# Patient Record
Sex: MALE | Race: WHITE | HISPANIC OR LATINO | Employment: OTHER | URBAN - METROPOLITAN AREA
[De-identification: names, ages, dates, MRNs, and addresses within clinical notes are randomized per-mention and may not be internally consistent; named-entity substitution may affect disease eponyms.]

---

## 2017-11-21 ENCOUNTER — HOSPITAL ENCOUNTER (OUTPATIENT)
Dept: FAMILY MEDICINE CLINIC | Facility: CLINIC | Age: 77
Discharge: HOME | End: 2017-11-21
Attending: NURSE PRACTITIONER | Admitting: NURSE PRACTITIONER

## 2018-03-20 ENCOUNTER — HOSPITAL ENCOUNTER (OUTPATIENT)
Dept: FAMILY MEDICINE CLINIC | Facility: CLINIC | Age: 78
Discharge: HOME | End: 2018-03-20
Attending: NURSE PRACTITIONER | Admitting: NURSE PRACTITIONER

## 2018-04-05 ENCOUNTER — HOSPITAL ENCOUNTER (OUTPATIENT)
Dept: MRI IMAGING | Facility: HOSPITAL | Age: 78
Discharge: HOME | End: 2018-04-05
Attending: NURSE PRACTITIONER | Admitting: NURSE PRACTITIONER

## 2018-04-13 ENCOUNTER — HOSPITAL ENCOUNTER (OUTPATIENT)
Dept: GENERAL RADIOLOGY | Facility: HOSPITAL | Age: 78
Discharge: HOME | End: 2018-04-13
Attending: NEUROLOGICAL SURGERY | Admitting: NEUROLOGICAL SURGERY

## 2019-09-19 PROBLEM — M54.9 BACK PAIN: Status: ACTIVE | Noted: 2018-04-05

## 2019-11-18 ENCOUNTER — FLU SHOT (OUTPATIENT)
Dept: FAMILY MEDICINE CLINIC | Facility: CLINIC | Age: 79
End: 2019-11-18

## 2019-11-18 DIAGNOSIS — Z23 FLU VACCINE NEED: Primary | ICD-10-CM

## 2019-11-18 PROCEDURE — G0008 ADMIN INFLUENZA VIRUS VAC: HCPCS | Performed by: NURSE PRACTITIONER

## 2019-11-18 PROCEDURE — 90653 IIV ADJUVANT VACCINE IM: CPT | Performed by: NURSE PRACTITIONER

## 2020-01-08 ENCOUNTER — OFFICE VISIT (OUTPATIENT)
Dept: FAMILY MEDICINE CLINIC | Facility: CLINIC | Age: 80
End: 2020-01-08

## 2020-01-08 VITALS
SYSTOLIC BLOOD PRESSURE: 172 MMHG | WEIGHT: 178.4 LBS | HEIGHT: 65 IN | OXYGEN SATURATION: 94 % | DIASTOLIC BLOOD PRESSURE: 70 MMHG | TEMPERATURE: 98.4 F | BODY MASS INDEX: 29.72 KG/M2 | HEART RATE: 64 BPM

## 2020-01-08 DIAGNOSIS — M54.9 CHRONIC BACK PAIN, UNSPECIFIED BACK LOCATION, UNSPECIFIED BACK PAIN LATERALITY: Primary | ICD-10-CM

## 2020-01-08 DIAGNOSIS — N13.30 HYDRONEPHROSIS, UNSPECIFIED HYDRONEPHROSIS TYPE: ICD-10-CM

## 2020-01-08 DIAGNOSIS — G89.29 CHRONIC BACK PAIN, UNSPECIFIED BACK LOCATION, UNSPECIFIED BACK PAIN LATERALITY: Primary | ICD-10-CM

## 2020-01-08 DIAGNOSIS — E11.9 TYPE 2 DIABETES MELLITUS WITHOUT COMPLICATION, WITH LONG-TERM CURRENT USE OF INSULIN (HCC): ICD-10-CM

## 2020-01-08 DIAGNOSIS — E87.5 HYPERKALEMIA: ICD-10-CM

## 2020-01-08 DIAGNOSIS — Z79.4 TYPE 2 DIABETES MELLITUS WITHOUT COMPLICATION, WITH LONG-TERM CURRENT USE OF INSULIN (HCC): ICD-10-CM

## 2020-01-08 PROBLEM — J01.90 ACUTE SINUSITIS: Status: ACTIVE | Noted: 2018-11-06

## 2020-01-08 PROBLEM — D64.9 ANEMIA: Status: ACTIVE | Noted: 2017-06-15

## 2020-01-08 PROBLEM — Z87.74 STATUS POST PATENT FORAMEN OVALE CLOSURE: Status: ACTIVE | Noted: 2017-03-30

## 2020-01-08 PROBLEM — R53.83 FATIGUE: Status: ACTIVE | Noted: 2018-08-03

## 2020-01-08 PROBLEM — E78.5 HYPERLIPIDEMIA: Status: ACTIVE | Noted: 2020-01-08

## 2020-01-08 PROBLEM — Z86.73 HISTORY OF TRANSIENT ISCHEMIC ATTACK: Status: ACTIVE | Noted: 2017-04-04

## 2020-01-08 PROBLEM — K92.2 GI BLEED: Status: ACTIVE | Noted: 2017-04-25

## 2020-01-08 PROBLEM — C34.90 ADENOCARCINOMA, LUNG: Status: ACTIVE | Noted: 2017-08-21

## 2020-01-08 PROBLEM — G47.33 OBSTRUCTIVE SLEEP APNEA: Status: ACTIVE | Noted: 2017-08-14

## 2020-01-08 PROBLEM — J30.9 ALLERGIC RHINITIS: Status: ACTIVE | Noted: 2018-11-06

## 2020-01-08 PROBLEM — K25.0 ACUTE GASTRIC ULCER WITH HEMORRHAGE: Status: ACTIVE | Noted: 2017-05-17

## 2020-01-08 PROBLEM — K21.9 GASTROESOPHAGEAL REFLUX DISEASE: Status: ACTIVE | Noted: 2020-01-08

## 2020-01-08 PROCEDURE — 99214 OFFICE O/P EST MOD 30 MIN: CPT | Performed by: NURSE PRACTITIONER

## 2020-01-08 RX ORDER — GLIMEPIRIDE 1 MG/1
1 TABLET ORAL DAILY
COMMUNITY
Start: 2015-03-20 | End: 2020-04-30 | Stop reason: SDUPTHER

## 2020-01-08 RX ORDER — ACETAMINOPHEN 160 MG
2000 TABLET,DISINTEGRATING ORAL DAILY
COMMUNITY
End: 2022-11-22

## 2020-01-08 RX ORDER — PRAVASTATIN SODIUM 40 MG
1 TABLET ORAL DAILY
COMMUNITY
Start: 2018-09-21 | End: 2020-04-30 | Stop reason: SDUPTHER

## 2020-01-08 RX ORDER — TAMSULOSIN HYDROCHLORIDE 0.4 MG/1
1 CAPSULE ORAL DAILY
COMMUNITY
Start: 2016-01-18 | End: 2020-04-28 | Stop reason: SDUPTHER

## 2020-01-08 RX ORDER — LANOLIN ALCOHOL/MO/W.PET/CERES
1 CREAM (GRAM) TOPICAL 2 TIMES DAILY
Status: ON HOLD | COMMUNITY
Start: 2017-10-16 | End: 2020-02-09 | Stop reason: SDUPTHER

## 2020-01-08 RX ORDER — AZITHROMYCIN 250 MG/1
TABLET, FILM COATED ORAL
Qty: 6 TABLET | Refills: 0 | Status: SHIPPED | OUTPATIENT
Start: 2020-01-08 | End: 2020-02-07

## 2020-01-08 RX ORDER — TORSEMIDE 5 MG/1
1 TABLET ORAL DAILY
COMMUNITY
Start: 2017-09-29 | End: 2020-02-09 | Stop reason: HOSPADM

## 2020-01-08 RX ORDER — HYDROCODONE BITARTRATE AND ACETAMINOPHEN 10; 325 MG/1; MG/1
1 TABLET ORAL EVERY 4 HOURS PRN
COMMUNITY
Start: 2020-01-03 | End: 2020-02-07

## 2020-01-08 RX ORDER — GEMFIBROZIL 600 MG/1
1 TABLET, FILM COATED ORAL 2 TIMES DAILY
COMMUNITY
Start: 2011-12-01 | End: 2020-02-07

## 2020-01-08 RX ORDER — METOPROLOL SUCCINATE 25 MG/1
1 TABLET, EXTENDED RELEASE ORAL DAILY
Status: ON HOLD | COMMUNITY
Start: 2017-10-16 | End: 2020-02-09 | Stop reason: SDUPTHER

## 2020-01-08 NOTE — PATIENT INSTRUCTIONS
Take antibiotics allergy medication as directed   keep appointment with Nephrology and new pain management clinic   take increased metoprolol monitor blood pressure

## 2020-01-08 NOTE — PROGRESS NOTES
Subjective   Kailash Cooper is a 79 y.o. male.      79-year-old white male with history of hypertension, hyperlipidemia, chronic back pain, CKD 3, type 2 diabetes, right arm tremor, anemia and lung cancer who comes in today with 1 week history of nasal congestion and maxillary pressure with some wheezing.  Exam reveals allergic rhinitis and acute sinusitis I am placing him on allergy medication and a Z-Shady   patient's blood sugars are doing excellent less than 110 fasting in the morning  Patient is requesting to be transferred to a new [place] pain management  Patient also needs to find a new nephrologist and I am going to send him to Dr. Lujan   blood pressure 172/70 heart rate 64 he denies any chest pain, dyspnea, tachycardia dizziness.  I am going to increase his metoprolol to 25 mg in the morning and 12.5 in the evening and he is to monitor blood pressure     Zyrtec/ Flonase   Z-Shady   nephrology referral   pain management referral       The following portions of the patient's history were reviewed and updated as appropriate: allergies, current medications, past family history, past medical history, past social history, past surgical history and problem list.    Review of Systems   Constitutional: Negative.    HENT: Positive for postnasal drip, rhinorrhea and sinus pressure.    Respiratory: Positive for cough.    Cardiovascular: Negative.    Gastrointestinal: Negative.    Genitourinary: Negative.    Musculoskeletal:        Chronic back and hip pain   Skin: Negative.    Neurological: Negative.    Psychiatric/Behavioral: Negative.        Objective   Physical Exam   Constitutional: He is oriented to person, place, and time. He appears well-developed and well-nourished.   Cardiovascular: Normal rate and regular rhythm.   Pulmonary/Chest:   Lungs clear but diminished   Abdominal: Soft. Bowel sounds are normal.   Musculoskeletal:   Chronic back and hip pain   Neurological: He is alert and oriented to person, place, and  time.   Skin: Skin is warm and dry.   Psychiatric: He has a normal mood and affect.         Assessment/Plan   Kailash was seen today for cough.    Diagnoses and all orders for this visit:    Type 2 diabetes mellitus without complication, with long-term current use of insulin (CMS/[facility])    Other orders  -     azithromycin (ZITHROMAX Z-BLANQUITA) 250 MG tablet; Take 2 tablets the first day, then 1 tablet daily for 4 days.

## 2020-01-27 ENCOUNTER — PATIENT OUTREACH (OUTPATIENT)
Dept: CASE MANAGEMENT | Facility: OTHER | Age: 80
End: 2020-01-27

## 2020-01-27 NOTE — OUTREACH NOTE
LM for pt informing him that he is due for his AWV and to call and schedule.     Paul Dasilva, [facility]  Health and    Phone: [phone]

## 2020-02-07 ENCOUNTER — APPOINTMENT (OUTPATIENT)
Dept: CT IMAGING | Facility: HOSPITAL | Age: 80
End: 2020-02-07

## 2020-02-07 ENCOUNTER — HOSPITAL ENCOUNTER (OUTPATIENT)
Facility: HOSPITAL | Age: 80
Setting detail: OBSERVATION
Discharge: HOME | End: 2020-02-09
Attending: EMERGENCY MEDICINE | Admitting: INTERNAL MEDICINE

## 2020-02-07 ENCOUNTER — TELEPHONE (OUTPATIENT)
Dept: FAMILY MEDICINE CLINIC | Facility: CLINIC | Age: 80
End: 2020-02-07

## 2020-02-07 ENCOUNTER — APPOINTMENT (OUTPATIENT)
Dept: GENERAL RADIOLOGY | Facility: HOSPITAL | Age: 80
End: 2020-02-07

## 2020-02-07 DIAGNOSIS — I67.4 HYPERTENSIVE ENCEPHALOPATHY: Primary | ICD-10-CM

## 2020-02-07 DIAGNOSIS — I12.9 HYPERTENSIVE NEPHROPATHY: ICD-10-CM

## 2020-02-07 PROBLEM — K25.0 ACUTE GASTRIC ULCER WITH HEMORRHAGE: Status: RESOLVED | Noted: 2017-05-17 | Resolved: 2020-02-07

## 2020-02-07 PROBLEM — J01.90 ACUTE SINUSITIS: Status: RESOLVED | Noted: 2018-11-06 | Resolved: 2020-02-07

## 2020-02-07 PROBLEM — Z85.118 HISTORY OF ADENOCARCINOMA OF LUNG: Chronic | Status: ACTIVE | Noted: 2017-08-21

## 2020-02-07 LAB
ALBUMIN SERPL-MCNC: 4.5 G/DL (ref 3.5–5.2)
ALBUMIN/GLOB SERPL: 1.6 G/DL
ALP SERPL-CCNC: 105 U/L (ref 39–117)
ALT SERPL W P-5'-P-CCNC: 15 U/L (ref 1–41)
ANION GAP SERPL CALCULATED.3IONS-SCNC: 18 MMOL/L (ref 5–15)
AST SERPL-CCNC: 23 U/L (ref 1–40)
BACTERIA UR QL AUTO: ABNORMAL /HPF
BASOPHILS # BLD AUTO: 0.1 10*3/MM3 (ref 0–0.2)
BASOPHILS NFR BLD AUTO: 0.8 % (ref 0–1.5)
BILIRUB SERPL-MCNC: 0.7 MG/DL (ref 0.2–1.2)
BILIRUB UR QL STRIP: NEGATIVE
BUN BLD-MCNC: 12 MG/DL (ref 8–23)
BUN/CREAT SERPL: 7.8 (ref 7–25)
CALCIUM SPEC-SCNC: 10.3 MG/DL (ref 8.6–10.5)
CHLORIDE SERPL-SCNC: 100 MMOL/L (ref 98–107)
CLARITY UR: CLEAR
CO2 SERPL-SCNC: 22 MMOL/L (ref 22–29)
COLOR UR: YELLOW
CREAT BLD-MCNC: 1.54 MG/DL (ref 0.76–1.27)
DEPRECATED RDW RBC AUTO: 45.1 FL (ref 37–54)
EOSINOPHIL # BLD AUTO: 0.1 10*3/MM3 (ref 0–0.4)
EOSINOPHIL NFR BLD AUTO: 1.5 % (ref 0.3–6.2)
ERYTHROCYTE [DISTWIDTH] IN BLOOD BY AUTOMATED COUNT: 14.1 % (ref 12.3–15.4)
GFR SERPL CREATININE-BSD FRML MDRD: 44 ML/MIN/1.73
GLOBULIN UR ELPH-MCNC: 2.8 GM/DL
GLUCOSE BLD-MCNC: 133 MG/DL (ref 65–99)
GLUCOSE BLDC GLUCOMTR-MCNC: 105 MG/DL (ref 70–105)
GLUCOSE BLDC GLUCOMTR-MCNC: 127 MG/DL (ref 70–105)
GLUCOSE BLDC GLUCOMTR-MCNC: 145 MG/DL (ref 70–105)
GLUCOSE UR STRIP-MCNC: NEGATIVE MG/DL
HCT VFR BLD AUTO: 45.4 % (ref 37.5–51)
HGB BLD-MCNC: 15.7 G/DL (ref 13–17.7)
HGB UR QL STRIP.AUTO: ABNORMAL
HYALINE CASTS UR QL AUTO: ABNORMAL /LPF
KETONES UR QL STRIP: ABNORMAL
LEUKOCYTE ESTERASE UR QL STRIP.AUTO: NEGATIVE
LYMPHOCYTES # BLD AUTO: 1.1 10*3/MM3 (ref 0.7–3.1)
LYMPHOCYTES NFR BLD AUTO: 13.9 % (ref 19.6–45.3)
MCH RBC QN AUTO: 31 PG (ref 26.6–33)
MCHC RBC AUTO-ENTMCNC: 34.5 G/DL (ref 31.5–35.7)
MCV RBC AUTO: 90 FL (ref 79–97)
MONOCYTES # BLD AUTO: 0.6 10*3/MM3 (ref 0.1–0.9)
MONOCYTES NFR BLD AUTO: 7.4 % (ref 5–12)
NEUTROPHILS # BLD AUTO: 6.2 10*3/MM3 (ref 1.7–7)
NEUTROPHILS NFR BLD AUTO: 76.4 % (ref 42.7–76)
NITRITE UR QL STRIP: NEGATIVE
NRBC BLD AUTO-RTO: 0.1 /100 WBC (ref 0–0.2)
PH UR STRIP.AUTO: 6.5 [PH] (ref 5–8)
PLATELET # BLD AUTO: 223 10*3/MM3 (ref 140–450)
PMV BLD AUTO: 7 FL (ref 6–12)
POTASSIUM BLD-SCNC: 3.8 MMOL/L (ref 3.5–5.2)
PROT SERPL-MCNC: 7.3 G/DL (ref 6–8.5)
PROT UR QL STRIP: ABNORMAL
RBC # BLD AUTO: 5.05 10*6/MM3 (ref 4.14–5.8)
RBC # UR: ABNORMAL /HPF
REF LAB TEST METHOD: ABNORMAL
SODIUM BLD-SCNC: 140 MMOL/L (ref 136–145)
SP GR UR STRIP: 1.01 (ref 1–1.03)
SQUAMOUS #/AREA URNS HPF: ABNORMAL /HPF
TROPONIN T SERPL-MCNC: 0.02 NG/ML (ref 0–0.03)
TROPONIN T SERPL-MCNC: <0.01 NG/ML (ref 0–0.03)
TSH SERPL DL<=0.05 MIU/L-ACNC: 1.9 UIU/ML (ref 0.27–4.2)
UROBILINOGEN UR QL STRIP: ABNORMAL
WBC NRBC COR # BLD: 8.1 10*3/MM3 (ref 3.4–10.8)
WBC UR QL AUTO: ABNORMAL /HPF
WHOLE BLOOD HOLD SPECIMEN: NORMAL

## 2020-02-07 PROCEDURE — G0378 HOSPITAL OBSERVATION PER HR: HCPCS

## 2020-02-07 PROCEDURE — 25010000002 ENOXAPARIN PER 10 MG: Performed by: PHYSICIAN ASSISTANT

## 2020-02-07 PROCEDURE — 70450 CT HEAD/BRAIN W/O DYE: CPT

## 2020-02-07 PROCEDURE — 70460 CT HEAD/BRAIN W/DYE: CPT

## 2020-02-07 PROCEDURE — 36415 COLL VENOUS BLD VENIPUNCTURE: CPT

## 2020-02-07 PROCEDURE — 96375 TX/PRO/DX INJ NEW DRUG ADDON: CPT

## 2020-02-07 PROCEDURE — 99220 PR INITIAL OBSERVATION CARE/DAY 70 MINUTES: CPT | Performed by: INTERNAL MEDICINE

## 2020-02-07 PROCEDURE — 84443 ASSAY THYROID STIM HORMONE: CPT | Performed by: EMERGENCY MEDICINE

## 2020-02-07 PROCEDURE — 84484 ASSAY OF TROPONIN QUANT: CPT | Performed by: PHYSICIAN ASSISTANT

## 2020-02-07 PROCEDURE — 82962 GLUCOSE BLOOD TEST: CPT

## 2020-02-07 PROCEDURE — 81001 URINALYSIS AUTO W/SCOPE: CPT | Performed by: EMERGENCY MEDICINE

## 2020-02-07 PROCEDURE — 71045 X-RAY EXAM CHEST 1 VIEW: CPT

## 2020-02-07 PROCEDURE — 96372 THER/PROPH/DIAG INJ SC/IM: CPT

## 2020-02-07 PROCEDURE — 96376 TX/PRO/DX INJ SAME DRUG ADON: CPT

## 2020-02-07 PROCEDURE — 82565 ASSAY OF CREATININE: CPT | Performed by: NURSE PRACTITIONER

## 2020-02-07 PROCEDURE — 25010000002 HYDRALAZINE PER 20 MG: Performed by: NURSE PRACTITIONER

## 2020-02-07 PROCEDURE — 99284 EMERGENCY DEPT VISIT MOD MDM: CPT

## 2020-02-07 PROCEDURE — 80053 COMPREHEN METABOLIC PANEL: CPT | Performed by: EMERGENCY MEDICINE

## 2020-02-07 PROCEDURE — 85025 COMPLETE CBC W/AUTO DIFF WBC: CPT | Performed by: EMERGENCY MEDICINE

## 2020-02-07 PROCEDURE — 84484 ASSAY OF TROPONIN QUANT: CPT | Performed by: EMERGENCY MEDICINE

## 2020-02-07 PROCEDURE — 87040 BLOOD CULTURE FOR BACTERIA: CPT | Performed by: EMERGENCY MEDICINE

## 2020-02-07 PROCEDURE — 93005 ELECTROCARDIOGRAM TRACING: CPT | Performed by: EMERGENCY MEDICINE

## 2020-02-07 PROCEDURE — 96374 THER/PROPH/DIAG INJ IV PUSH: CPT

## 2020-02-07 PROCEDURE — 70470 CT HEAD/BRAIN W/O & W/DYE: CPT

## 2020-02-07 RX ORDER — TORSEMIDE 10 MG/1
5 TABLET ORAL DAILY
Status: DISCONTINUED | OUTPATIENT
Start: 2020-02-07 | End: 2020-02-07

## 2020-02-07 RX ORDER — FERROUS SULFATE TAB EC 324 MG (65 MG FE EQUIVALENT) 324 (65 FE) MG
325 TABLET DELAYED RESPONSE ORAL 2 TIMES DAILY
Status: DISCONTINUED | OUTPATIENT
Start: 2020-02-07 | End: 2020-02-07

## 2020-02-07 RX ORDER — ATORVASTATIN CALCIUM 10 MG/1
10 TABLET, FILM COATED ORAL DAILY
Status: DISCONTINUED | OUTPATIENT
Start: 2020-02-07 | End: 2020-02-09 | Stop reason: HOSPADM

## 2020-02-07 RX ORDER — DOCUSATE SODIUM 100 MG/1
100 CAPSULE, LIQUID FILLED ORAL 2 TIMES DAILY PRN
Status: DISCONTINUED | OUTPATIENT
Start: 2020-02-07 | End: 2020-02-09 | Stop reason: HOSPADM

## 2020-02-07 RX ORDER — LABETALOL HYDROCHLORIDE 5 MG/ML
40 INJECTION, SOLUTION INTRAVENOUS ONCE
Status: COMPLETED | OUTPATIENT
Start: 2020-02-07 | End: 2020-02-07

## 2020-02-07 RX ORDER — SODIUM CHLORIDE 0.9 % (FLUSH) 0.9 %
10 SYRINGE (ML) INJECTION EVERY 12 HOURS SCHEDULED
Status: DISCONTINUED | OUTPATIENT
Start: 2020-02-07 | End: 2020-02-09 | Stop reason: HOSPADM

## 2020-02-07 RX ORDER — MAGNESIUM SULFATE HEPTAHYDRATE 40 MG/ML
2 INJECTION, SOLUTION INTRAVENOUS AS NEEDED
Status: DISCONTINUED | OUTPATIENT
Start: 2020-02-07 | End: 2020-02-09 | Stop reason: HOSPADM

## 2020-02-07 RX ORDER — ACETAMINOPHEN 650 MG/1
650 SUPPOSITORY RECTAL EVERY 4 HOURS PRN
Status: DISCONTINUED | OUTPATIENT
Start: 2020-02-07 | End: 2020-02-09 | Stop reason: HOSPADM

## 2020-02-07 RX ORDER — DEXTROSE MONOHYDRATE 25 G/50ML
25 INJECTION, SOLUTION INTRAVENOUS
Status: DISCONTINUED | OUTPATIENT
Start: 2020-02-07 | End: 2020-02-09 | Stop reason: HOSPADM

## 2020-02-07 RX ORDER — HYDRALAZINE HYDROCHLORIDE 20 MG/ML
10 INJECTION INTRAMUSCULAR; INTRAVENOUS EVERY 6 HOURS PRN
Status: DISCONTINUED | OUTPATIENT
Start: 2020-02-07 | End: 2020-02-09 | Stop reason: HOSPADM

## 2020-02-07 RX ORDER — HYDROCODONE BITARTRATE AND ACETAMINOPHEN 10; 325 MG/1; MG/1
1 TABLET ORAL EVERY 4 HOURS PRN
COMMUNITY
End: 2020-02-09 | Stop reason: HOSPADM

## 2020-02-07 RX ORDER — ONDANSETRON 2 MG/ML
4 INJECTION INTRAMUSCULAR; INTRAVENOUS EVERY 6 HOURS PRN
Status: DISCONTINUED | OUTPATIENT
Start: 2020-02-07 | End: 2020-02-09 | Stop reason: HOSPADM

## 2020-02-07 RX ORDER — METOPROLOL SUCCINATE 50 MG/1
50 TABLET, EXTENDED RELEASE ORAL DAILY
Status: DISCONTINUED | OUTPATIENT
Start: 2020-02-08 | End: 2020-02-09 | Stop reason: HOSPADM

## 2020-02-07 RX ORDER — ACETAMINOPHEN 325 MG/1
650 TABLET ORAL EVERY 4 HOURS PRN
Status: DISCONTINUED | OUTPATIENT
Start: 2020-02-07 | End: 2020-02-09 | Stop reason: HOSPADM

## 2020-02-07 RX ORDER — FAMOTIDINE 20 MG/1
20 TABLET, FILM COATED ORAL DAILY
COMMUNITY
End: 2022-06-15 | Stop reason: SDUPTHER

## 2020-02-07 RX ORDER — MAGNESIUM SULFATE HEPTAHYDRATE 40 MG/ML
4 INJECTION, SOLUTION INTRAVENOUS AS NEEDED
Status: DISCONTINUED | OUTPATIENT
Start: 2020-02-07 | End: 2020-02-09 | Stop reason: HOSPADM

## 2020-02-07 RX ORDER — LISINOPRIL 5 MG/1
10 TABLET ORAL
Status: DISCONTINUED | OUTPATIENT
Start: 2020-02-07 | End: 2020-02-08

## 2020-02-07 RX ORDER — LANOLIN ALCOHOL/MO/W.PET/CERES
1000 CREAM (GRAM) TOPICAL DAILY
Status: DISCONTINUED | OUTPATIENT
Start: 2020-02-08 | End: 2020-02-09 | Stop reason: HOSPADM

## 2020-02-07 RX ORDER — SODIUM CHLORIDE 0.9 % (FLUSH) 0.9 %
10 SYRINGE (ML) INJECTION AS NEEDED
Status: DISCONTINUED | OUTPATIENT
Start: 2020-02-07 | End: 2020-02-09 | Stop reason: HOSPADM

## 2020-02-07 RX ORDER — FERROUS SULFATE TAB EC 324 MG (65 MG FE EQUIVALENT) 324 (65 FE) MG
325 TABLET DELAYED RESPONSE ORAL
Status: DISCONTINUED | OUTPATIENT
Start: 2020-02-08 | End: 2020-02-09 | Stop reason: HOSPADM

## 2020-02-07 RX ORDER — LANOLIN ALCOHOL/MO/W.PET/CERES
2500 CREAM (GRAM) TOPICAL DAILY
Status: ON HOLD | COMMUNITY
End: 2020-02-09 | Stop reason: SDUPTHER

## 2020-02-07 RX ORDER — POTASSIUM CHLORIDE 1.5 G/1.77G
40 POWDER, FOR SOLUTION ORAL AS NEEDED
Status: DISCONTINUED | OUTPATIENT
Start: 2020-02-07 | End: 2020-02-09 | Stop reason: HOSPADM

## 2020-02-07 RX ORDER — FLUTICASONE PROPIONATE 50 MCG
2 SPRAY, SUSPENSION (ML) NASAL DAILY
Status: DISCONTINUED | OUTPATIENT
Start: 2020-02-08 | End: 2020-02-08

## 2020-02-07 RX ORDER — CALCIUM CARBONATE 200(500)MG
2 TABLET,CHEWABLE ORAL 2 TIMES DAILY PRN
Status: DISCONTINUED | OUTPATIENT
Start: 2020-02-07 | End: 2020-02-09 | Stop reason: HOSPADM

## 2020-02-07 RX ORDER — LABETALOL HYDROCHLORIDE 5 MG/ML
10 INJECTION, SOLUTION INTRAVENOUS EVERY 4 HOURS PRN
Status: DISCONTINUED | OUTPATIENT
Start: 2020-02-07 | End: 2020-02-09 | Stop reason: HOSPADM

## 2020-02-07 RX ORDER — BISACODYL 10 MG
10 SUPPOSITORY, RECTAL RECTAL DAILY PRN
Status: DISCONTINUED | OUTPATIENT
Start: 2020-02-07 | End: 2020-02-09 | Stop reason: HOSPADM

## 2020-02-07 RX ORDER — ONDANSETRON 4 MG/1
4 TABLET, FILM COATED ORAL EVERY 6 HOURS PRN
Status: DISCONTINUED | OUTPATIENT
Start: 2020-02-07 | End: 2020-02-09 | Stop reason: HOSPADM

## 2020-02-07 RX ORDER — ALUMINA, MAGNESIA, AND SIMETHICONE 2400; 2400; 240 MG/30ML; MG/30ML; MG/30ML
15 SUSPENSION ORAL EVERY 6 HOURS PRN
Status: DISCONTINUED | OUTPATIENT
Start: 2020-02-07 | End: 2020-02-09 | Stop reason: HOSPADM

## 2020-02-07 RX ORDER — CHLORTHALIDONE 25 MG/1
25 TABLET ORAL DAILY
Status: DISCONTINUED | OUTPATIENT
Start: 2020-02-07 | End: 2020-02-09 | Stop reason: HOSPADM

## 2020-02-07 RX ORDER — TAMSULOSIN HYDROCHLORIDE 0.4 MG/1
0.4 CAPSULE ORAL DAILY
Status: DISCONTINUED | OUTPATIENT
Start: 2020-02-07 | End: 2020-02-09 | Stop reason: HOSPADM

## 2020-02-07 RX ORDER — CHOLECALCIFEROL (VITAMIN D3) 125 MCG
5000 CAPSULE ORAL DAILY
Status: DISCONTINUED | OUTPATIENT
Start: 2020-02-07 | End: 2020-02-07

## 2020-02-07 RX ORDER — PANTOPRAZOLE SODIUM 40 MG/1
40 TABLET, DELAYED RELEASE ORAL
Status: DISCONTINUED | OUTPATIENT
Start: 2020-02-07 | End: 2020-02-09 | Stop reason: HOSPADM

## 2020-02-07 RX ORDER — METOPROLOL SUCCINATE 25 MG/1
25 TABLET, EXTENDED RELEASE ORAL DAILY
Status: DISCONTINUED | OUTPATIENT
Start: 2020-02-07 | End: 2020-02-07

## 2020-02-07 RX ORDER — ACETAMINOPHEN 160 MG/5ML
650 SOLUTION ORAL EVERY 4 HOURS PRN
Status: DISCONTINUED | OUTPATIENT
Start: 2020-02-07 | End: 2020-02-09 | Stop reason: HOSPADM

## 2020-02-07 RX ORDER — CHOLECALCIFEROL (VITAMIN D3) 125 MCG
5 CAPSULE ORAL NIGHTLY PRN
Status: DISCONTINUED | OUTPATIENT
Start: 2020-02-07 | End: 2020-02-09 | Stop reason: HOSPADM

## 2020-02-07 RX ORDER — NICOTINE POLACRILEX 4 MG
15 LOZENGE BUCCAL
Status: DISCONTINUED | OUTPATIENT
Start: 2020-02-07 | End: 2020-02-09 | Stop reason: HOSPADM

## 2020-02-07 RX ORDER — POTASSIUM CHLORIDE 20 MEQ/1
40 TABLET, EXTENDED RELEASE ORAL AS NEEDED
Status: DISCONTINUED | OUTPATIENT
Start: 2020-02-07 | End: 2020-02-09 | Stop reason: HOSPADM

## 2020-02-07 RX ADMIN — PANTOPRAZOLE SODIUM 40 MG: 40 TABLET, DELAYED RELEASE ORAL at 17:05

## 2020-02-07 RX ADMIN — ACETAMINOPHEN 650 MG: 325 TABLET, FILM COATED ORAL at 17:06

## 2020-02-07 RX ADMIN — TAMSULOSIN HYDROCHLORIDE 0.4 MG: 0.4 CAPSULE ORAL at 17:05

## 2020-02-07 RX ADMIN — CYANOCOBALAMIN TAB 1000 MCG 2500 MCG: 1000 TAB at 17:03

## 2020-02-07 RX ADMIN — METOPROLOL TARTRATE 25 MG: 25 TABLET ORAL at 22:57

## 2020-02-07 RX ADMIN — TORSEMIDE 5 MG: 10 TABLET ORAL at 17:05

## 2020-02-07 RX ADMIN — ENOXAPARIN SODIUM 40 MG: 40 INJECTION SUBCUTANEOUS at 17:08

## 2020-02-07 RX ADMIN — FERROUS SULFATE TAB EC 324 MG (65 MG FE EQUIVALENT) 325 MG: 324 (65 FE) TABLET DELAYED RESPONSE at 22:14

## 2020-02-07 RX ADMIN — Medication 10 ML: at 21:48

## 2020-02-07 RX ADMIN — ATORVASTATIN CALCIUM 10 MG: 10 TABLET, FILM COATED ORAL at 17:05

## 2020-02-07 RX ADMIN — MELATONIN TAB 5 MG 5 MG: 5 TAB at 21:48

## 2020-02-07 RX ADMIN — METOPROLOL SUCCINATE 25 MG: 25 TABLET, EXTENDED RELEASE ORAL at 17:06

## 2020-02-07 RX ADMIN — LABETALOL 20 MG/4 ML (5 MG/ML) INTRAVENOUS SYRINGE 10 MG: at 18:30

## 2020-02-07 RX ADMIN — LISINOPRIL 10 MG: 5 TABLET ORAL at 22:56

## 2020-02-07 RX ADMIN — HYDRALAZINE HYDROCHLORIDE 10 MG: 20 INJECTION INTRAMUSCULAR; INTRAVENOUS at 22:09

## 2020-02-07 RX ADMIN — LABETALOL 20 MG/4 ML (5 MG/ML) INTRAVENOUS SYRINGE 40 MG: at 14:39

## 2020-02-07 RX ADMIN — CHLORTHALIDONE 25 MG: 25 TABLET ORAL at 22:56

## 2020-02-07 NOTE — ED PROVIDER NOTES
Subjective   Devenney 9-year-old male complaining of confusion and difficulty resting last night.  The patient's blood pressure was in the 260/120 range according to his family.  He complained of some shortness of breath but denied chest pain.  He complained of headache but denied focal neurologic deficit or lateralizing neurologic sign.  There is been no reported recent cough or burning with urination.  The patient uses hydrocodone for pain and also occasionally will take gabapentin and Benadryl in an attempt to deal with the discomfort.  The patient has had no reports of melena hematemesis hematochezia.  His family has reported that he has looked depressed recently          Review of Systems   Constitutional: Positive for fatigue. Negative for chills and fever.   Respiratory: Negative for chest tightness and shortness of breath.    Gastrointestinal: Negative for abdominal pain.   Neurological: Positive for dizziness, weakness, light-headedness and headaches. Negative for seizures, facial asymmetry, speech difficulty and numbness.   All other systems reviewed and are negative.      Past Medical History:   Diagnosis Date   • CKD (chronic kidney disease)     Stage 3   • Diabetes (CMS/HCC)    • Enlarged prostate    • GERD (gastroesophageal reflux disease)    • Hiatal hernia    • Hyperlipidemia    • Hypertension        No Known Allergies    Past Surgical History:   Procedure Laterality Date   • CATARACT EXTRACTION  2006    OU   • COLONOSCOPY  2011       Family History   Problem Relation Age of Onset   • Stroke Mother    • Cancer Father         Prostate   • Heart failure Brother    • Heart disease Other        Social History     Socioeconomic History   • Marital status:      Spouse name: Not on file   • Number of children: Not on file   • Years of education: Not on file   • Highest education level: Not on file   Tobacco Use   • Smoking status: Former Smoker           Objective   Physical Exam  Alert [place] Coma  Scale 15   HEENT: Pupils equal and reactive to light. Conjunctivae are not injected. normal tympanic membranes. Oropharynx and nares are normal.   Neck: Supple. Midline trachea. No JVD. No goiter.   Chest: Clear and equal breath sounds bilaterally regular rate and rhythm without murmur or rub.   Abdomen: Positive bowel sounds nontender nondistended. No rebound or peritoneal signs. No CVA tenderness.   Extremities no clubbing cyanosis or edema motor sensory exam is normal the full range of motion is intact  Neuro: Right-hand-dominant cranial nerves are grossly intact no abnormal reflexes are elicited the patient gait was not assessed.  He was unsteady on finger-nose testing with some cogwheeling noted   skin: Warm and dry, no rashes or petechia.   Lymphatic: No regional lymphadenopathy. No calf pain, swelling or Sara's sign    Procedures           ED Course      .EDLABS  Medications   labetalol (NORMODYNE,TRANDATE) injection 40 mg (40 mg Intravenous Given 2/7/20 1439)     Ct Head Without Contrast    Result Date: 2/7/2020  1.Global atrophy.  Electronically Signed By-Mj Flores On:2/7/2020 2:26 PM This report was finalized on [id number] by  Mj Flores, .    Xr Chest 1 View    Result Date: 2/7/2020  1.Atelectasis or scarring left lower chest.  Electronically Signed ByDiana Flores On:2/7/2020 2:24 PM This report was finalized on [id number] by  Mj Flores, .                                             MDM  Number of Diagnoses or Management Options     Amount and/or Complexity of Data Reviewed  Clinical lab tests: reviewed  Tests in the radiology section of CPT®: reviewed  Obtain history from someone other than the patient: yes  Review and summarize past medical records: yes  Discuss the patient with other providers: yes  Independent visualization of images, tracings, or specimens: yes    Risk of Complications, Morbidity, and/or Mortality  Presenting problems: high  Diagnostic procedures: high  Management  options: high  General comments: Patient's family was able to provide some historical information.  The case was discussed with the hospitalist practitioner.  The patient will be admitted for control of blood pressure and serial troponin.  The possibility of nocturnal polypharmacy contributing to the patient's discomfort is certainly considered        Final diagnoses:   Hypertensive encephalopathy   Hypertensive nephropathy            Guille Friedman MD  02/07/20 1525

## 2020-02-07 NOTE — H&P
[facility] Medicine Services    Patient Name: Kailash Cooper  : 1940  MRN: [id number]  Primary Care Physician: Madelyn Márquez APRN  Date of admission: 2020    Patient Care Team:  Madelyn Márquez APRN as PCP - General    Subjective   History Present Illness     Chief Complaint:   Chief Complaint   Patient presents with   • Altered Mental Status     Mr. Cooper is a 79 y.o. with a past medical history of CKD stage 3, DM 2, GERD, HLD, HTN, previous TIA, LIBBY and lung cancer who presents to [facility]  with reports of intermittent confusion x 1 week.  The patient is confused upon my arrival, so he is unable to provide information.  His daughter states his wife had commented confusion earlier in the week, but did not think anything of it.  He was more altered today, so they brought him into the ED.  Because of this, he did not take any of his morning medication.  He denies chest pain, dizziness, dyspnea, nausea or vomiting.      In the ED, the patient's labs included the following: Na 140, K 3.8, BUN 12, Cr 1.54, glucose 133, WBC 8.1, hgb 15.7, plts 223.  Troponin negative.  CT head negative.  TSH normal.  UA without signs of infection.  The patient's BP on arrival was 240s/120s.  He was given Labetalol 40 mg once in the ED with some improvement.  He was admitted for further evaluation and management.      History of Present Illness    Review of Systems   Unable to perform ROS: mental status change     Personal History     Past Medical History:   Past Medical History:   Diagnosis Date   • Enlarged prostate    • Hiatal hernia    • Hyperlipidemia    • Hypertension      Surgical History:      Past Surgical History:   Procedure Laterality Date   • CATARACT EXTRACTION      OU   • COLONOSCOPY       Family History: family history includes Cancer in his father; Heart disease in an other family member; Heart failure in his brother; Stroke in his mother. Otherwise pertinent  FHx was reviewed and unremarkable.     Social History:  reports that he has quit smoking. He does not have any smokeless tobacco history on file.    Medications:  Prior to Admission medications    Medication Sig Start Date End Date Taking? Authorizing Provider   famotidine (PEPCID) 20 MG tablet Take 20 mg by mouth Daily.   Yes Britney Partida MD   HYDROcodone-acetaminophen (NORCO)  MG per tablet Take 1 tablet by mouth Every 4 (Four) Hours As Needed for Moderate Pain .   Yes Madelyn Márquez APRN   vitamin B-12 (CYANOCOBALAMIN) 1000 MCG tablet Take 2,500 mcg by mouth Daily.   Yes Britney Partida MD   Cholecalciferol (VITAMIN D3) 50 MCG (2000 UT) capsule Take 2,000 Units by mouth Daily.    Britney Partida MD   ferrous sulfate 325 (65 FE) MG EC tablet Take 1 tablet by mouth 2 (Two) Times a Day. 10/16/17   Britney Partida MD   glimepiride (AMARYL) 1 MG tablet Take 1 tablet by mouth Daily. 3/20/15   Britney Partida MD   metoprolol succinate XL (TOPROL-XL) 25 MG 24 hr tablet Take 1 tablet by mouth Daily. 10/16/17   Britney Partida MD   pravastatin (PRAVACHOL) 40 MG tablet Take 1 tablet by mouth Daily. 9/21/18   Britney Partida MD   tamsulosin (FLOMAX) 0.4 MG capsule 24 hr capsule Take 1 capsule by mouth Daily. 1/18/16   Britney Partida MD   torsemide (DEMADEX) 5 MG tablet Take 1 tablet by mouth Daily. 9/29/17   Britney Partida MD   azithromycin (ZITHROMAX Z-BLANQUITA) 250 MG tablet Take 2 tablets the first day, then 1 tablet daily for 4 days. 1/8/20 2/7/20  Madelyn Márquez APRN   Blood Glucose Monitoring Suppl (ACCU-CHEK SHANE) device ACCU-CHEK SHANE TEST STRIPS - Check BS twice a day DX Code E11.9 3/17/15 2/7/20  Britney Partida MD   gemfibrozil (LOPID) 600 MG tablet Take 1 tablet by mouth 2 (Two) Times a Day. 12/1/11 2/7/20  Britney Partida MD   glucose blood (ACCU-CHEK SHANE) test strip Check BS twice a day DX Code E11.9 3/17/15 2/7/20  Provider,  MD Britney   HYDROcodone-acetaminophen (NORCO)  MG per tablet Take 1 tablet by mouth Every 4 (Four) Hours As Needed. 1/3/20 2/7/20  Provider, MD Britney       Allergies:  No Known Allergies    Objective   Objective     Vital Signs  Temp:  [97.6 °F (36.4 °C)-97.8 °F (36.6 °C)] 97.6 °F (36.4 °C)  Heart Rate:  [62-97] 63  Resp:  [15-16] 15  BP: (154-257)/() 206/83  SpO2:  [96 %-98 %] 97 %  on   ;   Device (Oxygen Therapy): room air  Body mass index is 26.78 kg/m².    Physical Exam   Constitutional: He appears well-developed and well-nourished. No distress.   HENT:   Head: Normocephalic and atraumatic.   Eyes: Pupils are equal, round, and reactive to light. EOM are normal.   Neck: Normal range of motion. Neck supple.   Cardiovascular: Regular rhythm, normal heart sounds and intact distal pulses. Exam reveals no gallop and no friction rub.   No murmur heard.  hypertensive   Pulmonary/Chest: Effort normal and breath sounds normal. No stridor. No respiratory distress. He has no wheezes.   Abdominal: Soft. Bowel sounds are normal. He exhibits no distension. There is no tenderness. There is no guarding.   Musculoskeletal: Normal range of motion. He exhibits no edema or deformity.   Neurological: He is alert.   Oriented x 2   Skin: Skin is warm and dry. He is not diaphoretic.   Psychiatric: He has a normal mood and affect.   Vitals reviewed.    Results Review:    Results from last 7 days   Lab Units 02/07/20  1351   WBC 10*3/mm3 8.10   HEMOGLOBIN g/dL 15.7   HEMATOCRIT % 45.4   PLATELETS 10*3/mm3 223     Results from last 7 days   Lab Units 02/07/20  1351   SODIUM mmol/L 140   POTASSIUM mmol/L 3.8   CHLORIDE mmol/L 100   CO2 mmol/L 22.0   BUN mg/dL 12   CREATININE mg/dL 1.54*   GLUCOSE mg/dL 133*   CALCIUM mg/dL 10.3   ALT (SGPT) U/L 15   AST (SGOT) U/L 23   TROPONIN T ng/mL <0.010     Estimated Creatinine Clearance: 40.2 mL/min (A) (by C-G formula based on SCr of 1.54 mg/dL (H)).  Brief Urine Lab Results   (Last result in the past 365 days)      Color   Clarity   Blood   Leuk Est   Nitrite   Protein   CREAT   Urine HCG        02/07/20 1432 Yellow Clear Moderate (2+) Negative Negative >=300 mg/dL (3+)               Microbiology Results (last 10 days)     ** No results found for the last 240 hours. **          ECG/EMG Results (most recent)     Procedure Component Value Units Date/Time    ECG 12 Lead [[id number]] Collected:  02/07/20 1340     Updated:  02/07/20 1554    Narrative:       HEART RATE= 83  bpm  RR Interval= 728  ms  IN Interval= 182  ms  P Horizontal Axis= -3  deg  P Front Axis= 10  deg  QRSD Interval= 142  ms  QT Interval= 388  ms  QRS Axis= -48  deg  T Wave Axis= -14  deg  - ABNORMAL ECG -  Sinus rhythm  ST elevation secondary to IVCD  When compared with ECG of 08-Aug-2017 23:57:14,  Significant change in rhythm  Electronically Signed By: Guille Friedman (Jed) 07-Feb-2020 15:54:23  Date and Time of Study: 2020-02-07 13:40:53                    Ct Head Without Contrast    Result Date: 2/7/2020  1.Global atrophy.  Electronically Signed By-Mj Flores On:2/7/2020 2:26 PM This report was finalized on [id number] by  Mj Flores, .    Xr Chest 1 View    Result Date: 2/7/2020  1.Atelectasis or scarring left lower chest.  Electronically Signed ByDiana Flores On:2/7/2020 2:24 PM This report was finalized on [id number] by  Mj Flores, .        Estimated Creatinine Clearance: 40.2 mL/min (A) (by C-G formula based on SCr of 1.54 mg/dL (H)).    Assessment/Plan   Assessment/Plan       Active Hospital Problems    Diagnosis  POA   • **Hypertensive encephalopathy [I67.4]  Yes     Priority: High   • Gastroesophageal reflux disease [K21.9]  Yes   • Hyperlipidemia [E78.5]  Yes   • Back pain [M54.9]  Yes   • Adenocarcinoma, lung (CMS/HCC) [C34.90]  Yes   • Obstructive sleep apnea [G47.33]  Yes   • Anemia [D64.9]  Yes   • History of transient ischemic attack [Z86.73]  Not Applicable   • Diabetes mellitus, type II  (CMS/HCC) [E11.9]  Yes   • Chronic kidney disease, stage 3 (moderate) (CMS/HCC) [N18.3]  Yes   • Hypertension [I10]  Yes   • Type 2 diabetes mellitus without complication (CMS/HCC) [E11.9]  Yes      Resolved Hospital Problems   No resolved problems to display.     Hypertensive encephalopathy with chronic essential hypertension  - CT head negative   - UA negative   - TSH normal   - troponin normal, check serial troopnin  - BP 240s/120s on arrival  - cardiac monitoring   - patient given IV labetalol 40 mg once in ED, continue IV labetalol 10 mg PRN   - continue home metoprolol and torsemide  - cardiac diet     Hyperlipidemia   - continue home pravastatin     Diabetes mellitus type 2   - defer home glimepiride   - SSI   - accuchecks AC HS     CKD stage 3  - BUN 12/Cr1.54  - monitor     GERD  - continue home pepcid     Previous TIA   - continue home statin     LIBBY   - monitor nocturnal O2 sats to maintain > 92%    Lung cancer     VTE Prophylaxis - Lovenox    Mechanical Order History:     None      Pharmalogical Order History:     Ordered     Dose Route Frequency Stop    02/07/20 1632  enoxaparin (LOVENOX) syringe 40 mg      40 mg SC Daily --        CODE STATUS:    Code Status and Medical Interventions:   Ordered at: 02/07/20 1623     Code Status:    CPR     Medical Interventions (Level of Support Prior to Arrest):    Full     Admission Status:  I believe this patient meets observation criteria.    I discussed the patient's findings and my recommendations with patient and family.    Electronically signed by Rosita Hermosillo PA-C, 02/07/20, 5:08 PM.  [facility] Hospitalist Team

## 2020-02-08 ENCOUNTER — APPOINTMENT (OUTPATIENT)
Dept: CARDIOLOGY | Facility: HOSPITAL | Age: 80
End: 2020-02-08

## 2020-02-08 ENCOUNTER — APPOINTMENT (OUTPATIENT)
Dept: MRI IMAGING | Facility: HOSPITAL | Age: 80
End: 2020-02-08

## 2020-02-08 PROBLEM — F05 DELIRIUM DUE TO ANOTHER MEDICAL CONDITION: Status: ACTIVE | Noted: 2020-02-08

## 2020-02-08 LAB
ANION GAP SERPL CALCULATED.3IONS-SCNC: 17 MMOL/L (ref 5–15)
BASOPHILS # BLD AUTO: 0 10*3/MM3 (ref 0–0.2)
BASOPHILS NFR BLD AUTO: 0.5 % (ref 0–1.5)
BUN BLD-MCNC: 16 MG/DL (ref 8–23)
BUN/CREAT SERPL: 9.4 (ref 7–25)
CALCIUM SPEC-SCNC: 10.1 MG/DL (ref 8.6–10.5)
CHLORIDE SERPL-SCNC: 96 MMOL/L (ref 98–107)
CO2 SERPL-SCNC: 24 MMOL/L (ref 22–29)
CREAT BLD-MCNC: 1.58 MG/DL (ref 0.76–1.27)
CREAT BLD-MCNC: 1.71 MG/DL (ref 0.76–1.27)
DEPRECATED RDW RBC AUTO: 44.6 FL (ref 37–54)
EOSINOPHIL # BLD AUTO: 0.1 10*3/MM3 (ref 0–0.4)
EOSINOPHIL NFR BLD AUTO: 1.7 % (ref 0.3–6.2)
ERYTHROCYTE [DISTWIDTH] IN BLOOD BY AUTOMATED COUNT: 14 % (ref 12.3–15.4)
GFR SERPL CREATININE-BSD FRML MDRD: 39 ML/MIN/1.73
GFR SERPL CREATININE-BSD FRML MDRD: 43 ML/MIN/1.73
GLUCOSE BLD-MCNC: 141 MG/DL (ref 65–99)
GLUCOSE BLDC GLUCOMTR-MCNC: 104 MG/DL (ref 70–105)
GLUCOSE BLDC GLUCOMTR-MCNC: 132 MG/DL (ref 70–105)
GLUCOSE BLDC GLUCOMTR-MCNC: 150 MG/DL (ref 70–105)
HCT VFR BLD AUTO: 42.6 % (ref 37.5–51)
HGB BLD-MCNC: 14.8 G/DL (ref 13–17.7)
LYMPHOCYTES # BLD AUTO: 1.1 10*3/MM3 (ref 0.7–3.1)
LYMPHOCYTES NFR BLD AUTO: 13.7 % (ref 19.6–45.3)
MCH RBC QN AUTO: 31.5 PG (ref 26.6–33)
MCHC RBC AUTO-ENTMCNC: 34.8 G/DL (ref 31.5–35.7)
MCV RBC AUTO: 90.6 FL (ref 79–97)
MONOCYTES # BLD AUTO: 0.7 10*3/MM3 (ref 0.1–0.9)
MONOCYTES NFR BLD AUTO: 8.6 % (ref 5–12)
NEUTROPHILS # BLD AUTO: 6.3 10*3/MM3 (ref 1.7–7)
NEUTROPHILS NFR BLD AUTO: 75.5 % (ref 42.7–76)
NRBC BLD AUTO-RTO: 0 /100 WBC (ref 0–0.2)
PLATELET # BLD AUTO: 225 10*3/MM3 (ref 140–450)
PMV BLD AUTO: 7.4 FL (ref 6–12)
POTASSIUM BLD-SCNC: 4.2 MMOL/L (ref 3.5–5.2)
RBC # BLD AUTO: 4.7 10*6/MM3 (ref 4.14–5.8)
SODIUM BLD-SCNC: 137 MMOL/L (ref 136–145)
TROPONIN T SERPL-MCNC: 0.02 NG/ML (ref 0–0.03)
TROPONIN T SERPL-MCNC: 0.02 NG/ML (ref 0–0.03)
WBC NRBC COR # BLD: 8.4 10*3/MM3 (ref 3.4–10.8)

## 2020-02-08 PROCEDURE — 25010000002 SULFUR HEXAFLUORIDE MICROSPH 60.7-25 MG RECONSTITUTED SUSPENSION: Performed by: INTERNAL MEDICINE

## 2020-02-08 PROCEDURE — 84484 ASSAY OF TROPONIN QUANT: CPT | Performed by: PHYSICIAN ASSISTANT

## 2020-02-08 PROCEDURE — 96375 TX/PRO/DX INJ NEW DRUG ADDON: CPT

## 2020-02-08 PROCEDURE — G0378 HOSPITAL OBSERVATION PER HR: HCPCS

## 2020-02-08 PROCEDURE — 99214 OFFICE O/P EST MOD 30 MIN: CPT | Performed by: PSYCHIATRY & NEUROLOGY

## 2020-02-08 PROCEDURE — 93306 TTE W/DOPPLER COMPLETE: CPT

## 2020-02-08 PROCEDURE — 70551 MRI BRAIN STEM W/O DYE: CPT

## 2020-02-08 PROCEDURE — 25010000002 ENOXAPARIN PER 10 MG: Performed by: PHYSICIAN ASSISTANT

## 2020-02-08 PROCEDURE — 25010000002 LORAZEPAM PER 2 MG: Performed by: NURSE PRACTITIONER

## 2020-02-08 PROCEDURE — 25010000002 HYDRALAZINE PER 20 MG: Performed by: NURSE PRACTITIONER

## 2020-02-08 PROCEDURE — 99226 PR SBSQ OBSERVATION CARE/DAY 35 MINUTES: CPT | Performed by: INTERNAL MEDICINE

## 2020-02-08 PROCEDURE — 80048 BASIC METABOLIC PNL TOTAL CA: CPT | Performed by: PHYSICIAN ASSISTANT

## 2020-02-08 PROCEDURE — 96376 TX/PRO/DX INJ SAME DRUG ADON: CPT

## 2020-02-08 PROCEDURE — 25010000002 LORAZEPAM PER 2 MG: Performed by: INTERNAL MEDICINE

## 2020-02-08 PROCEDURE — 96372 THER/PROPH/DIAG INJ SC/IM: CPT

## 2020-02-08 PROCEDURE — 85025 COMPLETE CBC W/AUTO DIFF WBC: CPT | Performed by: PHYSICIAN ASSISTANT

## 2020-02-08 PROCEDURE — 25010000002 ONDANSETRON PER 1 MG: Performed by: PHYSICIAN ASSISTANT

## 2020-02-08 PROCEDURE — 0 IOPAMIDOL PER 1 ML: Performed by: INTERNAL MEDICINE

## 2020-02-08 PROCEDURE — 82962 GLUCOSE BLOOD TEST: CPT

## 2020-02-08 RX ORDER — AMLODIPINE BESYLATE 5 MG/1
5 TABLET ORAL
Status: DISCONTINUED | OUTPATIENT
Start: 2020-02-08 | End: 2020-02-09 | Stop reason: HOSPADM

## 2020-02-08 RX ORDER — SODIUM CHLORIDE 9 MG/ML
75 INJECTION, SOLUTION INTRAVENOUS CONTINUOUS
Status: DISPENSED | OUTPATIENT
Start: 2020-02-08 | End: 2020-02-08

## 2020-02-08 RX ORDER — BUTALBITAL, ACETAMINOPHEN AND CAFFEINE 50; 325; 40 MG/1; MG/1; MG/1
1 TABLET ORAL ONCE
Status: DISCONTINUED | OUTPATIENT
Start: 2020-02-08 | End: 2020-02-08

## 2020-02-08 RX ORDER — LORAZEPAM 2 MG/ML
0.5 INJECTION INTRAMUSCULAR ONCE
Status: COMPLETED | OUTPATIENT
Start: 2020-02-08 | End: 2020-02-08

## 2020-02-08 RX ORDER — LORAZEPAM 2 MG/ML
1 INJECTION INTRAMUSCULAR ONCE
Status: DISCONTINUED | OUTPATIENT
Start: 2020-02-08 | End: 2020-02-09 | Stop reason: HOSPADM

## 2020-02-08 RX ORDER — BUTALBITAL, ACETAMINOPHEN AND CAFFEINE 50; 325; 40 MG/1; MG/1; MG/1
2 TABLET ORAL ONCE
Status: DISCONTINUED | OUTPATIENT
Start: 2020-02-08 | End: 2020-02-08

## 2020-02-08 RX ORDER — FLUTICASONE PROPIONATE 50 MCG
2 SPRAY, SUSPENSION (ML) NASAL DAILY
Status: DISCONTINUED | OUTPATIENT
Start: 2020-02-08 | End: 2020-02-09 | Stop reason: HOSPADM

## 2020-02-08 RX ORDER — LORAZEPAM 2 MG/ML
1 INJECTION INTRAMUSCULAR ONCE
Status: COMPLETED | OUTPATIENT
Start: 2020-02-08 | End: 2020-02-08

## 2020-02-08 RX ORDER — HALOPERIDOL 5 MG/ML
1 INJECTION INTRAMUSCULAR EVERY 6 HOURS PRN
Status: DISCONTINUED | OUTPATIENT
Start: 2020-02-08 | End: 2020-02-09 | Stop reason: HOSPADM

## 2020-02-08 RX ORDER — RISPERIDONE 0.25 MG/1
0.25 TABLET ORAL NIGHTLY
Status: DISCONTINUED | OUTPATIENT
Start: 2020-02-08 | End: 2020-02-09 | Stop reason: HOSPADM

## 2020-02-08 RX ADMIN — SODIUM CHLORIDE 500 ML: 0.9 INJECTION, SOLUTION INTRAVENOUS at 17:11

## 2020-02-08 RX ADMIN — LORAZEPAM 1 MG: 2 INJECTION INTRAMUSCULAR; INTRAVENOUS at 09:32

## 2020-02-08 RX ADMIN — ATORVASTATIN CALCIUM 10 MG: 10 TABLET, FILM COATED ORAL at 08:55

## 2020-02-08 RX ADMIN — ONDANSETRON 4 MG: 2 INJECTION INTRAMUSCULAR; INTRAVENOUS at 06:41

## 2020-02-08 RX ADMIN — FLUTICASONE PROPIONATE 2 SPRAY: 50 SPRAY, METERED NASAL at 01:32

## 2020-02-08 RX ADMIN — IOPAMIDOL 50 ML: 755 INJECTION, SOLUTION INTRAVENOUS at 00:07

## 2020-02-08 RX ADMIN — SULFUR HEXAFLUORIDE 2 ML: KIT at 14:39

## 2020-02-08 RX ADMIN — HYDRALAZINE HYDROCHLORIDE 10 MG: 20 INJECTION INTRAMUSCULAR; INTRAVENOUS at 03:16

## 2020-02-08 RX ADMIN — LORAZEPAM 0.5 MG: 2 INJECTION INTRAMUSCULAR; INTRAVENOUS at 05:10

## 2020-02-08 RX ADMIN — Medication 10 ML: at 20:49

## 2020-02-08 RX ADMIN — CYANOCOBALAMIN TAB 1000 MCG 1000 MCG: 1000 TAB at 08:54

## 2020-02-08 RX ADMIN — METOPROLOL SUCCINATE 50 MG: 50 TABLET, EXTENDED RELEASE ORAL at 08:55

## 2020-02-08 RX ADMIN — RISPERIDONE 0.25 MG: 0.25 TABLET ORAL at 20:50

## 2020-02-08 RX ADMIN — Medication 10 ML: at 08:55

## 2020-02-08 RX ADMIN — ENOXAPARIN SODIUM 40 MG: 40 INJECTION SUBCUTANEOUS at 15:25

## 2020-02-08 RX ADMIN — IOPAMIDOL 50 ML: 755 INJECTION, SOLUTION INTRAVENOUS at 01:00

## 2020-02-08 RX ADMIN — FERROUS SULFATE TAB EC 324 MG (65 MG FE EQUIVALENT) 325 MG: 324 (65 FE) TABLET DELAYED RESPONSE at 08:55

## 2020-02-08 RX ADMIN — TAMSULOSIN HYDROCHLORIDE 0.4 MG: 0.4 CAPSULE ORAL at 08:54

## 2020-02-08 RX ADMIN — SODIUM CHLORIDE 75 ML/HR: 900 INJECTION, SOLUTION INTRAVENOUS at 03:20

## 2020-02-08 NOTE — CONSULTS
Primary Care Provider: Madelyn Márquez APRN     Consult requested by: Dr. Van    Reason for Consultation: Neurological evaluation/hypertensive encephalopathy    History taken from: patient chart family    Chief complaint: Confusion       SUBJECTIVE:    History of present illness:   The patient is a 79-year-old right-handed white gentleman who was seen in room 258 at [facility].  Source of information is the patient and evaluation done by admitting team.  This gentleman has been evaluated here before for possible TIA he is being followed by Dr. Seipel    Over the last week or so he has been somewhat confused and not himself so he was admitted for work-up and found to be likely having hypertensive encephalopathy.  Head CT was okay and there have been several attempts to do an MRI and they were not successful.  He has been agitated at times.  I came to see him earlier today but his daughter said that he has not slept for 28 days so they wanted him to rest.  I just came in he had gotten up and gone to the bathroom and he had been sitting in the bedside chair but went to bed again he woke up and talk to me he is confused about the place and time but otherwise interact with me fully and my exam though limited but I did not see anything major.  Nothing suggesting seizures or CNS infection or large stroke      As per admitting,Mr. Cooper is a 79 y.o. with a past medical history of CKD stage 3, DM 2, GERD, HLD, HTN, previous TIA, LIBBY and lung cancer who presents to [facility] 2/7 with reports of intermittent confusion x 1 week.  The patient is confused upon my arrival, so he is unable to provide information.  His daughter states his wife had commented confusion earlier in the week, but did not think anything of it.  He was more altered today, so they brought him into the ED.  Because of this, he did not take any of his morning medication.  He denies chest pain, dizziness, dyspnea, nausea or vomiting.        In the ED, the patient's labs included the following: Na 140, K 3.8, BUN 12, Cr 1.54, glucose 133, WBC 8.1, hgb 15.7, plts 223.  Troponin negative.  CT head negative.  TSH normal.  UA without signs of infection.  The patient's BP on arrival was 240s/120s.  He was given Labetalol 40 mg once in the ED with some improvement.  He was admitted for further evaluation and management.         Review of Systems   Not really possible considering his present lethargic state and not the most cooperative          PATIENT HISTORY:  Past Medical History:   Diagnosis Date   • Enlarged prostate    • Hiatal hernia    • Hyperlipidemia    • Hypertension    , Past Surgical History:   Procedure Laterality Date   • CATARACT EXTRACTION  2006    [facility]   • COLONOSCOPY  2011   , Family History   Problem Relation Age of Onset   • Stroke Mother    • Cancer Father         Prostate   • Heart failure Brother    • Heart disease Other    , Social History     Tobacco Use   • Smoking status: Former Smoker   Substance Use Topics   • Alcohol use: Not on file   • Drug use: Not on file   , Medications Prior to Admission   Medication Sig Dispense Refill Last Dose   • famotidine (PEPCID) 20 MG tablet Take 20 mg by mouth Daily.      • HYDROcodone-acetaminophen (NORCO)  MG per tablet Take 1 tablet by mouth Every 4 (Four) Hours As Needed for Moderate Pain .      • vitamin B-12 (CYANOCOBALAMIN) 1000 MCG tablet Take 2,500 mcg by mouth Daily.      • Cholecalciferol (VITAMIN D3) 50 MCG (2000 UT) capsule Take 2,000 Units by mouth Daily.   Taking   • ferrous sulfate 325 (65 FE) MG EC tablet Take 1 tablet by mouth 2 (Two) Times a Day.   Taking   • glimepiride (AMARYL) 1 MG tablet Take 1 tablet by mouth Daily.   Taking   • metoprolol succinate XL (TOPROL-XL) 25 MG 24 hr tablet Take 1 tablet by mouth Daily.   Taking   • pravastatin (PRAVACHOL) 40 MG tablet Take 1 tablet by mouth Daily.   Taking   • tamsulosin (FLOMAX) 0.4 MG capsule 24 hr capsule Take 1  capsule by mouth Daily.   Taking   • torsemide (DEMADEX) 5 MG tablet Take 1 tablet by mouth Daily.   Taking   , Scheduled Meds:    atorvastatin 10 mg Oral Daily   chlorthalidone 25 mg Oral Daily   enoxaparin 40 mg Subcutaneous Daily   ferrous sulfate 325 mg Oral Daily With Breakfast   fluticasone 2 spray Each Nare Daily   insulin lispro 0-7 Units Subcutaneous 4x Daily With Meals & Nightly   lisinopril 10 mg Oral Q24H   LORazepam 1 mg Intravenous Once   metoprolol succinate XL 50 mg Oral Daily   pantoprazole 40 mg Oral Q AM   sodium chloride 10 mL Intravenous Q12H   tamsulosin 0.4 mg Oral Daily   vitamin B-12 1,000 mcg Oral Daily   , Continuous Infusions:    sodium chloride 75 mL/hr Last Rate: 75 mL/hr (02/08/20 0520)   , PRN Meds:  •  acetaminophen **OR** acetaminophen **OR** acetaminophen  •  aluminum-magnesium hydroxide-simethicone  •  bisacodyl  •  calcium carbonate  •  dextrose  •  dextrose  •  docusate sodium  •  glucagon (human recombinant)  •  hydrALAZINE  •  insulin lispro **AND** insulin lispro  •  labetalol  •  magnesium hydroxide  •  magnesium sulfate **OR** magnesium sulfate **OR** magnesium sulfate  •  melatonin  •  ondansetron **OR** ondansetron  •  potassium chloride  •  potassium chloride  •  sodium chloride, Allergies:  Patient has no known allergies.    ________________________________________________________        OBJECTIVE:  Upon today's exam, resting comfortably in no acute distress         Neurologic Exam    The patient is awake and alert and oriented x self  No aphasia but mild dysarthria  Cranial nerve examination demonstrate:  Full fields of vision to confrontation  Pupils are round, reactive to light and accommodation and size of about 3 mm  No ptosis or nystagmus  Funduscopic examination was not successful  Eye movements are conjugate     Sensation on the face and scalp are normal  Muscles of mastication are normal and symmetric  Muscles of  facial expression are normal and  symmetric  Hearing is intact bilaterally  Head turning and shoulder shrugs were unremarkable  Tongue was midline  I could not visualize his oropharynx or uvula  Motor examination:  Normal bulk, tone and strength was 5-/5 the subtle asymmetry with subtle weakness on the left side  No fasciculations     Sensory examination:  Intact for soft touch, pain and position sensation  Romberg was not evaluated     Reflexes:  0     Coordination:  Normal finger-to-nose to finger,and toe to finger     Gait:  Deferred     Toe signs:  M[place]    ________________________________________________________   RESULTS REVIEW:    VITAL SIGNS:   Temp:  [97.2 °F (36.2 °C)-97.8 °F (36.6 °C)] 97.2 °F (36.2 °C)  Heart Rate:  [50-83] 69  Resp:  [12-18] 15  BP: (116-231)/(53-95) 116/53     LABS:  WBC   Date Value Ref Range Status   02/08/2020 8.40 3.40 - 10.80 10*3/mm3 Final     RBC   Date Value Ref Range Status   02/08/2020 4.70 4.14 - 5.80 10*6/mm3 Final     Hemoglobin   Date Value Ref Range Status   02/08/2020 14.8 13.0 - 17.7 g/dL Final     Hematocrit   Date Value Ref Range Status   02/08/2020 42.6 37.5 - 51.0 % Final     MCV   Date Value Ref Range Status   02/08/2020 90.6 79.0 - 97.0 fL Final     MCH   Date Value Ref Range Status   02/08/2020 31.5 26.6 - 33.0 pg Final     MCHC   Date Value Ref Range Status   02/08/2020 34.8 31.5 - 35.7 g/dL Final     RDW   Date Value Ref Range Status   02/08/2020 14.0 12.3 - 15.4 % Final     RDW-SD   Date Value Ref Range Status   02/08/2020 44.6 37.0 - 54.0 fl Final     MPV   Date Value Ref Range Status   02/08/2020 7.4 6.0 - 12.0 fL Final     Platelets   Date Value Ref Range Status   02/08/2020 225 140 - 450 10*3/mm3 Final     Neutrophil %   Date Value Ref Range Status   02/08/2020 75.5 42.7 - 76.0 % Final     Lymphocyte %   Date Value Ref Range Status   02/08/2020 13.7 (L) 19.6 - 45.3 % Final     Monocyte %   Date Value Ref Range Status   02/08/2020 8.6 5.0 - 12.0 % Final     Eosinophil %   Date Value Ref  Range Status   02/08/2020 1.7 0.3 - 6.2 % Final     Basophil %   Date Value Ref Range Status   02/08/2020 0.5 0.0 - 1.5 % Final     Neutrophils, Absolute   Date Value Ref Range Status   02/08/2020 6.30 1.70 - 7.00 10*3/mm3 Final     Lymphocytes, Absolute   Date Value Ref Range Status   02/08/2020 1.10 0.70 - 3.10 10*3/mm3 Final     Monocytes, Absolute   Date Value Ref Range Status   02/08/2020 0.70 0.10 - 0.90 10*3/mm3 Final     Eosinophils, Absolute   Date Value Ref Range Status   02/08/2020 0.10 0.00 - 0.40 10*3/mm3 Final     Basophils, Absolute   Date Value Ref Range Status   02/08/2020 0.00 0.00 - 0.20 10*3/mm3 Final     nRBC   Date Value Ref Range Status   02/08/2020 0.0 0.0 - 0.2 /100 WBC Final     Glucose   Date Value Ref Range Status   02/08/2020 141 (H) 65 - 99 mg/dL Final     BUN   Date Value Ref Range Status   02/08/2020 16 8 - 23 mg/dL Final     Creatinine   Date Value Ref Range Status   02/08/2020 1.71 (H) 0.76 - 1.27 mg/dL Final     Sodium   Date Value Ref Range Status   02/08/2020 137 136 - 145 mmol/L Final     Potassium   Date Value Ref Range Status   02/08/2020 4.2 3.5 - 5.2 mmol/L Final     Chloride   Date Value Ref Range Status   02/08/2020 96 (L) 98 - 107 mmol/L Final     CO2   Date Value Ref Range Status   02/08/2020 24.0 22.0 - 29.0 mmol/L Final     Calcium   Date Value Ref Range Status   02/08/2020 10.1 8.6 - 10.5 mg/dL Final     Total Protein   Date Value Ref Range Status   02/07/2020 7.3 6.0 - 8.5 g/dL Final     Albumin   Date Value Ref Range Status   02/07/2020 4.50 3.50 - 5.20 g/dL Final     ALT (SGPT)   Date Value Ref Range Status   02/07/2020 15 1 - 41 U/L Final     AST (SGOT)   Date Value Ref Range Status   02/07/2020 23 1 - 40 U/L Final     Alkaline Phosphatase   Date Value Ref Range Status   02/07/2020 105 39 - 117 U/L Final     Total Bilirubin   Date Value Ref Range Status   02/07/2020 0.7 0.2 - 1.2 mg/dL Final     eGFR Non  Amer   Date Value Ref Range Status   02/08/2020 39  (L) >60 mL/min/1.73 Final     BUN/Creatinine Ratio   Date Value Ref Range Status   02/08/2020 9.4 7.0 - 25.0 Final     Anion Gap   Date Value Ref Range Status   02/08/2020 17.0 (H) 5.0 - 15.0 mmol/L Final       Lab Results   Component Value Date    TSH 1.900 02/07/2020     (H) 04/05/2018    [id number] 254 07/24/2017         IMAGING STUDIES:  Ct Head Without Contrast    Result Date: 2/7/2020  1.Global atrophy.  Electronically Signed By-Mj Flores On:2/7/2020 2:26 PM This report was finalized on [id number] by  Mj Flores, .    Ct Head With Contrast    Result Date: 2/7/2020  1. No enhancing lesions are identified intracranially. 2. Mild generalized brain volume loss. Atherosclerosis. Small vessel ischemic changes in the cerebral white matter. Alexandr Madrid M.D. Neuroradiologist Electronically signed by:  Alexandr Madrid M.D.  2/7/2020 10:33 PM    Xr Chest 1 View    Result Date: 2/7/2020  1.Atelectasis or scarring left lower chest.  Electronically Signed ByDiana Flores On:2/7/2020 2:24 PM This report was finalized on [id number] by  Mj Flores, .      I reviewed the patient's new clinical results.      ________________________________________________________     PROBLEM LIST:    Hypertensive encephalopathy    Diabetes mellitus, type II (CMS/HCC)    History of adenocarcinoma of lung    Anemia    History of transient ischemic attack    Hyperlipidemia    Essential hypertension    Obstructive sleep apnea    Type 2 diabetes mellitus without complication (CMS/HCC)    Chronic kidney disease, stage 3 (moderate) (CMS/HCC)          Assessment/Plan   ASSESSMENT/PLAN:  Likely encephalopathy, nonspecific.  I am waiting for his brain MRI because I am not seeing anything really major to make changes in his medication or other issues I will follow-up and see how things go he is better than this morning.  I will follow-up on all the work-up and labs and I will keep you informed    Modification of stroke risk factors:    - Blood pressure should be less than 130/80 outpatient, HbA1c less than 6.5, LDL less than 70; b12>500 and smoking cessation if applicable. We would be grateful if the primary team / primary care physician keep a close watch on this above targets.  - Stroke education  - Follow up with neurologist of choice      I discussed the patients findings and my recommendations with patient    Stephanie Florez MD  02/08/20  1:45 PM

## 2020-02-08 NOTE — PLAN OF CARE
Pt in MRI at this time,3rd attempt.  Patient has been very confused, delusional, at times agitated and not wanting staff to provide care.  Daughter at bedside and helps calm patient when confusion increases.  Patient does have sitter for safety.

## 2020-02-08 NOTE — NURSING NOTE
Pt is having visual hallucinations, called placed to NP.    Update: orders to given 0.5mg ativan x 1 dose.

## 2020-02-08 NOTE — NURSING NOTE
Call from MRI. Patient not wanting to lie still. Kept getting up, chewing on blanket, spitting on self. Zofran last given at 0641.  Tech stated that patient will not be getting the MRI today d/t non compliancy.

## 2020-02-08 NOTE — PLAN OF CARE
Pt admitted with hypertensive encephalopathy,new onset of confusion today. Also elevated BUN/Cr. History of renal insuffiency,HTN,Lung CA with resection. Given Labetolol in ER with improvement. Has been hypertensive this evening 208/58 at 1830. Given Lebetolol 10mg with bp 186/77 at last check.High falls risk due to confusion and daughter plans to sty with pt tonight.

## 2020-02-08 NOTE — NURSING NOTE
MRI attempted x2. Patient still non compliant, figidity and not lying still for procedure. Unable to complete MRI even after one time ativan given. Patient will be transferred to unit. MRI is placed on hold.

## 2020-02-08 NOTE — NURSING NOTE
Pt c/o sharp/stabing like pain located in the back of his head, called NP. Pt is going down for a stat CT of the head. Will continue to monitor.

## 2020-02-08 NOTE — PROGRESS NOTES
[facility] Daily Progress Note      Hospitalist Team  LOS 0 days      Patient Care Team:  Madelyn Márquez APRN as PCP - General    Patient Location: 258/1      Subjective   Subjective     Chief Complaint / Subjective  Chief Complaint   Patient presents with   • Altered Mental Status         Brief Synopsis of Hospital Course/HPI  Mr. Cooper is a 79 y.o. with a past medical history of CKD stage 3, DM 2, GERD, HLD, HTN, previous TIA, LIBBY and lung cancer who presents to [facility] 2/7 with reports of intermittent confusion x 1 week.  The patient is confused upon my arrival, so he is unable to provide information.  His daughter states his wife had commented confusion earlier in the week, but did not think anything of it.  He was more altered today, so they brought him into the ED.  Because of this, he did not take any of his morning medication.  He denies chest pain, dizziness, dyspnea, nausea or vomiting.       In the ED, the patient's labs included the following: Na 140, K 3.8, BUN 12, Cr 1.54, glucose 133, WBC 8.1, hgb 15.7, plts 223.  Troponin negative.  CT head negative.  TSH normal.  UA without signs of infection.  The patient's BP on arrival was 240s/120s.  He was given Labetalol 40 mg once in the ED with some improvement.  He was admitted for further evaluation and management.      Review of Systems   Constitution: Negative.   HENT: Negative.    Eyes: Negative.    Cardiovascular: Negative.    Respiratory: Negative.    Endocrine: Negative.    Hematologic/Lymphatic: Negative.    Skin: Negative.    Musculoskeletal: Negative.    Gastrointestinal: Negative.    Genitourinary: Negative.    Neurological: Negative.    Psychiatric/Behavioral: Positive for altered mental status.   Allergic/Immunologic: Negative.    All other systems reviewed and are negative.        Objective   Objective      Vital Signs  Temp:  [97.2 °F (36.2 °C)-98 °F (36.7 °C)] 98 °F (36.7 °C)  Heart  "Rate:  [50-79] 68  Resp:  [12-18] 14  BP: (114-231)/(53-95) 131/87  Oxygen Therapy  SpO2: 96 %  Pulse Oximetry Type: Intermittent  Device (Oxygen Therapy): room air  Flowsheet Rows      First Filed Value   Admission Height  160 cm (63\") Documented at 02/07/2020 1317   Admission Weight  76.3 kg (168 lb 3.4 oz) Documented at 02/07/2020 1317        Intake & Output (last 3 days)       02/05 0701 - 02/06 0700 02/06 0701 - 02/07 0700 02/07 0701 - 02/08 0700 02/08 0701 - 02/09 0700    P.O.   500 500    I.V. (mL/kg)   0 (0)     Total Intake(mL/kg)   500 (6.2) 500 (6.2)    Urine (mL/kg/hr)   100     Total Output   100     Net   +400 +500            Urine Unmeasured Occurrence   2 x 3 x    Stool Unmeasured Occurrence    1 x        Lines, Drains & Airways    Active LDAs     Name:   Placement date:   Placement time:   Site:   Days:    Peripheral IV 02/07/20 1359 Left Antecubital   02/07/20    1359    Antecubital   1                  Physical Exam:    Physical Exam  Physical Exam   Constitutional: Patient appears well-developed and well-nourished and in no acute distress     HEENT:   Head: Normocephalic and atraumatic.   Eyes:  Pupils are equal, round, and reactive to light. EOM are intact. Sclera are anicteric and non-injected.  Mouth and Throat: Patient has moist mucous membranes. Oropharynx is clear of any erythema or exudate.   Edentulous    Neck: Neck supple.  No thyromegaly present. No lymphadenopathy present. No  masses.     Cardiovascular: Inspection: No JVD present. Palpation: No parasternal heave. Pedal pulses +1 bilaterally. No leg edema. Auscultation: Regular rate, regular rhythm, S1 normal and S2 normal. reveals no gallop and no friction rub. No Carotid bruit bilaterally.    Pulmonary/Chest: Inspection: No distress, no use of accessory muscles. Lungs are clear to auscultation bilaterally. No respiratory distress. No wheezes. No rhonchi. No rales.     Abdomen /Gastrointestinal: Inspection: no distension. Palpation: " no masses, no organomegaly. Soft. There is no tenderness. Bowel sounds are normal.     Musculoskeletal: Normal Muscle tone. Age appropriate, no deformities.    Neurological:  Cranial nerves II-XII are grossly intact with no focal deficits. Sensori-motor exam is normal. No cerebellar signs.  Babinski absent bilaterally episode of agitation when he went for MRI.  Does not appear to be done.    Skin: Skin is warm. No rash noted. Nails show no clubbing.  No cyanosis or erythema. No bruising.    Emotional Behavior:   Appropriate       Debilities:  Age appropriate          Procedures:              Results Review:     I reviewed the patient's new clinical results.      Lab Results (last 24 hours)     Procedure Component Value Units Date/Time    POC Glucose Once [[id number]]  (Normal) Collected:  02/08/20 1623    Specimen:  Blood Updated:  02/08/20 1626     Glucose 104 mg/dL      Comment: Serial Number: [id number]:  [id number]       Blood Culture - Blood, Hand, Right [[id number]] Collected:  02/07/20 1450    Specimen:  Blood from Hand, Right Updated:  02/08/20 1401     Blood Culture No growth at 24 hours    POC Glucose Once [[id number]]  (Abnormal) Collected:  02/08/20 0743    Specimen:  Blood Updated:  02/08/20 0745     Glucose 150 mg/dL      Comment: Serial Number: [id number]:  [id number]       Troponin [[id number]]  (Normal) Collected:  02/08/20 0506    Specimen:  Blood Updated:  02/08/20 0546     Troponin T 0.018 ng/mL     Narrative:       Troponin T Reference Range:  <= 0.03 ng/mL-   Negative for AMI  >0.03 ng/mL-     Abnormal for myocardial necrosis.  Clinicians would have to utilize clinical acumen, EKG, Troponin and serial changes to determine if it is an Acute Myocardial Infarction or myocardial injury due to an underlying chronic condition.       Results may be falsely decreased if patient taking Biotin.      Basic Metabolic Panel [[id number]]  (Abnormal) Collected:  02/08/20 0506    Specimen:  Blood Updated:   02/08/20 0546     Glucose 141 mg/dL      BUN 16 mg/dL      Creatinine 1.71 mg/dL      Sodium 137 mmol/L      Potassium 4.2 mmol/L      Chloride 96 mmol/L      CO2 24.0 mmol/L      Calcium 10.1 mg/dL      eGFR Non African Amer 39 mL/min/1.73      BUN/Creatinine Ratio 9.4     Anion Gap 17.0 mmol/L     Narrative:       GFR Normal >60  Chronic Kidney Disease <60  Kidney Failure <15      CBC & Differential [[id number]] Collected:  02/08/20 0506    Specimen:  Blood Updated:  02/08/20 0526    Narrative:       The following orders were created for panel order CBC & Differential.  Procedure                               Abnormality         Status                     ---------                               -----------         ------                     CBC Auto Differential[[id number]]        Abnormal            Final result                 Please view results for these tests on the individual orders.    CBC Auto Differential [[id number]]  (Abnormal) Collected:  02/08/20 0506    Specimen:  Blood Updated:  02/08/20 0526     WBC 8.40 10*3/mm3      RBC 4.70 10*6/mm3      Hemoglobin 14.8 g/dL      Hematocrit 42.6 %      MCV 90.6 fL      MCH 31.5 pg      MCHC 34.8 g/dL      RDW 14.0 %      RDW-SD 44.6 fl      MPV 7.4 fL      Platelets 225 10*3/mm3      Neutrophil % 75.5 %      Lymphocyte % 13.7 %      Monocyte % 8.6 %      Eosinophil % 1.7 %      Basophil % 0.5 %      Neutrophils, Absolute 6.30 10*3/mm3      Lymphocytes, Absolute 1.10 10*3/mm3      Monocytes, Absolute 0.70 10*3/mm3      Eosinophils, Absolute 0.10 10*3/mm3      Basophils, Absolute 0.00 10*3/mm3      nRBC 0.0 /100 WBC     Creatinine, Serum [[id number]]  (Abnormal) Collected:  02/07/20 2330    Specimen:  Blood Updated:  02/08/20 0332     Creatinine 1.58 mg/dL      eGFR Non African Amer 43 mL/min/1.73     Narrative:       GFR Normal >60  Chronic Kidney Disease <60  Kidney Failure <15      Troponin [[id number]]  (Normal) Collected:  02/07/20 2330    Specimen:  Blood  Updated:  02/08/20 0020     Troponin T 0.017 ng/mL     Narrative:       Troponin T Reference Range:  <= 0.03 ng/mL-   Negative for AMI  >0.03 ng/mL-     Abnormal for myocardial necrosis.  Clinicians would have to utilize clinical acumen, EKG, Troponin and serial changes to determine if it is an Acute Myocardial Infarction or myocardial injury due to an underlying chronic condition.       Results may be falsely decreased if patient taking Biotin.      POC Glucose Once [[id number]]  (Abnormal) Collected:  02/07/20 2018    Specimen:  Blood Updated:  02/07/20 2026     Glucose 145 mg/dL      Comment: Serial Number: [id number]:  [id number]       Troponin [[id number]]  (Normal) Collected:  02/07/20 1850    Specimen:  Blood Updated:  02/07/20 1948     Troponin T 0.024 ng/mL     Narrative:       Troponin T Reference Range:  <= 0.03 ng/mL-   Negative for AMI  >0.03 ng/mL-     Abnormal for myocardial necrosis.  Clinicians would have to utilize clinical acumen, EKG, Troponin and serial changes to determine if it is an Acute Myocardial Infarction or myocardial injury due to an underlying chronic condition.       Results may be falsely decreased if patient taking Biotin.      POC Glucose Once [[id number]]  (Normal) Collected:  02/07/20 1835    Specimen:  Blood Updated:  02/07/20 1836     Glucose 105 mg/dL      Comment: Serial Number: [id number]:  [id number]           No results found for: HGBA1C            No results found for: LIPASE  Lab Results   Component Value Date    CHOL 207 (H) 04/05/2018    TRIG 132 04/05/2018    HDL 49 04/05/2018     (H) 04/05/2018       No results found for: INTRAOP, PREDX, FINALDX, COMDX    Microbiology Results (last 10 days)     Procedure Component Value - Date/Time    Blood Culture - Blood, Hand, Right [[id number]] Collected:  02/07/20 1450    Lab Status:  Preliminary result Specimen:  Blood from Hand, Right Updated:  02/08/20 1401     Blood Culture No growth at 24 hours           ECG/EMG Results (most recent)     Procedure Component Value Units Date/Time    ECG 12 Lead [[id number]] Collected:  02/07/20 1340     Updated:  02/07/20 1554    Narrative:       HEART RATE= 83  bpm  RR Interval= 728  ms  TN Interval= 182  ms  P Horizontal Axis= -3  deg  P Front Axis= 10  deg  QRSD Interval= 142  ms  QT Interval= 388  ms  QRS Axis= -48  deg  T Wave Axis= -14  deg  - ABNORMAL ECG -  Sinus rhythm  ST elevation secondary to IVCD  When compared with ECG of 08-Aug-2017 23:57:14,  Significant change in rhythm  Electronically Signed By: Guille Friedman (Jed) 07-Feb-2020 15:54:23  Date and Time of Study: 2020-02-07 13:40:53    Adult Transthoracic Echo Complete W/ Cont if Necessary Per Protocol [[id number]] Collected:  02/08/20 1337     Updated:  02/08/20 1558     BSA 1.9 m^2      BH CV ECHO ALEJANDRA - RVDD 2.6 cm      IVSd 1.1 cm      IVSs 1.8 cm      LVIDd 4.6 cm      LVIDs 2.7 cm      LVPWd 1.0 cm      BH CV ECHO ALEJANDRA - LVPWS 1.7 cm      IVS/LVPW 1.1     FS 41.8 %      EDV(Teich) 99.0 ml      ESV(Teich) 26.9 ml      EF(Teich) 72.8 %      EDV(cubed) 99.5 ml      ESV(cubed) 19.6 ml      EF(cubed) 80.3 %      % IVS thick 71.4 %      % LVPW thick 67.2 %      LV mass(C)d 165.9 grams      LV mass(C)dI 88.3 grams/m^2      LV mass(C)s 179.1 grams      LV mass(C)sI 95.4 grams/m^2      SV(Teich) 72.1 ml      SI(Teich) 38.4 ml/m^2      SV(cubed) 79.9 ml      SI(cubed) 42.5 ml/m^2      Ao root diam 2.9 cm      Ao root area 6.4 cm^2      ACS 2.0 cm      LVOT diam 2.1 cm      LVOT area 3.5 cm^2      EDV(MOD-sp4) 82.5 ml      ESV(MOD-sp4) 27.3 ml      EF(MOD-sp4) 67.0 %      EDV(MOD-sp2) 72.6 ml      ESV(MOD-sp2) 17.4 ml      EF(MOD-sp2) 76.0 %      SV(MOD-sp4) 55.2 ml      SI(MOD-sp4) 29.4 ml/m^2      SV(MOD-sp2) 55.2 ml      SI(MOD-sp2) 29.4 ml/m^2      Ao root area (BSA corrected) 1.5     LV Austin Vol (BSA corrected) 43.9 ml/m^2      LV Sys Vol (BSA corrected) 14.5 ml/m^2      MV E max alejandra 36.1 cm/sec      MV A max alejandra  104.4 cm/sec      MV E/A 0.35     MV V2 max 112.3 cm/sec      MV max PG 5.0 mmHg      MV V2 mean 64.4 cm/sec      MV mean PG 1.9 mmHg      MV V2 VTI 30.5 cm      MVA(VTI) 6.2 cm^2      MV dec slope 242.7 cm/sec^2      MV dec time 0.29 sec      Ao pk levi 307.4 cm/sec      Ao max PG 40.9 mmHg      Ao max PG (full) 20.1 mmHg      Ao V2 mean 187.1 cm/sec      Ao mean PG 18.6 mmHg      Ao mean PG (full) 5.8 mmHg      Ao V2 VTI 57.2 cm      TAMI(I,A) 3.3 cm^2      TAMI(I,D) 3.3 cm^2      TAMI(V,A) 2.6 cm^2      TAMI(V,D) 2.6 cm^2      LV V1 max PG 20.8 mmHg      LV V1 mean PG 12.7 mmHg      LV V1 max 228.0 cm/sec      LV V1 mean 167.9 cm/sec      LV V1 VTI 54.6 cm      SV(Ao) 365.1 ml      SI(Ao) 194.4 ml/m^2      SV(LVOT) 189.0 ml      SI(LVOT) 100.6 ml/m^2      PA V2 max 125.8 cm/sec      PA max PG 6.3 mmHg      PA max PG (full) 1.5 mmHg      PA V2 mean 79.9 cm/sec      PA mean PG 3.0 mmHg      PA mean PG (full) 0.73 mmHg      PA V2 VTI 22.1 cm      PA acc time 0.08 sec      RV V1 max PG 4.9 mmHg      RV V1 mean PG 2.2 mmHg      RV V1 max 110.4 cm/sec      RV V1 mean 67.8 cm/sec      RV V1 VTI 21.1 cm      PA pr(Accel) 41.6 mmHg      Pulm Sys Levi 73.5 cm/sec      Pulm Austin Levi 52.8 cm/sec      Pulm S/D 1.4     Pulm A Revs Dur 0.1 sec      Pulm A Revs Levi 35.0 cm/sec       CV ECHO ALEJANDRA - BZI_BMI 29.5 kilograms/m^2       CV ECHO ALEJANDRA - BSA(HAYCOCK) 1.9 m^2       CV ECHO ALEJANDRA - BZI_METRIC_WEIGHT 80.3 kg       CV ECHO ALEJANDRA - BZI_METRIC_HEIGHT 165.1 cm      Target HR (85%) 120 bpm      Max. Pred. HR (100%) 141 bpm      EF(MOD-bp) 73.0 %      LA dimension(2D) 3.1 cm                     Ct Head Without Contrast    Result Date: 2/7/2020  1.Global atrophy.  Electronically Signed By-Mj Flores On:2/7/2020 2:26 PM This report was finalized on [id number] by  Mj Flores, .    Ct Head With Contrast    Result Date: 2/7/2020  1. No enhancing lesions are identified intracranially. 2. Mild generalized brain volume loss.  Atherosclerosis. Small vessel ischemic changes in the cerebral white matter. Alexandr Madrid M.D. Neuroradiologist Electronically signed by:  Alexandr Madrid M.D.  2/7/2020 10:33 PM    Xr Chest 1 View    Result Date: 2/7/2020  1.Atelectasis or scarring left lower chest.  Electronically Signed By-Mj Flores On:2/7/2020 2:24 PM This report was finalized on [id number] by  Mj Flores, .          Xrays, labs reviewed personally by physician.    Medication Review:   I have reviewed the patient's current medication list      Scheduled Meds    atorvastatin 10 mg Oral Daily   chlorthalidone 25 mg Oral Daily   enoxaparin 40 mg Subcutaneous Daily   ferrous sulfate 325 mg Oral Daily With Breakfast   fluticasone 2 spray Each Nare Daily   insulin lispro 0-7 Units Subcutaneous 4x Daily With Meals & Nightly   lisinopril 10 mg Oral Q24H   LORazepam 1 mg Intravenous Once   metoprolol succinate XL 50 mg Oral Daily   pantoprazole 40 mg Oral Q AM   sodium chloride 10 mL Intravenous Q12H   Sulfur Hexafluoride Microsph 2 mL Intravenous BID   tamsulosin 0.4 mg Oral Daily   vitamin B-12 1,000 mcg Oral Daily       Meds Infusions    sodium chloride 75 mL/hr Last Rate: 75 mL/hr (02/08/20 0520)       Meds PRN  •  acetaminophen **OR** acetaminophen **OR** acetaminophen  •  aluminum-magnesium hydroxide-simethicone  •  bisacodyl  •  calcium carbonate  •  dextrose  •  dextrose  •  docusate sodium  •  glucagon (human recombinant)  •  hydrALAZINE  •  insulin lispro **AND** insulin lispro  •  labetalol  •  magnesium hydroxide  •  magnesium sulfate **OR** magnesium sulfate **OR** magnesium sulfate  •  melatonin  •  ondansetron **OR** ondansetron  •  potassium chloride  •  potassium chloride  •  sodium chloride    I personally reviewed patient's x-ray films and my findings are 0    I personally reviewed patient's EKG strips and my findings are 0    Assessment/Plan   Assessment/Plan     Active Hospital Problems    Diagnosis  POA   • **Hypertensive  encephalopathy [I67.4]  Yes     Priority: High   • Delirium due to another medical condition [F05]  Yes     Priority: Medium   • Hyperlipidemia [E78.5]  Yes   • History of adenocarcinoma of lung [Z85.118]  Not Applicable   • Obstructive sleep apnea [G47.33]  Yes   • Anemia [D64.9]  Yes   • History of transient ischemic attack [Z86.73]  Not Applicable   • Diabetes mellitus, type II (CMS/HCC) [E11.9]  Yes   • Chronic kidney disease, stage 3 (moderate) (CMS/HCC) [N18.3]  Yes   • Essential hypertension [I10]  Yes   • Type 2 diabetes mellitus without complication (CMS/HCC) [E11.9]  Yes      Resolved Hospital Problems   No resolved problems to display.       MEDICAL DECISION MAKING COMPLEXITY BY PROBLEM:     Plan  Aggressive blood pressure control.  MRI brain  Neurology consult  Polypharmacy  On High dose of B12, vitamin D and ferrous sulfate.-  Elevated creatinine secondary to Demadex.  No indication available-discontinue  Supportive treatment     Fluid bolus.  Noted worsening creatinine.  Will try MRI.  That was canceled due to his delirium 02/08/20  4:52 PM        VTE Prophylaxis -   Mechanical Order History:     None      Pharmalogical Order History:     Ordered     Dose Route Frequency Stop    02/07/20 1632  enoxaparin (LOVENOX) syringe 40 mg      40 mg SC Daily --            Code Status -   Code Status and Medical Interventions:   Ordered at: 02/07/20 1623     Code Status:    CPR     Medical Interventions (Level of Support Prior to Arrest):    Full       Discharge Planning          Destination      Coordination has not been started for this encounter.      Durable Medical Equipment      Coordination has not been started for this encounter.      Dialysis/Infusion      Coordination has not been started for this encounter.      Home Medical Care      Coordination has not been started for this encounter.      Therapy      Coordination has not been started for this encounter.      Community Resources      Coordination  has not been started for this encounter.        Care coordination with nursing and bedside sitter for patient's delirium.  Cussed with patient limited conversation.  Total EM more than 40 minutes    Electronically signed by Geovanny Van MD, 02/08/20, 4:50 PM.  [facility] Hospitalist Team

## 2020-02-08 NOTE — PLAN OF CARE
Problem: Patient Care Overview  Goal: Plan of Care Review  Outcome: Ongoing (interventions implemented as appropriate)  Flowsheets (Taken 2/8/2020 0603)  Plan of Care Reviewed With: daughter  Note:   Pt had a very eventful night. Pt was started on new blood pressure medications as well as PRN medications. Pt got more confused as the night went on, as well as an increase in his posterior headache. A STAT ct was ordered. Pt then started having visual hallucinations around 0500. NP was notified, see orders. Pt got very agitated with his daughter at bedside d/t her trying to correct him on what was real and what was not. Daughter was asked to step to the side while I went in and sat down with him. Pt calmed down and a one time dose of ativan was given, pt is now sleeping comfortably. Daughter also stated that the pt has not slept in over 26 hours. Blood pressure is much more controlled. Will continue to monitor for change in status

## 2020-02-09 VITALS
DIASTOLIC BLOOD PRESSURE: 49 MMHG | HEART RATE: 86 BPM | TEMPERATURE: 97.7 F | OXYGEN SATURATION: 95 % | WEIGHT: 177.03 LBS | RESPIRATION RATE: 14 BRPM | HEIGHT: 65 IN | SYSTOLIC BLOOD PRESSURE: 98 MMHG | BODY MASS INDEX: 29.49 KG/M2

## 2020-02-09 PROBLEM — N18.30 ANEMIA DUE TO STAGE 3 CHRONIC KIDNEY DISEASE: Chronic | Status: ACTIVE | Noted: 2017-06-15

## 2020-02-09 PROBLEM — F05 DELIRIUM DUE TO ANOTHER MEDICAL CONDITION: Status: RESOLVED | Noted: 2020-02-08 | Resolved: 2020-02-09

## 2020-02-09 PROBLEM — D63.1 ANEMIA DUE TO STAGE 3 CHRONIC KIDNEY DISEASE (HCC): Chronic | Status: ACTIVE | Noted: 2017-06-15

## 2020-02-09 PROBLEM — I67.4 HYPERTENSIVE ENCEPHALOPATHY: Status: RESOLVED | Noted: 2020-02-07 | Resolved: 2020-02-09

## 2020-02-09 LAB
ANION GAP SERPL CALCULATED.3IONS-SCNC: 13 MMOL/L (ref 5–15)
BASOPHILS # BLD AUTO: 0 10*3/MM3 (ref 0–0.2)
BASOPHILS NFR BLD AUTO: 0.4 % (ref 0–1.5)
BUN BLD-MCNC: 23 MG/DL (ref 8–23)
BUN/CREAT SERPL: 11.6 (ref 7–25)
CALCIUM SPEC-SCNC: 9.2 MG/DL (ref 8.6–10.5)
CHLORIDE SERPL-SCNC: 104 MMOL/L (ref 98–107)
CO2 SERPL-SCNC: 25 MMOL/L (ref 22–29)
CREAT BLD-MCNC: 1.99 MG/DL (ref 0.76–1.27)
DEPRECATED RDW RBC AUTO: 45.1 FL (ref 37–54)
EOSINOPHIL # BLD AUTO: 0.2 10*3/MM3 (ref 0–0.4)
EOSINOPHIL NFR BLD AUTO: 2.1 % (ref 0.3–6.2)
ERYTHROCYTE [DISTWIDTH] IN BLOOD BY AUTOMATED COUNT: 13.9 % (ref 12.3–15.4)
GFR SERPL CREATININE-BSD FRML MDRD: 33 ML/MIN/1.73
GLUCOSE BLD-MCNC: 136 MG/DL (ref 65–99)
GLUCOSE BLDC GLUCOMTR-MCNC: 112 MG/DL (ref 70–105)
GLUCOSE BLDC GLUCOMTR-MCNC: 118 MG/DL (ref 70–105)
GLUCOSE BLDC GLUCOMTR-MCNC: 124 MG/DL (ref 70–105)
HCT VFR BLD AUTO: 39.5 % (ref 37.5–51)
HGB BLD-MCNC: 13.9 G/DL (ref 13–17.7)
LYMPHOCYTES # BLD AUTO: 1.1 10*3/MM3 (ref 0.7–3.1)
LYMPHOCYTES NFR BLD AUTO: 13.8 % (ref 19.6–45.3)
MCH RBC QN AUTO: 32.1 PG (ref 26.6–33)
MCHC RBC AUTO-ENTMCNC: 35.2 G/DL (ref 31.5–35.7)
MCV RBC AUTO: 91 FL (ref 79–97)
MONOCYTES # BLD AUTO: 0.8 10*3/MM3 (ref 0.1–0.9)
MONOCYTES NFR BLD AUTO: 9.9 % (ref 5–12)
NEUTROPHILS # BLD AUTO: 5.7 10*3/MM3 (ref 1.7–7)
NEUTROPHILS NFR BLD AUTO: 73.8 % (ref 42.7–76)
NRBC BLD AUTO-RTO: 0 /100 WBC (ref 0–0.2)
PLATELET # BLD AUTO: 205 10*3/MM3 (ref 140–450)
PMV BLD AUTO: 7.3 FL (ref 6–12)
POTASSIUM BLD-SCNC: 3.7 MMOL/L (ref 3.5–5.2)
RBC # BLD AUTO: 4.35 10*6/MM3 (ref 4.14–5.8)
SODIUM BLD-SCNC: 142 MMOL/L (ref 136–145)
WBC NRBC COR # BLD: 7.8 10*3/MM3 (ref 3.4–10.8)

## 2020-02-09 PROCEDURE — 85025 COMPLETE CBC W/AUTO DIFF WBC: CPT | Performed by: PHYSICIAN ASSISTANT

## 2020-02-09 PROCEDURE — 25010000002 SULFUR HEXAFLUORIDE MICROSPH 60.7-25 MG RECONSTITUTED SUSPENSION: Performed by: INTERNAL MEDICINE

## 2020-02-09 PROCEDURE — G0378 HOSPITAL OBSERVATION PER HR: HCPCS

## 2020-02-09 PROCEDURE — 99217 PR OBSERVATION CARE DISCHARGE MANAGEMENT: CPT | Performed by: INTERNAL MEDICINE

## 2020-02-09 PROCEDURE — 80048 BASIC METABOLIC PNL TOTAL CA: CPT | Performed by: PHYSICIAN ASSISTANT

## 2020-02-09 PROCEDURE — 82962 GLUCOSE BLOOD TEST: CPT

## 2020-02-09 RX ORDER — AMLODIPINE BESYLATE 5 MG/1
5 TABLET ORAL
Qty: 30 TABLET | Refills: 0 | Status: SHIPPED | OUTPATIENT
Start: 2020-02-10 | End: 2020-02-11 | Stop reason: ALTCHOICE

## 2020-02-09 RX ORDER — LANOLIN ALCOHOL/MO/W.PET/CERES
1 CREAM (GRAM) TOPICAL
Start: 2020-02-09

## 2020-02-09 RX ORDER — CHLORTHALIDONE 25 MG/1
25 TABLET ORAL DAILY
Qty: 30 TABLET | Refills: 0 | Status: SHIPPED | OUTPATIENT
Start: 2020-02-10 | End: 2020-02-11 | Stop reason: ALTCHOICE

## 2020-02-09 RX ORDER — LANOLIN ALCOHOL/MO/W.PET/CERES
1000 CREAM (GRAM) TOPICAL DAILY
Start: 2020-02-09 | End: 2022-05-25

## 2020-02-09 RX ORDER — METOPROLOL SUCCINATE 25 MG/1
50 TABLET, EXTENDED RELEASE ORAL DAILY
Qty: 60 TABLET | Refills: 0 | Status: SHIPPED | OUTPATIENT
Start: 2020-02-09 | End: 2020-03-10

## 2020-02-09 RX ADMIN — TAMSULOSIN HYDROCHLORIDE 0.4 MG: 0.4 CAPSULE ORAL at 09:57

## 2020-02-09 RX ADMIN — SULFUR HEXAFLUORIDE 2 ML: KIT at 10:06

## 2020-02-09 RX ADMIN — FERROUS SULFATE TAB EC 324 MG (65 MG FE EQUIVALENT) 324 MG: 324 (65 FE) TABLET DELAYED RESPONSE at 09:58

## 2020-02-09 RX ADMIN — AMLODIPINE BESYLATE 5 MG: 5 TABLET ORAL at 09:58

## 2020-02-09 RX ADMIN — CYANOCOBALAMIN TAB 1000 MCG 1000 MCG: 1000 TAB at 09:57

## 2020-02-09 RX ADMIN — FLUTICASONE PROPIONATE 2 SPRAY: 50 SPRAY, METERED NASAL at 10:05

## 2020-02-09 RX ADMIN — PANTOPRAZOLE SODIUM 40 MG: 40 TABLET, DELAYED RELEASE ORAL at 05:53

## 2020-02-09 RX ADMIN — ATORVASTATIN CALCIUM 10 MG: 10 TABLET, FILM COATED ORAL at 09:57

## 2020-02-09 RX ADMIN — Medication 10 ML: at 10:03

## 2020-02-09 NOTE — PLAN OF CARE
Problem: Patient Care Overview  Goal: Plan of Care Review  Outcome: Ongoing (interventions implemented as appropriate)  Flowsheets  Taken 2/9/2020 0512  Progress: no change  Outcome Summary: Patient's vitals and mental status have been stable.  Sitter at bedside.  No complaints per patient.  RN will continue to monitor.  Taken 2/9/2020 3594  Plan of Care Reviewed With: patient

## 2020-02-09 NOTE — PLAN OF CARE
BP better control, last reading 120's over 60's.  discharged to home.Follow up with primary care provider within 2 weeks.

## 2020-02-09 NOTE — PROGRESS NOTES
Patient is doing very well, awake and alert and oriented x2.  No new issues    His MRI was unremarkable.  I talked to his daughter and continue the present plan

## 2020-02-09 NOTE — DISCHARGE SUMMARY
Date of Admission: 2/7/2020    Date of Discharge:  2/9/2020    Length of stay:  LOS: 0 days     Presenting Problem/History of Present Illness   Present on Admission:  • (Resolved) Hypertensive encephalopathy  • Anemia due to stage 3 chronic kidney disease (CMS/HCC)  • Chronic kidney disease, stage 3 (moderate) (CMS/HCC)  • Hyperlipidemia  • Essential hypertension  • Obstructive sleep apnea  • Type 2 diabetes mellitus without complication (CMS/[facility])  • (Resolved) Delirium due to another medical condition        Hospital Course    Chief Complaint   Patient presents with   • Altered Mental Status       Mr. Cooper is a 79 y.o. with a past medical history of CKD stage 3, DM 2, GERD, HLD, HTN, previous TIA, LIBBY and lung cancer who presents to [facility] 2/7 with reports of intermittent confusion x 1 week.  The patient is confused upon my arrival, so he is unable to provide information.  His daughter states his wife had commented confusion earlier in the week, but did not think anything of it.  He was more altered today, so they brought him into the ED.  Because of this, he did not take any of his morning medication.  He denies chest pain, dizziness, dyspnea, nausea or vomiting.       In the ED, the patient's labs included the following: Na 140, K 3.8, BUN 12, Cr 1.54, glucose 133, WBC 8.1, hgb 15.7, plts 223.  Troponin negative.  CT head negative.  TSH normal.  UA without signs of infection.  The patient's BP on arrival was 240s/120s.  He was given Labetalol 40 mg once in the ED with some improvement.  He was admitted for further evaluation and management.      Symptoms resolved and patient is at baseline now he was observed for 2 days and the blood pressure controlled gradually the symptoms got better.  Scanning is negative for any ischemic insult. 02/09/20  5:39 PM        Review of Systems   Unable to perform ROS: mental status change   All other systems reviewed and are negative.      Past Medical History:      Past Medical History:   Diagnosis Date   • Enlarged prostate    • Hiatal hernia    • Hyperlipidemia    • Hypertension        Past Surgical History:     Past Surgical History:   Procedure Laterality Date   • CATARACT EXTRACTION  2006    [facility]   • COLONOSCOPY  2011       Social History:   Social History     Socioeconomic History   • Marital status:      Spouse name: Not on file   • Number of children: Not on file   • Years of education: Not on file   • Highest education level: Not on file   Tobacco Use   • Smoking status: Former Smoker       Vital Signs  Temp:  [97.7 °F (36.5 °C)-98.6 °F (37 °C)] 97.7 °F (36.5 °C)  Heart Rate:  [70-86] 86  Resp:  [14-18] 14  BP: ()/(49-68) 98/49    Physical Exam:  Physical Exam  Physical Exam   Constitutional: Patient appears well-developed and well-nourished and in no acute distress     HEENT:   Head: Normocephalic and atraumatic.   Eyes:  Pupils are equal, round, and reactive to light. EOM are intact. Sclera are anicteric and non-injected.  Mouth and Throat: Patient has moist mucous membranes. Oropharynx is clear of any erythema or exudate.   Edentulous    Neck: Neck supple.  No thyromegaly present. No lymphadenopathy present. No  masses.     Cardiovascular: Inspection: No JVD present. Palpation: No parasternal heave. Pedal pulses +1 bilaterally. No leg edema. Auscultation: Regular rate, regular rhythm, S1 normal and S2 normal. reveals no gallop and no friction rub. No Carotid bruit bilaterally.    Pulmonary/Chest: Inspection: No distress, no use of accessory muscles. Lungs are clear to auscultation bilaterally. No respiratory distress. No wheezes. No rhonchi. No rales.     Abdomen /Gastrointestinal: Inspection: no distension. Palpation: no masses, no organomegaly. Soft. There is no tenderness. Bowel sounds are normal.     Musculoskeletal: Normal Muscle tone. Age appropriate, no deformities.    Neurological: Patient is alert and oriented to person, place, and time.  Cranial nerves II-XII are grossly intact with no focal deficits. Sensori-motor exam is normal. No cerebellar signs.  Babinski absent bilaterally    Skin: Skin is warm. No rash noted. Nails show no clubbing.  No cyanosis or erythema. No bruising.    Emotional Behavior:   Appropriate       Debilities:  Age appropriate  Exam unchanged from February 8      Discharge Diagnosis:     Active Hospital Problems    Diagnosis  POA   • Chronic kidney disease, stage 3 (moderate) (CMS/HCC) [N18.3]  Yes     Priority: Medium   • Essential hypertension [I10]  Yes     Priority: Medium   • Type 2 diabetes mellitus without complication (CMS/HCC) [E11.9]  Yes     Priority: Medium   • Hyperlipidemia [E78.5]  Yes   • History of adenocarcinoma of lung [Z85.118]  Not Applicable   • Obstructive sleep apnea [G47.33]  Yes   • Anemia due to stage 3 chronic kidney disease (CMS/HCC) [N18.3, D63.1]  Yes      Resolved Hospital Problems    Diagnosis Date Resolved POA   • **Hypertensive encephalopathy [I67.4] 02/09/2020 Yes     Priority: High   • Delirium due to another medical condition [F05] 02/09/2020 Yes     Priority: Medium       Estimated Creatinine Clearance: 29.4 mL/min (A) (by C-G formula based on SCr of 1.99 mg/dL (H)).    Discharge Disposition    Destination      Coordination has not been started for this encounter.      Durable Medical Equipment      Coordination has not been started for this encounter.      Dialysis/Infusion      Coordination has not been started for this encounter.      Home Medical Care      Coordination has not been started for this encounter.      Therapy      Coordination has not been started for this encounter.      Community Resources      Coordination has not been started for this encounter.              PT Recommendation and Plan          Home or Self Care           Discharge Medications      New Medications      Instructions Start Date   amLODIPine 5 MG tablet  Commonly known as:  NORVASC   5 mg, Oral, Every 24  Hours Scheduled   Start Date:  February 10, 2020     chlorthalidone 25 MG tablet  Commonly known as:  HYGROTON   25 mg, Oral, Daily   Start Date:  February 10, 2020        Changes to Medications      Instructions Start Date   ferrous sulfate 325 (65 FE) MG EC tablet  What changed:  when to take this   325 mg, Oral, Daily With Breakfast      metoprolol succinate XL 25 MG 24 hr tablet  Commonly known as:  TOPROL-XL  What changed:  how much to take   50 mg, Oral, Daily      vitamin B-12 1000 MCG tablet  Commonly known as:  CYANOCOBALAMIN  What changed:  how much to take   1,000 mcg, Oral, Daily         Continue These Medications      Instructions Start Date   famotidine 20 MG tablet  Commonly known as:  PEPCID   20 mg, Oral, Daily      glimepiride 1 MG tablet  Commonly known as:  AMARYL   1 tablet, Oral, Daily      pravastatin 40 MG tablet  Commonly known as:  PRAVACHOL   1 tablet, Oral, Daily      tamsulosin 0.4 MG capsule 24 hr capsule  Commonly known as:  FLOMAX   1 capsule, Oral, Daily      Vitamin D3 50 MCG (2000 UT) capsule   2,000 Units, Oral, Daily         Stop These Medications    HYDROcodone-acetaminophen  MG per tablet  Commonly known as:  NORCO     torsemide 5 MG tablet  Commonly known as:  DEMADEX                Consults:   Consults     Date and Time Order Name Status Description    2/7/2020 2240 Inpatient Neurology Consult Other (see comments) Completed           Procedures Performed:         Pertinent Test Results:     I reviewed the patient's new clinical results.    Results from last 7 days   Lab Units 02/09/20  0249 02/08/20  0506 02/07/20  1351   WBC 10*3/mm3 7.80 8.40 8.10   HEMOGLOBIN g/dL 13.9 14.8 15.7   HEMATOCRIT % 39.5 42.6 45.4   PLATELETS 10*3/mm3 205 225 223     Results from last 7 days   Lab Units 02/09/20  0248 02/08/20  0506 02/07/20  2330 02/07/20  1351   SODIUM mmol/L 142 137  --  140   POTASSIUM mmol/L 3.7 4.2  --  3.8   CHLORIDE mmol/L 104 96*  --  100   CO2 mmol/L 25.0 24.0   --  22.0   BUN mg/dL 23 16  --  12   CREATININE mg/dL 1.99* 1.71* 1.58* 1.54*   CALCIUM mg/dL 9.2 10.1  --  10.3   BILIRUBIN mg/dL  --   --   --  0.7   ALK PHOS U/L  --   --   --  105   ALT (SGPT) U/L  --   --   --  15   AST (SGOT) U/L  --   --   --  23   GLUCOSE mg/dL 136* 141*  --  133*         Lab Results   Component Value Date    CALCIUM 9.2 02/09/2020    PHOS 4.1 04/05/2018     No results found for: HGBA1C  Lab Results   Component Value Date    CHOL 207 (H) 04/05/2018    TRIG 132 04/05/2018    HDL 49 04/05/2018     (H) 04/05/2018     No results found for: LIPASE        No results found for: INTRAOP, PREDX, FINALDX, COMDX    Microbiology Results (last 10 days)     Procedure Component Value - Date/Time    Blood Culture - Blood, Hand, Right [[id number]] Collected:  02/07/20 1450    Lab Status:  Preliminary result Specimen:  Blood from Hand, Right Updated:  02/09/20 1400     Blood Culture No growth at 2 days          ECG/EMG Results (most recent)     Procedure Component Value Units Date/Time    ECG 12 Lead [[id number]] Collected:  02/07/20 1340     Updated:  02/07/20 1554    Narrative:       HEART RATE= 83  bpm  RR Interval= 728  ms  AZ Interval= 182  ms  P Horizontal Axis= -3  deg  P Front Axis= 10  deg  QRSD Interval= 142  ms  QT Interval= 388  ms  QRS Axis= -48  deg  T Wave Axis= -14  deg  - ABNORMAL ECG -  Sinus rhythm  ST elevation secondary to IVCD  When compared with ECG of 08-Aug-2017 23:57:14,  Significant change in rhythm  Electronically Signed By: Guille Friedman (Jed) 07-Feb-2020 15:54:23  Date and Time of Study: 2020-02-07 13:40:53    Adult Transthoracic Echo Complete W/ Cont if Necessary Per Protocol [[id number]] Collected:  02/08/20 1337     Updated:  02/08/20 1558     BSA 1.9 m^2      BH CV ECHO ALEJANDRA - RVDD 2.6 cm      IVSd 1.1 cm      IVSs 1.8 cm      LVIDd 4.6 cm      LVIDs 2.7 cm      LVPWd 1.0 cm      BH CV ECHO ALEJANDRA - LVPWS 1.7 cm      IVS/LVPW 1.1     FS 41.8 %      EDV(Teich) 99.0 ml       ESV(Teich) 26.9 ml      EF(Teich) 72.8 %      EDV(cubed) 99.5 ml      ESV(cubed) 19.6 ml      EF(cubed) 80.3 %      % IVS thick 71.4 %      % LVPW thick 67.2 %      LV mass(C)d 165.9 grams      LV mass(C)dI 88.3 grams/m^2      LV mass(C)s 179.1 grams      LV mass(C)sI 95.4 grams/m^2      SV(Teich) 72.1 ml      SI(Teich) 38.4 ml/m^2      SV(cubed) 79.9 ml      SI(cubed) 42.5 ml/m^2      Ao root diam 2.9 cm      Ao root area 6.4 cm^2      ACS 2.0 cm      LVOT diam 2.1 cm      LVOT area 3.5 cm^2      EDV(MOD-sp4) 82.5 ml      ESV(MOD-sp4) 27.3 ml      EF(MOD-sp4) 67.0 %      EDV(MOD-sp2) 72.6 ml      ESV(MOD-sp2) 17.4 ml      EF(MOD-sp2) 76.0 %      SV(MOD-sp4) 55.2 ml      SI(MOD-sp4) 29.4 ml/m^2      SV(MOD-sp2) 55.2 ml      SI(MOD-sp2) 29.4 ml/m^2      Ao root area (BSA corrected) 1.5     LV Austin Vol (BSA corrected) 43.9 ml/m^2      LV Sys Vol (BSA corrected) 14.5 ml/m^2      MV E max alejandra 36.1 cm/sec      MV A max alejandra 104.4 cm/sec      MV E/A 0.35     MV V2 max 112.3 cm/sec      MV max PG 5.0 mmHg      MV V2 mean 64.4 cm/sec      MV mean PG 1.9 mmHg      MV V2 VTI 30.5 cm      MVA(VTI) 6.2 cm^2      MV dec slope 242.7 cm/sec^2      MV dec time 0.29 sec      Ao pk alejandra 307.4 cm/sec      Ao max PG 40.9 mmHg      Ao max PG (full) 20.1 mmHg      Ao V2 mean 187.1 cm/sec      Ao mean PG 18.6 mmHg      Ao mean PG (full) 5.8 mmHg      Ao V2 VTI 57.2 cm      TAMI(I,A) 3.3 cm^2      TAMI(I,D) 3.3 cm^2      TAMI(V,A) 2.6 cm^2      TAMI(V,D) 2.6 cm^2      LV V1 max PG 20.8 mmHg      LV V1 mean PG 12.7 mmHg      LV V1 max 228.0 cm/sec      LV V1 mean 167.9 cm/sec      LV V1 VTI 54.6 cm      SV(Ao) 365.1 ml      SI(Ao) 194.4 ml/m^2      SV(LVOT) 189.0 ml      SI(LVOT) 100.6 ml/m^2      PA V2 max 125.8 cm/sec      PA max PG 6.3 mmHg      PA max PG (full) 1.5 mmHg      PA V2 mean 79.9 cm/sec      PA mean PG 3.0 mmHg      PA mean PG (full) 0.73 mmHg      PA V2 VTI 22.1 cm      PA acc time 0.08 sec      RV V1 max PG 4.9 mmHg      RV V1  mean PG 2.2 mmHg      RV V1 max 110.4 cm/sec      RV V1 mean 67.8 cm/sec      RV V1 VTI 21.1 cm      PA pr(Accel) 41.6 mmHg      Pulm Sys Levi 73.5 cm/sec      Pulm Austin Levi 52.8 cm/sec      Pulm S/D 1.4     Pulm A Revs Dur 0.1 sec      Pulm A Revs Levi 35.0 cm/sec       CV ECHO ALEJANDRA - BZI_BMI 29.5 kilograms/m^2       CV ECHO ALEJANDRA - BSA(HAYCOCK) 1.9 m^2       CV ECHO ALEJANDRA - BZI_METRIC_WEIGHT 80.3 kg       CV ECHO ALEJANDRA - BZI_METRIC_HEIGHT 165.1 cm      Target HR (85%) 120 bpm      Max. Pred. HR (100%) 141 bpm      EF(MOD-bp) 73.0 %      LA dimension(2D) 3.1 cm                     Ct Head Without Contrast    Result Date: 2/7/2020  1.Global atrophy.  Electronically Signed By-Mj Flores On:2/7/2020 2:26 PM This report was finalized on [id number] by  Mj Flores, .    Ct Head With Contrast    Result Date: 2/7/2020  1. No enhancing lesions are identified intracranially. 2. Mild generalized brain volume loss. Atherosclerosis. Small vessel ischemic changes in the cerebral white matter. Alexandr Madrid M.D. Neuroradiologist Electronically signed by:  Alexandr Madrid M.D.  2/7/2020 10:33 PM    Mri Brain Without Contrast    Result Date: 2/8/2020  1. Negative for acute ischemia. 2. There is cerebral atrophy and mild white matter abnormality, most likely due to age-related chronic small vessel ischemia.  Electronically Signed By-Ct Mathur On:2/8/2020 7:14 PM This report was finalized on [id number] by  Ct Mathur .    Xr Chest 1 View    Result Date: 2/7/2020  1.Atelectasis or scarring left lower chest.  Electronically Signed ByDiana Flores On:2/7/2020 2:24 PM This report was finalized on [id number] by  Eliel Friedman      Xrays, labs reviewed personally by physician.    Condition on Discharge:    Stable    Discharge Diet:   Dietary Orders (From admission, onward)     Start     Ordered    02/07/20 1633  Diet Cardiac; Healthy Heart  Diet Effective Now     Question Answer Comment   Diet / Texture /  Consistency Cardiac    Select Type: Healthy Heart        02/07/20 1632                Activity at Discharge:   Activity Instructions     Activity as Tolerated            Follow-up Appointments  Future Appointments   Date Time Provider Department Center   2/13/2020  9:15 AM Anayeli Yarbrough MD [facility]  [facility] None     Additional Instructions for the Follow-ups that You Need to Schedule     Discharge Follow-up with PCP   As directed       Currently Documented PCP:    Madelyn Márquez APRN    PCP Phone Number:    [phone]     Follow Up Details:  If no PCP, call MD finder at [phone]         Discharge Follow-up with Specified Provider: Nephrologist Dr. Mcallister; 2 Weeks   As directed      To:  Nephrologist Dr. Mcallister    Follow Up:  2 Weeks    Follow Up Details:  Follow-up on renal insufficiency.               Test Results Pending at Discharge   Order Current Status    Blood Culture - Blood, Hand, Right Preliminary result           Risk for Readmission (LACE) Score: 8 (2/9/2020  6:01 AM)          Geovanny Van MD  02/09/20  5:41 PM    Time: Greater than 30 minutes in discharge activity for care coordination with nursing regarding patient's current care and discharge plan discussed with the patient and daughter at bedside.  His symptomatology was due to hypertensive encephalopathy due to uncontrolled blood pressure which has since resolved.  He does have renal insufficiency which is the renal function is slightly worse than his baseline.  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  He is to follow with Dr. Mcallister as an outpatient as mentioned above for follow-up.  PCP to check BMP in 1 week..

## 2020-02-10 ENCOUNTER — READMISSION MANAGEMENT (OUTPATIENT)
Dept: CALL CENTER | Facility: HOSPITAL | Age: 80
End: 2020-02-10

## 2020-02-10 NOTE — PROGRESS NOTES
Case Management Discharge Note      home         Final Discharge Disposition Code: 01 - home or self-care

## 2020-02-11 ENCOUNTER — OFFICE VISIT (OUTPATIENT)
Dept: FAMILY MEDICINE CLINIC | Facility: CLINIC | Age: 80
End: 2020-02-11

## 2020-02-11 VITALS
DIASTOLIC BLOOD PRESSURE: 68 MMHG | HEART RATE: 67 BPM | HEIGHT: 65 IN | SYSTOLIC BLOOD PRESSURE: 158 MMHG | OXYGEN SATURATION: 99 % | BODY MASS INDEX: 28.16 KG/M2 | WEIGHT: 169 LBS | TEMPERATURE: 98 F

## 2020-02-11 DIAGNOSIS — N18.30 CHRONIC KIDNEY DISEASE, STAGE 3 (MODERATE): ICD-10-CM

## 2020-02-11 DIAGNOSIS — I67.4 HYPERTENSIVE ENCEPHALOPATHY: ICD-10-CM

## 2020-02-11 DIAGNOSIS — E21.3 HYPERPARATHYROIDISM (HCC): Primary | ICD-10-CM

## 2020-02-11 DIAGNOSIS — R00.0 TACHYCARDIA: ICD-10-CM

## 2020-02-11 DIAGNOSIS — I10 ESSENTIAL HYPERTENSION: Chronic | ICD-10-CM

## 2020-02-11 PROCEDURE — 99214 OFFICE O/P EST MOD 30 MIN: CPT | Performed by: NURSE PRACTITIONER

## 2020-02-11 RX ORDER — NITROGLYCERIN 0.3 MG/1
0.3 TABLET SUBLINGUAL
Qty: 50 TABLET | Refills: 12 | Status: SHIPPED | OUTPATIENT
Start: 2020-02-11 | End: 2021-06-17 | Stop reason: SDUPTHER

## 2020-02-11 NOTE — PATIENT INSTRUCTIONS
Keep appointment with Nephrology   metoprolol 25 mg daily monitor and record blood pressures   use nitroglycerin sublingual if pressure gets too high   report any episodes of confusion immediately

## 2020-02-11 NOTE — OUTREACH NOTE
Prep Survey      Responses   Facility patient discharged from?  [facility]   Is patient eligible?  Yes   Discharge diagnosis  Hypertensive encephalopathy   Does the patient have one of the following disease processes/diagnoses(primary or secondary)?  Other   Does the patient have Home health ordered?  No   Is there a DME ordered?  No   Prep survey completed?  Yes          Tanvi Kwon RN

## 2020-02-11 NOTE — PROGRESS NOTES
Kailash Cooper is a 79 y.o. male.      With 79-year-old white male with history of hypertension, hyperlipidemia, chronic back pain, CKD 3, type 2 diabetes, right arm tremor, anemia, and stable lung cancer who comes in post hospitalization for hypertensive encephalopathy.  Patient states he woke up about 230 in the morning and was very confused and was taken to the ER his blood pressure at that time was 240/120.  The wife had mentioned she noticed a little bit confusion several days before the but did not think anything of it.  Patient's neural workup was negative in the hospital as well as a cardiac workup.  He is renal function on discharge was a creatinine 1.5 and I am setting him up with Dr. Trinidad.  Basically hospital could not really find a reason for the hypertensive episode and patient did not take his medications any differently than normal at home.  He was on metoprolol 25 mg daily only med on discharge they added amlodipine and chlorthalidone.  Patient is now back to baseline on his mental status his pressure today is 158/68 on no medications at all and I did not feel cough will with patient taking extra medications that were added.  I instructed patient to take metoprolol 25 mg 1 tablet daily and monitor blood pressures twice a day and call them to office if they are high or too low.  I also instructed him how to use nitroglycerin tablets sublingually to help bring his pressure down should he have another episode.  I also instructed her daughter and wife to report any abnormal findings of mental status and medially if this happens again. Patient has a history of hyperparathyroidism and I am checking thyroid levels today   patient was advised to go off Norco he is in pain management and we discussed possibility of tramadol instead and he is going to talk to his nephrologist about it       nephrology referral   metoprolol 25 mg daily and monitor blood pressures try today and record   use nitroglycerin if  "needed for blood pressure greater than  160/90   report any episodes of confusion immediately       The following portions of the patient's history were reviewed and updated as appropriate: allergies, current medications, past family history, past medical history, past social history, past surgical history and problem list.    Vitals:    02/11/20 1311   BP: 158/68   BP Location: Right arm   Patient Position: Sitting   Cuff Size: Adult   Pulse: 67   Temp: 98 °F (36.7 °C)   TempSrc: Oral   SpO2: 99%   Weight: 76.7 kg (169 lb)   Height: 164.5 cm (64.75\")     Body mass index is 28.34 kg/m².    Past Medical History:   Diagnosis Date   • Enlarged prostate    • Former smoker    • Hiatal hernia    • Hyperlipidemia    • Hypertension      Past Surgical History:   Procedure Laterality Date   • CATARACT EXTRACTION  2006 [facility]   • COLONOSCOPY  2011     Family History   Problem Relation Age of Onset   • Stroke Mother    • Cancer Father         Prostate   • Heart failure Brother    • Heart disease Other      Immunization History   Administered Date(s) Administered   • Fluad Quad 11/18/2019   • Influenza, Unspecified 09/20/2017       No results displayed because visit has over 200 results.            Review of Systems   HENT: Negative.    Respiratory: Negative.    Cardiovascular: Negative.    Gastrointestinal: Negative.    Genitourinary: Negative.    Musculoskeletal: Positive for back pain and neck pain.   Skin: Negative.    Neurological: Negative.    Psychiatric/Behavioral: Negative.        Objective   Physical Exam   Constitutional: He is oriented to person, place, and time. He appears well-developed and well-nourished.   Cardiovascular: Normal rate and regular rhythm.   Pulmonary/Chest: Effort normal and breath sounds normal.   Abdominal: Soft. Bowel sounds are normal.   Musculoskeletal:   Chronic pain goes to pain management   Neurological: He is alert and oriented to person, place, and time.   Skin: Skin is warm. "       Procedures    Assessment/Plan   Kailash was seen today for hospital follow up and hypertension.    Diagnoses and all orders for this visit:    Hyperparathyroidism (CMS/HCC)    Tachycardia  -     T3  -     TSH+Free T4    Chronic kidney disease, stage 3 (moderate) (CMS/HCC)  -     Ambulatory Referral to Nephrology    Hypertensive encephalopathy    Essential hypertension    Other orders  -     nitroglycerin (NITROSTAT) 0.3 MG SL tablet; Place 1 tablet under the tongue Every 5 (Five) Minutes As Needed for Chest Pain. Take no more than 3 doses in 15 minutes.          Current Outpatient Medications:   •  Cholecalciferol (VITAMIN D3) 50 MCG (2000 UT) capsule, Take 2,000 Units by mouth Daily., Disp: , Rfl:   •  famotidine (PEPCID) 20 MG tablet, Take 20 mg by mouth Daily., Disp: , Rfl:   •  ferrous sulfate 325 (65 FE) MG EC tablet, Take 1 tablet by mouth Daily With Breakfast., Disp: , Rfl:   •  glimepiride (AMARYL) 1 MG tablet, Take 1 tablet by mouth Daily., Disp: , Rfl:   •  metoprolol succinate XL (TOPROL-XL) 25 MG 24 hr tablet, Take 2 tablets by mouth Daily for 30 days., Disp: 60 tablet, Rfl: 0  •  pravastatin (PRAVACHOL) 40 MG tablet, Take 1 tablet by mouth Daily., Disp: , Rfl:   •  tamsulosin (FLOMAX) 0.4 MG capsule 24 hr capsule, Take 1 capsule by mouth Daily., Disp: , Rfl:   •  vitamin B-12 (CYANOCOBALAMIN) 1000 MCG tablet, Take 1 tablet by mouth Daily., Disp: , Rfl:   •  nitroglycerin (NITROSTAT) 0.3 MG SL tablet, Place 1 tablet under the tongue Every 5 (Five) Minutes As Needed for Chest Pain. Take no more than 3 doses in 15 minutes., Disp: 50 tablet, Rfl: 12

## 2020-02-12 ENCOUNTER — READMISSION MANAGEMENT (OUTPATIENT)
Dept: CALL CENTER | Facility: HOSPITAL | Age: 80
End: 2020-02-12

## 2020-02-12 LAB
BACTERIA SPEC AEROBE CULT: NORMAL
T3 SERPL-MCNC: 96 NG/DL (ref 71–180)
T4 FREE SERPL-MCNC: 1.24 NG/DL (ref 0.82–1.77)
TSH SERPL DL<=0.005 MIU/L-ACNC: 3.73 UIU/ML (ref 0.45–4.5)

## 2020-02-12 NOTE — OUTREACH NOTE
Medical Week 1 Survey      Responses   Facility patient discharged from?  [facility]   Does the patient have one of the following disease processes/diagnoses(primary or secondary)?  Other   Is there a successful TCM telephone encounter documented?  No   Week 1 attempt successful?  Yes   Call start time  1626   Call end time  1636   Discharge diagnosis  Hypertensive encephalopathy   Meds reviewed with patient/caregiver?  Yes   Does the patient have all medications ordered at discharge?  No   Prescription comments  Pt states he is getting them by mail pharmacy and they should be here in the next few days.   Does the patient have a primary care provider?   Yes   Does the patient have an appointment with their PCP within 7 days of discharge?  Yes   Has the patient kept scheduled appointments due by today?  N/A   Comments  patient has spoke with his PCP office and has appt in the next two weeks   Has home health visited the patient within 72 hours of discharge?  N/A   Did the patient receive a copy of their discharge instructions?  Yes   Nursing interventions  Reviewed instructions with patient   What is the patient's perception of their health status since discharge?  Improving   Is the patient/caregiver able to teach back signs and symptoms related to disease process for when to call PCP?  Yes   Is the patient/caregiver able to teach back signs and symptoms related to disease process for when to call 911?  Yes   Is the patient/caregiver able to teach back the hierarchy of who to call/visit for symptoms/problems? PCP, Specialist, Home health nurse, Urgent Care, ED, 911  Yes   Additional teach back comments  Patient states he is waiting on his medication and getting them by mail so he can get them cheaper.  Has spoke with Madelyn Márquez's office and they have made his an appt.  He is needing a follow up  with Dr. Mcallister.  Pt wanted assistance to make appt.  Called Dr. Mcallister's office and left voicemail of pt needing a follow up  appt.    Week 1 call completed?  Yes   Graduated  Yes   Did the patient feel the follow up calls were helpful during their recovery period?  Yes   Was the number of calls appropriate?  Yes   Graduated/Revoked comments  Waiting on return call for a follow up appt with Dr. Mcallister.          Zoe Cuello LPN

## 2020-02-13 ENCOUNTER — OFFICE VISIT (OUTPATIENT)
Dept: PAIN MEDICINE | Facility: CLINIC | Age: 80
End: 2020-02-13

## 2020-02-13 VITALS
DIASTOLIC BLOOD PRESSURE: 75 MMHG | HEIGHT: 66 IN | SYSTOLIC BLOOD PRESSURE: 189 MMHG | BODY MASS INDEX: 27.32 KG/M2 | OXYGEN SATURATION: 99 % | HEART RATE: 67 BPM | WEIGHT: 170 LBS | RESPIRATION RATE: 16 BRPM | TEMPERATURE: 97.6 F

## 2020-02-13 DIAGNOSIS — M25.50 POLYARTHRALGIA: ICD-10-CM

## 2020-02-13 DIAGNOSIS — Z79.899 HIGH RISK MEDICATION USE: ICD-10-CM

## 2020-02-13 DIAGNOSIS — M79.18 MYOFASCIAL PAIN SYNDROME: ICD-10-CM

## 2020-02-13 DIAGNOSIS — M47.817 LUMBOSACRAL SPONDYLOSIS WITHOUT MYELOPATHY: ICD-10-CM

## 2020-02-13 DIAGNOSIS — F19.90 CURRENT DRUG USE: Primary | ICD-10-CM

## 2020-02-13 DIAGNOSIS — M47.812 CERVICAL SPONDYLOSIS WITHOUT MYELOPATHY: ICD-10-CM

## 2020-02-13 DIAGNOSIS — G89.4 CHRONIC PAIN SYNDROME: Primary | ICD-10-CM

## 2020-02-13 PROCEDURE — 99204 OFFICE O/P NEW MOD 45 MIN: CPT | Performed by: ANESTHESIOLOGY

## 2020-02-13 PROCEDURE — G0463 HOSPITAL OUTPT CLINIC VISIT: HCPCS | Performed by: ANESTHESIOLOGY

## 2020-02-13 RX ORDER — PERPHENAZINE/AMITRIPTYLINE HCL 2 MG-10 MG
4 TABLET ORAL DAILY
COMMUNITY
End: 2020-06-22

## 2020-02-13 RX ORDER — PSEUDOEPHEDRINE HYDROCHLORIDE 60 MG/1
120 TABLET, FILM COATED ORAL AS NEEDED
COMMUNITY
End: 2021-06-12

## 2020-02-13 NOTE — PROGRESS NOTES
Subjective    CC back pain, neck pain, bilateral leg pain  Kailash Cooper is a 79 y.o. male with multiple comorbidities including CKD, DM, COPD, chronic back pain polyarthralgia here for initial evaluation.  Referred by PCP.  Complains of continued and worsening back pain radiating to bilateral hips worse with standing walking or prolonged activity.  Denies new weakness saddle anesthesia bladder bowel continence.  Chronic axial neck pain radiating to bilateral shoulder without radicular pain.  Continued chronic polyarthralgia and myofascial pain worse with activity.  Tried physical therapy/chiropractor with marginal relief.  Seen in [place] [facility] currently, on opioid therapy for several years previously in [place]/Dr. Lewis with hydrocodone 60 morphine equivalent daily.  Denies noncompliance.  Tried LESI's with marginal relief.    L-spine MRI 2018 moderate degenerative changes throughout facet arthropathy mild to moderate foraminal narrowing, scoliosis.  C-spine MRI 2018 marked degenerative disc and some posterior element disease, causing spinal stenosis at multiple levels, including C2-3, C3-4, C5-6, and C6-7.  There multiple foraminal impingements      Pain Assessment   Location of Pain: Lower Back, neck pain, joint  Description of Pain: Dull/Aching, Throbbing, Stabbing  Previous Pain Rating :7  Current Pain Ratin  Aggravating Factors: Activity  Alleviating Factors: Rest, Medication    PEG Assessment   What number best describes your pain on average in the past week?7  What number best describes how, during the past week, pain has interfered with your enjoyment of life?7  What number best describes how, during the past week, pain has interfered with your general activity?9      The following portions of the patient's history were reviewed and updated as appropriate: allergies, current medications, past family history, past medical history, past social history, past surgical history and problem list.      has a past medical history of Arthritis, Cancer (CMS/HCC), Diabetes (CMS/HCC), Enlarged prostate, Former smoker, Hiatal hernia, Hip pain, Hyperlipidemia, Hypertension, Kidney disease, Leg pain, Low back pain, Neck pain, and Stroke (CMS/HCC).   has a past surgical history that includes Cataract extraction (); Colonoscopy (); and Lung cancer surgery.  family history includes Cancer in his father; Heart disease in an other family member; Heart failure in his brother; Stroke in his mother.  Social History     Tobacco Use   • Smoking status: Former Smoker     Types: Cigarettes     Last attempt to quit:      Years since quittin.1   • Smokeless tobacco: Never Used   Substance Use Topics   • Alcohol use: Never     Frequency: Never       Review of Systems   Constitutional: Negative for chills, fatigue and fever.   HENT: Negative for swollen glands and trouble swallowing.    Respiratory: Negative.  Negative for shortness of breath.    Cardiovascular: Negative for chest pain, palpitations and leg swelling.   Gastrointestinal: Negative for nausea and vomiting.   Musculoskeletal: Positive for arthralgias, back pain, myalgias and neck pain. Negative for gait problem, joint swelling and neck stiffness.   Skin: Negative for color change, dry skin, rash and skin lesions.   Neurological: Negative for dizziness, weakness, light-headedness and headache.   Hematological: Negative for adenopathy. Does not bruise/bleed easily.   Psychiatric/Behavioral: Negative for behavioral problems, suicidal ideas and depressed mood.   All other systems reviewed and are negative.    Objective   Physical Exam   Constitutional: He is oriented to person, place, and time. He appears well-developed and well-nourished. No distress.   Eyes: Pupils are equal, round, and reactive to light. Conjunctivae are normal.   Neck: Normal range of motion and full passive range of motion without pain. No Kernig's sign noted.   Cardiovascular: Normal heart  "sounds and intact distal pulses.   Pulmonary/Chest: Effort normal and breath sounds normal.   Musculoskeletal:        Cervical back: He exhibits decreased range of motion and tenderness.        Lumbar back: He exhibits decreased range of motion, tenderness, pain and spasm.   Lumbar and cervical paraspinal tenderness,  negative leg raise .  Pain with extension/side rotation.   Lumbar loading positive, pain on extension of low back past 5 degrees.  TTP on the lumbar facets noted.  Lumbar scoliosis       Vascular Status -  His right foot exhibits no edema. His left foot exhibits no edema.  Lymphadenopathy:     He has no cervical adenopathy.   Neurological: He is alert and oriented to person, place, and time. He has normal strength and normal reflexes. No sensory deficit. Gait abnormal. Coordination normal.   Antalgic gait   Skin: Skin is warm and dry. Capillary refill takes less than 2 seconds.   Psychiatric: He has a normal mood and affect. His behavior is normal.   Vitals reviewed.    BP (!) 189/75   Pulse 67   Temp 97.6 °F (36.4 °C)   Resp 16   Ht 167.6 cm (66\")   Wt 77.1 kg (170 lb)   SpO2 99%   BMI 27.44 kg/m²     PHQ 9 on chart  Opioid risk tool low risk    Assessment/Plan   Kailash was seen today for neck pain, hip pain, leg pain and initial evaluation.    Diagnoses and all orders for this visit:    Chronic pain syndrome    Lumbosacral spondylosis without myelopathy    Cervical spondylosis without myelopathy    Polyarthralgia    Myofascial pain syndrome    High risk medication use    Summary  Kailash Cooper is a 79 y.o. male with multiple comorbidities including CKD, DM, COPD, chronic back pain polyarthralgia here for initial evaluation.  Referred by PCP.  Chronic pain from lumbar and cervical DDD spondylosis, polyarthralgia/myofascial pain.  Significantly impairing daily activity.  Tried physical therapy, chiropractor, LESI's with marginal relief.  Seen in [place] pain management currently, on opioid therapy " for several years previously in [place]/Dr. Lewis with hydrocodone 60 morphine equivalent daily.  Denies noncompliance.  Would like to change providers due to location and wanting to stay in [facility] where he sees his PCP.    I recommended trying facet injections/possible RFA.  He will consider.    We will continue hydrocodone and attempt dose reduction to 40 morphine equivalent daily.  He just recently refill hydrocodone from [place] [facility].  UDS sent.  Inspect reviewed.  Controlled substance agreement signed  Discussed risk of tolerance, dependence, respiratory depression, coma and death associated with use of oral opioids for treatment of chronic nonmalignant pain.     Follow-up in 2 to 3 weeks at which point we will consider continuing hydrocodone at 40 morphine equivalent daily.

## 2020-02-14 LAB
BH CV ECHO MEAS - % IVS THICK: 71.4 %
BH CV ECHO MEAS - % LVPW THICK: 67.2 %
BH CV ECHO MEAS - ACS: 2 CM
BH CV ECHO MEAS - AO MAX PG (FULL): 20.1 MMHG
BH CV ECHO MEAS - AO MAX PG: 40.9 MMHG
BH CV ECHO MEAS - AO MEAN PG (FULL): 5.8 MMHG
BH CV ECHO MEAS - AO MEAN PG: 18.6 MMHG
BH CV ECHO MEAS - AO ROOT AREA (BSA CORRECTED): 1.5
BH CV ECHO MEAS - AO ROOT AREA: 6.4 CM^2
BH CV ECHO MEAS - AO ROOT DIAM: 2.9 CM
BH CV ECHO MEAS - AO V2 MAX: 307.4 CM/SEC
BH CV ECHO MEAS - AO V2 MEAN: 187.1 CM/SEC
BH CV ECHO MEAS - AO V2 VTI: 57.2 CM
BH CV ECHO MEAS - AVA(I,A): 3.3 CM^2
BH CV ECHO MEAS - AVA(I,D): 3.3 CM^2
BH CV ECHO MEAS - AVA(V,A): 2.6 CM^2
BH CV ECHO MEAS - AVA(V,D): 2.6 CM^2
BH CV ECHO MEAS - BSA(HAYCOCK): 1.9 M^2
BH CV ECHO MEAS - BSA: 1.9 M^2
BH CV ECHO MEAS - BZI_BMI: 29.5 KILOGRAMS/M^2
BH CV ECHO MEAS - BZI_METRIC_HEIGHT: 165.1 CM
BH CV ECHO MEAS - BZI_METRIC_WEIGHT: 80.3 KG
BH CV ECHO MEAS - EDV(CUBED): 99.5 ML
BH CV ECHO MEAS - EDV(MOD-SP2): 72.6 ML
BH CV ECHO MEAS - EDV(MOD-SP4): 82.5 ML
BH CV ECHO MEAS - EDV(TEICH): 99 ML
BH CV ECHO MEAS - EF(CUBED): 80.3 %
BH CV ECHO MEAS - EF(MOD-BP): 73 %
BH CV ECHO MEAS - EF(MOD-SP2): 76 %
BH CV ECHO MEAS - EF(MOD-SP4): 67 %
BH CV ECHO MEAS - EF(TEICH): 72.8 %
BH CV ECHO MEAS - ESV(CUBED): 19.6 ML
BH CV ECHO MEAS - ESV(MOD-SP2): 17.4 ML
BH CV ECHO MEAS - ESV(MOD-SP4): 27.3 ML
BH CV ECHO MEAS - ESV(TEICH): 26.9 ML
BH CV ECHO MEAS - FS: 41.8 %
BH CV ECHO MEAS - IVS/LVPW: 1.1
BH CV ECHO MEAS - IVSD: 1.1 CM
BH CV ECHO MEAS - IVSS: 1.8 CM
BH CV ECHO MEAS - LA DIMENSION(2D): 3.1 CM
BH CV ECHO MEAS - LV DIASTOLIC VOL/BSA (35-75): 43.9 ML/M^2
BH CV ECHO MEAS - LV MASS(C)D: 165.9 GRAMS
BH CV ECHO MEAS - LV MASS(C)DI: 88.3 GRAMS/M^2
BH CV ECHO MEAS - LV MASS(C)S: 179.1 GRAMS
BH CV ECHO MEAS - LV MASS(C)SI: 95.4 GRAMS/M^2
BH CV ECHO MEAS - LV MAX PG: 20.8 MMHG
BH CV ECHO MEAS - LV MEAN PG: 12.7 MMHG
BH CV ECHO MEAS - LV SYSTOLIC VOL/BSA (12-30): 14.5 ML/M^2
BH CV ECHO MEAS - LV V1 MAX: 228 CM/SEC
BH CV ECHO MEAS - LV V1 MEAN: 167.9 CM/SEC
BH CV ECHO MEAS - LV V1 VTI: 54.6 CM
BH CV ECHO MEAS - LVIDD: 4.6 CM
BH CV ECHO MEAS - LVIDS: 2.7 CM
BH CV ECHO MEAS - LVOT AREA: 3.5 CM^2
BH CV ECHO MEAS - LVOT DIAM: 2.1 CM
BH CV ECHO MEAS - LVPWD: 1 CM
BH CV ECHO MEAS - LVPWS: 1.7 CM
BH CV ECHO MEAS - MV A MAX VEL: 104.4 CM/SEC
BH CV ECHO MEAS - MV DEC SLOPE: 242.7 CM/SEC^2
BH CV ECHO MEAS - MV DEC TIME: 0.29 SEC
BH CV ECHO MEAS - MV E MAX VEL: 36.1 CM/SEC
BH CV ECHO MEAS - MV E/A: 0.35
BH CV ECHO MEAS - MV MAX PG: 5 MMHG
BH CV ECHO MEAS - MV MEAN PG: 1.9 MMHG
BH CV ECHO MEAS - MV V2 MAX: 112.3 CM/SEC
BH CV ECHO MEAS - MV V2 MEAN: 64.4 CM/SEC
BH CV ECHO MEAS - MV V2 VTI: 30.5 CM
BH CV ECHO MEAS - MVA(VTI): 6.2 CM^2
BH CV ECHO MEAS - PA ACC TIME: 0.08 SEC
BH CV ECHO MEAS - PA MAX PG (FULL): 1.5 MMHG
BH CV ECHO MEAS - PA MAX PG: 6.3 MMHG
BH CV ECHO MEAS - PA MEAN PG (FULL): 0.73 MMHG
BH CV ECHO MEAS - PA MEAN PG: 3 MMHG
BH CV ECHO MEAS - PA PR(ACCEL): 41.6 MMHG
BH CV ECHO MEAS - PA V2 MAX: 125.8 CM/SEC
BH CV ECHO MEAS - PA V2 MEAN: 79.9 CM/SEC
BH CV ECHO MEAS - PA V2 VTI: 22.1 CM
BH CV ECHO MEAS - PULM A REVS DUR: 0.1 SEC
BH CV ECHO MEAS - PULM A REVS VEL: 35 CM/SEC
BH CV ECHO MEAS - PULM DIAS VEL: 52.8 CM/SEC
BH CV ECHO MEAS - PULM S/D: 1.4
BH CV ECHO MEAS - PULM SYS VEL: 73.5 CM/SEC
BH CV ECHO MEAS - RV MAX PG: 4.9 MMHG
BH CV ECHO MEAS - RV MEAN PG: 2.2 MMHG
BH CV ECHO MEAS - RV V1 MAX: 110.4 CM/SEC
BH CV ECHO MEAS - RV V1 MEAN: 67.8 CM/SEC
BH CV ECHO MEAS - RV V1 VTI: 21.1 CM
BH CV ECHO MEAS - RVDD: 2.6 CM
BH CV ECHO MEAS - SI(AO): 194.4 ML/M^2
BH CV ECHO MEAS - SI(CUBED): 42.5 ML/M^2
BH CV ECHO MEAS - SI(LVOT): 100.6 ML/M^2
BH CV ECHO MEAS - SI(MOD-SP2): 29.4 ML/M^2
BH CV ECHO MEAS - SI(MOD-SP4): 29.4 ML/M^2
BH CV ECHO MEAS - SI(TEICH): 38.4 ML/M^2
BH CV ECHO MEAS - SV(AO): 365.1 ML
BH CV ECHO MEAS - SV(CUBED): 79.9 ML
BH CV ECHO MEAS - SV(LVOT): 189 ML
BH CV ECHO MEAS - SV(MOD-SP2): 55.2 ML
BH CV ECHO MEAS - SV(MOD-SP4): 55.2 ML
BH CV ECHO MEAS - SV(TEICH): 72.1 ML
MAXIMAL PREDICTED HEART RATE: 141 BPM
STRESS TARGET HR: 120 BPM

## 2020-02-14 PROCEDURE — 93306 TTE W/DOPPLER COMPLETE: CPT | Performed by: INTERNAL MEDICINE

## 2020-02-14 NOTE — OUTREACH NOTE
RETURN CALL FROM SHARIFA AT DR. TRAN'S OFFICE. APPOINTMENT MADE FOR 2/21/20 AT 1100 IN THE [place] OFFICE. CALL TO PATIENT, PATIENT NOTIFIED OF THIS DATE AND TIME.

## 2020-03-05 ENCOUNTER — OFFICE VISIT (OUTPATIENT)
Dept: PAIN MEDICINE | Facility: CLINIC | Age: 80
End: 2020-03-05

## 2020-03-05 VITALS
BODY MASS INDEX: 26.52 KG/M2 | DIASTOLIC BLOOD PRESSURE: 76 MMHG | HEART RATE: 56 BPM | SYSTOLIC BLOOD PRESSURE: 148 MMHG | OXYGEN SATURATION: 99 % | HEIGHT: 66 IN | TEMPERATURE: 97.8 F | RESPIRATION RATE: 16 BRPM | WEIGHT: 165 LBS

## 2020-03-05 DIAGNOSIS — G89.4 CHRONIC PAIN SYNDROME: Primary | ICD-10-CM

## 2020-03-05 DIAGNOSIS — Z79.899 HIGH RISK MEDICATION USE: ICD-10-CM

## 2020-03-05 DIAGNOSIS — M25.50 POLYARTHRALGIA: ICD-10-CM

## 2020-03-05 DIAGNOSIS — M47.812 CERVICAL SPONDYLOSIS WITHOUT MYELOPATHY: ICD-10-CM

## 2020-03-05 DIAGNOSIS — M47.817 LUMBOSACRAL SPONDYLOSIS WITHOUT MYELOPATHY: ICD-10-CM

## 2020-03-05 PROCEDURE — 99214 OFFICE O/P EST MOD 30 MIN: CPT | Performed by: ANESTHESIOLOGY

## 2020-03-05 PROCEDURE — G0463 HOSPITAL OUTPT CLINIC VISIT: HCPCS | Performed by: ANESTHESIOLOGY

## 2020-03-05 RX ORDER — HYDROCODONE BITARTRATE AND ACETAMINOPHEN 10; 325 MG/1; MG/1
1 TABLET ORAL 4 TIMES DAILY PRN
Qty: 28 TABLET | Refills: 0 | Status: SHIPPED | OUTPATIENT
Start: 2020-03-05 | End: 2020-03-05 | Stop reason: SDUPTHER

## 2020-03-05 RX ORDER — TORSEMIDE 5 MG/1
5 TABLET ORAL DAILY
COMMUNITY
Start: 2020-02-21 | End: 2020-04-30 | Stop reason: SDUPTHER

## 2020-03-05 RX ORDER — RAMIPRIL 5 MG/1
5 CAPSULE ORAL EVERY EVENING
COMMUNITY
Start: 2020-02-21 | End: 2020-04-30 | Stop reason: SDUPTHER

## 2020-03-05 RX ORDER — HYDROCODONE BITARTRATE AND ACETAMINOPHEN 10; 325 MG/1; MG/1
1 TABLET ORAL 4 TIMES DAILY PRN
Qty: 120 TABLET | Refills: 0
Start: 2020-03-05 | End: 2020-04-07 | Stop reason: SDUPTHER

## 2020-03-05 RX ORDER — HYDROCODONE BITARTRATE AND ACETAMINOPHEN 10; 325 MG/1; MG/1
1 TABLET ORAL EVERY 4 HOURS PRN
COMMUNITY
End: 2020-03-05 | Stop reason: SDUPTHER

## 2020-03-05 RX ORDER — HYDROCODONE BITARTRATE AND ACETAMINOPHEN 10; 325 MG/1; MG/1
1 TABLET ORAL 4 TIMES DAILY PRN
Qty: 120 TABLET | Refills: 0 | Status: SHIPPED | OUTPATIENT
Start: 2020-03-05 | End: 2020-03-05 | Stop reason: SDUPTHER

## 2020-03-05 NOTE — PROGRESS NOTES
Subjective    CC back pain, neck pain, bilateral leg pain  Kailash Cooper is a 79 y.o. male with multiple comorbidities including CKD, DM, COPD, chronic back pain polyarthralgia here for f/u.  Continued hydrocodone last visit instructed to decrease to 4 times daily.    Reports worsening axial back pain which interferes with his activity and exercising.    Chronic back pain radiating to bilateral hips worse with standing walking or prolonged activity.  Denies new weakness saddle anesthesia bladder bowel continence.  Chronic axial neck pain radiating to bilateral shoulder without radicular pain.  Continued chronic polyarthralgia and myofascial pain worse with activity.  Tried physical therapy/chiropractor with marginal relief.  Seen in [place] pain management on opioid therapy for several years previously in [place]/Dr. Lewis   Tried LESI's with marginal relief.    L-spine MRI 2018 moderate degenerative changes throughout facet arthropathy mild to moderate foraminal narrowing, scoliosis.  C-spine MRI 2018 marked degenerative disc and some posterior element disease, causing spinal stenosis at multiple levels, including C2-3, C3-4, C5-6, and C6-7.  There multiple foraminal impingements      Pain Assessment   Location of Pain: Lower Back, neck pain, joint  Description of Pain: Dull/Aching, Throbbing, Stabbing  Previous Pain Rating :7  Current Pain Ratin  Aggravating Factors: Activity  Alleviating Factors: Rest, Medication    PEG Assessment   What number best describes your pain on average in the past week?9  What number best describes how, during the past week, pain has interfered with your enjoyment of life?9  What number best describes how, during the past week, pain has interfered with your general activity?9      The following portions of the patient's history were reviewed and updated as appropriate: allergies, current medications, past family history, past medical history, past social history, past surgical history  and problem list.     has a past medical history of Arthritis, Cancer (CMS/HCC), Diabetes (CMS/HCC), Enlarged prostate, Former smoker, Hiatal hernia, Hip pain, Hyperlipidemia, Hypertension, Kidney disease, Leg pain, Low back pain, Neck pain, and Stroke (CMS/HCC).   has a past surgical history that includes Cataract extraction (); Colonoscopy (); and Lung cancer surgery.  family history includes Cancer in his father; Heart disease in an other family member; Heart failure in his brother; Stroke in his mother.  Social History     Tobacco Use   • Smoking status: Former Smoker     Types: Cigarettes     Last attempt to quit:      Years since quittin.1   • Smokeless tobacco: Never Used   Substance Use Topics   • Alcohol use: Never     Frequency: Never     Review of Systems   Constitutional: Negative for chills, fatigue and fever.   HENT: Negative for swollen glands and trouble swallowing.    Respiratory: Negative.  Negative for shortness of breath.    Cardiovascular: Negative for chest pain, palpitations and leg swelling.   Gastrointestinal: Negative for nausea and vomiting.   Musculoskeletal: Positive for arthralgias, back pain, myalgias and neck pain. Negative for gait problem, joint swelling and neck stiffness.   Skin: Negative for color change, dry skin, rash and skin lesions.   Neurological: Negative for dizziness, weakness, light-headedness and headache.   Hematological: Negative for adenopathy. Does not bruise/bleed easily.   Psychiatric/Behavioral: Negative for behavioral problems, suicidal ideas and depressed mood.   All other systems reviewed and are negative.    Objective   Physical Exam   Constitutional: He is oriented to person, place, and time. He appears well-developed and well-nourished. No distress.   Eyes: Pupils are equal, round, and reactive to light. Conjunctivae are normal.   Neck: Normal range of motion and full passive range of motion without pain. No Kernig's sign noted.  "  Cardiovascular: Normal heart sounds and intact distal pulses.   Pulmonary/Chest: Effort normal and breath sounds normal.   Musculoskeletal:        Cervical back: He exhibits decreased range of motion and tenderness.        Lumbar back: He exhibits decreased range of motion, tenderness, pain and spasm.   Lumbar and cervical paraspinal tenderness,  negative leg raise .  Pain with extension/side rotation.   Lumbar loading positive, pain on extension of low back past 5 degrees.  TTP on the lumbar facets noted.  Lumbar scoliosis       Vascular Status -  His right foot exhibits no edema. His left foot exhibits no edema.  Lymphadenopathy:     He has no cervical adenopathy.   Neurological: He is alert and oriented to person, place, and time. He has normal strength and normal reflexes. No sensory deficit. Gait abnormal. Coordination normal.   Antalgic gait   Skin: Skin is warm and dry. Capillary refill takes less than 2 seconds.   Psychiatric: He has a normal mood and affect. His behavior is normal.   Vitals reviewed.    /76   Pulse 56   Temp 97.8 °F (36.6 °C)   Resp 16   Ht 167.6 cm (66\")   Wt 74.8 kg (165 lb)   SpO2 99%   BMI 26.63 kg/m²     PHQ 9 on chart  Opioid risk tool low risk    Assessment/Plan   Kailash was seen today for back pain, hip pain, leg pain and follow-up.    Diagnoses and all orders for this visit:    Chronic pain syndrome  -     Discontinue: HYDROcodone-acetaminophen (NORCO)  MG per tablet; Take 1 tablet by mouth 4 (Four) Times a Day As Needed for Severe Pain .  -     Discontinue: HYDROcodone-acetaminophen (NORCO)  MG per tablet; Take 1 tablet by mouth 4 (Four) Times a Day As Needed for Severe Pain .  -     HYDROcodone-acetaminophen (NORCO)  MG per tablet; Take 1 tablet by mouth 4 (Four) Times a Day As Needed for Severe Pain .    Lumbosacral spondylosis without myelopathy  -     Ambulatory Referral to Pain Management  -     Discontinue: HYDROcodone-acetaminophen (NORCO) "  MG per tablet; Take 1 tablet by mouth 4 (Four) Times a Day As Needed for Severe Pain .  -     Discontinue: HYDROcodone-acetaminophen (NORCO)  MG per tablet; Take 1 tablet by mouth 4 (Four) Times a Day As Needed for Severe Pain .  -     HYDROcodone-acetaminophen (NORCO)  MG per tablet; Take 1 tablet by mouth 4 (Four) Times a Day As Needed for Severe Pain .    Cervical spondylosis without myelopathy  -     Discontinue: HYDROcodone-acetaminophen (NORCO)  MG per tablet; Take 1 tablet by mouth 4 (Four) Times a Day As Needed for Severe Pain .  -     Discontinue: HYDROcodone-acetaminophen (NORCO)  MG per tablet; Take 1 tablet by mouth 4 (Four) Times a Day As Needed for Severe Pain .  -     HYDROcodone-acetaminophen (NORCO)  MG per tablet; Take 1 tablet by mouth 4 (Four) Times a Day As Needed for Severe Pain .    Polyarthralgia    High risk medication use    Summary  Kailash oCoper is a 79 y.o. male with multiple comorbidities including CKD, DM, COPD, chronic back pain polyarthralgia here for f/u.   Chronic pain from lumbar and cervical DDD spondylosis, polyarthralgia/myofascial pain.  Tried physical therapy, chiropractor, LESI's with marginal relief.  Axial back pain interfering with ADL and his ability to exercise.    We will schedule for bilateral lumbar medial branch block if effective will plan RFA.    Notes from [facility] pain management reviewed.    No NSAIDs due to CKD.  We will continue hydrocodone and attempt dose reduction to 40 morphine equivalent daily.    Hydrocodone 10/325 4 times daily as needed for severe pain.  UDS and inspect reviewed.    Discussed risk of tolerance, dependence, respiratory depression, coma and death associated with use of oral opioids for treatment of chronic nonmalignant pain.     RTC for procedure or in 5 weeks

## 2020-03-17 ENCOUNTER — APPOINTMENT (OUTPATIENT)
Dept: PAIN MEDICINE | Facility: HOSPITAL | Age: 80
End: 2020-03-17

## 2020-03-18 ENCOUNTER — APPOINTMENT (OUTPATIENT)
Dept: PAIN MEDICINE | Facility: HOSPITAL | Age: 80
End: 2020-03-18

## 2020-04-07 ENCOUNTER — RESULTS ENCOUNTER (OUTPATIENT)
Dept: PAIN MEDICINE | Facility: CLINIC | Age: 80
End: 2020-04-07

## 2020-04-07 ENCOUNTER — OFFICE VISIT (OUTPATIENT)
Dept: PAIN MEDICINE | Facility: CLINIC | Age: 80
End: 2020-04-07

## 2020-04-07 VITALS
WEIGHT: 165 LBS | OXYGEN SATURATION: 98 % | TEMPERATURE: 97.9 F | RESPIRATION RATE: 16 BRPM | HEART RATE: 55 BPM | DIASTOLIC BLOOD PRESSURE: 72 MMHG | SYSTOLIC BLOOD PRESSURE: 136 MMHG | HEIGHT: 65 IN | BODY MASS INDEX: 27.49 KG/M2

## 2020-04-07 DIAGNOSIS — F19.90 CURRENT DRUG USE: Primary | ICD-10-CM

## 2020-04-07 DIAGNOSIS — G89.4 CHRONIC PAIN SYNDROME: ICD-10-CM

## 2020-04-07 DIAGNOSIS — M47.817 LUMBOSACRAL SPONDYLOSIS WITHOUT MYELOPATHY: ICD-10-CM

## 2020-04-07 DIAGNOSIS — F19.90 CURRENT DRUG USE: ICD-10-CM

## 2020-04-07 DIAGNOSIS — M47.812 CERVICAL SPONDYLOSIS WITHOUT MYELOPATHY: ICD-10-CM

## 2020-04-07 PROCEDURE — 99214 OFFICE O/P EST MOD 30 MIN: CPT | Performed by: ANESTHESIOLOGY

## 2020-04-07 RX ORDER — HYDROCODONE BITARTRATE AND ACETAMINOPHEN 10; 325 MG/1; MG/1
1 TABLET ORAL 4 TIMES DAILY PRN
Qty: 120 TABLET | Refills: 0 | Status: SHIPPED | OUTPATIENT
Start: 2020-04-07 | End: 2020-06-03 | Stop reason: SDUPTHER

## 2020-04-07 RX ORDER — HYDROCODONE BITARTRATE AND ACETAMINOPHEN 10; 325 MG/1; MG/1
1 TABLET ORAL 4 TIMES DAILY PRN
Qty: 120 TABLET | Refills: 0
Start: 2020-04-07 | End: 2020-04-07 | Stop reason: SDUPTHER

## 2020-04-07 RX ORDER — HYDROCODONE BITARTRATE AND ACETAMINOPHEN 10; 325 MG/1; MG/1
1 TABLET ORAL 4 TIMES DAILY PRN
Qty: 120 TABLET | Refills: 0
Start: 2020-04-07 | End: 2020-04-07

## 2020-04-07 NOTE — PROGRESS NOTES
Subjective    CC back pain, neck pain, bilateral leg pain  Kailash Cooper is a 79 y.o. male with multiple comorbidities including CKD, DM, COPD, chronic back pain polyarthralgia here for f/u.  Continues to derive functional benefit from hydrocodone.  Tolerated dose reduction.  Chronic back pain radiating to bilateral hips worse with standing walking or prolonged activity.  Denies new weakness saddle anesthesia bladder bowel continence.  Chronic axial neck pain radiating to bilateral shoulder without radicular pain.  Continued chronic polyarthralgia and myofascial pain worse with activity.  Tried physical therapy/chiropractor with marginal relief.  Tried LESI's with marginal relief.    Utilizes hydrocodone 10/325 4 times daily as needed for severe pain.  Functional benefit /denies side effects.    L-spine MRI 2018 moderate degenerative changes throughout facet arthropathy mild to moderate foraminal narrowing, scoliosis.  C-spine MRI 2018 marked degenerative disc and some posterior element disease, causing spinal stenosis at multiple levels, including C2-3, C3-4, C5-6, and C6-7.  There multiple foraminal impingements    Pain Assessment   Location of Pain: Lower Back, neck pain, joint  Description of Pain: Dull/Aching, Throbbing, Stabbing  Previous Pain Rating :7  Current Pain Ratin  Aggravating Factors: Activity  Alleviating Factors: Rest, Medication    PEG Assessment   What number best describes your pain on average in the past week?9  What number best describes how, during the past week, pain has interfered with your enjoyment of life?9  What number best describes how, during the past week, pain has interfered with your general activity?9      The following portions of the patient's history were reviewed and updated as appropriate: allergies, current medications, past family history, past medical history, past social history, past surgical history and problem list.     has a past medical history of Arthritis,  Cancer (CMS/HCC), Diabetes (CMS/HCC), Enlarged prostate, Former smoker, Hiatal hernia, Hip pain, Hyperlipidemia, Hypertension, Kidney disease, Leg pain, Low back pain, Neck pain, and Stroke (CMS/HCC).   has a past surgical history that includes Cataract extraction (); Colonoscopy (); and Lung cancer surgery.  family history includes Cancer in his father; Heart disease in an other family member; Heart failure in his brother; Stroke in his mother.  Social History     Tobacco Use   • Smoking status: Former Smoker     Types: Cigarettes     Last attempt to quit: 1990     Years since quittin.2   • Smokeless tobacco: Never Used   Substance Use Topics   • Alcohol use: Never     Frequency: Never     Review of Systems   Constitutional: Negative for chills, fatigue and fever.   HENT: Negative for swollen glands and trouble swallowing.    Respiratory: Negative.  Negative for shortness of breath.    Cardiovascular: Negative for chest pain, palpitations and leg swelling.   Gastrointestinal: Negative for nausea and vomiting.   Musculoskeletal: Positive for arthralgias, back pain, myalgias and neck pain. Negative for gait problem, joint swelling and neck stiffness.   Skin: Negative for color change, dry skin, rash and skin lesions.   Neurological: Negative for dizziness, weakness, light-headedness and headache.   Hematological: Negative for adenopathy. Does not bruise/bleed easily.   Psychiatric/Behavioral: Negative for behavioral problems, suicidal ideas and depressed mood.   All other systems reviewed and are negative.    Objective   Physical Exam   Constitutional: He is oriented to person, place, and time. He appears well-developed and well-nourished. No distress.   Eyes: Pupils are equal, round, and reactive to light. Conjunctivae are normal.   Neck: Normal range of motion and full passive range of motion without pain. No Kernig's sign noted.   Cardiovascular: Normal heart sounds and intact distal pulses.  "  Pulmonary/Chest: Effort normal and breath sounds normal.   Musculoskeletal:        Cervical back: He exhibits decreased range of motion and tenderness.        Lumbar back: He exhibits decreased range of motion, tenderness, pain and spasm.   Lumbar and cervical paraspinal tenderness,  negative leg raise .  Pain with extension/side rotation.   Lumbar loading positive, pain on extension of low back past 5 degrees.  TTP on the lumbar facets noted.  Lumbar scoliosis       Vascular Status -  His right foot exhibits no edema. His left foot exhibits no edema.  Lymphadenopathy:     He has no cervical adenopathy.   Neurological: He is alert and oriented to person, place, and time. He has normal strength and normal reflexes. No sensory deficit. Gait abnormal. Coordination normal.   Antalgic gait   Skin: Skin is warm and dry. Capillary refill takes less than 2 seconds.   Psychiatric: He has a normal mood and affect. His behavior is normal.   Vitals reviewed.    /72   Pulse 55   Temp 97.9 °F (36.6 °C)   Resp 16   Ht 165.1 cm (65\")   Wt 74.8 kg (165 lb)   SpO2 98%   BMI 27.46 kg/m²     PHQ 9 on chart  Opioid risk tool low risk    Assessment/Plan   Kailash was seen today for back pain, neck pain and follow-up.    Diagnoses and all orders for this visit:    Chronic pain syndrome  -     Discontinue: HYDROcodone-acetaminophen (NORCO)  MG per tablet; Take 1 tablet by mouth 4 (Four) Times a Day As Needed for Severe Pain .  -     HYDROcodone-acetaminophen (NORCO)  MG per tablet; Take 1 tablet by mouth 4 (Four) Times a Day As Needed for Severe Pain .    Lumbosacral spondylosis without myelopathy  -     Discontinue: HYDROcodone-acetaminophen (NORCO)  MG per tablet; Take 1 tablet by mouth 4 (Four) Times a Day As Needed for Severe Pain .  -     HYDROcodone-acetaminophen (NORCO)  MG per tablet; Take 1 tablet by mouth 4 (Four) Times a Day As Needed for Severe Pain .    Cervical spondylosis without " myelopathy  -     Discontinue: HYDROcodone-acetaminophen (NORCO)  MG per tablet; Take 1 tablet by mouth 4 (Four) Times a Day As Needed for Severe Pain .  -     HYDROcodone-acetaminophen (NORCO)  MG per tablet; Take 1 tablet by mouth 4 (Four) Times a Day As Needed for Severe Pain .    Summary  Kailash Cooper is a 79 y.o. male with multiple comorbidities including CKD, DM, COPD, chronic back pain polyarthralgia here for f/u.   Chronic pain from lumbar and cervical DDD spondylosis, polyarthralgia/myofascial pain.  Tried physical therapy, chiropractor, LESI's with marginal relief.  Axial back pain interfering with ADL and his ability to exercise.    We will schedule for bilateral lumbar medial branch block if effective will plan RFA when elective procedures resume..    No NSAIDs due to CKD.  Functional benefit of hydrocodone.  Tolerated dose reduction.    Cont Hydrocodone 10/325 4 times daily as needed for severe pain.  UDS and inspect reviewed.    Discussed risk of tolerance, dependence, respiratory depression, coma and death associated with use of oral opioids for treatment of chronic nonmalignant pain.     Discussed CDC guidelines/precaution regarding coronavirus    RTC 2 months

## 2020-04-08 ENCOUNTER — APPOINTMENT (OUTPATIENT)
Dept: PAIN MEDICINE | Facility: CLINIC | Age: 80
End: 2020-04-08

## 2020-04-22 ENCOUNTER — TELEPHONE (OUTPATIENT)
Dept: FAMILY MEDICINE CLINIC | Facility: CLINIC | Age: 80
End: 2020-04-22

## 2020-04-22 ENCOUNTER — APPOINTMENT (OUTPATIENT)
Dept: PAIN MEDICINE | Facility: HOSPITAL | Age: 80
End: 2020-04-22

## 2020-04-22 NOTE — TELEPHONE ENCOUNTER
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  Pt is going to bring me a copy of the letter tomorrow.  SG

## 2020-04-22 NOTE — TELEPHONE ENCOUNTER
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  Pt wants to know if we can write a letter for him.  He does not do drugs and can not go w/o his pain medication.  He said we are the one that told him to take the sudafed a few years ago and that that is the only thing that keeps him dried up.  Please call pt.  SG

## 2020-04-28 RX ORDER — TAMSULOSIN HYDROCHLORIDE 0.4 MG/1
1 CAPSULE ORAL DAILY
Qty: 30 CAPSULE | Refills: 3 | Status: SHIPPED | OUTPATIENT
Start: 2020-04-28 | End: 2020-04-30 | Stop reason: SDUPTHER

## 2020-04-30 ENCOUNTER — OFFICE VISIT (OUTPATIENT)
Dept: FAMILY MEDICINE CLINIC | Facility: CLINIC | Age: 80
End: 2020-04-30

## 2020-04-30 DIAGNOSIS — N18.30 CHRONIC KIDNEY DISEASE, STAGE 3 (MODERATE): ICD-10-CM

## 2020-04-30 DIAGNOSIS — N40.0 ENLARGED PROSTATE WITHOUT LOWER URINARY TRACT SYMPTOMS (LUTS): ICD-10-CM

## 2020-04-30 DIAGNOSIS — E11.9 TYPE 2 DIABETES MELLITUS WITHOUT COMPLICATION, WITHOUT LONG-TERM CURRENT USE OF INSULIN (HCC): ICD-10-CM

## 2020-04-30 DIAGNOSIS — I10 ESSENTIAL HYPERTENSION: Primary | Chronic | ICD-10-CM

## 2020-04-30 PROCEDURE — 99442 PR PHYS/QHP TELEPHONE EVALUATION 11-20 MIN: CPT | Performed by: NURSE PRACTITIONER

## 2020-04-30 RX ORDER — GLIMEPIRIDE 1 MG/1
1 TABLET ORAL DAILY
Qty: 90 TABLET | Refills: 1 | Status: SHIPPED | OUTPATIENT
Start: 2020-04-30 | End: 2020-11-10

## 2020-04-30 RX ORDER — PRAVASTATIN SODIUM 40 MG
40 TABLET ORAL DAILY
Qty: 90 TABLET | Refills: 1 | Status: SHIPPED | OUTPATIENT
Start: 2020-04-30 | End: 2020-11-10

## 2020-04-30 RX ORDER — TORSEMIDE 5 MG/1
5 TABLET ORAL DAILY
Qty: 90 TABLET | Refills: 1 | Status: SHIPPED | OUTPATIENT
Start: 2020-04-30 | End: 2021-01-25

## 2020-04-30 RX ORDER — CETIRIZINE HYDROCHLORIDE 10 MG/1
10 TABLET ORAL DAILY
Qty: 90 TABLET | Refills: 1 | Status: SHIPPED | OUTPATIENT
Start: 2020-04-30

## 2020-04-30 RX ORDER — RAMIPRIL 5 MG/1
5 CAPSULE ORAL EVERY EVENING
Qty: 90 CAPSULE | Refills: 1 | Status: SHIPPED | OUTPATIENT
Start: 2020-04-30 | End: 2020-11-10

## 2020-04-30 RX ORDER — CETIRIZINE HYDROCHLORIDE 10 MG/1
10 TABLET ORAL DAILY
COMMUNITY
End: 2020-04-30 | Stop reason: SDUPTHER

## 2020-04-30 RX ORDER — TAMSULOSIN HYDROCHLORIDE 0.4 MG/1
1 CAPSULE ORAL DAILY
Qty: 90 CAPSULE | Refills: 1 | Status: SHIPPED | OUTPATIENT
Start: 2020-04-30 | End: 2020-11-10

## 2020-04-30 NOTE — PROGRESS NOTES
"    Kailash Cooper is a 79 y.o. male.     You have chosen to receive care through a telephone visit. Do you consent to use a telephone visit for your medical care today? Yes    80yo white male with hx of lung ca, ch back pain who goes to pain mgt, htn, hdl, ckd3 and right arm tremor who calls in today for telephone visit. Patient states for the last 3 days his urine stream has been diminished but denies any other symptoms. He does take flomax and has been to Dr Moss in the past. He denies any fever. Patient is going to increase po fluids and come in tomorrow and drop off urine for testing. If symptoms do not improve will refer to Urology again.  B/s have mostly been under 130 and b/p today was 150/60 and he denies any c/p, dyspnea, tachycardia or edema  Patient last creatinine was in feb and was 1.99, he has an appt with nephrology in June, but I recommended he get bmp to recheck.    Visit time 12 \"    Urine specimen  Possible urology f/u  BMP when possible  Keep appt with Nephrology       The following portions of the patient's history were reviewed and updated as appropriate: allergies, current medications, past family history, past medical history, past social history, past surgical history and problem list.    There were no vitals filed for this visit.  There is no height or weight on file to calculate BMI.    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 [facility]   • Diabetes (CMS/HCC)    • Enlarged prostate    • Former smoker    • Hiatal hernia    • Hip pain     don   • Hyperlipidemia    • Hypertension    • Kidney disease        stage 3    • Leg pain     don   • Low back pain    • Neck pain    • Stroke (CMS/HCC)      Past Surgical History:   Procedure Laterality Date   • CATARACT EXTRACTION  2006    [facility]   • COLONOSCOPY  2011   • LUNG CANCER SURGERY      upper lobe rt lung cancer 9/2017  at [facility]     Family History   Problem Relation Age of " Onset   • Stroke Mother    • Cancer Father         Prostate   • Heart failure Brother    • Heart disease Other      Immunization History   Administered Date(s) Administered   • Fluad Quad 11/18/2019   • Influenza, Unspecified 09/20/2017       Office Visit on 02/11/2020   Component Date Value Ref Range Status   • T3, Total 02/11/2020 96  71 - 180 ng/dL Final   • TSH 02/11/2020 3.730  0.450 - 4.500 uIU/mL Final   • Free T4 02/11/2020 1.24  0.82 - 1.77 ng/dL Final         Review of Systems   HENT: Negative.    Respiratory: Negative.    Cardiovascular: Negative.    Gastrointestinal: Negative.    Genitourinary: Positive for decreased urine volume.   Musculoskeletal: Positive for back pain.   Skin: Negative.    Psychiatric/Behavioral: Negative.        Objective   Physical Exam   Pulmonary/Chest:   No dyspnea or cough noted with conversation   Genitourinary:   Genitourinary Comments: Complains of weaken urine stream and less urination   Musculoskeletal:   ch back pain       Procedures    Assessment/Plan   Kailash was seen today for diabetes, hypertension and hyperlipidemia.    Diagnoses and all orders for this visit:    Essential hypertension    Type 2 diabetes mellitus without complication, without long-term current use of insulin (CMS/[facility])    Enlarged prostate without lower urinary tract symptoms (luts)    Chronic kidney disease, stage 3 (moderate) (CMS/[facility])  -     Comprehensive Metabolic Panel    Other orders  -     tamsulosin (FLOMAX) 0.4 MG capsule 24 hr capsule; Take 1 capsule by mouth Daily.  -     glimepiride (AMARYL) 1 MG tablet; Take 1 tablet by mouth Daily.  -     pravastatin (PRAVACHOL) 40 MG tablet; Take 1 tablet by mouth Daily.  -     ramipril (ALTACE) 5 MG capsule; Take 1 capsule by mouth Every Evening.  -     torsemide (DEMADEX) 5 MG tablet; Take 1 tablet by mouth Daily.  -     metoprolol tartrate (LOPRESSOR) 25 MG tablet; Take 1 tablet by mouth Daily.  -     cetirizine (zyrTEC) 10 MG tablet; Take 1 tablet by  mouth Daily.          Current Outpatient Medications:   •  cetirizine (zyrTEC) 10 MG tablet, Take 1 tablet by mouth Daily., Disp: 90 tablet, Rfl: 1  •  Cholecalciferol (VITAMIN D3) 50 MCG (2000 UT) capsule, Take 2,000 Units by mouth Daily., Disp: , Rfl:   •  famotidine (PEPCID) 20 MG tablet, Take 20 mg by mouth Daily., Disp: , Rfl:   •  glimepiride (AMARYL) 1 MG tablet, Take 1 tablet by mouth Daily., Disp: 90 tablet, Rfl: 1  •  HYDROcodone-acetaminophen (NORCO)  MG per tablet, Take 1 tablet by mouth 4 (Four) Times a Day As Needed for Severe Pain ., Disp: 120 tablet, Rfl: 0  •  metoprolol tartrate (LOPRESSOR) 25 MG tablet, Take 1 tablet by mouth Daily., Disp: 90 tablet, Rfl: 1  •  nitroglycerin (NITROSTAT) 0.3 MG SL tablet, Place 1 tablet under the tongue Every 5 (Five) Minutes As Needed for Chest Pain. Take no more than 3 doses in 15 minutes., Disp: 50 tablet, Rfl: 12  •  pravastatin (PRAVACHOL) 40 MG tablet, Take 1 tablet by mouth Daily., Disp: 90 tablet, Rfl: 1  •  pseudoephedrine (SUDAFED) 60 MG tablet, Take 120 mg by mouth As Needed for Congestion., Disp: , Rfl:   •  ramipril (ALTACE) 5 MG capsule, Take 1 capsule by mouth Every Evening., Disp: 90 capsule, Rfl: 1  •  tamsulosin (FLOMAX) 0.4 MG capsule 24 hr capsule, Take 1 capsule by mouth Daily., Disp: 90 capsule, Rfl: 1  •  torsemide (DEMADEX) 5 MG tablet, Take 1 tablet by mouth Daily., Disp: 90 tablet, Rfl: 1  •  vitamin B-12 (CYANOCOBALAMIN) 1000 MCG tablet, Take 1 tablet by mouth Daily., Disp: , Rfl:   •  Astaxanthin 4 MG capsule, Take 4 mg by mouth Daily., Disp: , Rfl:   •  ferrous sulfate 325 (65 FE) MG EC tablet, Take 1 tablet by mouth Daily With Breakfast., Disp: , Rfl:

## 2020-04-30 NOTE — PATIENT INSTRUCTIONS
Drop off urine sample  Stay hydrated  Possible f/u with DR Ajay PAPPAS  Keep upcoming appt with nephrology

## 2020-06-02 ENCOUNTER — TELEPHONE (OUTPATIENT)
Dept: PAIN MEDICINE | Facility: CLINIC | Age: 80
End: 2020-06-02

## 2020-06-02 NOTE — TELEPHONE ENCOUNTER
Pain on right side when walks or moves thinks it maybe a pulled muscle but not sure wanted to know if he should still come in for injection tomorrow

## 2020-06-03 ENCOUNTER — HOSPITAL ENCOUNTER (OUTPATIENT)
Dept: PAIN MEDICINE | Facility: HOSPITAL | Age: 80
Discharge: HOME | End: 2020-06-03
Admitting: ANESTHESIOLOGY

## 2020-06-03 ENCOUNTER — HOSPITAL ENCOUNTER (OUTPATIENT)
Dept: PAIN MEDICINE | Facility: HOSPITAL | Age: 80
Discharge: HOME | End: 2020-06-03

## 2020-06-03 VITALS
DIASTOLIC BLOOD PRESSURE: 67 MMHG | HEIGHT: 65 IN | BODY MASS INDEX: 27.49 KG/M2 | HEART RATE: 48 BPM | SYSTOLIC BLOOD PRESSURE: 180 MMHG | OXYGEN SATURATION: 100 % | TEMPERATURE: 96.9 F | WEIGHT: 165 LBS | RESPIRATION RATE: 16 BRPM

## 2020-06-03 DIAGNOSIS — G89.4 CHRONIC PAIN SYNDROME: ICD-10-CM

## 2020-06-03 DIAGNOSIS — R52 PAIN: ICD-10-CM

## 2020-06-03 DIAGNOSIS — M47.812 CERVICAL SPONDYLOSIS WITHOUT MYELOPATHY: ICD-10-CM

## 2020-06-03 DIAGNOSIS — M47.817 LUMBOSACRAL SPONDYLOSIS WITHOUT MYELOPATHY: Primary | ICD-10-CM

## 2020-06-03 PROCEDURE — 0 IOPAMIDOL 41 % SOLUTION: Performed by: ANESTHESIOLOGY

## 2020-06-03 PROCEDURE — 25010000002 METHYLPREDNISOLONE PER 40 MG

## 2020-06-03 PROCEDURE — 77003 FLUOROGUIDE FOR SPINE INJECT: CPT

## 2020-06-03 PROCEDURE — 64493 INJ PARAVERT F JNT L/S 1 LEV: CPT | Performed by: ANESTHESIOLOGY

## 2020-06-03 PROCEDURE — 64494 INJ PARAVERT F JNT L/S 2 LEV: CPT | Performed by: ANESTHESIOLOGY

## 2020-06-03 RX ORDER — METHYLPREDNISOLONE ACETATE 40 MG/ML
40 INJECTION, SUSPENSION INTRA-ARTICULAR; INTRALESIONAL; INTRAMUSCULAR; SOFT TISSUE ONCE
Status: COMPLETED | OUTPATIENT
Start: 2020-06-03 | End: 2020-06-03

## 2020-06-03 RX ORDER — HYDROCODONE BITARTRATE AND ACETAMINOPHEN 10; 325 MG/1; MG/1
1 TABLET ORAL 4 TIMES DAILY PRN
Qty: 120 TABLET | Refills: 0 | Status: SHIPPED | OUTPATIENT
Start: 2020-06-08 | End: 2020-07-01 | Stop reason: SDUPTHER

## 2020-06-03 RX ORDER — BUPIVACAINE HYDROCHLORIDE 2.5 MG/ML
INJECTION, SOLUTION EPIDURAL; INFILTRATION; INTRACAUDAL
Status: DISCONTINUED
Start: 2020-06-03 | End: 2020-06-04 | Stop reason: HOSPADM

## 2020-06-03 RX ORDER — BUPIVACAINE HYDROCHLORIDE 2.5 MG/ML
10 INJECTION, SOLUTION INFILTRATION; PERINEURAL ONCE
Status: COMPLETED | OUTPATIENT
Start: 2020-06-03 | End: 2020-06-03

## 2020-06-03 RX ORDER — METHYLPREDNISOLONE ACETATE 40 MG/ML
INJECTION, SUSPENSION INTRA-ARTICULAR; INTRALESIONAL; INTRAMUSCULAR; SOFT TISSUE
Status: DISCONTINUED
Start: 2020-06-03 | End: 2020-06-04 | Stop reason: HOSPADM

## 2020-06-03 RX ADMIN — IOPAMIDOL 3 ML: 408 INJECTION, SOLUTION INTRATHECAL at 12:00

## 2020-06-03 RX ADMIN — BUPIVACAINE HYDROCHLORIDE 10 ML: 2.5 INJECTION, SOLUTION INFILTRATION; PERINEURAL at 12:01

## 2020-06-03 RX ADMIN — METHYLPREDNISOLONE ACETATE 40 MG: 40 INJECTION, SUSPENSION INTRA-ARTICULAR; INTRALESIONAL; INTRAMUSCULAR; SOFT TISSUE at 12:01

## 2020-06-03 NOTE — PROCEDURES
"Subjective    CC back pain  Kailash Cooper is a 79 y.o. male with lumbar spondylosis here for bilateral lumbar medial branch block at 2 levels.  No anticoagulation    Pain Assessment   Location of Pain: Lower Back, R Hip, L Hip,   Description of Pain: Dull/Aching, Throbbing, Stabbing  Previous Pain Rating :5  Current Pain Ratin  Aggravating Factors: Activity  Alleviating Factors: Rest, Medication    The following portions of the patient's history were reviewed and updated as appropriate: allergies, current medications, past family history, past medical history, past social history, past surgical history and problem list.  Review of Systems  As in HPI  Objective   Physical Exam   Constitutional: He is oriented to person, place, and time. No distress.   Cardiovascular: Normal rate.   Pulmonary/Chest: Effort normal.   Musculoskeletal:        Lumbar back: He exhibits decreased range of motion and tenderness.   Neurological: He is alert and oriented to person, place, and time.   Psychiatric: He has a normal mood and affect.     /67 (BP Location: Left arm, Patient Position: Sitting)   Pulse (!) 48   Temp 96.9 °F (36.1 °C) (Skin)   Resp 16   Ht 165.1 cm (65\")   Wt 74.8 kg (165 lb)   SpO2 100%   BMI 27.46 kg/m²       Assessment/Plan    underwent bilateral lumbar medial branch block at L4/5, L5/S1  Refill hydrocodone.  UDS and inspect reviewed    RTC 4-6 weeks or as needed for repeat    DATE OF PROCEDURE: 6/3/2020    PREOPERATIVE DIAGNOSIS: Lumbar spondylosis without myelopathy    POSTOPERATIVE DIAGNOSIS: same    PROCEDURE PERFORMED: Tomer Lumbar Medial Branch Block at  L4/L5, L5/S1.     The patient presents with a history of lumbosacral spondylosis bilaterally at level [ L4/L5 ] [ L5/S1].   The patient understands the risks and benefits of the procedure and wishes to proceed. The patient was seen in the preoperative area.  Patient's consent was obtained and updated.  Vitals were taken.  Patient was then brought " to the procedure suite and placed in a prone position. The appropriate anatomic area was widely prepped with Chloroprep and draped in a sterile fashion.  Noninvasive monitoring per routine anesthesia protocol was placed.  Under fluoroscopic guidance an AP view with caudad cephaled tilt was obtained. A 22 gauge curved tip spinal needle was passed through skin anesthetized with 1% Lidocaine without epinephrine. The needle tip was guided into the superior medial aspect of the transverse process at [ L4 ] [ L5 ] [ sacral ala ].  Next 0.25 mL of  preservative free contrast were injected.   At this point 0.5 mL of a solution  containing 1 mL of 40 mg Depo-Medrol and 4 mL of 0.25% bupivacaine was injected. A sterile dressing was placed over the puncture site. The patient tolerated the procedure with  no complications. They were then brought to the post procedure area where they recovered nicely.

## 2020-06-03 NOTE — PATIENT INSTRUCTIONS
Medial Branch Nerve Block    Medial branch nerve block is a procedure to numb the nerves that supply the joints between your spinal bones (facet joints). The facet joints are located on the back of your spine. You may have the procedure on your neck or your upper, middle, or lower spine.  During this procedure, your health care provider will inject a numbing medicine (local anesthetic) around the medial nerves near the facet joint being treated. If more than one facet joint is causing pain, you may have more than one injection. In most cases, an anti-inflammatory medicine (steroid) will also be injected. You may need this procedure if:  · You have back pain from wear and tear (osteoarthritis) of your facet joint.  · You have an injury to a facet joint.  · Your health care provider wants to diagnose a facet joint as the cause of your pain. If the procedure relieves the pain, this indicates that the facet joint was the cause.  The local anesthetic will relieve pain for several days. The steroid may continue to relieve pain for several weeks. If your pain returns when the medicines wear off, this procedure may be repeated.  Tell a health care provider about:  · Any allergies you have.  · All medicines you are taking, including vitamins, herbs, eye drops, creams, and over-the-counter medicines.  · Any problems you or family members have had with anesthetic medicines.  · Any blood disorders you have.  · Any surgeries you have had.  · Any medical conditions you have.  · Whether you are pregnant or may be pregnant.  What are the risks?  Generally, this is a safe procedure. However, problems may occur, including:  · Infection.  · Bleeding.  · Allergic reactions to medicines or dyes.  · Damage to other structures or organs.  · Injection of the anesthetic into a blood vessel, which may decrease blood supply to your spinal cord and cause damage.  · Spread of the anesthetic to nearby nerves, which may cause temporary weakness  or numbness.  What happens before the procedure?  · Ask your health care provider about:  ? Changing or stopping your regular medicines. This is especially important if you are taking diabetes medicines or blood thinners.  ? Taking medicines such as aspirin and ibuprofen. These medicines can thin your blood. Do not take these medicines unless your health care provider tells you to take them.  ? Taking over-the-counter medicines, vitamins, herbs, and supplements.  · Plan to have someone take you home from the hospital or clinic.  · Follow instructions from your health care provider about any eating or drinking restrictions before the procedure.  · Ask your health care provider what steps will be taken to help prevent infection. These may include:  ? Removing hair at the injection site.  ? Washing skin with a germ-killing soap.  What happens during the procedure?  · An IV may be inserted into one of your veins.  · You will be given one or more of the following:  ? A medicine to help you relax (sedative).  ? A medicine to numb the area (local anesthetic). Your health care provider will feel for the facet joint or joints that are causing pain and inject a short-acting local anesthetic into the skin over the joint or joints.  · Your health care provider will then pass a needle into the area around the facet joint.  · Your health care provider may use a type of X-ray (fluoroscopy) to look at images of your spinal cord. If so, the health care provider will inject a small amount of dye into the facet joint area. The dye will show up on fluoroscopy and help locate the exact area to inject the long-acting anesthetic.  · The medicine will then be injected. Along with the long-acting anesthetic, a steroid medicine may also be injected.  · The needle will be removed, and a bandage will be placed over the injection site.  The procedure may vary among health care providers and hospitals.  What can I expect after the  procedure?  · Your blood pressure, heart rate, breathing rate, and blood oxygen level will be monitored until you leave the hospital or clinic.  · You should feel less pain in your back.  · You may have some soreness around the injection site.  Follow these instructions at home:  Injection site care  · Leave your bandage on for 24 hours.  · Do not take baths, swim, or use a hot tub until your health care provider approves.  · Check your injection site every day for signs of infection. Check for:  ? Redness, swelling, or pain.  ? Fluid or blood.  ? Warmth.  ? Pus or a bad smell.  · If directed, put ice on the injection area:  ? Put ice in a plastic bag.  ? Place a towel between your skin and the bag.  ? Leave the ice on for 20 minutes, 2-3 times a day.  General instructions  · Take over-the-counter and prescription medicines only as told by your health care provider.  · Do not drive for 24 hours if you were given a sedative during the procedure.  · Return to your normal activities as told by your health care provider. Ask your health care provider what activities are safe for you.  · Keep a log of your pain after the procedure. Keep track of how much pain you have and when you have it. This will help your health care provider plan your future treatment.  ? You should have relief of pain from the anesthetic for up to 3 days.  ? After that you may notice some pain again until the steroid starts to help. Pain relief from the steroid may last for a few weeks.  · Keep all follow-up visits as told by your health care provider. This is important.  Contact your health care provider if:  · Your pain is not relieved or gets worse at home.  · You have a fever or chills.  · You have any signs of infection.  · You develop any numbness or weakness.  Summary  · Medial branch nerve block is a procedure to numb the nerves that supply the joints between your spinal bones (facet joint).  · You may have the procedure on your neck or  your upper, middle, or lower spine.  · This procedure may be done both to diagnose and relieve facet joint pain.  · A long-acting local anesthetic is injected close to the nerve that supplies the facet joint. An anti-inflammatory medicine (steroid) will also be injected.  This information is not intended to replace advice given to you by your health care provider. Make sure you discuss any questions you have with your health care provider.  Document Released: 08/22/2019 Document Revised: 04/10/2020 Document Reviewed: 08/22/2019  Elsevier Patient Education © 2020 [employer]

## 2020-06-22 ENCOUNTER — OFFICE VISIT (OUTPATIENT)
Dept: FAMILY MEDICINE CLINIC | Facility: CLINIC | Age: 80
End: 2020-06-22

## 2020-06-22 VITALS
WEIGHT: 173 LBS | OXYGEN SATURATION: 97 % | TEMPERATURE: 98.4 F | HEART RATE: 50 BPM | DIASTOLIC BLOOD PRESSURE: 64 MMHG | HEIGHT: 65 IN | BODY MASS INDEX: 28.82 KG/M2 | SYSTOLIC BLOOD PRESSURE: 132 MMHG

## 2020-06-22 DIAGNOSIS — N18.30 CHRONIC KIDNEY DISEASE, STAGE 3 (MODERATE): ICD-10-CM

## 2020-06-22 DIAGNOSIS — N18.30 ANEMIA DUE TO STAGE 3 CHRONIC KIDNEY DISEASE (HCC): Chronic | ICD-10-CM

## 2020-06-22 DIAGNOSIS — R10.9 RIGHT SIDED ABDOMINAL PAIN: ICD-10-CM

## 2020-06-22 DIAGNOSIS — D63.1 ANEMIA DUE TO STAGE 3 CHRONIC KIDNEY DISEASE (HCC): Chronic | ICD-10-CM

## 2020-06-22 DIAGNOSIS — I10 ESSENTIAL HYPERTENSION: Chronic | ICD-10-CM

## 2020-06-22 DIAGNOSIS — E11.9 TYPE 2 DIABETES MELLITUS WITHOUT COMPLICATION, WITHOUT LONG-TERM CURRENT USE OF INSULIN (HCC): Primary | ICD-10-CM

## 2020-06-22 PROCEDURE — 99213 OFFICE O/P EST LOW 20 MIN: CPT | Performed by: NURSE PRACTITIONER

## 2020-06-22 RX ORDER — CYCLOBENZAPRINE HCL 10 MG
10 TABLET ORAL NIGHTLY PRN
Qty: 30 TABLET | Refills: 2 | Status: SHIPPED | OUTPATIENT
Start: 2020-06-22 | End: 2020-10-16

## 2020-06-22 NOTE — PATIENT INSTRUCTIONS
Blood work today  Flexeril as directed monitor for drowsiness  Fasting blood work in 2 months lipid panel and PSA

## 2020-06-22 NOTE — PROGRESS NOTES
"    Kailash Cooper is a 79 y.o. male.     79-year-old white male with history of lung cancer, chronic back pain he goes to pain management, hypertension, hyperlipidemia, CKD 3 and right arm tremor who comes in today with complaints of right sided pain that hurts especially with movement or moving his right leg.  Pain is right over her liver area however description of pain sounds more muscular.  He cannot take NSAIDs I am putting him on some muscle relaxers she is wearing a brace that he states has helped with the pain.  I am going to check liver enzymes today to make sure there is nothing going on the liver or pancreas  Blood pressure 132/64 heart rate 50 he denies any chest pain, dyspnea, tachycardia or dizziness  Blood sugars usually run under 100 in the morning  Weight is 173    Flexeril 10 mg 3 times daily monitor for drowsiness  Wear brace during the day  Blood work today  Fasting blood work in 2 months lipid panel and PSA           Vitals:    06/22/20 1336   BP: 132/64   BP Location: Right arm   Patient Position: Sitting   Cuff Size: Adult   Pulse: 50   Temp: 98.4 °F (36.9 °C)   TempSrc: Temporal   SpO2: 97%   Weight: 78.5 kg (173 lb)   Height: 165.1 cm (65\")     Body mass index is 28.79 kg/m².    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 [facility]   • Diabetes (CMS/HCC)    • Enlarged prostate    • Former smoker    • Hiatal hernia    • Hip pain     don   • Hyperlipidemia    • Hypertension    • Kidney disease        stage 3    • Leg pain     don   • Low back pain    • Neck pain    • Stroke (CMS/HCC)      Past Surgical History:   Procedure Laterality Date   • CATARACT EXTRACTION  2006    [facility]   • COLONOSCOPY  2011   • LUNG CANCER SURGERY      upper lobe rt lung cancer 9/2017  at [facility]     Family History   Problem Relation Age of Onset   • Stroke Mother    • Cancer Father         Prostate   • Heart failure Brother    • Heart disease Other  "     Immunization History   Administered Date(s) Administered   • Fluad Quad 11/18/2019   • Influenza, Unspecified 09/20/2017       Office Visit on 02/11/2020   Component Date Value Ref Range Status   • T3, Total 02/11/2020 96  71 - 180 ng/dL Final   • TSH 02/11/2020 3.730  0.450 - 4.500 uIU/mL Final   • Free T4 02/11/2020 1.24  0.82 - 1.77 ng/dL Final         Review of Systems   Constitutional: Negative.    HENT: Negative.    Respiratory: Negative.    Cardiovascular: Negative.    Gastrointestinal: Negative.    Genitourinary: Negative.    Musculoskeletal:        Right-sided pain   Skin: Negative.    Neurological: Negative.    Psychiatric/Behavioral: Negative.        Objective   Physical Exam   Constitutional: He is oriented to person, place, and time. He appears well-developed and well-nourished.   Cardiovascular: Normal rate and regular rhythm.   Pulmonary/Chest: Effort normal and breath sounds normal.   Abdominal: Soft. Bowel sounds are normal.   Musculoskeletal:   Right-sided pain that is worse with movement or lifting leg which I suspect is a musculoskeletal injury without any known reason   Neurological: He is oriented to person, place, and time.   Skin: Skin is warm.   Psychiatric: He has a normal mood and affect.       Procedures    Assessment/Plan   Kailash was seen today for abdominal pain.    Diagnoses and all orders for this visit:    Type 2 diabetes mellitus without complication, without long-term current use of insulin (CMS/[facility])  -     Comprehensive Metabolic Panel    Anemia due to stage 3 chronic kidney disease (CMS/[facility])  -     CBC & Differential    Right sided abdominal pain  -     Lipase; Future    Essential hypertension    Chronic kidney disease, stage 3 (moderate) (CMS/[facility])    Other orders  -     cyclobenzaprine (FLEXERIL) 10 MG tablet; Take 1 tablet by mouth At Night As Needed for Muscle Spasms.          Current Outpatient Medications:   •  cetirizine (zyrTEC) 10 MG tablet, Take 1 tablet by mouth  Daily., Disp: 90 tablet, Rfl: 1  •  Cholecalciferol (VITAMIN D3) 50 MCG (2000 UT) capsule, Take 2,000 Units by mouth Daily., Disp: , Rfl:   •  famotidine (PEPCID) 20 MG tablet, Take 20 mg by mouth Daily., Disp: , Rfl:   •  ferrous sulfate 325 (65 FE) MG EC tablet, Take 1 tablet by mouth Daily With Breakfast., Disp: , Rfl:   •  glimepiride (AMARYL) 1 MG tablet, Take 1 tablet by mouth Daily., Disp: 90 tablet, Rfl: 1  •  HYDROcodone-acetaminophen (NORCO)  MG per tablet, Take 1 tablet by mouth 4 (Four) Times a Day As Needed for Severe Pain ., Disp: 120 tablet, Rfl: 0  •  metoprolol tartrate (LOPRESSOR) 25 MG tablet, Take 1 tablet by mouth Daily., Disp: 90 tablet, Rfl: 1  •  nitroglycerin (NITROSTAT) 0.3 MG SL tablet, Place 1 tablet under the tongue Every 5 (Five) Minutes As Needed for Chest Pain. Take no more than 3 doses in 15 minutes., Disp: 50 tablet, Rfl: 12  •  pravastatin (PRAVACHOL) 40 MG tablet, Take 1 tablet by mouth Daily., Disp: 90 tablet, Rfl: 1  •  pseudoephedrine (SUDAFED) 60 MG tablet, Take 120 mg by mouth As Needed for Congestion., Disp: , Rfl:   •  ramipril (ALTACE) 5 MG capsule, Take 1 capsule by mouth Every Evening., Disp: 90 capsule, Rfl: 1  •  tamsulosin (FLOMAX) 0.4 MG capsule 24 hr capsule, Take 1 capsule by mouth Daily., Disp: 90 capsule, Rfl: 1  •  torsemide (DEMADEX) 5 MG tablet, Take 1 tablet by mouth Daily., Disp: 90 tablet, Rfl: 1  •  vitamin B-12 (CYANOCOBALAMIN) 1000 MCG tablet, Take 1 tablet by mouth Daily., Disp: , Rfl:   •  cyclobenzaprine (FLEXERIL) 10 MG tablet, Take 1 tablet by mouth At Night As Needed for Muscle Spasms., Disp: 30 tablet, Rfl: 2

## 2020-06-23 LAB
ALBUMIN SERPL-MCNC: 4 G/DL (ref 3.7–4.7)
ALBUMIN/GLOB SERPL: 2.2 {RATIO} (ref 1.2–2.2)
ALP SERPL-CCNC: 73 IU/L (ref 39–117)
ALT SERPL-CCNC: 16 IU/L (ref 0–44)
AST SERPL-CCNC: 11 IU/L (ref 0–40)
BASOPHILS # BLD AUTO: 0.1 X10E3/UL (ref 0–0.2)
BASOPHILS NFR BLD AUTO: 1 %
BILIRUB SERPL-MCNC: 0.3 MG/DL (ref 0–1.2)
BUN SERPL-MCNC: 35 MG/DL (ref 8–27)
BUN/CREAT SERPL: 17 (ref 10–24)
CALCIUM SERPL-MCNC: 9.1 MG/DL (ref 8.6–10.2)
CHLORIDE SERPL-SCNC: 107 MMOL/L (ref 96–106)
CO2 SERPL-SCNC: 24 MMOL/L (ref 20–29)
CREAT SERPL-MCNC: 2.06 MG/DL (ref 0.76–1.27)
EOSINOPHIL # BLD AUTO: 0.1 X10E3/UL (ref 0–0.4)
EOSINOPHIL NFR BLD AUTO: 2 %
ERYTHROCYTE [DISTWIDTH] IN BLOOD BY AUTOMATED COUNT: 13.8 % (ref 11.6–15.4)
GLOBULIN SER CALC-MCNC: 1.8 G/DL (ref 1.5–4.5)
GLUCOSE SERPL-MCNC: 66 MG/DL (ref 65–99)
HCT VFR BLD AUTO: 32 % (ref 37.5–51)
HGB BLD-MCNC: 10.7 G/DL (ref 13–17.7)
IMM GRANULOCYTES # BLD AUTO: 0 X10E3/UL (ref 0–0.1)
IMM GRANULOCYTES NFR BLD AUTO: 0 %
LYMPHOCYTES # BLD AUTO: 1.6 X10E3/UL (ref 0.7–3.1)
LYMPHOCYTES NFR BLD AUTO: 27 %
MCH RBC QN AUTO: 32.1 PG (ref 26.6–33)
MCHC RBC AUTO-ENTMCNC: 33.4 G/DL (ref 31.5–35.7)
MCV RBC AUTO: 96 FL (ref 79–97)
MONOCYTES # BLD AUTO: 0.6 X10E3/UL (ref 0.1–0.9)
MONOCYTES NFR BLD AUTO: 10 %
NEUTROPHILS # BLD AUTO: 3.5 X10E3/UL (ref 1.4–7)
NEUTROPHILS NFR BLD AUTO: 60 %
PLATELET # BLD AUTO: 189 X10E3/UL (ref 150–450)
POTASSIUM SERPL-SCNC: 5.1 MMOL/L (ref 3.5–5.2)
PROT SERPL-MCNC: 5.8 G/DL (ref 6–8.5)
RBC # BLD AUTO: 3.33 X10E6/UL (ref 4.14–5.8)
SODIUM SERPL-SCNC: 145 MMOL/L (ref 134–144)
WBC # BLD AUTO: 5.9 X10E3/UL (ref 3.4–10.8)

## 2020-06-27 ENCOUNTER — RESULTS ENCOUNTER (OUTPATIENT)
Dept: FAMILY MEDICINE CLINIC | Facility: CLINIC | Age: 80
End: 2020-06-27

## 2020-06-27 DIAGNOSIS — R10.9 RIGHT SIDED ABDOMINAL PAIN: ICD-10-CM

## 2020-07-01 ENCOUNTER — RESULTS ENCOUNTER (OUTPATIENT)
Dept: PAIN MEDICINE | Facility: HOSPITAL | Age: 80
End: 2020-07-01

## 2020-07-01 ENCOUNTER — HOSPITAL ENCOUNTER (OUTPATIENT)
Dept: PAIN MEDICINE | Facility: HOSPITAL | Age: 80
Discharge: HOME | End: 2020-07-01

## 2020-07-01 ENCOUNTER — TELEPHONE (OUTPATIENT)
Dept: PAIN MEDICINE | Facility: HOSPITAL | Age: 80
End: 2020-07-01

## 2020-07-01 ENCOUNTER — HOSPITAL ENCOUNTER (OUTPATIENT)
Dept: PAIN MEDICINE | Facility: HOSPITAL | Age: 80
Discharge: HOME | End: 2020-07-01
Admitting: ANESTHESIOLOGY

## 2020-07-01 VITALS
RESPIRATION RATE: 16 BRPM | DIASTOLIC BLOOD PRESSURE: 61 MMHG | OXYGEN SATURATION: 100 % | WEIGHT: 165 LBS | HEIGHT: 65 IN | HEART RATE: 51 BPM | SYSTOLIC BLOOD PRESSURE: 164 MMHG | BODY MASS INDEX: 27.49 KG/M2 | TEMPERATURE: 98.4 F

## 2020-07-01 DIAGNOSIS — M54.50 LOWER BACK PAIN: ICD-10-CM

## 2020-07-01 DIAGNOSIS — F19.90 CURRENT DRUG USE: Primary | ICD-10-CM

## 2020-07-01 DIAGNOSIS — M47.812 CERVICAL SPONDYLOSIS WITHOUT MYELOPATHY: ICD-10-CM

## 2020-07-01 DIAGNOSIS — G89.4 CHRONIC PAIN SYNDROME: ICD-10-CM

## 2020-07-01 DIAGNOSIS — M47.817 LUMBOSACRAL SPONDYLOSIS WITHOUT MYELOPATHY: Primary | ICD-10-CM

## 2020-07-01 DIAGNOSIS — F19.90 CURRENT DRUG USE: ICD-10-CM

## 2020-07-01 PROCEDURE — 64493 INJ PARAVERT F JNT L/S 1 LEV: CPT | Performed by: ANESTHESIOLOGY

## 2020-07-01 PROCEDURE — 0 IOPAMIDOL 41 % SOLUTION: Performed by: ANESTHESIOLOGY

## 2020-07-01 PROCEDURE — 25010000002 METHYLPREDNISOLONE PER 40 MG

## 2020-07-01 PROCEDURE — 64494 INJ PARAVERT F JNT L/S 2 LEV: CPT | Performed by: ANESTHESIOLOGY

## 2020-07-01 PROCEDURE — 77003 FLUOROGUIDE FOR SPINE INJECT: CPT

## 2020-07-01 RX ORDER — METHYLPREDNISOLONE ACETATE 40 MG/ML
40 INJECTION, SUSPENSION INTRA-ARTICULAR; INTRALESIONAL; INTRAMUSCULAR; SOFT TISSUE ONCE
Status: COMPLETED | OUTPATIENT
Start: 2020-07-01 | End: 2020-07-01

## 2020-07-01 RX ORDER — HYDROCODONE BITARTRATE AND ACETAMINOPHEN 10; 325 MG/1; MG/1
1 TABLET ORAL 4 TIMES DAILY PRN
Qty: 120 TABLET | Refills: 0 | Status: SHIPPED | OUTPATIENT
Start: 2020-07-08 | End: 2020-07-31 | Stop reason: SDUPTHER

## 2020-07-01 RX ORDER — BUPIVACAINE HYDROCHLORIDE 2.5 MG/ML
10 INJECTION, SOLUTION INFILTRATION; PERINEURAL ONCE
Status: COMPLETED | OUTPATIENT
Start: 2020-07-01 | End: 2020-07-01

## 2020-07-01 RX ORDER — LIDOCAINE HYDROCHLORIDE 10 MG/ML
5 INJECTION, SOLUTION EPIDURAL; INFILTRATION; INTRACAUDAL; PERINEURAL ONCE
Status: COMPLETED | OUTPATIENT
Start: 2020-07-01 | End: 2020-07-01

## 2020-07-01 RX ORDER — LIDOCAINE HYDROCHLORIDE 10 MG/ML
INJECTION, SOLUTION EPIDURAL; INFILTRATION; INTRACAUDAL; PERINEURAL
Status: DISPENSED
Start: 2020-07-01 | End: 2020-07-01

## 2020-07-01 RX ORDER — METHYLPREDNISOLONE ACETATE 40 MG/ML
INJECTION, SUSPENSION INTRA-ARTICULAR; INTRALESIONAL; INTRAMUSCULAR; SOFT TISSUE
Status: DISPENSED
Start: 2020-07-01 | End: 2020-07-01

## 2020-07-01 RX ORDER — BUPIVACAINE HYDROCHLORIDE 2.5 MG/ML
INJECTION, SOLUTION EPIDURAL; INFILTRATION; INTRACAUDAL
Status: DISPENSED
Start: 2020-07-01 | End: 2020-07-01

## 2020-07-01 RX ADMIN — IOPAMIDOL 2.5 ML: 408 INJECTION, SOLUTION INTRATHECAL at 11:00

## 2020-07-01 RX ADMIN — LIDOCAINE HYDROCHLORIDE 5 ML: 10 INJECTION, SOLUTION EPIDURAL; INFILTRATION; INTRACAUDAL; PERINEURAL at 10:58

## 2020-07-01 RX ADMIN — METHYLPREDNISOLONE ACETATE 40 MG: 40 INJECTION, SUSPENSION INTRA-ARTICULAR; INTRALESIONAL; INTRAMUSCULAR; SOFT TISSUE at 11:01

## 2020-07-01 RX ADMIN — BUPIVACAINE HYDROCHLORIDE 10 ML: 2.5 INJECTION, SOLUTION INFILTRATION; PERINEURAL at 11:01

## 2020-07-01 NOTE — ADDENDUM NOTE
Encounter addended by: Anayeli Yarbrough MD on: 7/1/2020 11:09 AM   Actions taken: Order list changed, Diagnosis association updated

## 2020-07-01 NOTE — PROCEDURES
"Subjective    CC back pain  Kailash Cooper is a 79 y.o. male with lumbar spondylosis here for repeat bilateral lumbar medial branch block at 2 levels.  No anticoagulation    Had 80% relief with first bilateral lumbar medial branch block for 5 days.    Pain Assessment   Location of Pain: Lower Back, R Hip, L Hip,   Description of Pain: Dull/Aching, Throbbing, Stabbing  Previous Pain Rating :5  Current Pain Ratin  Aggravating Factors: Activity  Alleviating Factors: Rest, Medication    The following portions of the patient's history were reviewed and updated as appropriate: allergies, current medications, past family history, past medical history, past social history, past surgical history and problem list.  Review of Systems  As in HPI  Objective   Physical Exam   Constitutional: He is oriented to person, place, and time. No distress.   Cardiovascular: Normal rate.   Pulmonary/Chest: Effort normal.   Musculoskeletal:        Lumbar back: He exhibits decreased range of motion and tenderness.   Neurological: He is alert and oriented to person, place, and time.   Psychiatric: He has a normal mood and affect.     /75 (BP Location: Left arm, Patient Position: Sitting)   Pulse (!) 49   Temp 98.4 °F (36.9 °C) (Skin)   Resp 16   Ht 165.1 cm (65\")   Wt 74.8 kg (165 lb)   SpO2 99%   BMI 27.46 kg/m²       Assessment/Plan    underwent repeat bilateral lumbar medial branch block at L4/5, L5/S1.  Reported 90% relief 15 minutes after the procedure.  80% Relief of facet bilateral lumbar branch block.  We will schedule for right and left lumbar RFA 2 levels without sedation.    Refill hydrocodone.  UDS and inspect reviewed      DATE OF PROCEDURE:2020    PREOPERATIVE DIAGNOSIS: Lumbar spondylosis without myelopathy    POSTOPERATIVE DIAGNOSIS: same    PROCEDURE PERFORMED: Tomer Lumbar Medial Branch Block at  L4/L5, L5/S1.     The patient presents with a history of lumbosacral spondylosis bilaterally at level [ L4/L5 ] [ " L5/S1].   The patient understands the risks and benefits of the procedure and wishes to proceed. The patient was seen in the preoperative area.  Patient's consent was obtained and updated.  Vitals were taken.  Patient was then brought to the procedure suite and placed in a prone position. The appropriate anatomic area was widely prepped with Chloroprep and draped in a sterile fashion.  Noninvasive monitoring per routine anesthesia protocol was placed.  Under fluoroscopic guidance an AP view with caudad cephaled tilt was obtained. A 22 gauge curved tip spinal needle was passed through skin anesthetized with 1% Lidocaine without epinephrine. The needle tip was guided into the superior medial aspect of the transverse process at [ L4 ] [ L5 ] [ sacral ala ].  Next 0.25 mL of  preservative free contrast were injected.   At this point 0.5 mL of a solution  containing 1 mL of 40 mg Depo-Medrol and 4 mL of 0.25% bupivacaine was injected. A sterile dressing was placed over the puncture site. The patient tolerated the procedure with  no complications. They were then brought to the post procedure area where they recovered nicely.

## 2020-07-01 NOTE — ADDENDUM NOTE
Encounter addended by: Kamini Daigle RN on: 7/1/2020 11:23 AM   Actions taken: Visit Navigator Flowsheet section accepted

## 2020-07-31 DIAGNOSIS — G89.4 CHRONIC PAIN SYNDROME: ICD-10-CM

## 2020-07-31 DIAGNOSIS — M47.817 LUMBOSACRAL SPONDYLOSIS WITHOUT MYELOPATHY: ICD-10-CM

## 2020-07-31 DIAGNOSIS — M47.812 CERVICAL SPONDYLOSIS WITHOUT MYELOPATHY: ICD-10-CM

## 2020-08-03 RX ORDER — HYDROCODONE BITARTRATE AND ACETAMINOPHEN 10; 325 MG/1; MG/1
1 TABLET ORAL 4 TIMES DAILY PRN
Qty: 120 TABLET | Refills: 0 | Status: SHIPPED | OUTPATIENT
Start: 2020-08-07 | End: 2020-08-31 | Stop reason: SDUPTHER

## 2020-08-19 ENCOUNTER — OFFICE VISIT (OUTPATIENT)
Dept: FAMILY MEDICINE CLINIC | Facility: CLINIC | Age: 80
End: 2020-08-19

## 2020-08-19 VITALS
BODY MASS INDEX: 29.49 KG/M2 | WEIGHT: 177 LBS | DIASTOLIC BLOOD PRESSURE: 70 MMHG | OXYGEN SATURATION: 95 % | TEMPERATURE: 97.3 F | HEART RATE: 91 BPM | HEIGHT: 65 IN | SYSTOLIC BLOOD PRESSURE: 161 MMHG

## 2020-08-19 DIAGNOSIS — E11.9 TYPE 2 DIABETES MELLITUS WITHOUT COMPLICATION, WITHOUT LONG-TERM CURRENT USE OF INSULIN (HCC): ICD-10-CM

## 2020-08-19 DIAGNOSIS — R11.0 NAUSEA: ICD-10-CM

## 2020-08-19 DIAGNOSIS — R74.8 SERUM LIPASE ELEVATION: Primary | ICD-10-CM

## 2020-08-19 DIAGNOSIS — I10 ESSENTIAL HYPERTENSION: Chronic | ICD-10-CM

## 2020-08-19 DIAGNOSIS — R10.11 RUQ PAIN: ICD-10-CM

## 2020-08-19 PROCEDURE — 99213 OFFICE O/P EST LOW 20 MIN: CPT | Performed by: NURSE PRACTITIONER

## 2020-08-19 NOTE — PROGRESS NOTES
"    Kailash Cooper is a 79 y.o. male.     79-year-old white male with history of lung cancer, chronic back pain who goes to pain management, hypertension, hyperlipidemia, CKD 3 and right arm tremor who comes in today with complaints of pain on right side under rib cage that has been there about 10 days has not improved and seems to be worse after eating or sometimes eating will bring the pain on.  Patient is already on hydrocodone and a muscle relaxer.  He denies any nausea or reflux or issues with bowels.  I am checking liver enzymes and lipase today and getting a right upper quadrant ultrasound since pain is not improving  Patient had recent follow-up CT lung and and there have been no metastasis  Blood sugars averaging under 120 fasting in the morning.  Blood pressure is 140/66 manually heart rate 90 he denies any chest pain, dyspnea, tachycardia or dizziness.  Patient's last creatinine 2.5 hemoglobin 12.6    Right upper quadrant ultrasound  CMP/lipase         The following portions of the patient's history were reviewed and updated as appropriate: allergies, current medications, past family history, past medical history, past social history, past surgical history and problem list.    Vitals:    08/19/20 1023   BP: 161/70   BP Location: Right arm   Patient Position: Sitting   Cuff Size: Adult   Pulse: 91   Temp: 97.3 °F (36.3 °C)   TempSrc: Temporal   SpO2: 95%   Weight: 80.3 kg (177 lb)   Height: 165.1 cm (65\")     Body mass index is 29.45 kg/m².    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 [facility]   • Diabetes (CMS/HCC)    • Enlarged prostate    • Former smoker    • Hiatal hernia    • Hip pain     don   • Hyperlipidemia    • Hypertension    • Kidney disease        stage 3    • Leg pain     don   • Low back pain    • Neck pain    • Stroke (CMS/HCC)      Past Surgical History:   Procedure Laterality Date   • CATARACT EXTRACTION  2006 [facility] "   • COLONOSCOPY  2011   • LUNG CANCER SURGERY      upper lobe rt lung cancer 9/2017  at [facility]     Family History   Problem Relation Age of Onset   • Stroke Mother    • Cancer Father         Prostate   • Heart failure Brother    • Heart disease Other      Immunization History   Administered Date(s) Administered   • Fluad Quad 11/18/2019   • Influenza, Unspecified 09/20/2017       Office Visit on 06/22/2020   Component Date Value Ref Range Status   • WBC 06/22/2020 5.9  3.4 - 10.8 x10E3/uL Final   • RBC 06/22/2020 3.33* 4.14 - 5.80 x10E6/uL Final   • Hemoglobin 06/22/2020 10.7* 13.0 - 17.7 g/dL Final   • Hematocrit 06/22/2020 32.0* 37.5 - 51.0 % Final   • MCV 06/22/2020 96  79 - 97 fL Final   • MCH 06/22/2020 32.1  26.6 - 33.0 pg Final   • MCHC 06/22/2020 33.4  31.5 - 35.7 g/dL Final   • RDW 06/22/2020 13.8  11.6 - 15.4 % Final   • Platelets 06/22/2020 189  150 - 450 x10E3/uL Final   • Neutrophil Rel % 06/22/2020 60  Not Estab. % Final   • Lymphocyte Rel % 06/22/2020 27  Not Estab. % Final   • Monocyte Rel % 06/22/2020 10  Not Estab. % Final   • Eosinophil Rel % 06/22/2020 2  Not Estab. % Final   • Basophil Rel % 06/22/2020 1  Not Estab. % Final   • Neutrophils Absolute 06/22/2020 3.5  1.4 - 7.0 x10E3/uL Final   • Lymphocytes Absolute 06/22/2020 1.6  0.7 - 3.1 x10E3/uL Final   • Monocytes Absolute 06/22/2020 0.6  0.1 - 0.9 x10E3/uL Final   • Eosinophils Absolute 06/22/2020 0.1  0.0 - 0.4 x10E3/uL Final   • Basophils Absolute 06/22/2020 0.1  0.0 - 0.2 x10E3/uL Final   • Immature Granulocyte Rel % 06/22/2020 0  Not Estab. % Final   • Immature Grans Absolute 06/22/2020 0.0  0.0 - 0.1 x10E3/uL Final   • Glucose 06/22/2020 66  65 - 99 mg/dL Final   • BUN 06/22/2020 35* 8 - 27 mg/dL Final   • Creatinine 06/22/2020 2.06* 0.76 - 1.27 mg/dL Final   • eGFR Non African Am 06/22/2020 30* >59 mL/min/1.73 Final   • eGFR African Am 06/22/2020 34* >59 mL/min/1.73 Final   • BUN/Creatinine Ratio 06/22/2020 17  10 - 24 Final   •  Sodium 06/22/2020 145* 134 - 144 mmol/L Final   • Potassium 06/22/2020 5.1  3.5 - 5.2 mmol/L Final   • Chloride 06/22/2020 107* 96 - 106 mmol/L Final   • Total CO2 06/22/2020 24  20 - 29 mmol/L Final   • Calcium 06/22/2020 9.1  8.6 - 10.2 mg/dL Final   • Total Protein 06/22/2020 5.8* 6.0 - 8.5 g/dL Final   • Albumin 06/22/2020 4.0  3.7 - 4.7 g/dL Final   • Globulin 06/22/2020 1.8  1.5 - 4.5 g/dL Final   • A/G Ratio 06/22/2020 2.2  1.2 - 2.2 Final   • Total Bilirubin 06/22/2020 0.3  0.0 - 1.2 mg/dL Final   • Alkaline Phosphatase 06/22/2020 73  39 - 117 IU/L Final   • AST (SGOT) 06/22/2020 11  0 - 40 IU/L Final   • ALT (SGPT) 06/22/2020 16  0 - 44 IU/L Final         Review of Systems   Constitutional: Negative.    HENT: Negative.    Respiratory: Negative.    Cardiovascular: Negative.    Gastrointestinal: Negative.    Genitourinary: Negative.    Musculoskeletal:        Right side pain   Skin: Negative.    Neurological: Negative.    Psychiatric/Behavioral: Negative.        Objective   Physical Exam   Constitutional: He is oriented to person, place, and time. He appears well-developed and well-nourished.   Cardiovascular: Normal rate and regular rhythm.   Pulmonary/Chest: Effort normal and breath sounds normal.   Abdominal: Soft. Bowel sounds are normal.   Musculoskeletal:   No swelling or palpable tenderness   Neurological: He is alert and oriented to person, place, and time.   Skin: Skin is warm and dry.   Psychiatric: He has a normal mood and affect.       Procedures    Assessment/Plan   Kailash was seen today for abdominal pain.    Diagnoses and all orders for this visit:    Serum lipase elevation    RUQ pain  -     Comprehensive Metabolic Panel  -     Lipase; Future    Essential hypertension    Type 2 diabetes mellitus without complication, without long-term current use of insulin (CMS/[facility])    BMI 29.0-29.9,adult          Current Outpatient Medications:   •  cetirizine (zyrTEC) 10 MG tablet, Take 1 tablet by mouth  Daily., Disp: 90 tablet, Rfl: 1  •  Cholecalciferol (VITAMIN D3) 50 MCG (2000 UT) capsule, Take 2,000 Units by mouth Daily., Disp: , Rfl:   •  cyclobenzaprine (FLEXERIL) 10 MG tablet, Take 1 tablet by mouth At Night As Needed for Muscle Spasms., Disp: 30 tablet, Rfl: 2  •  famotidine (PEPCID) 20 MG tablet, Take 20 mg by mouth Daily., Disp: , Rfl:   •  ferrous sulfate 325 (65 FE) MG EC tablet, Take 1 tablet by mouth Daily With Breakfast., Disp: , Rfl:   •  glimepiride (AMARYL) 1 MG tablet, Take 1 tablet by mouth Daily., Disp: 90 tablet, Rfl: 1  •  HYDROcodone-acetaminophen (NORCO)  MG per tablet, Take 1 tablet by mouth 4 (Four) Times a Day As Needed for Severe Pain  for up to 30 days., Disp: 120 tablet, Rfl: 0  •  metoprolol tartrate (LOPRESSOR) 25 MG tablet, Take 1 tablet by mouth Daily., Disp: 90 tablet, Rfl: 1  •  nitroglycerin (NITROSTAT) 0.3 MG SL tablet, Place 1 tablet under the tongue Every 5 (Five) Minutes As Needed for Chest Pain. Take no more than 3 doses in 15 minutes., Disp: 50 tablet, Rfl: 12  •  pravastatin (PRAVACHOL) 40 MG tablet, Take 1 tablet by mouth Daily., Disp: 90 tablet, Rfl: 1  •  pseudoephedrine (SUDAFED) 60 MG tablet, Take 120 mg by mouth As Needed for Congestion., Disp: , Rfl:   •  ramipril (ALTACE) 5 MG capsule, Take 1 capsule by mouth Every Evening., Disp: 90 capsule, Rfl: 1  •  tamsulosin (FLOMAX) 0.4 MG capsule 24 hr capsule, Take 1 capsule by mouth Daily., Disp: 90 capsule, Rfl: 1  •  torsemide (DEMADEX) 5 MG tablet, Take 1 tablet by mouth Daily., Disp: 90 tablet, Rfl: 1  •  vitamin B-12 (CYANOCOBALAMIN) 1000 MCG tablet, Take 1 tablet by mouth Daily., Disp: , Rfl:

## 2020-08-20 LAB
ALBUMIN SERPL-MCNC: 4.2 G/DL (ref 3.7–4.7)
ALBUMIN/GLOB SERPL: 2.2 {RATIO} (ref 1.2–2.2)
ALP SERPL-CCNC: 90 IU/L (ref 39–117)
ALT SERPL-CCNC: 15 IU/L (ref 0–44)
AST SERPL-CCNC: 20 IU/L (ref 0–40)
BILIRUB SERPL-MCNC: <0.2 MG/DL (ref 0–1.2)
BUN SERPL-MCNC: 31 MG/DL (ref 8–27)
BUN/CREAT SERPL: 14 (ref 10–24)
CALCIUM SERPL-MCNC: 9.7 MG/DL (ref 8.6–10.2)
CHLORIDE SERPL-SCNC: 107 MMOL/L (ref 96–106)
CO2 SERPL-SCNC: 21 MMOL/L (ref 20–29)
CREAT SERPL-MCNC: 2.29 MG/DL (ref 0.76–1.27)
GLOBULIN SER CALC-MCNC: 1.9 G/DL (ref 1.5–4.5)
GLUCOSE SERPL-MCNC: 111 MG/DL (ref 65–99)
POTASSIUM SERPL-SCNC: 5.2 MMOL/L (ref 3.5–5.2)
PROT SERPL-MCNC: 6.1 G/DL (ref 6–8.5)
SODIUM SERPL-SCNC: 140 MMOL/L (ref 134–144)

## 2020-08-22 ENCOUNTER — HOSPITAL ENCOUNTER (OUTPATIENT)
Dept: ULTRASOUND IMAGING | Facility: HOSPITAL | Age: 80
Discharge: HOME | End: 2020-08-22
Admitting: NURSE PRACTITIONER

## 2020-08-22 DIAGNOSIS — R74.8 SERUM LIPASE ELEVATION: ICD-10-CM

## 2020-08-22 DIAGNOSIS — R10.11 RUQ PAIN: ICD-10-CM

## 2020-08-22 DIAGNOSIS — R11.0 NAUSEA: ICD-10-CM

## 2020-08-22 PROCEDURE — 76705 ECHO EXAM OF ABDOMEN: CPT

## 2020-08-24 ENCOUNTER — HOSPITAL ENCOUNTER (OUTPATIENT)
Dept: PAIN MEDICINE | Facility: HOSPITAL | Age: 80
Discharge: HOME | End: 2020-08-24

## 2020-08-24 ENCOUNTER — HOSPITAL ENCOUNTER (OUTPATIENT)
Dept: PAIN MEDICINE | Facility: HOSPITAL | Age: 80
Discharge: HOME | End: 2020-08-24
Admitting: ANESTHESIOLOGY

## 2020-08-24 ENCOUNTER — RESULTS ENCOUNTER (OUTPATIENT)
Dept: FAMILY MEDICINE CLINIC | Facility: CLINIC | Age: 80
End: 2020-08-24

## 2020-08-24 VITALS
OXYGEN SATURATION: 100 % | DIASTOLIC BLOOD PRESSURE: 82 MMHG | RESPIRATION RATE: 16 BRPM | WEIGHT: 170 LBS | BODY MASS INDEX: 28.32 KG/M2 | HEIGHT: 65 IN | SYSTOLIC BLOOD PRESSURE: 136 MMHG | TEMPERATURE: 97.1 F | HEART RATE: 65 BPM

## 2020-08-24 DIAGNOSIS — M54.50 LOWER BACK PAIN: ICD-10-CM

## 2020-08-24 DIAGNOSIS — R10.11 RUQ PAIN: ICD-10-CM

## 2020-08-24 DIAGNOSIS — M47.817 LUMBOSACRAL SPONDYLOSIS WITHOUT MYELOPATHY: Primary | ICD-10-CM

## 2020-08-24 PROCEDURE — 64636 DESTROY L/S FACET JNT ADDL: CPT | Performed by: ANESTHESIOLOGY

## 2020-08-24 PROCEDURE — 25010000002 METHYLPREDNISOLONE PER 40 MG

## 2020-08-24 PROCEDURE — 77003 FLUOROGUIDE FOR SPINE INJECT: CPT

## 2020-08-24 PROCEDURE — 64635 DESTROY LUMB/SAC FACET JNT: CPT | Performed by: ANESTHESIOLOGY

## 2020-08-24 RX ORDER — BUPIVACAINE HYDROCHLORIDE 2.5 MG/ML
10 INJECTION, SOLUTION INFILTRATION; PERINEURAL ONCE
Status: COMPLETED | OUTPATIENT
Start: 2020-08-24 | End: 2020-08-24

## 2020-08-24 RX ORDER — LIDOCAINE HYDROCHLORIDE 10 MG/ML
5 INJECTION, SOLUTION EPIDURAL; INFILTRATION; INTRACAUDAL; PERINEURAL ONCE
Status: COMPLETED | OUTPATIENT
Start: 2020-08-24 | End: 2020-08-24

## 2020-08-24 RX ORDER — LIDOCAINE HYDROCHLORIDE 10 MG/ML
INJECTION, SOLUTION EPIDURAL; INFILTRATION; INTRACAUDAL; PERINEURAL
Status: DISCONTINUED
Start: 2020-08-24 | End: 2020-08-25 | Stop reason: HOSPADM

## 2020-08-24 RX ORDER — METHYLPREDNISOLONE ACETATE 40 MG/ML
INJECTION, SUSPENSION INTRA-ARTICULAR; INTRALESIONAL; INTRAMUSCULAR; SOFT TISSUE
Status: DISCONTINUED
Start: 2020-08-24 | End: 2020-08-25 | Stop reason: HOSPADM

## 2020-08-24 RX ORDER — METHYLPREDNISOLONE ACETATE 40 MG/ML
40 INJECTION, SUSPENSION INTRA-ARTICULAR; INTRALESIONAL; INTRAMUSCULAR; SOFT TISSUE ONCE
Status: COMPLETED | OUTPATIENT
Start: 2020-08-24 | End: 2020-08-24

## 2020-08-24 RX ORDER — BUPIVACAINE HYDROCHLORIDE 2.5 MG/ML
INJECTION, SOLUTION EPIDURAL; INFILTRATION; INTRACAUDAL
Status: DISCONTINUED
Start: 2020-08-24 | End: 2020-08-25 | Stop reason: HOSPADM

## 2020-08-24 RX ADMIN — METHYLPREDNISOLONE ACETATE 40 MG: 40 INJECTION, SUSPENSION INTRA-ARTICULAR; INTRALESIONAL; INTRAMUSCULAR; SOFT TISSUE at 14:27

## 2020-08-24 RX ADMIN — BUPIVACAINE HYDROCHLORIDE 10 ML: 2.5 INJECTION, SOLUTION INFILTRATION; PERINEURAL at 14:27

## 2020-08-24 RX ADMIN — LIDOCAINE HYDROCHLORIDE 5 ML: 10 INJECTION, SOLUTION EPIDURAL; INFILTRATION; INTRACAUDAL; PERINEURAL at 14:14

## 2020-08-24 NOTE — PROCEDURES
"Subjective    CC back pain  Kailash Cooper is a 79 y.o. male with lumbar spondylosis here for left lumbar RFA  No anticoagulation    Pain Assessment   Location of Pain: Lower Back, R Hip, L Hip,   Description of Pain: Dull/Aching, Throbbing, Stabbing  Previous Pain Rating :5  Current Pain Ratin  Aggravating Factors: Activity  Alleviating Factors: Rest, Medication    The following portions of the patient's history were reviewed and updated as appropriate: allergies, current medications, past family history, past medical history, past social history, past surgical history and problem list.  Review of Systems  As in HPI  Objective   Physical Exam   Constitutional: He is oriented to person, place, and time. No distress.   Cardiovascular: Normal rate.   Pulmonary/Chest: Effort normal.   Musculoskeletal:        Lumbar back: He exhibits decreased range of motion and tenderness.   Neurological: He is alert and oriented to person, place, and time.   Psychiatric: He has a normal mood and affect.     /82 (BP Location: Right arm, Patient Position: Sitting)   Pulse 65   Temp 97.1 °F (36.2 °C) (Skin)   Resp 16   Ht 165.1 cm (65\")   Wt 77.1 kg (170 lb)   SpO2 100%   BMI 28.29 kg/m²     Assessment/Plan    underwent left lumbar RFA at L4/5, L5/S1.    RTC scheduled for right lumbar RFA 2 levels without sedation.      DATE OF PROCEDURE:   PREOPERATIVE DIAGNOSIS:   Lumbar spondylosis without myelopathy    POSTOPERATIVE DIAGNOSIS: same    PROCEDURE PERFORMED:  left  Lumbar Sacral RFTC at  L4/L5, L5/S1.    The patient presents with a history of lumbosacral spondylosis on the left at level   L4/L5 ] [ L5/ S1 ].  The patient presents today for lumbosacral RFTC.  The patient understands the risks and benefits of the procedure and wishes to proceed.  The patient was seen in the preoperative area.  Patient's consent was obtained and updated.  Vitals were taken.  Patient was then brought to the procedure suite and placed in a " prone position.  The appropriate anatomic area was widely prepped with Chloraprep and draped in a sterile fashion.  Noninvasive monitoring per routine anesthesia protocol was placed.  Under fluoroscopic guidance an AP view with caudad cephaled tilt was obtained.  A 20 guage RFTC cannula was passed through skin anesthetized with 1% Lidocaine without epinephrine.  The needle tip was guided to the superior medial aspect of the transverse process at [  L3 ][  L4 ][  L5 and  sacral ala ].  Sensory stimulation was obtained at 07, 0.9, 1.0, 0.4 Amps  respectively at 50 Htz.  Motor stimulation was undertaken at 2.0 mAmps at 2 Hertz. At no point was motor stimulation or sensory stimulation noted in a radicular fashion.  Impedence range 200's. Prior to ablation, each level was anesthetized with 2mL of 1% Lidocaine without epinephrine. The medial branch nerves were then ablated at 80* C for 90 seconds each .  Following ablation, each level was injected with 1 mL of  expiration containing 1 mL of 40 mg Depo-Medrol and 4 mL of 0.25% bupivacaine and the RFTC cannula removed.  A sterile dressing was placed over the puncture site.    The patient tolerated the procedure with no complications. They were then brought to the post procedure area where they recovered nicely.     Discharge  The patient will be discharged home in stable condition.  Patient understands to contact the Center with any post procedure questions or concerns.  Discharge instructions given by nursing staff.

## 2020-08-31 ENCOUNTER — HOSPITAL ENCOUNTER (OUTPATIENT)
Dept: PAIN MEDICINE | Facility: HOSPITAL | Age: 80
Discharge: HOME | End: 2020-08-31

## 2020-08-31 ENCOUNTER — HOSPITAL ENCOUNTER (OUTPATIENT)
Dept: PAIN MEDICINE | Facility: HOSPITAL | Age: 80
Discharge: HOME | End: 2020-08-31
Admitting: ANESTHESIOLOGY

## 2020-08-31 VITALS
SYSTOLIC BLOOD PRESSURE: 154 MMHG | HEIGHT: 65 IN | WEIGHT: 167 LBS | BODY MASS INDEX: 27.82 KG/M2 | OXYGEN SATURATION: 98 % | RESPIRATION RATE: 16 BRPM | HEART RATE: 55 BPM | DIASTOLIC BLOOD PRESSURE: 68 MMHG | TEMPERATURE: 97.1 F

## 2020-08-31 DIAGNOSIS — R52 PAIN: ICD-10-CM

## 2020-08-31 DIAGNOSIS — G89.4 CHRONIC PAIN SYNDROME: ICD-10-CM

## 2020-08-31 DIAGNOSIS — M47.812 CERVICAL SPONDYLOSIS WITHOUT MYELOPATHY: ICD-10-CM

## 2020-08-31 DIAGNOSIS — M47.817 LUMBOSACRAL SPONDYLOSIS WITHOUT MYELOPATHY: Primary | ICD-10-CM

## 2020-08-31 PROCEDURE — 64635 DESTROY LUMB/SAC FACET JNT: CPT | Performed by: ANESTHESIOLOGY

## 2020-08-31 PROCEDURE — 64636 DESTROY L/S FACET JNT ADDL: CPT | Performed by: ANESTHESIOLOGY

## 2020-08-31 PROCEDURE — 77003 FLUOROGUIDE FOR SPINE INJECT: CPT

## 2020-08-31 PROCEDURE — 25010000002 METHYLPREDNISOLONE PER 40 MG

## 2020-08-31 RX ORDER — HYDROCODONE BITARTRATE AND ACETAMINOPHEN 10; 325 MG/1; MG/1
1 TABLET ORAL 4 TIMES DAILY PRN
Qty: 120 TABLET | Refills: 0 | Status: SHIPPED | OUTPATIENT
Start: 2020-09-05 | End: 2020-09-28 | Stop reason: SDUPTHER

## 2020-08-31 RX ORDER — LIDOCAINE HYDROCHLORIDE 10 MG/ML
5 INJECTION, SOLUTION EPIDURAL; INFILTRATION; INTRACAUDAL; PERINEURAL ONCE
Status: COMPLETED | OUTPATIENT
Start: 2020-08-31 | End: 2020-08-31

## 2020-08-31 RX ORDER — BUPIVACAINE HYDROCHLORIDE 2.5 MG/ML
10 INJECTION, SOLUTION INFILTRATION; PERINEURAL ONCE
Status: COMPLETED | OUTPATIENT
Start: 2020-08-31 | End: 2020-08-31

## 2020-08-31 RX ORDER — BUPIVACAINE HYDROCHLORIDE 2.5 MG/ML
INJECTION, SOLUTION EPIDURAL; INFILTRATION; INTRACAUDAL
Status: DISCONTINUED
Start: 2020-08-31 | End: 2020-09-01 | Stop reason: HOSPADM

## 2020-08-31 RX ORDER — METHYLPREDNISOLONE ACETATE 40 MG/ML
INJECTION, SUSPENSION INTRA-ARTICULAR; INTRALESIONAL; INTRAMUSCULAR; SOFT TISSUE
Status: DISCONTINUED
Start: 2020-08-31 | End: 2020-09-01 | Stop reason: HOSPADM

## 2020-08-31 RX ORDER — METHYLPREDNISOLONE ACETATE 40 MG/ML
40 INJECTION, SUSPENSION INTRA-ARTICULAR; INTRALESIONAL; INTRAMUSCULAR; SOFT TISSUE ONCE
Status: COMPLETED | OUTPATIENT
Start: 2020-08-31 | End: 2020-08-31

## 2020-08-31 RX ORDER — LIDOCAINE HYDROCHLORIDE 10 MG/ML
INJECTION, SOLUTION EPIDURAL; INFILTRATION; INTRACAUDAL; PERINEURAL
Status: DISCONTINUED
Start: 2020-08-31 | End: 2020-09-01 | Stop reason: HOSPADM

## 2020-08-31 RX ADMIN — METHYLPREDNISOLONE ACETATE 40 MG: 40 INJECTION, SUSPENSION INTRA-ARTICULAR; INTRALESIONAL; INTRAMUSCULAR; SOFT TISSUE at 14:28

## 2020-08-31 RX ADMIN — LIDOCAINE HYDROCHLORIDE 5 ML: 10 INJECTION, SOLUTION EPIDURAL; INFILTRATION; INTRACAUDAL; PERINEURAL at 14:36

## 2020-08-31 RX ADMIN — BUPIVACAINE HYDROCHLORIDE 10 ML: 2.5 INJECTION, SOLUTION INFILTRATION; PERINEURAL at 14:28

## 2020-08-31 NOTE — PROCEDURES
"Subjective    CC back pain  Kailash Cooper is a 79 y.o. male with lumbar spondylosis here for right lumbar RFA  No anticoagulation    Pain Assessment   Location of Pain: Lower Back, R Hip, L Hip,   Description of Pain: Dull/Aching, Throbbing, Stabbing  Previous Pain Rating :5  Current Pain Ratin  Aggravating Factors: Activity  Alleviating Factors: Rest, Medication    The following portions of the patient's history were reviewed and updated as appropriate: allergies, current medications, past family history, past medical history, past social history, past surgical history and problem list.  Review of Systems  As in HPI  Objective   Physical Exam   Constitutional: He is oriented to person, place, and time. No distress.   Cardiovascular: Normal rate.   Pulmonary/Chest: Effort normal.   Musculoskeletal:        Lumbar back: He exhibits decreased range of motion and tenderness.   Neurological: He is alert and oriented to person, place, and time.   Psychiatric: He has a normal mood and affect.     BP (!) 189/80 (BP Location: Left arm, Patient Position: Sitting)   Pulse 58   Temp 97.1 °F (36.2 °C) (Oral)   Resp 20   Ht 165.1 cm (65\")   Wt 75.8 kg (167 lb)   SpO2 100%   BMI 27.79 kg/m²     Assessment/Plan    underwent right lumbar RFA at L4/5, L5/S1.    Good relief with left lumbar RFA last visit however noted continued left lower extremity radicular pain.  We will schedule for LESI.  Refill hydrocodone.  UDS and inspect reviewed.  RTC for procedure    DATE OF PROCEDURE:  2020   PREOPERATIVE DIAGNOSIS:   Lumbar spondylosis without myelopathy    POSTOPERATIVE DIAGNOSIS: same    PROCEDURE PERFORMED:  right Lumbar Sacral RFTC at  L4/L5, L5/S1.    The patient presents with a history of lumbosacral spondylosis on the right at level   L4/L5 ] [ L5/ S1 ].  The patient presents today for lumbosacral RFTC.  The patient understands the risks and benefits of the procedure and wishes to proceed.  The patient was seen in the " preoperative area.  Patient's consent was obtained and updated.  Vitals were taken.  Patient was then brought to the procedure suite and placed in a prone position.  The appropriate anatomic area was widely prepped with Chloraprep and draped in a sterile fashion.  Noninvasive monitoring per routine anesthesia protocol was placed.  Under fluoroscopic guidance an AP view with caudad cephaled tilt was obtained.  A 20 guage RFTC cannula was passed through skin anesthetized with 1% Lidocaine without epinephrine.  The needle tip was guided to the superior medial aspect of the transverse process at [  L3 ][  L4 ][  L5 and  sacral ala ].  Motor stimulation was undertaken at 2.0 mAmps at 2 Hertz. At no point was motor stimulation or sensory stimulation noted in a radicular fashion.  Impedence range 200's. Prior to ablation, each level was anesthetized with 2mL of 1% Lidocaine without epinephrine. The medial branch nerves were then ablated at 80* C for 90 seconds each .  Following ablation, each level was injected with 1 mL of  expiration containing 1 mL of 40 mg Depo-Medrol and 4 mL of 0.25% bupivacaine and the RFTC cannula removed.  A sterile dressing was placed over the puncture site.    The patient tolerated the procedure with no complications. They were then brought to the post procedure area where they recovered nicely.     Discharge  The patient will be discharged home in stable condition.  Patient understands to contact the Center with any post procedure questions or concerns.  Discharge instructions given by nursing staff.

## 2020-09-21 ENCOUNTER — APPOINTMENT (OUTPATIENT)
Dept: PAIN MEDICINE | Facility: HOSPITAL | Age: 80
End: 2020-09-21

## 2020-09-28 ENCOUNTER — HOSPITAL ENCOUNTER (OUTPATIENT)
Dept: PAIN MEDICINE | Facility: HOSPITAL | Age: 80
Discharge: HOME | End: 2020-09-28

## 2020-09-28 ENCOUNTER — RESULTS ENCOUNTER (OUTPATIENT)
Dept: PAIN MEDICINE | Facility: HOSPITAL | Age: 80
End: 2020-09-28

## 2020-09-28 VITALS
WEIGHT: 170 LBS | HEART RATE: 55 BPM | DIASTOLIC BLOOD PRESSURE: 71 MMHG | HEIGHT: 65 IN | TEMPERATURE: 98.2 F | OXYGEN SATURATION: 99 % | SYSTOLIC BLOOD PRESSURE: 138 MMHG | BODY MASS INDEX: 28.32 KG/M2 | RESPIRATION RATE: 16 BRPM

## 2020-09-28 DIAGNOSIS — Z79.899 ENCOUNTER FOR LONG-TERM (CURRENT) USE OF OTHER MEDICATIONS: ICD-10-CM

## 2020-09-28 DIAGNOSIS — Z79.899 ENCOUNTER FOR LONG-TERM (CURRENT) USE OF OTHER MEDICATIONS: Primary | ICD-10-CM

## 2020-09-28 DIAGNOSIS — R52 PAIN: ICD-10-CM

## 2020-09-28 DIAGNOSIS — M54.16 LUMBAR RADICULITIS: Primary | ICD-10-CM

## 2020-09-28 DIAGNOSIS — G89.4 CHRONIC PAIN SYNDROME: ICD-10-CM

## 2020-09-28 PROCEDURE — 62323 NJX INTERLAMINAR LMBR/SAC: CPT | Performed by: ANESTHESIOLOGY

## 2020-09-28 PROCEDURE — 25010000002 METHYLPREDNISOLONE PER 40 MG

## 2020-09-28 PROCEDURE — 77003 FLUOROGUIDE FOR SPINE INJECT: CPT

## 2020-09-28 PROCEDURE — 0 IOPAMIDOL 41 % SOLUTION: Performed by: ANESTHESIOLOGY

## 2020-09-28 RX ORDER — HYDROCODONE BITARTRATE AND ACETAMINOPHEN 10; 325 MG/1; MG/1
1 TABLET ORAL 4 TIMES DAILY PRN
Qty: 120 TABLET | Refills: 0 | Status: SHIPPED | OUTPATIENT
Start: 2020-10-02 | End: 2020-10-27 | Stop reason: SDUPTHER

## 2020-09-28 RX ORDER — METHYLPREDNISOLONE ACETATE 40 MG/ML
40 INJECTION, SUSPENSION INTRA-ARTICULAR; INTRALESIONAL; INTRAMUSCULAR; SOFT TISSUE ONCE
Status: COMPLETED | OUTPATIENT
Start: 2020-09-28 | End: 2020-09-28

## 2020-09-28 RX ORDER — METHYLPREDNISOLONE ACETATE 40 MG/ML
INJECTION, SUSPENSION INTRA-ARTICULAR; INTRALESIONAL; INTRAMUSCULAR; SOFT TISSUE
Status: DISCONTINUED
Start: 2020-09-28 | End: 2020-09-29 | Stop reason: HOSPADM

## 2020-09-28 RX ORDER — HYDROCODONE BITARTRATE AND ACETAMINOPHEN 10; 325 MG/1; MG/1
1 TABLET ORAL 4 TIMES DAILY PRN
Qty: 120 TABLET | Refills: 0 | Status: SHIPPED | OUTPATIENT
Start: 2020-10-03 | End: 2020-09-28 | Stop reason: SDUPTHER

## 2020-09-28 RX ADMIN — IOPAMIDOL 1 ML: 408 INJECTION, SOLUTION INTRATHECAL at 14:00

## 2020-09-28 RX ADMIN — METHYLPREDNISOLONE ACETATE 40 MG: 40 INJECTION, SUSPENSION INTRA-ARTICULAR; INTRALESIONAL; INTRAMUSCULAR; SOFT TISSUE at 14:00

## 2020-09-28 NOTE — PROCEDURES
"Subjective    CC back pain  Kailash Cooper is a 80 y.o. male with lumbar spondylosis and radiculitis here for LESI.  No anticoagulation    Pain Assessment   Location of Pain: Lower Back, R Hip, L Hip,   Description of Pain: Dull/Aching, Throbbing, Stabbing  Previous Pain Rating :5  Current Pain Ratin  Aggravating Factors: Activity  Alleviating Factors: Rest, Medication    The following portions of the patient's history were reviewed and updated as appropriate: allergies, current medications, past family history, past medical history, past social history, past surgical history and problem list.  Review of Systems  As in HPI  Objective   Physical Exam   Constitutional: He is oriented to person, place, and time. No distress.   Cardiovascular: Normal rate.   Pulmonary/Chest: Effort normal.   Musculoskeletal:      Lumbar back: He exhibits decreased range of motion and tenderness.   Neurological: He is alert and oriented to person, place, and time.     /71 (BP Location: Left arm, Patient Position: Sitting)   Pulse 55   Temp 98.2 °F (36.8 °C) (Skin)   Resp 16   Ht 165.1 cm (65\")   Wt 77.1 kg (170 lb)   SpO2 99%   BMI 28.29 kg/m²     Assessment/Plan    underwent  LESI for left lower extremity radicular pain.  Refill hydrocodone.  UDS and inspect reviewed.  RTC 1 month    DATE OF PROCEDURE: 2020    PREOPERATIVE DIAGNOSIS: lumbar DDD/radiculitis    POSTOPERATIVE DIAGNOSIS: same    PROCEDURE PERFORMED:  lumbar Epidural Steroid Injection    The patient presents with a history of   lumbar degenerative disc disease with  radiculitis. The patient presents today for a  lumbar epidural steroid injection at level  L5/S1.   The patient was seen in the preoperative area.  Patient's consent was obtained and updated.  Vitals were taken.  Patient was then brought to the procedure suite and placed in a prone position. The appropriate anatomic area was widely prepped with Chloraprep and draped in a sterile fashion. " Noninvasive monitoring per routine anesthesia protocol was placed.  Under fluoroscopic guidance using  AP view a 20 gauge styleted tuohy needle was passed through skin anesthetized with 1% Lidocaine without epinephrine.  The needle was advanced using the continuous loss of resistance to saline technique into the L5 epidural space. Needle tip placement in the epidural space was confirmed by loss of resistance and injection of 1 mL of  preservative free contrast.  Following this 8 mL of a solution containing  1 mL of 40 mg Depo-Medrol and 7 mL of preservative-free saline was carefully administered in the epidural space.  A sterile dressing was placed over the puncture site.    The patient tolerated the procedure with no complications . They were then brought to the post procedure area where they recovered nicely.

## 2020-10-01 ENCOUNTER — TELEPHONE (OUTPATIENT)
Dept: PAIN MEDICINE | Facility: HOSPITAL | Age: 80
End: 2020-10-01

## 2020-10-16 RX ORDER — CYCLOBENZAPRINE HCL 10 MG
10 TABLET ORAL NIGHTLY PRN
Qty: 30 TABLET | Refills: 2 | Status: SHIPPED | OUTPATIENT
Start: 2020-10-16 | End: 2021-12-29

## 2020-10-27 ENCOUNTER — OFFICE VISIT (OUTPATIENT)
Dept: PAIN MEDICINE | Facility: CLINIC | Age: 80
End: 2020-10-27

## 2020-10-27 VITALS — HEIGHT: 65 IN | RESPIRATION RATE: 16 BRPM | WEIGHT: 165 LBS | BODY MASS INDEX: 27.49 KG/M2

## 2020-10-27 DIAGNOSIS — M25.50 POLYARTHRALGIA: ICD-10-CM

## 2020-10-27 DIAGNOSIS — Z79.899 HIGH RISK MEDICATION USE: ICD-10-CM

## 2020-10-27 DIAGNOSIS — M47.812 CERVICAL SPONDYLOSIS WITHOUT MYELOPATHY: ICD-10-CM

## 2020-10-27 DIAGNOSIS — G89.4 CHRONIC PAIN SYNDROME: Primary | ICD-10-CM

## 2020-10-27 DIAGNOSIS — M47.817 LUMBOSACRAL SPONDYLOSIS WITHOUT MYELOPATHY: ICD-10-CM

## 2020-10-27 PROCEDURE — 99443 PR PHYS/QHP TELEPHONE EVALUATION 21-30 MIN: CPT | Performed by: ANESTHESIOLOGY

## 2020-10-27 RX ORDER — HYDROCODONE BITARTRATE AND ACETAMINOPHEN 10; 325 MG/1; MG/1
1 TABLET ORAL
Qty: 150 TABLET | Refills: 0 | Status: SHIPPED | OUTPATIENT
Start: 2020-11-30 | End: 2021-02-04 | Stop reason: SDUPTHER

## 2020-10-27 RX ORDER — HYDROCODONE BITARTRATE AND ACETAMINOPHEN 10; 325 MG/1; MG/1
1 TABLET ORAL
Qty: 150 TABLET | Refills: 0 | Status: SHIPPED | OUTPATIENT
Start: 2020-10-30 | End: 2021-01-05 | Stop reason: SDUPTHER

## 2020-10-27 NOTE — PROGRESS NOTES
Subjective    CC back pain, neck pain, bilateral leg pain  Kailash Cooper is a 80 y.o. male with multiple comorbidities including CKD, DM, COPD, chronic back pain polyarthralgia here for f/u by tel.   Good relief and functional benefit with lumbar RFA and LESI. Hydrocodone is helping, and tolerated slight dose reduction however reports some limitation with ADL due to meds wearing off sooner.   Chronic back pain radiating to bilateral hips worse with standing walking or prolonged activity.  Denies new weakness saddle anesthesia bladder bowel continence.  Chronic axial neck pain radiating to bilateral shoulder without radicular pain.  Continued chronic polyarthralgia and myofascial pain worse with activity.  Tried physical therapy/chiropractor with marginal relief.  Tried LESI's with marginal relief.    Utilizes hydrocodone 10/325 4 times daily as needed for severe pain.  Functional benefit /denies side effects.    L-spine MRI 2018 moderate degenerative changes throughout facet arthropathy mild to moderate foraminal narrowing, scoliosis.  C-spine MRI 2018 marked degenerative disc and some posterior element disease, causing spinal stenosis at multiple levels, including C2-3, C3-4, C5-6, and C6-7.  There multiple foraminal impingements    Pain Assessment   Location of Pain: Lower Back, neck pain, joint  Description of Pain: Dull/Aching, Throbbing, Stabbing  Previous Pain Rating :7  Current Pain Ratin  Aggravating Factors: Activity  Alleviating Factors: Rest, Medication    PEG Assessment   What number best describes your pain on average in the past week? 1  What number best describes how, during the past week, pain has interfered with your enjoyment of life?9  What number best describes how, during the past week, pain has interfered with your general activity?5      The following portions of the patient's history were reviewed and updated as appropriate: allergies, current medications, past family history, past medical  history, past social history, past surgical history and problem list.     has a past medical history of Arthritis, Cancer (CMS/HCC), Diabetes (CMS/HCC), Enlarged prostate, Former smoker, Hiatal hernia, Hip pain, Hip pain, bilateral, Hyperlipidemia, Hypertension, Kidney disease, Leg pain, Low back pain, Neck pain, and Stroke (CMS/HCC).   has a past surgical history that includes Cataract extraction (); Colonoscopy (); and Lung cancer surgery.  family history includes Cancer in his father; Heart disease in an other family member; Heart failure in his brother; Stroke in his mother.  Social History     Tobacco Use   • Smoking status: Former Smoker     Types: Cigarettes     Quit date:      Years since quittin.8   • Smokeless tobacco: Never Used   Substance Use Topics   • Alcohol use: Never     Frequency: Never     Review of Systems   Constitutional: Negative for chills, fatigue and fever.   HENT: Negative for swollen glands and trouble swallowing.    Respiratory: Negative.  Negative for shortness of breath.    Cardiovascular: Negative for chest pain, palpitations and leg swelling.   Gastrointestinal: Negative for nausea and vomiting.   Musculoskeletal: Positive for arthralgias, back pain, myalgias and neck pain. Negative for gait problem, joint swelling and neck stiffness.   Skin: Negative for color change, dry skin, rash and skin lesions.   Neurological: Negative for dizziness, weakness, light-headedness and headache.   Hematological: Negative for adenopathy. Does not bruise/bleed easily.   Psychiatric/Behavioral: Negative for behavioral problems, suicidal ideas and depressed mood.   All other systems reviewed and are negative.    Objective   Physical Exam   Constitutional: He is oriented to person, place, and time. He appears well-developed and well-nourished. No distress.   Eyes: Pupils are equal, round, and reactive to light. Conjunctivae are normal.   Neck: Normal range of motion and full passive  "range of motion without pain. No Kernig's sign noted.   Cardiovascular: Normal heart sounds.   Pulmonary/Chest: Effort normal and breath sounds normal.   Musculoskeletal:      Cervical back: He exhibits decreased range of motion and tenderness.      Lumbar back: He exhibits decreased range of motion, tenderness, pain and spasm.      Comments: Lumbar and cervical paraspinal tenderness,  negative leg raise .  Pain with extension/side rotation.   Lumbar loading positive, pain on extension of low back past 5 degrees.  TTP on the lumbar facets noted.  Lumbar scoliosis       Vascular Status -  His right foot exhibits no edema. His left foot exhibits no edema.  Lymphadenopathy:     He has no cervical adenopathy.   Neurological: He is alert and oriented to person, place, and time. He has normal reflexes. No sensory deficit. Gait abnormal. Coordination normal.   Antalgic gait   Skin: Skin is warm and dry. Capillary refill takes less than 2 seconds.   Psychiatric: His behavior is normal.   Vitals reviewed.    Resp 16   Ht 165.1 cm (65\")   Wt 74.8 kg (165 lb)   BMI 27.46 kg/m²     PHQ 9 on chart  Opioid risk tool low risk    Assessment/Plan   Diagnoses and all orders for this visit:    1. Chronic pain syndrome (Primary)  -     HYDROcodone-acetaminophen (NORCO)  MG per tablet; Take 1 tablet by mouth 5 (Five) Times a Day As Needed for Severe Pain .  Dispense: 150 tablet; Refill: 0  -     HYDROcodone-acetaminophen (NORCO)  MG per tablet; Take 1 tablet by mouth 5 (Five) Times a Day As Needed for Severe Pain  for up to 180 days. DNF before 11/30/2020  Dispense: 150 tablet; Refill: 0    2. Lumbosacral spondylosis without myelopathy    3. Cervical spondylosis without myelopathy    4. Polyarthralgia    5. High risk medication use    Summary  Kailash Cooper is a 80 y.o. male with multiple comorbidities including CKD, DM, COPD, chronic back pain polyarthralgia here for f/u.   Chronic pain from lumbar and cervical DDD " spondylosis, polyarthralgia/myofascial pain.  Tried physical therapy, chiropractor, LESI's with marginal relief.  Axial back pain interfering with ADL and his ability to exercise.    Good relief and functional benefit with lumbar RFA and LESI. Hydrocodone is helping, and tolerated slight dose reduction however reports some limitation with ADL due to meds wearing off sooner.   No NSAIDs due to CKD.      Increase hydrocodone to 5 times daily.   Cont Hydrocodone 10/325 5 times daily as needed for severe pain.  UDS and inspect reviewed.    Discussed risk of tolerance, dependence, respiratory depression, coma and death associated with use of oral opioids for treatment of chronic nonmalignant pain.     telemedicine done to avoid coronavirus exposure.   A phone visit was used to complete visit. Total time  22 minutes    RTC 2 months

## 2020-11-10 RX ORDER — TAMSULOSIN HYDROCHLORIDE 0.4 MG/1
CAPSULE ORAL
Qty: 90 CAPSULE | Refills: 1 | Status: SHIPPED | OUTPATIENT
Start: 2020-11-10 | End: 2021-05-28 | Stop reason: SDUPTHER

## 2020-11-10 RX ORDER — PRAVASTATIN SODIUM 40 MG
TABLET ORAL
Qty: 90 TABLET | Refills: 1 | Status: SHIPPED | OUTPATIENT
Start: 2020-11-10 | End: 2021-06-17 | Stop reason: SDUPTHER

## 2020-11-10 RX ORDER — RAMIPRIL 5 MG/1
CAPSULE ORAL
Qty: 90 CAPSULE | Refills: 1 | Status: SHIPPED | OUTPATIENT
Start: 2020-11-10 | End: 2021-06-17 | Stop reason: SDUPTHER

## 2020-11-10 RX ORDER — GLIMEPIRIDE 1 MG/1
TABLET ORAL
Qty: 90 TABLET | Refills: 1 | Status: SHIPPED | OUTPATIENT
Start: 2020-11-10 | End: 2021-01-11 | Stop reason: SDUPTHER

## 2021-01-05 ENCOUNTER — OFFICE VISIT (OUTPATIENT)
Dept: PAIN MEDICINE | Facility: CLINIC | Age: 81
End: 2021-01-05

## 2021-01-05 VITALS — BODY MASS INDEX: 28.32 KG/M2 | HEIGHT: 65 IN | RESPIRATION RATE: 16 BRPM | WEIGHT: 170 LBS

## 2021-01-05 DIAGNOSIS — M47.817 LUMBOSACRAL SPONDYLOSIS WITHOUT MYELOPATHY: ICD-10-CM

## 2021-01-05 DIAGNOSIS — G89.4 CHRONIC PAIN SYNDROME: Primary | ICD-10-CM

## 2021-01-05 DIAGNOSIS — M47.812 CERVICAL SPONDYLOSIS WITHOUT MYELOPATHY: ICD-10-CM

## 2021-01-05 DIAGNOSIS — Z79.899 HIGH RISK MEDICATION USE: ICD-10-CM

## 2021-01-05 DIAGNOSIS — M25.50 POLYARTHRALGIA: ICD-10-CM

## 2021-01-05 PROCEDURE — 99443 PR PHYS/QHP TELEPHONE EVALUATION 21-30 MIN: CPT | Performed by: ANESTHESIOLOGY

## 2021-01-05 RX ORDER — HYDROCODONE BITARTRATE AND ACETAMINOPHEN 10; 325 MG/1; MG/1
1 TABLET ORAL
Qty: 150 TABLET | Refills: 0 | Status: SHIPPED | OUTPATIENT
Start: 2021-01-05 | End: 2021-02-17 | Stop reason: SDUPTHER

## 2021-01-05 NOTE — PROGRESS NOTES
Subjective    CC back pain, neck pain, bilateral leg pain  Kailash Cooper is a 80 y.o. male with multiple comorbidities including CKD, DM, COPD, chronic back pain polyarthralgia here for f/u by tel.   Better relief with hydrocodone 5 times but still not lasting long.   Chronic back pain radiating to bilateral hips worse with standing walking or prolonged activity.  Denies new weakness saddle anesthesia bladder bowel continence.  Chronic axial neck pain radiating to bilateral shoulder without radicular pain.  Continued chronic polyarthralgia and myofascial pain worse with activity.  Tried physical therapy/chiropractor with marginal relief.  Tried LESI's with marginal relief.    Utilizes hydrocodone 10/325 4 times daily as needed for severe pain.  Functional benefit /denies side effects.    L-spine MRI 2018 moderate degenerative changes throughout facet arthropathy mild to moderate foraminal narrowing, scoliosis.  C-spine MRI 2018 marked degenerative disc and some posterior element disease, causing spinal stenosis at multiple levels, including C2-3, C3-4, C5-6, and C6-7.  There multiple foraminal impingements    Pain Assessment   Location of Pain: Lower Back, neck pain, joint  Description of Pain: Dull/Aching, Throbbing, Stabbing  Previous Pain Rating :2  Current Pain Ratin  Aggravating Factors: Activity  Alleviating Factors: Rest, Medication    PEG Assessment   What number best describes your pain on average in the past week? 4  What number best describes how, during the past week, pain has interfered with your enjoyment of life?9  What number best describes how, during the past week, pain has interfered with your general activity?5      The following portions of the patient's history were reviewed and updated as appropriate: allergies, current medications, past family history, past medical history, past social history, past surgical history and problem list.     has a past medical history of Arthritis, Cancer  "(CMS/[facility]), Diabetes (CMS/[facility]), Enlarged prostate, Former smoker, Hiatal hernia, Hip pain, Hip pain, bilateral, Hyperlipidemia, Hypertension, Kidney disease, Leg pain, Low back pain, Neck pain, and Stroke (CMS/HCC).   has a past surgical history that includes Cataract extraction (); Colonoscopy (); and Lung cancer surgery.  family history includes Cancer in his father; Heart disease in an other family member; Heart failure in his brother; Stroke in his mother.  Social History     Tobacco Use   • Smoking status: Former Smoker     Types: Cigarettes     Quit date:      Years since quittin.0   • Smokeless tobacco: Never Used   Substance Use Topics   • Alcohol use: Never     Frequency: Never     Review of Systems   Constitutional: Negative for chills, fatigue and fever.   HENT: Negative for swollen glands and trouble swallowing.    Respiratory: Negative.  Negative for shortness of breath.    Cardiovascular: Negative for chest pain, palpitations and leg swelling.   Gastrointestinal: Negative for nausea and vomiting.   Musculoskeletal: Positive for arthralgias, back pain, myalgias and neck pain. Negative for gait problem, joint swelling and neck stiffness.   Skin: Negative for color change, dry skin, rash and skin lesions.   Neurological: Negative for dizziness, weakness, light-headedness and headache.   Hematological: Negative for adenopathy. Does not bruise/bleed easily.   Psychiatric/Behavioral: Negative for behavioral problems, suicidal ideas and depressed mood.   All other systems reviewed and are negative.    Objective   Physical Exam   Constitutional: No distress.     Resp 16   Ht 165.1 cm (65\")   Wt 77.1 kg (170 lb)   BMI 28.29 kg/m²     PHQ 9 on chart  Opioid risk tool low risk    Assessment/Plan   Diagnoses and all orders for this visit:    1. Chronic pain syndrome (Primary)  -     HYDROcodone-acetaminophen (NORCO)  MG per tablet; Take 1 tablet by mouth 5 (Five) Times a Day As Needed for " Severe Pain .  Dispense: 150 tablet; Refill: 0    2. Lumbosacral spondylosis without myelopathy    3. Cervical spondylosis without myelopathy    4. Polyarthralgia    5. High risk medication use    Summary  Kailash Cooper is a 80 y.o. male with multiple comorbidities including CKD, DM, COPD, chronic back pain polyarthralgia here for f/u.   Chronic pain from lumbar and cervical DDD spondylosis, polyarthralgia/myofascial pain.  Tried physical therapy, chiropractor, LESI's with marginal relief.  Axial back pain interfering with ADL and his ability to exercise.     Better relief with hydrocodone 5 times but still not lasting long. Asking for alternative.   No NSAIDs due to CKD.      Discussed Morphine ER since he is requiring hydrocodone around the clock. He declines today and will consider next month.     Cont Hydrocodone 10/325 5 times daily as needed for severe pain.  UDS and inspect reviewed.    Discussed risk of tolerance, dependence, respiratory depression, coma and death associated with use of oral opioids for treatment of chronic nonmalignant pain.     telemedicine done to avoid coronavirus exposure.   A phone visit was used to complete visit. Total time  24 minutes    RTC 2 months

## 2021-01-11 RX ORDER — GLIMEPIRIDE 1 MG/1
1 TABLET ORAL DAILY
Qty: 90 TABLET | Refills: 1 | Status: SHIPPED | OUTPATIENT
Start: 2021-01-11 | End: 2021-01-12 | Stop reason: SDUPTHER

## 2021-01-12 RX ORDER — GLIMEPIRIDE 1 MG/1
1 TABLET ORAL DAILY
Qty: 30 TABLET | Refills: 0 | Status: SHIPPED | OUTPATIENT
Start: 2021-01-12 | End: 2021-06-17 | Stop reason: SDUPTHER

## 2021-01-25 RX ORDER — TORSEMIDE 5 MG/1
TABLET ORAL
Qty: 30 TABLET | Refills: 0 | Status: SHIPPED | OUTPATIENT
Start: 2021-01-25 | End: 2021-06-17 | Stop reason: SDUPTHER

## 2021-02-01 ENCOUNTER — TELEPHONE (OUTPATIENT)
Dept: PAIN MEDICINE | Facility: HOSPITAL | Age: 81
End: 2021-02-01

## 2021-02-01 NOTE — TELEPHONE ENCOUNTER
HUB STAFF ATTEMPTED WARM TRANSFER & WAS UNSUCCESSFUL     Caller: Kailash Cooper    Relationship: Self    Best call back number: [phone]     Which medication are you concerned about: PATIENT STATED HE & DR. RAINEY DISCUSSED CHANGING FROM HYDROCODONE TO MORPHINE & PATIENT STATES HE IS DOING PRETTY WELL ON HYDROCODONE, HE DOES NOT WISH TO CHANGE TO MORPHINE.      PATIENT CALL BACK [phone]     THANKS

## 2021-02-03 ENCOUNTER — APPOINTMENT (OUTPATIENT)
Dept: PAIN MEDICINE | Facility: CLINIC | Age: 81
End: 2021-02-03

## 2021-02-04 ENCOUNTER — TELEPHONE (OUTPATIENT)
Dept: PAIN MEDICINE | Facility: CLINIC | Age: 81
End: 2021-02-04

## 2021-02-04 DIAGNOSIS — G89.4 CHRONIC PAIN SYNDROME: ICD-10-CM

## 2021-02-04 RX ORDER — HYDROCODONE BITARTRATE AND ACETAMINOPHEN 10; 325 MG/1; MG/1
1 TABLET ORAL
Qty: 150 TABLET | Refills: 0 | Status: SHIPPED | OUTPATIENT
Start: 2021-02-04 | End: 2021-02-17 | Stop reason: SDUPTHER

## 2021-02-04 NOTE — TELEPHONE ENCOUNTER
Caller:CATHERINE DURAN     Relationship: SELF    Best call back number: [phone]    Medication needed: HYDROCODONE:  ON LABEL(PER PATIENT) PER CHART: TAKE UP TO 5 TIMES A DAY AS NEEDED  Requested Prescriptions      No prescriptions requested or ordered in this encounter       When do you need the refill by: 02/04/21    What details did the patient provide when requesting the medication: PATIENT WANTING TO KNOW HOW REFILL IS COUNTED: IF BY DAYS OF RX WRITTEN OR FILLED?    Does the patient have less than a 3 day supply:  [x] Yes  [] No    What is the patient's preferred pharmacy:[id number] [place] [address] B: [phone]

## 2021-02-17 ENCOUNTER — OFFICE VISIT (OUTPATIENT)
Dept: PAIN MEDICINE | Facility: CLINIC | Age: 81
End: 2021-02-17

## 2021-02-17 VITALS
OXYGEN SATURATION: 96 % | SYSTOLIC BLOOD PRESSURE: 119 MMHG | HEIGHT: 65 IN | WEIGHT: 170 LBS | TEMPERATURE: 97.3 F | HEART RATE: 57 BPM | BODY MASS INDEX: 28.32 KG/M2 | RESPIRATION RATE: 16 BRPM | DIASTOLIC BLOOD PRESSURE: 65 MMHG

## 2021-02-17 DIAGNOSIS — G89.4 CHRONIC PAIN SYNDROME: Primary | ICD-10-CM

## 2021-02-17 DIAGNOSIS — M25.50 POLYARTHRALGIA: ICD-10-CM

## 2021-02-17 DIAGNOSIS — M47.814 THORACIC SPONDYLOSIS WITHOUT MYELOPATHY: ICD-10-CM

## 2021-02-17 DIAGNOSIS — Z79.899 HIGH RISK MEDICATION USE: ICD-10-CM

## 2021-02-17 DIAGNOSIS — M47.817 LUMBOSACRAL SPONDYLOSIS WITHOUT MYELOPATHY: ICD-10-CM

## 2021-02-17 DIAGNOSIS — M47.812 CERVICAL SPONDYLOSIS WITHOUT MYELOPATHY: ICD-10-CM

## 2021-02-17 PROCEDURE — 99214 OFFICE O/P EST MOD 30 MIN: CPT | Performed by: ANESTHESIOLOGY

## 2021-02-17 RX ORDER — HYDROCODONE BITARTRATE AND ACETAMINOPHEN 10; 325 MG/1; MG/1
1 TABLET ORAL
Qty: 150 TABLET | Refills: 0 | Status: SHIPPED | OUTPATIENT
Start: 2021-04-02 | End: 2021-04-20 | Stop reason: SDUPTHER

## 2021-02-17 RX ORDER — HYDROCODONE BITARTRATE AND ACETAMINOPHEN 10; 325 MG/1; MG/1
1 TABLET ORAL
Qty: 150 TABLET | Refills: 0 | Status: SHIPPED | OUTPATIENT
Start: 2021-03-04 | End: 2021-04-20 | Stop reason: SDUPTHER

## 2021-02-17 NOTE — PROGRESS NOTES
Subjective    CC back pain, neck pain, bilateral leg pain  Kailash Cooper is a 80 y.o. male with multiple comorbidities including CKD, DM, COPD, chronic back pain polyarthralgia here for f/u.   Chronic back pain radiating to bilateral hips worse with standing walking or prolonged activity.  Denies new weakness saddle anesthesia bladder bowel continence.  Chronic axial neck pain radiating to bilateral shoulder without radicular pain.  Continued chronic polyarthralgia and myofascial pain worse with activity.  Tried physical therapy/chiropractor with marginal relief.  Tried LESI's with marginal relief.    Utilizes hydrocodone 10/325 4 times daily as needed for severe pain.  Functional benefit /denies side effects.    L-spine MRI 2018 moderate degenerative changes throughout facet arthropathy mild to moderate foraminal narrowing, scoliosis.  C-spine MRI 2018 marked degenerative disc and some posterior element disease, causing spinal stenosis at multiple levels, including C2-3, C3-4, C5-6, and C6-7.  There multiple foraminal impingements    Pain Assessment   Location of Pain: Lower Back, neck pain, joint  Description of Pain: Dull/Aching, Throbbing, Stabbing  Previous Pain Rating :4  Current Pain Ratin  Aggravating Factors: Activity  Alleviating Factors: Rest, Medication    PEG Assessment   What number best describes your pain on average in the past week? 4  What number best describes how, during the past week, pain has interfered with your enjoyment of life?9  What number best describes how, during the past week, pain has interfered with your general activity?5      The following portions of the patient's history were reviewed and updated as appropriate: allergies, current medications, past family history, past medical history, past social history, past surgical history and problem list.     has a past medical history of Arthritis, Cancer (CMS/HCC), Diabetes (CMS/HCC), Enlarged prostate, Former smoker, Hiatal hernia,  "Hip pain, Hip pain, bilateral, Hyperlipidemia, Hypertension, Kidney disease, Leg pain, Low back pain, Neck pain, and Stroke (CMS/HCC).   has a past surgical history that includes Cataract extraction (); Colonoscopy (); and Lung cancer surgery.  family history includes Cancer in his father; Heart disease in an other family member; Heart failure in his brother; Stroke in his mother.  Social History     Tobacco Use   • Smoking status: Former Smoker     Types: Cigarettes     Quit date:      Years since quittin.1   • Smokeless tobacco: Never Used   Substance Use Topics   • Alcohol use: Never     Frequency: Never     Review of Systems   Musculoskeletal: Positive for arthralgias, back pain, myalgias and neck pain.   All other systems reviewed and are negative.    Objective   Physical Exam   Constitutional: No distress.   Pulmonary/Chest: Effort normal.   Musculoskeletal:      Cervical back: He exhibits tenderness.      Lumbar back: He exhibits decreased range of motion and tenderness.   Psychiatric: His behavior is normal.     /65   Pulse 57   Temp 97.3 °F (36.3 °C)   Resp 16   Ht 165.1 cm (65\")   Wt 77.1 kg (170 lb)   SpO2 96%   BMI 28.29 kg/m²     PHQ 9 on chart  Opioid risk tool low risk    Assessment/Plan   Diagnoses and all orders for this visit:    1. Chronic pain syndrome (Primary)  -     HYDROcodone-acetaminophen (NORCO)  MG per tablet; Take 1 tablet by mouth 5 (Five) Times a Day As Needed for Severe Pain . DNF before 3/4/2021  Dispense: 150 tablet; Refill: 0  -     HYDROcodone-acetaminophen (NORCO)  MG per tablet; Take 1 tablet by mouth 5 (Five) Times a Day As Needed for Severe Pain  for up to 180 days. DNF before 2021  Dispense: 150 tablet; Refill: 0    2. Lumbosacral spondylosis without myelopathy    3. Cervical spondylosis without myelopathy    4. Thoracic spondylosis without myelopathy    5. Polyarthralgia    6. High risk medication use  -     Urine Drug Screen - " Urine, Clean Catch; Future    Summary  Kailash Cooper is a 80 y.o. male with multiple comorbidities including CKD, DM, COPD, chronic back pain polyarthralgia here for f/u.   Chronic pain from lumbar and cervical DDD spondylosis, polyarthralgia/myofascial pain.  Tried physical therapy, chiropractor, LESI's with marginal relief.  Axial back pain interfering with ADL and his ability to exercise.  No NSAIDs due to CKD.        Cont Hydrocodone 10/325 5 times daily as needed for severe pain.  UDS and inspect reviewed.    Discussed risk of tolerance, dependence, respiratory depression, coma and death associated with use of oral opioids for treatment of chronic nonmalignant pain.     RTC 2 months

## 2021-04-20 ENCOUNTER — OFFICE VISIT (OUTPATIENT)
Dept: PAIN MEDICINE | Facility: CLINIC | Age: 81
End: 2021-04-20

## 2021-04-20 VITALS — HEIGHT: 65 IN | BODY MASS INDEX: 27.49 KG/M2 | RESPIRATION RATE: 16 BRPM | WEIGHT: 165 LBS

## 2021-04-20 DIAGNOSIS — G89.4 CHRONIC PAIN SYNDROME: Primary | ICD-10-CM

## 2021-04-20 DIAGNOSIS — M47.814 THORACIC SPONDYLOSIS WITHOUT MYELOPATHY: ICD-10-CM

## 2021-04-20 DIAGNOSIS — M47.817 LUMBOSACRAL SPONDYLOSIS WITHOUT MYELOPATHY: ICD-10-CM

## 2021-04-20 DIAGNOSIS — M47.812 CERVICAL SPONDYLOSIS WITHOUT MYELOPATHY: ICD-10-CM

## 2021-04-20 DIAGNOSIS — Z79.899 HIGH RISK MEDICATION USE: ICD-10-CM

## 2021-04-20 PROCEDURE — 99443 PR PHYS/QHP TELEPHONE EVALUATION 21-30 MIN: CPT | Performed by: ANESTHESIOLOGY

## 2021-04-20 RX ORDER — HYDROCODONE BITARTRATE AND ACETAMINOPHEN 10; 325 MG/1; MG/1
1 TABLET ORAL
Qty: 150 TABLET | Refills: 0 | Status: SHIPPED | OUTPATIENT
Start: 2021-05-05 | End: 2021-05-10 | Stop reason: SDUPTHER

## 2021-04-20 RX ORDER — HYDROCODONE BITARTRATE AND ACETAMINOPHEN 10; 325 MG/1; MG/1
1 TABLET ORAL
Qty: 150 TABLET | Refills: 0 | Status: SHIPPED | OUTPATIENT
Start: 2021-06-04 | End: 2021-06-29 | Stop reason: SDUPTHER

## 2021-04-20 NOTE — PROGRESS NOTES
Subjective    CC back pain, neck pain, bilateral leg pain  Kailash Cooper is a 80 y.o. male with multiple comorbidities including CKD, DM, COPD, chronic back pain polyarthralgia here for f/u by tel.   Denies new issues.   Chronic back pain radiating to bilateral hips worse with standing walking or prolonged activity.  Denies new weakness saddle anesthesia bladder bowel continence.  Chronic axial neck pain radiating to bilateral shoulder without radicular pain.  Continued chronic polyarthralgia and myofascial pain worse with activity.  Tried physical therapy/chiropractor with marginal relief.  Tried LESI's with marginal relief.    Utilizes hydrocodone 10/325 4 times daily as needed for severe pain.  Functional benefit /denies side effects.    L-spine MRI 2018 moderate degenerative changes throughout facet arthropathy mild to moderate foraminal narrowing, scoliosis.  C-spine MRI 2018 marked degenerative disc and some posterior element disease, causing spinal stenosis at multiple levels, including C2-3, C3-4, C5-6, and C6-7.  There multiple foraminal impingements    Pain Assessment   Location of Pain: Lower Back, neck pain, joint  Description of Pain: Dull/Aching, Throbbing, Stabbing  Previous Pain Rating :4  Current Pain Ratin  Aggravating Factors: Activity  Alleviating Factors: Rest, Medication    PEG Assessment   What number best describes your pain on average in the past week? 5  What number best describes how, during the past week, pain has interfered with your enjoyment of life?1  What number best describes how, during the past week, pain has interfered with your general activity?7      The following portions of the patient's history were reviewed and updated as appropriate: allergies, current medications, past family history, past medical history, past social history, past surgical history and problem list.     has a past medical history of Arthritis, Cancer (CMS/HCC), Diabetes (CMS/[facility]), Enlarged prostate,  "Former smoker, Hiatal hernia, Hip pain, Hip pain, bilateral, Hyperlipidemia, Hypertension, Kidney disease, Leg pain, Low back pain, Neck pain, and Stroke (CMS/HCC).   has a past surgical history that includes Cataract extraction (); Colonoscopy (); and Lung cancer surgery.  family history includes Cancer in his father; Heart disease in an other family member; Heart failure in his brother; Stroke in his mother.  Social History     Tobacco Use   • Smoking status: Former Smoker     Types: Cigarettes     Quit date:      Years since quittin.3   • Smokeless tobacco: Never Used   Substance Use Topics   • Alcohol use: Never     Review of Systems   Musculoskeletal: Positive for arthralgias, back pain, myalgias and neck pain.   All other systems reviewed and are negative.    Objective   Physical Exam   Constitutional: No distress.     Resp 16   Ht 165.1 cm (65\")   Wt 74.8 kg (165 lb)   BMI 27.46 kg/m²     PHQ 9 on chart  Opioid risk tool low risk    Assessment/Plan   Diagnoses and all orders for this visit:    1. Chronic pain syndrome (Primary)  -     HYDROcodone-acetaminophen (NORCO)  MG per tablet; Take 1 tablet by mouth 5 (Five) Times a Day As Needed for Severe Pain . DNF before 2021  Dispense: 150 tablet; Refill: 0  -     HYDROcodone-acetaminophen (NORCO)  MG per tablet; Take 1 tablet by mouth 5 (Five) Times a Day As Needed for Severe Pain  for up to 180 days. DNF before 2021  Dispense: 150 tablet; Refill: 0    2. Lumbosacral spondylosis without myelopathy    3. Cervical spondylosis without myelopathy    4. Thoracic spondylosis without myelopathy    5. High risk medication use    Summary  Kailash Cooper is a 80 y.o. male with multiple comorbidities including CKD, DM, COPD, chronic back pain polyarthralgia here for f/u.   Chronic pain from lumbar and cervical DDD spondylosis, polyarthralgia/myofascial pain.  Tried physical therapy, chiropractor, LESI's with marginal relief.  Axial back " pain interfering with ADL and his ability to exercise.  No NSAIDs due to CKD.        Cont Hydrocodone 10/325 5 times daily as needed for severe pain.  UDS and inspect reviewed.    Discussed risk of tolerance, dependence, respiratory depression, coma and death associated with use of oral opioids for treatment of chronic nonmalignant pain.     telemedicine done to avoid coronavirus exposure.   A phone visit was used to complete visit. Total time  22 minutes    RTC 2 months

## 2021-05-07 ENCOUNTER — TELEPHONE (OUTPATIENT)
Dept: PAIN MEDICINE | Facility: CLINIC | Age: 81
End: 2021-05-07

## 2021-05-07 NOTE — TELEPHONE ENCOUNTER
[facility] pharmacy has been out of Hydrocodone for 4 weeks per patient. Wants script sent to [facility] in [place]. Pharmacy added into chart and inspect scanned in.

## 2021-05-07 NOTE — TELEPHONE ENCOUNTER
Caller: CATHERINE ROGER    Relationship: SELF    Best call back number: [phone]    Medication needed: HYDROCODONE  M TAB EVRY 5 HRS  Requested Prescriptions      No prescriptions requested or ordered in this encounter       When do you need the refill by: 2021    What additional details did the patient provide when requesting the medication: PATIENT IS OUT OF MEDICINE- [facility] PHARM HAS NOT HAD THE MEDICATION IN: PATIENT IS REQUESTING REFILL BE SENT TO [facility]    Does the patient have less than a 3 day supply:  [x] Yes  [] No    What is the patient's preferred pharmacy:[facility]: [address] [place] [address].: [place]. [address]: [phone]

## 2021-05-07 NOTE — TELEPHONE ENCOUNTER
Caller: BRIAN DURAN    Relationship to patient: SPOUSE    Best call back number:     Patient is needing: PATIENTS SPOUSE CALLED TO TRY AND GET HELP WITH PAIN MEDICINE.  CALLED OFFICE THEY STATED THEY ALREADY SPOKE WITH PATIENT AND WE MUST WAIT TIL DR. RAINEY COMES BACK.

## 2021-05-07 NOTE — TELEPHONE ENCOUNTER
5/7/21-- JUST TRIED TO CALL PT-- HAD TO LEAVE A VM FOR PT S WIFE TO CALL  US BACK-( ALSO [place] ALREADY TALKED TO CATHERINE AND TOLD HIM DR RAINEY IS OUT ON FRIDAY

## 2021-05-10 ENCOUNTER — TELEPHONE (OUTPATIENT)
Dept: PAIN MEDICINE | Facility: CLINIC | Age: 81
End: 2021-05-10

## 2021-05-10 DIAGNOSIS — G89.4 CHRONIC PAIN SYNDROME: ICD-10-CM

## 2021-05-10 RX ORDER — HYDROCODONE BITARTRATE AND ACETAMINOPHEN 10; 325 MG/1; MG/1
1 TABLET ORAL
Qty: 150 TABLET | Refills: 0 | Status: SHIPPED | OUTPATIENT
Start: 2021-05-10 | End: 2021-06-12

## 2021-05-23 RX ORDER — TAMSULOSIN HYDROCHLORIDE 0.4 MG/1
1 CAPSULE ORAL DAILY
Qty: 90 CAPSULE | Refills: 1 | OUTPATIENT
Start: 2021-05-23

## 2021-05-23 RX ORDER — PRAVASTATIN SODIUM 40 MG
40 TABLET ORAL DAILY
Qty: 90 TABLET | Refills: 1 | OUTPATIENT
Start: 2021-05-23

## 2021-05-23 RX ORDER — GLIMEPIRIDE 1 MG/1
1 TABLET ORAL DAILY
Qty: 30 TABLET | Refills: 0 | OUTPATIENT
Start: 2021-05-23

## 2021-05-27 RX ORDER — TAMSULOSIN HYDROCHLORIDE 0.4 MG/1
1 CAPSULE ORAL DAILY
Qty: 90 CAPSULE | Refills: 1 | OUTPATIENT
Start: 2021-05-27

## 2021-05-27 NOTE — TELEPHONE ENCOUNTER
Caller: BRIAN DURAN    Relationship: Emergency Contact    Best call back number: [phone]    Medication needed:   Requested Prescriptions     Pending Prescriptions Disp Refills   • tamsulosin (FLOMAX) 0.4 MG capsule 24 hr capsule 90 capsule 1     Sig: Take 1 capsule by mouth Daily.       When do you need the refill by: ASAP    PLEASE CALL AND ADVISE WHEN THIS IS DONE    Does the patient have less than a 3 day supply:  [x] Yes  [] No    What is the patient's preferred pharmacy: [facility] - [place], [place] - [address] [facility] - [phone] [facility] [phone] FX

## 2021-05-27 NOTE — TELEPHONE ENCOUNTER
PATIENT IS ASKING FOR 10 PILLS UNTIL HE GETS HIS RX FROM [facility].PLEASE SEND THEM TO:  [facility] [address] [facility], [place] - [address] [facility] [phone] [facility] [phone] FX

## 2021-05-27 NOTE — TELEPHONE ENCOUNTER
Pt stopped by the office and demanded to see Madelyn.  Told him he needed to make an appt.  He was given an appt time on Wednesday and declined it and walked out of the office.      Pt was given refills once and canceled his appt when he got his refills.  SG

## 2021-05-28 ENCOUNTER — TELEPHONE (OUTPATIENT)
Dept: FAMILY MEDICINE CLINIC | Facility: CLINIC | Age: 81
End: 2021-05-28

## 2021-05-28 RX ORDER — TAMSULOSIN HYDROCHLORIDE 0.4 MG/1
1 CAPSULE ORAL DAILY
Qty: 90 CAPSULE | Refills: 0 | Status: SHIPPED | OUTPATIENT
Start: 2021-05-28 | End: 2021-06-17 | Stop reason: SDUPTHER

## 2021-05-28 NOTE — TELEPHONE ENCOUNTER
Caller: BRIAN DURAN    Relationship: Emergency Contact    Best call back number: [phone]    Medication needed:   Requested Prescriptions     Pending Prescriptions Disp Refills   • tamsulosin (FLOMAX) 0.4 MG capsule 24 hr capsule 90 capsule 1     Sig: Take 1 capsule by mouth Daily.       When do you need the refill by: ASAP    What additional details did the patient provide when requesting the medication: PATIENT HAS NEW PATIENT APPT ON JUL 13TH WITH DR WISE, HE HAS NONE OF THIS RX LEFT.      Does the patient have less than a 3 day supply:  [x] Yes  [] No    What is the patient's preferred pharmacy: [facility] [address] [place] [address] [address] [facility] [phone] [facility] [phone]

## 2021-06-12 ENCOUNTER — HOSPITAL ENCOUNTER (INPATIENT)
Facility: HOSPITAL | Age: 81
LOS: 2 days | Discharge: HOME | End: 2021-06-14
Attending: INTERNAL MEDICINE | Admitting: HOSPITALIST

## 2021-06-12 ENCOUNTER — APPOINTMENT (OUTPATIENT)
Dept: GENERAL RADIOLOGY | Facility: HOSPITAL | Age: 81
End: 2021-06-12

## 2021-06-12 ENCOUNTER — APPOINTMENT (OUTPATIENT)
Dept: CT IMAGING | Facility: HOSPITAL | Age: 81
End: 2021-06-12

## 2021-06-12 DIAGNOSIS — I67.4 HYPERTENSIVE ENCEPHALOPATHY: Primary | ICD-10-CM

## 2021-06-12 LAB
ALBUMIN SERPL-MCNC: 4.1 G/DL (ref 3.5–5.2)
ALBUMIN/GLOB SERPL: 2 G/DL
ALP SERPL-CCNC: 101 U/L (ref 39–117)
ALT SERPL W P-5'-P-CCNC: 16 U/L (ref 1–41)
AMMONIA BLD-SCNC: 16 UMOL/L (ref 16–60)
ANION GAP SERPL CALCULATED.3IONS-SCNC: 12 MMOL/L (ref 5–15)
APTT PPP: 23.5 SECONDS (ref 24–31)
AST SERPL-CCNC: 19 U/L (ref 1–40)
BACTERIA UR QL AUTO: NORMAL /HPF
BASOPHILS # BLD AUTO: 0 10*3/MM3 (ref 0–0.2)
BASOPHILS NFR BLD AUTO: 0.5 % (ref 0–1.5)
BILIRUB SERPL-MCNC: 0.5 MG/DL (ref 0–1.2)
BILIRUB UR QL STRIP: NEGATIVE
BUN SERPL-MCNC: 16 MG/DL (ref 8–23)
BUN/CREAT SERPL: 9.9 (ref 7–25)
CALCIUM SPEC-SCNC: 8.7 MG/DL (ref 8.6–10.5)
CHLORIDE SERPL-SCNC: 106 MMOL/L (ref 98–107)
CLARITY UR: CLEAR
CO2 SERPL-SCNC: 25 MMOL/L (ref 22–29)
COLOR UR: YELLOW
CREAT SERPL-MCNC: 1.62 MG/DL (ref 0.76–1.27)
DEPRECATED RDW RBC AUTO: 44.2 FL (ref 37–54)
EOSINOPHIL # BLD AUTO: 0.2 10*3/MM3 (ref 0–0.4)
EOSINOPHIL NFR BLD AUTO: 2.4 % (ref 0.3–6.2)
ERYTHROCYTE [DISTWIDTH] IN BLOOD BY AUTOMATED COUNT: 13.9 % (ref 12.3–15.4)
GFR SERPL CREATININE-BSD FRML MDRD: 41 ML/MIN/1.73
GLOBULIN UR ELPH-MCNC: 2.1 GM/DL
GLUCOSE BLDC GLUCOMTR-MCNC: 95 MG/DL (ref 70–105)
GLUCOSE SERPL-MCNC: 97 MG/DL (ref 65–99)
GLUCOSE UR STRIP-MCNC: NEGATIVE MG/DL
HCT VFR BLD AUTO: 35.6 % (ref 37.5–51)
HGB BLD-MCNC: 12 G/DL (ref 13–17.7)
HGB UR QL STRIP.AUTO: NEGATIVE
HYALINE CASTS UR QL AUTO: NORMAL /LPF
INR PPP: <0.93 (ref 0.93–1.1)
KETONES UR QL STRIP: NEGATIVE
LEUKOCYTE ESTERASE UR QL STRIP.AUTO: NEGATIVE
LYMPHOCYTES # BLD AUTO: 1.2 10*3/MM3 (ref 0.7–3.1)
LYMPHOCYTES NFR BLD AUTO: 13.8 % (ref 19.6–45.3)
MCH RBC QN AUTO: 30.2 PG (ref 26.6–33)
MCHC RBC AUTO-ENTMCNC: 33.8 G/DL (ref 31.5–35.7)
MCV RBC AUTO: 89.5 FL (ref 79–97)
MONOCYTES # BLD AUTO: 0.6 10*3/MM3 (ref 0.1–0.9)
MONOCYTES NFR BLD AUTO: 7.1 % (ref 5–12)
NEUTROPHILS NFR BLD AUTO: 6.5 10*3/MM3 (ref 1.7–7)
NEUTROPHILS NFR BLD AUTO: 76.2 % (ref 42.7–76)
NITRITE UR QL STRIP: NEGATIVE
NRBC BLD AUTO-RTO: 0 /100 WBC (ref 0–0.2)
PH UR STRIP.AUTO: 5.5 [PH] (ref 5–8)
PLATELET # BLD AUTO: 203 10*3/MM3 (ref 140–450)
PMV BLD AUTO: 7.6 FL (ref 6–12)
POTASSIUM SERPL-SCNC: 3.4 MMOL/L (ref 3.5–5.2)
PROT SERPL-MCNC: 6.2 G/DL (ref 6–8.5)
PROT UR QL STRIP: ABNORMAL
PROTHROMBIN TIME: 9.8 SECONDS (ref 9.6–11.7)
RBC # BLD AUTO: 3.98 10*6/MM3 (ref 4.14–5.8)
RBC # UR: NORMAL /HPF
REF LAB TEST METHOD: NORMAL
SODIUM SERPL-SCNC: 143 MMOL/L (ref 136–145)
SP GR UR STRIP: 1.01 (ref 1–1.03)
SQUAMOUS #/AREA URNS HPF: NORMAL /HPF
TROPONIN T SERPL-MCNC: 0.01 NG/ML (ref 0–0.03)
TSH SERPL DL<=0.05 MIU/L-ACNC: 1.09 UIU/ML (ref 0.27–4.2)
UROBILINOGEN UR QL STRIP: ABNORMAL
WBC # BLD AUTO: 8.5 10*3/MM3 (ref 3.4–10.8)
WBC UR QL AUTO: NORMAL /HPF

## 2021-06-12 PROCEDURE — 25010000002 HYDRALAZINE PER 20 MG: Performed by: PHYSICIAN ASSISTANT

## 2021-06-12 PROCEDURE — 85025 COMPLETE CBC W/AUTO DIFF WBC: CPT | Performed by: PHYSICIAN ASSISTANT

## 2021-06-12 PROCEDURE — 80053 COMPREHEN METABOLIC PANEL: CPT | Performed by: PHYSICIAN ASSISTANT

## 2021-06-12 PROCEDURE — 93005 ELECTROCARDIOGRAM TRACING: CPT | Performed by: PHYSICIAN ASSISTANT

## 2021-06-12 PROCEDURE — 99223 1ST HOSP IP/OBS HIGH 75: CPT | Performed by: INTERNAL MEDICINE

## 2021-06-12 PROCEDURE — 82140 ASSAY OF AMMONIA: CPT | Performed by: PHYSICIAN ASSISTANT

## 2021-06-12 PROCEDURE — 82962 GLUCOSE BLOOD TEST: CPT

## 2021-06-12 PROCEDURE — 84484 ASSAY OF TROPONIN QUANT: CPT | Performed by: PHYSICIAN ASSISTANT

## 2021-06-12 PROCEDURE — 70450 CT HEAD/BRAIN W/O DYE: CPT

## 2021-06-12 PROCEDURE — 84443 ASSAY THYROID STIM HORMONE: CPT | Performed by: PHYSICIAN ASSISTANT

## 2021-06-12 PROCEDURE — 85610 PROTHROMBIN TIME: CPT | Performed by: PHYSICIAN ASSISTANT

## 2021-06-12 PROCEDURE — 99284 EMERGENCY DEPT VISIT MOD MDM: CPT

## 2021-06-12 PROCEDURE — 71045 X-RAY EXAM CHEST 1 VIEW: CPT

## 2021-06-12 PROCEDURE — 87040 BLOOD CULTURE FOR BACTERIA: CPT | Performed by: PHYSICIAN ASSISTANT

## 2021-06-12 PROCEDURE — 85730 THROMBOPLASTIN TIME PARTIAL: CPT | Performed by: PHYSICIAN ASSISTANT

## 2021-06-12 PROCEDURE — 81001 URINALYSIS AUTO W/SCOPE: CPT | Performed by: PHYSICIAN ASSISTANT

## 2021-06-12 RX ORDER — ALUMINA, MAGNESIA, AND SIMETHICONE 2400; 2400; 240 MG/30ML; MG/30ML; MG/30ML
15 SUSPENSION ORAL EVERY 6 HOURS PRN
Status: DISCONTINUED | OUTPATIENT
Start: 2021-06-12 | End: 2021-06-14 | Stop reason: HOSPADM

## 2021-06-12 RX ORDER — SODIUM CHLORIDE 0.9 % (FLUSH) 0.9 %
10 SYRINGE (ML) INJECTION AS NEEDED
Status: DISCONTINUED | OUTPATIENT
Start: 2021-06-12 | End: 2021-06-14 | Stop reason: HOSPADM

## 2021-06-12 RX ORDER — SODIUM CHLORIDE 0.9 % (FLUSH) 0.9 %
3 SYRINGE (ML) INJECTION EVERY 12 HOURS SCHEDULED
Status: DISCONTINUED | OUTPATIENT
Start: 2021-06-13 | End: 2021-06-14 | Stop reason: HOSPADM

## 2021-06-12 RX ORDER — BISACODYL 5 MG/1
5 TABLET, DELAYED RELEASE ORAL DAILY PRN
Status: DISCONTINUED | OUTPATIENT
Start: 2021-06-12 | End: 2021-06-14 | Stop reason: HOSPADM

## 2021-06-12 RX ORDER — HYDRALAZINE HYDROCHLORIDE 20 MG/ML
10 INJECTION INTRAMUSCULAR; INTRAVENOUS ONCE
Status: COMPLETED | OUTPATIENT
Start: 2021-06-12 | End: 2021-06-12

## 2021-06-12 RX ORDER — POLYETHYLENE GLYCOL 3350 17 G/17G
17 POWDER, FOR SOLUTION ORAL DAILY PRN
Status: DISCONTINUED | OUTPATIENT
Start: 2021-06-12 | End: 2021-06-14 | Stop reason: HOSPADM

## 2021-06-12 RX ORDER — SODIUM CHLORIDE 0.9 % (FLUSH) 0.9 %
3-10 SYRINGE (ML) INJECTION AS NEEDED
Status: DISCONTINUED | OUTPATIENT
Start: 2021-06-12 | End: 2021-06-14 | Stop reason: HOSPADM

## 2021-06-12 RX ORDER — ONDANSETRON 4 MG/1
4 TABLET, FILM COATED ORAL EVERY 6 HOURS PRN
Status: DISCONTINUED | OUTPATIENT
Start: 2021-06-12 | End: 2021-06-14 | Stop reason: HOSPADM

## 2021-06-12 RX ORDER — ONDANSETRON 2 MG/ML
4 INJECTION INTRAMUSCULAR; INTRAVENOUS EVERY 6 HOURS PRN
Status: DISCONTINUED | OUTPATIENT
Start: 2021-06-12 | End: 2021-06-14 | Stop reason: HOSPADM

## 2021-06-12 RX ORDER — BISACODYL 10 MG
10 SUPPOSITORY, RECTAL RECTAL DAILY PRN
Status: DISCONTINUED | OUTPATIENT
Start: 2021-06-12 | End: 2021-06-14 | Stop reason: HOSPADM

## 2021-06-12 RX ORDER — AMOXICILLIN 250 MG
2 CAPSULE ORAL 2 TIMES DAILY
Status: DISCONTINUED | OUTPATIENT
Start: 2021-06-13 | End: 2021-06-14 | Stop reason: HOSPADM

## 2021-06-12 RX ADMIN — SODIUM CHLORIDE 5 MG/HR: 9 INJECTION, SOLUTION INTRAVENOUS at 21:00

## 2021-06-12 RX ADMIN — HYDRALAZINE HYDROCHLORIDE 10 MG: 20 INJECTION INTRAMUSCULAR; INTRAVENOUS at 18:45

## 2021-06-13 LAB
ANION GAP SERPL CALCULATED.3IONS-SCNC: 12 MMOL/L (ref 5–15)
BASOPHILS # BLD AUTO: 0.1 10*3/MM3 (ref 0–0.2)
BASOPHILS NFR BLD AUTO: 1.1 % (ref 0–1.5)
BUN SERPL-MCNC: 16 MG/DL (ref 8–23)
BUN/CREAT SERPL: 10.4 (ref 7–25)
CALCIUM SPEC-SCNC: 9.1 MG/DL (ref 8.6–10.5)
CHLORIDE SERPL-SCNC: 107 MMOL/L (ref 98–107)
CO2 SERPL-SCNC: 23 MMOL/L (ref 22–29)
CREAT SERPL-MCNC: 1.54 MG/DL (ref 0.76–1.27)
DEPRECATED RDW RBC AUTO: 43.3 FL (ref 37–54)
EOSINOPHIL # BLD AUTO: 0.2 10*3/MM3 (ref 0–0.4)
EOSINOPHIL NFR BLD AUTO: 2.4 % (ref 0.3–6.2)
ERYTHROCYTE [DISTWIDTH] IN BLOOD BY AUTOMATED COUNT: 14 % (ref 12.3–15.4)
GFR SERPL CREATININE-BSD FRML MDRD: 44 ML/MIN/1.73
GLUCOSE SERPL-MCNC: 105 MG/DL (ref 65–99)
HCT VFR BLD AUTO: 36.5 % (ref 37.5–51)
HGB BLD-MCNC: 12.4 G/DL (ref 13–17.7)
LYMPHOCYTES # BLD AUTO: 1.2 10*3/MM3 (ref 0.7–3.1)
LYMPHOCYTES NFR BLD AUTO: 13.6 % (ref 19.6–45.3)
MCH RBC QN AUTO: 30.5 PG (ref 26.6–33)
MCHC RBC AUTO-ENTMCNC: 33.9 G/DL (ref 31.5–35.7)
MCV RBC AUTO: 90.1 FL (ref 79–97)
MONOCYTES # BLD AUTO: 0.6 10*3/MM3 (ref 0.1–0.9)
MONOCYTES NFR BLD AUTO: 7.1 % (ref 5–12)
NEUTROPHILS NFR BLD AUTO: 6.6 10*3/MM3 (ref 1.7–7)
NEUTROPHILS NFR BLD AUTO: 75.8 % (ref 42.7–76)
NRBC BLD AUTO-RTO: 0 /100 WBC (ref 0–0.2)
PLATELET # BLD AUTO: 182 10*3/MM3 (ref 140–450)
PMV BLD AUTO: 7.6 FL (ref 6–12)
POTASSIUM SERPL-SCNC: 3.1 MMOL/L (ref 3.5–5.2)
POTASSIUM SERPL-SCNC: 3.7 MMOL/L (ref 3.5–5.2)
QT INTERVAL: 436 MS
RBC # BLD AUTO: 4.05 10*6/MM3 (ref 4.14–5.8)
SODIUM SERPL-SCNC: 142 MMOL/L (ref 136–145)
TROPONIN T SERPL-MCNC: 0.02 NG/ML (ref 0–0.03)
WBC # BLD AUTO: 8.7 10*3/MM3 (ref 3.4–10.8)

## 2021-06-13 PROCEDURE — 99232 SBSQ HOSP IP/OBS MODERATE 35: CPT | Performed by: HOSPITALIST

## 2021-06-13 PROCEDURE — 80048 BASIC METABOLIC PNL TOTAL CA: CPT | Performed by: INTERNAL MEDICINE

## 2021-06-13 PROCEDURE — 25010000002 ENOXAPARIN PER 10 MG: Performed by: INTERNAL MEDICINE

## 2021-06-13 PROCEDURE — 85025 COMPLETE CBC W/AUTO DIFF WBC: CPT | Performed by: INTERNAL MEDICINE

## 2021-06-13 PROCEDURE — 84132 ASSAY OF SERUM POTASSIUM: CPT | Performed by: HOSPITALIST

## 2021-06-13 PROCEDURE — 84484 ASSAY OF TROPONIN QUANT: CPT | Performed by: INTERNAL MEDICINE

## 2021-06-13 RX ORDER — HYDRALAZINE HYDROCHLORIDE 10 MG/1
10 TABLET, FILM COATED ORAL EVERY 6 HOURS PRN
Status: DISCONTINUED | OUTPATIENT
Start: 2021-06-13 | End: 2021-06-14

## 2021-06-13 RX ORDER — RAMIPRIL 5 MG/1
5 CAPSULE ORAL EVERY EVENING
Status: DISCONTINUED | OUTPATIENT
Start: 2021-06-13 | End: 2021-06-14 | Stop reason: HOSPADM

## 2021-06-13 RX ORDER — POTASSIUM CHLORIDE 1.5 G/1.77G
40 POWDER, FOR SOLUTION ORAL AS NEEDED
Status: DISCONTINUED | OUTPATIENT
Start: 2021-06-13 | End: 2021-06-14 | Stop reason: HOSPADM

## 2021-06-13 RX ORDER — NITROGLYCERIN 0.4 MG/1
0.4 TABLET SUBLINGUAL
Status: DISCONTINUED | OUTPATIENT
Start: 2021-06-13 | End: 2021-06-14 | Stop reason: HOSPADM

## 2021-06-13 RX ORDER — GLIPIZIDE 5 MG/1
2.5 TABLET ORAL
Refills: 0 | Status: DISCONTINUED | OUTPATIENT
Start: 2021-06-13 | End: 2021-06-14 | Stop reason: HOSPADM

## 2021-06-13 RX ORDER — CARVEDILOL 3.12 MG/1
3.12 TABLET ORAL 2 TIMES DAILY WITH MEALS
Status: DISCONTINUED | OUTPATIENT
Start: 2021-06-13 | End: 2021-06-13

## 2021-06-13 RX ORDER — TORSEMIDE 10 MG/1
5 TABLET ORAL DAILY
Status: DISCONTINUED | OUTPATIENT
Start: 2021-06-13 | End: 2021-06-14 | Stop reason: HOSPADM

## 2021-06-13 RX ORDER — HYDROXYZINE HYDROCHLORIDE 25 MG/1
25 TABLET, FILM COATED ORAL 3 TIMES DAILY PRN
Status: DISCONTINUED | OUTPATIENT
Start: 2021-06-13 | End: 2021-06-14 | Stop reason: HOSPADM

## 2021-06-13 RX ORDER — POTASSIUM CHLORIDE 20 MEQ/1
40 TABLET, EXTENDED RELEASE ORAL AS NEEDED
Status: DISCONTINUED | OUTPATIENT
Start: 2021-06-13 | End: 2021-06-14 | Stop reason: HOSPADM

## 2021-06-13 RX ORDER — AMLODIPINE BESYLATE 5 MG/1
5 TABLET ORAL
Status: DISCONTINUED | OUTPATIENT
Start: 2021-06-13 | End: 2021-06-14 | Stop reason: HOSPADM

## 2021-06-13 RX ORDER — ATORVASTATIN CALCIUM 10 MG/1
10 TABLET, FILM COATED ORAL DAILY
Refills: 1 | Status: DISCONTINUED | OUTPATIENT
Start: 2021-06-13 | End: 2021-06-14 | Stop reason: HOSPADM

## 2021-06-13 RX ORDER — NITROGLYCERIN 0.3 MG/1
0.3 TABLET SUBLINGUAL
Status: DISCONTINUED | OUTPATIENT
Start: 2021-06-13 | End: 2021-06-13 | Stop reason: CLARIF

## 2021-06-13 RX ORDER — LANOLIN ALCOHOL/MO/W.PET/CERES
1000 CREAM (GRAM) TOPICAL DAILY
Status: DISCONTINUED | OUTPATIENT
Start: 2021-06-13 | End: 2021-06-14 | Stop reason: HOSPADM

## 2021-06-13 RX ORDER — RAMIPRIL 5 MG/1
5 CAPSULE ORAL EVERY EVENING
Status: DISCONTINUED | OUTPATIENT
Start: 2021-06-13 | End: 2021-06-13

## 2021-06-13 RX ORDER — FAMOTIDINE 20 MG/1
20 TABLET, FILM COATED ORAL DAILY
Status: DISCONTINUED | OUTPATIENT
Start: 2021-06-13 | End: 2021-06-14 | Stop reason: HOSPADM

## 2021-06-13 RX ORDER — MELATONIN
2000 DAILY
Status: DISCONTINUED | OUTPATIENT
Start: 2021-06-13 | End: 2021-06-14 | Stop reason: HOSPADM

## 2021-06-13 RX ORDER — CLONIDINE HYDROCHLORIDE 0.1 MG/1
0.1 TABLET ORAL EVERY 6 HOURS PRN
Status: DISCONTINUED | OUTPATIENT
Start: 2021-06-13 | End: 2021-06-14 | Stop reason: HOSPADM

## 2021-06-13 RX ORDER — CYCLOBENZAPRINE HCL 10 MG
10 TABLET ORAL NIGHTLY PRN
Status: DISCONTINUED | OUTPATIENT
Start: 2021-06-13 | End: 2021-06-14 | Stop reason: HOSPADM

## 2021-06-13 RX ORDER — TAMSULOSIN HYDROCHLORIDE 0.4 MG/1
0.4 CAPSULE ORAL DAILY
Status: DISCONTINUED | OUTPATIENT
Start: 2021-06-13 | End: 2021-06-14 | Stop reason: HOSPADM

## 2021-06-13 RX ADMIN — AMLODIPINE BESYLATE 5 MG: 5 TABLET ORAL at 13:58

## 2021-06-13 RX ADMIN — HYDROXYZINE HYDROCHLORIDE 25 MG: 25 TABLET, FILM COATED ORAL at 18:38

## 2021-06-13 RX ADMIN — RAMIPRIL 5 MG: 5 CAPSULE ORAL at 17:21

## 2021-06-13 RX ADMIN — METOPROLOL TARTRATE 25 MG: 25 TABLET, FILM COATED ORAL at 08:41

## 2021-06-13 RX ADMIN — POTASSIUM CHLORIDE 40 MEQ: 1500 TABLET, EXTENDED RELEASE ORAL at 12:44

## 2021-06-13 RX ADMIN — ATORVASTATIN CALCIUM 10 MG: 10 TABLET, FILM COATED ORAL at 08:41

## 2021-06-13 RX ADMIN — TAMSULOSIN HYDROCHLORIDE 0.4 MG: 0.4 CAPSULE ORAL at 08:42

## 2021-06-13 RX ADMIN — CYCLOBENZAPRINE HYDROCHLORIDE 10 MG: 10 TABLET, FILM COATED ORAL at 18:38

## 2021-06-13 RX ADMIN — TORSEMIDE 5 MG: 10 TABLET ORAL at 08:42

## 2021-06-13 RX ADMIN — FAMOTIDINE 20 MG: 20 TABLET ORAL at 08:42

## 2021-06-13 RX ADMIN — Medication 3 ML: at 00:52

## 2021-06-13 RX ADMIN — POTASSIUM CHLORIDE 40 MEQ: 1500 TABLET, EXTENDED RELEASE ORAL at 08:41

## 2021-06-13 RX ADMIN — DOCUSATE SODIUM 50 MG AND SENNOSIDES 8.6 MG 2 TABLET: 8.6; 5 TABLET, FILM COATED ORAL at 00:52

## 2021-06-13 RX ADMIN — ENOXAPARIN SODIUM 30 MG: 30 INJECTION SUBCUTANEOUS at 17:21

## 2021-06-13 RX ADMIN — GLIPIZIDE 2.5 MG: 5 TABLET ORAL at 08:42

## 2021-06-13 RX ADMIN — Medication 2000 UNITS: at 08:42

## 2021-06-13 RX ADMIN — Medication 3 ML: at 20:27

## 2021-06-13 RX ADMIN — CYANOCOBALAMIN TAB 1000 MCG 1000 MCG: 1000 TAB at 08:42

## 2021-06-13 RX ADMIN — DOCUSATE SODIUM 50 MG AND SENNOSIDES 8.6 MG 2 TABLET: 8.6; 5 TABLET, FILM COATED ORAL at 08:42

## 2021-06-13 RX ADMIN — SODIUM CHLORIDE 2.5 MG/HR: 9 INJECTION, SOLUTION INTRAVENOUS at 03:50

## 2021-06-13 RX ADMIN — POTASSIUM CHLORIDE 40 MEQ: 1500 TABLET, EXTENDED RELEASE ORAL at 17:21

## 2021-06-13 RX ADMIN — Medication 3 ML: at 08:42

## 2021-06-14 ENCOUNTER — READMISSION MANAGEMENT (OUTPATIENT)
Dept: CALL CENTER | Facility: HOSPITAL | Age: 81
End: 2021-06-14

## 2021-06-14 VITALS
SYSTOLIC BLOOD PRESSURE: 154 MMHG | HEIGHT: 65 IN | RESPIRATION RATE: 18 BRPM | OXYGEN SATURATION: 98 % | DIASTOLIC BLOOD PRESSURE: 54 MMHG | BODY MASS INDEX: 27.99 KG/M2 | HEART RATE: 60 BPM | WEIGHT: 167.99 LBS | TEMPERATURE: 97.7 F

## 2021-06-14 LAB
ANION GAP SERPL CALCULATED.3IONS-SCNC: 9 MMOL/L (ref 5–15)
BASOPHILS # BLD AUTO: 0.1 10*3/MM3 (ref 0–0.2)
BASOPHILS NFR BLD AUTO: 0.9 % (ref 0–1.5)
BUN SERPL-MCNC: 14 MG/DL (ref 8–23)
BUN/CREAT SERPL: 9.2 (ref 7–25)
CALCIUM SPEC-SCNC: 9.9 MG/DL (ref 8.6–10.5)
CHLORIDE SERPL-SCNC: 108 MMOL/L (ref 98–107)
CO2 SERPL-SCNC: 27 MMOL/L (ref 22–29)
CREAT SERPL-MCNC: 1.52 MG/DL (ref 0.76–1.27)
DEPRECATED RDW RBC AUTO: 45.1 FL (ref 37–54)
EOSINOPHIL # BLD AUTO: 0.2 10*3/MM3 (ref 0–0.4)
EOSINOPHIL NFR BLD AUTO: 3.4 % (ref 0.3–6.2)
ERYTHROCYTE [DISTWIDTH] IN BLOOD BY AUTOMATED COUNT: 14.2 % (ref 12.3–15.4)
GFR SERPL CREATININE-BSD FRML MDRD: 44 ML/MIN/1.73
GLUCOSE SERPL-MCNC: 105 MG/DL (ref 65–99)
HCT VFR BLD AUTO: 37.5 % (ref 37.5–51)
HGB BLD-MCNC: 12.6 G/DL (ref 13–17.7)
LYMPHOCYTES # BLD AUTO: 1.1 10*3/MM3 (ref 0.7–3.1)
LYMPHOCYTES NFR BLD AUTO: 16.4 % (ref 19.6–45.3)
MCH RBC QN AUTO: 30.5 PG (ref 26.6–33)
MCHC RBC AUTO-ENTMCNC: 33.5 G/DL (ref 31.5–35.7)
MCV RBC AUTO: 91.2 FL (ref 79–97)
MONOCYTES # BLD AUTO: 0.5 10*3/MM3 (ref 0.1–0.9)
MONOCYTES NFR BLD AUTO: 7.9 % (ref 5–12)
NEUTROPHILS NFR BLD AUTO: 4.8 10*3/MM3 (ref 1.7–7)
NEUTROPHILS NFR BLD AUTO: 71.4 % (ref 42.7–76)
NRBC BLD AUTO-RTO: 0.1 /100 WBC (ref 0–0.2)
PLATELET # BLD AUTO: 187 10*3/MM3 (ref 140–450)
PMV BLD AUTO: 7.9 FL (ref 6–12)
POTASSIUM SERPL-SCNC: 4.2 MMOL/L (ref 3.5–5.2)
RBC # BLD AUTO: 4.12 10*6/MM3 (ref 4.14–5.8)
SODIUM SERPL-SCNC: 144 MMOL/L (ref 136–145)
WBC # BLD AUTO: 6.7 10*3/MM3 (ref 3.4–10.8)

## 2021-06-14 PROCEDURE — 85025 COMPLETE CBC W/AUTO DIFF WBC: CPT | Performed by: HOSPITALIST

## 2021-06-14 PROCEDURE — 99239 HOSP IP/OBS DSCHRG MGMT >30: CPT | Performed by: HOSPITALIST

## 2021-06-14 PROCEDURE — 80048 BASIC METABOLIC PNL TOTAL CA: CPT | Performed by: HOSPITALIST

## 2021-06-14 PROCEDURE — 25010000002 HYDRALAZINE PER 20 MG: Performed by: NURSE PRACTITIONER

## 2021-06-14 RX ORDER — AMLODIPINE BESYLATE 5 MG/1
10 TABLET ORAL
Qty: 30 TABLET | Refills: 1 | Status: SHIPPED | OUTPATIENT
Start: 2021-06-15 | End: 2021-06-17 | Stop reason: SDUPTHER

## 2021-06-14 RX ORDER — HYDRALAZINE HYDROCHLORIDE 25 MG/1
25 TABLET, FILM COATED ORAL 3 TIMES DAILY
Qty: 90 TABLET | Refills: 0 | Status: SHIPPED | OUTPATIENT
Start: 2021-06-14 | End: 2021-06-17 | Stop reason: ALTCHOICE

## 2021-06-14 RX ORDER — HYDRALAZINE HYDROCHLORIDE 20 MG/ML
10 INJECTION INTRAMUSCULAR; INTRAVENOUS EVERY 6 HOURS PRN
Status: DISCONTINUED | OUTPATIENT
Start: 2021-06-14 | End: 2021-06-14 | Stop reason: HOSPADM

## 2021-06-14 RX ADMIN — CLONIDINE HYDROCHLORIDE 0.1 MG: 0.1 TABLET ORAL at 00:17

## 2021-06-14 RX ADMIN — Medication 2000 UNITS: at 08:22

## 2021-06-14 RX ADMIN — ATORVASTATIN CALCIUM 10 MG: 10 TABLET, FILM COATED ORAL at 08:22

## 2021-06-14 RX ADMIN — HYDRALAZINE HYDROCHLORIDE 10 MG: 20 INJECTION INTRAMUSCULAR; INTRAVENOUS at 13:25

## 2021-06-14 RX ADMIN — TORSEMIDE 5 MG: 10 TABLET ORAL at 08:23

## 2021-06-14 RX ADMIN — METOPROLOL TARTRATE 25 MG: 25 TABLET, FILM COATED ORAL at 08:22

## 2021-06-14 RX ADMIN — FAMOTIDINE 20 MG: 20 TABLET ORAL at 08:22

## 2021-06-14 RX ADMIN — GLIPIZIDE 2.5 MG: 5 TABLET ORAL at 08:23

## 2021-06-14 RX ADMIN — CLONIDINE HYDROCHLORIDE 0.1 MG: 0.1 TABLET ORAL at 06:29

## 2021-06-14 RX ADMIN — AMLODIPINE BESYLATE 5 MG: 5 TABLET ORAL at 08:22

## 2021-06-14 RX ADMIN — Medication 3 ML: at 08:23

## 2021-06-14 RX ADMIN — DOCUSATE SODIUM 50 MG AND SENNOSIDES 8.6 MG 2 TABLET: 8.6; 5 TABLET, FILM COATED ORAL at 08:22

## 2021-06-14 RX ADMIN — CLONIDINE HYDROCHLORIDE 0.1 MG: 0.1 TABLET ORAL at 13:59

## 2021-06-14 RX ADMIN — TAMSULOSIN HYDROCHLORIDE 0.4 MG: 0.4 CAPSULE ORAL at 08:23

## 2021-06-14 RX ADMIN — CYANOCOBALAMIN TAB 1000 MCG 1000 MCG: 1000 TAB at 08:22

## 2021-06-14 NOTE — OUTREACH NOTE
Prep Survey      Responses   [facility] discharged from?  [facility]   Is LACE score < 7 ?  No   Emergency Room discharge w/ pulse ox?  No   Eligibility  TCM   Hospital  [facility]   Date of Admission  06/12/21   Date of Discharge  06/14/21   Discharge Disposition  Home or Self Care   Discharge diagnosis  Hypertensive encephalopathy d/t hypertensive emergency, T2DM   Does the patient have one of the following disease processes/diagnoses(primary or secondary)?  Other   Does the patient have Home health ordered?  No   Is there a DME ordered?  No   Prep survey completed?  Yes          Tanvi Kwon RN

## 2021-06-15 ENCOUNTER — TRANSITIONAL CARE MANAGEMENT TELEPHONE ENCOUNTER (OUTPATIENT)
Dept: CALL CENTER | Facility: HOSPITAL | Age: 81
End: 2021-06-15

## 2021-06-15 NOTE — OUTREACH NOTE
Call Center TCM Note      Responses   [facility] patient discharged from?  [facility]   Does the patient have one of the following disease processes/diagnoses(primary or secondary)?  Other   TCM attempt successful?  Yes   Call start time  1024   Call end time  1029   Discharge diagnosis  Hypertensive encephalopathy d/t hypertensive emergency, T2DM   Meds reviewed with patient/caregiver?  Yes   Is the patient having any side effects they believe may be caused by any medication additions or changes?  No   Does the patient have all medications ordered at discharge?  Yes   Is the patient taking all medications as directed (includes completed medication regime)?  No   What is preventing the patient from taking all medications as directed?  Desires to consult PCP first   Nursing Interventions  Nurse provided patient education   Does the patient have a primary care provider?   Yes   Does the patient have an appointment with their PCP within 7 days of discharge?  Yes   Comments regarding PCP  Hospital d/c f/u appt is on 6/17/21 at 1:30 pm    Has the patient kept scheduled appointments due by today?  N/A   Psychosocial issues?  No   Did the patient receive a copy of their discharge instructions?  Yes   Nursing interventions  Reviewed instructions with patient   What is the patient's perception of their health status since discharge?  Improving   Is the patient/caregiver able to teach back signs and symptoms related to disease process for when to call PCP?  Yes   Is the patient/caregiver able to teach back signs and symptoms related to disease process for when to call 911?  Yes   Is the patient/caregiver able to teach back the hierarchy of who to call/visit for symptoms/problems? PCP, Specialist, Home health nurse, Urgent Care, ED, 911  Yes   If the patient is a current smoker, are they able to teach back resources for cessation?  Not a smoker   TCM call completed?  Yes          Nga Corrigan RN    6/15/2021, 10:31  EDT

## 2021-06-17 ENCOUNTER — OFFICE VISIT (OUTPATIENT)
Dept: FAMILY MEDICINE CLINIC | Facility: CLINIC | Age: 81
End: 2021-06-17

## 2021-06-17 VITALS
HEIGHT: 65 IN | HEART RATE: 71 BPM | TEMPERATURE: 98.3 F | BODY MASS INDEX: 28.66 KG/M2 | OXYGEN SATURATION: 96 % | WEIGHT: 172 LBS | SYSTOLIC BLOOD PRESSURE: 172 MMHG | DIASTOLIC BLOOD PRESSURE: 72 MMHG

## 2021-06-17 DIAGNOSIS — I10 ESSENTIAL HYPERTENSION: Chronic | ICD-10-CM

## 2021-06-17 DIAGNOSIS — E11.9 TYPE 2 DIABETES MELLITUS WITHOUT COMPLICATION, WITHOUT LONG-TERM CURRENT USE OF INSULIN (HCC): ICD-10-CM

## 2021-06-17 DIAGNOSIS — E66.3 OVERWEIGHT WITH BODY MASS INDEX (BMI) OF 28 TO 28.9 IN ADULT: ICD-10-CM

## 2021-06-17 DIAGNOSIS — G89.4 CHRONIC PAIN SYNDROME: ICD-10-CM

## 2021-06-17 DIAGNOSIS — R73.01 FASTING HYPERGLYCEMIA: ICD-10-CM

## 2021-06-17 DIAGNOSIS — Z12.11 COLON CANCER SCREENING: Primary | ICD-10-CM

## 2021-06-17 DIAGNOSIS — Z12.5 SCREENING PSA (PROSTATE SPECIFIC ANTIGEN): ICD-10-CM

## 2021-06-17 DIAGNOSIS — N18.32 STAGE 3B CHRONIC KIDNEY DISEASE (HCC): ICD-10-CM

## 2021-06-17 DIAGNOSIS — Z13.220 LIPID SCREENING: ICD-10-CM

## 2021-06-17 LAB
BACTERIA SPEC AEROBE CULT: NORMAL
BACTERIA SPEC AEROBE CULT: NORMAL

## 2021-06-17 PROCEDURE — 99496 TRANSJ CARE MGMT HIGH F2F 7D: CPT | Performed by: NURSE PRACTITIONER

## 2021-06-17 PROCEDURE — 1111F DSCHRG MED/CURRENT MED MERGE: CPT | Performed by: NURSE PRACTITIONER

## 2021-06-17 RX ORDER — GLIMEPIRIDE 1 MG/1
1 TABLET ORAL DAILY
Qty: 90 TABLET | Refills: 1 | Status: SHIPPED | OUTPATIENT
Start: 2021-06-17 | End: 2021-12-15

## 2021-06-17 RX ORDER — TAMSULOSIN HYDROCHLORIDE 0.4 MG/1
1 CAPSULE ORAL DAILY
Qty: 90 CAPSULE | Refills: 0 | Status: SHIPPED | OUTPATIENT
Start: 2021-06-17 | End: 2021-08-12 | Stop reason: SDUPTHER

## 2021-06-17 RX ORDER — HYDRALAZINE HYDROCHLORIDE 50 MG/1
50 TABLET, FILM COATED ORAL 3 TIMES DAILY
Qty: 90 TABLET | Refills: 1 | Status: SHIPPED | OUTPATIENT
Start: 2021-06-17 | End: 2021-10-19

## 2021-06-17 RX ORDER — RAMIPRIL 5 MG/1
5 CAPSULE ORAL EVERY EVENING
Qty: 90 CAPSULE | Refills: 1 | Status: SHIPPED | OUTPATIENT
Start: 2021-06-17 | End: 2022-01-03 | Stop reason: SDUPTHER

## 2021-06-17 RX ORDER — PRAVASTATIN SODIUM 40 MG
40 TABLET ORAL DAILY
Qty: 90 TABLET | Refills: 1 | Status: SHIPPED | OUTPATIENT
Start: 2021-06-17 | End: 2022-01-03 | Stop reason: SDUPTHER

## 2021-06-17 RX ORDER — AMLODIPINE BESYLATE 5 MG/1
10 TABLET ORAL
Qty: 90 TABLET | Refills: 1 | Status: SHIPPED | OUTPATIENT
Start: 2021-06-17 | End: 2021-10-19

## 2021-06-17 RX ORDER — TORSEMIDE 5 MG/1
5 TABLET ORAL DAILY
Qty: 90 TABLET | Refills: 1 | Status: SHIPPED | OUTPATIENT
Start: 2021-06-17 | End: 2022-01-03 | Stop reason: SDUPTHER

## 2021-06-17 RX ORDER — HYDRALAZINE HYDROCHLORIDE 50 MG/1
50 TABLET, FILM COATED ORAL 3 TIMES DAILY
Qty: 90 TABLET | Refills: 1 | Status: SHIPPED | OUTPATIENT
Start: 2021-06-17 | End: 2021-06-17 | Stop reason: SDUPTHER

## 2021-06-17 RX ORDER — NITROGLYCERIN 0.3 MG/1
0.3 TABLET SUBLINGUAL
Qty: 50 TABLET | Refills: 12 | Status: SHIPPED | OUTPATIENT
Start: 2021-06-17 | End: 2022-01-17 | Stop reason: SDUPTHER

## 2021-06-17 NOTE — PATIENT INSTRUCTIONS
Take increased dose of hydralazine as directed monitor blood pressure and call office  Keep appointment with nephrology  Home Cologuard  Fasting blood work  Follow-up 6 months fasting

## 2021-06-17 NOTE — PROGRESS NOTES
"    Kailash Cooper is a 80 y.o. male.     79-year-old white male with history of lung cancer, chronic back pain he goes to pain management, hypertension, hyperlipidemia, CKD 3 and right arm tremor who comes in today for TCM visit for hospitalization for hypertensive encephalopathy.  Patient states she really does not remember the details or why he was actually taken to the hospital.  He states he cannot tell when his blood pressure is high when it is not and he has not been checking it at home.  His pressure today 172/72 heart rate 70.  I am increasing his hydralazine to 50 mg 3 times daily since his last creatinine was 1.5 and he is is promised to monitor blood pressures and call them to the office.  He denies any chest pain tachycardia or dizziness  Patient  is not happy with his present nephrologist and we are going to refer him to Dr. Lazaro to get established.  As I said his last creatinine at discharge was 1.52  Weight is down 5 pounds at 172 with a BMI of 28.6.  Patient is now working 4 days a week and states he feels pretty good  Patient states blood sugars are normally always under 130 when he checks them he is up-to-date on both Covid vaccines and eye exam.  I am going to be drawing a PSA and we are going to do home Cologuard.    Increase hydralazine 50 mg 3 times daily monitor blood pressures and call office  Nephrology referral  Cologuard  Fasting blood work  Follow-up 6 months fasting         The following portions of the patient's history were reviewed and updated as appropriate: allergies, current medications, past family history, past medical history, past social history, past surgical history and problem list.    Vitals:    06/17/21 1344 06/17/21 1347   BP: 180/71 172/72   BP Location: Right arm Left arm   Patient Position: Sitting Sitting   Cuff Size: Adult Adult   Pulse: 71    Temp: 98.3 °F (36.8 °C)    TempSrc: Oral    SpO2: 96%    Weight: 78 kg (172 lb)    Height: 165.1 cm (65\")      Body mass index " is 28.62 kg/m².    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 [facility]   • Diabetes (CMS/HCC)    • Enlarged prostate    • Former smoker    • Hiatal hernia    • Hip pain     don   • Hip pain, bilateral    • Hyperlipidemia    • Hypertension    • Kidney disease        stage 3    • Leg pain     don   • Low back pain    • Neck pain    • Stroke (CMS/HCC)      Past Surgical History:   Procedure Laterality Date   • CATARACT EXTRACTION  2006 [facility]   • COLONOSCOPY  2011   • LUNG CANCER SURGERY      upper lobe rt lung cancer 9/2017  at [facility]     Family History   Problem Relation Age of Onset   • Stroke Mother    • Cancer Father         Prostate   • Heart failure Brother    • Heart disease Other      Immunization History   Administered Date(s) Administered   • Fluad Quad 65+ 11/18/2019   • Influenza, Unspecified 09/20/2017       Admission on 06/12/2021, Discharged on 06/14/2021   Component Date Value Ref Range Status   • Glucose 06/12/2021 97  65 - 99 mg/dL Final   • BUN 06/12/2021 16  8 - 23 mg/dL Final   • Creatinine 06/12/2021 1.62* 0.76 - 1.27 mg/dL Final   • Sodium 06/12/2021 143  136 - 145 mmol/L Final   • Potassium 06/12/2021 3.4* 3.5 - 5.2 mmol/L Final   • Chloride 06/12/2021 106  98 - 107 mmol/L Final   • CO2 06/12/2021 25.0  22.0 - 29.0 mmol/L Final   • Calcium 06/12/2021 8.7  8.6 - 10.5 mg/dL Final   • Total Protein 06/12/2021 6.2  6.0 - 8.5 g/dL Final   • Albumin 06/12/2021 4.10  3.50 - 5.20 g/dL Final   • ALT (SGPT) 06/12/2021 16  1 - 41 U/L Final   • AST (SGOT) 06/12/2021 19  1 - 40 U/L Final   • Alkaline Phosphatase 06/12/2021 101  39 - 117 U/L Final   • Total Bilirubin 06/12/2021 0.5  0.0 - 1.2 mg/dL Final   • eGFR Non  Amer 06/12/2021 41* >60 mL/min/1.73 Final   • Globulin 06/12/2021 2.1  gm/dL Final   • A/G Ratio 06/12/2021 2.0  g/dL Final   • BUN/Creatinine Ratio 06/12/2021 9.9  7.0 - 25.0 Final   • Anion Gap 06/12/2021 12.0   5.0 - 15.0 mmol/L Final   • Protime 06/12/2021 9.8  9.6 - 11.7 Seconds Final   • INR 06/12/2021 <0.93* 0.93 - 1.10 Final   • PTT 06/12/2021 23.5* 24.0 - 31.0 seconds Final   • TSH 06/12/2021 1.090  0.270 - 4.200 uIU/mL Final   • Ammonia 06/12/2021 16  16 - 60 umol/L Final   • Blood Culture 06/12/2021 No growth at 4 days   Preliminary   • Blood Culture 06/12/2021 No growth at 4 days   Preliminary   • QT Interval 06/12/2021 436  ms Final   • Troponin T 06/12/2021 0.014  0.000 - 0.030 ng/mL Final   • WBC 06/12/2021 8.50  3.40 - 10.80 10*3/mm3 Final   • RBC 06/12/2021 3.98* 4.14 - 5.80 10*6/mm3 Final   • Hemoglobin 06/12/2021 12.0* 13.0 - 17.7 g/dL Final   • Hematocrit 06/12/2021 35.6* 37.5 - 51.0 % Final   • MCV 06/12/2021 89.5  79.0 - 97.0 fL Final   • MCH 06/12/2021 30.2  26.6 - 33.0 pg Final   • MCHC 06/12/2021 33.8  31.5 - 35.7 g/dL Final   • RDW 06/12/2021 13.9  12.3 - 15.4 % Final   • RDW-SD 06/12/2021 44.2  37.0 - 54.0 fl Final   • MPV 06/12/2021 7.6  6.0 - 12.0 fL Final   • Platelets 06/12/2021 203  140 - 450 10*3/mm3 Final   • Neutrophil % 06/12/2021 76.2* 42.7 - 76.0 % Final   • Lymphocyte % 06/12/2021 13.8* 19.6 - 45.3 % Final   • Monocyte % 06/12/2021 7.1  5.0 - 12.0 % Final   • Eosinophil % 06/12/2021 2.4  0.3 - 6.2 % Final   • Basophil % 06/12/2021 0.5  0.0 - 1.5 % Final   • Neutrophils, Absolute 06/12/2021 6.50  1.70 - 7.00 10*3/mm3 Final   • Lymphocytes, Absolute 06/12/2021 1.20  0.70 - 3.10 10*3/mm3 Final   • Monocytes, Absolute 06/12/2021 0.60  0.10 - 0.90 10*3/mm3 Final   • Eosinophils, Absolute 06/12/2021 0.20  0.00 - 0.40 10*3/mm3 Final   • Basophils, Absolute 06/12/2021 0.00  0.00 - 0.20 10*3/mm3 Final   • nRBC 06/12/2021 0.0  0.0 - 0.2 /100 WBC Final   • Glucose 06/12/2021 95  70 - 105 mg/dL Final    Serial Number: [id number]:  [id number]   • Color, UA 06/12/2021 Yellow  Yellow, Straw Final   • Appearance, UA 06/12/2021 Clear  Clear Final   • pH, UA 06/12/2021 5.5  5.0 - 8.0 Final   •  Specific [place], UA 06/12/2021 1.012  1.005 - 1.030 Final   • Glucose, UA 06/12/2021 Negative  Negative Final   • Ketones, UA 06/12/2021 Negative  Negative Final   • Bilirubin, UA 06/12/2021 Negative  Negative Final   • Blood, UA 06/12/2021 Negative  Negative Final   • Protein, UA 06/12/2021 30 mg/dL (1+)* Negative Final   • Leuk Esterase, UA 06/12/2021 Negative  Negative Final   • Nitrite, UA 06/12/2021 Negative  Negative Final   • Urobilinogen, UA 06/12/2021 0.2 E.U./dL  0.2 - 1.0 E.U./dL Final   • RBC, UA 06/12/2021 None Seen  None Seen /HPF Final   • WBC, UA 06/12/2021 None Seen  None Seen /HPF Final   • Bacteria, UA 06/12/2021 None Seen  None Seen /HPF Final   • Squamous Epithelial Cells, UA 06/12/2021 None Seen  None Seen, 0-2 /HPF Final   • Hyaline Casts, UA 06/12/2021 0-2  None Seen /LPF Final   • Methodology 06/12/2021 Automated Microscopy   Final   • Troponin T 06/13/2021 0.017  0.000 - 0.030 ng/mL Final   • Glucose 06/13/2021 105* 65 - 99 mg/dL Final   • BUN 06/13/2021 16  8 - 23 mg/dL Final   • Creatinine 06/13/2021 1.54* 0.76 - 1.27 mg/dL Final   • Sodium 06/13/2021 142  136 - 145 mmol/L Final   • Potassium 06/13/2021 3.1* 3.5 - 5.2 mmol/L Final   • Chloride 06/13/2021 107  98 - 107 mmol/L Final   • CO2 06/13/2021 23.0  22.0 - 29.0 mmol/L Final   • Calcium 06/13/2021 9.1  8.6 - 10.5 mg/dL Final   • eGFR Non African Amer 06/13/2021 44* >60 mL/min/1.73 Final   • BUN/Creatinine Ratio 06/13/2021 10.4  7.0 - 25.0 Final   • Anion Gap 06/13/2021 12.0  5.0 - 15.0 mmol/L Final   • WBC 06/13/2021 8.70  3.40 - 10.80 10*3/mm3 Final   • RBC 06/13/2021 4.05* 4.14 - 5.80 10*6/mm3 Final   • Hemoglobin 06/13/2021 12.4* 13.0 - 17.7 g/dL Final   • Hematocrit 06/13/2021 36.5* 37.5 - 51.0 % Final   • MCV 06/13/2021 90.1  79.0 - 97.0 fL Final   • MCH 06/13/2021 30.5  26.6 - 33.0 pg Final   • MCHC 06/13/2021 33.9  31.5 - 35.7 g/dL Final   • RDW 06/13/2021 14.0  12.3 - 15.4 % Final   • RDW-SD 06/13/2021 43.3  37.0 - 54.0 fl  Final   • MPV 06/13/2021 7.6  6.0 - 12.0 fL Final   • Platelets 06/13/2021 182  140 - 450 10*3/mm3 Final   • Neutrophil % 06/13/2021 75.8  42.7 - 76.0 % Final   • Lymphocyte % 06/13/2021 13.6* 19.6 - 45.3 % Final   • Monocyte % 06/13/2021 7.1  5.0 - 12.0 % Final   • Eosinophil % 06/13/2021 2.4  0.3 - 6.2 % Final   • Basophil % 06/13/2021 1.1  0.0 - 1.5 % Final   • Neutrophils, Absolute 06/13/2021 6.60  1.70 - 7.00 10*3/mm3 Final   • Lymphocytes, Absolute 06/13/2021 1.20  0.70 - 3.10 10*3/mm3 Final   • Monocytes, Absolute 06/13/2021 0.60  0.10 - 0.90 10*3/mm3 Final   • Eosinophils, Absolute 06/13/2021 0.20  0.00 - 0.40 10*3/mm3 Final   • Basophils, Absolute 06/13/2021 0.10  0.00 - 0.20 10*3/mm3 Final   • nRBC 06/13/2021 0.0  0.0 - 0.2 /100 WBC Final   • Potassium 06/13/2021 3.7  3.5 - 5.2 mmol/L Final   • Glucose 06/14/2021 105* 65 - 99 mg/dL Final   • BUN 06/14/2021 14  8 - 23 mg/dL Final   • Creatinine 06/14/2021 1.52* 0.76 - 1.27 mg/dL Final   • Sodium 06/14/2021 144  136 - 145 mmol/L Final   • Potassium 06/14/2021 4.2  3.5 - 5.2 mmol/L Final   • Chloride 06/14/2021 108* 98 - 107 mmol/L Final   • CO2 06/14/2021 27.0  22.0 - 29.0 mmol/L Final   • Calcium 06/14/2021 9.9  8.6 - 10.5 mg/dL Final   • eGFR Non African Amer 06/14/2021 44* >60 mL/min/1.73 Final   • BUN/Creatinine Ratio 06/14/2021 9.2  7.0 - 25.0 Final   • Anion Gap 06/14/2021 9.0  5.0 - 15.0 mmol/L Final   • WBC 06/14/2021 6.70  3.40 - 10.80 10*3/mm3 Final   • RBC 06/14/2021 4.12* 4.14 - 5.80 10*6/mm3 Final   • Hemoglobin 06/14/2021 12.6* 13.0 - 17.7 g/dL Final   • Hematocrit 06/14/2021 37.5  37.5 - 51.0 % Final   • MCV 06/14/2021 91.2  79.0 - 97.0 fL Final   • MCH 06/14/2021 30.5  26.6 - 33.0 pg Final   • MCHC 06/14/2021 33.5  31.5 - 35.7 g/dL Final   • RDW 06/14/2021 14.2  12.3 - 15.4 % Final   • RDW-SD 06/14/2021 45.1  37.0 - 54.0 fl Final   • MPV 06/14/2021 7.9  6.0 - 12.0 fL Final   • Platelets 06/14/2021 187  140 - 450 10*3/mm3 Final   • Neutrophil  % 06/14/2021 71.4  42.7 - 76.0 % Final   • Lymphocyte % 06/14/2021 16.4* 19.6 - 45.3 % Final   • Monocyte % 06/14/2021 7.9  5.0 - 12.0 % Final   • Eosinophil % 06/14/2021 3.4  0.3 - 6.2 % Final   • Basophil % 06/14/2021 0.9  0.0 - 1.5 % Final   • Neutrophils, Absolute 06/14/2021 4.80  1.70 - 7.00 10*3/mm3 Final   • Lymphocytes, Absolute 06/14/2021 1.10  0.70 - 3.10 10*3/mm3 Final   • Monocytes, Absolute 06/14/2021 0.50  0.10 - 0.90 10*3/mm3 Final   • Eosinophils, Absolute 06/14/2021 0.20  0.00 - 0.40 10*3/mm3 Final   • Basophils, Absolute 06/14/2021 0.10  0.00 - 0.20 10*3/mm3 Final   • nRBC 06/14/2021 0.1  0.0 - 0.2 /100 WBC Final         Review of Systems   Constitutional: Negative.    HENT: Negative.    Respiratory: Negative.    Cardiovascular: Negative.    Gastrointestinal: Negative.    Genitourinary: Negative.    Musculoskeletal: Negative.    Skin: Negative.    Neurological: Negative.    Psychiatric/Behavioral: Negative.        Objective   Physical Exam  Constitutional:       Appearance: Normal appearance.   HENT:      Head: Normocephalic.   Neck:      Comments: Neck deviates to the right  Cardiovascular:      Rate and Rhythm: Normal rate and regular rhythm.      Pulses: Normal pulses.      Heart sounds: Normal heart sounds.   Pulmonary:      Effort: Pulmonary effort is normal.      Breath sounds: Normal breath sounds.   Abdominal:      General: Bowel sounds are normal.   Musculoskeletal:         General: Normal range of motion.      Cervical back: Normal range of motion.   Skin:     General: Skin is warm and dry.   Neurological:      General: No focal deficit present.      Mental Status: He is alert and oriented to person, place, and time.   Psychiatric:         Mood and Affect: Mood normal.         Behavior: Behavior normal.         Procedures    Assessment/Plan   Diagnoses and all orders for this visit:    1. Colon cancer screening (Primary)  -     Cologuard - Stool, Per Rectum; Future    2. Screening PSA  (prostate specific antigen)  -     PSA Screen; Future    3. Lipid screening  -     Lipid Panel With LDL / HDL Ratio; Future    4. Stage 3b chronic kidney disease (CMS/HCC)  -     Ambulatory Referral to Nephrology    5. Chronic pain syndrome    6. Type 2 diabetes mellitus without complication, without long-term current use of insulin (CMS/formerly [facility])  -     Hemoglobin A1c; Future    7. Essential hypertension    8. Fasting hyperglycemia    9. Overweight with body mass index (BMI) of 28 to 28.9 in adult    Other orders  -     Discontinue: hydrALAZINE (APRESOLINE) 50 MG tablet; Take 1 tablet by mouth 3 (Three) Times a Day.  Dispense: 90 tablet; Refill: 1  -     amLODIPine (NORVASC) 5 MG tablet; Take 2 tablets by mouth Daily.  Dispense: 90 tablet; Refill: 1  -     glimepiride (AMARYL) 1 MG tablet; Take 1 tablet by mouth Daily.  Dispense: 90 tablet; Refill: 1  -     hydrALAZINE (APRESOLINE) 50 MG tablet; Take 1 tablet by mouth 3 (Three) Times a Day.  Dispense: 90 tablet; Refill: 1  -     metoprolol tartrate (LOPRESSOR) 25 MG tablet; Take 1 tablet by mouth Daily.  Dispense: 90 tablet; Refill: 1  -     pravastatin (PRAVACHOL) 40 MG tablet; Take 1 tablet by mouth Daily.  Dispense: 90 tablet; Refill: 1  -     nitroglycerin (Nitrostat) 0.3 MG SL tablet; Place 1 tablet under the tongue Every 5 (Five) Minutes As Needed for Chest Pain. Take no more than 3 doses in 15 minutes.  Dispense: 50 tablet; Refill: 12  -     ramipril (ALTACE) 5 MG capsule; Take 1 capsule by mouth Every Evening.  Dispense: 90 capsule; Refill: 1  -     tamsulosin (FLOMAX) 0.4 MG capsule 24 hr capsule; Take 1 capsule by mouth Daily. NEED TO BE SEEN IN OFFICE FOR FUTURE REFILLS.  Dispense: 90 capsule; Refill: 0  -     torsemide (DEMADEX) 5 MG tablet; Take 1 tablet by mouth Daily.  Dispense: 90 tablet; Refill: 1          Current Outpatient Medications:   •  amLODIPine (NORVASC) 5 MG tablet, Take 2 tablets by mouth Daily., Disp: 90 tablet, Rfl: 1  •  cetirizine  (zyrTEC) 10 MG tablet, Take 1 tablet by mouth Daily., Disp: 90 tablet, Rfl: 1  •  Cholecalciferol (VITAMIN D3) 50 MCG (2000 UT) capsule, Take 2,000 Units by mouth Daily., Disp: , Rfl:   •  cyclobenzaprine (FLEXERIL) 10 MG tablet, Take 1 tablet by mouth At Night As Needed for Muscle Spasms., Disp: 30 tablet, Rfl: 2  •  famotidine (PEPCID) 20 MG tablet, Take 20 mg by mouth Daily., Disp: , Rfl:   •  ferrous sulfate 325 (65 FE) MG EC tablet, Take 1 tablet by mouth Daily With Breakfast., Disp: , Rfl:   •  glimepiride (AMARYL) 1 MG tablet, Take 1 tablet by mouth Daily., Disp: 90 tablet, Rfl: 1  •  HYDROcodone-acetaminophen (NORCO)  MG per tablet, Take 1 tablet by mouth 5 (Five) Times a Day As Needed for Severe Pain . DNF before 6/4/2021, Disp: 150 tablet, Rfl: 0  •  metoprolol tartrate (LOPRESSOR) 25 MG tablet, Take 1 tablet by mouth Daily., Disp: 90 tablet, Rfl: 1  •  nitroglycerin (Nitrostat) 0.3 MG SL tablet, Place 1 tablet under the tongue Every 5 (Five) Minutes As Needed for Chest Pain. Take no more than 3 doses in 15 minutes., Disp: 50 tablet, Rfl: 12  •  pravastatin (PRAVACHOL) 40 MG tablet, Take 1 tablet by mouth Daily., Disp: 90 tablet, Rfl: 1  •  ramipril (ALTACE) 5 MG capsule, Take 1 capsule by mouth Every Evening., Disp: 90 capsule, Rfl: 1  •  tamsulosin (FLOMAX) 0.4 MG capsule 24 hr capsule, Take 1 capsule by mouth Daily. NEED TO BE SEEN IN OFFICE FOR FUTURE REFILLS., Disp: 90 capsule, Rfl: 0  •  torsemide (DEMADEX) 5 MG tablet, Take 1 tablet by mouth Daily., Disp: 90 tablet, Rfl: 1  •  vitamin B-12 (CYANOCOBALAMIN) 1000 MCG tablet, Take 1 tablet by mouth Daily., Disp: , Rfl:   •  hydrALAZINE (APRESOLINE) 50 MG tablet, Take 1 tablet by mouth 3 (Three) Times a Day., Disp: 90 tablet, Rfl: 1

## 2021-06-23 ENCOUNTER — OFFICE VISIT (OUTPATIENT)
Dept: FAMILY MEDICINE CLINIC | Facility: CLINIC | Age: 81
End: 2021-06-23

## 2021-06-23 VITALS
WEIGHT: 173.6 LBS | BODY MASS INDEX: 28.92 KG/M2 | TEMPERATURE: 97.3 F | OXYGEN SATURATION: 95 % | DIASTOLIC BLOOD PRESSURE: 68 MMHG | HEIGHT: 65 IN | SYSTOLIC BLOOD PRESSURE: 130 MMHG | HEART RATE: 63 BPM

## 2021-06-23 DIAGNOSIS — R06.02 SHORTNESS OF BREATH: Primary | ICD-10-CM

## 2021-06-23 PROBLEM — R91.1 LUNG NODULE: Status: ACTIVE | Noted: 2021-06-18

## 2021-06-23 PROBLEM — Z85.118 H/O: LUNG CANCER: Status: ACTIVE | Noted: 2017-08-21

## 2021-06-23 PROCEDURE — 99213 OFFICE O/P EST LOW 20 MIN: CPT | Performed by: NURSE PRACTITIONER

## 2021-06-24 LAB — NT-PROBNP SERPL-MCNC: 571 PG/ML (ref 0–486)

## 2021-06-28 DIAGNOSIS — R06.02 SHORTNESS OF BREATH: Primary | ICD-10-CM

## 2021-06-29 ENCOUNTER — APPOINTMENT (OUTPATIENT)
Dept: PAIN MEDICINE | Facility: CLINIC | Age: 81
End: 2021-06-29

## 2021-06-29 ENCOUNTER — OFFICE VISIT (OUTPATIENT)
Dept: PAIN MEDICINE | Facility: CLINIC | Age: 81
End: 2021-06-29

## 2021-06-29 VITALS
HEART RATE: 56 BPM | WEIGHT: 173 LBS | RESPIRATION RATE: 16 BRPM | OXYGEN SATURATION: 96 % | DIASTOLIC BLOOD PRESSURE: 61 MMHG | BODY MASS INDEX: 28.82 KG/M2 | SYSTOLIC BLOOD PRESSURE: 132 MMHG | HEIGHT: 65 IN

## 2021-06-29 DIAGNOSIS — Z79.899 HIGH RISK MEDICATION USE: ICD-10-CM

## 2021-06-29 DIAGNOSIS — M47.812 CERVICAL SPONDYLOSIS WITHOUT MYELOPATHY: ICD-10-CM

## 2021-06-29 DIAGNOSIS — G89.4 CHRONIC PAIN SYNDROME: Primary | ICD-10-CM

## 2021-06-29 DIAGNOSIS — M47.814 THORACIC SPONDYLOSIS WITHOUT MYELOPATHY: ICD-10-CM

## 2021-06-29 DIAGNOSIS — M47.817 LUMBOSACRAL SPONDYLOSIS WITHOUT MYELOPATHY: ICD-10-CM

## 2021-06-29 PROCEDURE — 99214 OFFICE O/P EST MOD 30 MIN: CPT | Performed by: ANESTHESIOLOGY

## 2021-06-29 RX ORDER — HYDROCODONE BITARTRATE AND ACETAMINOPHEN 10; 325 MG/1; MG/1
1 TABLET ORAL
Qty: 150 TABLET | Refills: 0 | Status: SHIPPED | OUTPATIENT
Start: 2021-07-07 | End: 2021-07-09 | Stop reason: SDUPTHER

## 2021-06-29 RX ORDER — HYDROCODONE BITARTRATE AND ACETAMINOPHEN 10; 325 MG/1; MG/1
1 TABLET ORAL
Qty: 150 TABLET | Refills: 0 | Status: SHIPPED | OUTPATIENT
Start: 2021-08-06 | End: 2021-08-12

## 2021-06-29 NOTE — PROGRESS NOTES
Subjective    CC back pain, neck pain, bilateral leg pain  Kailash Cooper is a 80 y.o. male with multiple comorbidities including CKD, DM, COPD, chronic back pain polyarthralgia here for f/u.  Chronic back pain radiating to bilateral hips worse with standing walking or prolonged activity.  Denies new weakness saddle anesthesia bladder bowel continence.  Chronic axial neck pain radiating to bilateral shoulder without radicular pain.  Continued chronic polyarthralgia and myofascial pain worse with activity.  Tried physical therapy/chiropractor with marginal relief.  Tried LESI's with marginal relief.    Utilizes hydrocodone 10/325 4 times daily as needed for severe pain.  Functional benefit /denies side effects.    L-spine MRI 2018 moderate degenerative changes throughout facet arthropathy mild to moderate foraminal narrowing, scoliosis.  C-spine MRI 2018 marked degenerative disc and some posterior element disease, causing spinal stenosis at multiple levels, including C2-3, C3-4, C5-6, and C6-7.  There multiple foraminal impingements    Pain Assessment   Location of Pain: Lower Back, neck pain, joint  Description of Pain: Dull/Aching, Throbbing, Stabbing  Previous Pain Rating :4  Current Pain Ratin  Aggravating Factors: Activity  Alleviating Factors: Rest, Medication    PEG Assessment   What number best describes your pain on average in the past week? 5  What number best describes how, during the past week, pain has interfered with your enjoyment of life?3  What number best describes how, during the past week, pain has interfered with your general activity?10     The following portions of the patient's history were reviewed and updated as appropriate: allergies, current medications, past family history, past medical history, past social history, past surgical history and problem list.     has a past medical history of Arthritis, Cancer (CMS/HCC), Diabetes (CMS/HCC), Enlarged prostate, Former smoker, Hiatal hernia,  "Hip pain, Hip pain, bilateral, Hyperlipidemia, Hypertension, Kidney disease, Leg pain, Low back pain, Neck pain, and Stroke (CMS/HCC).   has a past surgical history that includes Cataract extraction (); Colonoscopy (); and Lung cancer surgery.  family history includes Cancer in his father; Heart disease in an other family member; Heart failure in his brother; Stroke in his mother.  Social History     Tobacco Use   • Smoking status: Former Smoker     Types: Cigarettes     Quit date:      Years since quittin.5   • Smokeless tobacco: Never Used   Substance Use Topics   • Alcohol use: Never     Review of Systems   Musculoskeletal: Positive for arthralgias, back pain, myalgias and neck pain.   All other systems reviewed and are negative.    Objective   Physical Exam   Constitutional: No distress.   Pulmonary/Chest: Effort normal.   Musculoskeletal:      Cervical back: He exhibits tenderness.      Lumbar back: He exhibits decreased range of motion and tenderness.   Psychiatric: His behavior is normal.     /61   Pulse 56   Resp 16   Ht 165.1 cm (65\")   Wt 78.5 kg (173 lb)   SpO2 96%   BMI 28.79 kg/m²     PHQ 9 on chart  Opioid risk tool low risk    Assessment/Plan   Diagnoses and all orders for this visit:    1. Chronic pain syndrome (Primary)  -     HYDROcodone-acetaminophen (NORCO)  MG per tablet; Take 1 tablet by mouth 5 (Five) Times a Day As Needed for Severe Pain . DNF before 2021  Dispense: 150 tablet; Refill: 0  -     HYDROcodone-acetaminophen (NORCO)  MG per tablet; Take 1 tablet by mouth 5 (Five) Times a Day As Needed for Severe Pain . DNF before 2021  Dispense: 150 tablet; Refill: 0    2. Lumbosacral spondylosis without myelopathy    3. Cervical spondylosis without myelopathy    4. Thoracic spondylosis without myelopathy    5. High risk medication use  -     Urine Drug Screen - Urine, Clean Catch; Future    Summary  Kailash Cooper is a 80 y.o. male with multiple " comorbidities including CKD, DM, COPD, chronic back pain polyarthralgia here for f/u.   Chronic pain from lumbar and cervical DDD spondylosis, polyarthralgia/myofascial pain.  Tried physical therapy, chiropractor, LESI's with marginal relief.  Axial back pain interfering with ADL and his ability to exercise.  No NSAIDs due to CKD.        Complains of worsening back and bilateral hip pain.  Declines injections.    Cont Hydrocodone 10/325 5 times daily as needed for severe pain.  UDS and inspect reviewed.    Discussed risk of tolerance, dependence, respiratory depression, coma and death associated with use of oral opioids for treatment of chronic nonmalignant pain.     RTC 2 months

## 2021-07-06 ENCOUNTER — TELEPHONE (OUTPATIENT)
Dept: FAMILY MEDICINE CLINIC | Facility: CLINIC | Age: 81
End: 2021-07-06

## 2021-07-06 RX ORDER — IPRATROPIUM BROMIDE AND ALBUTEROL SULFATE 2.5; .5 MG/3ML; MG/3ML
3 SOLUTION RESPIRATORY (INHALATION) EVERY 4 HOURS PRN
Qty: 360 ML | Refills: 6 | Status: SHIPPED | OUTPATIENT
Start: 2021-07-06 | End: 2021-07-07 | Stop reason: SDUPTHER

## 2021-07-06 NOTE — TELEPHONE ENCOUNTER
Patient brought in his bp readings and said that you were going to call in medicine for his nebulizer and he wants to know if it can be sent to [facility]

## 2021-07-07 DIAGNOSIS — R19.5 POSITIVE COLORECTAL CANCER SCREENING USING COLOGUARD TEST: ICD-10-CM

## 2021-07-07 DIAGNOSIS — Z12.11 COLON CANCER SCREENING: Primary | ICD-10-CM

## 2021-07-07 RX ORDER — IPRATROPIUM BROMIDE AND ALBUTEROL SULFATE 2.5; .5 MG/3ML; MG/3ML
3 SOLUTION RESPIRATORY (INHALATION) EVERY 4 HOURS PRN
Qty: 360 ML | Refills: 6 | Status: SHIPPED | OUTPATIENT
Start: 2021-07-07 | End: 2022-03-10 | Stop reason: SDUPTHER

## 2021-07-09 DIAGNOSIS — G89.4 CHRONIC PAIN SYNDROME: ICD-10-CM

## 2021-07-09 RX ORDER — HYDROCODONE BITARTRATE AND ACETAMINOPHEN 10; 325 MG/1; MG/1
1 TABLET ORAL
Qty: 150 TABLET | Refills: 0 | Status: SHIPPED | OUTPATIENT
Start: 2021-07-09 | End: 2021-08-16 | Stop reason: SDUPTHER

## 2021-07-23 ENCOUNTER — TELEPHONE (OUTPATIENT)
Dept: FAMILY MEDICINE CLINIC | Facility: CLINIC | Age: 81
End: 2021-07-23

## 2021-07-23 NOTE — TELEPHONE ENCOUNTER
PT DAUGHTER CALLED STATING THAT HE HAS NOT RECEIVED THE KIT FOR HIS COLONOSCOPY. SHE IS REQUESTING A CALL BACK TO SEE WHEN THIS WILL BE DONE.

## 2021-07-23 NOTE — TELEPHONE ENCOUNTER
Spoke with Isidra told her the referral has been faxed and they would have to call I to get the other info.  SG

## 2021-08-04 ENCOUNTER — TELEPHONE (OUTPATIENT)
Dept: FAMILY MEDICINE CLINIC | Facility: CLINIC | Age: 81
End: 2021-08-04

## 2021-08-04 ENCOUNTER — TELEPHONE (OUTPATIENT)
Dept: PAIN MEDICINE | Facility: CLINIC | Age: 81
End: 2021-08-04

## 2021-08-04 NOTE — TELEPHONE ENCOUNTER
Pt's wife Candis called in upset stating the pt still has not gotten a referral to see Dr. Key. Candis states she is already a pt there, but Kailash still needs a referral to be seen there.

## 2021-08-05 ENCOUNTER — TELEPHONE (OUTPATIENT)
Dept: PAIN MEDICINE | Facility: CLINIC | Age: 81
End: 2021-08-05

## 2021-08-16 ENCOUNTER — OFFICE VISIT (OUTPATIENT)
Dept: PAIN MEDICINE | Facility: CLINIC | Age: 81
End: 2021-08-16

## 2021-08-16 VITALS
RESPIRATION RATE: 16 BRPM | TEMPERATURE: 97.1 F | BODY MASS INDEX: 27.82 KG/M2 | HEIGHT: 65 IN | WEIGHT: 167 LBS | DIASTOLIC BLOOD PRESSURE: 65 MMHG | OXYGEN SATURATION: 94 % | HEART RATE: 88 BPM | SYSTOLIC BLOOD PRESSURE: 193 MMHG

## 2021-08-16 DIAGNOSIS — M47.814 THORACIC SPONDYLOSIS: ICD-10-CM

## 2021-08-16 DIAGNOSIS — G89.29 CHRONIC BACK PAIN, UNSPECIFIED BACK LOCATION, UNSPECIFIED BACK PAIN LATERALITY: Primary | ICD-10-CM

## 2021-08-16 DIAGNOSIS — M47.812 CERVICAL SPONDYLOSIS WITHOUT MYELOPATHY: ICD-10-CM

## 2021-08-16 DIAGNOSIS — M54.9 CHRONIC BACK PAIN, UNSPECIFIED BACK LOCATION, UNSPECIFIED BACK PAIN LATERALITY: Primary | ICD-10-CM

## 2021-08-16 DIAGNOSIS — G89.4 CHRONIC PAIN SYNDROME: ICD-10-CM

## 2021-08-16 DIAGNOSIS — M47.817 LUMBOSACRAL SPONDYLOSIS WITHOUT MYELOPATHY: ICD-10-CM

## 2021-08-16 PROCEDURE — 99214 OFFICE O/P EST MOD 30 MIN: CPT | Performed by: PHYSICAL MEDICINE & REHABILITATION

## 2021-08-16 PROCEDURE — G0463 HOSPITAL OUTPT CLINIC VISIT: HCPCS | Performed by: PHYSICAL MEDICINE & REHABILITATION

## 2021-08-16 RX ORDER — HYDROCODONE BITARTRATE AND ACETAMINOPHEN 10; 325 MG/1; MG/1
1 TABLET ORAL EVERY 4 HOURS PRN
Qty: 180 TABLET | Refills: 0 | Status: SHIPPED | OUTPATIENT
Start: 2021-08-16 | End: 2021-10-11 | Stop reason: SDUPTHER

## 2021-08-16 RX ORDER — HYDROCODONE BITARTRATE AND ACETAMINOPHEN 10; 325 MG/1; MG/1
1 TABLET ORAL EVERY 4 HOURS PRN
Qty: 180 TABLET | Refills: 0 | Status: SHIPPED | OUTPATIENT
Start: 2021-08-16 | End: 2021-12-03 | Stop reason: SDUPTHER

## 2021-08-16 NOTE — PROGRESS NOTES
Subjective    CC back pain, neck pain, bilateral leg pain  Kailash Cooper is a 80 y.o. male with multiple comorbidities including CKD, DM, COPD, chronic back pain polyarthralgia here for f/u.  Chronic back pain radiating to bilateral hips worse with standing walking or prolonged activity.  Denies new weakness saddle anesthesia bladder bowel continence.  Chronic axial neck pain radiating to bilateral shoulder without radicular pain.  Continued chronic polyarthralgia and myofascial pain worse with activity.  Tried physical therapy/chiropractor with marginal relief.  Tried LESI's with marginal relief.    Utilizes hydrocodone 10/325 4 times daily as needed for severe pain.  Functional benefit /denies side effects.    L-spine MRI 2018 moderate degenerative changes throughout facet arthropathy mild to moderate foraminal narrowing, scoliosis.  C-spine MRI 2018 marked degenerative disc and some posterior element disease, causing spinal stenosis at multiple levels, including C2-3, C3-4, C5-6, and C6-7.  There multiple foraminal impingements    Pain Assessment   Location of Pain: Lower Back, neck pain, joint  Description of Pain: Dull/Aching, Throbbing, Stabbing  Previous Pain Rating :4  Current Pain Ratin  Aggravating Factors: Activity  Alleviating Factors: Rest, Medication    PEG Assessment   What number best describes your pain on average in the past week? 5  What number best describes how, during the past week, pain has interfered with your enjoyment of life?3  What number best describes how, during the past week, pain has interfered with your general activity?10     The following portions of the patient's history were reviewed and updated as appropriate: allergies, current medications, past family history, past medical history, past social history, past surgical history and problem list.     has a past medical history of Arthritis, Cancer (CMS/HCC), Diabetes (CMS/HCC), Enlarged prostate, Former smoker, Hiatal hernia,  "Hip pain, Hip pain, bilateral, Hyperlipidemia, Hypertension, Kidney disease, Leg pain, Low back pain, Neck pain, and Stroke (CMS/HCC).   has a past surgical history that includes Cataract extraction (); Colonoscopy (2011); and Lung cancer surgery.  family history includes Cancer in his father; Heart disease in an other family member; Heart failure in his brother; Stroke in his mother.  Social History     Tobacco Use   • Smoking status: Former Smoker     Types: Cigarettes     Quit date:      Years since quittin.6   • Smokeless tobacco: Never Used   Substance Use Topics   • Alcohol use: Never     Review of Systems   Musculoskeletal: Positive for arthralgias, back pain, myalgias and neck pain.   All other systems reviewed and are negative.    Objective   Physical Exam   Constitutional: No distress.   Pulmonary/Chest: Effort normal.   Musculoskeletal:      Cervical back: He exhibits tenderness.      Lumbar back: He exhibits decreased range of motion and tenderness.   Psychiatric: His behavior is normal.     BP (!) 193/65   Pulse 88   Temp 97.1 °F (36.2 °C)   Resp 16   Ht 165.1 cm (65\")   Wt 75.8 kg (167 lb)   SpO2 94%   BMI 27.79 kg/m²     PHQ 9 on chart  Opioid risk tool low risk    Assessment/Plan   Diagnoses and all orders for this visit:    1. Chronic back pain, unspecified back location, unspecified back pain laterality (Primary)    2. Cervical spondylosis without myelopathy    3. Chronic pain syndrome    4. Lumbosacral spondylosis without myelopathy    5. Thoracic spondylosis    Summary  Kailash Cooper is a 80 y.o. male with multiple comorbidities including CKD, DM, COPD, chronic back pain polyarthralgia here for f/u.   Chronic pain from lumbar and cervical DDD spondylosis, polyarthralgia/myofascial pain.  Tried physical therapy, chiropractor, LESI's with marginal relief.  Axial back pain interfering with ADL and his ability to exercise.  No NSAIDs due to CKD.        Complains of worsening back " and bilateral hip pain.  Declines injections.    Increase to Hydrocodone 10/325 6 times daily as needed for severe pain, worked better than 4 or 5 pills daily with [facility] pain physician.  UDS 6/30/21 and inspect reviewed, filled 8/8/21.    Discussed risk of tolerance, dependence, respiratory depression, coma and death associated with use of oral opioids for treatment of chronic nonmalignant pain.     RTC 2 months

## 2021-08-19 DIAGNOSIS — R97.20 HIGH PROSTATE SPECIFIC ANTIGEN (PSA): Primary | ICD-10-CM

## 2021-08-19 PROBLEM — Z85.29 HISTORY OF MALIGNANT NEOPLASM OF THORACIC CAVITY STRUCTURE: Status: ACTIVE | Noted: 2017-08-21

## 2021-10-11 ENCOUNTER — OFFICE VISIT (OUTPATIENT)
Dept: PAIN MEDICINE | Facility: CLINIC | Age: 81
End: 2021-10-11

## 2021-10-11 VITALS
OXYGEN SATURATION: 96 % | BODY MASS INDEX: 27.82 KG/M2 | RESPIRATION RATE: 16 BRPM | TEMPERATURE: 97.1 F | DIASTOLIC BLOOD PRESSURE: 62 MMHG | WEIGHT: 167 LBS | HEART RATE: 49 BPM | SYSTOLIC BLOOD PRESSURE: 195 MMHG | HEIGHT: 65 IN

## 2021-10-11 DIAGNOSIS — M47.814 THORACIC SPONDYLOSIS: ICD-10-CM

## 2021-10-11 DIAGNOSIS — G89.4 CHRONIC PAIN SYNDROME: ICD-10-CM

## 2021-10-11 DIAGNOSIS — M54.2 CERVICALGIA: ICD-10-CM

## 2021-10-11 DIAGNOSIS — G89.29 CHRONIC BILATERAL LOW BACK PAIN WITHOUT SCIATICA: Primary | ICD-10-CM

## 2021-10-11 DIAGNOSIS — M54.50 CHRONIC BILATERAL LOW BACK PAIN WITHOUT SCIATICA: Primary | ICD-10-CM

## 2021-10-11 DIAGNOSIS — M47.817 LUMBOSACRAL SPONDYLOSIS WITHOUT MYELOPATHY: ICD-10-CM

## 2021-10-11 DIAGNOSIS — M47.812 CERVICAL SPONDYLOSIS WITHOUT MYELOPATHY: ICD-10-CM

## 2021-10-11 PROCEDURE — G0463 HOSPITAL OUTPT CLINIC VISIT: HCPCS | Performed by: PHYSICAL MEDICINE & REHABILITATION

## 2021-10-11 PROCEDURE — 99213 OFFICE O/P EST LOW 20 MIN: CPT | Performed by: PHYSICAL MEDICINE & REHABILITATION

## 2021-10-11 RX ORDER — HYDROCODONE BITARTRATE AND ACETAMINOPHEN 10; 325 MG/1; MG/1
1 TABLET ORAL EVERY 4 HOURS PRN
Qty: 180 TABLET | Refills: 0 | Status: SHIPPED | OUTPATIENT
Start: 2021-10-11 | End: 2021-12-13 | Stop reason: SDUPTHER

## 2021-10-11 NOTE — PROGRESS NOTES
Subjective    CC back pain, neck pain, bilateral leg pain  Kailash Cooper is a 81 y.o. male with multiple comorbidities including CKD, DM, COPD, chronic back pain polyarthralgia here for f/u.  Chronic back pain radiating to bilateral hips worse with standing walking or prolonged activity.  Denies new weakness saddle anesthesia bladder bowel continence.  Chronic axial neck pain radiating to bilateral shoulder without radicular pain.  Continued chronic polyarthralgia and myofascial pain worse with activity.  Tried physical therapy/chiropractor with marginal relief.  Tried LESI's with marginal relief.    Utilizes hydrocodone 10/325 4 times daily as needed for severe pain.  Functional benefit /denies side effects.    L-spine MRI 2018 moderate degenerative changes throughout facet arthropathy mild to moderate foraminal narrowing, scoliosis.  C-spine MRI 2018 marked degenerative disc and some posterior element disease, causing spinal stenosis at multiple levels, including C2-3, C3-4, C5-6, and C6-7.  There multiple foraminal impingements    Pain Assessment   Location of Pain: Lower Back, neck pain, joint  Description of Pain: Dull/Aching, Throbbing, Stabbing  Previous Pain Rating :4  Current Pain Ratin  Aggravating Factors: Activity  Alleviating Factors: Rest, Medication    PEG Assessment   What number best describes your pain on average in the past week? 5  What number best describes how, during the past week, pain has interfered with your enjoyment of life?3  What number best describes how, during the past week, pain has interfered with your general activity?10     The following portions of the patient's history were reviewed and updated as appropriate: allergies, current medications, past family history, past medical history, past social history, past surgical history and problem list.     has a past medical history of Arthritis, Cancer (HCC), Diabetes (HCC), Enlarged prostate, Former smoker, Hiatal hernia, Hip pain,  "Hip pain, bilateral, Hyperlipidemia, Hypertension, Kidney disease, Leg pain, Low back pain, Neck pain, and Stroke (HCC).   has a past surgical history that includes Cataract extraction (); Colonoscopy (); and Lung cancer surgery.  family history includes Cancer in his father; Heart disease in an other family member; Heart failure in his brother; Stroke in his mother.  Social History     Tobacco Use   • Smoking status: Former Smoker     Types: Cigarettes     Quit date:      Years since quittin.7   • Smokeless tobacco: Never Used   Substance Use Topics   • Alcohol use: Never     Review of Systems   Musculoskeletal: Positive for arthralgias, back pain, myalgias and neck pain.   All other systems reviewed and are negative.    Objective   Physical Exam   Constitutional: He is oriented to person, place, and time. He appears well-developed and well-nourished. No distress.   HENT:   Head: Normocephalic and atraumatic.   Eyes: Pupils are equal, round, and reactive to light.   Cardiovascular: Normal rate, regular rhythm and normal heart sounds.   Pulmonary/Chest: Effort normal and breath sounds normal.   Abdominal: Soft. Normal appearance and bowel sounds are normal. He exhibits no distension. There is no abdominal tenderness.   Musculoskeletal:      Cervical back: He exhibits tenderness.      Lumbar back: He exhibits decreased range of motion and tenderness.   Neurological: He is alert and oriented to person, place, and time. He has normal reflexes. He displays normal reflexes. No sensory deficit.   Psychiatric: His behavior is normal. Mood, judgment and thought content normal.     BP (!) 195/62   Pulse (!) 49   Temp 97.1 °F (36.2 °C)   Resp 16   Ht 165.1 cm (65\")   Wt 75.8 kg (167 lb)   SpO2 96%   BMI 27.79 kg/m²     PHQ 9 on chart  Opioid risk tool low risk    Assessment/Plan   Diagnoses and all orders for this visit:    1. Chronic bilateral low back pain without sciatica (Primary)    2. Cervical " spondylosis without myelopathy    3. Chronic pain syndrome    4. Lumbosacral spondylosis without myelopathy    5. Thoracic spondylosis    6. Cervicalgia    Summary  Kailash Cooper is a 80 y.o. male with multiple comorbidities including CKD, DM, COPD, chronic back pain polyarthralgia here for f/u.   Chronic pain from lumbar and cervical DDD spondylosis, polyarthralgia/myofascial pain.  Tried physical therapy, chiropractor, LESI's with marginal relief.  Axial back pain interfering with ADL and his ability to exercise.  No NSAIDs due to CKD.        Complains of worsening back and bilateral hip pain.  Declines injections.    Increase to Hydrocodone 10/325 6 times daily as needed for severe pain, worked better than 4 or 5 pills daily with [facility] pain physician.  UDS 6/30/21 and inspect reviewed, filled 10/7/21.    Discussed risk of tolerance, dependence, respiratory depression, coma and death associated with use of oral opioids for treatment of chronic nonmalignant pain.     RTC 2 months        History of Present Illness

## 2021-10-19 RX ORDER — AMLODIPINE BESYLATE 5 MG/1
TABLET ORAL
Qty: 90 TABLET | Refills: 0 | Status: SHIPPED | OUTPATIENT
Start: 2021-10-19 | End: 2021-12-15

## 2021-10-19 RX ORDER — HYDRALAZINE HYDROCHLORIDE 50 MG/1
TABLET, FILM COATED ORAL
Qty: 90 TABLET | Refills: 0 | Status: SHIPPED | OUTPATIENT
Start: 2021-10-19 | End: 2021-12-15

## 2021-12-03 DIAGNOSIS — M54.9 CHRONIC BACK PAIN, UNSPECIFIED BACK LOCATION, UNSPECIFIED BACK PAIN LATERALITY: ICD-10-CM

## 2021-12-03 DIAGNOSIS — G89.29 CHRONIC BACK PAIN, UNSPECIFIED BACK LOCATION, UNSPECIFIED BACK PAIN LATERALITY: ICD-10-CM

## 2021-12-03 RX ORDER — HYDROCODONE BITARTRATE AND ACETAMINOPHEN 10; 325 MG/1; MG/1
1 TABLET ORAL EVERY 4 HOURS PRN
Qty: 180 TABLET | Refills: 0 | Status: SHIPPED | OUTPATIENT
Start: 2021-12-03 | End: 2021-12-13 | Stop reason: SDUPTHER

## 2021-12-13 ENCOUNTER — OFFICE VISIT (OUTPATIENT)
Dept: PAIN MEDICINE | Facility: CLINIC | Age: 81
End: 2021-12-13

## 2021-12-13 VITALS
RESPIRATION RATE: 20 BRPM | DIASTOLIC BLOOD PRESSURE: 63 MMHG | HEIGHT: 65 IN | WEIGHT: 167 LBS | HEART RATE: 71 BPM | TEMPERATURE: 96.9 F | SYSTOLIC BLOOD PRESSURE: 120 MMHG | BODY MASS INDEX: 27.82 KG/M2 | OXYGEN SATURATION: 98 %

## 2021-12-13 DIAGNOSIS — G89.4 CHRONIC PAIN SYNDROME: ICD-10-CM

## 2021-12-13 DIAGNOSIS — M47.812 CERVICAL SPONDYLOSIS WITHOUT MYELOPATHY: ICD-10-CM

## 2021-12-13 DIAGNOSIS — M54.9 CHRONIC BACK PAIN, UNSPECIFIED BACK LOCATION, UNSPECIFIED BACK PAIN LATERALITY: ICD-10-CM

## 2021-12-13 DIAGNOSIS — G89.29 CHRONIC BACK PAIN, UNSPECIFIED BACK LOCATION, UNSPECIFIED BACK PAIN LATERALITY: ICD-10-CM

## 2021-12-13 DIAGNOSIS — M54.2 CERVICALGIA: ICD-10-CM

## 2021-12-13 DIAGNOSIS — M54.50 CHRONIC BILATERAL LOW BACK PAIN WITHOUT SCIATICA: Primary | ICD-10-CM

## 2021-12-13 DIAGNOSIS — M47.814 THORACIC SPONDYLOSIS: ICD-10-CM

## 2021-12-13 DIAGNOSIS — M47.817 LUMBOSACRAL SPONDYLOSIS WITHOUT MYELOPATHY: ICD-10-CM

## 2021-12-13 DIAGNOSIS — G89.29 CHRONIC BILATERAL LOW BACK PAIN WITHOUT SCIATICA: Primary | ICD-10-CM

## 2021-12-13 PROCEDURE — G0463 HOSPITAL OUTPT CLINIC VISIT: HCPCS | Performed by: PHYSICAL MEDICINE & REHABILITATION

## 2021-12-13 PROCEDURE — 99213 OFFICE O/P EST LOW 20 MIN: CPT | Performed by: PHYSICAL MEDICINE & REHABILITATION

## 2021-12-13 RX ORDER — HYDROCODONE BITARTRATE AND ACETAMINOPHEN 10; 325 MG/1; MG/1
1 TABLET ORAL EVERY 4 HOURS PRN
Qty: 180 TABLET | Refills: 0 | Status: SHIPPED | OUTPATIENT
Start: 2021-12-13 | End: 2022-01-03 | Stop reason: SDUPTHER

## 2021-12-13 RX ORDER — HYDROCODONE BITARTRATE AND ACETAMINOPHEN 10; 325 MG/1; MG/1
1 TABLET ORAL EVERY 4 HOURS PRN
Qty: 180 TABLET | Refills: 0 | Status: SHIPPED | OUTPATIENT
Start: 2021-12-13 | End: 2022-02-25 | Stop reason: SDUPTHER

## 2021-12-13 NOTE — PROGRESS NOTES
Subjective    CC back pain, neck pain, bilateral leg pain  Kailash Cooper is a 81 y.o. male with multiple comorbidities including CKD, DM, COPD, chronic back pain polyarthralgia here for f/u.  Chronic back pain radiating to bilateral hips worse with standing walking or prolonged activity.  Denies new weakness saddle anesthesia bladder bowel continence.  Chronic axial neck pain radiating to bilateral shoulder without radicular pain.  Continued chronic polyarthralgia and myofascial pain worse with activity.  Tried physical therapy/chiropractor with marginal relief.  Tried LESI's with marginal relief.    Utilizes hydrocodone 10/325 6 times daily as needed for severe pain, failed 4 times daily as needed.  Functional benefit /denies side effects.    L-spine MRI 2018 moderate degenerative changes throughout facet arthropathy mild to moderate foraminal narrowing, scoliosis.  C-spine MRI 2018 marked degenerative disc and some posterior element disease, causing spinal stenosis at multiple levels, including C2-3, C3-4, C5-6, and C6-7.  There multiple foraminal impingements    Pain Assessment   Location of Pain: Lower Back, neck pain, joint  Description of Pain: Dull/Aching, Throbbing, Stabbing  Previous Pain Rating :4  Current Pain Ratin  Aggravating Factors: Activity  Alleviating Factors: Rest, Medication    PEG Assessment   What number best describes your pain on average in the past week? 5  What number best describes how, during the past week, pain has interfered with your enjoyment of life?3  What number best describes how, during the past week, pain has interfered with your general activity?10     The following portions of the patient's history were reviewed and updated as appropriate: allergies, current medications, past family history, past medical history, past social history, past surgical history and problem list.     has a past medical history of Arthritis, Cancer (HCC), Diabetes (HCC), Enlarged prostate, Former  "smoker, Hiatal hernia, Hip pain, Hip pain, bilateral, Hyperlipidemia, Hypertension, Kidney disease, Leg pain, Low back pain, Neck pain, and Stroke (HCC).   has a past surgical history that includes Cataract extraction (); Colonoscopy (); and Lung cancer surgery.  family history includes Cancer in his father; Heart disease in an other family member; Heart failure in his brother; Stroke in his mother.  Social History     Tobacco Use   • Smoking status: Former Smoker     Types: Cigarettes     Quit date:      Years since quittin.9   • Smokeless tobacco: Never Used   Substance Use Topics   • Alcohol use: Never     Review of Systems   Musculoskeletal: Positive for arthralgias, back pain, myalgias and neck pain.   All other systems reviewed and are negative.    Objective   Physical Exam   Constitutional: He is oriented to person, place, and time. He appears well-developed and well-nourished. No distress.   HENT:   Head: Normocephalic and atraumatic.   Eyes: Pupils are equal, round, and reactive to light.   Cardiovascular: Normal rate, regular rhythm and normal heart sounds.   Pulmonary/Chest: Effort normal and breath sounds normal.   Abdominal: Soft. Normal appearance and bowel sounds are normal. He exhibits no distension. There is no abdominal tenderness.   Musculoskeletal:      Cervical back: He exhibits tenderness.      Lumbar back: He exhibits decreased range of motion and tenderness.   Neurological: He is alert and oriented to person, place, and time. He has normal reflexes. He displays normal reflexes. No sensory deficit.   Psychiatric: His behavior is normal. Mood, judgment and thought content normal.     /63   Pulse 71   Temp 96.9 °F (36.1 °C)   Resp 20   Ht 165.1 cm (65\")   Wt 75.8 kg (167 lb)   SpO2 98%   BMI 27.79 kg/m²     PHQ 9 on chart  Opioid risk tool low risk    Assessment/Plan   Diagnoses and all orders for this visit:    1. Chronic bilateral low back pain without sciatica " (Primary)    2. Cervicalgia    3. Chronic pain syndrome    4. Lumbosacral spondylosis without myelopathy    5. Thoracic spondylosis    6. Cervical spondylosis without myelopathy    Summary  Kailash Cooper is a 81 y.o. male with multiple comorbidities including CKD, DM, COPD, chronic back pain polyarthralgia here for f/u.   Chronic pain from lumbar and cervical DDD spondylosis, polyarthralgia/myofascial pain.  Tried physical therapy, chiropractor, LESI's with marginal relief.  Axial back pain interfering with ADL and his ability to exercise.  No NSAIDs due to CKD.        Complains of worsening back and bilateral hip pain.  Declines injections.    Increased to Hydrocodone 10/325 6 times daily as needed for severe pain, worked better than 4 or 5 pills daily with [facility] pain physician.  UDS 6/30/21 and inspect reviewed, filled 12/6/21.    Discussed risk of tolerance, dependence, respiratory depression, coma and death associated with use of oral opioids for treatment of chronic nonmalignant pain.     RTC 2 months        History of Present Illness           Physical Exam  Constitutional:       General: He is not in acute distress.     Appearance: Normal appearance. He is well-developed and well-nourished.   HENT:      Head: Normocephalic and atraumatic.   Eyes:      Pupils: Pupils are equal, round, and reactive to light.   Cardiovascular:      Rate and Rhythm: Normal rate and regular rhythm.      Heart sounds: Normal heart sounds.   Pulmonary:      Effort: Pulmonary effort is normal.      Breath sounds: Normal breath sounds.   Abdominal:      General: Bowel sounds are normal. There is no distension.      Palpations: Abdomen is soft.      Tenderness: There is no abdominal tenderness.   Musculoskeletal:      Cervical back: Normal range of motion. Tenderness present.      Lumbar back: Tenderness present. Decreased range of motion.   Neurological:      Mental Status: He is alert and oriented to person, place, and time.       Sensory: No sensory deficit.      Deep Tendon Reflexes: Reflexes are normal and symmetric. Reflexes normal.   Psychiatric:         Mood and Affect: Mood normal.         Behavior: Behavior normal.         Thought Content: Thought content normal.         Judgment: Judgment normal.

## 2021-12-15 RX ORDER — GLIMEPIRIDE 1 MG/1
TABLET ORAL
Qty: 90 TABLET | Refills: 0 | Status: SHIPPED | OUTPATIENT
Start: 2021-12-15 | End: 2022-01-03 | Stop reason: SDUPTHER

## 2021-12-15 RX ORDER — AMLODIPINE BESYLATE 5 MG/1
TABLET ORAL
Qty: 90 TABLET | Refills: 0 | Status: SHIPPED | OUTPATIENT
Start: 2021-12-15 | End: 2022-01-03 | Stop reason: SDUPTHER

## 2021-12-15 RX ORDER — HYDRALAZINE HYDROCHLORIDE 50 MG/1
TABLET, FILM COATED ORAL
Qty: 90 TABLET | Refills: 0 | Status: SHIPPED | OUTPATIENT
Start: 2021-12-15 | End: 2022-01-03 | Stop reason: SDUPTHER

## 2021-12-29 ENCOUNTER — OFFICE VISIT (OUTPATIENT)
Dept: FAMILY MEDICINE CLINIC | Facility: CLINIC | Age: 81
End: 2021-12-29

## 2021-12-29 VITALS
OXYGEN SATURATION: 94 % | DIASTOLIC BLOOD PRESSURE: 50 MMHG | WEIGHT: 156 LBS | RESPIRATION RATE: 16 BRPM | BODY MASS INDEX: 25.99 KG/M2 | HEIGHT: 65 IN | HEART RATE: 54 BPM | TEMPERATURE: 97.5 F | SYSTOLIC BLOOD PRESSURE: 140 MMHG

## 2021-12-29 DIAGNOSIS — E11.9 TYPE 2 DIABETES MELLITUS WITHOUT COMPLICATION, WITHOUT LONG-TERM CURRENT USE OF INSULIN (HCC): ICD-10-CM

## 2021-12-29 DIAGNOSIS — G25.0 ESSENTIAL TREMOR: ICD-10-CM

## 2021-12-29 DIAGNOSIS — Z23 NEED FOR PROPHYLACTIC VACCINATION AGAINST STREPTOCOCCUS PNEUMONIAE (PNEUMOCOCCUS): Primary | ICD-10-CM

## 2021-12-29 DIAGNOSIS — N18.31 STAGE 3A CHRONIC KIDNEY DISEASE (HCC): ICD-10-CM

## 2021-12-29 PROCEDURE — G0439 PPPS, SUBSEQ VISIT: HCPCS | Performed by: NURSE PRACTITIONER

## 2021-12-29 PROCEDURE — 1125F AMNT PAIN NOTED PAIN PRSNT: CPT | Performed by: NURSE PRACTITIONER

## 2021-12-29 PROCEDURE — 1170F FXNL STATUS ASSESSED: CPT | Performed by: NURSE PRACTITIONER

## 2021-12-29 PROCEDURE — 99213 OFFICE O/P EST LOW 20 MIN: CPT | Performed by: NURSE PRACTITIONER

## 2021-12-29 PROCEDURE — 1160F RVW MEDS BY RX/DR IN RCRD: CPT | Performed by: NURSE PRACTITIONER

## 2021-12-29 PROCEDURE — 90732 PPSV23 VACC 2 YRS+ SUBQ/IM: CPT | Performed by: NURSE PRACTITIONER

## 2021-12-29 PROCEDURE — G0009 ADMIN PNEUMOCOCCAL VACCINE: HCPCS | Performed by: NURSE PRACTITIONER

## 2021-12-29 RX ORDER — PRIMIDONE 50 MG/1
50 TABLET ORAL NIGHTLY
Qty: 30 TABLET | Refills: 2 | Status: SHIPPED | OUTPATIENT
Start: 2021-12-29 | End: 2022-03-07

## 2021-12-29 RX ORDER — PSEUDOEPHEDRINE HYDROCHLORIDE 60 MG/1
60 TABLET, FILM COATED ORAL EVERY 4 HOURS PRN
COMMUNITY
End: 2022-06-01 | Stop reason: HOSPADM

## 2021-12-29 NOTE — PROGRESS NOTES
Kailash Cooper is a 81 y.o. male.     81-year-old white male with history of lung cancer, chronic back pain goes to pain management, hypertension, hyperlipidemia, CKD 3 and right arm tremor who comes in today for Medicare wellness visit    Blood pressure 140/50 heart rate 56 he denies any chest pain, dyspnea, tachycardia or dizziness    Patient states blood sugars are always very consistently less than 140 in the morning fasting his last A1c was 6.0 fasting blood sugar 105 cholesterol 248 triglycerides 458 and we are planning on some upcoming fasting blood work    Patient's main complaint is worsening tremor in his right hand And he is having to left and do some of his work with his left hand which slows him down.  We will try a low-dose of primidone at bedtime and see if this helps    Patient has been dieting which should help with his cholesterol panel he has lost 16 pounds with a weight of 156 and a BMI of 26 and I encouraged him not to lose anymore weight    Patient's last creatinine was 1.52 I did order him a nephrology referral but patient states he never made the appointment due to Covid and being busy    Patient had a positive Cologuard in June 2021 and had a colonoscopy after that at [facility].  He is up-to-date on Covid vaccines and other immunizations with the exception of shingles and pneumonia.  He will get a Pneumovax 23 today.  He does need to schedule an eye exam               Primidone 50 mg nightly  Schedule eye exam  Reconsider nephrology referral  Upcoming fasting blood work  Try not to lose anymore weight  Pneumovax 23 today                   The following portions of the patient's history were reviewed and updated as appropriate: allergies, current medications, past family history, past medical history, past social history, past surgical history and problem list.    Vitals:    12/29/21 1427 12/29/21 1430   BP: 149/62 140/50   BP Location: Right arm Right arm   Patient Position:  "Sitting Sitting   Cuff Size: Adult Adult   Pulse: 54    Resp: 16    Temp: 97.5 °F (36.4 °C)    TempSrc: Infrared    SpO2: 94%    Weight: 70.8 kg (156 lb)    Height: 165.1 cm (65\")      Body mass index is 25.96 kg/m².    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 [facility]   • Diabetes (HCC)    • Enlarged prostate    • Former smoker    • Hiatal hernia    • Hip pain     don   • Hip pain, bilateral    • Hyperlipidemia    • Hypertension    • Kidney disease        stage 3    • Leg pain     don   • Low back pain    • Neck pain    • Stroke (HCC)      Past Surgical History:   Procedure Laterality Date   • CATARACT EXTRACTION  2006 [facility]   • COLONOSCOPY  2011   • LUNG CANCER SURGERY      upper lobe rt lung cancer 9/2017  at [facility]     Family History   Problem Relation Age of Onset   • Stroke Mother    • Cancer Father         Prostate   • Heart failure Brother    • Heart disease Other      Immunization History   Administered Date(s) Administered   • COVID-19 (PFIZER) 02/11/2021, 03/04/2021   • Fluad Quad 65+ 11/18/2019   • Flublock Quad =>18yrs 10/07/2020   • Influenza, Unspecified 09/20/2017       Orders Only on 06/23/2021   Component Date Value Ref Range Status   • proBNP 06/23/2021 571* 0 - 486 pg/mL Final    Comment: The following cut-points have been suggested for the  use of proBNP for the diagnostic evaluation of heart  failure (HF) in patients with acute dyspnea:  Modality                     Age           Optimal Cut                             (years)            Point  ------------------------------------------------------  Diagnosis (rule in HF)        <50            450 pg/mL                            50 - 75            900 pg/mL                                >75           1800 pg/mL  Exclusion (rule out HF)  Age independent     300 pg/mL           Review of Systems   Constitutional: Negative.    HENT: Negative.    Respiratory: Negative.  "   Cardiovascular: Negative.    Gastrointestinal: Negative.    Genitourinary: Negative.    Musculoskeletal: Negative.    Neurological: Positive for tremors.   Psychiatric/Behavioral: Negative.        Objective   Physical Exam  Constitutional:       Appearance: Normal appearance.   HENT:      Head: Normocephalic.   Cardiovascular:      Rate and Rhythm: Normal rate and regular rhythm.      Pulses: Normal pulses.      Heart sounds: Normal heart sounds.   Pulmonary:      Effort: Pulmonary effort is normal.      Breath sounds: Normal breath sounds.   Abdominal:      General: Bowel sounds are normal.   Musculoskeletal:         General: Normal range of motion.   Skin:     General: Skin is warm and dry.   Neurological:      General: No focal deficit present.      Mental Status: He is alert and oriented to person, place, and time.      Comments: Right hand intention tremor is worsened   Psychiatric:         Mood and Affect: Mood normal.         Behavior: Behavior normal.         Procedures    Assessment/Plan   Diagnoses and all orders for this visit:    1. Need for prophylactic vaccination against Streptococcus pneumoniae (pneumococcus) (Primary)  -     Pneumococcal Polysaccharide Vaccine 23-Valent (PPSV23) Greater Than or Equal To 1yo Subcutaneous / IM    2. Type 2 diabetes mellitus without complication, without long-term current use of insulin ([facility])    3. Stage 3a chronic kidney disease (HCC)    4. Essential tremor    Other orders  -     primidone (MYSOLINE) 50 MG tablet; Take 1 tablet by mouth Every Night.  Dispense: 30 tablet; Refill: 2          Current Outpatient Medications:   •  amLODIPine (NORVASC) 5 MG tablet, Take 2 tablets by mouth once daily, Disp: 90 tablet, Rfl: 0  •  cetirizine (zyrTEC) 10 MG tablet, Take 1 tablet by mouth Daily., Disp: 90 tablet, Rfl: 1  •  Cholecalciferol (VITAMIN D3) 50 MCG (2000 UT) capsule, Take 2,000 Units by mouth Daily., Disp: , Rfl:   •  famotidine (PEPCID) 20 MG tablet, Take 20 mg by  mouth Daily., Disp: , Rfl:   •  ferrous sulfate 325 (65 FE) MG EC tablet, Take 1 tablet by mouth Daily With Breakfast., Disp: , Rfl:   •  glimepiride (AMARYL) 1 MG tablet, Take 1 tablet by mouth once daily (Patient taking differently: Take 1 mg by mouth 2 (Two) Times a Day.), Disp: 90 tablet, Rfl: 0  •  hydrALAZINE (APRESOLINE) 50 MG tablet, TAKE 1 TABLET BY MOUTH THREE TIMES DAILY, Disp: 90 tablet, Rfl: 0  •  HYDROcodone-acetaminophen (Norco)  MG per tablet, Take 1 tablet by mouth Every 4 (Four) Hours As Needed for Moderate Pain ., Disp: 180 tablet, Rfl: 0  •  ipratropium-albuterol (DUO-NEB) 0.5-2.5 mg/3 ml nebulizer, Take 3 mL by nebulization Every 4 (Four) Hours As Needed for Wheezing., Disp: 360 mL, Rfl: 6  •  metoprolol tartrate (LOPRESSOR) 25 MG tablet, Take 1 tablet by mouth Daily. (Patient taking differently: Take 25 mg by mouth 2 (Two) Times a Day.), Disp: 90 tablet, Rfl: 1  •  pravastatin (PRAVACHOL) 40 MG tablet, Take 1 tablet by mouth Daily., Disp: 90 tablet, Rfl: 1  •  pseudoephedrine (SUDAFED) 60 MG tablet, Take 60 mg by mouth Every 4 (Four) Hours As Needed for Congestion., Disp: , Rfl:   •  ramipril (ALTACE) 5 MG capsule, Take 1 capsule by mouth Every Evening., Disp: 90 capsule, Rfl: 1  •  tamsulosin (FLOMAX) 0.4 MG capsule 24 hr capsule, Take 1 capsule by mouth Daily., Disp: 90 capsule, Rfl: 1  •  torsemide (DEMADEX) 5 MG tablet, Take 1 tablet by mouth Daily., Disp: 90 tablet, Rfl: 1  •  vitamin B-12 (CYANOCOBALAMIN) 1000 MCG tablet, Take 1 tablet by mouth Daily. (Patient taking differently: Take 1,000 mcg by mouth 2 (Two) Times a Day.), Disp: , Rfl:   •  HYDROcodone-acetaminophen (NORCO)  MG per tablet, Take 1 tablet by mouth Every 4 (Four) Hours As Needed for Severe Pain ., Disp: 180 tablet, Rfl: 0  •  nitroglycerin (Nitrostat) 0.3 MG SL tablet, Place 1 tablet under the tongue Every 5 (Five) Minutes As Needed for Chest Pain. Take no more than 3 doses in 15 minutes., Disp: 50 tablet,  Rfl: 12  •  primidone (MYSOLINE) 50 MG tablet, Take 1 tablet by mouth Every Night., Disp: 30 tablet, Rfl: 2           Madelyn Márquez, APRN12/29/202114:53 EST  This note has been electronically signed

## 2022-01-03 RX ORDER — HYDRALAZINE HYDROCHLORIDE 50 MG/1
50 TABLET, FILM COATED ORAL 3 TIMES DAILY
Qty: 270 TABLET | Refills: 1 | Status: SHIPPED | OUTPATIENT
Start: 2022-01-03 | End: 2022-11-21 | Stop reason: SDUPTHER

## 2022-01-03 RX ORDER — TAMSULOSIN HYDROCHLORIDE 0.4 MG/1
1 CAPSULE ORAL DAILY
Qty: 90 CAPSULE | Refills: 1 | Status: SHIPPED | OUTPATIENT
Start: 2022-01-03 | End: 2022-08-23 | Stop reason: SDUPTHER

## 2022-01-03 RX ORDER — AMLODIPINE BESYLATE 10 MG/1
10 TABLET ORAL DAILY
Qty: 90 TABLET | Refills: 1 | Status: SHIPPED | OUTPATIENT
Start: 2022-01-03 | End: 2022-08-23 | Stop reason: SDUPTHER

## 2022-01-03 RX ORDER — PRAVASTATIN SODIUM 40 MG
40 TABLET ORAL DAILY
Qty: 90 TABLET | Refills: 1 | Status: SHIPPED | OUTPATIENT
Start: 2022-01-03 | End: 2022-10-17

## 2022-01-03 RX ORDER — GLIMEPIRIDE 1 MG/1
1 TABLET ORAL 2 TIMES DAILY
Qty: 180 TABLET | Refills: 1 | Status: SHIPPED | OUTPATIENT
Start: 2022-01-03 | End: 2022-11-22

## 2022-01-03 RX ORDER — RAMIPRIL 5 MG/1
5 CAPSULE ORAL EVERY EVENING
Qty: 90 CAPSULE | Refills: 1 | Status: SHIPPED | OUTPATIENT
Start: 2022-01-03 | End: 2022-02-01 | Stop reason: HOSPADM

## 2022-01-03 RX ORDER — TORSEMIDE 5 MG/1
5 TABLET ORAL DAILY
Qty: 90 TABLET | Refills: 1 | Status: SHIPPED | OUTPATIENT
Start: 2022-01-03 | End: 2022-02-01 | Stop reason: HOSPADM

## 2022-01-17 ENCOUNTER — OFFICE VISIT (OUTPATIENT)
Dept: FAMILY MEDICINE CLINIC | Facility: CLINIC | Age: 82
End: 2022-01-17

## 2022-01-17 VITALS
SYSTOLIC BLOOD PRESSURE: 144 MMHG | OXYGEN SATURATION: 92 % | HEIGHT: 65 IN | BODY MASS INDEX: 27.63 KG/M2 | HEART RATE: 62 BPM | WEIGHT: 165.8 LBS | DIASTOLIC BLOOD PRESSURE: 52 MMHG | TEMPERATURE: 97.6 F

## 2022-01-17 DIAGNOSIS — R07.9 CHEST PAIN, UNSPECIFIED TYPE: Primary | ICD-10-CM

## 2022-01-17 PROCEDURE — 99213 OFFICE O/P EST LOW 20 MIN: CPT | Performed by: NURSE PRACTITIONER

## 2022-01-17 PROCEDURE — 93000 ELECTROCARDIOGRAM COMPLETE: CPT | Performed by: NURSE PRACTITIONER

## 2022-01-17 RX ORDER — NITROGLYCERIN 0.3 MG/1
0.3 TABLET SUBLINGUAL
Qty: 50 TABLET | Refills: 12 | Status: SHIPPED | OUTPATIENT
Start: 2022-01-17

## 2022-01-17 NOTE — PROGRESS NOTES
"    Kailash Cooper is a 81 y.o. male.     81-year-old white male with history of lung cancer, chronic back pain goes to pain management, hypertension, hyperlipidemia, CKD 3 and right arm tremor who comes in today with complaints of chest pain.  Patient states he fell yesterday just felt off balance and had a small episode of chest pain later that day.  He states today the chest pain was worse he took 2 nitroglycerin with relief.  Patient has not had any cardiac history nor does he have a cardiologist.  EKG showed right bundle branch block and sinus bradycardia.  We will get cardiac enzymes and D-dimer and I am setting him up with Dr. Salguero for stress test.  I refilled his nitroglycerin tablets told patient if episodes become more frequent he needs to go to the emergency room.  Patient denies any injury from the fall    Blood pressure 144/52 heart rate 62 he denies any  tachycardia dizziness or edema    Patient states blood sugars are normally under 130 and has been well controlled              If chest pain episodes become more frequent go to the emergency room at [place]  Cardiology referral for stress test  Labs today  EKG  Use nitroglycerin as needed and monitor blood pressure       The following portions of the patient's history were reviewed and updated as appropriate: allergies, current medications, past family history, past medical history, past social history, past surgical history and problem list.    Vitals:    01/17/22 1420   BP: 144/52   BP Location: Left arm   Patient Position: Sitting   Cuff Size: Adult   Pulse: 62   Temp: 97.6 °F (36.4 °C)   TempSrc: Temporal   SpO2: 92%   Weight: 75.2 kg (165 lb 12.8 oz)   Height: 165.1 cm (65\")     Body mass index is 27.59 kg/m².    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 [facility]   • Diabetes (HCC)    • Enlarged prostate    • Former smoker    • Hiatal hernia    • Hip pain     don   • Hip pain, " bilateral    • Hyperlipidemia    • Hypertension    • Kidney disease        stage 3    • Leg pain     don   • Low back pain    • Neck pain    • Stroke (HCC)      Past Surgical History:   Procedure Laterality Date   • CATARACT EXTRACTION  2006    [facility]   • COLONOSCOPY  2011   • LUNG CANCER SURGERY      upper lobe rt lung cancer 9/2017  at [facility]     Family History   Problem Relation Age of Onset   • Stroke Mother    • Cancer Father         Prostate   • Heart failure Brother    • Heart disease Other      Immunization History   Administered Date(s) Administered   • COVID-19 (PFIZER) 02/11/2021, 03/04/2021   • Fluad Quad 65+ 11/18/2019   • Flublock Quad =>18yrs 10/07/2020   • Influenza, Unspecified 09/20/2017   • Pneumococcal Polysaccharide (PPSV23) 12/29/2021       Orders Only on 06/23/2021   Component Date Value Ref Range Status   • proBNP 06/23/2021 571* 0 - 486 pg/mL Final    Comment: The following cut-points have been suggested for the  use of proBNP for the diagnostic evaluation of heart  failure (HF) in patients with acute dyspnea:  Modality                     Age           Optimal Cut                             (years)            Point  ------------------------------------------------------  Diagnosis (rule in HF)        <50            450 pg/mL                            50 - 75            900 pg/mL                                >75           1800 pg/mL  Exclusion (rule out HF)  Age independent     300 pg/mL           Review of Systems   Constitutional: Negative.    HENT: Negative.    Respiratory: Positive for chest tightness.    Cardiovascular: Positive for chest pain.   Gastrointestinal: Negative.    Genitourinary: Negative.    Musculoskeletal: Negative.    Skin: Negative.    Neurological: Negative.    Psychiatric/Behavioral: Negative.        Objective   Physical Exam  Constitutional:       Appearance: Normal appearance.   HENT:      Head: Normocephalic.   Cardiovascular:      Rate and Rhythm: Regular rhythm.  Bradycardia present.      Pulses: Normal pulses.      Heart sounds: Normal heart sounds.   Pulmonary:      Effort: Pulmonary effort is normal.      Breath sounds: Normal breath sounds.   Abdominal:      General: Bowel sounds are normal.   Musculoskeletal:         General: Normal range of motion.   Skin:     General: Skin is warm and dry.   Neurological:      General: No focal deficit present.      Mental Status: He is alert and oriented to person, place, and time.   Psychiatric:         Mood and Affect: Mood normal.         Behavior: Behavior normal.           ECG 12 Lead    Date/Time: 1/17/2022 3:54 PM  Performed by: Madelyn Márquez APRN  Authorized by: Madelyn Márquez APRN   Comparison: not compared with previous ECG   Rhythm: sinus bradycardia  Rate: bradycardic  Conduction: right bundle branch block  QRS axis: normal    Clinical impression: abnormal EKG            Assessment/Plan   Diagnoses and all orders for this visit:    1. Chest pain, unspecified type (Primary)  -     Troponin T; Future  -     CK Total & CKMB; Future  -     CBC & Differential; Future  -     Comprehensive Metabolic Panel; Future  -     D-dimer, Quantitative; Future  -     Ambulatory Referral to Cardiology  -     ECG 12 Lead    Other orders  -     nitroglycerin (Nitrostat) 0.3 MG SL tablet; Place 1 tablet under the tongue Every 5 (Five) Minutes As Needed for Chest Pain. Take no more than 3 doses in 15 minutes.  Dispense: 50 tablet; Refill: 12          Current Outpatient Medications:   •  amLODIPine (NORVASC) 10 MG tablet, Take 1 tablet by mouth Daily., Disp: 90 tablet, Rfl: 1  •  cetirizine (zyrTEC) 10 MG tablet, Take 1 tablet by mouth Daily., Disp: 90 tablet, Rfl: 1  •  Cholecalciferol (VITAMIN D3) 50 MCG (2000 UT) capsule, Take 2,000 Units by mouth Daily., Disp: , Rfl:   •  famotidine (PEPCID) 20 MG tablet, Take 20 mg by mouth Daily., Disp: , Rfl:   •  ferrous sulfate 325 (65 FE) MG EC tablet, Take 1 tablet by mouth Daily With Breakfast.,  Disp: , Rfl:   •  glimepiride (AMARYL) 1 MG tablet, Take 1 tablet by mouth 2 (Two) Times a Day., Disp: 180 tablet, Rfl: 1  •  hydrALAZINE (APRESOLINE) 50 MG tablet, Take 1 tablet by mouth 3 (Three) Times a Day., Disp: 270 tablet, Rfl: 1  •  HYDROcodone-acetaminophen (NORCO)  MG per tablet, Take 1 tablet by mouth Every 4 (Four) Hours As Needed for Severe Pain ., Disp: 180 tablet, Rfl: 0  •  ipratropium-albuterol (DUO-NEB) 0.5-2.5 mg/3 ml nebulizer, Take 3 mL by nebulization Every 4 (Four) Hours As Needed for Wheezing., Disp: 360 mL, Rfl: 6  •  metoprolol tartrate (LOPRESSOR) 25 MG tablet, Take 1 tablet by mouth 2 (Two) Times a Day., Disp: 180 tablet, Rfl: 1  •  nitroglycerin (Nitrostat) 0.3 MG SL tablet, Place 1 tablet under the tongue Every 5 (Five) Minutes As Needed for Chest Pain. Take no more than 3 doses in 15 minutes., Disp: 50 tablet, Rfl: 12  •  pravastatin (PRAVACHOL) 40 MG tablet, Take 1 tablet by mouth Daily., Disp: 90 tablet, Rfl: 1  •  primidone (MYSOLINE) 50 MG tablet, Take 1 tablet by mouth Every Night., Disp: 30 tablet, Rfl: 2  •  pseudoephedrine (SUDAFED) 60 MG tablet, Take 60 mg by mouth Every 4 (Four) Hours As Needed for Congestion., Disp: , Rfl:   •  ramipril (ALTACE) 5 MG capsule, Take 1 capsule by mouth Every Evening., Disp: 90 capsule, Rfl: 1  •  tamsulosin (FLOMAX) 0.4 MG capsule 24 hr capsule, Take 1 capsule by mouth Daily., Disp: 90 capsule, Rfl: 1  •  torsemide (DEMADEX) 5 MG tablet, Take 1 tablet by mouth Daily., Disp: 90 tablet, Rfl: 1  •  vitamin B-12 (CYANOCOBALAMIN) 1000 MCG tablet, Take 1 tablet by mouth Daily. (Patient taking differently: Take 1,000 mcg by mouth 2 (Two) Times a Day.), Disp: , Rfl:            Madelyn Márquez, APRN1/17/202215:49 EST  This note has been electronically signed

## 2022-01-17 NOTE — PATIENT INSTRUCTIONS
If chest pain episodes become more frequent go to the emergency room at [place]  Cardiology referral for stress test  Labs today  EKG  Use nitroglycerin as needed and monitor blood pressure

## 2022-01-18 DIAGNOSIS — R07.9 CHEST PAIN, UNSPECIFIED TYPE: ICD-10-CM

## 2022-01-18 DIAGNOSIS — R07.9 CHEST PAIN, UNSPECIFIED TYPE: Primary | ICD-10-CM

## 2022-01-18 DIAGNOSIS — R79.89 ELEVATED D-DIMER: ICD-10-CM

## 2022-01-21 DIAGNOSIS — R06.02 SHORTNESS OF BREATH: ICD-10-CM

## 2022-01-21 DIAGNOSIS — R07.9 CHEST PAIN, UNSPECIFIED TYPE: Primary | ICD-10-CM

## 2022-01-21 DIAGNOSIS — R79.89 ELEVATED D-DIMER: ICD-10-CM

## 2022-01-24 DIAGNOSIS — R06.02 SHORTNESS OF BREATH: ICD-10-CM

## 2022-01-24 DIAGNOSIS — R79.89 ELEVATED D-DIMER: ICD-10-CM

## 2022-01-24 DIAGNOSIS — R07.9 CHEST PAIN, UNSPECIFIED TYPE: ICD-10-CM

## 2022-01-25 ENCOUNTER — TELEPHONE (OUTPATIENT)
Dept: FAMILY MEDICINE CLINIC | Facility: CLINIC | Age: 82
End: 2022-01-25

## 2022-01-25 NOTE — TELEPHONE ENCOUNTER
THE PATIENT CALLED IN REQUESTING HIS RESULTS FROM RECENT LABS/IMAGING. PLEASE FOLLOW-UP BY CALLING [phone].

## 2022-01-25 NOTE — TELEPHONE ENCOUNTER
VQ scan and chest x-ray normal  Labs hemoglobin 10.4 but his creatinine was elevated at 2.6 which is very high does he have a nephrologist?

## 2022-01-25 NOTE — TELEPHONE ENCOUNTER
Spoke with pt.  He wants me to call and schedule him an appt with Dr. Lazaro @ [place] or [place].

## 2022-01-26 ENCOUNTER — HOSPITAL ENCOUNTER (INPATIENT)
Facility: HOSPITAL | Age: 82
LOS: 6 days | Discharge: HOME HEALTH | End: 2022-02-01
Attending: EMERGENCY MEDICINE | Admitting: INTERNAL MEDICINE

## 2022-01-26 ENCOUNTER — APPOINTMENT (OUTPATIENT)
Dept: CARDIOLOGY | Facility: HOSPITAL | Age: 82
End: 2022-01-26

## 2022-01-26 ENCOUNTER — APPOINTMENT (OUTPATIENT)
Dept: GENERAL RADIOLOGY | Facility: HOSPITAL | Age: 82
End: 2022-01-26

## 2022-01-26 DIAGNOSIS — R07.9 CHEST PAIN, UNSPECIFIED TYPE: ICD-10-CM

## 2022-01-26 DIAGNOSIS — R09.02 HYPOXIA: ICD-10-CM

## 2022-01-26 DIAGNOSIS — J96.01 ACUTE RESPIRATORY FAILURE WITH HYPOXIA: ICD-10-CM

## 2022-01-26 DIAGNOSIS — N17.9 ACUTE KIDNEY INJURY: ICD-10-CM

## 2022-01-26 DIAGNOSIS — J18.9 PNEUMONIA OF BOTH LUNGS DUE TO INFECTIOUS ORGANISM, UNSPECIFIED PART OF LUNG: Primary | ICD-10-CM

## 2022-01-26 DIAGNOSIS — I20.0 UNSTABLE ANGINA PECTORIS: ICD-10-CM

## 2022-01-26 PROBLEM — R79.89 POSITIVE D DIMER: Status: ACTIVE | Noted: 2022-01-26

## 2022-01-26 PROBLEM — R07.89 CHEST PAIN, ATYPICAL: Status: ACTIVE | Noted: 2022-01-26

## 2022-01-26 PROBLEM — N18.9 ACUTE KIDNEY INJURY SUPERIMPOSED ON CKD: Status: ACTIVE | Noted: 2022-01-26

## 2022-01-26 PROBLEM — R79.89 ELEVATED TROPONIN: Status: ACTIVE | Noted: 2022-01-26

## 2022-01-26 PROBLEM — R77.8 ELEVATED TROPONIN: Status: ACTIVE | Noted: 2022-01-26

## 2022-01-26 PROBLEM — E78.2 MIXED HYPERLIPIDEMIA: Status: ACTIVE | Noted: 2020-01-08

## 2022-01-26 LAB
ALBUMIN SERPL-MCNC: 4.1 G/DL (ref 3.5–5.2)
ALBUMIN/GLOB SERPL: 1.6 G/DL
ALP SERPL-CCNC: 82 U/L (ref 39–117)
ALT SERPL W P-5'-P-CCNC: 10 U/L (ref 1–41)
ANION GAP SERPL CALCULATED.3IONS-SCNC: 14 MMOL/L (ref 5–15)
AST SERPL-CCNC: 16 U/L (ref 1–40)
B PARAPERT DNA SPEC QL NAA+PROBE: NOT DETECTED
B PERT DNA SPEC QL NAA+PROBE: NOT DETECTED
BASOPHILS # BLD AUTO: 0 10*3/MM3 (ref 0–0.2)
BASOPHILS NFR BLD AUTO: 0.4 % (ref 0–1.5)
BILIRUB SERPL-MCNC: 0.4 MG/DL (ref 0–1.2)
BUN SERPL-MCNC: 31 MG/DL (ref 8–23)
BUN/CREAT SERPL: 16.1 (ref 7–25)
C PNEUM DNA NPH QL NAA+NON-PROBE: NOT DETECTED
CALCIUM SPEC-SCNC: 9.8 MG/DL (ref 8.6–10.5)
CHLORIDE SERPL-SCNC: 107 MMOL/L (ref 98–107)
CO2 SERPL-SCNC: 18 MMOL/L (ref 22–29)
CREAT SERPL-MCNC: 1.93 MG/DL (ref 0.76–1.27)
D DIMER PPP FEU-MCNC: 1.1 MG/L (FEU) (ref 0–0.59)
D-LACTATE SERPL-SCNC: 0.6 MMOL/L (ref 0.5–2)
DEPRECATED RDW RBC AUTO: 45.5 FL (ref 37–54)
EOSINOPHIL # BLD AUTO: 0 10*3/MM3 (ref 0–0.4)
EOSINOPHIL NFR BLD AUTO: 0.1 % (ref 0.3–6.2)
ERYTHROCYTE [DISTWIDTH] IN BLOOD BY AUTOMATED COUNT: 14 % (ref 12.3–15.4)
FLUAV SUBTYP SPEC NAA+PROBE: NOT DETECTED
FLUBV RNA ISLT QL NAA+PROBE: NOT DETECTED
GFR SERPL CREATININE-BSD FRML MDRD: 34 ML/MIN/1.73
GLOBULIN UR ELPH-MCNC: 2.6 GM/DL
GLUCOSE BLDC GLUCOMTR-MCNC: 82 MG/DL (ref 70–105)
GLUCOSE BLDC GLUCOMTR-MCNC: 94 MG/DL (ref 70–105)
GLUCOSE SERPL-MCNC: 98 MG/DL (ref 65–99)
HADV DNA SPEC NAA+PROBE: NOT DETECTED
HCOV 229E RNA SPEC QL NAA+PROBE: NOT DETECTED
HCOV HKU1 RNA SPEC QL NAA+PROBE: NOT DETECTED
HCOV NL63 RNA SPEC QL NAA+PROBE: NOT DETECTED
HCOV OC43 RNA SPEC QL NAA+PROBE: NOT DETECTED
HCT VFR BLD AUTO: 33.8 % (ref 37.5–51)
HGB BLD-MCNC: 11.3 G/DL (ref 13–17.7)
HMPV RNA NPH QL NAA+NON-PROBE: NOT DETECTED
HOLD SPECIMEN: NORMAL
HOLD SPECIMEN: NORMAL
HPIV1 RNA ISLT QL NAA+PROBE: NOT DETECTED
HPIV2 RNA SPEC QL NAA+PROBE: NOT DETECTED
HPIV3 RNA NPH QL NAA+PROBE: NOT DETECTED
HPIV4 P GENE NPH QL NAA+PROBE: NOT DETECTED
LYMPHOCYTES # BLD AUTO: 0.4 10*3/MM3 (ref 0.7–3.1)
LYMPHOCYTES NFR BLD AUTO: 3.7 % (ref 19.6–45.3)
M PNEUMO IGG SER IA-ACNC: NOT DETECTED
MCH RBC QN AUTO: 31 PG (ref 26.6–33)
MCHC RBC AUTO-ENTMCNC: 33.4 G/DL (ref 31.5–35.7)
MCV RBC AUTO: 92.8 FL (ref 79–97)
MONOCYTES # BLD AUTO: 0.7 10*3/MM3 (ref 0.1–0.9)
MONOCYTES NFR BLD AUTO: 6.3 % (ref 5–12)
NEUTROPHILS NFR BLD AUTO: 10.1 10*3/MM3 (ref 1.7–7)
NEUTROPHILS NFR BLD AUTO: 89.5 % (ref 42.7–76)
NRBC BLD AUTO-RTO: 0.1 /100 WBC (ref 0–0.2)
PLATELET # BLD AUTO: 232 10*3/MM3 (ref 140–450)
PMV BLD AUTO: 8.2 FL (ref 6–12)
POTASSIUM SERPL-SCNC: 4.6 MMOL/L (ref 3.5–5.2)
PROCALCITONIN SERPL-MCNC: 0.12 NG/ML (ref 0–0.25)
PROT SERPL-MCNC: 6.7 G/DL (ref 6–8.5)
RBC # BLD AUTO: 3.64 10*6/MM3 (ref 4.14–5.8)
RHINOVIRUS RNA SPEC NAA+PROBE: NOT DETECTED
RSV RNA NPH QL NAA+NON-PROBE: NOT DETECTED
SARS-COV-2 RNA NPH QL NAA+NON-PROBE: NOT DETECTED
SODIUM SERPL-SCNC: 139 MMOL/L (ref 136–145)
TROPONIN T SERPL-MCNC: 0.06 NG/ML (ref 0–0.03)
TROPONIN T SERPL-MCNC: 0.3 NG/ML (ref 0–0.03)
WBC NRBC COR # BLD: 11.3 10*3/MM3 (ref 3.4–10.8)
WHOLE BLOOD HOLD SPECIMEN: NORMAL
WHOLE BLOOD HOLD SPECIMEN: NORMAL

## 2022-01-26 PROCEDURE — 83605 ASSAY OF LACTIC ACID: CPT

## 2022-01-26 PROCEDURE — 93306 TTE W/DOPPLER COMPLETE: CPT

## 2022-01-26 PROCEDURE — 87040 BLOOD CULTURE FOR BACTERIA: CPT | Performed by: EMERGENCY MEDICINE

## 2022-01-26 PROCEDURE — 82962 GLUCOSE BLOOD TEST: CPT

## 2022-01-26 PROCEDURE — 93005 ELECTROCARDIOGRAM TRACING: CPT | Performed by: EMERGENCY MEDICINE

## 2022-01-26 PROCEDURE — 83036 HEMOGLOBIN GLYCOSYLATED A1C: CPT | Performed by: NURSE PRACTITIONER

## 2022-01-26 PROCEDURE — 25010000002 ONDANSETRON PER 1 MG: Performed by: NURSE PRACTITIONER

## 2022-01-26 PROCEDURE — 85025 COMPLETE CBC W/AUTO DIFF WBC: CPT | Performed by: EMERGENCY MEDICINE

## 2022-01-26 PROCEDURE — 87147 CULTURE TYPE IMMUNOLOGIC: CPT | Performed by: EMERGENCY MEDICINE

## 2022-01-26 PROCEDURE — 25010000002 CEFTRIAXONE PER 250 MG: Performed by: EMERGENCY MEDICINE

## 2022-01-26 PROCEDURE — 99284 EMERGENCY DEPT VISIT MOD MDM: CPT

## 2022-01-26 PROCEDURE — 84145 PROCALCITONIN (PCT): CPT | Performed by: EMERGENCY MEDICINE

## 2022-01-26 PROCEDURE — 0202U NFCT DS 22 TRGT SARS-COV-2: CPT | Performed by: EMERGENCY MEDICINE

## 2022-01-26 PROCEDURE — 84484 ASSAY OF TROPONIN QUANT: CPT | Performed by: NURSE PRACTITIONER

## 2022-01-26 PROCEDURE — 80053 COMPREHEN METABOLIC PANEL: CPT | Performed by: EMERGENCY MEDICINE

## 2022-01-26 PROCEDURE — 93306 TTE W/DOPPLER COMPLETE: CPT | Performed by: INTERNAL MEDICINE

## 2022-01-26 PROCEDURE — 87150 DNA/RNA AMPLIFIED PROBE: CPT | Performed by: EMERGENCY MEDICINE

## 2022-01-26 PROCEDURE — 25010000002 ENOXAPARIN PER 10 MG: Performed by: EMERGENCY MEDICINE

## 2022-01-26 PROCEDURE — 85379 FIBRIN DEGRADATION QUANT: CPT | Performed by: EMERGENCY MEDICINE

## 2022-01-26 PROCEDURE — 36415 COLL VENOUS BLD VENIPUNCTURE: CPT | Performed by: NURSE PRACTITIONER

## 2022-01-26 PROCEDURE — 84484 ASSAY OF TROPONIN QUANT: CPT | Performed by: EMERGENCY MEDICINE

## 2022-01-26 PROCEDURE — 25010000002 AZITHROMYCIN PER 500 MG: Performed by: EMERGENCY MEDICINE

## 2022-01-26 PROCEDURE — 71045 X-RAY EXAM CHEST 1 VIEW: CPT

## 2022-01-26 RX ORDER — NITROGLYCERIN 0.4 MG/1
0.4 TABLET SUBLINGUAL
Status: DISCONTINUED | OUTPATIENT
Start: 2022-01-26 | End: 2022-02-01 | Stop reason: HOSPADM

## 2022-01-26 RX ORDER — SODIUM CHLORIDE, SODIUM LACTATE, POTASSIUM CHLORIDE, CALCIUM CHLORIDE 600; 310; 30; 20 MG/100ML; MG/100ML; MG/100ML; MG/100ML
100 INJECTION, SOLUTION INTRAVENOUS CONTINUOUS
Status: DISCONTINUED | OUTPATIENT
Start: 2022-01-26 | End: 2022-01-27

## 2022-01-26 RX ORDER — NICOTINE POLACRILEX 4 MG
15 LOZENGE BUCCAL
Status: DISCONTINUED | OUTPATIENT
Start: 2022-01-26 | End: 2022-02-01 | Stop reason: HOSPADM

## 2022-01-26 RX ORDER — DEXTROSE MONOHYDRATE 25 G/50ML
25 INJECTION, SOLUTION INTRAVENOUS
Status: DISCONTINUED | OUTPATIENT
Start: 2022-01-26 | End: 2022-02-01 | Stop reason: HOSPADM

## 2022-01-26 RX ORDER — OLANZAPINE 10 MG/2ML
1 INJECTION, POWDER, LYOPHILIZED, FOR SOLUTION INTRAMUSCULAR AS NEEDED
Status: DISCONTINUED | OUTPATIENT
Start: 2022-01-26 | End: 2022-01-26 | Stop reason: SDUPTHER

## 2022-01-26 RX ORDER — ATORVASTATIN CALCIUM 10 MG/1
10 TABLET, FILM COATED ORAL DAILY
Refills: 1 | Status: DISCONTINUED | OUTPATIENT
Start: 2022-01-27 | End: 2022-02-01 | Stop reason: HOSPADM

## 2022-01-26 RX ORDER — ACETAMINOPHEN 650 MG/1
650 SUPPOSITORY RECTAL EVERY 4 HOURS PRN
Status: DISCONTINUED | OUTPATIENT
Start: 2022-01-26 | End: 2022-02-01 | Stop reason: HOSPADM

## 2022-01-26 RX ORDER — SODIUM CHLORIDE 9 MG/ML
100 INJECTION, SOLUTION INTRAVENOUS CONTINUOUS
Status: DISCONTINUED | OUTPATIENT
Start: 2022-01-26 | End: 2022-01-26

## 2022-01-26 RX ORDER — HYDRALAZINE HYDROCHLORIDE 25 MG/1
50 TABLET, FILM COATED ORAL 3 TIMES DAILY
Status: DISCONTINUED | OUTPATIENT
Start: 2022-01-26 | End: 2022-01-28

## 2022-01-26 RX ORDER — SODIUM CHLORIDE 0.9 % (FLUSH) 0.9 %
10 SYRINGE (ML) INJECTION AS NEEDED
Status: DISCONTINUED | OUTPATIENT
Start: 2022-01-26 | End: 2022-02-01 | Stop reason: HOSPADM

## 2022-01-26 RX ORDER — INSULIN LISPRO 100 [IU]/ML
0-7 INJECTION, SOLUTION INTRAVENOUS; SUBCUTANEOUS
Status: DISCONTINUED | OUTPATIENT
Start: 2022-01-27 | End: 2022-01-26 | Stop reason: SDUPTHER

## 2022-01-26 RX ORDER — AMLODIPINE BESYLATE 5 MG/1
10 TABLET ORAL DAILY
Status: DISCONTINUED | OUTPATIENT
Start: 2022-01-27 | End: 2022-02-01 | Stop reason: HOSPADM

## 2022-01-26 RX ORDER — CETIRIZINE HYDROCHLORIDE 10 MG/1
10 TABLET ORAL DAILY
Status: DISCONTINUED | OUTPATIENT
Start: 2022-01-27 | End: 2022-02-01 | Stop reason: HOSPADM

## 2022-01-26 RX ORDER — RAMIPRIL 5 MG/1
5 CAPSULE ORAL NIGHTLY
Status: DISCONTINUED | OUTPATIENT
Start: 2022-01-26 | End: 2022-01-27

## 2022-01-26 RX ORDER — TORSEMIDE 10 MG/1
5 TABLET ORAL DAILY
Status: DISCONTINUED | OUTPATIENT
Start: 2022-01-27 | End: 2022-01-27

## 2022-01-26 RX ORDER — ASPIRIN 81 MG/1
81 TABLET ORAL ONCE
Status: DISCONTINUED | OUTPATIENT
Start: 2022-01-26 | End: 2022-01-31 | Stop reason: SDUPTHER

## 2022-01-26 RX ORDER — CHOLECALCIFEROL (VITAMIN D3) 125 MCG
5 CAPSULE ORAL NIGHTLY PRN
Status: DISCONTINUED | OUTPATIENT
Start: 2022-01-26 | End: 2022-02-01 | Stop reason: HOSPADM

## 2022-01-26 RX ORDER — FAMOTIDINE 20 MG/1
20 TABLET, FILM COATED ORAL DAILY
Status: DISCONTINUED | OUTPATIENT
Start: 2022-01-27 | End: 2022-02-01 | Stop reason: HOSPADM

## 2022-01-26 RX ORDER — SODIUM CHLORIDE 0.9 % (FLUSH) 0.9 %
3 SYRINGE (ML) INJECTION EVERY 12 HOURS SCHEDULED
Status: DISCONTINUED | OUTPATIENT
Start: 2022-01-26 | End: 2022-02-01 | Stop reason: HOSPADM

## 2022-01-26 RX ORDER — OLANZAPINE 10 MG/2ML
1 INJECTION, POWDER, LYOPHILIZED, FOR SOLUTION INTRAMUSCULAR AS NEEDED
Status: DISCONTINUED | OUTPATIENT
Start: 2022-01-26 | End: 2022-02-01 | Stop reason: HOSPADM

## 2022-01-26 RX ORDER — PRIMIDONE 50 MG/1
50 TABLET ORAL NIGHTLY
Status: DISCONTINUED | OUTPATIENT
Start: 2022-01-26 | End: 2022-02-01 | Stop reason: HOSPADM

## 2022-01-26 RX ORDER — ACETAMINOPHEN 325 MG/1
650 TABLET ORAL EVERY 4 HOURS PRN
Status: DISCONTINUED | OUTPATIENT
Start: 2022-01-26 | End: 2022-02-01 | Stop reason: HOSPADM

## 2022-01-26 RX ORDER — INSULIN LISPRO 100 [IU]/ML
0-7 INJECTION, SOLUTION INTRAVENOUS; SUBCUTANEOUS
Status: DISCONTINUED | OUTPATIENT
Start: 2022-01-26 | End: 2022-02-01 | Stop reason: HOSPADM

## 2022-01-26 RX ORDER — INSULIN LISPRO 100 [IU]/ML
0-7 INJECTION, SOLUTION INTRAVENOUS; SUBCUTANEOUS AS NEEDED
Status: DISCONTINUED | OUTPATIENT
Start: 2022-01-26 | End: 2022-01-26 | Stop reason: SDUPTHER

## 2022-01-26 RX ORDER — ASPIRIN 81 MG/1
81 TABLET ORAL DAILY
Status: DISCONTINUED | OUTPATIENT
Start: 2022-01-27 | End: 2022-02-01 | Stop reason: HOSPADM

## 2022-01-26 RX ORDER — FERROUS SULFATE TAB EC 324 MG (65 MG FE EQUIVALENT) 324 (65 FE) MG
325 TABLET DELAYED RESPONSE ORAL
Status: DISCONTINUED | OUTPATIENT
Start: 2022-01-27 | End: 2022-02-01 | Stop reason: HOSPADM

## 2022-01-26 RX ORDER — ONDANSETRON 4 MG/1
4 TABLET, FILM COATED ORAL EVERY 6 HOURS PRN
Status: DISCONTINUED | OUTPATIENT
Start: 2022-01-26 | End: 2022-02-01 | Stop reason: HOSPADM

## 2022-01-26 RX ORDER — SODIUM CHLORIDE 0.9 % (FLUSH) 0.9 %
3-10 SYRINGE (ML) INJECTION AS NEEDED
Status: DISCONTINUED | OUTPATIENT
Start: 2022-01-26 | End: 2022-02-01 | Stop reason: HOSPADM

## 2022-01-26 RX ORDER — MELATONIN
2000 DAILY
Status: DISCONTINUED | OUTPATIENT
Start: 2022-01-27 | End: 2022-02-01 | Stop reason: HOSPADM

## 2022-01-26 RX ORDER — IPRATROPIUM BROMIDE AND ALBUTEROL SULFATE 2.5; .5 MG/3ML; MG/3ML
3 SOLUTION RESPIRATORY (INHALATION) EVERY 4 HOURS PRN
Status: DISCONTINUED | OUTPATIENT
Start: 2022-01-26 | End: 2022-02-01 | Stop reason: HOSPADM

## 2022-01-26 RX ORDER — LANOLIN ALCOHOL/MO/W.PET/CERES
1000 CREAM (GRAM) TOPICAL DAILY
Status: DISCONTINUED | OUTPATIENT
Start: 2022-01-27 | End: 2022-02-01 | Stop reason: HOSPADM

## 2022-01-26 RX ORDER — TAMSULOSIN HYDROCHLORIDE 0.4 MG/1
0.4 CAPSULE ORAL DAILY
Status: DISCONTINUED | OUTPATIENT
Start: 2022-01-27 | End: 2022-02-01 | Stop reason: HOSPADM

## 2022-01-26 RX ORDER — IPRATROPIUM BROMIDE AND ALBUTEROL SULFATE 2.5; .5 MG/3ML; MG/3ML
3 SOLUTION RESPIRATORY (INHALATION) EVERY 4 HOURS PRN
Status: DISCONTINUED | OUTPATIENT
Start: 2022-01-26 | End: 2022-01-26 | Stop reason: SDUPTHER

## 2022-01-26 RX ORDER — ACETAMINOPHEN 160 MG/5ML
650 SOLUTION ORAL EVERY 4 HOURS PRN
Status: DISCONTINUED | OUTPATIENT
Start: 2022-01-26 | End: 2022-02-01 | Stop reason: HOSPADM

## 2022-01-26 RX ORDER — INSULIN LISPRO 100 [IU]/ML
0-7 INJECTION, SOLUTION INTRAVENOUS; SUBCUTANEOUS AS NEEDED
Status: DISCONTINUED | OUTPATIENT
Start: 2022-01-26 | End: 2022-02-01 | Stop reason: HOSPADM

## 2022-01-26 RX ORDER — HYDROCODONE BITARTRATE AND ACETAMINOPHEN 10; 325 MG/1; MG/1
1 TABLET ORAL EVERY 4 HOURS PRN
Status: DISCONTINUED | OUTPATIENT
Start: 2022-01-26 | End: 2022-02-01 | Stop reason: HOSPADM

## 2022-01-26 RX ORDER — ONDANSETRON 2 MG/ML
4 INJECTION INTRAMUSCULAR; INTRAVENOUS EVERY 6 HOURS PRN
Status: DISCONTINUED | OUTPATIENT
Start: 2022-01-26 | End: 2022-02-01 | Stop reason: HOSPADM

## 2022-01-26 RX ORDER — DEXTROSE MONOHYDRATE 25 G/50ML
25 INJECTION, SOLUTION INTRAVENOUS
Status: DISCONTINUED | OUTPATIENT
Start: 2022-01-26 | End: 2022-01-26 | Stop reason: SDUPTHER

## 2022-01-26 RX ADMIN — HYDRALAZINE HYDROCHLORIDE 50 MG: 25 TABLET, FILM COATED ORAL at 21:13

## 2022-01-26 RX ADMIN — METOPROLOL TARTRATE 25 MG: 25 TABLET, FILM COATED ORAL at 21:13

## 2022-01-26 RX ADMIN — ACETAMINOPHEN 650 MG: 325 TABLET, FILM COATED ORAL at 21:13

## 2022-01-26 RX ADMIN — PRIMIDONE 50 MG: 50 TABLET ORAL at 21:13

## 2022-01-26 RX ADMIN — SODIUM CHLORIDE 500 ML: 9 INJECTION, SOLUTION INTRAVENOUS at 17:32

## 2022-01-26 RX ADMIN — ENOXAPARIN SODIUM 70 MG: 100 INJECTION SUBCUTANEOUS at 16:34

## 2022-01-26 RX ADMIN — AZITHROMYCIN MONOHYDRATE 500 MG: 500 INJECTION, POWDER, LYOPHILIZED, FOR SOLUTION INTRAVENOUS at 16:42

## 2022-01-26 RX ADMIN — SODIUM CHLORIDE, PRESERVATIVE FREE 3 ML: 5 INJECTION INTRAVENOUS at 21:15

## 2022-01-26 RX ADMIN — CEFTRIAXONE 1 G: 10 INJECTION, POWDER, FOR SOLUTION INTRAVENOUS at 16:39

## 2022-01-26 RX ADMIN — SODIUM CHLORIDE, POTASSIUM CHLORIDE, SODIUM LACTATE AND CALCIUM CHLORIDE 100 ML/HR: 600; 310; 30; 20 INJECTION, SOLUTION INTRAVENOUS at 21:15

## 2022-01-26 RX ADMIN — ONDANSETRON 4 MG: 2 INJECTION INTRAMUSCULAR; INTRAVENOUS at 17:37

## 2022-01-26 RX ADMIN — RAMIPRIL 5 MG: 5 CAPSULE ORAL at 21:13

## 2022-01-26 NOTE — TELEPHONE ENCOUNTER
Spoke with pt and gave him appt info for Dr. Lazaro Nephrologist.    2/9/22 @ 11:30 [place] office [address] Faxed records to their office.  SG

## 2022-01-27 ENCOUNTER — APPOINTMENT (OUTPATIENT)
Dept: ULTRASOUND IMAGING | Facility: HOSPITAL | Age: 82
End: 2022-01-27

## 2022-01-27 ENCOUNTER — APPOINTMENT (OUTPATIENT)
Dept: CARDIOLOGY | Facility: HOSPITAL | Age: 82
End: 2022-01-27

## 2022-01-27 PROBLEM — J96.01 ACUTE RESPIRATORY FAILURE WITH HYPOXIA (HCC): Status: ACTIVE | Noted: 2022-01-27

## 2022-01-27 PROBLEM — A41.9 SEPSIS WITHOUT ACUTE ORGAN DYSFUNCTION: Status: ACTIVE | Noted: 2022-01-27

## 2022-01-27 LAB
ANION GAP SERPL CALCULATED.3IONS-SCNC: 9 MMOL/L (ref 5–15)
APTT PPP: 26.8 SECONDS (ref 61–76.5)
APTT PPP: 29.1 SECONDS (ref 61–76.5)
APTT PPP: 44.1 SECONDS (ref 61–76.5)
BACTERIA BLD CULT: ABNORMAL
BACTERIA UR QL AUTO: ABNORMAL /HPF
BASOPHILS # BLD AUTO: 0 10*3/MM3 (ref 0–0.2)
BASOPHILS # BLD AUTO: 0.1 10*3/MM3 (ref 0–0.2)
BASOPHILS NFR BLD AUTO: 0.4 % (ref 0–1.5)
BASOPHILS NFR BLD AUTO: 0.6 % (ref 0–1.5)
BH CV LOWER VASCULAR LEFT COMMON FEMORAL AUGMENT: NORMAL
BH CV LOWER VASCULAR LEFT COMMON FEMORAL COMPETENT: NORMAL
BH CV LOWER VASCULAR LEFT COMMON FEMORAL COMPRESS: NORMAL
BH CV LOWER VASCULAR LEFT COMMON FEMORAL PHASIC: NORMAL
BH CV LOWER VASCULAR LEFT COMMON FEMORAL SPONT: NORMAL
BH CV LOWER VASCULAR LEFT DISTAL FEMORAL COMPRESS: NORMAL
BH CV LOWER VASCULAR LEFT GASTRONEMIUS COMPRESS: NORMAL
BH CV LOWER VASCULAR LEFT GREATER SAPH AK COMPRESS: NORMAL
BH CV LOWER VASCULAR LEFT GREATER SAPH BK COMPRESS: NORMAL
BH CV LOWER VASCULAR LEFT LESSER SAPH COMPRESS: NORMAL
BH CV LOWER VASCULAR LEFT MID FEMORAL AUGMENT: NORMAL
BH CV LOWER VASCULAR LEFT MID FEMORAL COMPETENT: NORMAL
BH CV LOWER VASCULAR LEFT MID FEMORAL COMPRESS: NORMAL
BH CV LOWER VASCULAR LEFT MID FEMORAL PHASIC: NORMAL
BH CV LOWER VASCULAR LEFT MID FEMORAL SPONT: NORMAL
BH CV LOWER VASCULAR LEFT PERONEAL COMPRESS: NORMAL
BH CV LOWER VASCULAR LEFT POPLITEAL AUGMENT: NORMAL
BH CV LOWER VASCULAR LEFT POPLITEAL COMPETENT: NORMAL
BH CV LOWER VASCULAR LEFT POPLITEAL COMPRESS: NORMAL
BH CV LOWER VASCULAR LEFT POPLITEAL PHASIC: NORMAL
BH CV LOWER VASCULAR LEFT POPLITEAL SPONT: NORMAL
BH CV LOWER VASCULAR LEFT POSTERIOR TIBIAL COMPRESS: NORMAL
BH CV LOWER VASCULAR LEFT PROXIMAL FEMORAL COMPRESS: NORMAL
BH CV LOWER VASCULAR LEFT SAPHENOFEMORAL JUNCTION COMPRESS: NORMAL
BH CV LOWER VASCULAR RIGHT COMMON FEMORAL AUGMENT: NORMAL
BH CV LOWER VASCULAR RIGHT COMMON FEMORAL COMPETENT: NORMAL
BH CV LOWER VASCULAR RIGHT COMMON FEMORAL COMPRESS: NORMAL
BH CV LOWER VASCULAR RIGHT COMMON FEMORAL PHASIC: NORMAL
BH CV LOWER VASCULAR RIGHT COMMON FEMORAL SPONT: NORMAL
BH CV LOWER VASCULAR RIGHT DISTAL FEMORAL COMPRESS: NORMAL
BH CV LOWER VASCULAR RIGHT GASTRONEMIUS COMPRESS: NORMAL
BH CV LOWER VASCULAR RIGHT GREATER SAPH AK COMPRESS: NORMAL
BH CV LOWER VASCULAR RIGHT GREATER SAPH BK COMPRESS: NORMAL
BH CV LOWER VASCULAR RIGHT LESSER SAPH COMPRESS: NORMAL
BH CV LOWER VASCULAR RIGHT MID FEMORAL AUGMENT: NORMAL
BH CV LOWER VASCULAR RIGHT MID FEMORAL COMPETENT: NORMAL
BH CV LOWER VASCULAR RIGHT MID FEMORAL COMPRESS: NORMAL
BH CV LOWER VASCULAR RIGHT MID FEMORAL PHASIC: NORMAL
BH CV LOWER VASCULAR RIGHT MID FEMORAL SPONT: NORMAL
BH CV LOWER VASCULAR RIGHT PERONEAL COMPRESS: NORMAL
BH CV LOWER VASCULAR RIGHT POPLITEAL AUGMENT: NORMAL
BH CV LOWER VASCULAR RIGHT POPLITEAL COMPETENT: NORMAL
BH CV LOWER VASCULAR RIGHT POPLITEAL COMPRESS: NORMAL
BH CV LOWER VASCULAR RIGHT POPLITEAL PHASIC: NORMAL
BH CV LOWER VASCULAR RIGHT POPLITEAL SPONT: NORMAL
BH CV LOWER VASCULAR RIGHT POSTERIOR TIBIAL COMPRESS: NORMAL
BH CV LOWER VASCULAR RIGHT PROXIMAL FEMORAL COMPRESS: NORMAL
BH CV LOWER VASCULAR RIGHT SAPHENOFEMORAL JUNCTION COMPRESS: NORMAL
BILIRUB UR QL STRIP: NEGATIVE
BOTTLE TYPE: ABNORMAL
BUN SERPL-MCNC: 30 MG/DL (ref 8–23)
BUN/CREAT SERPL: 17.6 (ref 7–25)
CALCIUM SPEC-SCNC: 9.1 MG/DL (ref 8.6–10.5)
CHLORIDE SERPL-SCNC: 107 MMOL/L (ref 98–107)
CLARITY UR: CLEAR
CO2 SERPL-SCNC: 21 MMOL/L (ref 22–29)
COLOR UR: YELLOW
CREAT SERPL-MCNC: 1.7 MG/DL (ref 0.76–1.27)
CREAT UR-MCNC: 82.5 MG/DL
DEPRECATED RDW RBC AUTO: 44.2 FL (ref 37–54)
DEPRECATED RDW RBC AUTO: 45.9 FL (ref 37–54)
EOSINOPHIL # BLD AUTO: 0 10*3/MM3 (ref 0–0.4)
EOSINOPHIL # BLD AUTO: 0.1 10*3/MM3 (ref 0–0.4)
EOSINOPHIL NFR BLD AUTO: 0.3 % (ref 0.3–6.2)
EOSINOPHIL NFR BLD AUTO: 0.6 % (ref 0.3–6.2)
ERYTHROCYTE [DISTWIDTH] IN BLOOD BY AUTOMATED COUNT: 13.4 % (ref 12.3–15.4)
ERYTHROCYTE [DISTWIDTH] IN BLOOD BY AUTOMATED COUNT: 13.9 % (ref 12.3–15.4)
GFR SERPL CREATININE-BSD FRML MDRD: 39 ML/MIN/1.73
GLUCOSE BLDC GLUCOMTR-MCNC: 104 MG/DL (ref 70–105)
GLUCOSE BLDC GLUCOMTR-MCNC: 147 MG/DL (ref 70–105)
GLUCOSE BLDC GLUCOMTR-MCNC: 222 MG/DL (ref 70–105)
GLUCOSE BLDC GLUCOMTR-MCNC: 70 MG/DL (ref 70–105)
GLUCOSE BLDC GLUCOMTR-MCNC: 86 MG/DL (ref 70–105)
GLUCOSE SERPL-MCNC: 61 MG/DL (ref 65–99)
GLUCOSE UR STRIP-MCNC: NEGATIVE MG/DL
HBA1C MFR BLD: 5.4 % (ref 3.5–5.6)
HCT VFR BLD AUTO: 28.5 % (ref 37.5–51)
HCT VFR BLD AUTO: 29.7 % (ref 37.5–51)
HGB BLD-MCNC: 10 G/DL (ref 13–17.7)
HGB BLD-MCNC: 9.6 G/DL (ref 13–17.7)
HGB UR QL STRIP.AUTO: NEGATIVE
HYALINE CASTS UR QL AUTO: ABNORMAL /LPF
INR PPP: 1.02 (ref 0.93–1.1)
KETONES UR QL STRIP: NEGATIVE
LEUKOCYTE ESTERASE UR QL STRIP.AUTO: NEGATIVE
LYMPHOCYTES # BLD AUTO: 0.6 10*3/MM3 (ref 0.7–3.1)
LYMPHOCYTES # BLD AUTO: 0.7 10*3/MM3 (ref 0.7–3.1)
LYMPHOCYTES NFR BLD AUTO: 6.3 % (ref 19.6–45.3)
LYMPHOCYTES NFR BLD AUTO: 7.7 % (ref 19.6–45.3)
MAGNESIUM SERPL-MCNC: 2.2 MG/DL (ref 1.6–2.4)
MAXIMAL PREDICTED HEART RATE: 139 BPM
MCH RBC QN AUTO: 31 PG (ref 26.6–33)
MCH RBC QN AUTO: 31.3 PG (ref 26.6–33)
MCHC RBC AUTO-ENTMCNC: 33.5 G/DL (ref 31.5–35.7)
MCHC RBC AUTO-ENTMCNC: 33.6 G/DL (ref 31.5–35.7)
MCV RBC AUTO: 92.6 FL (ref 79–97)
MCV RBC AUTO: 93.1 FL (ref 79–97)
MONOCYTES # BLD AUTO: 0.8 10*3/MM3 (ref 0.1–0.9)
MONOCYTES # BLD AUTO: 0.9 10*3/MM3 (ref 0.1–0.9)
MONOCYTES NFR BLD AUTO: 10.4 % (ref 5–12)
MONOCYTES NFR BLD AUTO: 8.6 % (ref 5–12)
NEUTROPHILS NFR BLD AUTO: 7 10*3/MM3 (ref 1.7–7)
NEUTROPHILS NFR BLD AUTO: 8.1 10*3/MM3 (ref 1.7–7)
NEUTROPHILS NFR BLD AUTO: 81.2 % (ref 42.7–76)
NEUTROPHILS NFR BLD AUTO: 83.9 % (ref 42.7–76)
NITRITE UR QL STRIP: NEGATIVE
NRBC BLD AUTO-RTO: 0 /100 WBC (ref 0–0.2)
NRBC BLD AUTO-RTO: 0.1 /100 WBC (ref 0–0.2)
NT-PROBNP SERPL-MCNC: 4664 PG/ML (ref 0–1800)
PH UR STRIP.AUTO: <=5 [PH] (ref 5–8)
PLATELET # BLD AUTO: 200 10*3/MM3 (ref 140–450)
PLATELET # BLD AUTO: 221 10*3/MM3 (ref 140–450)
PMV BLD AUTO: 7.8 FL (ref 6–12)
PMV BLD AUTO: 8.2 FL (ref 6–12)
POTASSIUM SERPL-SCNC: 4.5 MMOL/L (ref 3.5–5.2)
PROT ?TM UR-MCNC: 46.3 MG/DL
PROT UR QL STRIP: ABNORMAL
PROTHROMBIN TIME: 11.3 SECONDS (ref 9.6–11.7)
RBC # BLD AUTO: 3.06 10*6/MM3 (ref 4.14–5.8)
RBC # BLD AUTO: 3.21 10*6/MM3 (ref 4.14–5.8)
RBC # UR STRIP: ABNORMAL /HPF
REF LAB TEST METHOD: ABNORMAL
SODIUM SERPL-SCNC: 137 MMOL/L (ref 136–145)
SODIUM UR-SCNC: 83 MMOL/L
SP GR UR STRIP: 1.02 (ref 1–1.03)
SQUAMOUS #/AREA URNS HPF: ABNORMAL /HPF
STRESS TARGET HR: 118 BPM
TROPONIN T SERPL-MCNC: 0.38 NG/ML (ref 0–0.03)
TROPONIN T SERPL-MCNC: 0.4 NG/ML (ref 0–0.03)
TROPONIN T SERPL-MCNC: 0.4 NG/ML (ref 0–0.03)
UROBILINOGEN UR QL STRIP: ABNORMAL
WBC # UR STRIP: ABNORMAL /HPF
WBC NRBC COR # BLD: 8.7 10*3/MM3 (ref 3.4–10.8)
WBC NRBC COR # BLD: 9.7 10*3/MM3 (ref 3.4–10.8)

## 2022-01-27 PROCEDURE — 84484 ASSAY OF TROPONIN QUANT: CPT | Performed by: INTERNAL MEDICINE

## 2022-01-27 PROCEDURE — 87205 SMEAR GRAM STAIN: CPT | Performed by: INTERNAL MEDICINE

## 2022-01-27 PROCEDURE — 76775 US EXAM ABDO BACK WALL LIM: CPT

## 2022-01-27 PROCEDURE — 25010000002 HEPARIN (PORCINE) PER 1000 UNITS: Performed by: INTERNAL MEDICINE

## 2022-01-27 PROCEDURE — 82962 GLUCOSE BLOOD TEST: CPT

## 2022-01-27 PROCEDURE — 93970 EXTREMITY STUDY: CPT

## 2022-01-27 PROCEDURE — 85025 COMPLETE CBC W/AUTO DIFF WBC: CPT | Performed by: INTERNAL MEDICINE

## 2022-01-27 PROCEDURE — 25010000002 FUROSEMIDE PER 20 MG: Performed by: INTERNAL MEDICINE

## 2022-01-27 PROCEDURE — 85730 THROMBOPLASTIN TIME PARTIAL: CPT | Performed by: INTERNAL MEDICINE

## 2022-01-27 PROCEDURE — 80048 BASIC METABOLIC PNL TOTAL CA: CPT | Performed by: NURSE PRACTITIONER

## 2022-01-27 PROCEDURE — 83880 ASSAY OF NATRIURETIC PEPTIDE: CPT | Performed by: NURSE PRACTITIONER

## 2022-01-27 PROCEDURE — 87070 CULTURE OTHR SPECIMN AEROBIC: CPT | Performed by: INTERNAL MEDICINE

## 2022-01-27 PROCEDURE — 85025 COMPLETE CBC W/AUTO DIFF WBC: CPT | Performed by: NURSE PRACTITIONER

## 2022-01-27 PROCEDURE — 99233 SBSQ HOSP IP/OBS HIGH 50: CPT | Performed by: INTERNAL MEDICINE

## 2022-01-27 PROCEDURE — 84156 ASSAY OF PROTEIN URINE: CPT | Performed by: INTERNAL MEDICINE

## 2022-01-27 PROCEDURE — 94640 AIRWAY INHALATION TREATMENT: CPT

## 2022-01-27 PROCEDURE — 81001 URINALYSIS AUTO W/SCOPE: CPT | Performed by: INTERNAL MEDICINE

## 2022-01-27 PROCEDURE — 94799 UNLISTED PULMONARY SVC/PX: CPT

## 2022-01-27 PROCEDURE — 25010000002 CEFTRIAXONE PER 250 MG: Performed by: INTERNAL MEDICINE

## 2022-01-27 PROCEDURE — 82570 ASSAY OF URINE CREATININE: CPT | Performed by: INTERNAL MEDICINE

## 2022-01-27 PROCEDURE — 83735 ASSAY OF MAGNESIUM: CPT | Performed by: NURSE PRACTITIONER

## 2022-01-27 PROCEDURE — 84300 ASSAY OF URINE SODIUM: CPT | Performed by: INTERNAL MEDICINE

## 2022-01-27 PROCEDURE — 99222 1ST HOSP IP/OBS MODERATE 55: CPT | Performed by: INTERNAL MEDICINE

## 2022-01-27 PROCEDURE — 85610 PROTHROMBIN TIME: CPT | Performed by: INTERNAL MEDICINE

## 2022-01-27 RX ORDER — SODIUM CHLORIDE 9 MG/ML
100 INJECTION, SOLUTION INTRAVENOUS CONTINUOUS
Status: DISCONTINUED | OUTPATIENT
Start: 2022-01-28 | End: 2022-01-27

## 2022-01-27 RX ORDER — FUROSEMIDE 10 MG/ML
40 INJECTION INTRAMUSCULAR; INTRAVENOUS EVERY 8 HOURS SCHEDULED
Status: DISCONTINUED | OUTPATIENT
Start: 2022-01-27 | End: 2022-01-28

## 2022-01-27 RX ORDER — AZITHROMYCIN 250 MG/1
500 TABLET, FILM COATED ORAL
Status: COMPLETED | OUTPATIENT
Start: 2022-01-27 | End: 2022-01-30

## 2022-01-27 RX ORDER — HEPARIN SOD,PORCINE/0.9 % NACL 25000/250
12 INTRAVENOUS SOLUTION INTRAVENOUS
Status: DISCONTINUED | OUTPATIENT
Start: 2022-01-27 | End: 2022-02-01 | Stop reason: HOSPADM

## 2022-01-27 RX ORDER — IPRATROPIUM BROMIDE AND ALBUTEROL SULFATE 2.5; .5 MG/3ML; MG/3ML
3 SOLUTION RESPIRATORY (INHALATION)
Status: DISCONTINUED | OUTPATIENT
Start: 2022-01-27 | End: 2022-02-01 | Stop reason: HOSPADM

## 2022-01-27 RX ADMIN — HEPARIN SODIUM 12 UNITS/KG/HR: 5000 INJECTION, SOLUTION INTRAVENOUS; SUBCUTANEOUS at 10:37

## 2022-01-27 RX ADMIN — HYDROCODONE BITARTRATE AND ACETAMINOPHEN 1 TABLET: 10; 325 TABLET ORAL at 10:04

## 2022-01-27 RX ADMIN — METOPROLOL TARTRATE 25 MG: 25 TABLET, FILM COATED ORAL at 08:45

## 2022-01-27 RX ADMIN — CETIRIZINE HYDROCHLORIDE 10 MG: 10 TABLET, FILM COATED ORAL at 08:45

## 2022-01-27 RX ADMIN — HYDRALAZINE HYDROCHLORIDE 50 MG: 25 TABLET, FILM COATED ORAL at 08:45

## 2022-01-27 RX ADMIN — HYDROCODONE BITARTRATE AND ACETAMINOPHEN 1 TABLET: 10; 325 TABLET ORAL at 19:46

## 2022-01-27 RX ADMIN — SODIUM CHLORIDE, POTASSIUM CHLORIDE, SODIUM LACTATE AND CALCIUM CHLORIDE 100 ML/HR: 600; 310; 30; 20 INJECTION, SOLUTION INTRAVENOUS at 06:33

## 2022-01-27 RX ADMIN — SODIUM CHLORIDE, PRESERVATIVE FREE 3 ML: 5 INJECTION INTRAVENOUS at 08:45

## 2022-01-27 RX ADMIN — AMLODIPINE BESYLATE 10 MG: 5 TABLET ORAL at 08:45

## 2022-01-27 RX ADMIN — ASPIRIN 81 MG: 81 TABLET, COATED ORAL at 08:46

## 2022-01-27 RX ADMIN — FERROUS SULFATE TAB EC 324 MG (65 MG FE EQUIVALENT) 325 MG: 324 (65 FE) TABLET DELAYED RESPONSE at 08:45

## 2022-01-27 RX ADMIN — Medication 5 MG: at 20:37

## 2022-01-27 RX ADMIN — TAMSULOSIN HYDROCHLORIDE 0.4 MG: 0.4 CAPSULE ORAL at 08:45

## 2022-01-27 RX ADMIN — PRIMIDONE 50 MG: 50 TABLET ORAL at 20:38

## 2022-01-27 RX ADMIN — CEFTRIAXONE SODIUM 1 G: 1 INJECTION, POWDER, FOR SOLUTION INTRAMUSCULAR; INTRAVENOUS at 15:04

## 2022-01-27 RX ADMIN — SODIUM CHLORIDE, PRESERVATIVE FREE 3 ML: 5 INJECTION INTRAVENOUS at 20:37

## 2022-01-27 RX ADMIN — CYANOCOBALAMIN TAB 1000 MCG 1000 MCG: 1000 TAB at 08:45

## 2022-01-27 RX ADMIN — ATORVASTATIN CALCIUM 10 MG: 10 TABLET, FILM COATED ORAL at 08:45

## 2022-01-27 RX ADMIN — NITROGLYCERIN 1 INCH: 20 OINTMENT TOPICAL at 16:29

## 2022-01-27 RX ADMIN — NITROGLYCERIN 1 INCH: 20 OINTMENT TOPICAL at 10:00

## 2022-01-27 RX ADMIN — FAMOTIDINE 20 MG: 20 TABLET ORAL at 08:45

## 2022-01-27 RX ADMIN — FUROSEMIDE 40 MG: 10 INJECTION INTRAMUSCULAR; INTRAVENOUS at 20:37

## 2022-01-27 RX ADMIN — FUROSEMIDE 40 MG: 10 INJECTION INTRAMUSCULAR; INTRAVENOUS at 12:18

## 2022-01-27 RX ADMIN — INSULIN LISPRO 0 UNITS: 100 INJECTION, SOLUTION INTRAVENOUS; SUBCUTANEOUS at 16:32

## 2022-01-27 RX ADMIN — Medication 2000 UNITS: at 08:45

## 2022-01-27 RX ADMIN — IPRATROPIUM BROMIDE AND ALBUTEROL SULFATE 3 ML: 2.5; .5 SOLUTION RESPIRATORY (INHALATION) at 15:54

## 2022-01-27 RX ADMIN — NITROGLYCERIN 1 INCH: 20 OINTMENT TOPICAL at 21:59

## 2022-01-27 RX ADMIN — IPRATROPIUM BROMIDE AND ALBUTEROL SULFATE 3 ML: 2.5; .5 SOLUTION RESPIRATORY (INHALATION) at 20:30

## 2022-01-27 RX ADMIN — HYDRALAZINE HYDROCHLORIDE 50 MG: 25 TABLET, FILM COATED ORAL at 16:30

## 2022-01-27 RX ADMIN — AZITHROMYCIN DIHYDRATE 500 MG: 250 TABLET, FILM COATED ORAL at 12:18

## 2022-01-28 ENCOUNTER — APPOINTMENT (OUTPATIENT)
Dept: GENERAL RADIOLOGY | Facility: HOSPITAL | Age: 82
End: 2022-01-28

## 2022-01-28 LAB
ACT BLD: 148 SECONDS (ref 89–137)
ACT BLD: 154 SECONDS (ref 89–137)
ACT BLD: 166 SECONDS (ref 89–137)
ACT BLD: 220 SECONDS (ref 89–137)
ALBUMIN SERPL-MCNC: 3.2 G/DL (ref 3.5–5.2)
ALBUMIN/GLOB SERPL: 1.4 G/DL
ALP SERPL-CCNC: 60 U/L (ref 39–117)
ALT SERPL W P-5'-P-CCNC: 23 U/L (ref 1–41)
ANION GAP SERPL CALCULATED.3IONS-SCNC: 12 MMOL/L (ref 5–15)
APTT PPP: 33 SECONDS (ref 61–76.5)
APTT PPP: 61.4 SECONDS (ref 61–76.5)
AST SERPL-CCNC: 32 U/L (ref 1–40)
BACTERIA SPEC AEROBE CULT: ABNORMAL
BASOPHILS # BLD AUTO: 0.1 10*3/MM3 (ref 0–0.2)
BASOPHILS NFR BLD AUTO: 1.4 % (ref 0–1.5)
BILIRUB SERPL-MCNC: 0.2 MG/DL (ref 0–1.2)
BUN SERPL-MCNC: 31 MG/DL (ref 8–23)
BUN/CREAT SERPL: 16.4 (ref 7–25)
CALCIUM SPEC-SCNC: 8.9 MG/DL (ref 8.6–10.5)
CHLORIDE SERPL-SCNC: 107 MMOL/L (ref 98–107)
CO2 SERPL-SCNC: 22 MMOL/L (ref 22–29)
CREAT SERPL-MCNC: 1.89 MG/DL (ref 0.76–1.27)
DEPRECATED RDW RBC AUTO: 45.9 FL (ref 37–54)
EOSINOPHIL # BLD AUTO: 0.2 10*3/MM3 (ref 0–0.4)
EOSINOPHIL NFR BLD AUTO: 2 % (ref 0.3–6.2)
ERYTHROCYTE [DISTWIDTH] IN BLOOD BY AUTOMATED COUNT: 14 % (ref 12.3–15.4)
GFR SERPL CREATININE-BSD FRML MDRD: 34 ML/MIN/1.73
GLOBULIN UR ELPH-MCNC: 2.3 GM/DL
GLUCOSE BLDC GLUCOMTR-MCNC: 137 MG/DL (ref 70–105)
GLUCOSE BLDC GLUCOMTR-MCNC: 141 MG/DL (ref 70–105)
GLUCOSE SERPL-MCNC: 135 MG/DL (ref 65–99)
GRAM STN SPEC: ABNORMAL
HCT VFR BLD AUTO: 26.9 % (ref 37.5–51)
HGB BLD-MCNC: 9 G/DL (ref 13–17.7)
INR PPP: 1.04 (ref 0.93–1.1)
IRON 24H UR-MRATE: 11 MCG/DL (ref 59–158)
IRON SATN MFR SERPL: 6 % (ref 20–50)
ISOLATED FROM: ABNORMAL
LYMPHOCYTES # BLD AUTO: 0.7 10*3/MM3 (ref 0.7–3.1)
LYMPHOCYTES NFR BLD AUTO: 9.5 % (ref 19.6–45.3)
MAGNESIUM SERPL-MCNC: 1.8 MG/DL (ref 1.6–2.4)
MCH RBC QN AUTO: 31.1 PG (ref 26.6–33)
MCHC RBC AUTO-ENTMCNC: 33.6 G/DL (ref 31.5–35.7)
MCV RBC AUTO: 92.6 FL (ref 79–97)
MONOCYTES # BLD AUTO: 0.7 10*3/MM3 (ref 0.1–0.9)
MONOCYTES NFR BLD AUTO: 9.4 % (ref 5–12)
NEUTROPHILS NFR BLD AUTO: 6 10*3/MM3 (ref 1.7–7)
NEUTROPHILS NFR BLD AUTO: 77.7 % (ref 42.7–76)
NRBC BLD AUTO-RTO: 0.1 /100 WBC (ref 0–0.2)
NT-PROBNP SERPL-MCNC: 3742 PG/ML (ref 0–1800)
PHOSPHATE SERPL-MCNC: 3.3 MG/DL (ref 2.5–4.5)
PLATELET # BLD AUTO: 189 10*3/MM3 (ref 140–450)
PMV BLD AUTO: 7.8 FL (ref 6–12)
POTASSIUM SERPL-SCNC: 4 MMOL/L (ref 3.5–5.2)
PROT SERPL-MCNC: 5.5 G/DL (ref 6–8.5)
PROTHROMBIN TIME: 11.5 SECONDS (ref 9.6–11.7)
QT INTERVAL: 375 MS
RBC # BLD AUTO: 2.9 10*6/MM3 (ref 4.14–5.8)
SODIUM SERPL-SCNC: 141 MMOL/L (ref 136–145)
TIBC SERPL-MCNC: 197 MCG/DL (ref 298–536)
TRANSFERRIN SERPL-MCNC: 132 MG/DL (ref 200–360)
WBC NRBC COR # BLD: 7.7 10*3/MM3 (ref 3.4–10.8)

## 2022-01-28 PROCEDURE — 93005 ELECTROCARDIOGRAM TRACING: CPT | Performed by: INTERNAL MEDICINE

## 2022-01-28 PROCEDURE — 25010000002 CEFTRIAXONE PER 250 MG: Performed by: INTERNAL MEDICINE

## 2022-01-28 PROCEDURE — 25010000002 EPTIFIBATIDE 20 MG/10ML SOLUTION: Performed by: INTERNAL MEDICINE

## 2022-01-28 PROCEDURE — 4A023N7 MEASUREMENT OF CARDIAC SAMPLING AND PRESSURE, LEFT HEART, PERCUTANEOUS APPROACH: ICD-10-PCS | Performed by: INTERNAL MEDICINE

## 2022-01-28 PROCEDURE — 71045 X-RAY EXAM CHEST 1 VIEW: CPT

## 2022-01-28 PROCEDURE — 83735 ASSAY OF MAGNESIUM: CPT | Performed by: INTERNAL MEDICINE

## 2022-01-28 PROCEDURE — 25010000002 MIDAZOLAM PER 1 MG: Performed by: INTERNAL MEDICINE

## 2022-01-28 PROCEDURE — B2151ZZ FLUOROSCOPY OF LEFT HEART USING LOW OSMOLAR CONTRAST: ICD-10-PCS | Performed by: INTERNAL MEDICINE

## 2022-01-28 PROCEDURE — 25010000002 FUROSEMIDE PER 20 MG: Performed by: INTERNAL MEDICINE

## 2022-01-28 PROCEDURE — 25010000002 FENTANYL CITRATE (PF) 100 MCG/2ML SOLUTION: Performed by: INTERNAL MEDICINE

## 2022-01-28 PROCEDURE — 85610 PROTHROMBIN TIME: CPT | Performed by: NURSE PRACTITIONER

## 2022-01-28 PROCEDURE — 99232 SBSQ HOSP IP/OBS MODERATE 35: CPT | Performed by: INTERNAL MEDICINE

## 2022-01-28 PROCEDURE — 0 IOPAMIDOL PER 1 ML: Performed by: INTERNAL MEDICINE

## 2022-01-28 PROCEDURE — 99233 SBSQ HOSP IP/OBS HIGH 50: CPT | Performed by: INTERNAL MEDICINE

## 2022-01-28 PROCEDURE — C1769 GUIDE WIRE: HCPCS | Performed by: INTERNAL MEDICINE

## 2022-01-28 PROCEDURE — C1725 CATH, TRANSLUMIN NON-LASER: HCPCS | Performed by: INTERNAL MEDICINE

## 2022-01-28 PROCEDURE — 99153 MOD SED SAME PHYS/QHP EA: CPT | Performed by: INTERNAL MEDICINE

## 2022-01-28 PROCEDURE — 84466 ASSAY OF TRANSFERRIN: CPT | Performed by: INTERNAL MEDICINE

## 2022-01-28 PROCEDURE — 85025 COMPLETE CBC W/AUTO DIFF WBC: CPT | Performed by: NURSE PRACTITIONER

## 2022-01-28 PROCEDURE — 84100 ASSAY OF PHOSPHORUS: CPT | Performed by: INTERNAL MEDICINE

## 2022-01-28 PROCEDURE — 93458 L HRT ARTERY/VENTRICLE ANGIO: CPT | Performed by: INTERNAL MEDICINE

## 2022-01-28 PROCEDURE — C9600 PERC DRUG-EL COR STENT SING: HCPCS | Performed by: INTERNAL MEDICINE

## 2022-01-28 PROCEDURE — C1874 STENT, COATED/COV W/DEL SYS: HCPCS | Performed by: INTERNAL MEDICINE

## 2022-01-28 PROCEDURE — 82962 GLUCOSE BLOOD TEST: CPT

## 2022-01-28 PROCEDURE — 83540 ASSAY OF IRON: CPT | Performed by: INTERNAL MEDICINE

## 2022-01-28 PROCEDURE — 80053 COMPREHEN METABOLIC PANEL: CPT | Performed by: INTERNAL MEDICINE

## 2022-01-28 PROCEDURE — 25010000002 HEPARIN (PORCINE) PER 1000 UNITS: Performed by: INTERNAL MEDICINE

## 2022-01-28 PROCEDURE — 99152 MOD SED SAME PHYS/QHP 5/>YRS: CPT | Performed by: INTERNAL MEDICINE

## 2022-01-28 PROCEDURE — 85730 THROMBOPLASTIN TIME PARTIAL: CPT | Performed by: INTERNAL MEDICINE

## 2022-01-28 PROCEDURE — 83880 ASSAY OF NATRIURETIC PEPTIDE: CPT | Performed by: INTERNAL MEDICINE

## 2022-01-28 PROCEDURE — 92928 PRQ TCAT PLMT NTRAC ST 1 LES: CPT | Performed by: INTERNAL MEDICINE

## 2022-01-28 PROCEDURE — 85347 COAGULATION TIME ACTIVATED: CPT

## 2022-01-28 PROCEDURE — C1887 CATHETER, GUIDING: HCPCS | Performed by: INTERNAL MEDICINE

## 2022-01-28 PROCEDURE — C1894 INTRO/SHEATH, NON-LASER: HCPCS | Performed by: INTERNAL MEDICINE

## 2022-01-28 PROCEDURE — B2111ZZ FLUOROSCOPY OF MULTIPLE CORONARY ARTERIES USING LOW OSMOLAR CONTRAST: ICD-10-PCS | Performed by: INTERNAL MEDICINE

## 2022-01-28 PROCEDURE — 027034Z DILATION OF CORONARY ARTERY, ONE ARTERY WITH DRUG-ELUTING INTRALUMINAL DEVICE, PERCUTANEOUS APPROACH: ICD-10-PCS | Performed by: INTERNAL MEDICINE

## 2022-01-28 PROCEDURE — 94799 UNLISTED PULMONARY SVC/PX: CPT

## 2022-01-28 DEVICE — XIENCE SKYPOINT™ EVEROLIMUS ELUTING CORONARY STENT SYSTEM 4.00 MM X 28 MM / RAPID-EXCHANGE
Type: IMPLANTABLE DEVICE | Site: CORONARY | Status: FUNCTIONAL
Brand: XIENCE SKYPOINT™

## 2022-01-28 RX ORDER — HEPARIN SODIUM 1000 [USP'U]/ML
INJECTION, SOLUTION INTRAVENOUS; SUBCUTANEOUS AS NEEDED
Status: DISCONTINUED | OUTPATIENT
Start: 2022-01-28 | End: 2022-01-28 | Stop reason: HOSPADM

## 2022-01-28 RX ORDER — LIDOCAINE HYDROCHLORIDE 20 MG/ML
INJECTION, SOLUTION INFILTRATION; PERINEURAL AS NEEDED
Status: DISCONTINUED | OUTPATIENT
Start: 2022-01-28 | End: 2022-01-28 | Stop reason: HOSPADM

## 2022-01-28 RX ORDER — CLOPIDOGREL 300 MG/1
TABLET, FILM COATED ORAL AS NEEDED
Status: DISCONTINUED | OUTPATIENT
Start: 2022-01-28 | End: 2022-01-28 | Stop reason: HOSPADM

## 2022-01-28 RX ORDER — DIPHENHYDRAMINE HCL 25 MG
25 CAPSULE ORAL EVERY 6 HOURS PRN
Status: DISCONTINUED | OUTPATIENT
Start: 2022-01-28 | End: 2022-02-01 | Stop reason: HOSPADM

## 2022-01-28 RX ORDER — FENTANYL CITRATE 50 UG/ML
INJECTION, SOLUTION INTRAMUSCULAR; INTRAVENOUS AS NEEDED
Status: DISCONTINUED | OUTPATIENT
Start: 2022-01-28 | End: 2022-01-28 | Stop reason: HOSPADM

## 2022-01-28 RX ORDER — ASPIRIN 325 MG
TABLET ORAL AS NEEDED
Status: DISCONTINUED | OUTPATIENT
Start: 2022-01-28 | End: 2022-01-28 | Stop reason: HOSPADM

## 2022-01-28 RX ORDER — CLOPIDOGREL BISULFATE 75 MG/1
75 TABLET ORAL DAILY
Status: DISCONTINUED | OUTPATIENT
Start: 2022-01-29 | End: 2022-02-01 | Stop reason: HOSPADM

## 2022-01-28 RX ORDER — EPTIFIBATIDE 2 MG/ML
INJECTION, SOLUTION INTRAVENOUS AS NEEDED
Status: DISCONTINUED | OUTPATIENT
Start: 2022-01-28 | End: 2022-01-28 | Stop reason: HOSPADM

## 2022-01-28 RX ORDER — ONDANSETRON 4 MG/1
4 TABLET, FILM COATED ORAL EVERY 6 HOURS PRN
Status: DISCONTINUED | OUTPATIENT
Start: 2022-01-28 | End: 2022-01-31 | Stop reason: SDUPTHER

## 2022-01-28 RX ORDER — SODIUM CHLORIDE 9 MG/ML
250 INJECTION, SOLUTION INTRAVENOUS ONCE AS NEEDED
Status: DISCONTINUED | OUTPATIENT
Start: 2022-01-28 | End: 2022-02-01 | Stop reason: HOSPADM

## 2022-01-28 RX ORDER — ISOSORBIDE MONONITRATE 60 MG/1
60 TABLET, EXTENDED RELEASE ORAL
Status: DISCONTINUED | OUTPATIENT
Start: 2022-01-28 | End: 2022-02-01 | Stop reason: HOSPADM

## 2022-01-28 RX ORDER — SODIUM CHLORIDE 9 MG/ML
100 INJECTION, SOLUTION INTRAVENOUS CONTINUOUS
Status: DISCONTINUED | OUTPATIENT
Start: 2022-01-28 | End: 2022-01-29

## 2022-01-28 RX ORDER — MIDAZOLAM HYDROCHLORIDE 1 MG/ML
INJECTION INTRAMUSCULAR; INTRAVENOUS AS NEEDED
Status: DISCONTINUED | OUTPATIENT
Start: 2022-01-28 | End: 2022-01-28 | Stop reason: HOSPADM

## 2022-01-28 RX ORDER — FUROSEMIDE 10 MG/ML
60 INJECTION INTRAMUSCULAR; INTRAVENOUS EVERY 8 HOURS SCHEDULED
Status: DISCONTINUED | OUTPATIENT
Start: 2022-01-28 | End: 2022-01-28

## 2022-01-28 RX ORDER — HYDRALAZINE HYDROCHLORIDE 25 MG/1
75 TABLET, FILM COATED ORAL 3 TIMES DAILY
Status: DISCONTINUED | OUTPATIENT
Start: 2022-01-28 | End: 2022-02-01 | Stop reason: HOSPADM

## 2022-01-28 RX ORDER — ONDANSETRON 2 MG/ML
4 INJECTION INTRAMUSCULAR; INTRAVENOUS EVERY 6 HOURS PRN
Status: DISCONTINUED | OUTPATIENT
Start: 2022-01-28 | End: 2022-01-31 | Stop reason: SDUPTHER

## 2022-01-28 RX ADMIN — SODIUM CHLORIDE 100 ML/HR: 9 INJECTION, SOLUTION INTRAVENOUS at 21:27

## 2022-01-28 RX ADMIN — HYDROCODONE BITARTRATE AND ACETAMINOPHEN 1 TABLET: 10; 325 TABLET ORAL at 09:34

## 2022-01-28 RX ADMIN — AMLODIPINE BESYLATE 10 MG: 5 TABLET ORAL at 09:33

## 2022-01-28 RX ADMIN — Medication 2000 UNITS: at 09:34

## 2022-01-28 RX ADMIN — ISOSORBIDE MONONITRATE 60 MG: 60 TABLET, EXTENDED RELEASE ORAL at 21:23

## 2022-01-28 RX ADMIN — CYANOCOBALAMIN TAB 1000 MCG 1000 MCG: 1000 TAB at 09:34

## 2022-01-28 RX ADMIN — PRIMIDONE 50 MG: 50 TABLET ORAL at 22:11

## 2022-01-28 RX ADMIN — HYDROCODONE BITARTRATE AND ACETAMINOPHEN 1 TABLET: 10; 325 TABLET ORAL at 17:57

## 2022-01-28 RX ADMIN — CEFTRIAXONE SODIUM 1 G: 1 INJECTION, POWDER, FOR SOLUTION INTRAMUSCULAR; INTRAVENOUS at 22:11

## 2022-01-28 RX ADMIN — HYDRALAZINE HYDROCHLORIDE 50 MG: 25 TABLET, FILM COATED ORAL at 09:33

## 2022-01-28 RX ADMIN — FAMOTIDINE 20 MG: 20 TABLET ORAL at 09:34

## 2022-01-28 RX ADMIN — AZITHROMYCIN DIHYDRATE 500 MG: 250 TABLET, FILM COATED ORAL at 12:30

## 2022-01-28 RX ADMIN — IPRATROPIUM BROMIDE AND ALBUTEROL SULFATE 3 ML: 2.5; .5 SOLUTION RESPIRATORY (INHALATION) at 07:16

## 2022-01-28 RX ADMIN — SODIUM CHLORIDE, PRESERVATIVE FREE 3 ML: 5 INJECTION INTRAVENOUS at 21:27

## 2022-01-28 RX ADMIN — HEPARIN SODIUM 16 UNITS/KG/HR: 5000 INJECTION, SOLUTION INTRAVENOUS; SUBCUTANEOUS at 02:49

## 2022-01-28 RX ADMIN — HYDRALAZINE HYDROCHLORIDE 75 MG: 25 TABLET, FILM COATED ORAL at 21:22

## 2022-01-28 RX ADMIN — IPRATROPIUM BROMIDE AND ALBUTEROL SULFATE 3 ML: 2.5; .5 SOLUTION RESPIRATORY (INHALATION) at 18:52

## 2022-01-28 RX ADMIN — TAMSULOSIN HYDROCHLORIDE 0.4 MG: 0.4 CAPSULE ORAL at 09:34

## 2022-01-28 RX ADMIN — SODIUM CHLORIDE 100 ML/HR: 9 INJECTION, SOLUTION INTRAVENOUS at 12:30

## 2022-01-28 RX ADMIN — CETIRIZINE HYDROCHLORIDE 10 MG: 10 TABLET, FILM COATED ORAL at 09:34

## 2022-01-28 RX ADMIN — HYDROCODONE BITARTRATE AND ACETAMINOPHEN 1 TABLET: 10; 325 TABLET ORAL at 22:11

## 2022-01-28 RX ADMIN — IPRATROPIUM BROMIDE AND ALBUTEROL SULFATE 3 ML: 2.5; .5 SOLUTION RESPIRATORY (INHALATION) at 14:50

## 2022-01-28 RX ADMIN — NITROGLYCERIN 1 INCH: 20 OINTMENT TOPICAL at 04:55

## 2022-01-28 RX ADMIN — ATORVASTATIN CALCIUM 10 MG: 10 TABLET, FILM COATED ORAL at 09:34

## 2022-01-28 RX ADMIN — NITROGLYCERIN 1 INCH: 20 OINTMENT TOPICAL at 09:39

## 2022-01-28 RX ADMIN — METOPROLOL TARTRATE 25 MG: 25 TABLET, FILM COATED ORAL at 09:34

## 2022-01-28 RX ADMIN — FERROUS SULFATE TAB EC 324 MG (65 MG FE EQUIVALENT) 325 MG: 324 (65 FE) TABLET DELAYED RESPONSE at 09:33

## 2022-01-28 RX ADMIN — SODIUM CHLORIDE, PRESERVATIVE FREE 3 ML: 5 INJECTION INTRAVENOUS at 09:38

## 2022-01-28 RX ADMIN — FUROSEMIDE 40 MG: 10 INJECTION INTRAMUSCULAR; INTRAVENOUS at 04:55

## 2022-01-28 RX ADMIN — METOPROLOL TARTRATE 25 MG: 25 TABLET, FILM COATED ORAL at 21:22

## 2022-01-28 RX ADMIN — IPRATROPIUM BROMIDE AND ALBUTEROL SULFATE 3 ML: 2.5; .5 SOLUTION RESPIRATORY (INHALATION) at 10:55

## 2022-01-29 LAB
ANION GAP SERPL CALCULATED.3IONS-SCNC: 11 MMOL/L (ref 5–15)
APTT PPP: 24.8 SECONDS (ref 61–76.5)
BACTERIA SPEC RESP CULT: NORMAL
BASOPHILS # BLD AUTO: 0.1 10*3/MM3 (ref 0–0.2)
BASOPHILS NFR BLD AUTO: 0.6 % (ref 0–1.5)
BUN SERPL-MCNC: 30 MG/DL (ref 8–23)
BUN/CREAT SERPL: 17.5 (ref 7–25)
CALCIUM SPEC-SCNC: 9.2 MG/DL (ref 8.6–10.5)
CHLORIDE SERPL-SCNC: 106 MMOL/L (ref 98–107)
CHOLEST SERPL-MCNC: 115 MG/DL (ref 0–200)
CO2 SERPL-SCNC: 23 MMOL/L (ref 22–29)
CREAT SERPL-MCNC: 1.71 MG/DL (ref 0.76–1.27)
DEPRECATED RDW RBC AUTO: 45.1 FL (ref 37–54)
EOSINOPHIL # BLD AUTO: 0.4 10*3/MM3 (ref 0–0.4)
EOSINOPHIL NFR BLD AUTO: 4 % (ref 0.3–6.2)
ERYTHROCYTE [DISTWIDTH] IN BLOOD BY AUTOMATED COUNT: 13.8 % (ref 12.3–15.4)
GFR SERPL CREATININE-BSD FRML MDRD: 39 ML/MIN/1.73
GLUCOSE BLDC GLUCOMTR-MCNC: 150 MG/DL (ref 70–105)
GLUCOSE BLDC GLUCOMTR-MCNC: 177 MG/DL (ref 70–105)
GLUCOSE BLDC GLUCOMTR-MCNC: 183 MG/DL (ref 70–105)
GLUCOSE BLDC GLUCOMTR-MCNC: 248 MG/DL (ref 70–105)
GLUCOSE SERPL-MCNC: 145 MG/DL (ref 65–99)
GRAM STN SPEC: NORMAL
HCT VFR BLD AUTO: 27.9 % (ref 37.5–51)
HDLC SERPL-MCNC: 39 MG/DL (ref 40–60)
HGB BLD-MCNC: 9.2 G/DL (ref 13–17.7)
LDLC SERPL CALC-MCNC: 56 MG/DL (ref 0–100)
LDLC/HDLC SERPL: 1.38 {RATIO}
LYMPHOCYTES # BLD AUTO: 0.7 10*3/MM3 (ref 0.7–3.1)
LYMPHOCYTES NFR BLD AUTO: 7.3 % (ref 19.6–45.3)
MCH RBC QN AUTO: 30.8 PG (ref 26.6–33)
MCHC RBC AUTO-ENTMCNC: 33 G/DL (ref 31.5–35.7)
MCV RBC AUTO: 93.3 FL (ref 79–97)
MONOCYTES # BLD AUTO: 0.8 10*3/MM3 (ref 0.1–0.9)
MONOCYTES NFR BLD AUTO: 8.4 % (ref 5–12)
NEUTROPHILS NFR BLD AUTO: 7.4 10*3/MM3 (ref 1.7–7)
NEUTROPHILS NFR BLD AUTO: 79.7 % (ref 42.7–76)
NRBC BLD AUTO-RTO: 0.1 /100 WBC (ref 0–0.2)
PLATELET # BLD AUTO: 267 10*3/MM3 (ref 140–450)
PMV BLD AUTO: 7.8 FL (ref 6–12)
POTASSIUM SERPL-SCNC: 4.6 MMOL/L (ref 3.5–5.2)
RBC # BLD AUTO: 2.99 10*6/MM3 (ref 4.14–5.8)
SODIUM SERPL-SCNC: 140 MMOL/L (ref 136–145)
TRIGL SERPL-MCNC: 111 MG/DL (ref 0–150)
VLDLC SERPL-MCNC: 20 MG/DL (ref 5–40)
WBC NRBC COR # BLD: 9.3 10*3/MM3 (ref 3.4–10.8)

## 2022-01-29 PROCEDURE — 80061 LIPID PANEL: CPT | Performed by: INTERNAL MEDICINE

## 2022-01-29 PROCEDURE — 82962 GLUCOSE BLOOD TEST: CPT

## 2022-01-29 PROCEDURE — 94799 UNLISTED PULMONARY SVC/PX: CPT

## 2022-01-29 PROCEDURE — 85730 THROMBOPLASTIN TIME PARTIAL: CPT | Performed by: INTERNAL MEDICINE

## 2022-01-29 PROCEDURE — 85025 COMPLETE CBC W/AUTO DIFF WBC: CPT | Performed by: INTERNAL MEDICINE

## 2022-01-29 PROCEDURE — 94760 N-INVAS EAR/PLS OXIMETRY 1: CPT

## 2022-01-29 PROCEDURE — 80048 BASIC METABOLIC PNL TOTAL CA: CPT | Performed by: INTERNAL MEDICINE

## 2022-01-29 PROCEDURE — 99233 SBSQ HOSP IP/OBS HIGH 50: CPT | Performed by: INTERNAL MEDICINE

## 2022-01-29 PROCEDURE — 93005 ELECTROCARDIOGRAM TRACING: CPT | Performed by: INTERNAL MEDICINE

## 2022-01-29 PROCEDURE — 63710000001 INSULIN LISPRO (HUMAN) PER 5 UNITS: Performed by: INTERNAL MEDICINE

## 2022-01-29 PROCEDURE — 36415 COLL VENOUS BLD VENIPUNCTURE: CPT | Performed by: INTERNAL MEDICINE

## 2022-01-29 PROCEDURE — 99232 SBSQ HOSP IP/OBS MODERATE 35: CPT | Performed by: INTERNAL MEDICINE

## 2022-01-29 PROCEDURE — 25010000002 CEFTRIAXONE PER 250 MG: Performed by: INTERNAL MEDICINE

## 2022-01-29 RX ADMIN — INSULIN LISPRO 2 UNITS: 100 INJECTION, SOLUTION INTRAVENOUS; SUBCUTANEOUS at 17:09

## 2022-01-29 RX ADMIN — METOPROLOL TARTRATE 25 MG: 25 TABLET, FILM COATED ORAL at 08:45

## 2022-01-29 RX ADMIN — IPRATROPIUM BROMIDE AND ALBUTEROL SULFATE 3 ML: 2.5; .5 SOLUTION RESPIRATORY (INHALATION) at 18:40

## 2022-01-29 RX ADMIN — CEFTRIAXONE SODIUM 1 G: 1 INJECTION, POWDER, FOR SOLUTION INTRAMUSCULAR; INTRAVENOUS at 20:00

## 2022-01-29 RX ADMIN — ISOSORBIDE MONONITRATE 60 MG: 60 TABLET, EXTENDED RELEASE ORAL at 08:45

## 2022-01-29 RX ADMIN — ATORVASTATIN CALCIUM 10 MG: 10 TABLET, FILM COATED ORAL at 08:45

## 2022-01-29 RX ADMIN — TAMSULOSIN HYDROCHLORIDE 0.4 MG: 0.4 CAPSULE ORAL at 08:44

## 2022-01-29 RX ADMIN — HYDRALAZINE HYDROCHLORIDE 75 MG: 25 TABLET, FILM COATED ORAL at 08:45

## 2022-01-29 RX ADMIN — HYDRALAZINE HYDROCHLORIDE 75 MG: 25 TABLET, FILM COATED ORAL at 17:09

## 2022-01-29 RX ADMIN — IPRATROPIUM BROMIDE AND ALBUTEROL SULFATE 3 ML: 2.5; .5 SOLUTION RESPIRATORY (INHALATION) at 10:16

## 2022-01-29 RX ADMIN — INSULIN LISPRO 2 UNITS: 100 INJECTION, SOLUTION INTRAVENOUS; SUBCUTANEOUS at 08:46

## 2022-01-29 RX ADMIN — ASPIRIN 81 MG: 81 TABLET, COATED ORAL at 08:45

## 2022-01-29 RX ADMIN — Medication 2000 UNITS: at 08:44

## 2022-01-29 RX ADMIN — HYDROCODONE BITARTRATE AND ACETAMINOPHEN 1 TABLET: 10; 325 TABLET ORAL at 08:57

## 2022-01-29 RX ADMIN — CETIRIZINE HYDROCHLORIDE 10 MG: 10 TABLET, FILM COATED ORAL at 08:45

## 2022-01-29 RX ADMIN — AZITHROMYCIN DIHYDRATE 500 MG: 250 TABLET, FILM COATED ORAL at 12:55

## 2022-01-29 RX ADMIN — INSULIN LISPRO 3 UNITS: 100 INJECTION, SOLUTION INTRAVENOUS; SUBCUTANEOUS at 12:55

## 2022-01-29 RX ADMIN — SODIUM CHLORIDE, PRESERVATIVE FREE 3 ML: 5 INJECTION INTRAVENOUS at 20:01

## 2022-01-29 RX ADMIN — HYDROCODONE BITARTRATE AND ACETAMINOPHEN 1 TABLET: 10; 325 TABLET ORAL at 20:00

## 2022-01-29 RX ADMIN — PRIMIDONE 50 MG: 50 TABLET ORAL at 20:00

## 2022-01-29 RX ADMIN — FERROUS SULFATE TAB EC 324 MG (65 MG FE EQUIVALENT) 325 MG: 324 (65 FE) TABLET DELAYED RESPONSE at 08:44

## 2022-01-29 RX ADMIN — CLOPIDOGREL BISULFATE 75 MG: 75 TABLET, FILM COATED ORAL at 08:44

## 2022-01-29 RX ADMIN — IPRATROPIUM BROMIDE AND ALBUTEROL SULFATE 3 ML: 2.5; .5 SOLUTION RESPIRATORY (INHALATION) at 07:55

## 2022-01-29 RX ADMIN — AMLODIPINE BESYLATE 10 MG: 5 TABLET ORAL at 08:45

## 2022-01-29 RX ADMIN — IPRATROPIUM BROMIDE AND ALBUTEROL SULFATE 3 ML: 2.5; .5 SOLUTION RESPIRATORY (INHALATION) at 15:05

## 2022-01-29 RX ADMIN — FAMOTIDINE 20 MG: 20 TABLET ORAL at 08:45

## 2022-01-29 RX ADMIN — HYDRALAZINE HYDROCHLORIDE 75 MG: 25 TABLET, FILM COATED ORAL at 20:00

## 2022-01-29 RX ADMIN — METOPROLOL TARTRATE 25 MG: 25 TABLET, FILM COATED ORAL at 20:00

## 2022-01-29 RX ADMIN — SODIUM CHLORIDE, PRESERVATIVE FREE 3 ML: 5 INJECTION INTRAVENOUS at 08:47

## 2022-01-29 RX ADMIN — CYANOCOBALAMIN TAB 1000 MCG 1000 MCG: 1000 TAB at 08:44

## 2022-01-30 LAB
ANION GAP SERPL CALCULATED.3IONS-SCNC: 12 MMOL/L (ref 5–15)
APTT PPP: 25.8 SECONDS (ref 61–76.5)
BASOPHILS # BLD AUTO: 0.1 10*3/MM3 (ref 0–0.2)
BASOPHILS NFR BLD AUTO: 0.7 % (ref 0–1.5)
BUN SERPL-MCNC: 26 MG/DL (ref 8–23)
BUN/CREAT SERPL: 18.7 (ref 7–25)
CALCIUM SPEC-SCNC: 9 MG/DL (ref 8.6–10.5)
CHLORIDE SERPL-SCNC: 108 MMOL/L (ref 98–107)
CO2 SERPL-SCNC: 21 MMOL/L (ref 22–29)
CREAT SERPL-MCNC: 1.39 MG/DL (ref 0.76–1.27)
DEPRECATED RDW RBC AUTO: 45.1 FL (ref 37–54)
EOSINOPHIL # BLD AUTO: 0.3 10*3/MM3 (ref 0–0.4)
EOSINOPHIL NFR BLD AUTO: 4.4 % (ref 0.3–6.2)
ERYTHROCYTE [DISTWIDTH] IN BLOOD BY AUTOMATED COUNT: 13.8 % (ref 12.3–15.4)
GFR SERPL CREATININE-BSD FRML MDRD: 49 ML/MIN/1.73
GLUCOSE BLDC GLUCOMTR-MCNC: 147 MG/DL (ref 70–105)
GLUCOSE BLDC GLUCOMTR-MCNC: 155 MG/DL (ref 70–105)
GLUCOSE BLDC GLUCOMTR-MCNC: 223 MG/DL (ref 70–105)
GLUCOSE BLDC GLUCOMTR-MCNC: 275 MG/DL (ref 70–105)
GLUCOSE SERPL-MCNC: 144 MG/DL (ref 65–99)
HCT VFR BLD AUTO: 26.9 % (ref 37.5–51)
HGB BLD-MCNC: 8.7 G/DL (ref 13–17.7)
HOLD SPECIMEN: NORMAL
LYMPHOCYTES # BLD AUTO: 0.7 10*3/MM3 (ref 0.7–3.1)
LYMPHOCYTES NFR BLD AUTO: 9.2 % (ref 19.6–45.3)
MCH RBC QN AUTO: 30 PG (ref 26.6–33)
MCHC RBC AUTO-ENTMCNC: 32.5 G/DL (ref 31.5–35.7)
MCV RBC AUTO: 92.4 FL (ref 79–97)
MONOCYTES # BLD AUTO: 0.7 10*3/MM3 (ref 0.1–0.9)
MONOCYTES NFR BLD AUTO: 9.6 % (ref 5–12)
NEUTROPHILS NFR BLD AUTO: 5.7 10*3/MM3 (ref 1.7–7)
NEUTROPHILS NFR BLD AUTO: 76.1 % (ref 42.7–76)
NRBC BLD AUTO-RTO: 0.1 /100 WBC (ref 0–0.2)
PLATELET # BLD AUTO: 250 10*3/MM3 (ref 140–450)
PMV BLD AUTO: 7.6 FL (ref 6–12)
POTASSIUM SERPL-SCNC: 3.9 MMOL/L (ref 3.5–5.2)
QT INTERVAL: 424 MS
QT INTERVAL: 433 MS
QT INTERVAL: 434 MS
RBC # BLD AUTO: 2.91 10*6/MM3 (ref 4.14–5.8)
SODIUM SERPL-SCNC: 141 MMOL/L (ref 136–145)
WBC NRBC COR # BLD: 7.5 10*3/MM3 (ref 3.4–10.8)
WHOLE BLOOD HOLD SPECIMEN: NORMAL

## 2022-01-30 PROCEDURE — 97162 PT EVAL MOD COMPLEX 30 MIN: CPT

## 2022-01-30 PROCEDURE — 94799 UNLISTED PULMONARY SVC/PX: CPT

## 2022-01-30 PROCEDURE — 82962 GLUCOSE BLOOD TEST: CPT

## 2022-01-30 PROCEDURE — 99233 SBSQ HOSP IP/OBS HIGH 50: CPT | Performed by: INTERNAL MEDICINE

## 2022-01-30 PROCEDURE — 97530 THERAPEUTIC ACTIVITIES: CPT

## 2022-01-30 PROCEDURE — 80048 BASIC METABOLIC PNL TOTAL CA: CPT | Performed by: INTERNAL MEDICINE

## 2022-01-30 PROCEDURE — 99232 SBSQ HOSP IP/OBS MODERATE 35: CPT | Performed by: INTERNAL MEDICINE

## 2022-01-30 PROCEDURE — 63710000001 INSULIN LISPRO (HUMAN) PER 5 UNITS: Performed by: INTERNAL MEDICINE

## 2022-01-30 PROCEDURE — 85025 COMPLETE CBC W/AUTO DIFF WBC: CPT | Performed by: INTERNAL MEDICINE

## 2022-01-30 PROCEDURE — 85730 THROMBOPLASTIN TIME PARTIAL: CPT | Performed by: INTERNAL MEDICINE

## 2022-01-30 PROCEDURE — 25010000002 CEFTRIAXONE PER 250 MG: Performed by: INTERNAL MEDICINE

## 2022-01-30 RX ADMIN — CYANOCOBALAMIN TAB 1000 MCG 1000 MCG: 1000 TAB at 09:35

## 2022-01-30 RX ADMIN — INSULIN LISPRO 3 UNITS: 100 INJECTION, SOLUTION INTRAVENOUS; SUBCUTANEOUS at 12:04

## 2022-01-30 RX ADMIN — CEFTRIAXONE SODIUM 1 G: 1 INJECTION, POWDER, FOR SOLUTION INTRAMUSCULAR; INTRAVENOUS at 21:27

## 2022-01-30 RX ADMIN — METOPROLOL TARTRATE 25 MG: 25 TABLET, FILM COATED ORAL at 21:26

## 2022-01-30 RX ADMIN — ASPIRIN 81 MG: 81 TABLET, COATED ORAL at 09:35

## 2022-01-30 RX ADMIN — TAMSULOSIN HYDROCHLORIDE 0.4 MG: 0.4 CAPSULE ORAL at 09:35

## 2022-01-30 RX ADMIN — HYDROCODONE BITARTRATE AND ACETAMINOPHEN 1 TABLET: 10; 325 TABLET ORAL at 17:53

## 2022-01-30 RX ADMIN — ATORVASTATIN CALCIUM 10 MG: 10 TABLET, FILM COATED ORAL at 09:35

## 2022-01-30 RX ADMIN — AZITHROMYCIN DIHYDRATE 500 MG: 250 TABLET, FILM COATED ORAL at 12:04

## 2022-01-30 RX ADMIN — HYDRALAZINE HYDROCHLORIDE 75 MG: 25 TABLET, FILM COATED ORAL at 09:34

## 2022-01-30 RX ADMIN — AMLODIPINE BESYLATE 10 MG: 5 TABLET ORAL at 09:35

## 2022-01-30 RX ADMIN — CETIRIZINE HYDROCHLORIDE 10 MG: 10 TABLET, FILM COATED ORAL at 09:35

## 2022-01-30 RX ADMIN — HYDROCODONE BITARTRATE AND ACETAMINOPHEN 1 TABLET: 10; 325 TABLET ORAL at 03:21

## 2022-01-30 RX ADMIN — PRIMIDONE 50 MG: 50 TABLET ORAL at 21:26

## 2022-01-30 RX ADMIN — FAMOTIDINE 20 MG: 20 TABLET ORAL at 09:35

## 2022-01-30 RX ADMIN — HYDROCODONE BITARTRATE AND ACETAMINOPHEN 1 TABLET: 10; 325 TABLET ORAL at 22:01

## 2022-01-30 RX ADMIN — HYDROCODONE BITARTRATE AND ACETAMINOPHEN 1 TABLET: 10; 325 TABLET ORAL at 10:20

## 2022-01-30 RX ADMIN — INSULIN LISPRO 2 UNITS: 100 INJECTION, SOLUTION INTRAVENOUS; SUBCUTANEOUS at 17:42

## 2022-01-30 RX ADMIN — METOPROLOL TARTRATE 25 MG: 25 TABLET, FILM COATED ORAL at 09:36

## 2022-01-30 RX ADMIN — IPRATROPIUM BROMIDE AND ALBUTEROL SULFATE 3 ML: 2.5; .5 SOLUTION RESPIRATORY (INHALATION) at 14:35

## 2022-01-30 RX ADMIN — Medication 2000 UNITS: at 09:35

## 2022-01-30 RX ADMIN — HYDRALAZINE HYDROCHLORIDE 75 MG: 25 TABLET, FILM COATED ORAL at 17:42

## 2022-01-30 RX ADMIN — IPRATROPIUM BROMIDE AND ALBUTEROL SULFATE 3 ML: 2.5; .5 SOLUTION RESPIRATORY (INHALATION) at 10:40

## 2022-01-30 RX ADMIN — IPRATROPIUM BROMIDE AND ALBUTEROL SULFATE 3 ML: 2.5; .5 SOLUTION RESPIRATORY (INHALATION) at 19:35

## 2022-01-30 RX ADMIN — SODIUM CHLORIDE, PRESERVATIVE FREE 3 ML: 5 INJECTION INTRAVENOUS at 21:27

## 2022-01-30 RX ADMIN — FERROUS SULFATE TAB EC 324 MG (65 MG FE EQUIVALENT) 325 MG: 324 (65 FE) TABLET DELAYED RESPONSE at 09:35

## 2022-01-30 RX ADMIN — SODIUM CHLORIDE, PRESERVATIVE FREE 3 ML: 5 INJECTION INTRAVENOUS at 09:36

## 2022-01-30 RX ADMIN — CLOPIDOGREL BISULFATE 75 MG: 75 TABLET, FILM COATED ORAL at 09:34

## 2022-01-30 RX ADMIN — HYDRALAZINE HYDROCHLORIDE 75 MG: 25 TABLET, FILM COATED ORAL at 21:26

## 2022-01-30 RX ADMIN — ISOSORBIDE MONONITRATE 60 MG: 60 TABLET, EXTENDED RELEASE ORAL at 09:35

## 2022-01-30 RX ADMIN — IPRATROPIUM BROMIDE AND ALBUTEROL SULFATE 3 ML: 2.5; .5 SOLUTION RESPIRATORY (INHALATION) at 07:00

## 2022-01-31 ENCOUNTER — TELEPHONE (OUTPATIENT)
Dept: PAIN MEDICINE | Facility: CLINIC | Age: 82
End: 2022-01-31

## 2022-01-31 DIAGNOSIS — G89.4 CHRONIC PAIN SYNDROME: ICD-10-CM

## 2022-01-31 LAB
ANION GAP SERPL CALCULATED.3IONS-SCNC: 10 MMOL/L (ref 5–15)
APTT PPP: 26.3 SECONDS (ref 61–76.5)
BACTERIA SPEC AEROBE CULT: NORMAL
BASOPHILS # BLD AUTO: 0.1 10*3/MM3 (ref 0–0.2)
BASOPHILS NFR BLD AUTO: 0.8 % (ref 0–1.5)
BUN SERPL-MCNC: 24 MG/DL (ref 8–23)
BUN/CREAT SERPL: 17 (ref 7–25)
CALCIUM SPEC-SCNC: 9.1 MG/DL (ref 8.6–10.5)
CHLORIDE SERPL-SCNC: 108 MMOL/L (ref 98–107)
CO2 SERPL-SCNC: 22 MMOL/L (ref 22–29)
CREAT SERPL-MCNC: 1.41 MG/DL (ref 0.76–1.27)
DEPRECATED RDW RBC AUTO: 44.6 FL (ref 37–54)
EOSINOPHIL # BLD AUTO: 0.4 10*3/MM3 (ref 0–0.4)
EOSINOPHIL NFR BLD AUTO: 4.6 % (ref 0.3–6.2)
ERYTHROCYTE [DISTWIDTH] IN BLOOD BY AUTOMATED COUNT: 13.7 % (ref 12.3–15.4)
GFR SERPL CREATININE-BSD FRML MDRD: 48 ML/MIN/1.73
GLUCOSE BLDC GLUCOMTR-MCNC: 148 MG/DL (ref 70–105)
GLUCOSE BLDC GLUCOMTR-MCNC: 162 MG/DL (ref 70–105)
GLUCOSE BLDC GLUCOMTR-MCNC: 193 MG/DL (ref 70–105)
GLUCOSE BLDC GLUCOMTR-MCNC: 201 MG/DL (ref 70–105)
GLUCOSE SERPL-MCNC: 132 MG/DL (ref 65–99)
HCT VFR BLD AUTO: 25 % (ref 37.5–51)
HGB BLD-MCNC: 8.3 G/DL (ref 13–17.7)
LYMPHOCYTES # BLD AUTO: 0.7 10*3/MM3 (ref 0.7–3.1)
LYMPHOCYTES NFR BLD AUTO: 9.8 % (ref 19.6–45.3)
MCH RBC QN AUTO: 30.5 PG (ref 26.6–33)
MCHC RBC AUTO-ENTMCNC: 33.4 G/DL (ref 31.5–35.7)
MCV RBC AUTO: 91.3 FL (ref 79–97)
MONOCYTES # BLD AUTO: 0.8 10*3/MM3 (ref 0.1–0.9)
MONOCYTES NFR BLD AUTO: 10.3 % (ref 5–12)
NEUTROPHILS NFR BLD AUTO: 5.7 10*3/MM3 (ref 1.7–7)
NEUTROPHILS NFR BLD AUTO: 74.5 % (ref 42.7–76)
NRBC BLD AUTO-RTO: 0 /100 WBC (ref 0–0.2)
NT-PROBNP SERPL-MCNC: 2138 PG/ML (ref 0–1800)
PLATELET # BLD AUTO: 255 10*3/MM3 (ref 140–450)
PMV BLD AUTO: 7.5 FL (ref 6–12)
POTASSIUM SERPL-SCNC: 3.9 MMOL/L (ref 3.5–5.2)
RBC # BLD AUTO: 2.73 10*6/MM3 (ref 4.14–5.8)
SODIUM SERPL-SCNC: 140 MMOL/L (ref 136–145)
WBC NRBC COR # BLD: 7.7 10*3/MM3 (ref 3.4–10.8)

## 2022-01-31 PROCEDURE — 83880 ASSAY OF NATRIURETIC PEPTIDE: CPT | Performed by: INTERNAL MEDICINE

## 2022-01-31 PROCEDURE — 97166 OT EVAL MOD COMPLEX 45 MIN: CPT

## 2022-01-31 PROCEDURE — 94799 UNLISTED PULMONARY SVC/PX: CPT

## 2022-01-31 PROCEDURE — 97530 THERAPEUTIC ACTIVITIES: CPT

## 2022-01-31 PROCEDURE — 82962 GLUCOSE BLOOD TEST: CPT

## 2022-01-31 PROCEDURE — 63710000001 INSULIN LISPRO (HUMAN) PER 5 UNITS: Performed by: INTERNAL MEDICINE

## 2022-01-31 PROCEDURE — 99233 SBSQ HOSP IP/OBS HIGH 50: CPT | Performed by: FAMILY MEDICINE

## 2022-01-31 PROCEDURE — 80048 BASIC METABOLIC PNL TOTAL CA: CPT | Performed by: INTERNAL MEDICINE

## 2022-01-31 PROCEDURE — 99232 SBSQ HOSP IP/OBS MODERATE 35: CPT | Performed by: INTERNAL MEDICINE

## 2022-01-31 PROCEDURE — 85730 THROMBOPLASTIN TIME PARTIAL: CPT | Performed by: INTERNAL MEDICINE

## 2022-01-31 PROCEDURE — 85025 COMPLETE CBC W/AUTO DIFF WBC: CPT | Performed by: INTERNAL MEDICINE

## 2022-01-31 RX ORDER — AMOXICILLIN 250 MG
2 CAPSULE ORAL 2 TIMES DAILY
Status: DISCONTINUED | OUTPATIENT
Start: 2022-01-31 | End: 2022-02-01 | Stop reason: HOSPADM

## 2022-01-31 RX ORDER — LOSARTAN POTASSIUM 25 MG/1
25 TABLET ORAL
Status: DISCONTINUED | OUTPATIENT
Start: 2022-01-31 | End: 2022-02-01 | Stop reason: HOSPADM

## 2022-01-31 RX ORDER — POLYETHYLENE GLYCOL 3350 17 G/17G
17 POWDER, FOR SOLUTION ORAL DAILY
Status: DISCONTINUED | OUTPATIENT
Start: 2022-01-31 | End: 2022-02-01 | Stop reason: HOSPADM

## 2022-01-31 RX ORDER — HYDROCHLOROTHIAZIDE 12.5 MG/1
12.5 TABLET ORAL DAILY
Status: DISCONTINUED | OUTPATIENT
Start: 2022-01-31 | End: 2022-02-01 | Stop reason: HOSPADM

## 2022-01-31 RX ADMIN — METOPROLOL TARTRATE 25 MG: 25 TABLET, FILM COATED ORAL at 20:21

## 2022-01-31 RX ADMIN — SODIUM CHLORIDE, PRESERVATIVE FREE 3 ML: 5 INJECTION INTRAVENOUS at 20:20

## 2022-01-31 RX ADMIN — FAMOTIDINE 20 MG: 20 TABLET ORAL at 09:37

## 2022-01-31 RX ADMIN — INSULIN LISPRO 3 UNITS: 100 INJECTION, SOLUTION INTRAVENOUS; SUBCUTANEOUS at 11:28

## 2022-01-31 RX ADMIN — AMLODIPINE BESYLATE 10 MG: 5 TABLET ORAL at 09:37

## 2022-01-31 RX ADMIN — TAMSULOSIN HYDROCHLORIDE 0.4 MG: 0.4 CAPSULE ORAL at 09:37

## 2022-01-31 RX ADMIN — IPRATROPIUM BROMIDE AND ALBUTEROL SULFATE 3 ML: 2.5; .5 SOLUTION RESPIRATORY (INHALATION) at 15:10

## 2022-01-31 RX ADMIN — IPRATROPIUM BROMIDE AND ALBUTEROL SULFATE 3 ML: 2.5; .5 SOLUTION RESPIRATORY (INHALATION) at 09:08

## 2022-01-31 RX ADMIN — ATORVASTATIN CALCIUM 10 MG: 10 TABLET, FILM COATED ORAL at 09:37

## 2022-01-31 RX ADMIN — IPRATROPIUM BROMIDE AND ALBUTEROL SULFATE 3 ML: 2.5; .5 SOLUTION RESPIRATORY (INHALATION) at 19:52

## 2022-01-31 RX ADMIN — FERROUS SULFATE TAB EC 324 MG (65 MG FE EQUIVALENT) 325 MG: 324 (65 FE) TABLET DELAYED RESPONSE at 09:37

## 2022-01-31 RX ADMIN — INSULIN LISPRO 2 UNITS: 100 INJECTION, SOLUTION INTRAVENOUS; SUBCUTANEOUS at 17:12

## 2022-01-31 RX ADMIN — IPRATROPIUM BROMIDE AND ALBUTEROL SULFATE 3 ML: 2.5; .5 SOLUTION RESPIRATORY (INHALATION) at 11:19

## 2022-01-31 RX ADMIN — POLYETHYLENE GLYCOL 3350 17 G: 17 POWDER, FOR SOLUTION ORAL at 10:28

## 2022-01-31 RX ADMIN — HYDROCODONE BITARTRATE AND ACETAMINOPHEN 1 TABLET: 10; 325 TABLET ORAL at 09:51

## 2022-01-31 RX ADMIN — ASPIRIN 81 MG: 81 TABLET, COATED ORAL at 09:37

## 2022-01-31 RX ADMIN — HYDRALAZINE HYDROCHLORIDE 75 MG: 25 TABLET, FILM COATED ORAL at 17:12

## 2022-01-31 RX ADMIN — DOCUSATE SODIUM 50 MG AND SENNOSIDES 8.6 MG 2 TABLET: 8.6; 5 TABLET, FILM COATED ORAL at 10:28

## 2022-01-31 RX ADMIN — CYANOCOBALAMIN TAB 1000 MCG 1000 MCG: 1000 TAB at 09:36

## 2022-01-31 RX ADMIN — LOSARTAN POTASSIUM 25 MG: 25 TABLET, FILM COATED ORAL at 10:28

## 2022-01-31 RX ADMIN — HYDRALAZINE HYDROCHLORIDE 75 MG: 25 TABLET, FILM COATED ORAL at 20:21

## 2022-01-31 RX ADMIN — Medication 2000 UNITS: at 09:36

## 2022-01-31 RX ADMIN — DOCUSATE SODIUM 50 MG AND SENNOSIDES 8.6 MG 2 TABLET: 8.6; 5 TABLET, FILM COATED ORAL at 20:20

## 2022-01-31 RX ADMIN — HYDRALAZINE HYDROCHLORIDE 75 MG: 25 TABLET, FILM COATED ORAL at 09:37

## 2022-01-31 RX ADMIN — HYDROCODONE BITARTRATE AND ACETAMINOPHEN 1 TABLET: 10; 325 TABLET ORAL at 19:38

## 2022-01-31 RX ADMIN — CLOPIDOGREL BISULFATE 75 MG: 75 TABLET, FILM COATED ORAL at 09:36

## 2022-01-31 RX ADMIN — ISOSORBIDE MONONITRATE 60 MG: 60 TABLET, EXTENDED RELEASE ORAL at 09:38

## 2022-01-31 RX ADMIN — PRIMIDONE 50 MG: 50 TABLET ORAL at 20:20

## 2022-01-31 RX ADMIN — CETIRIZINE HYDROCHLORIDE 10 MG: 10 TABLET, FILM COATED ORAL at 09:37

## 2022-01-31 RX ADMIN — SODIUM CHLORIDE, PRESERVATIVE FREE 3 ML: 5 INJECTION INTRAVENOUS at 09:38

## 2022-01-31 RX ADMIN — HYDROCHLOROTHIAZIDE 12.5 MG: 12.5 TABLET ORAL at 10:28

## 2022-01-31 RX ADMIN — METOPROLOL TARTRATE 25 MG: 25 TABLET, FILM COATED ORAL at 09:38

## 2022-02-01 ENCOUNTER — READMISSION MANAGEMENT (OUTPATIENT)
Dept: CALL CENTER | Facility: HOSPITAL | Age: 82
End: 2022-02-01

## 2022-02-01 VITALS
RESPIRATION RATE: 20 BRPM | WEIGHT: 160.94 LBS | HEART RATE: 62 BPM | SYSTOLIC BLOOD PRESSURE: 126 MMHG | BODY MASS INDEX: 26.81 KG/M2 | OXYGEN SATURATION: 98 % | DIASTOLIC BLOOD PRESSURE: 38 MMHG | HEIGHT: 65 IN | TEMPERATURE: 99 F

## 2022-02-01 LAB
ANION GAP SERPL CALCULATED.3IONS-SCNC: 11 MMOL/L (ref 5–15)
APTT PPP: 26 SECONDS (ref 61–76.5)
BASOPHILS # BLD AUTO: 0.1 10*3/MM3 (ref 0–0.2)
BASOPHILS NFR BLD AUTO: 0.6 % (ref 0–1.5)
BUN SERPL-MCNC: 25 MG/DL (ref 8–23)
BUN/CREAT SERPL: 16 (ref 7–25)
CALCIUM SPEC-SCNC: 9.5 MG/DL (ref 8.6–10.5)
CHLORIDE SERPL-SCNC: 106 MMOL/L (ref 98–107)
CO2 SERPL-SCNC: 23 MMOL/L (ref 22–29)
CREAT SERPL-MCNC: 1.56 MG/DL (ref 0.76–1.27)
DEPRECATED RDW RBC AUTO: 44.6 FL (ref 37–54)
EOSINOPHIL # BLD AUTO: 0.3 10*3/MM3 (ref 0–0.4)
EOSINOPHIL NFR BLD AUTO: 4.3 % (ref 0.3–6.2)
ERYTHROCYTE [DISTWIDTH] IN BLOOD BY AUTOMATED COUNT: 13.7 % (ref 12.3–15.4)
GFR SERPL CREATININE-BSD FRML MDRD: 43 ML/MIN/1.73
GLUCOSE BLDC GLUCOMTR-MCNC: 127 MG/DL (ref 70–105)
GLUCOSE BLDC GLUCOMTR-MCNC: 150 MG/DL (ref 70–105)
GLUCOSE SERPL-MCNC: 165 MG/DL (ref 65–99)
HCT VFR BLD AUTO: 24.9 % (ref 37.5–51)
HGB BLD-MCNC: 8.3 G/DL (ref 13–17.7)
LYMPHOCYTES # BLD AUTO: 0.6 10*3/MM3 (ref 0.7–3.1)
LYMPHOCYTES NFR BLD AUTO: 7 % (ref 19.6–45.3)
MCH RBC QN AUTO: 30.5 PG (ref 26.6–33)
MCHC RBC AUTO-ENTMCNC: 33.2 G/DL (ref 31.5–35.7)
MCV RBC AUTO: 91.9 FL (ref 79–97)
MONOCYTES # BLD AUTO: 0.8 10*3/MM3 (ref 0.1–0.9)
MONOCYTES NFR BLD AUTO: 9.3 % (ref 5–12)
NEUTROPHILS NFR BLD AUTO: 6.4 10*3/MM3 (ref 1.7–7)
NEUTROPHILS NFR BLD AUTO: 78.8 % (ref 42.7–76)
NRBC BLD AUTO-RTO: 0 /100 WBC (ref 0–0.2)
PLATELET # BLD AUTO: 281 10*3/MM3 (ref 140–450)
PMV BLD AUTO: 7.4 FL (ref 6–12)
POTASSIUM SERPL-SCNC: 4.2 MMOL/L (ref 3.5–5.2)
RBC # BLD AUTO: 2.71 10*6/MM3 (ref 4.14–5.8)
SODIUM SERPL-SCNC: 140 MMOL/L (ref 136–145)
WBC NRBC COR # BLD: 8.1 10*3/MM3 (ref 3.4–10.8)

## 2022-02-01 PROCEDURE — 97110 THERAPEUTIC EXERCISES: CPT

## 2022-02-01 PROCEDURE — 82962 GLUCOSE BLOOD TEST: CPT

## 2022-02-01 PROCEDURE — 80048 BASIC METABOLIC PNL TOTAL CA: CPT | Performed by: INTERNAL MEDICINE

## 2022-02-01 PROCEDURE — 94799 UNLISTED PULMONARY SVC/PX: CPT

## 2022-02-01 PROCEDURE — 99239 HOSP IP/OBS DSCHRG MGMT >30: CPT | Performed by: FAMILY MEDICINE

## 2022-02-01 PROCEDURE — 99232 SBSQ HOSP IP/OBS MODERATE 35: CPT | Performed by: INTERNAL MEDICINE

## 2022-02-01 PROCEDURE — 85025 COMPLETE CBC W/AUTO DIFF WBC: CPT | Performed by: INTERNAL MEDICINE

## 2022-02-01 PROCEDURE — 97116 GAIT TRAINING THERAPY: CPT

## 2022-02-01 PROCEDURE — 63710000001 INSULIN LISPRO (HUMAN) PER 5 UNITS: Performed by: INTERNAL MEDICINE

## 2022-02-01 PROCEDURE — 85730 THROMBOPLASTIN TIME PARTIAL: CPT | Performed by: INTERNAL MEDICINE

## 2022-02-01 PROCEDURE — 94618 PULMONARY STRESS TESTING: CPT

## 2022-02-01 RX ORDER — BUMETANIDE 1 MG/1
1 TABLET ORAL
Status: DISCONTINUED | OUTPATIENT
Start: 2022-02-01 | End: 2022-02-01 | Stop reason: HOSPADM

## 2022-02-01 RX ORDER — BUMETANIDE 1 MG/1
1 TABLET ORAL 2 TIMES DAILY
Qty: 60 TABLET | Refills: 0 | Status: SHIPPED | OUTPATIENT
Start: 2022-02-01 | End: 2022-03-15 | Stop reason: SDUPTHER

## 2022-02-01 RX ORDER — ISOSORBIDE MONONITRATE 60 MG/1
60 TABLET, EXTENDED RELEASE ORAL
Qty: 30 TABLET | Refills: 0 | Status: SHIPPED | OUTPATIENT
Start: 2022-02-02 | End: 2022-03-07 | Stop reason: SDUPTHER

## 2022-02-01 RX ORDER — HYDROCHLOROTHIAZIDE 12.5 MG/1
12.5 TABLET ORAL DAILY
Qty: 30 TABLET | Refills: 0 | Status: SHIPPED | OUTPATIENT
Start: 2022-02-02 | End: 2022-03-07 | Stop reason: SDUPTHER

## 2022-02-01 RX ORDER — ASPIRIN 81 MG/1
81 TABLET ORAL DAILY
Qty: 30 TABLET | Refills: 0 | Status: SHIPPED | OUTPATIENT
Start: 2022-02-02 | End: 2022-03-04

## 2022-02-01 RX ORDER — LOSARTAN POTASSIUM 25 MG/1
25 TABLET ORAL
Qty: 30 TABLET | Refills: 0 | Status: SHIPPED | OUTPATIENT
Start: 2022-02-02 | End: 2022-03-07 | Stop reason: SDUPTHER

## 2022-02-01 RX ORDER — BENZONATATE 100 MG/1
100 CAPSULE ORAL 3 TIMES DAILY PRN
Status: DISCONTINUED | OUTPATIENT
Start: 2022-02-01 | End: 2022-02-01 | Stop reason: HOSPADM

## 2022-02-01 RX ORDER — CLOPIDOGREL BISULFATE 75 MG/1
75 TABLET ORAL DAILY
Qty: 30 TABLET | Refills: 0 | Status: SHIPPED | OUTPATIENT
Start: 2022-02-02 | End: 2022-03-04

## 2022-02-01 RX ADMIN — IPRATROPIUM BROMIDE AND ALBUTEROL SULFATE 3 ML: 2.5; .5 SOLUTION RESPIRATORY (INHALATION) at 08:10

## 2022-02-01 RX ADMIN — TAMSULOSIN HYDROCHLORIDE 0.4 MG: 0.4 CAPSULE ORAL at 07:40

## 2022-02-01 RX ADMIN — FAMOTIDINE 20 MG: 20 TABLET ORAL at 07:40

## 2022-02-01 RX ADMIN — IPRATROPIUM BROMIDE AND ALBUTEROL SULFATE 3 ML: 2.5; .5 SOLUTION RESPIRATORY (INHALATION) at 16:13

## 2022-02-01 RX ADMIN — BENZONATATE 100 MG: 100 CAPSULE ORAL at 00:52

## 2022-02-01 RX ADMIN — ASPIRIN 81 MG: 81 TABLET, COATED ORAL at 07:40

## 2022-02-01 RX ADMIN — AMLODIPINE BESYLATE 10 MG: 5 TABLET ORAL at 07:39

## 2022-02-01 RX ADMIN — Medication 2000 UNITS: at 07:39

## 2022-02-01 RX ADMIN — BUMETANIDE 1 MG: 1 TABLET ORAL at 16:03

## 2022-02-01 RX ADMIN — POLYETHYLENE GLYCOL 3350 17 G: 17 POWDER, FOR SOLUTION ORAL at 07:39

## 2022-02-01 RX ADMIN — BUMETANIDE 1 MG: 1 TABLET ORAL at 10:04

## 2022-02-01 RX ADMIN — IPRATROPIUM BROMIDE AND ALBUTEROL SULFATE 3 ML: 2.5; .5 SOLUTION RESPIRATORY (INHALATION) at 12:36

## 2022-02-01 RX ADMIN — LOSARTAN POTASSIUM 25 MG: 25 TABLET, FILM COATED ORAL at 07:40

## 2022-02-01 RX ADMIN — ISOSORBIDE MONONITRATE 60 MG: 60 TABLET, EXTENDED RELEASE ORAL at 07:40

## 2022-02-01 RX ADMIN — CETIRIZINE HYDROCHLORIDE 10 MG: 10 TABLET, FILM COATED ORAL at 07:40

## 2022-02-01 RX ADMIN — DOCUSATE SODIUM 50 MG AND SENNOSIDES 8.6 MG 2 TABLET: 8.6; 5 TABLET, FILM COATED ORAL at 07:39

## 2022-02-01 RX ADMIN — HYDRALAZINE HYDROCHLORIDE 75 MG: 25 TABLET, FILM COATED ORAL at 16:01

## 2022-02-01 RX ADMIN — SODIUM CHLORIDE, PRESERVATIVE FREE 3 ML: 5 INJECTION INTRAVENOUS at 07:40

## 2022-02-01 RX ADMIN — CLOPIDOGREL BISULFATE 75 MG: 75 TABLET, FILM COATED ORAL at 07:40

## 2022-02-01 RX ADMIN — HYDROCODONE BITARTRATE AND ACETAMINOPHEN 1 TABLET: 10; 325 TABLET ORAL at 12:17

## 2022-02-01 RX ADMIN — FERROUS SULFATE TAB EC 324 MG (65 MG FE EQUIVALENT) 325 MG: 324 (65 FE) TABLET DELAYED RESPONSE at 07:39

## 2022-02-01 RX ADMIN — CYANOCOBALAMIN TAB 1000 MCG 1000 MCG: 1000 TAB at 07:40

## 2022-02-01 RX ADMIN — HYDRALAZINE HYDROCHLORIDE 75 MG: 25 TABLET, FILM COATED ORAL at 07:39

## 2022-02-01 RX ADMIN — HYDROCODONE BITARTRATE AND ACETAMINOPHEN 1 TABLET: 10; 325 TABLET ORAL at 07:48

## 2022-02-01 RX ADMIN — INSULIN LISPRO 2 UNITS: 100 INJECTION, SOLUTION INTRAVENOUS; SUBCUTANEOUS at 07:40

## 2022-02-01 RX ADMIN — ATORVASTATIN CALCIUM 10 MG: 10 TABLET, FILM COATED ORAL at 07:40

## 2022-02-01 RX ADMIN — METOPROLOL TARTRATE 25 MG: 25 TABLET, FILM COATED ORAL at 07:40

## 2022-02-01 RX ADMIN — HYDROCHLOROTHIAZIDE 12.5 MG: 12.5 TABLET ORAL at 07:39

## 2022-02-01 NOTE — PLAN OF CARE
Goal Outcome Evaluation:  Plan of Care Reviewed With: patient        Progress: no change  Outcome Summary: Patient has rested in bed throughout the shift. Patient complained of worsensing cough during the shift. Patient experienced no acute events. Will continue to monitor.

## 2022-02-01 NOTE — THERAPY TREATMENT NOTE
Subjective: Pt agreeable to therapeutic plan of care.  Feels better today.    Objective:     Bed mobility - Modified-Independent  Transfers - CGA  Ambulation - 100 feet CGA and with rolling walker,  Pt on 2L, sats 86% with activity.  Pt c/o B hip pain and weakness when ambulating.     /82 post ambulation    Seated LE exercises x 10-15 reps:  LAQs, marching, shoulder flexion, scapular retraction    Pain: 0 VAS  Education: Provided education on importance of mobility and skilled verbal / tactile cueing throughout intervention.     Assessment: Kailash Cooper presents with functional mobility impairments which indicate the need for skilled intervention. Pt continues to make progress and is very motivated to improve his strength.  Tolerating session today without incident. Will continue to follow and progress as tolerated.     Plan/Recommendations:   Pt would benefit from Inpatient Rehabilitation placement at discharge from facility and requires rolling walker at discharge.   Pt desires Inpatient Rehabilitation placement at discharge. Pt cooperative; agreeable to therapeutic recommendations and plan of care.     Basic Mobility 6-click:  Rollin = Total, A lot = 2, A little = 3; 4 = None  Supine>Sit:   1 = Total, A lot = 2, A little = 3; 4 = None   Sit>Stand with arms:  1 = Total, A lot = 2, A little = 3; 4 = None  Bed>Chair:   1 = Total, A lot = 2, A little = 3; 4 = None  Ambulate in room:  1 = Total, A lot = 2, A little = 3; 4 = None  3-5 Steps with railin = Total, A lot = 2, A little = 3; 4 = None  Score: 17    Modified Julito: N/A = No pre-op stroke/TIA    Post-Tx Position: Up in Chair, Alarms activated and Call light and personal items within reach  PPE: gloves, surgical mask, eyewear protection

## 2022-02-01 NOTE — PROGRESS NOTES
Exercise Oximetry    Patient Name:Kailash Cooper   MRN: [id number]   Date: 02/01/22             ROOM AIR BASELINE   SpO2% 86   Heart Rate 60   Blood Pressure      EXERCISE ON ROOM AIR SpO2% EXERCISE ON O2 @ 3 LPM SpO2%   1 MINUTE  1 MINUTE    2 MINUTES  2 MINUTES    3 MINUTES  3 MINUTES    4 MINUTES  4 MINUTES    5 MINUTES  5 MINUTES    6 MINUTES  6 MINUTES               Distance Walked   Distance Walked   Dyspnea (Mora Scale)   Dyspnea (Mora Scale)   Fatigue (Mora Scale)   Fatigue (Mora Scale)   SpO2% Post Exercise   SpO2% Post Exercise   HR Post Exercise   HR Post Exercise   Time to Recovery   Time to Recovery     Comments: 02 sat 86% on room air at rest.  Placed patient on 2 liters, 02 sat increased to 89%.  Increased 02 to 3 liters, 02 sat increased to 94% on 3 liters.

## 2022-02-01 NOTE — PLAN OF CARE
Goal Outcome Evaluation:  Plan of Care Reviewed With: patient     Progress: no change  Outcome Summary: Patient has been resting in bed on and off throughout shift. Ambulated in the [facility] with PT. Chronic pain being treated with norco. Pending ip rehab at [facility]. Plan of care ongoing at this time.      Problem: Adult Inpatient Plan of Care  Goal: Absence of Hospital-Acquired Illness or Injury  Outcome: Ongoing, Progressing  Intervention: Identify and Manage Fall Risk  Recent Flowsheet Documentation  Taken 2/1/2022 1217 by Lesly Jones RN  Safety Promotion/Fall Prevention:   safety round/check completed   nonskid shoes/slippers when out of bed   fall prevention program maintained   activity supervised   assistive device/personal items within reach   clutter free environment maintained  Taken 2/1/2022 1000 by Lesly Jones RN  Safety Promotion/Fall Prevention:   safety round/check completed   nonskid shoes/slippers when out of bed   fall prevention program maintained   clutter free environment maintained   activity supervised   assistive device/personal items within reach  Taken 2/1/2022 0744 by Lesly Jones RN  Safety Promotion/Fall Prevention:   safety round/check completed   nonskid shoes/slippers when out of bed   fall prevention program maintained   clutter free environment maintained   assistive device/personal items within reach   activity supervised  Intervention: Prevent Skin Injury  Recent Flowsheet Documentation  Taken 2/1/2022 1217 by Lesly Jones RN  Body Position: position changed independently  Taken 2/1/2022 0744 by Lesly Jones RN  Body Position: position changed independently  Skin Protection: adhesive use limited  Intervention: Prevent Infection  Recent Flowsheet Documentation  Taken 2/1/2022 1217 by Lesly Jones RN  Infection Prevention:   single patient room provided   rest/sleep promoted   hand hygiene promoted   personal protective equipment utilized  Taken 2/1/2022 1000  by Lesly Jones, RN  Infection Prevention:   single patient room provided   rest/sleep promoted   personal protective equipment utilized   hand hygiene promoted  Taken 2/1/2022 0744 by Lesly Jones, RN  Infection Prevention:   single patient room provided   rest/sleep promoted   personal protective equipment utilized   hand hygiene promoted

## 2022-02-01 NOTE — PROGRESS NOTES
Card[facility] [place]        LOS:  LOS: 6 days   Patient Name: Kailash Cooper  Age/Sex: 81 y.o. male  : 1940  MRN: [id number]    Day of Service: 22   Length of Stay: 6  Encounter Provider: Mani Salguero MD  Place of Service: [facility] CARDIOLOGY  Patient Care Team:  Madelyn Márquez APRN as PCP - General    Subjective:     Chief Complaint: f/u CAD, NSTEMI    Subjective: Patient resting comfortably. Daughter at bedside. He has no acute complaints. He remains on supplemental O2.  He denies chest pain or SOA.  Family is awaiting discussion with case management today regarding rehab placement. Overall creatinine coming down- 1.4    Current Medications:   Scheduled Meds:amLODIPine, 10 mg, Oral, Daily  aspirin, 81 mg, Oral, Daily  atorvastatin, 10 mg, Oral, Daily  bumetanide, 1 mg, Oral, BID  cetirizine, 10 mg, Oral, Daily  cholecalciferol, 2,000 Units, Oral, Daily  clopidogrel, 75 mg, Oral, Daily  famotidine, 20 mg, Oral, Daily  ferrous sulfate, 325 mg, Oral, Daily With Breakfast  hydrALAZINE, 75 mg, Oral, TID  hydroCHLOROthiazide, 12.5 mg, Oral, Daily  insulin lispro, 0-7 Units, Subcutaneous, TID AC  ipratropium-albuterol, 3 mL, Nebulization, 4x Daily - RT  isosorbide mononitrate, 60 mg, Oral, Q24H  losartan, 25 mg, Oral, Q24H  metoprolol tartrate, 25 mg, Oral, BID  polyethylene glycol, 17 g, Oral, Daily  primidone, 50 mg, Oral, Nightly  senna-docusate sodium, 2 tablet, Oral, BID  sodium chloride, 3 mL, Intravenous, Q12H  tamsulosin, 0.4 mg, Oral, Daily  vitamin B-12, 1,000 mcg, Oral, Daily      Continuous Infusions:heparin, 12 Units/kg/hr, Last Rate: 19 Units/kg/hr (22)        Allergies:  No Known Allergies    Review of Systems   Constitutional: Positive for malaise/fatigue. Negative for chills and diaphoresis.   Cardiovascular: Positive for dyspnea on exertion. Negative for chest pain, irregular heartbeat, leg swelling, near-syncope, orthopnea, palpitations,  paroxysmal nocturnal dyspnea and syncope.   Respiratory: Negative for cough, shortness of breath, sleep disturbances due to breathing and sputum production.    Gastrointestinal: Negative for change in bowel habit.   Genitourinary: Negative for urgency.   Neurological: Negative for dizziness and headaches.   Psychiatric/Behavioral: Negative for altered mental status.         Objective:     Temp:  [97.3 °F (36.3 °C)-99.5 °F (37.5 °C)] 99 °F (37.2 °C)  Heart Rate:  [53-70] 62  Resp:  [18-24] 20  BP: (110-148)/(38-59) 126/38     Intake/Output Summary (Last 24 hours) at 2/1/2022 1651  Last data filed at 2/1/2022 0947  Gross per 24 hour   Intake 360 ml   Output 475 ml   Net -115 ml     Body mass index is 26.78 kg/m².      01/30/22  0551 01/31/22  0611 02/01/22  0500   Weight: 74 kg (163 lb 2.3 oz) 74 kg (163 lb 2.3 oz) 73 kg (160 lb 15 oz)         General Appearance:    Alert, cooperative, in no acute distress                                Head: Atraumatic, normocephalic, PERRLA               Neck:   supple, no JVD   Lungs:    5L NC, diminished bilateral bases other wise clear to auscultation,respirations regular, even and unlabored    Heart:    Regular rhythm and normal rate, normal S1 and S2   Abdomen:     Normal bowel sounds, no masses, no organomegaly, soft  non-tender, non-distended, no guarding, no rebound  tenderness   Extremities:   Moves all extremities well, no edema, no cyanosis, no  redness   Pulses:   Pulses palpable and equal bilaterally   Skin:   No bleeding, bruising or rash   Neurologic:   Awake, alert, oriented x3         Lab Review:   Results from last 7 days   Lab Units 02/01/22  0316 01/31/22  0332 01/29/22  0909 01/28/22  0516 01/27/22  0258 01/26/22  1304   SODIUM mmol/L 140 140   < > 141   < > 139   POTASSIUM mmol/L 4.2 3.9   < > 4.0   < > 4.6   CHLORIDE mmol/L 106 108*   < > 107   < > 107   CO2 mmol/L 23.0 22.0   < > 22.0   < > 18.0*   BUN mg/dL 25* 24*   < > 31*   < > 31*   CREATININE mg/dL  1.56* 1.41*   < > 1.89*   < > 1.93*   GLUCOSE mg/dL 165* 132*   < > 135*   < > 98   CALCIUM mg/dL 9.5 9.1   < > 8.9   < > 9.8   AST (SGOT) U/L  --   --   --  32  --  16   ALT (SGPT) U/L  --   --   --  23  --  10    < > = values in this interval not displayed.     Results from last 7 days   Lab Units 01/27/22  1809 01/27/22  1404 01/27/22  0832 01/26/22  1857 01/26/22  1304   TROPONIN T ng/mL 0.404* 0.395* 0.377* 0.297* 0.055*     Results from last 7 days   Lab Units 02/01/22  0316 01/31/22  0332   WBC 10*3/mm3 8.10 7.70   HEMOGLOBIN g/dL 8.3* 8.3*   HEMATOCRIT % 24.9* 25.0*   PLATELETS 10*3/mm3 281 255     Results from last 7 days   Lab Units 02/01/22  0316 01/31/22  0332 01/28/22  1023 01/28/22  0516 01/27/22  1404 01/27/22  1004   INR   --   --   --  1.04  --  1.02   APTT seconds 26.0* 26.3*   < > 33.0*   < > 26.8*    < > = values in this interval not displayed.     Results from last 7 days   Lab Units 01/28/22  0516 01/27/22  0258   MAGNESIUM mg/dL 1.8 2.2     Results from last 7 days   Lab Units 01/29/22  0909   CHOLESTEROL mg/dL 115   TRIGLYCERIDES mg/dL 111   HDL CHOL mg/dL 39*     Results from last 7 days   Lab Units 01/31/22  0332 01/28/22  0516 01/27/22  0258   PROBNP pg/mL 2,138.0* 3,742.0* 4,664.0*           Recent Radiology:  Imaging Results (Most Recent)     Procedure Component Value Units Date/Time    XR Chest 1 View [[id number]] Collected: 01/28/22 0829     Updated: 01/28/22 0834    Narrative:      DATE OF EXAM:  1/28/2022 8:26 AM     PROCEDURE:  XR CHEST 1 VW-     INDICATIONS:  shortness of breath; J18.9-Pneumonia, unspecified organism; R07.9-Chest  pain, unspecified; N17.9-Acute kidney failure, unspecified;  R09.02-Hypoxemia; I20.0-Unstable angina     COMPARISON:  1/26/2022     TECHNIQUE:   Single radiographic AP view of the chest was obtained.     FINDINGS:  There are groundglass and interstitial opacities in both lungs with  focal airspace disease in the left lung base. There appears to been  some  improvement in the left upper lobe, although the films were obtained in  different projections (previous film was lordotic). No new pulmonary  abnormality demonstrated. No pneumothorax        Impression:      Bilateral pulmonary infiltrates suspicious for Covid pattern of  pneumonia. There appears to been some improvement in the left upper  lobe, although this may in part be an artifact related to differences in  projection of the 2 films     Electronically Signed By-Josh Cruz On:1/28/2022 8:32 AM  This report was finalized on [id number] by  Josh Cruz, .    US Renal Bilateral [[id number]] Collected: 01/27/22 1305     Updated: 01/27/22 1309    Narrative:      DATE OF EXAM:  1/27/2022 12:42 PM     PROCEDURE:  US RENAL BILATERAL-     INDICATIONS:  araceli; J18.9-Pneumonia, unspecified organism; R07.9-Chest pain,  unspecified; N17.9-Acute kidney failure, unspecified; R09.02-Hypoxemia;  I20.0-Unstable angina     COMPARISON:  No Comparisons Available     TECHNIQUE:   Grayscale and color Doppler ultrasound evaluation of the kidneys and  urinary bladder was performed.        FINDINGS:  Right kidney measures 9.5 x 4.1 x 5.2 cm. Left kidney measures 10.8 x  4.7 x 6.2 cm. There is marked right renal cortical thinning and elevated  cortical echotexture suggestive of chronic medical renal disease. There  is mild left renal cortical thinning but normal left renal cortical  echotexture.     No cystic or solid renal lesion, shadowing stone or hydronephrosis is  seen in either side.     The urinary bladder has normal sonographic appearance with a prevoid  volume of 136 cc.             Impression:         1. Right renal atrophy with cortical thinning and elevated cortical  echotexture suggesting chronic medical renal disease.  2. Mild left renal cortical thinning.  3. No hydronephrosis.     Electronically Signed By-Sasha Rios MD On:1/27/2022 1:07 PM  This report was finalized on [id number] by  Sasha Rios  MD.    XR Chest 1 View [[id number]] Collected: 01/26/22 1243     Updated: 01/26/22 1304    Narrative:      DATE OF EXAM:  1/26/2022 12:40 PM     PROCEDURE:  XR CHEST 1 VW-     INDICATIONS:  COUGH     COMPARISON:  06/12/2021     TECHNIQUE:   Single radiographic AP view of the chest was obtained.     FINDINGS:  Cardiac size remains normal. The patient has developed bilateral  pulmonary infiltrates, left greater than right. The vascular markings in  the chest appear normal.        Impression:         1. Bilateral patchy pulmonary infiltrates, greater on the left than the  right.     Electronically Signed By-Devyn Plascencia MD On:1/26/2022 12:44 PM  This report was finalized on [id number] by  Devyn Plascencia MD.          ECHOCARDIOGRAM:    Results for orders placed during the hospital encounter of 02/07/20    Adult Transthoracic Echo Complete W/ Cont if Necessary Per Protocol    Interpretation Summary  · Left ventricular systolic function is normal.  · Right ventricular cavity is borderline dilated.    No structural or valvular heart disease I would suggest cardioembolic etiology        I reviewed the patient's new clinical results.    EKG:      Assessment:       CAP (community acquired pneumonia)    History of malignant neoplasm of thoracic cavity structure    Anemia due to stage 3 chronic kidney disease (HCC)    Osteoarthritis    Gastroesophageal reflux disease    Mixed hyperlipidemia    Essential hypertension    Obstructive sleep apnea    Type 2 diabetes mellitus (HCC)    Elevated troponin    Positive D dimer    Acute kidney injury superimposed on CKD (HCC)    Chest pain    Acute respiratory failure with hypoxia (HCC)    Sepsis without acute organ dysfunction (HCC)    1.  Non-ST relation myocardial infarction:     Patient underwent cardiac catheterization and high-grade stenosis of RCA was noted.  Patient underwent successful stenting.  Patient also have diffuse disease of distal circumflex which is treated  medically  Continue DAPT with ASA / Plavix     2.  CAD:     I would continue nitrates and Norvasc.  Beta-blocker has been started     3.  Hypertension:     Hydralazine at 75mg TID  Blood pressures are in the 140 range     4.  Hyperlipidemia:     LDL is 56 which is desirable     Continue current Rx and supportive care    Plan:   Patient on ASA / Plavix for DAPT  His renal function is improving overall, nephrology is following  He remains on amlodipine and nitrates  Blood pressures are 140s systolic with the increase of hydralazine  Continue beta blockers    Patient is seen and examined and findings are verified.  Patient is doing well.  No chest pain or shortness of breath    Hemodynamics are stable    Normal S1 and S2.  No pericardial rub or murmur abdominal exam was benign    Rehab bed is still not available.  Patient is slightly upset and he wants to go home with home health care.  Which I think is okay and reasonable patient can be discharged and follow-up with me in 6 weeks          Mani Salguero MD  02/01/22  16:51 EST

## 2022-02-01 NOTE — DISCHARGE SUMMARY
[facility] Medicine Services  DISCHARGE SUMMARY    Patient Name: Kailash Cooper  : 1940  MRN: [id number]    Date of Admission: 2022  Discharge Diagnosis: NSTEMI  Date of Discharge: 2022  Primary Care Physician: Madelyn Márquez APRN      Presenting Problem:   Chest pain [R07.9]  Hypoxia [R09.02]  Acute kidney injury (HCC) [N17.9]  Pneumonia of both lungs due to infectious organism, unspecified part of lung [J18.9]  Chest pain, unspecified type [R07.9]    Active and Resolved Hospital Problems:  Active Hospital Problems    Diagnosis POA   • **CAP (community acquired pneumonia) [J18.9] Yes   • Acute respiratory failure with hypoxia (HCC) [J96.01] Yes   • Sepsis without acute organ dysfunction (HCC) [A41.9] Yes   • Elevated troponin [R77.8] Yes   • Positive D dimer [R79.89] Yes   • Acute kidney injury superimposed on CKD (HCC) [N17.9, N18.9] Yes   • Chest pain [R07.9] Yes   • Gastroesophageal reflux disease [K21.9] Yes   • Mixed hyperlipidemia [E78.2] Yes   • 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     Shortness of breath-possibly multifactorial given patient's underlying pulmonary infection as well as NSTEMI, patient overall normalized  -Wean oxygen as tolerated  -Patient on Rocephin and Zithromax, viral panel negative no atypicals can stop Zithromax  -Bronchodilators  -Monitor volume status  -Patient underwent cardiac cath with stent to RCA with cardiology  -PT/OT, patient with significant debility needs assistance  -Insurance not covering inpatient rehab patient to go home with home health at this time  -Walking oximetry     Acute  hypoxic respiratory failure-with underlying possible pneumonia as well as NSTEMI  -Antibiotics for bacterial pneumonia, finished course  -Patient underwent cardiac cath with stenting now doing well  -Wean oxygen as tolerated  -Antiplatelets     NSTEMI-uncertain if caused by underlying pneumonia or if separate however patient noted to have significant disease of RCA amenable to stenting  -Goal-directed therapy  -Antiplatelets per cardiology  -Cardiac monitoring  -Appreciate cardiology input     Renal insufficiency-mild in nature likely component of chronic disease state  -Monitor daily  -Nephrology consulted appreciate recommendations  -Electrolytes and volume status per nephrology     Heart failure-patient with history of diastolic cardiomyopathy  -Strict I's and O's  -Echo  -Volume status being managed by nephrology, continue monitoring outpatient     Anemia-appearing overall stable between 8 and 9 patient with no definitive findings of bleeding likely associated with chronic disease state  -CBC daily     Type 2 diabetes-aim to keep blood sugars less than 200  -Sliding scale  -Diabetic/cardiac diet     Hypertension/hyperlipidemia/GERD/RLS-chronic in nature  -Resume home medication as clinically appropriate    Hospital Course     Hospital Course:    Kailash Cooper is a 81 y.o. 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  Imaging in the emergency room consistent with pneumonia and hypoxemia requiring O2.  Troponins were mildly elevated with no EKG changes.  He was admitted for further evaluation and treatment.  His troponins continued to trend upward and was placed on heparin drip with cardiology consult.  Lab work concerning for CHF exacerbation and medications were adjusted by cardiology and nephrology.  He has undergone cath with stent to RCA.     1/31/2022: Patient reports  still feeling weak.  Still having some breathing difficulties.  No chest pain no nausea or vomiting.  Patient became tearful when discussing his weakness and wishes to work aggressively with physical therapy to get back to walking.    2/1/2022: Patient doing well.  Other than persistent weakness patient asymptomatic.  Initial plan discharge to rehab but insurance not covering.  Patient to go home with home health at this time.  Goal-directed therapy for NSTEMI per cardiology.  Diuretics adjusted per nephrology.  Follow-up organized primary care, cardiology nephrology.  Walking oximetry performed.  Patient able to be discharged home with home health in good condition with strict return precautions given.      DISCHARGE Follow Up Recommendations for labs and diagnostics:     Continue monitoring renal function with nephrology      Reasons For Change In Medications and Indications for New Medications:  -Stop ramipril and torsemide  -Take aspirin 81 mg Plavix 75 mg daily develop prevent clotting of cardiac stent  -Take Bumex 1 mg twice daily for heart failure  -Take hydrochlorothiazide 12.5 mg daily to help lower blood pressure  -Take losartan 25 mg for renal protection lower blood pressure  -Take Imdur 60 mg daily to reduce cardiac stress    Day of Discharge     Vital Signs:  Temp:  [97.3 °F (36.3 °C)-99.5 °F (37.5 °C)] 99 °F (37.2 °C)  Heart Rate:  [53-70] 61  Resp:  [18-24] 18  BP: (110-148)/(38-59) 126/38  Flow (L/min):  [4-5] 4    Physical Exam:  Physical Exam     General: Elderly male lying in bed breathing comfortably on 4 L via nasal cannula no acute distress  HEENT: NC/AT, EOMI, mucosa moist  Heart: Regular, rate controlled  Chest: Normal work of breathing moving air well no wheezing  Abdominal: Soft. NT/ND.   Musculoskeletal: Normal ROM.  No cyanosis. No calf tenderness.  Neurological: Awake and alert no focal deficits  Skin: Skin is warm and dry. No rash  Psychiatric: Normal mood and affect.      Pertinent   and/or Most Recent Results     LAB RESULTS:      Lab 02/01/22  0316 01/31/22  0332 01/30/22  0814 01/30/22  0448 01/29/22  0909 01/28/22  1023 01/28/22  0516 01/28/22  0516 01/27/22  1404 01/27/22  1004 01/27/22  0258 01/26/22  1419 01/26/22  1304   WBC 8.10 7.70 7.50  --  9.30  --   --  7.70   < > 9.70   < >  --  11.30*   HEMOGLOBIN 8.3* 8.3* 8.7*  --  9.2*  --   --  9.0*   < > 10.0*   < >  --  11.3*   HEMATOCRIT 24.9* 25.0* 26.9*  --  27.9*  --   --  26.9*   < > 29.7*   < >  --  33.8*   PLATELETS 281 255 250  --  267  --   --  189   < > 221   < >  --  232   NEUTROS ABS 6.40 5.70 5.70  --  7.40*  --   --  6.00   < > 8.10*   < >  --  10.10*   LYMPHS ABS 0.60* 0.70 0.70  --  0.70  --   --  0.70   < > 0.60*   < >  --  0.40*   MONOS ABS 0.80 0.80 0.70  --  0.80  --   --  0.70   < > 0.80   < >  --  0.70   EOS ABS 0.30 0.40 0.30  --  0.40  --   --  0.20   < > 0.10   < >  --  0.00   MCV 91.9 91.3 92.4  --  93.3  --   --  92.6   < > 92.6   < >  --  92.8   PROCALCITONIN  --   --   --   --   --   --   --   --   --   --   --   --  0.12   LACTATE  --   --   --   --   --   --   --   --   --   --   --  0.6  --    PROTIME  --   --   --   --   --   --   --  11.5  --  11.3  --   --   --    APTT 26.0* 26.3*  --  25.8* 24.8* 61.4   < > 33.0*   < > 26.8*  --   --   --     < > = values in this interval not displayed.         Lab 02/01/22  0316 01/31/22  0332 01/30/22  0814 01/29/22  0909 01/28/22  0516 01/27/22  0258 01/27/22  0258 01/26/22  1857 01/26/22  1304   SODIUM 140 140 141 140 141   < > 137  --    < >   POTASSIUM 4.2 3.9 3.9 4.6 4.0   < > 4.5  --    < >   CHLORIDE 106 108* 108* 106 107   < > 107  --    < >   CO2 23.0 22.0 21.0* 23.0 22.0   < > 21.0*  --    < >   ANION GAP 11.0 10.0 12.0 11.0 12.0   < > 9.0  --    < >   BUN 25* 24* 26* 30* 31*   < > 30*  --    < >   CREATININE 1.56* 1.41* 1.39* 1.71* 1.89*   < > 1.70*  --    < >   GLUCOSE 165* 132* 144* 145* 135*   < > 61*  --    < >   CALCIUM 9.5 9.1 9.0 9.2 8.9   < > 9.1  --     < >   MAGNESIUM  --   --   --   --  1.8  --  2.2  --   --    PHOSPHORUS  --   --   --   --  3.3  --   --   --   --    HEMOGLOBIN A1C  --   --   --   --   --   --   --  5.4  --     < > = values in this interval not displayed.         Lab 01/28/22  0516 01/26/22  1304   TOTAL PROTEIN 5.5* 6.7   ALBUMIN 3.20* 4.10   GLOBULIN 2.3 2.6   ALT (SGPT) 23 10   AST (SGOT) 32 16   BILIRUBIN 0.2 0.4   ALK PHOS 60 82         Lab 01/31/22  0332 01/28/22  0516 01/27/22  1809 01/27/22  1404 01/27/22  1004 01/27/22  0832 01/27/22  0258 01/26/22  1857 01/26/22  1304   PROBNP 2,138.0* 3,742.0*  --   --   --   --  4,664.0*  --   --    TROPONIN T  --   --  0.404* 0.395*  --  0.377*  --  0.297* 0.055*   PROTIME  --  11.5  --   --  11.3  --   --   --   --    INR  --  1.04  --   --  1.02  --   --   --   --          Lab 01/29/22  0909   CHOLESTEROL 115   LDL CHOL 56   HDL CHOL 39*   TRIGLYCERIDES 111         Lab 01/28/22  0516   IRON 11*   IRON SATURATION 6*   TIBC 197*   TRANSFERRIN 132*         Brief Urine Lab Results  (Last result in the past 365 days)      Color   Clarity   Blood   Leuk Est   Nitrite   Protein   CREAT   Urine HCG        01/27/22 0834             82.5         01/27/22 0834 Yellow   Clear   Negative   Negative   Negative   30 mg/dL (1+)               Microbiology Results (last 10 days)     Procedure Component Value - Date/Time    Respiratory Culture - Sputum, Cough [[id number]] Collected: 01/27/22 0849    Lab Status: Final result Specimen: Sputum from Cough Updated: 01/29/22 1200     Respiratory Culture Scant growth (1+) Normal Respiratory Love     Gram Stain Few (2+) WBCs per low power field      Rare (1+) Epithelial cells per low power field      Few (2+) Mixed bacterial morphotypes seen on Gram Stain    Blood Culture - Blood, Arm, Right [[id number]]  (Abnormal) Collected: 01/26/22 1414    Lab Status: Final result Specimen: Blood from Arm, Right Updated: 01/28/22 0937     Blood Culture Staphylococcus, coagulase negative      Isolated from Aerobic Bottle     Gram Stain Aerobic Bottle Gram positive cocci in clusters    Narrative:      Probable contaminant requires clinical correlation, susceptibility not performed unless requested by physician.      Blood Culture ID, PCR - Blood, Arm, Right [[id number]]  (Abnormal) Collected: 01/26/22 1414    Lab Status: Final result Specimen: Blood from Arm, Right Updated: 01/27/22 1244     BCID, PCR Staph spp, not aureus or lugdunesis. Identification by BCID2 PCR.     BOTTLE TYPE Aerobic Bottle    Blood Culture - Blood, Arm, Right [[id number]]  (Normal) Collected: 01/26/22 1304    Lab Status: Final result Specimen: Blood from Arm, Right Updated: 01/31/22 1315     Blood Culture No growth at 5 days    Respiratory Panel PCR w/COVID-19(SARS-CoV-2) NEETU/MICHELLE/GARETH/PAD/COR/MAD/[facility] In-House, NP Swab in UTM/VTM, 3-4 HR TAT - Swab, Nasopharynx [[id number]]  (Normal) Collected: 01/26/22 1303    Lab Status: Final result Specimen: Swab from Nasopharynx Updated: 01/26/22 1402     ADENOVIRUS, PCR Not Detected     Coronavirus 229E Not Detected     Coronavirus HKU1 Not Detected     Coronavirus NL63 Not Detected     Coronavirus OC43 Not Detected     COVID19 Not Detected     Human Metapneumovirus Not Detected     Human Rhinovirus/Enterovirus Not Detected     Influenza A PCR Not Detected     Influenza B PCR Not Detected     Parainfluenza Virus 1 Not Detected     Parainfluenza Virus 2 Not Detected     Parainfluenza Virus 3 Not Detected     Parainfluenza Virus 4 Not Detected     RSV, PCR Not Detected     Bordetella pertussis pcr Not Detected     Bordetella parapertussis PCR Not Detected     Chlamydophila pneumoniae PCR Not Detected     Mycoplasma pneumo by PCR Not Detected    Narrative:      In the setting of a positive respiratory panel with a viral infection PLUS a negative procalcitonin without other underlying concern for bacterial infection, consider observing off antibiotics or discontinuation of antibiotics and continue  supportive care. If the respiratory panel is positive for atypical bacterial infection (Bordetella pertussis, Chlamydophila pneumoniae, or Mycoplasma pneumoniae), consider antibiotic de-escalation to target atypical bacterial infection.          XR Chest 1 View    Result Date: 1/28/2022  Impression: Bilateral pulmonary infiltrates suspicious for Covid pattern of pneumonia. There appears to been some improvement in the left upper lobe, although this may in part be an artifact related to differences in projection of the 2 films  Electronically Signed By-Josh Cruz On:1/28/2022 8:32 AM This report was finalized on [id number] by  Josh Cruz, .    XR Chest 1 View    Result Date: 1/26/2022  Impression:  1. Bilateral patchy pulmonary infiltrates, greater on the left than the right.  Electronically Signed By-Devyn Plascencia MD On:1/26/2022 12:44 PM This report was finalized on [id number] by  Devyn Plascencia MD.    US Renal Bilateral    Result Date: 1/27/2022  Impression:  1. Right renal atrophy with cortical thinning and elevated cortical echotexture suggesting chronic medical renal disease. 2. Mild left renal cortical thinning. 3. No hydronephrosis.  Electronically Signed By-Sasha Rios MD On:1/27/2022 1:07 PM This report was finalized on [id number] by  Sasha Rios MD.      Results for orders placed during the hospital encounter of 01/26/22    Duplex Venous Lower Extremity - Bilateral CAR    Interpretation Summary  · Normal bilateral lower extremity venous duplex scan.      Results for orders placed during the hospital encounter of 01/26/22    Duplex Venous Lower Extremity - Bilateral CAR    Interpretation Summary  · Normal bilateral lower extremity venous duplex scan.      Results for orders placed during the hospital encounter of 02/07/20    Adult Transthoracic Echo Complete W/ Cont if Necessary Per Protocol    Interpretation Summary  · Left ventricular systolic function is normal.  · Right ventricular  cavity is borderline dilated.    No structural or valvular heart disease I would suggest cardioembolic etiology      Labs Pending at Discharge:      Procedures Performed  Procedure(s):  Cardiac Catheterization/Vascular Study         Consults:   Consults     Date and Time Order Name Status Description    1/27/2022 10:15 AM Inpatient Nephrology Consult      1/26/2022  8:32 PM Inpatient Cardiology Consult Completed     1/26/2022  8:10 PM Inpatient Cardiology Consult      1/26/2022  3:42 PM Hospitalist (on-call MD unless specified)              Discharge Details        Discharge Medications      ASK your doctor about these medications      Instructions Start Date   amLODIPine 10 MG tablet  Commonly known as: NORVASC   10 mg, Oral, Daily      cetirizine 10 MG tablet  Commonly known as: zyrTEC   10 mg, Oral, Daily      famotidine 20 MG tablet  Commonly known as: PEPCID   20 mg, Oral, Daily      ferrous sulfate 325 (65 FE) MG EC tablet   325 mg, Oral, Daily With Breakfast      glimepiride 1 MG tablet  Commonly known as: AMARYL   1 mg, Oral, 2 Times Daily      hydrALAZINE 50 MG tablet  Commonly known as: APRESOLINE   50 mg, Oral, 3 Times Daily      HYDROcodone-acetaminophen  MG per tablet  Commonly known as: NORCO   1 tablet, Oral, Every 4 Hours PRN      ipratropium-albuterol 0.5-2.5 mg/3 ml nebulizer  Commonly known as: DUO-NEB   3 mL, Nebulization, Every 4 Hours PRN      metoprolol tartrate 25 MG tablet  Commonly known as: LOPRESSOR   25 mg, Oral, 2 Times Daily      nitroglycerin 0.3 MG SL tablet  Commonly known as: Nitrostat   0.3 mg, Sublingual, Every 5 Minutes PRN, Take no more than 3 doses in 15 minutes.      pravastatin 40 MG tablet  Commonly known as: PRAVACHOL   40 mg, Oral, Daily      primidone 50 MG tablet  Commonly known as: MYSOLINE   50 mg, Oral, Nightly      pseudoephedrine 60 MG tablet  Commonly known as: SUDAFED   60 mg, Oral, Every 4 Hours PRN      ramipril 5 MG capsule  Commonly known as: ALTACE   5  mg, Oral, Every Evening      tamsulosin 0.4 MG capsule 24 hr capsule  Commonly known as: FLOMAX   0.4 mg, Oral, Daily      torsemide 5 MG tablet  Commonly known as: DEMADEX   5 mg, Oral, Daily      vitamin B-12 1000 MCG tablet  Commonly known as: CYANOCOBALAMIN   1,000 mcg, Oral, Daily      Vitamin D3 50 MCG (2000 UT) capsule   2,000 Units, Oral, Daily             No Known Allergies      Discharge Disposition: Good      Diet:  Hospital:  Diet Order   Procedures   • Diet Cardiac; Healthy Heart         Discharge Activity:         CODE STATUS:  Code Status and Medical Interventions:   Ordered at: 01/26/22 1622     Code Status (Patient has no pulse and is not breathing):    CPR (Attempt to Resuscitate)     Medical Interventions (Patient has pulse or is breathing):    Full Support         Future Appointments   Date Time Provider Department Center   3/31/2022  1:00 PM Madelyn Márquez APRN [facility]   4/11/2022 11:20 AM Mc Key MD ANTONETTE PM [facility]       Additional Instructions for the Follow-ups that You Need to Schedule     Ambulatory Referral to Home Health ([facility])   As directed      Face to Face Visit Date: 2/1/2022    Follow-up provider for Plan of Care?: I treated the patient in an acute care facility and will not continue treatment after discharge.    Follow-up provider: MADELYN MÁRQUEZ [[id number]]    Reason/Clinical Findings: Respiratory distress    Describe mobility limitations that make leaving home difficult: Debility    Nursing/Therapeutic Services Requested: Physical Therapy    Frequency: 1 Week 1               Time spent on Discharge including face to face service:  35 minutes    This patient has been examined wearing appropriate Personal Protective Equipment and discussed with hospital infection control department. 02/01/22      Signature: Electronically signed by Marty Malin MD, 02/01/22, 3:49 PM EST.

## 2022-02-01 NOTE — PROGRESS NOTES
PROGRESS NOTE      Patient Name: Kailash Cooper  : 1940  MRN: [id number]  Primary Care Physician: Madelyn Márquez APRN  Date of admission: 2022    Patient Care Team:  Madelyn Márquez APRN as PCP - General        Subjective   Subjective:     Patient seen and examined, no new complaints.  No issues overnight  Review of systems:  unremarable SOB better      Allergies:  No Known Allergies    Objective   Exam:     Vital Signs  Temp:  [97.4 °F (36.3 °C)-99.5 °F (37.5 °C)] 98.9 °F (37.2 °C)  Heart Rate:  [55-70] 55  Resp:  [18-24] 20  BP: (125-148)/(39-59) 147/51  SpO2:  [92 %-100 %] 99 %  on  Flow (L/min):  [4-5] 4;   Device (Oxygen Therapy): nasal cannula  Body mass index is 26.78 kg/m².    General:  Elderly   male in no acute distress.    Head:      Normocephalic and atraumatic.    Eyes:      PERRL/EOM intact, conjunctiva and sclera clear with out nystagmus.    Neck:      No masses, thyromegaly,  trachea central with normal respiratory effort   Lungs:    Clear bilaterally to auscultation.    Heart:     Mild crackles  Regular rate and rhythm, no murmur no gallop  Abd:        Soft, nontender, not distended, bowel sounds positive, no shifting dullness   Pulses:   Pulses palpable  Extr:        No cyanosis or clubbing--+ edema.    Neuro:    No focal deficits.   alert oriented x3  Skin:       Intact without lesions or rashes.    Psych:    Alert and cooperative; normal mood and affect; .      Results Review:  I have personally reviewed most recent Data :  CBC    Results from last 7 days   Lab Units 22  0316 22  0332 22  0814 22  0909 22  0516 22  1004 22  0258   WBC 10*3/mm3 8.10 7.70 7.50 9.30 7.70 9.70 8.70   HEMOGLOBIN g/dL 8.3* 8.3* 8.7* 9.2* 9.0* 10.0* 9.6*   PLATELETS 10*3/mm3 281 255 250 267 189 221 200     CMP   Results from last 7 days   Lab Units 22  0316 22  0332 22  0814 22  0909 22  0516 22  0258 22  1304   SODIUM  mmol/L 140 140 141 140 141 137 139   POTASSIUM mmol/L 4.2 3.9 3.9 4.6 4.0 4.5 4.6   CHLORIDE mmol/L 106 108* 108* 106 107 107 107   CO2 mmol/L 23.0 22.0 21.0* 23.0 22.0 21.0* 18.0*   BUN mg/dL 25* 24* 26* 30* 31* 30* 31*   CREATININE mg/dL 1.56* 1.41* 1.39* 1.71* 1.89* 1.70* 1.93*   GLUCOSE mg/dL 165* 132* 144* 145* 135* 61* 98   ALBUMIN g/dL  --   --   --   --  3.20*  --  4.10   BILIRUBIN mg/dL  --   --   --   --  0.2  --  0.4   ALK PHOS U/L  --   --   --   --  60  --  82   AST (SGOT) U/L  --   --   --   --  32  --  16   ALT (SGPT) U/L  --   --   --   --  23  --  10     ABG      No radiology results for the last day    Results for orders placed during the hospital encounter of 02/07/20    Adult Transthoracic Echo Complete W/ Cont if Necessary Per Protocol    Interpretation Summary  · Left ventricular systolic function is normal.  · Right ventricular cavity is borderline dilated.    No structural or valvular heart disease I would suggest cardioembolic etiology    Scheduled Meds:amLODIPine, 10 mg, Oral, Daily  aspirin, 81 mg, Oral, Daily  atorvastatin, 10 mg, Oral, Daily  bumetanide, 1 mg, Oral, BID  cetirizine, 10 mg, Oral, Daily  cholecalciferol, 2,000 Units, Oral, Daily  clopidogrel, 75 mg, Oral, Daily  famotidine, 20 mg, Oral, Daily  ferrous sulfate, 325 mg, Oral, Daily With Breakfast  hydrALAZINE, 75 mg, Oral, TID  hydroCHLOROthiazide, 12.5 mg, Oral, Daily  insulin lispro, 0-7 Units, Subcutaneous, TID AC  ipratropium-albuterol, 3 mL, Nebulization, 4x Daily - RT  isosorbide mononitrate, 60 mg, Oral, Q24H  losartan, 25 mg, Oral, Q24H  metoprolol tartrate, 25 mg, Oral, BID  polyethylene glycol, 17 g, Oral, Daily  primidone, 50 mg, Oral, Nightly  senna-docusate sodium, 2 tablet, Oral, BID  sodium chloride, 3 mL, Intravenous, Q12H  tamsulosin, 0.4 mg, Oral, Daily  vitamin B-12, 1,000 mcg, Oral, Daily      Continuous Infusions:heparin, 12 Units/kg/hr, Last Rate: 19 Units/kg/hr (01/28/22 0622)      PRN Meds:•   acetaminophen **OR** acetaminophen **OR** acetaminophen  •  benzonatate  •  dextrose  •  dextrose  •  dextrose  •  diphenhydrAMINE  •  glucagon (human recombinant)  •  heparin  •  heparin  •  HYDROcodone-acetaminophen  •  insulin lispro **AND** insulin lispro  •  ipratropium-albuterol  •  melatonin  •  nitroglycerin  •  ondansetron **OR** ondansetron  •  sodium chloride  •  sodium chloride  •  sodium chloride    Assessment/Plan   Assessment and Plan:         CAP (community acquired pneumonia)    History of malignant neoplasm of thoracic cavity structure    Anemia due to stage 3 chronic kidney disease (HCC)    Osteoarthritis    Gastroesophageal reflux disease    Mixed hyperlipidemia    Essential hypertension    Obstructive sleep apnea    Type 2 diabetes mellitus (HCC)    Elevated troponin    Positive D dimer    Acute kidney injury superimposed on CKD (HCC)    Chest pain    Acute respiratory failure with hypoxia (HCC)    Sepsis without acute organ dysfunction (HCC)    ASSESSMENT:  Acute kidney injury patient with chronic kidney disease stage III  History of hypertension  History of sleep apnea  Congestive heart failure  History of TIA  History of diabetes mellitus              Plan:      At this time patient with some TRENA in a patient with chronic kidney disease stage III fluctuation in creatinine might be related to the underlying cardiorenal syndrome in the presence of some ACE inhibitors.  Patient has cardiorenal syndrome with acute coronary syndrome status post cardiac cath and successful PCI of RCA  Patient slight increase in creatinine since yesterday with normal electrolytes still short of breath  We will start Bumex at 1 mg p.o. twice a day and readjust diuretics in 1 to 2 days again.  Follow-up with repeat labs tomorrow morning.  Patient still on oxygen he was not on any oxygen at home before admission  ACE inhibitor and low-dose diuretics started yesterday that might contributed to some increase in  creatinine at this time.  echocardiogram result pending but cardiac cath showed that patient ejection fraction around 50%  Urine sodium 83, no significant proteinuria  Renal ultrasound with significant CKD with increased echogenicity   will continue to follow  Cardiology evaluation noted  We will continue to follow              Electronically signed by Destin Keene MD,   [facility] kidney consultant  [phone]

## 2022-02-01 NOTE — CASE MANAGEMENT/SOCIAL WORK
Continued Stay Note  [facility]     Patient Name: Kailash Cooper  MRN: [id number]  Today's Date: 2/1/2022    Admit Date: 1/26/2022     Discharge Plan     Row Name 02/01/22 1542       Plan    Plan DC Plan: Insurance denial for [facility]. Anticipate routine home with [facility] (pending acceptance, order placed). New home O2 ordered through [place].    Patient/Family in Agreement with Plan yes    Plan Comments Received notification from [facility] liaterese Waldron that patient was denied by insurance for acute rehab. CM inquired if there was an option to appeal if patient/family wanted to appeal the determination. CM contacted patient’s room phone to notify and spoke with spouse, Candis. She reported that she did not know much about the other rehab facilities but she would talk to her daughter about it and let CM know. CM later discussed with patient and spouse that they have decided to go home instead and have HH services. Patient’s daughter will be back later on today to be able to provide transportation home. CM sent new HH referral in basket to [facility] and notified Ct rome. CM notified MD and RN of NE plan to go home with HH and requested HH order from MD. FILOMENA discussed with RN that patient on 4L O2 with no home O2. Walking oximetry pending at this time. CM updated MD that O2 order would also be needed if patient qualifies for home O2. Per walking oximetry results, patient qualified for 3L home O2. FILOMENA sent new referral in [place] basket and notified Minda rome that patient was scheduled to dc home today. O2 delivery confirmed.           Expected Discharge Date and Time     Expected Discharge Date Expected Discharge Time    Feb 1, 2022           Phone communication or documentation only - no physical contact with patient or family.    Joellen Castro RN     Office Phone: [phone]  Office Cell: [phone]

## 2022-02-01 NOTE — PLAN OF CARE
Goal Outcome Evaluation:   Assessment: Kailash Cooper presents with functional mobility impairments which indicate the need for skilled intervention. Pt continues to make progress and is very motivated to improve his strength.  Tolerating session today without incident. Will continue to follow and progress as tolerated.

## 2022-02-01 NOTE — CASE MANAGEMENT/SOCIAL WORK
Continued Stay Note   Pro     Patient Name: Kailash Cooper  MRN: [id number]  Today's Date: 2/1/2022    Admit Date: 1/26/2022     Discharge Plan     Row Name 02/01/22 1234       Plan    Plan DC Plan: [facility] Acute pending precert. Precert started 1/31, no PASRR needed.    Patient/Family in Agreement with Plan yes    Plan Comments CM contacted [facility] liaison this morning to inquire about precert. Liaison Vanita reported that it was started yesterday 1/31 around 1730. CM contacted [employer] representative April who reported that she did not see any request that had been submitted for Acute rehab on patient. CM notified [facility] liaison and provided April’s number for them to contact to assist with precert. CM met with patient and daughter at bedside to discuss dc planning. Notified them that insurance precertification from rehab at [facility] was currently pending. They were agreeable to plan for [facility] and reported that if not approved by rehab, they would consider dc plan of going home with HH instead.              Met with patient in room wearing PPE: mask/goggles.      Maintained distance greater than six feet and spent less than 15 minutes in the room.      Joellen Castro RN     Office Phone: [phone]  Office Cell: [phone]

## 2022-02-01 NOTE — PROGRESS NOTES
[facility] Daily Progress Note    Patient Name: Kailash Cooper  : 1940  MRN: [id number]  Primary Care Physician:  Madelyn Márquez APRN  Date of admission: 2022      Subjective      Chief Complaint: Shortness of breath    Patient overall improving. Still having some weakness. Awaiting rehab placement.      Review of Systems   Constitutional: Positive for malaise/fatigue.   Respiratory: Positive for cough.    Neurological: Positive for weakness.         Objective      Vitals:   Temp:  [97.3 °F (36.3 °C)-99.5 °F (37.5 °C)] 99 °F (37.2 °C)  Heart Rate:  [53-70] 61  Resp:  [18-24] 18  BP: (110-148)/(38-59) 126/38  Flow (L/min):  [4-5] 4    Physical Exam     General: Elderly male lying in bed breathing comfortably on 5 L via nasal cannula no acute distress  HEENT: NC/AT, EOMI, mucosa moist  Heart: Regular, rate controlled  Chest: Normal work of breathing moving air well no wheezing  Abdominal: Soft. NT/ND.   Musculoskeletal: Normal ROM.  No cyanosis. No calf tenderness.  Neurological: Awake and alert no focal deficits  Skin: Skin is warm and dry. No rash  Psychiatric: Normal mood and affect.         Result Review    Result Review:  I have personally reviewed the results from the time of this admission to 2022 15:14 EST and agree with these findings:  [x]  Laboratory  [x]  Microbiology  [x]  Radiology  [x]  EKG/Telemetry   []  Cardiology/Vascular   []  Pathology  []  Old records  []  Other:  Most notable findings include: Renal insufficiency, anemia          Assessment/Plan      Brief Patient Summary:    Kailash Cooper is a 81 y.o. 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  Imaging in the emergency room consistent with pneumonia and hypoxemia requiring O2.  Troponins were mildly elevated with no EKG changes.  He was admitted  for further evaluation and treatment.  His troponins continued to trend upward and was placed on heparin drip with cardiology consult.  Lab work concerning for CHF exacerbation and medications were adjusted by cardiology and nephrology.  He has undergone cath with stent to RCA.    1/31/2022: Patient reports still feeling weak.  Still having some breathing difficulties.  No chest pain no nausea or vomiting.  Patient became tearful when discussing his weakness and wishes to work aggressively with physical therapy to get back to walking.    amLODIPine, 10 mg, Oral, Daily  aspirin, 81 mg, Oral, Daily  atorvastatin, 10 mg, Oral, Daily  bumetanide, 1 mg, Oral, BID  cetirizine, 10 mg, Oral, Daily  cholecalciferol, 2,000 Units, Oral, Daily  clopidogrel, 75 mg, Oral, Daily  famotidine, 20 mg, Oral, Daily  ferrous sulfate, 325 mg, Oral, Daily With Breakfast  hydrALAZINE, 75 mg, Oral, TID  hydroCHLOROthiazide, 12.5 mg, Oral, Daily  insulin lispro, 0-7 Units, Subcutaneous, TID AC  ipratropium-albuterol, 3 mL, Nebulization, 4x Daily - RT  isosorbide mononitrate, 60 mg, Oral, Q24H  losartan, 25 mg, Oral, Q24H  metoprolol tartrate, 25 mg, Oral, BID  polyethylene glycol, 17 g, Oral, Daily  primidone, 50 mg, Oral, Nightly  senna-docusate sodium, 2 tablet, Oral, BID  sodium chloride, 3 mL, Intravenous, Q12H  tamsulosin, 0.4 mg, Oral, Daily  vitamin B-12, 1,000 mcg, Oral, Daily       heparin, 12 Units/kg/hr, Last Rate: 19 Units/kg/hr (01/28/22 0622)         Active Hospital Problems:  Active Hospital Problems    Diagnosis    • **CAP (community acquired pneumonia)    • Acute respiratory failure with hypoxia (HCC)    • Sepsis without acute organ dysfunction (HCC)    • Elevated troponin    • Positive D dimer    • Acute kidney injury superimposed on CKD (HCC)    • Chest pain    • Gastroesophageal reflux disease    • Mixed hyperlipidemia    • History of malignant neoplasm of thoracic cavity structure    • Obstructive sleep apnea    • Anemia  due to stage 3 chronic kidney disease (HCC)      Formatting of this note might be different from the original.  Replacing diagnoses that were inactivated after the 10/1 regulatory import     • Essential hypertension    • Type 2 diabetes mellitus (HCC)    • Osteoarthritis      Plan:     Shortness of breath-possibly multifactorial given patient's underlying pulmonary infection as well as NSTEMI, patient overall improving  -Wean oxygen as tolerated  -Patient on Rocephin and Zithromax, viral panel negative no atypicals can stop Zithromax  -Bronchodilators  -Monitor volume status  -Patient underwent cardiac cath with stent to RCA with cardiology  -PT/OT, patient with significant debility needs assistance    Acute hypoxic respiratory failure-with underlying possible pneumonia as well as NSTEMI  -Antibiotics for bacterial pneumonia  -Patient underwent cardiac cath with stenting now doing well  -Wean oxygen as tolerated  -Antiplatelets    NSTEMI-uncertain if caused by underlying pneumonia or if separate however patient noted to have significant disease of RCA amenable to stenting  -Antiplatelets per cardiology  -Cardiac monitoring  -Appreciate cardiology input    Renal insufficiency-mild in nature likely component of chronic disease state  -Monitor daily  -Nephrology consulted appreciate recommendations  -Electrolytes and volume status per nephrology    Heart failure-patient with history of diastolic cardiomyopathy  -Strict I's and O's  -Echo  -Volume status being managed by nephrology    Anemia-appearing overall stable between 8 and 9 patient with no definitive findings of bleeding likely associated with chronic disease state  -CBC daily    Type 2 diabetes-aim to keep blood sugars less than 200  -Sliding scale  -Diabetic/cardiac diet    Hypertension/hyperlipidemia/GERD/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    CODE STATUS:     Code Status (Patient has no pulse and is not breathing): CPR (Attempt to Resuscitate)  Medical Interventions (Patient has pulse or is breathing): Full Support      Disposition: Patient is medically cleared for discharge.    This patient has been examined wearing appropriate Personal Protective Equipment and discussed with hospital infection control department. 02/01/22      Electronically signed by Marty Malin MD, 02/01/22, 15:14 EST.  [facility] Hospitalist Team

## 2022-02-02 ENCOUNTER — HOME HEALTH ADMISSION (OUTPATIENT)
Dept: HOME HEALTH SERVICES | Facility: HOME HEALTHCARE | Age: 82
End: 2022-02-02

## 2022-02-02 ENCOUNTER — TELEPHONE (OUTPATIENT)
Dept: PAIN MEDICINE | Facility: HOSPITAL | Age: 82
End: 2022-02-02

## 2022-02-02 ENCOUNTER — TRANSITIONAL CARE MANAGEMENT TELEPHONE ENCOUNTER (OUTPATIENT)
Dept: CALL CENTER | Facility: HOSPITAL | Age: 82
End: 2022-02-02

## 2022-02-02 LAB
BH CV ECHO MEAS - AO MAX PG (FULL): 7.8 MMHG
BH CV ECHO MEAS - AO MAX PG: 17.1 MMHG
BH CV ECHO MEAS - AO MEAN PG (FULL): 4 MMHG
BH CV ECHO MEAS - AO MEAN PG: 9.9 MMHG
BH CV ECHO MEAS - AO ROOT AREA (BSA CORRECTED): 1.5
BH CV ECHO MEAS - AO ROOT AREA: 5.9 CM^2
BH CV ECHO MEAS - AO ROOT DIAM: 2.7 CM
BH CV ECHO MEAS - AO V2 MAX: 206.7 CM/SEC
BH CV ECHO MEAS - AO V2 MEAN: 150.8 CM/SEC
BH CV ECHO MEAS - AO V2 VTI: 41.2 CM
BH CV ECHO MEAS - ASC AORTA: 2.8 CM
BH CV ECHO MEAS - AVA(I,A): 2.4 CM^2
BH CV ECHO MEAS - AVA(I,D): 2.4 CM^2
BH CV ECHO MEAS - AVA(V,A): 2.2 CM^2
BH CV ECHO MEAS - AVA(V,D): 2.2 CM^2
BH CV ECHO MEAS - BSA(HAYCOCK): 1.8 M^2
BH CV ECHO MEAS - BSA: 1.8 M^2
BH CV ECHO MEAS - BZI_BMI: 26.6 KILOGRAMS/M^2
BH CV ECHO MEAS - BZI_METRIC_HEIGHT: 165.1 CM
BH CV ECHO MEAS - BZI_METRIC_WEIGHT: 72.6 KG
BH CV ECHO MEAS - EDV(CUBED): 112.7 ML
BH CV ECHO MEAS - EDV(TEICH): 109.1 ML
BH CV ECHO MEAS - EF(CUBED): 71.7 %
BH CV ECHO MEAS - EF(TEICH): 63.3 %
BH CV ECHO MEAS - ESV(CUBED): 31.9 ML
BH CV ECHO MEAS - ESV(TEICH): 40 ML
BH CV ECHO MEAS - FS: 34.4 %
BH CV ECHO MEAS - IVS/LVPW: 1
BH CV ECHO MEAS - IVSD: 1 CM
BH CV ECHO MEAS - LA DIMENSION(2D): 3.2 CM
BH CV ECHO MEAS - LV MASS(C)D: 175.4 GRAMS
BH CV ECHO MEAS - LV MASS(C)DI: 97.5 GRAMS/M^2
BH CV ECHO MEAS - LV MAX PG: 9.3 MMHG
BH CV ECHO MEAS - LV MEAN PG: 5.9 MMHG
BH CV ECHO MEAS - LV V1 MAX: 152.7 CM/SEC
BH CV ECHO MEAS - LV V1 MEAN: 116.5 CM/SEC
BH CV ECHO MEAS - LV V1 VTI: 33.4 CM
BH CV ECHO MEAS - LVIDD: 4.8 CM
BH CV ECHO MEAS - LVIDS: 3.2 CM
BH CV ECHO MEAS - LVOT AREA: 3 CM^2
BH CV ECHO MEAS - LVOT DIAM: 2 CM
BH CV ECHO MEAS - LVPWD: 1 CM
BH CV ECHO MEAS - MR MAX PG: 135.5 MMHG
BH CV ECHO MEAS - MR MAX VEL: 581.9 CM/SEC
BH CV ECHO MEAS - MV A MAX VEL: 112.9 CM/SEC
BH CV ECHO MEAS - MV DEC SLOPE: 787.2 CM/SEC^2
BH CV ECHO MEAS - MV DEC TIME: 0.18 SEC
BH CV ECHO MEAS - MV E MAX VEL: 138 CM/SEC
BH CV ECHO MEAS - MV E/A: 1.2
BH CV ECHO MEAS - MV MAX PG: 8.5 MMHG
BH CV ECHO MEAS - MV MEAN PG: 3.9 MMHG
BH CV ECHO MEAS - MV V2 MAX: 145.8 CM/SEC
BH CV ECHO MEAS - MV V2 MEAN: 92.5 CM/SEC
BH CV ECHO MEAS - MV V2 VTI: 36.2 CM
BH CV ECHO MEAS - MVA(VTI): 2.8 CM^2
BH CV ECHO MEAS - PA MAX PG (FULL): 4.4 MMHG
BH CV ECHO MEAS - PA MAX PG: 5.6 MMHG
BH CV ECHO MEAS - PA MEAN PG (FULL): 2.8 MMHG
BH CV ECHO MEAS - PA MEAN PG: 3.6 MMHG
BH CV ECHO MEAS - PA V2 MAX: 118.2 CM/SEC
BH CV ECHO MEAS - PA V2 MEAN: 91.6 CM/SEC
BH CV ECHO MEAS - PA V2 VTI: 25.4 CM
BH CV ECHO MEAS - PI END-D VEL: 110.9 CM/SEC
BH CV ECHO MEAS - RAP SYSTOLE: 8 MMHG
BH CV ECHO MEAS - RV MAX PG: 1.2 MMHG
BH CV ECHO MEAS - RV MEAN PG: 0.78 MMHG
BH CV ECHO MEAS - RV V1 MAX: 53.8 CM/SEC
BH CV ECHO MEAS - RV V1 MEAN: 42.9 CM/SEC
BH CV ECHO MEAS - RV V1 VTI: 10.7 CM
BH CV ECHO MEAS - RVDD: 2.5 CM
BH CV ECHO MEAS - RVSP: 72.1 MMHG
BH CV ECHO MEAS - SI(AO): 134.5 ML/M^2
BH CV ECHO MEAS - SI(CUBED): 44.9 ML/M^2
BH CV ECHO MEAS - SI(LVOT): 56.1 ML/M^2
BH CV ECHO MEAS - SI(TEICH): 38.4 ML/M^2
BH CV ECHO MEAS - SV(AO): 241.9 ML
BH CV ECHO MEAS - SV(CUBED): 80.8 ML
BH CV ECHO MEAS - SV(LVOT): 100.9 ML
BH CV ECHO MEAS - SV(TEICH): 69.1 ML
BH CV ECHO MEAS - TR MAX VEL: 400.2 CM/SEC

## 2022-02-02 NOTE — PROGRESS NOTES
Spoke with patient to verify demographics and explain services. Pt is agreeable.    Spoke with donna Márquez NP via secure chat. She is agreeable to sign the plan of care and follow for home health orders.    Pharmacy:  [facility] in [place]

## 2022-02-02 NOTE — TELEPHONE ENCOUNTER
Spoke pharmacist, jessica, at [facility] in [place].  They will fill rx early for pt due to weather.  Pt's wife notifed.

## 2022-02-02 NOTE — CASE MANAGEMENT/SOCIAL WORK
Continued Stay Note   Pro     Patient Name: Kailash Cooper  MRN: [id number]  Today's Date: 2/2/2022    Admit Date: 1/26/2022     Discharge Plan     Row Name 02/02/22 1316       Plan    Plan Comments CM received call from patient’s daughter Isidra Cooper ([phone]) who reported that patient “took a turn for the worst” after having to go up two stairs to get into his home yesterday. She reported that “it was rough.” She inquired if patient could get into rehab. CM provided choice list verbally over the phone. No choices provided at this time as she reported that patient did not want to go back to the hospital or to rehab at this time but she reported that she would talk to him and call CM back with choices.    Later received callback from patient's daughter, Isidra who reported she spoke to her dad and he reported he wanted to stay home with his family and she was agreeable to that plan. She reported that they did speak to  and was given a phone number to call to ask the  nurse any questions and they felt better about that.               Phone communication or documentation only - no physical contact with patient or family.    Joellen Castro RN     Office Phone: [phone]  Office Cell: [phone]

## 2022-02-02 NOTE — CASE MANAGEMENT/SOCIAL WORK
Continued Stay Note  [facility]     Patient Name: Kailash Cooper  MRN: [id number]  Today's Date: 2/2/2022    Admit Date: 1/26/2022     Discharge Plan     Row Name 02/02/22 0834       Plan    Plan DC Plan: Home with [facility] (accepted, order placed). New home O2 delivered by [facility].    Final Discharge Disposition Code 06 - home with home health care    Final Note Confirmed acceptance with [facility] liaCt gudino.              Phone communication or documentation only - no physical contact with patient or family.    Joellen Castro RN     Office Phone: [phone]  Office Cell: [phone]

## 2022-02-02 NOTE — OUTREACH NOTE
Call Center TCM Note      Responses   [facility] patient discharged from? Pro   Does the patient have one of the following disease processes/diagnoses(primary or secondary)? Sepsis   TCM attempt successful? Yes   Call start time 1315   Call end time 1327   Discharge diagnosis CAP (community acquired pneumonia  Sepsis   Person spoke with today (if not patient) and relationship Daughter    Meds reviewed with patient/caregiver? Yes   Is the patient having any side effects they believe may be caused by any medication additions or changes? No   Does the patient have all medications related to this admission filled (includes all antibiotics, inhalers, nebulizers,steroids,etc.) Yes   Is the patient taking all medications as directed (includes completed medication regime)? Yes   Medication comments Per daughter the wife is picking up meds at this time. She is aware of meds to give per AVS and which meds  have been stopped.    Does the patient have a primary care provider?  Yes   Comments regarding PCP HOSP DC FU 2/7/22 @ 9:30 am.    Does the patient have an appointment with their PCP within 7 days of discharge? Yes   Has the patient kept scheduled appointments due by today? N/A   What is the Home health agency?  [facility]   Has home health visited the patient within 72 hours of discharge? Call prior to 72 hours   What DME was ordered? New home O2 ordered through [place].   Has all DME been delivered? Yes   Psychosocial issues? No   Comments Daughter will  pulse ox.    Did the patient receive a copy of their discharge instructions? Yes   Nursing interventions Reviewed instructions with patient   What is the patient's perception of their health status since discharge? Improving   Nursing interventions Nurse provided patient education   Is the patient/caregiver able to teach back Sepsis? S - Shivering,fever or very cold,  E - Extreme pain or generalized discomfort (worst ever,especially abdomen),  P - Pale or  discolored skin,  S - Sleepy, difficult to arouse,confused,  S - Short of breath   Is patient/caregiver able to teach back steps to recovery at home? Rest and regain strength,  Eat a balanced diet   Is the patient/caregiver able to teach back signs and symptoms of worsening condition: Fever,  Rapid heart rate (>90),  Shortness of breath/rapid respiratory rate,  Altered mental status(confusion/coma)   If the patient is a current smoker, are they able to teach back resources for cessation? Not a smoker   Is the patient/caregiver able to teach back the hierarchy of who to call/visit for symptoms/problems? PCP, Specialist, Home health nurse, Urgent Care, ED, 911 Yes   TCM call completed? Yes   Wrap up additional comments Pt reports he is doing ok just tired. Daughter states that she called CM and wanted Pt to go to rehab , however Pt has declined. Reviewed AVS and alerted Daughter to Nurse call line for any questions as daughter is worried about weather coming in and if Pt gets worse.           Fannie Pimentel RN    2/2/2022, 13:30 EST

## 2022-02-02 NOTE — OUTREACH NOTE
Prep Survey      Responses   [facility] patient discharged from? Pro   Is LACE score < 7 ? No   Emergency Room discharge w/ pulse ox? No   Eligibility [facility]   Date of Admission 01/26/22   Date of Discharge 02/01/22   Discharge Disposition Home or Self Care   Discharge diagnosis CAP (community acquired pneumonia  Sepsis   Does the patient have one of the following disease processes/diagnoses(primary or secondary)? Sepsis   Does the patient have Home health ordered? Yes   What is the Home health agency?  [facility]   Is there a DME ordered? Yes   What DME was ordered? New home O2 ordered through [place].   Prep survey completed? Yes          Ambika Frias RN

## 2022-02-03 ENCOUNTER — HOME CARE VISIT (OUTPATIENT)
Dept: HOME HEALTH SERVICES | Facility: HOME HEALTHCARE | Age: 82
End: 2022-02-03

## 2022-02-03 NOTE — CASE COMMUNICATION
Patient not seen for PT [facility] visit this day (2/3/2022) due to inclement weather. Phone number on record called and voice message left to advise patient of this situation.  Visit to be moved to 2/4/2022.

## 2022-02-04 ENCOUNTER — HOME CARE VISIT (OUTPATIENT)
Dept: HOME HEALTH SERVICES | Facility: HOME HEALTHCARE | Age: 82
End: 2022-02-04

## 2022-02-04 NOTE — CASE COMMUNICATION
Made contact with patient's daughter this day (2/4/2022) to attempt to arrange home health physical therapy visit. [facility]   Daughter advises that they do not want us to attempt a visit this day due to poor driving conditions in the area and request that PT visit occur next week (week of 2/6/2022).  The daughter further advises that the patient reports that he is doing  well and feeling much better.  Plan is to call to schedule PT admission visit on 2/7/2022.

## 2022-02-07 ENCOUNTER — HOME CARE VISIT (OUTPATIENT)
Dept: HOME HEALTH SERVICES | Facility: HOME HEALTHCARE | Age: 82
End: 2022-02-07

## 2022-02-07 ENCOUNTER — OFFICE VISIT (OUTPATIENT)
Dept: FAMILY MEDICINE CLINIC | Facility: CLINIC | Age: 82
End: 2022-02-07

## 2022-02-07 VITALS
DIASTOLIC BLOOD PRESSURE: 58 MMHG | RESPIRATION RATE: 18 BRPM | WEIGHT: 166 LBS | HEART RATE: 57 BPM | HEIGHT: 66 IN | BODY MASS INDEX: 26.68 KG/M2 | OXYGEN SATURATION: 98 % | TEMPERATURE: 96.9 F | SYSTOLIC BLOOD PRESSURE: 126 MMHG

## 2022-02-07 VITALS
DIASTOLIC BLOOD PRESSURE: 58 MMHG | BODY MASS INDEX: 27.66 KG/M2 | TEMPERATURE: 97.1 F | SYSTOLIC BLOOD PRESSURE: 108 MMHG | HEART RATE: 70 BPM | OXYGEN SATURATION: 98 % | HEIGHT: 65 IN | WEIGHT: 166 LBS

## 2022-02-07 DIAGNOSIS — E11.9 TYPE 2 DIABETES MELLITUS WITHOUT COMPLICATION, WITHOUT LONG-TERM CURRENT USE OF INSULIN: Primary | ICD-10-CM

## 2022-02-07 DIAGNOSIS — E66.3 OVERWEIGHT WITH BODY MASS INDEX (BMI) OF 27 TO 27.9 IN ADULT: ICD-10-CM

## 2022-02-07 DIAGNOSIS — I21.4 NON-STEMI (NON-ST ELEVATED MYOCARDIAL INFARCTION): ICD-10-CM

## 2022-02-07 DIAGNOSIS — N18.31 STAGE 3A CHRONIC KIDNEY DISEASE: ICD-10-CM

## 2022-02-07 DIAGNOSIS — J18.9 COMMUNITY ACQUIRED PNEUMONIA OF RIGHT LUNG, UNSPECIFIED PART OF LUNG: ICD-10-CM

## 2022-02-07 PROCEDURE — 1111F DSCHRG MED/CURRENT MED MERGE: CPT | Performed by: NURSE PRACTITIONER

## 2022-02-07 PROCEDURE — 99496 TRANSJ CARE MGMT HIGH F2F 7D: CPT | Performed by: NURSE PRACTITIONER

## 2022-02-07 PROCEDURE — G0151 HHCP-SERV OF PT,EA 15 MIN: HCPCS

## 2022-02-07 NOTE — PATIENT INSTRUCTIONS
Blood work today for nephrology  Decrease oxygen to 2 L and wear continuous  Start on diuretics when you get them and follow-up BMP 1 month  Keep appointment for physical therapy  Make appointment with Dr. Salguero  Report any chest pain or swelling at groin site

## 2022-02-07 NOTE — PROGRESS NOTES
Kailash Cooper is a 81 y.o. male.     81-year-old white male with history of lung cancer, chronic back pain goes to pain management, hypertension, hyperlipidemia, CKD 3 and right arm tremor who comes in today for TCM post hospitalization for pneumonia renal failure and non-STEMI MI with angioplasty to RCA.    Blood pressure 108/58 heart rate 70 he denies any chest pain, tachycardia or dizziness.  He is currently on 3 L oxygen with O2 of 98%.  We tried to ambulate him on room air and his sats dropped into the upper 80s.  I decreased his oxygen to 2 L and told him to wear 24/7 until he gets on the water pills that they ordered for him.  He is going to follow-up in a month and we will try to wean oxygen.  He has nitroglycerin to take if he does have any chest pain.  His right groin cath site with small hematoma but minimal bruising noted and dressing was removed    Patient also has physical therapy scheduled for cardiac rehab but has not started yet.  He needs to make an appointment with Dr. Salguero and he has a follow-up appointment with nephrology on Wednesday.  His last creatinine at discharge was 1.56 hemoglobin 8.3.    Patient states blood sugars are doing well less than 130 normally in the morning.  Patient's weight is 166 with BMI 27.6            Blood work today for nephrology  Decrease oxygen to 2 L and wear continuous  Start on diuretics when you get them and follow-up BMP 1 month  Keep appointment for physical therapy  Make appointment with Dr. Salguero  Report any chest pain or swelling at groin site           The following portions of the patient's history were reviewed and updated as appropriate: allergies, current medications, past family history, past medical history, past social history, past surgical history and problem list.    Vitals:    02/07/22 0915   BP: 108/58   BP Location: Right arm   Patient Position: Sitting   Cuff Size: Adult   Pulse: 70   Temp: 97.1 °F (36.2 °C)   TempSrc: Infrared   SpO2: 98%  "  Weight: 75.3 kg (166 lb)   Height: 165.1 cm (65\")     Body mass index is 27.62 kg/m².    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 INVASIVE LOCATION;  Service: Cardiovascular;  Laterality: Left;   • CATARACT EXTRACTION  2006 OU   • COLONOSCOPY  2011   • CORONARY STENT PLACEMENT  01/28/2022    RCA   • LUNG CANCER SURGERY      upper lobe rt lung cancer 9/2017  at [facility]     Family History   Problem Relation Age of Onset   • Stroke Mother    • Cancer Father         Prostate   • Heart failure Brother    • Heart disease Other      Immunization History   Administered Date(s) Administered   • COVID-19 (PFIZER) PURPLE CAP 02/11/2021, 03/04/2021   • Fluad Quad 65+ 11/18/2019   • Flublock Quad =>18yrs 10/07/2020   • Influenza, Unspecified 09/20/2017   • Pneumococcal Polysaccharide (PPSV23) 12/29/2021       No results displayed because visit has over 200 results.            Review of Systems   Constitutional: Positive for fatigue.   HENT: Negative.    Respiratory: Positive for shortness of breath.    Cardiovascular: Negative.    Gastrointestinal: Negative.    Genitourinary: Negative.    Musculoskeletal: Positive for back pain.   Neurological: Positive for weakness.   Psychiatric/Behavioral: Negative.        Objective   Physical Exam  Constitutional:       Appearance: Normal appearance.   HENT:      Head: Normocephalic.   Cardiovascular:      Rate and Rhythm: Normal rate and " regular rhythm.      Pulses: Normal pulses.      Heart sounds: Murmur heard.       Pulmonary:      Effort: Pulmonary effort is normal.      Breath sounds: Normal breath sounds.   Abdominal:      General: Bowel sounds are normal.   Musculoskeletal:      Comments: Generalized weakness   Skin:     Comments: Right groin site with very minimal bruising there is a small hematoma below the skin at the puncture site   Neurological:      General: No focal deficit present.      Mental Status: He is alert and oriented to person, place, and time.   Psychiatric:         Mood and Affect: Mood normal.         Behavior: Behavior normal.         Procedures    Assessment/Plan   Diagnoses and all orders for this visit:    1. Type 2 diabetes mellitus without complication, without long-term current use of insulin ([facility]) (Primary)    2. Stage 3a chronic kidney disease ([facility])    3. Community acquired pneumonia of right lung, unspecified part of lung    4. Non-STEMI (non-ST elevated myocardial infarction) ([facility])          Current Outpatient Medications:   •  amLODIPine (NORVASC) 10 MG tablet, Take 1 tablet by mouth Daily., Disp: 90 tablet, Rfl: 1  •  aspirin 81 MG EC tablet, Take 1 tablet by mouth Daily for 30 days., Disp: 30 tablet, Rfl: 0  •  bumetanide (BUMEX) 1 MG tablet, Take 1 tablet by mouth 2 (Two) Times a Day for 30 days., Disp: 60 tablet, Rfl: 0  •  cetirizine (zyrTEC) 10 MG tablet, Take 1 tablet by mouth Daily., Disp: 90 tablet, Rfl: 1  •  Cholecalciferol (VITAMIN D3) 50 MCG (2000 UT) capsule, Take 2,000 Units by mouth Daily., Disp: , Rfl:   •  clopidogrel (PLAVIX) 75 MG tablet, Take 1 tablet by mouth Daily for 30 days., Disp: 30 tablet, Rfl: 0  •  famotidine (PEPCID) 20 MG tablet, Take 20 mg by mouth Daily., Disp: , Rfl:   •  ferrous sulfate 325 (65 FE) MG EC tablet, Take 1 tablet by mouth Daily With Breakfast., Disp: , Rfl:   •  glimepiride (AMARYL) 1 MG tablet, Take 1 tablet by mouth 2 (Two) Times a Day., Disp: 180 tablet, Rfl:  1  •  hydrALAZINE (APRESOLINE) 50 MG tablet, Take 1 tablet by mouth 3 (Three) Times a Day., Disp: 270 tablet, Rfl: 1  •  hydroCHLOROthiazide (HYDRODIURIL) 12.5 MG tablet, Take 1 tablet by mouth Daily for 30 days., Disp: 30 tablet, Rfl: 0  •  HYDROcodone-acetaminophen (NORCO)  MG per tablet, Take 1 tablet by mouth Every 4 (Four) Hours As Needed for Severe Pain ., Disp: 180 tablet, Rfl: 0  •  ipratropium-albuterol (DUO-NEB) 0.5-2.5 mg/3 ml nebulizer, Take 3 mL by nebulization Every 4 (Four) Hours As Needed for Wheezing., Disp: 360 mL, Rfl: 6  •  isosorbide mononitrate (IMDUR) 60 MG 24 hr tablet, Take 1 tablet by mouth Daily for 30 days., Disp: 30 tablet, Rfl: 0  •  losartan (COZAAR) 25 MG tablet, Take 1 tablet by mouth Daily for 30 days., Disp: 30 tablet, Rfl: 0  •  metoprolol tartrate (LOPRESSOR) 25 MG tablet, Take 1 tablet by mouth 2 (Two) Times a Day., Disp: 180 tablet, Rfl: 1  •  nitroglycerin (Nitrostat) 0.3 MG SL tablet, Place 1 tablet under the tongue Every 5 (Five) Minutes As Needed for Chest Pain. Take no more than 3 doses in 15 minutes., Disp: 50 tablet, Rfl: 12  •  pravastatin (PRAVACHOL) 40 MG tablet, Take 1 tablet by mouth Daily., Disp: 90 tablet, Rfl: 1  •  primidone (MYSOLINE) 50 MG tablet, Take 1 tablet by mouth Every Night., Disp: 30 tablet, Rfl: 2  •  pseudoephedrine (SUDAFED) 60 MG tablet, Take 60 mg by mouth Every 4 (Four) Hours As Needed for Congestion., Disp: , Rfl:   •  tamsulosin (FLOMAX) 0.4 MG capsule 24 hr capsule, Take 1 capsule by mouth Daily., Disp: 90 capsule, Rfl: 1  •  vitamin B-12 (CYANOCOBALAMIN) 1000 MCG tablet, Take 1 tablet by mouth Daily. (Patient taking differently: Take 1,000 mcg by mouth 2 (Two) Times a Day.), Disp: , Rfl:            Madelyn Márquez, APRN2/7/202210:21 EST  This note has been electronically signed

## 2022-02-08 NOTE — HOME HEALTH
"PT Admission Summary: The patient is a 81 year old male admitted to home health service by PT this day (2/7/2022) following hospitalization 1/25 to 2/1/2022.  Hospitalized with Chest pain, Hypoxia, Acute kidney injury, Pneumonia of both lungs due to infectious organism; diagnosed with NSTEMI and underwent cardiac cath with stent to RCA.    Past Medical History includes: CAP (community acquired pneumonia), Acute respiratory failure with hypoxia, Sepsis without acute organ dysfunction, Elevated troponin, Positive D dimer, Acute kidney injury superimposed on CKD, Anemia due to stage 3 chronic kidney disease, Chest pain, Gastroesophageal reflux disease (GERD), Mixed hyperlipidemia, History of malignant neoplasm of thoracic cavity structure, Obstructive sleep apnea (LIBBY), Essential hypertension, Type 2 diabetes mellitus, Osteoarthritis, diastolic cardiomyopathy.    Prior Functional Level: Independent in all functional mobility.    Social History: Lives in home with 3 step entry with spouse. Patient names his spouse, Candis Cooper ([phone]) as his healthcare representative.  Patient Stated Goal: \"I'd like to get my mobility back, get to breathing better.\"  Password: Tomcat    PT assessment this day (2/7/2022) reveals the problems of general lower extremity weakness (rated 4-/5), impaired transfers (requires stand-by to minimal assistance), limited ambulation ability (70 feet requiring up to minimal assistance), imbalance (Tinetti Balance Assessment score 16/28 - high falls risk).    Rehab potential good due to patient demonstrated ability and willingness to participate in PT session and availability of caregiver support.    Plan is to discharge patient with home exercise program (HEP) and to care of spouse as needed with patient at a higher functional level.    Session Notes: History of chronic hip and back pain with resultant limited mobility. Seen by JANE Martinez (Nurse Practitioner) this day " (2/7/2022).    Patient noted to be sitting in chair without supplemental oxygen in place. Patient asserts that this is due to him having to answer door to let PT into the home.   SpO2: 94% HR 60 without supplemental oxygen at rest. Patient encouraged to use supplemental oxygen as prescribed.    Follow ups  Dr. Lazaro 2/9/2022 (Wed) at 11:30  Dr. Destin Keene MD (Nephrology) - 2/14/2022  Dr. Mani Salguero MD (Cardiology) - 2/28/2022    Plan for next visit: Carefully increase intensity of exercise and ambulation per patient ability and tolerance avoiding pushing through shortness of breath, chest pain or exhaustion.

## 2022-02-08 NOTE — CASE COMMUNICATION
"HUD SOC – HUDDLE START OF CARE    Caregiver Status: spouse  Availability:24/7  Ability to meet patient's needs:good  Willingness:good    Risk for Hospitalization:     Patient Stated Goal: \"I'd like to get my mobility back, get to breathing better.\"  Services required to achieve goals: PT    Potential Issues for goal attainment: None noted.    Problems identified:  The patient is a 81 year old male admitted to home health service by PT t his day (2/7/2022) following hospitalization 1/25 to 2/1/2022.  Hospitalized with Chest pain, Hypoxia, Acute kidney injury, Pneumonia of both lungs due to infectious organism; diagnosed with NSTEMI and underwent cardiac cath with stent to RCA.    Knowledge deficits:  Safe exercise and ambulation progression.    Functional status and safety:  PT assessment this day (2/7/2022) reveals the problems of general lower extremity weakness (rate d 4-/5), impaired transfers (requires stand-by to minimal assistance), limited ambulation ability (70 feet requiring up to minimal assistance), imbalance (Tinetti Balance Assessment score 16/28 - high falls risk).    Any Duplication of Services:none noted.    Environmental issues:  3 steps to enter home.    Equipment/Adjunct Services:  Devices for care present in the home: Rolling walker.  Devices needed and not present: none    Communi ty resources involved/needed:  none    Any additional needs: none    Based upon record review and collaboration conference, the recommended frequency for this patient is:     SN-----    PT-----2WK3 (medicare replacement - visit approval pending)    OT----    ST-----    HHA---    MSW---         Please note that above is a recommendation only and that the plan of care should reflect the patient's clinical needs and goals. If in agreement,  please complete note. If a different frequency is necessary, please explain in note box and proceed per patients clinical needs.   "

## 2022-02-08 NOTE — CASE COMMUNICATION
Patient has need for 6 PT visits due to level of impairment - see noted this day (2/7/2022) for details as needed.

## 2022-02-09 ENCOUNTER — TELEPHONE (OUTPATIENT)
Dept: FAMILY MEDICINE CLINIC | Facility: CLINIC | Age: 82
End: 2022-02-09

## 2022-02-09 DIAGNOSIS — N18.31 STAGE 3A CHRONIC KIDNEY DISEASE: Primary | ICD-10-CM

## 2022-02-09 PROCEDURE — G0180 MD CERTIFICATION HHA PATIENT: HCPCS | Performed by: NURSE PRACTITIONER

## 2022-02-09 NOTE — TELEPHONE ENCOUNTER
Caller: BRIAN DURAN    Relationship: Emergency Contact    Best call back number: [phone]    What is the medical concern/diagnosis: KIDNEY ISSUES    What is the provider, practice or medical service name: DR. DANIELS    What is the office phone number: [phone]    Any additional details: PT'S WIFE STATED THAT THEY DO NOT WANT TO DEAL WITH DR. LUNDBERG DUE TO ISSUES THIS MORNING WITH THE OFFICE LOCATION/SCHEDULE. REQUESTING THAT THE REFERRAL BE SWITCHED TO DR. DANIELS.

## 2022-02-10 ENCOUNTER — READMISSION MANAGEMENT (OUTPATIENT)
Dept: CALL CENTER | Facility: HOSPITAL | Age: 82
End: 2022-02-10

## 2022-02-10 NOTE — OUTREACH NOTE
Sepsis Week 2 Survey      Responses   [facility] patient discharged from? [facility]   Does the patient have one of the following disease processes/diagnoses(primary or secondary)? Sepsis   Week 2 attempt successful? Yes   Call start time [phone]   Call end time 1501   Discharge diagnosis CAP (community acquired pneumonia) Sepsis   Is patient permission given to speak with other caregiver? Yes   List who call center can speak with Candis Cooper, spouse   Person spoke with today (if not patient) and relationship spouse and patient   Meds reviewed with patient/caregiver? Yes   Does the patient have all medications related to this admission filled (includes all antibiotics, inhalers, nebulizers,steroids,etc.) Yes   Is the patient taking all medications as directed (includes completed medication regime)? Yes   Does the patient have a primary care provider?  Yes   Comments regarding PCP Patient has seen PCP since discharge   Has the patient kept scheduled appointments due by today? Yes   What is the Home health agency?  [facility]   Has home health visited the patient within 72 hours of discharge? Yes   What DME was ordered? New home O2 ordered through [place].   Has all DME been delivered? Yes   DME comments Home O23L    Psychosocial issues? No   Did the patient receive a copy of their discharge instructions? Yes   Nursing interventions Reviewed instructions with patient   What is the patient's perception of their health status since discharge? Improving   Nursing interventions Nurse provided patient education   Is the patient/caregiver able to teach back Sepsis? S - Short of breath,  S - Shivering,fever or very cold   Nursing interventions Nurse provided reassurance to patient,  Nurse provided patient education   Is patient/caregiver able to teach back steps to recovery at home? Set small, achievable goals for return to baseline health,  Rest and regain strength   Is the patient/caregiver able to teach back signs and symptoms  of worsening condition: Fever,  Shortness of breath/rapid respiratory rate   If the patient is a current smoker, are they able to teach back resources for cessation? Not a smoker   Is the patient/caregiver able to teach back the hierarchy of who to call/visit for symptoms/problems? PCP, Specialist, Home health nurse, Urgent Care, ED, 911 Yes   Week 2 call completed? Yes          Melanie Nelson RN

## 2022-02-11 ENCOUNTER — HOME CARE VISIT (OUTPATIENT)
Dept: HOME HEALTH SERVICES | Facility: HOME HEALTHCARE | Age: 82
End: 2022-02-11

## 2022-02-11 PROCEDURE — G0157 HHC PT ASSISTANT EA 15: HCPCS

## 2022-02-12 VITALS
OXYGEN SATURATION: 95 % | TEMPERATURE: 97.5 F | DIASTOLIC BLOOD PRESSURE: 82 MMHG | HEART RATE: 66 BPM | SYSTOLIC BLOOD PRESSURE: 132 MMHG

## 2022-02-15 ENCOUNTER — HOME CARE VISIT (OUTPATIENT)
Dept: HOME HEALTH SERVICES | Facility: HOME HEALTHCARE | Age: 82
End: 2022-02-15

## 2022-02-15 VITALS
OXYGEN SATURATION: 96 % | TEMPERATURE: 97 F | SYSTOLIC BLOOD PRESSURE: 124 MMHG | RESPIRATION RATE: 18 BRPM | HEART RATE: 54 BPM | DIASTOLIC BLOOD PRESSURE: 58 MMHG

## 2022-02-15 PROCEDURE — G0151 HHCP-SERV OF PT,EA 15 MIN: HCPCS

## 2022-02-15 NOTE — HOME HEALTH
Session Notes: Patient alone in the home when PT arrives. Patient not using supplemental oxygen but concentrator running.    Follow ups  Dr. Mani Slaguero MD (Cardiology) - 2/28/2022  Nephrology 2/30/2022    Plan for next visit: Continue with advancement of exercise per patient ability and tolerance. Advance ambulation per patient ability.

## 2022-02-17 ENCOUNTER — HOME CARE VISIT (OUTPATIENT)
Dept: HOME HEALTH SERVICES | Facility: HOME HEALTHCARE | Age: 82
End: 2022-02-17

## 2022-02-17 VITALS — OXYGEN SATURATION: 95 % | TEMPERATURE: 96.9 F | HEART RATE: 85 BPM | RESPIRATION RATE: 18 BRPM

## 2022-02-17 PROCEDURE — G0151 HHCP-SERV OF PT,EA 15 MIN: HCPCS

## 2022-02-18 ENCOUNTER — READMISSION MANAGEMENT (OUTPATIENT)
Dept: CALL CENTER | Facility: HOSPITAL | Age: 82
End: 2022-02-18

## 2022-02-18 NOTE — HOME HEALTH
Session Notes: Patient asserts that he needs continued PT into next week (as scheduled).    Plan for next visit: Continue to advance exercise and gait. At next visit, prepare patient for upcoming d/c.

## 2022-02-22 ENCOUNTER — HOME CARE VISIT (OUTPATIENT)
Dept: HOME HEALTH SERVICES | Facility: HOME HEALTHCARE | Age: 82
End: 2022-02-22

## 2022-02-23 ENCOUNTER — HOME CARE VISIT (OUTPATIENT)
Dept: HOME HEALTH SERVICES | Facility: HOME HEALTHCARE | Age: 82
End: 2022-02-23

## 2022-02-23 VITALS
DIASTOLIC BLOOD PRESSURE: 78 MMHG | OXYGEN SATURATION: 99 % | TEMPERATURE: 97.7 F | HEART RATE: 60 BPM | SYSTOLIC BLOOD PRESSURE: 128 MMHG

## 2022-02-23 PROCEDURE — G0157 HHC PT ASSISTANT EA 15: HCPCS

## 2022-02-25 ENCOUNTER — HOME CARE VISIT (OUTPATIENT)
Dept: HOME HEALTH SERVICES | Facility: HOME HEALTHCARE | Age: 82
End: 2022-02-25

## 2022-02-25 ENCOUNTER — TELEPHONE (OUTPATIENT)
Dept: PAIN MEDICINE | Facility: CLINIC | Age: 82
End: 2022-02-25

## 2022-02-25 VITALS
RESPIRATION RATE: 18 BRPM | OXYGEN SATURATION: 98 % | HEART RATE: 52 BPM | DIASTOLIC BLOOD PRESSURE: 50 MMHG | SYSTOLIC BLOOD PRESSURE: 110 MMHG | TEMPERATURE: 97.7 F

## 2022-02-25 DIAGNOSIS — G89.4 CHRONIC PAIN SYNDROME: ICD-10-CM

## 2022-02-25 PROCEDURE — G0151 HHCP-SERV OF PT,EA 15 MIN: HCPCS

## 2022-02-25 RX ORDER — HYDROCODONE BITARTRATE AND ACETAMINOPHEN 10; 325 MG/1; MG/1
1 TABLET ORAL EVERY 4 HOURS PRN
Qty: 180 TABLET | Refills: 0 | Status: SHIPPED | OUTPATIENT
Start: 2022-02-25 | End: 2022-03-27

## 2022-02-25 NOTE — TELEPHONE ENCOUNTER
Caller: BRIAN DURAN     Relationship to patient: SPOUSE    Best call back number:     Patient is needing: UNABLE TO WARM TRANSFER.  PATIENTS SPOUSE WANTED TO KNOW WHEN NEXT ROUND OF HYDROCODONE IS PRESCRIBED.  THEY ALSO CONFIRMED THEIR 4/11 APT

## 2022-02-25 NOTE — HOME HEALTH
Pt reports feeling well and states he has no further chest pain.  Pt states his only complaint is his chronic low back pain.  Pt is mobilizing well and independently without AD.  Pt has met all goals with skilled home care PT and is prepared for discharge with no further questions or concerns.  Pt reports compliance with HEP 2x/day and states he will continue after discharge.

## 2022-03-01 ENCOUNTER — READMISSION MANAGEMENT (OUTPATIENT)
Dept: CALL CENTER | Facility: HOSPITAL | Age: 82
End: 2022-03-01

## 2022-03-01 NOTE — OUTREACH NOTE
Sepsis Week 4 Survey      Responses   [facility] patient discharged from? Pro   Does the patient have one of the following disease processes/diagnoses(primary or secondary)? Sepsis   Week 4 attempt successful? Yes   Call start time 0729   Call end time 0735   Discharge diagnosis CAP (community acquired pneumonia) Sepsis   Meds reviewed with patient/caregiver? Yes   Is the patient taking all medications as directed (includes completed medication regime)? Yes   Has the patient kept scheduled appointments due by today? No   Comments Missed his scheduled appt but says he has 2 appts next week   Is the patient still receiving Home Health Services? No   What is the patient's perception of their health status since discharge? Improving   Additional teach back comments Reviewed s/s infection/sepsis   Week 4 call completed? Yes   Would the patient like one additional call? No   Graduated Yes   Is the patient interested in additional calls from an ambulatory ?  NOTE:  applies to high risk patients requiring additional follow-up. No   Wrap up additional comments Instructed on Nurse call center          Manisha Grullon RN

## 2022-03-02 PROBLEM — N17.9 ACUTE KIDNEY INJURY SUPERIMPOSED ON CKD: Status: RESOLVED | Noted: 2022-01-26 | Resolved: 2022-03-02

## 2022-03-02 PROBLEM — N18.9 ACUTE KIDNEY INJURY SUPERIMPOSED ON CKD: Status: RESOLVED | Noted: 2022-01-26 | Resolved: 2022-03-02

## 2022-03-03 NOTE — PROGRESS NOTES
Date of Office Visit: 2022  Encounter Provider: Dr. Mani Salguero    Place of Service: [facility] CARDIOLOGY [place]  Patient Name: Kailash Cooper  :1940  Madelyn Márquez APRN    Chief Complaint   Patient presents with   • Hospital Follow Up Visit   • Chest Pain   • Hyperlipidemia     History of Present Illness:    I am pleased to see Mr. Cooper in my office today as a follow-up.    As you know, patient is 81-year-old white gentleman whose past medical history significant for hypertension, hyperlipidemia, diabetes mellitus, CAD, coronary artery stenting, who came today for follow-up after his recent hospital admission and discharge.    In 2022, patient was admitted at [facility] with symptom of shortness of breath and chest pain.  He was diagnosed with pneumonia.  Patient developed acute kidney injury.  Once the patient is stabilized patient underwent cardiac catheterization and it showed high-grade stenosis of RCA and patient underwent successful stenting.  LAD had mild disease and LCx was free of significant disease.  Echocardiogram showed ejection fraction of 60 to 65%.  LVH was noted no significant valvular heart disease present.    Since the discharge from the hospital, patient is feeling much better.  His ability to do all the chores at home has improved.  He is not short of breath.  He denies any chest pain or angina pectoris.  No orthopnea PND no syncope or presyncope.  No leg edema noted.    Patient is doing very well from cardiac standpoint after revascularization.  I am pleased with the patient recovery and progress.  I will continue DAPT.  Plavix prescription has been given to the patient.        Past Medical History:   Diagnosis Date   • Arthritis    • Cancer (HCC)     bone cancer upper lobe rt lung 2017 [facility]   • Diabetes (HCC)    • Enlarged prostate    • Former smoker    • Hiatal hernia    • Hip pain     don   • Hip pain, bilateral    • Hyperlipidemia    •  Hypertension    • Kidney disease        stage 3    • Leg pain     don   • Low back pain    • Neck pain    • Stroke (HCC)          Past Surgical History:   Procedure Laterality Date   • CARDIAC CATHETERIZATION Left 1/28/2022    Procedure: Cardiac Catheterization/Vascular Study;  Surgeon: Mani Salguero MD;  Location: [facility] INVASIVE LOCATION;  Service: Cardiovascular;  Laterality: Left;   • CATARACT EXTRACTION  2006 [facility]   • COLONOSCOPY  2011   • CORONARY STENT PLACEMENT  01/28/2022    RCA   • LUNG CANCER SURGERY      upper lobe rt lung cancer 9/2017  at [facility]           Current Outpatient Medications:   •  amLODIPine (NORVASC) 10 MG tablet, Take 1 tablet by mouth Daily., Disp: 90 tablet, Rfl: 1  •  aspirin 81 MG EC tablet, Take 81 mg by mouth Daily., Disp: , Rfl:   •  Blood Glucose Monitoring Suppl w/Device kit, Inject 1 strip under the skin into the appropriate area as directed Daily., Disp: , Rfl:   •  cetirizine (zyrTEC) 10 MG tablet, Take 1 tablet by mouth Daily., Disp: 90 tablet, Rfl: 1  •  Cholecalciferol (VITAMIN D3) 50 MCG (2000 UT) capsule, Take 2,000 Units by mouth Daily., Disp: , Rfl:   •  famotidine (PEPCID) 20 MG tablet, Take 20 mg by mouth Daily., Disp: , Rfl:   •  ferrous sulfate 325 (65 FE) MG EC tablet, Take 1 tablet by mouth Daily With Breakfast., Disp: , Rfl:   •  glimepiride (AMARYL) 1 MG tablet, Take 1 tablet by mouth 2 (Two) Times a Day., Disp: 180 tablet, Rfl: 1  •  hydrALAZINE (APRESOLINE) 50 MG tablet, Take 1 tablet by mouth 3 (Three) Times a Day., Disp: 270 tablet, Rfl: 1  •  HYDROcodone-acetaminophen (NORCO)  MG per tablet, Take 1 tablet by mouth Every 4 (Four) Hours As Needed for Severe Pain  for up to 30 days., Disp: 180 tablet, Rfl: 0  •  ipratropium-albuterol (DUO-NEB) 0.5-2.5 mg/3 ml nebulizer, Take 3 mL by nebulization Every 4 (Four) Hours As Needed for Wheezing., Disp: 360 mL, Rfl: 6  •  metoprolol tartrate (LOPRESSOR) 25 MG tablet, Take 1 tablet by mouth 2 (Two) Times a  Day., Disp: 180 tablet, Rfl: 1  •  nitroglycerin (Nitrostat) 0.3 MG SL tablet, Place 1 tablet under the tongue Every 5 (Five) Minutes As Needed for Chest Pain. Take no more than 3 doses in 15 minutes., Disp: 50 tablet, Rfl: 12  •  O2 (OXYGEN), Inhale 3 L/min Continuous., Disp: , Rfl:   •  pravastatin (PRAVACHOL) 40 MG tablet, Take 1 tablet by mouth Daily., Disp: 90 tablet, Rfl: 1  •  pseudoephedrine (SUDAFED) 60 MG tablet, Take 60 mg by mouth Every 4 (Four) Hours As Needed for Congestion., Disp: , Rfl:   •  tamsulosin (FLOMAX) 0.4 MG capsule 24 hr capsule, Take 1 capsule by mouth Daily., Disp: 90 capsule, Rfl: 1  •  vitamin B-12 (CYANOCOBALAMIN) 1000 MCG tablet, Take 1 tablet by mouth Daily. (Patient taking differently: Take 1,000 mcg by mouth 2 (Two) Times a Day.), Disp: , Rfl:   •  bumetanide (BUMEX) 1 MG tablet, Take 1 tablet by mouth 2 (Two) Times a Day for 30 days., Disp: 60 tablet, Rfl: 0  •  clopidogrel (PLAVIX) 75 MG tablet, Take 1 tablet by mouth Daily., Disp: 90 tablet, Rfl: 1  •  hydroCHLOROthiazide (HYDRODIURIL) 12.5 MG tablet, Take 1 tablet by mouth Daily for 180 days., Disp: 90 tablet, Rfl: 1  •  isosorbide mononitrate (IMDUR) 60 MG 24 hr tablet, Take 1 tablet by mouth Daily for 180 days., Disp: 90 tablet, Rfl: 1  •  losartan (COZAAR) 25 MG tablet, Take 1 tablet by mouth Daily for 180 days., Disp: 90 tablet, Rfl: 1      Social History     Socioeconomic History   • Marital status:    Tobacco Use   • Smoking status: Former Smoker     Types: Cigarettes     Quit date:      Years since quittin.2   • Smokeless tobacco: Never Used   Vaping Use   • Vaping Use: Former   Substance and Sexual Activity   • Alcohol use: Never   • Drug use: Never   • Sexual activity: Defer         Review of Systems   Constitutional: Negative for chills and fever.   HENT: Negative for ear discharge and nosebleeds.    Eyes: Negative for discharge and redness.   Cardiovascular: Negative for chest pain, orthopnea,  "palpitations, paroxysmal nocturnal dyspnea and syncope.   Respiratory: Positive for shortness of breath. Negative for cough and wheezing.    Endocrine: Negative for heat intolerance.   Skin: Negative for rash.   Musculoskeletal: Positive for arthritis and joint pain. Negative for myalgias.   Gastrointestinal: Negative for abdominal pain, melena, nausea and vomiting.   Genitourinary: Negative for dysuria and hematuria.   Neurological: Negative for dizziness, light-headedness, numbness and tremors.   Psychiatric/Behavioral: Negative for depression. The patient is not nervous/anxious.        Procedures    Procedures    No orders to display           Objective:    /57   Pulse 58   Ht 167.6 cm (65.98\")   Wt 66.7 kg (147 lb)   SpO2 96%   BMI 23.74 kg/m²         Constitutional:       Appearance: Well-developed.   Eyes:      General: No scleral icterus.        Right eye: No discharge.   HENT:      Head: Normocephalic and atraumatic.   Neck:      Thyroid: No thyromegaly.      Lymphadenopathy: No cervical adenopathy.   Pulmonary:      Effort: Pulmonary effort is normal. No respiratory distress.      Breath sounds: Normal breath sounds. No wheezing. No rales.   Cardiovascular:      Normal rate. Regular rhythm.      No gallop.   Abdominal:      Tenderness: There is no abdominal tenderness.   Skin:     Findings: No erythema or rash.   Neurological:      Mental Status: Alert and oriented to person, place, and time.             Assessment:       Diagnosis Plan   1. Essential hypertension     2. Type 2 diabetes mellitus without complication, without long-term current use of insulin ([facility])     3. Chest pain, atypical     4. Non-STEMI (non-ST elevated myocardial infarction) ([facility])     5. Obstructive sleep apnea     6. Mixed hyperlipidemia              Plan:       MDM:    1.  CAD:    Patient is stable from coronary artery standpoint.  He had recent coronary artery stenting and did well.  Continue.    Hypertension:    Blood " pressure is very well controlled.  Current treatment would be continued    3.  Hyperlipidemia patient is on pravastatin.  Recent blood work showed LDL is 56 it is desirable.    4.  Diabetes mellitus:    Diet modification is advised

## 2022-03-07 ENCOUNTER — OFFICE VISIT (OUTPATIENT)
Dept: CARDIOLOGY | Facility: CLINIC | Age: 82
End: 2022-03-07

## 2022-03-07 ENCOUNTER — TELEPHONE (OUTPATIENT)
Dept: CARDIOLOGY | Facility: CLINIC | Age: 82
End: 2022-03-07

## 2022-03-07 VITALS
OXYGEN SATURATION: 96 % | HEART RATE: 58 BPM | HEIGHT: 66 IN | SYSTOLIC BLOOD PRESSURE: 132 MMHG | WEIGHT: 147 LBS | DIASTOLIC BLOOD PRESSURE: 57 MMHG | BODY MASS INDEX: 23.63 KG/M2

## 2022-03-07 DIAGNOSIS — R07.89 CHEST PAIN, ATYPICAL: ICD-10-CM

## 2022-03-07 DIAGNOSIS — E11.9 TYPE 2 DIABETES MELLITUS WITHOUT COMPLICATION, WITHOUT LONG-TERM CURRENT USE OF INSULIN: ICD-10-CM

## 2022-03-07 DIAGNOSIS — G47.33 OBSTRUCTIVE SLEEP APNEA: ICD-10-CM

## 2022-03-07 DIAGNOSIS — I10 ESSENTIAL HYPERTENSION: Primary | Chronic | ICD-10-CM

## 2022-03-07 DIAGNOSIS — E78.2 MIXED HYPERLIPIDEMIA: ICD-10-CM

## 2022-03-07 DIAGNOSIS — I21.4 NON-STEMI (NON-ST ELEVATED MYOCARDIAL INFARCTION): ICD-10-CM

## 2022-03-07 PROCEDURE — 99214 OFFICE O/P EST MOD 30 MIN: CPT | Performed by: INTERNAL MEDICINE

## 2022-03-07 RX ORDER — ASPIRIN 81 MG/1
81 TABLET ORAL DAILY
COMMUNITY

## 2022-03-07 RX ORDER — CLOPIDOGREL BISULFATE 75 MG/1
75 TABLET ORAL DAILY
Qty: 90 TABLET | Refills: 1 | Status: SHIPPED | OUTPATIENT
Start: 2022-03-07 | End: 2022-03-10 | Stop reason: SDUPTHER

## 2022-03-07 RX ORDER — HYDROCHLOROTHIAZIDE 12.5 MG/1
12.5 TABLET ORAL DAILY
Qty: 90 TABLET | Refills: 1 | Status: SHIPPED | OUTPATIENT
Start: 2022-03-07 | End: 2022-03-31

## 2022-03-07 RX ORDER — ISOSORBIDE MONONITRATE 60 MG/1
60 TABLET, EXTENDED RELEASE ORAL
Qty: 90 TABLET | Refills: 1 | Status: SHIPPED | OUTPATIENT
Start: 2022-03-07 | End: 2022-05-25

## 2022-03-07 RX ORDER — LOSARTAN POTASSIUM 25 MG/1
25 TABLET ORAL
Qty: 90 TABLET | Refills: 1 | Status: SHIPPED | OUTPATIENT
Start: 2022-03-07 | End: 2022-03-31

## 2022-03-07 NOTE — TELEPHONE ENCOUNTER
He was seen at the [place] office today Needs a refill on clopidogrel 75 mg 1 daily, losartan potassium tab 25 mg 1 daily, hydrochlorothiazide 12.5 mg 1 daily and isosorbide mononitrate ER tab 60 mg 1 daily Please send to [facility]

## 2022-03-10 ENCOUNTER — OFFICE VISIT (OUTPATIENT)
Dept: FAMILY MEDICINE CLINIC | Facility: CLINIC | Age: 82
End: 2022-03-10

## 2022-03-10 VITALS
SYSTOLIC BLOOD PRESSURE: 100 MMHG | HEART RATE: 69 BPM | DIASTOLIC BLOOD PRESSURE: 50 MMHG | BODY MASS INDEX: 24.83 KG/M2 | TEMPERATURE: 97.5 F | OXYGEN SATURATION: 90 % | HEIGHT: 65 IN | WEIGHT: 149 LBS

## 2022-03-10 DIAGNOSIS — N18.31 STAGE 3A CHRONIC KIDNEY DISEASE: ICD-10-CM

## 2022-03-10 DIAGNOSIS — Z85.29 HISTORY OF MALIGNANT NEOPLASM OF THORACIC CAVITY STRUCTURE: ICD-10-CM

## 2022-03-10 DIAGNOSIS — I21.4 NON-STEMI (NON-ST ELEVATED MYOCARDIAL INFARCTION): ICD-10-CM

## 2022-03-10 DIAGNOSIS — R26.81 UNSTEADY GAIT: ICD-10-CM

## 2022-03-10 DIAGNOSIS — Z99.81 OXYGEN DEPENDENT: ICD-10-CM

## 2022-03-10 DIAGNOSIS — D63.1 ANEMIA DUE TO STAGE 3A CHRONIC KIDNEY DISEASE: ICD-10-CM

## 2022-03-10 DIAGNOSIS — J96.01 ACUTE RESPIRATORY FAILURE WITH HYPOXIA: ICD-10-CM

## 2022-03-10 DIAGNOSIS — N18.31 ANEMIA DUE TO STAGE 3A CHRONIC KIDNEY DISEASE: ICD-10-CM

## 2022-03-10 DIAGNOSIS — E11.9 TYPE 2 DIABETES MELLITUS WITHOUT COMPLICATION, WITHOUT LONG-TERM CURRENT USE OF INSULIN: Primary | ICD-10-CM

## 2022-03-10 PROCEDURE — 99214 OFFICE O/P EST MOD 30 MIN: CPT | Performed by: NURSE PRACTITIONER

## 2022-03-10 RX ORDER — CLOPIDOGREL BISULFATE 75 MG/1
75 TABLET ORAL DAILY
Qty: 30 TABLET | Refills: 0 | Status: SHIPPED | OUTPATIENT
Start: 2022-03-10 | End: 2022-03-10 | Stop reason: SDUPTHER

## 2022-03-10 RX ORDER — CLOPIDOGREL BISULFATE 75 MG/1
75 TABLET ORAL DAILY
Qty: 90 TABLET | Refills: 1
Start: 2022-03-10 | End: 2022-09-12 | Stop reason: SDUPTHER

## 2022-03-10 NOTE — PROGRESS NOTES
Kailash Cooper is a 81 y.o. male.     81-year-old white male with history of lung cancer oxygen dependent, chronic back pain goes to pain management, hypertension, hyperlipidemia, CKD 3, and right arm tremor with recent diagnosis for pneumonia and cardiac cath with 1 stent placement who comes in today for follow-up visit    Patient blood pressure 100/50 heart rate 68 with systolic murmur and he denies any chest pain, dyspnea, tachycardia or dizziness.  He had a recent office visit with cardiology.    Patient's hemoglobin remained under 9 his last hemoglobin 8.3 over a month ago we will be rechecking today and if still 9 I will try to get patient a unit of blood.  He does take iron daily but has not seen oncology in quite a while I am setting him up with oncologist here in [place] to try to get anemia under control.    He does go to nephrology his last creatinine was 1.56.    Patient states blood sugars are normally always under 110 and he only takes 1 mg of Amaryl.  We are going to hold the Amaryl and see if patient sugars remain consistently below 130.  Patient will call office with blood sugar readings    Patient has a lot of fatigue and weakness.  His oxygen level today on room air was 98 he normally only wears oxygen at night I am going to try to get him a oxygen machine that produces is on oxygen that he can take with him to go out and walk and leave the house without oxygen levels dropping.  Unfortunately patient's insurance would not cover physical therapy which he  certainly needs.  Patient has also lost 17 pounds since I saw him a month ago which is adding to his weakness.  I encouraged him to do health shakes and take super B complex vitamins.  BMI is currently 24.8        Blood work today  Hold Amaryl as long as blood sugars stay below 130 fasting in the morning  Keep all appointments with cardiology  Oncology referral  Health shakes and super B complex vitamins  Try to walk and get more exercise to build  Flonase and Mucinex sent in. If not improved let me know.    "up strength       The following portions of the patient's history were reviewed and updated as appropriate: allergies, current medications, past family history, past medical history, past social history, past surgical history and problem list.    Vitals:    03/10/22 1104   BP: 100/50   BP Location: Left arm   Patient Position: Sitting   Cuff Size: Adult   Pulse: 69   Temp: 97.5 °F (36.4 °C)   TempSrc: Temporal   SpO2: 90%   Weight: 67.6 kg (149 lb)   Height: 165.1 cm (65\")     Body mass index is 24.79 kg/m².    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 [facility]   • Diabetes (HCC)    • Enlarged prostate    • Former smoker    • Hiatal hernia    • Hip pain     don   • Hip pain, bilateral    • Hyperlipidemia    • Hypertension    • Kidney disease        stage 3    • Leg pain     don   • Low back pain    • Neck pain    • Stroke (HCC)      Past Surgical History:   Procedure Laterality Date   • CARDIAC CATHETERIZATION Left 1/28/2022    Procedure: Cardiac Catheterization/Vascular Study;  Surgeon: Mani Salguero MD;  Location: [facility] INVASIVE LOCATION;  Service: Cardiovascular;  Laterality: Left;   • CATARACT EXTRACTION  2006    [facility]   • COLONOSCOPY  2011   • CORONARY STENT PLACEMENT  01/28/2022    RCA   • LUNG CANCER SURGERY      upper lobe rt lung cancer 9/2017  at [facility]     Family History   Problem Relation Age of Onset   • Stroke Mother    • Cancer Father         Prostate   • Heart failure Brother    • Heart disease Other      Immunization History   Administered Date(s) Administered   • COVID-19 (PFIZER) PURPLE CAP 02/11/2021, 03/04/2021   • Fluad Quad 65+ 11/18/2019   • Flublock Quad =>18yrs 10/07/2020   • Influenza, Unspecified 09/20/2017   • Pneumococcal Polysaccharide (PPSV23) 12/29/2021       No results displayed because visit has over 200 results.            Review of Systems   HENT: Negative.    Respiratory: Positive for shortness of breath.  "   Cardiovascular: Negative.    Gastrointestinal: Negative.    Genitourinary: Negative.    Musculoskeletal: Positive for back pain.   Neurological: Positive for weakness.   Psychiatric/Behavioral: Negative.        Objective   Physical Exam  Constitutional:       Appearance: Normal appearance.   HENT:      Head: Normocephalic.   Cardiovascular:      Rate and Rhythm: Normal rate and regular rhythm.      Pulses: Normal pulses.      Heart sounds: Murmur heard.   Pulmonary:      Effort: Pulmonary effort is normal.      Comments: Lungs diminished  Abdominal:      General: Bowel sounds are normal.   Musculoskeletal:      Comments: Patient with unsteady gait   Skin:     General: Skin is warm and dry.   Neurological:      General: No focal deficit present.      Mental Status: He is alert and oriented to person, place, and time.   Psychiatric:         Mood and Affect: Mood normal.         Behavior: Behavior normal.         Procedures    Assessment/Plan   Diagnoses and all orders for this visit:    1. Type 2 diabetes mellitus without complication, without long-term current use of insulin ([facility]) (Primary)  -     Hemoglobin A1c  -     Comprehensive Metabolic Panel    2. Anemia due to stage 3a chronic kidney disease ([facility])  -     CBC & Differential    3. Non-STEMI (non-ST elevated myocardial infarction) ([facility])    4. Stage 3a chronic kidney disease ([facility])    5. Acute respiratory failure with hypoxia ([facility])    6. Oxygen dependent    7. History of malignant neoplasm of thoracic cavity structure    8. Unsteady gait    Other orders  -     Discontinue: clopidogrel (PLAVIX) 75 MG tablet; Take 1 tablet by mouth Daily.  Dispense: 30 tablet; Refill: 0  -     clopidogrel (PLAVIX) 75 MG tablet; Take 1 tablet by mouth Daily.  Dispense: 90 tablet; Refill: 1          Current Outpatient Medications:   •  amLODIPine (NORVASC) 10 MG tablet, Take 1 tablet by mouth Daily., Disp: 90 tablet, Rfl: 1  •  aspirin 81 MG EC tablet, Take 81 mg by mouth Daily.,  Disp: , Rfl:   •  Blood Glucose Monitoring Suppl w/Device kit, Inject 1 strip under the skin into the appropriate area as directed Daily., Disp: , Rfl:   •  cetirizine (zyrTEC) 10 MG tablet, Take 1 tablet by mouth Daily., Disp: 90 tablet, Rfl: 1  •  Cholecalciferol (VITAMIN D3) 50 MCG (2000 UT) capsule, Take 2,000 Units by mouth Daily., Disp: , Rfl:   •  clopidogrel (PLAVIX) 75 MG tablet, Take 1 tablet by mouth Daily., Disp: 90 tablet, Rfl: 1  •  famotidine (PEPCID) 20 MG tablet, Take 20 mg by mouth Daily., Disp: , Rfl:   •  ferrous sulfate 325 (65 FE) MG EC tablet, Take 1 tablet by mouth Daily With Breakfast., Disp: , Rfl:   •  glimepiride (AMARYL) 1 MG tablet, Take 1 tablet by mouth 2 (Two) Times a Day., Disp: 180 tablet, Rfl: 1  •  hydrALAZINE (APRESOLINE) 50 MG tablet, Take 1 tablet by mouth 3 (Three) Times a Day., Disp: 270 tablet, Rfl: 1  •  hydroCHLOROthiazide (HYDRODIURIL) 12.5 MG tablet, Take 1 tablet by mouth Daily for 180 days., Disp: 90 tablet, Rfl: 1  •  HYDROcodone-acetaminophen (NORCO)  MG per tablet, Take 1 tablet by mouth Every 4 (Four) Hours As Needed for Severe Pain  for up to 30 days., Disp: 180 tablet, Rfl: 0  •  isosorbide mononitrate (IMDUR) 60 MG 24 hr tablet, Take 1 tablet by mouth Daily for 180 days., Disp: 90 tablet, Rfl: 1  •  losartan (COZAAR) 25 MG tablet, Take 1 tablet by mouth Daily for 180 days., Disp: 90 tablet, Rfl: 1  •  metoprolol tartrate (LOPRESSOR) 25 MG tablet, Take 1 tablet by mouth 2 (Two) Times a Day., Disp: 180 tablet, Rfl: 1  •  nitroglycerin (Nitrostat) 0.3 MG SL tablet, Place 1 tablet under the tongue Every 5 (Five) Minutes As Needed for Chest Pain. Take no more than 3 doses in 15 minutes., Disp: 50 tablet, Rfl: 12  •  O2 (OXYGEN), Inhale 3 L/min Continuous., Disp: , Rfl:   •  pravastatin (PRAVACHOL) 40 MG tablet, Take 1 tablet by mouth Daily., Disp: 90 tablet, Rfl: 1  •  pseudoephedrine (SUDAFED) 60 MG tablet, Take 60 mg by mouth Every 4 (Four) Hours As Needed  for Congestion., Disp: , Rfl:   •  tamsulosin (FLOMAX) 0.4 MG capsule 24 hr capsule, Take 1 capsule by mouth Daily., Disp: 90 capsule, Rfl: 1  •  vitamin B-12 (CYANOCOBALAMIN) 1000 MCG tablet, Take 1 tablet by mouth Daily. (Patient taking differently: Take 1,000 mcg by mouth 2 (Two) Times a Day.), Disp: , Rfl:   •  bumetanide (BUMEX) 1 MG tablet, Take 1 tablet by mouth 2 (Two) Times a Day for 30 days., Disp: 60 tablet, Rfl: 0           Madelyn Márquez, APRN3/[id number]:50 EST  This note has been electronically signed

## 2022-03-10 NOTE — PATIENT INSTRUCTIONS
Blood work today  Hold Amaryl as long as blood sugars stay below 130 fasting in the morning  Keep all appointments with cardiology  Oncology referral  Health shakes and super B complex vitamins  Try to walk and get more exercise to build up strength

## 2022-03-11 LAB
ALBUMIN SERPL-MCNC: 4.2 G/DL (ref 3.6–4.6)
ALBUMIN/GLOB SERPL: 2.3 {RATIO} (ref 1.2–2.2)
ALP SERPL-CCNC: 79 IU/L (ref 44–121)
ALT SERPL-CCNC: 15 IU/L (ref 0–44)
AST SERPL-CCNC: 20 IU/L (ref 0–40)
BASOPHILS # BLD AUTO: 0.1 X10E3/UL (ref 0–0.2)
BASOPHILS NFR BLD AUTO: 1 %
BILIRUB SERPL-MCNC: 0.3 MG/DL (ref 0–1.2)
BUN SERPL-MCNC: 27 MG/DL (ref 8–27)
BUN/CREAT SERPL: 11 (ref 10–24)
CALCIUM SERPL-MCNC: 9.8 MG/DL (ref 8.6–10.2)
CHLORIDE SERPL-SCNC: 104 MMOL/L (ref 96–106)
CO2 SERPL-SCNC: 21 MMOL/L (ref 20–29)
CREAT SERPL-MCNC: 2.49 MG/DL (ref 0.76–1.27)
EGFR GENE MUT ANL BLD/T: 25 ML/MIN/1.73
EOSINOPHIL # BLD AUTO: 0.3 X10E3/UL (ref 0–0.4)
EOSINOPHIL NFR BLD AUTO: 3 %
ERYTHROCYTE [DISTWIDTH] IN BLOOD BY AUTOMATED COUNT: 13.7 % (ref 11.6–15.4)
GLOBULIN SER CALC-MCNC: 1.8 G/DL (ref 1.5–4.5)
GLUCOSE SERPL-MCNC: 116 MG/DL (ref 65–99)
HBA1C MFR BLD: 5.5 % (ref 4.8–5.6)
HCT VFR BLD AUTO: 32 % (ref 37.5–51)
HGB BLD-MCNC: 10.4 G/DL (ref 13–17.7)
IMM GRANULOCYTES # BLD AUTO: 0 X10E3/UL (ref 0–0.1)
IMM GRANULOCYTES NFR BLD AUTO: 1 %
LYMPHOCYTES # BLD AUTO: 1.4 X10E3/UL (ref 0.7–3.1)
LYMPHOCYTES NFR BLD AUTO: 16 %
MCH RBC QN AUTO: 29.9 PG (ref 26.6–33)
MCHC RBC AUTO-ENTMCNC: 32.5 G/DL (ref 31.5–35.7)
MCV RBC AUTO: 92 FL (ref 79–97)
MONOCYTES # BLD AUTO: 0.7 X10E3/UL (ref 0.1–0.9)
MONOCYTES NFR BLD AUTO: 7 %
NEUTROPHILS # BLD AUTO: 6.4 X10E3/UL (ref 1.4–7)
NEUTROPHILS NFR BLD AUTO: 72 %
PLATELET # BLD AUTO: 251 X10E3/UL (ref 150–450)
POTASSIUM SERPL-SCNC: 4.5 MMOL/L (ref 3.5–5.2)
PROT SERPL-MCNC: 6 G/DL (ref 6–8.5)
RBC # BLD AUTO: 3.48 X10E6/UL (ref 4.14–5.8)
SODIUM SERPL-SCNC: 142 MMOL/L (ref 134–144)
WBC # BLD AUTO: 8.7 X10E3/UL (ref 3.4–10.8)

## 2022-03-14 DIAGNOSIS — N18.31 STAGE 3A CHRONIC KIDNEY DISEASE: Primary | ICD-10-CM

## 2022-03-15 RX ORDER — BUMETANIDE 1 MG/1
1 TABLET ORAL 2 TIMES DAILY
Qty: 60 TABLET | Refills: 0 | Status: SHIPPED | OUTPATIENT
Start: 2022-03-15 | End: 2022-03-31

## 2022-03-24 ENCOUNTER — TELEPHONE (OUTPATIENT)
Dept: FAMILY MEDICINE CLINIC | Facility: CLINIC | Age: 82
End: 2022-03-24

## 2022-03-24 LAB
BUN SERPL-MCNC: 42 MG/DL (ref 8–27)
BUN/CREAT SERPL: 14 (ref 10–24)
CALCIUM SERPL-MCNC: 10.1 MG/DL (ref 8.6–10.2)
CHLORIDE SERPL-SCNC: 100 MMOL/L (ref 96–106)
CO2 SERPL-SCNC: 20 MMOL/L (ref 20–29)
CREAT SERPL-MCNC: 2.92 MG/DL (ref 0.76–1.27)
EGFRCR SERPLBLD CKD-EPI 2021: 21 ML/MIN/1.73
GLUCOSE SERPL-MCNC: 92 MG/DL (ref 65–99)
POTASSIUM SERPL-SCNC: 4 MMOL/L (ref 3.5–5.2)
SODIUM SERPL-SCNC: 140 MMOL/L (ref 134–144)

## 2022-03-24 NOTE — TELEPHONE ENCOUNTER
Normally medications especially diuretics can cause it to fluctuate and blood sugar levels can cause it to fluctuate.  Diet can also affect it    She should go with Kailash to see nephrologist and talk to him about it

## 2022-03-24 NOTE — TELEPHONE ENCOUNTER
Pt spouse, Candis mia.  Has questions concerning Creatinine levels.  Wants to know what causes it to fluctuate?  Is there anything they should be doing until the see Dr Carolina?

## 2022-03-28 ENCOUNTER — TELEPHONE (OUTPATIENT)
Dept: PEDIATRICS | Facility: OTHER | Age: 82
End: 2022-03-28

## 2022-03-28 ENCOUNTER — TELEPHONE (OUTPATIENT)
Dept: FAMILY MEDICINE CLINIC | Facility: CLINIC | Age: 82
End: 2022-03-28

## 2022-03-28 NOTE — TELEPHONE ENCOUNTER
Caller: BRIAN DURAN    Relationship: Emergency Contact    Best call back number:[phone]       What is the best time to reach you: ANYTIME     Who are you requesting to speak with (clinical staff, provider,  specific staff member): CLINICAL STAFF     What was the call regarding: PATIENT IS FEELING WEAK SO BRIAN IS WANTING TO KNOW IF THE PATIENT COULD GET THE TEST DONE TO SEE IF HE IS ANEMIC AND TO SEE IF HE NEEDS A BLOOD INFUSION.    Do you require a callback: YES

## 2022-03-28 NOTE — TELEPHONE ENCOUNTER
Caller: Kailash Cooper    Relationship: Self    Best call back number: [phone]    What is the best time to reach you: ANYTIME    Who are you requesting to speak with (clinical staff, provider,  specific staff member): JENNY ROGERS    What was the call regarding: PATIENT IS REQUESTING HIS MOST RECENT LAB WORK THAT HE HAD DONE FOR HIS KIDNEYS TO BE FAXED TO HIS KIDNEY SPECIALIST (DR. DANIELS) BEFORE HIS APPOINTMENT ON Wednesday, 03/30/2022.    FAX NUMBER: [phone]

## 2022-03-28 NOTE — TELEPHONE ENCOUNTER
Just had CBC drawn 18 days ago hemoglobin was stable at 10.4.  This is mainly due to his kidney disease.  His renal function however had worsened has he see nephrology lately

## 2022-03-31 ENCOUNTER — OFFICE VISIT (OUTPATIENT)
Dept: FAMILY MEDICINE CLINIC | Facility: CLINIC | Age: 82
End: 2022-03-31

## 2022-03-31 VITALS
WEIGHT: 143.8 LBS | SYSTOLIC BLOOD PRESSURE: 128 MMHG | TEMPERATURE: 97.1 F | RESPIRATION RATE: 16 BRPM | BODY MASS INDEX: 23.96 KG/M2 | OXYGEN SATURATION: 97 % | HEIGHT: 65 IN | DIASTOLIC BLOOD PRESSURE: 67 MMHG | HEART RATE: 67 BPM

## 2022-03-31 DIAGNOSIS — N18.31 STAGE 3A CHRONIC KIDNEY DISEASE: ICD-10-CM

## 2022-03-31 DIAGNOSIS — I21.4 NON-STEMI (NON-ST ELEVATED MYOCARDIAL INFARCTION): Primary | ICD-10-CM

## 2022-03-31 PROCEDURE — 99213 OFFICE O/P EST LOW 20 MIN: CPT | Performed by: NURSE PRACTITIONER

## 2022-03-31 RX ORDER — BUMETANIDE 1 MG/1
TABLET ORAL
Qty: 30 TABLET | Refills: 0
Start: 2022-03-31 | End: 2022-05-02 | Stop reason: SDUPTHER

## 2022-03-31 NOTE — PROGRESS NOTES
"    Kailash Cooper is a 81 y.o. male.     81-year-old white male with history of lung cancer who wears oxygen at night, chronic back pain goes to pain management, hypertension, hyperlipidemia, CKD 3 right arm tremor, and recent hospitalization for pneumonia and MI with stent placement who comes in the office today wanting to talk about returning to work.  Patient has had no chest pain and is finished physical therapy at home.  He has this office visit with cardiology and cardiology stated patient was doing well did not need to be seen for 6 months.  Patient also recently saw nephrology and we are decreasing Bumex to 1 today and he has follow-up labs in 3 weeks.  Patient is willing to go back to work.  I will note stating I felt he would be okay to go back to work but to let me know if he develops any symptoms at all    Blood pressure 120/66 heart rate 66 he denies any chest pain, dyspnea, tachycardia or dizziness          Note given to patient okay to return to work  Patient to call if any symptoms present themselves  Keep all appointments with cardiology and nephrology         The following portions of the patient's history were reviewed and updated as appropriate: allergies, current medications, past family history, past medical history, past social history, past surgical history and problem list.    Vitals:    03/31/22 1304   BP: 128/67   BP Location: Right arm   Patient Position: Sitting   Cuff Size: Adult   Pulse: 67   Resp: 16   Temp: 97.1 °F (36.2 °C)   TempSrc: Infrared   SpO2: 97%   Weight: 65.2 kg (143 lb 12.8 oz)   Height: 165.1 cm (65\")     Body mass index is 23.93 kg/m².    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 INVASIVE LOCATION;  Service: Cardiovascular;  Laterality: Left;   • CATARACT EXTRACTION  2006    OU   • COLONOSCOPY  2011   • CORONARY STENT PLACEMENT  01/28/2022    RCA   • LUNG CANCER SURGERY      upper lobe rt lung cancer 9/2017  at [facility]     Family History   Problem Relation Age of Onset   • Stroke Mother    • Cancer Father         Prostate   • Heart failure Brother    • Heart disease Other      Immunization History   Administered Date(s) Administered   • COVID-19 (PFIZER) PURPLE CAP 02/11/2021, 03/04/2021   • Fluad Quad 65+ 11/18/2019   • Flublock Quad =>18yrs 10/07/2020   • Influenza, Unspecified 09/20/2017   • Pneumococcal Polysaccharide (PPSV23) 12/29/2021       Orders Only on 03/14/2022   Component Date Value Ref Range Status   • Glucose 03/23/2022 92  65 - 99 mg/dL Final   • BUN 03/23/2022 42 (A) 8 - 27 mg/dL Final   • Creatinine 03/23/2022 2.92 (A) 0.76 - 1.27 mg/dL Final   • EGFR Result 03/23/2022 21 (A) >59 mL/min/1.73 Final   • BUN/Creatinine Ratio 03/23/2022 14  10 - 24 Final   • Sodium 03/23/2022 140  134 - 144 mmol/L Final   • Potassium 03/23/2022 4.0  3.5 - 5.2 mmol/L Final   • Chloride 03/23/2022 100  96 - 106 mmol/L Final   • Total CO2 03/23/2022 20  20 - 29 mmol/L Final   • Calcium 03/23/2022 10.1  8.6 - 10.2 mg/dL Final         Review of Systems   Constitutional: Negative.    HENT: Negative.    Respiratory: Negative.    Cardiovascular: Negative.    Gastrointestinal: Negative.    Genitourinary: Negative.    Musculoskeletal: Negative.    Skin: Negative.    Neurological: Negative.    Psychiatric/Behavioral: Negative.        Objective   Physical Exam  Constitutional:       Appearance: Normal appearance.   Cardiovascular:      Rate and Rhythm:  Normal rate and regular rhythm.      Pulses: Normal pulses.      Heart sounds: Normal heart sounds.   Pulmonary:      Effort: Pulmonary effort is normal.      Breath sounds: Normal breath sounds.   Abdominal:      General: Bowel sounds are normal.   Musculoskeletal:         General: Normal range of motion.   Skin:     General: Skin is warm and dry.   Neurological:      General: No focal deficit present.      Mental Status: He is alert and oriented to person, place, and time.   Psychiatric:         Mood and Affect: Mood normal.         Behavior: Behavior normal.         Procedures    Assessment/Plan   Diagnoses and all orders for this visit:    1. Non-STEMI (non-ST elevated myocardial infarction) (HCC) (Primary)    2. Stage 3a chronic kidney disease (HCC)    Other orders  -     bumetanide (BUMEX) 1 MG tablet; One tab daily  Dispense: 30 tablet; Refill: 0          Current Outpatient Medications:   •  amLODIPine (NORVASC) 10 MG tablet, Take 1 tablet by mouth Daily., Disp: 90 tablet, Rfl: 1  •  aspirin 81 MG EC tablet, Take 81 mg by mouth Daily., Disp: , Rfl:   •  Blood Glucose Monitoring Suppl w/Device kit, Inject 1 strip under the skin into the appropriate area as directed Daily., Disp: , Rfl:   •  bumetanide (BUMEX) 1 MG tablet, One tab daily, Disp: 30 tablet, Rfl: 0  •  cetirizine (zyrTEC) 10 MG tablet, Take 1 tablet by mouth Daily., Disp: 90 tablet, Rfl: 1  •  Cholecalciferol (VITAMIN D3) 50 MCG (2000 UT) capsule, Take 2,000 Units by mouth Daily., Disp: , Rfl:   •  clopidogrel (PLAVIX) 75 MG tablet, Take 1 tablet by mouth Daily., Disp: 90 tablet, Rfl: 1  •  famotidine (PEPCID) 20 MG tablet, Take 20 mg by mouth Daily., Disp: , Rfl:   •  ferrous sulfate 325 (65 FE) MG EC tablet, Take 1 tablet by mouth Daily With Breakfast., Disp: , Rfl:   •  glimepiride (AMARYL) 1 MG tablet, Take 1 tablet by mouth 2 (Two) Times a Day., Disp: 180 tablet, Rfl: 1  •  hydrALAZINE (APRESOLINE) 50 MG tablet, Take 1 tablet by mouth 3  (Three) Times a Day., Disp: 270 tablet, Rfl: 1  •  isosorbide mononitrate (IMDUR) 60 MG 24 hr tablet, Take 1 tablet by mouth Daily for 180 days., Disp: 90 tablet, Rfl: 1  •  metoprolol tartrate (LOPRESSOR) 25 MG tablet, Take 1 tablet by mouth 2 (Two) Times a Day., Disp: 180 tablet, Rfl: 1  •  pravastatin (PRAVACHOL) 40 MG tablet, Take 1 tablet by mouth Daily., Disp: 90 tablet, Rfl: 1  •  pseudoephedrine (SUDAFED) 60 MG tablet, Take 60 mg by mouth Every 4 (Four) Hours As Needed for Congestion., Disp: , Rfl:   •  tamsulosin (FLOMAX) 0.4 MG capsule 24 hr capsule, Take 1 capsule by mouth Daily., Disp: 90 capsule, Rfl: 1  •  vitamin B-12 (CYANOCOBALAMIN) 1000 MCG tablet, Take 1 tablet by mouth Daily. (Patient taking differently: Take 1,000 mcg by mouth 2 (Two) Times a Day.), Disp: , Rfl:   •  nitroglycerin (Nitrostat) 0.3 MG SL tablet, Place 1 tablet under the tongue Every 5 (Five) Minutes As Needed for Chest Pain. Take no more than 3 doses in 15 minutes., Disp: 50 tablet, Rfl: 12  •  O2 (OXYGEN), Inhale 3 L/min Continuous., Disp: , Rfl:            Madelyn Márquez, JANE 3/31/2022 13:40 EDT  This note has been electronically signed

## 2022-03-31 NOTE — PATIENT INSTRUCTIONS
Note given to patient okay to return to work  Patient to call if any symptoms present themselves  Keep all appointments with cardiology and nephrology

## 2022-04-04 ENCOUNTER — OFFICE VISIT (OUTPATIENT)
Dept: PAIN MEDICINE | Facility: CLINIC | Age: 82
End: 2022-04-04

## 2022-04-04 ENCOUNTER — TELEPHONE (OUTPATIENT)
Dept: PAIN MEDICINE | Facility: CLINIC | Age: 82
End: 2022-04-04

## 2022-04-04 VITALS
RESPIRATION RATE: 16 BRPM | TEMPERATURE: 97.5 F | HEART RATE: 63 BPM | OXYGEN SATURATION: 94 % | DIASTOLIC BLOOD PRESSURE: 58 MMHG | SYSTOLIC BLOOD PRESSURE: 117 MMHG

## 2022-04-04 DIAGNOSIS — M54.2 CERVICALGIA: ICD-10-CM

## 2022-04-04 DIAGNOSIS — G89.29 CHRONIC BILATERAL LOW BACK PAIN WITHOUT SCIATICA: ICD-10-CM

## 2022-04-04 DIAGNOSIS — M47.812 CERVICAL SPONDYLOSIS WITHOUT MYELOPATHY: ICD-10-CM

## 2022-04-04 DIAGNOSIS — M47.814 THORACIC SPONDYLOSIS: ICD-10-CM

## 2022-04-04 DIAGNOSIS — M47.817 LUMBOSACRAL SPONDYLOSIS WITHOUT MYELOPATHY: ICD-10-CM

## 2022-04-04 DIAGNOSIS — G89.4 CHRONIC PAIN SYNDROME: ICD-10-CM

## 2022-04-04 DIAGNOSIS — M54.50 CHRONIC BILATERAL LOW BACK PAIN WITHOUT SCIATICA: ICD-10-CM

## 2022-04-04 DIAGNOSIS — Z79.899 HIGH RISK MEDICATION USE: Primary | ICD-10-CM

## 2022-04-04 PROCEDURE — G0463 HOSPITAL OUTPT CLINIC VISIT: HCPCS | Performed by: PHYSICAL MEDICINE & REHABILITATION

## 2022-04-04 PROCEDURE — 99213 OFFICE O/P EST LOW 20 MIN: CPT | Performed by: PHYSICAL MEDICINE & REHABILITATION

## 2022-04-04 RX ORDER — HYDROCODONE BITARTRATE AND ACETAMINOPHEN 10; 325 MG/1; MG/1
1 TABLET ORAL EVERY 4 HOURS PRN
Qty: 180 TABLET | Refills: 0 | Status: SHIPPED | OUTPATIENT
Start: 2022-04-04 | End: 2022-06-27 | Stop reason: SDUPTHER

## 2022-04-04 RX ORDER — HYDROCODONE BITARTRATE AND ACETAMINOPHEN 10; 325 MG/1; MG/1
1 TABLET ORAL EVERY 4 HOURS PRN
Qty: 180 TABLET | Refills: 0 | Status: SHIPPED | OUTPATIENT
Start: 2022-04-04 | End: 2022-05-25 | Stop reason: SDUPTHER

## 2022-04-04 NOTE — TELEPHONE ENCOUNTER
PT'S WIFE CALLED FOR REFILL OF HYDROCODONE TODAY. LOOKS LIKE HIS LAST VISIT WAS IN December. HE HAS AN APPT ON 4-11. DO YOU WANT TO SEND A REFILL IN OR DO YOU WANT US TO SEE IF WE CAN GET HIM IN SOONER??

## 2022-04-04 NOTE — PROGRESS NOTES
Subjective    CC back pain, neck pain, bilateral leg pain  Kailash Cooper is a 81 y.o. male with multiple comorbidities including CKD, DM, COPD, chronic back pain polyarthralgia here for f/u.  Chronic back pain radiating to bilateral hips worse with standing walking or prolonged activity.  Denies new weakness saddle anesthesia bladder bowel continence.  Chronic axial neck pain radiating to bilateral shoulder without radicular pain.  Continued chronic polyarthralgia and myofascial pain worse with activity.  Tried physical therapy/chiropractor with marginal relief.  Tried LESI's with marginal relief.  Had MI with stent, began Plavix for life.    Utilizes hydrocodone 10/325 6 times daily as needed for severe pain, failed 4 times daily as needed.  Functional benefit /denies side effects.    L-spine MRI 2018 moderate degenerative changes throughout facet arthropathy mild to moderate foraminal narrowing, scoliosis.  C-spine MRI 2018 marked degenerative disc and some posterior element disease, causing spinal stenosis at multiple levels, including C2-3, C3-4, C5-6, and C6-7.  There multiple foraminal impingements    Pain Assessment   Location of Pain: Lower Back, neck pain, joint  Description of Pain: Dull/Aching, Throbbing, Stabbing  Previous Pain Rating :4  Current Pain Ratin  Aggravating Factors: Activity  Alleviating Factors: Rest, Medication    PEG Assessment   What number best describes your pain on average in the past week? 5  What number best describes how, during the past week, pain has interfered with your enjoyment of life?3  What number best describes how, during the past week, pain has interfered with your general activity?10     The following portions of the patient's history were reviewed and updated as appropriate: allergies, current medications, past family history, past medical history, past social history, past surgical history and problem list.     has a past medical history of Arthritis, Cancer (HCC),  Diabetes (HCC), Enlarged prostate, Former smoker, Hiatal hernia, Hip pain, Hip pain, bilateral, Hyperlipidemia, Hypertension, Kidney disease, Leg pain, Low back pain, Neck pain, and Stroke (HCC).   has a past surgical history that includes Cataract extraction (); Colonoscopy (); Lung cancer surgery; Coronary stent placement (2022); and Cardiac catheterization (Left, 2022).  family history includes Cancer in his father; Heart disease in an other family member; Heart failure in his brother; Stroke in his mother.  Social History     Tobacco Use   • Smoking status: Former Smoker     Packs/day: 2.00     Types: Cigarettes     Quit date:      Years since quittin.2   • Smokeless tobacco: Never Used   Substance Use Topics   • Alcohol use: Never     Review of Systems   Musculoskeletal: Positive for arthralgias, back pain, myalgias and neck pain.   All other systems reviewed and are negative.    Objective   Physical Exam   Constitutional: He is oriented to person, place, and time. He appears well-developed and well-nourished. No distress.   HENT:   Head: Normocephalic and atraumatic.   Eyes: Pupils are equal, round, and reactive to light.   Cardiovascular: Normal rate, regular rhythm and normal heart sounds.   Pulmonary/Chest: Effort normal and breath sounds normal.   Abdominal: Soft. Normal appearance and bowel sounds are normal. He exhibits no distension. There is no abdominal tenderness.   Musculoskeletal:      Cervical back: He exhibits tenderness.      Lumbar back: He exhibits decreased range of motion and tenderness.   Neurological: He is alert and oriented to person, place, and time. He has normal reflexes. He displays normal reflexes. No sensory deficit.   Psychiatric: His behavior is normal. Mood, judgment and thought content normal.     /58   Pulse 63   Temp 97.5 °F (36.4 °C)   Resp 16   SpO2 94%     PHQ 9 on chart  Opioid risk tool low risk    Assessment/Plan   Diagnoses and all  orders for this visit:    1. Chronic bilateral low back pain without sciatica (Primary)    2. Cervical spondylosis without myelopathy    3. Cervicalgia    4. Chronic pain syndrome    5. Lumbosacral spondylosis without myelopathy    6. Thoracic spondylosis    Summary  Kailash Cooper is a 81 y.o. male with multiple comorbidities including CKD, DM, COPD, chronic back pain polyarthralgia here for f/u.   Chronic pain from lumbar and cervical DDD spondylosis, polyarthralgia/myofascial pain.  Tried physical therapy, chiropractor, LESI's with marginal relief.  Axial back pain interfering with ADL and his ability to exercise.  No NSAIDs due to CKD.        Complains of worsening back and bilateral hip pain.  Declines injections.    Increased to Hydrocodone 10/325 6 times daily as needed for severe pain, worked better than 4 or 5 pills daily with [facility] pain physician.  UDS 6/30/21 and inspect reviewed.  Discussed risk of tolerance, dependence, respiratory depression, coma and death associated with use of oral opioids for treatment of chronic nonmalignant pain.     RTC 3 months        History of Present Illness         Physical Exam  Constitutional:       General: He is not in acute distress.     Appearance: Normal appearance. He is well-developed and well-nourished.   HENT:      Head: Normocephalic and atraumatic.   Eyes:      Pupils: Pupils are equal, round, and reactive to light.   Cardiovascular:      Rate and Rhythm: Normal rate and regular rhythm.      Heart sounds: Normal heart sounds.   Pulmonary:      Effort: Pulmonary effort is normal.      Breath sounds: Normal breath sounds.   Abdominal:      General: Bowel sounds are normal. There is no distension.      Palpations: Abdomen is soft.      Tenderness: There is no abdominal tenderness.   Musculoskeletal:      Cervical back: Normal range of motion. Tenderness present.      Lumbar back: Tenderness present. Decreased range of motion.   Neurological:      Mental Status: He  is alert and oriented to person, place, and time.      Sensory: No sensory deficit.      Deep Tendon Reflexes: Reflexes are normal and symmetric. Reflexes normal.   Psychiatric:         Mood and Affect: Mood normal.         Behavior: Behavior normal.         Thought Content: Thought content normal.         Judgment: Judgment normal.

## 2022-05-02 RX ORDER — BUMETANIDE 1 MG/1
TABLET ORAL
Qty: 90 TABLET | Refills: 0
Start: 2022-05-02 | End: 2022-05-18

## 2022-05-18 RX ORDER — BUMETANIDE 1 MG/1
TABLET ORAL
Qty: 60 TABLET | Refills: 0 | Status: SHIPPED | OUTPATIENT
Start: 2022-05-18 | End: 2022-05-23

## 2022-05-22 ENCOUNTER — APPOINTMENT (OUTPATIENT)
Dept: CT IMAGING | Facility: HOSPITAL | Age: 82
End: 2022-05-22

## 2022-05-22 ENCOUNTER — HOSPITAL ENCOUNTER (EMERGENCY)
Facility: HOSPITAL | Age: 82
Discharge: HOME | End: 2022-05-22
Attending: EMERGENCY MEDICINE | Admitting: EMERGENCY MEDICINE

## 2022-05-22 VITALS
SYSTOLIC BLOOD PRESSURE: 152 MMHG | WEIGHT: 150 LBS | HEART RATE: 80 BPM | DIASTOLIC BLOOD PRESSURE: 62 MMHG | BODY MASS INDEX: 24.99 KG/M2 | HEIGHT: 65 IN | RESPIRATION RATE: 18 BRPM | OXYGEN SATURATION: 95 % | TEMPERATURE: 98 F

## 2022-05-22 DIAGNOSIS — R53.1 WEAKNESS: Primary | ICD-10-CM

## 2022-05-22 LAB
ANION GAP SERPL CALCULATED.3IONS-SCNC: 12 MMOL/L (ref 5–15)
BASOPHILS # BLD AUTO: 0.1 10*3/MM3 (ref 0–0.2)
BASOPHILS NFR BLD AUTO: 0.4 % (ref 0–1.5)
BUN SERPL-MCNC: 22 MG/DL (ref 8–23)
BUN/CREAT SERPL: 12.4 (ref 7–25)
CALCIUM SPEC-SCNC: 9 MG/DL (ref 8.6–10.5)
CHLORIDE SERPL-SCNC: 110 MMOL/L (ref 98–107)
CO2 SERPL-SCNC: 19 MMOL/L (ref 22–29)
CREAT SERPL-MCNC: 1.77 MG/DL (ref 0.76–1.27)
DEPRECATED RDW RBC AUTO: 50.8 FL (ref 37–54)
EGFRCR SERPLBLD CKD-EPI 2021: 38.1 ML/MIN/1.73
EOSINOPHIL # BLD AUTO: 0.1 10*3/MM3 (ref 0–0.4)
EOSINOPHIL NFR BLD AUTO: 0.6 % (ref 0.3–6.2)
ERYTHROCYTE [DISTWIDTH] IN BLOOD BY AUTOMATED COUNT: 15.5 % (ref 12.3–15.4)
GLUCOSE BLDC GLUCOMTR-MCNC: 134 MG/DL (ref 70–105)
GLUCOSE SERPL-MCNC: 139 MG/DL (ref 65–99)
HCT VFR BLD AUTO: 25.3 % (ref 37.5–51)
HGB BLD-MCNC: 8.2 G/DL (ref 13–17.7)
LYMPHOCYTES # BLD AUTO: 0.7 10*3/MM3 (ref 0.7–3.1)
LYMPHOCYTES NFR BLD AUTO: 5.5 % (ref 19.6–45.3)
MCH RBC QN AUTO: 29.8 PG (ref 26.6–33)
MCHC RBC AUTO-ENTMCNC: 32.2 G/DL (ref 31.5–35.7)
MCV RBC AUTO: 92.3 FL (ref 79–97)
MONOCYTES # BLD AUTO: 0.8 10*3/MM3 (ref 0.1–0.9)
MONOCYTES NFR BLD AUTO: 6.6 % (ref 5–12)
NEUTROPHILS NFR BLD AUTO: 11.1 10*3/MM3 (ref 1.7–7)
NEUTROPHILS NFR BLD AUTO: 86.9 % (ref 42.7–76)
NRBC BLD AUTO-RTO: 0 /100 WBC (ref 0–0.2)
PLATELET # BLD AUTO: 340 10*3/MM3 (ref 140–450)
PMV BLD AUTO: 7.1 FL (ref 6–12)
POTASSIUM SERPL-SCNC: 4.4 MMOL/L (ref 3.5–5.2)
RBC # BLD AUTO: 2.75 10*6/MM3 (ref 4.14–5.8)
SODIUM SERPL-SCNC: 141 MMOL/L (ref 136–145)
WBC NRBC COR # BLD: 12.8 10*3/MM3 (ref 3.4–10.8)

## 2022-05-22 PROCEDURE — 99283 EMERGENCY DEPT VISIT LOW MDM: CPT

## 2022-05-22 PROCEDURE — 85025 COMPLETE CBC W/AUTO DIFF WBC: CPT | Performed by: EMERGENCY MEDICINE

## 2022-05-22 PROCEDURE — 82962 GLUCOSE BLOOD TEST: CPT

## 2022-05-22 PROCEDURE — 80048 BASIC METABOLIC PNL TOTAL CA: CPT | Performed by: EMERGENCY MEDICINE

## 2022-05-22 PROCEDURE — 93005 ELECTROCARDIOGRAM TRACING: CPT | Performed by: EMERGENCY MEDICINE

## 2022-05-22 PROCEDURE — 70450 CT HEAD/BRAIN W/O DYE: CPT

## 2022-05-22 RX ORDER — SODIUM CHLORIDE 0.9 % (FLUSH) 0.9 %
10 SYRINGE (ML) INJECTION AS NEEDED
Status: DISCONTINUED | OUTPATIENT
Start: 2022-05-22 | End: 2022-05-22 | Stop reason: HOSPADM

## 2022-05-23 ENCOUNTER — OFFICE VISIT (OUTPATIENT)
Dept: FAMILY MEDICINE CLINIC | Facility: CLINIC | Age: 82
End: 2022-05-23

## 2022-05-23 VITALS
DIASTOLIC BLOOD PRESSURE: 50 MMHG | TEMPERATURE: 98.2 F | OXYGEN SATURATION: 91 % | SYSTOLIC BLOOD PRESSURE: 90 MMHG | HEART RATE: 70 BPM

## 2022-05-23 DIAGNOSIS — D63.1 ANEMIA DUE TO STAGE 4 CHRONIC KIDNEY DISEASE: ICD-10-CM

## 2022-05-23 DIAGNOSIS — E11.9 TYPE 2 DIABETES MELLITUS WITHOUT COMPLICATION, WITHOUT LONG-TERM CURRENT USE OF INSULIN: ICD-10-CM

## 2022-05-23 DIAGNOSIS — R41.0 CONFUSION: ICD-10-CM

## 2022-05-23 DIAGNOSIS — N18.31 STAGE 3A CHRONIC KIDNEY DISEASE: ICD-10-CM

## 2022-05-23 DIAGNOSIS — N18.4 ANEMIA DUE TO STAGE 4 CHRONIC KIDNEY DISEASE: ICD-10-CM

## 2022-05-23 DIAGNOSIS — R06.02 SHORTNESS OF BREATH: Primary | ICD-10-CM

## 2022-05-23 DIAGNOSIS — J44.1 COPD WITH EXACERBATION: ICD-10-CM

## 2022-05-23 DIAGNOSIS — R35.0 FREQUENT URINATION: ICD-10-CM

## 2022-05-23 DIAGNOSIS — J18.9 COMMUNITY ACQUIRED PNEUMONIA OF RIGHT LUNG, UNSPECIFIED PART OF LUNG: ICD-10-CM

## 2022-05-23 LAB
BILIRUB BLD-MCNC: NEGATIVE MG/DL
CLARITY, POC: CLEAR
COLOR UR: YELLOW
EXPIRATION DATE: ABNORMAL
GLUCOSE UR STRIP-MCNC: NEGATIVE MG/DL
KETONES UR QL: NEGATIVE
LEUKOCYTE EST, POC: NEGATIVE
Lab: ABNORMAL
NITRITE UR-MCNC: NEGATIVE MG/ML
PH UR: 5.5 [PH] (ref 5–8)
PROT UR STRIP-MCNC: ABNORMAL MG/DL
RBC # UR STRIP: NEGATIVE /UL
SP GR UR: 1.01 (ref 1–1.03)
UROBILINOGEN UR QL: NORMAL

## 2022-05-23 PROCEDURE — 96372 THER/PROPH/DIAG INJ SC/IM: CPT | Performed by: NURSE PRACTITIONER

## 2022-05-23 PROCEDURE — 81003 URINALYSIS AUTO W/O SCOPE: CPT | Performed by: NURSE PRACTITIONER

## 2022-05-23 PROCEDURE — 99214 OFFICE O/P EST MOD 30 MIN: CPT | Performed by: NURSE PRACTITIONER

## 2022-05-23 RX ORDER — CEFTRIAXONE 1 G/1
1 INJECTION, POWDER, FOR SOLUTION INTRAMUSCULAR; INTRAVENOUS ONCE
Status: COMPLETED | OUTPATIENT
Start: 2022-05-23 | End: 2022-05-23

## 2022-05-23 RX ORDER — DOXYCYCLINE 100 MG/1
100 CAPSULE ORAL 2 TIMES DAILY
Qty: 20 CAPSULE | Refills: 0 | Status: SHIPPED | OUTPATIENT
Start: 2022-05-23 | End: 2022-06-01 | Stop reason: HOSPADM

## 2022-05-23 RX ORDER — PREDNISONE 10 MG/1
TABLET ORAL
Qty: 19 TABLET | Refills: 0 | Status: SHIPPED | OUTPATIENT
Start: 2022-05-23 | End: 2022-06-01 | Stop reason: HOSPADM

## 2022-05-23 RX ORDER — ALLOPURINOL 100 MG/1
100 TABLET ORAL DAILY
COMMUNITY
Start: 2022-04-27 | End: 2022-05-25

## 2022-05-23 RX ORDER — BUMETANIDE 1 MG/1
TABLET ORAL
Qty: 60 TABLET | Refills: 0
Start: 2022-05-23 | End: 2022-05-25

## 2022-05-23 RX ADMIN — CEFTRIAXONE 1 G: 1 INJECTION, POWDER, FOR SOLUTION INTRAMUSCULAR; INTRAVENOUS at 15:08

## 2022-05-23 NOTE — PATIENT INSTRUCTIONS
1 g Rocephin  Doxycycline 100 mg twice daily x10 days  Prednisone 10 mg taper dose patient has visibly lost weight but did not want to stand to be weighed today.  Encourage patient to eat increase oxygen to 3 L  If no improvement by Thursday morning call office

## 2022-05-23 NOTE — PROGRESS NOTES
Chief Complaint  Weakness - Generalized    Subjective     {    Kailash Cooper presents to [facility]  81-year-old white male with history of lung cancer who wears oxygen at night, DM2, chronic back pain he goes to pain management, hypertension, hyperlipidemia, CKD 3, right arm tremor and recent hospitalization for pneumonia and MI with stent placement who comes in today for follow-up ER visit for weakness.  Patient complains of weakness and wife said he was having difficulty trying to make sentences.  Patient had a CT of the brain at the ER that was normal and was sent home.  Patient comes in today with her oxygen level 75% on 1 L and the blood pressure 90/50.  He still feels weak and is having some difficulty forming sentences.  Urinalysis was negative but chest x-ray did show basilar pneumonia.  I gave him a Rocephin shot placed him on doxycycline and steroids and increased oxygen to 3 L.  I told wife that if he is not greatly improved by Wednesday or Thursday to call me before I leave for the weekend    Blood pressure 90/50 heart rate 78 I am having patient hold hydralazine isosorbide and amlodipine until blood pressure recovers.  He is still on metoprolol    Patient has chronic anemia usually hemoglobin between 8.0 and which I am sure is contributing to his weakness 8.5,.  Will set him up with oncology.  Chronic anemia due to his kidney disease.  He had a positive occult blood stool but states that he had a colonoscopy in 2021 it was negative but I cannot find it in the chart    Wife states he has not been eating for the past 2 days and I told her to hold his diabetes medications until he start eating        1 g Rocephin  Doxycycline 100 mg twice daily x10 days  Prednisone 10 mg taper dose patient has visibly lost weight but did not want to stand to be weighed today.  Encourage patient to eat increase oxygen to 3 L  If no improvement by Thursday morning call  office                  Objective   Vital Signs:  BP 90/50 (BP Location: Left arm, Patient Position: Sitting, Cuff Size: Adult) Comment: Manual  Pulse 70   Temp 98.2 °F (36.8 °C) (Temporal)   SpO2 91% Comment: Bumped up to 3 liters  BMI is within normal parameters. No other follow-up for BMI required.      Physical Exam  HENT:      Head: Normocephalic.   Cardiovascular:      Rate and Rhythm: Normal rate and regular rhythm.      Pulses: Normal pulses.      Heart sounds: Normal heart sounds.   Pulmonary:      Effort: Pulmonary effort is normal.      Comments: Rhonchi wheezes throughout  Abdominal:      General: Bowel sounds are normal.   Musculoskeletal:      Comments: Generalized weakness   Skin:     General: Skin is warm.   Neurological:      General: No focal deficit present.      Mental Status: He is alert and oriented to person, place, and time.   Psychiatric:         Mood and Affect: Mood normal.         Behavior: Behavior normal.     Review of Systems - Psychological ROS: negative   Result Review :                 Assessment and Plan   Diagnoses and all orders for this visit:    1. Shortness of breath (Primary)  -     XR Chest 2 View    2. Anemia due to stage 4 chronic kidney disease (HCC)  -     Vitamin B12    3. Confusion  -     Lactic Acid, Plasma    4. COPD with exacerbation ([facility])  -     cefTRIAXone (ROCEPHIN) injection 1 g    5. Frequent urination  -     POCT urinalysis dipstick, automated  -     Urine Culture - Urine, Urine, Clean Catch    Other orders  -     bumetanide (BUMEX) 1 MG tablet; Take 1 tablet by mouth daily  Dispense: 60 tablet; Refill: 0  -     doxycycline (MONODOX) 100 MG capsule; Take 1 capsule by mouth 2 (Two) Times a Day.  Dispense: 20 capsule; Refill: 0  -     predniSONE (DELTASONE) 10 MG tablet; Take 3 tablets by mouth Daily for 3 days, THEN 2 tablets Daily for 3 days, THEN 1 tablet Daily for 2 days, THEN 0.5 tablets Daily for 4 days.  Dispense: 19 tablet; Refill: 0            I  spent 30 minutes caring for Kailash on this date of service. This time includes time spent by me in the following activities:  Follow Up   No follow-ups on file.  Patient was given instructions and counseling regarding his condition or for health maintenance advice. Please see specific information pulled into the AVS if appropriate.

## 2022-05-24 LAB
LACTATE SERPL-MCNC: 24.3 MG/DL (ref 4.8–25.7)
VIT B12 SERPL-MCNC: 1540 PG/ML (ref 232–1245)

## 2022-05-25 ENCOUNTER — HOSPITAL ENCOUNTER (INPATIENT)
Facility: HOSPITAL | Age: 82
LOS: 7 days | Discharge: HOME HEALTH | End: 2022-06-01
Attending: EMERGENCY MEDICINE | Admitting: HOSPITALIST

## 2022-05-25 ENCOUNTER — APPOINTMENT (OUTPATIENT)
Dept: GENERAL RADIOLOGY | Facility: HOSPITAL | Age: 82
End: 2022-05-25

## 2022-05-25 DIAGNOSIS — I21.4 NON-STEMI (NON-ST ELEVATED MYOCARDIAL INFARCTION): ICD-10-CM

## 2022-05-25 DIAGNOSIS — R77.8 ELEVATED TROPONIN: ICD-10-CM

## 2022-05-25 DIAGNOSIS — K92.1 MELENA: ICD-10-CM

## 2022-05-25 DIAGNOSIS — Z87.19 HISTORY OF DUODENAL ULCER: ICD-10-CM

## 2022-05-25 DIAGNOSIS — N18.32 STAGE 3B CHRONIC KIDNEY DISEASE: ICD-10-CM

## 2022-05-25 DIAGNOSIS — R94.31 ABNORMAL EKG: Primary | ICD-10-CM

## 2022-05-25 DIAGNOSIS — J81.0 ACUTE PULMONARY EDEMA: ICD-10-CM

## 2022-05-25 DIAGNOSIS — R55 SYNCOPE, UNSPECIFIED SYNCOPE TYPE: ICD-10-CM

## 2022-05-25 DIAGNOSIS — J96.21 ACUTE ON CHRONIC RESPIRATORY FAILURE WITH HYPOXIA: ICD-10-CM

## 2022-05-25 DIAGNOSIS — R06.02 SHORTNESS OF BREATH: ICD-10-CM

## 2022-05-25 DIAGNOSIS — D64.9 ANEMIA, UNSPECIFIED TYPE: ICD-10-CM

## 2022-05-25 DIAGNOSIS — J18.9 PNEUMONIA OF BOTH LOWER LOBES DUE TO INFECTIOUS ORGANISM: ICD-10-CM

## 2022-05-25 DIAGNOSIS — R19.5 GUAIAC POSITIVE STOOLS: ICD-10-CM

## 2022-05-25 LAB
ABO GROUP BLD: NORMAL
ALBUMIN SERPL-MCNC: 3.6 G/DL (ref 3.5–5.2)
ALBUMIN/GLOB SERPL: 1.3 G/DL
ALP SERPL-CCNC: 85 U/L (ref 39–117)
ALT SERPL W P-5'-P-CCNC: 40 U/L (ref 1–41)
ANION GAP SERPL CALCULATED.3IONS-SCNC: 13 MMOL/L (ref 5–15)
AST SERPL-CCNC: 52 U/L (ref 1–40)
B PARAPERT DNA SPEC QL NAA+PROBE: NOT DETECTED
B PERT DNA SPEC QL NAA+PROBE: NOT DETECTED
BACTERIA UR CULT: NORMAL
BACTERIA UR CULT: NORMAL
BACTERIA UR QL AUTO: ABNORMAL /HPF
BASOPHILS # BLD AUTO: 0.1 10*3/MM3 (ref 0–0.2)
BASOPHILS NFR BLD AUTO: 0.6 % (ref 0–1.5)
BILIRUB SERPL-MCNC: 0.2 MG/DL (ref 0–1.2)
BILIRUB UR QL STRIP: NEGATIVE
BLD GP AB SCN SERPL QL: NEGATIVE
BUN SERPL-MCNC: 49 MG/DL (ref 8–23)
BUN/CREAT SERPL: 24.6 (ref 7–25)
C PNEUM DNA NPH QL NAA+NON-PROBE: NOT DETECTED
CALCIUM SPEC-SCNC: 9.3 MG/DL (ref 8.6–10.5)
CHLORIDE SERPL-SCNC: 109 MMOL/L (ref 98–107)
CLARITY UR: CLEAR
CO2 SERPL-SCNC: 20 MMOL/L (ref 22–29)
COLOR UR: YELLOW
CREAT SERPL-MCNC: 1.99 MG/DL (ref 0.76–1.27)
D-LACTATE SERPL-SCNC: 1.1 MMOL/L (ref 0.5–2)
DEPRECATED RDW RBC AUTO: 50.3 FL (ref 37–54)
EGFRCR SERPLBLD CKD-EPI 2021: 33.1 ML/MIN/1.73
EOSINOPHIL # BLD AUTO: 0.1 10*3/MM3 (ref 0–0.4)
EOSINOPHIL NFR BLD AUTO: 0.8 % (ref 0.3–6.2)
ERYTHROCYTE [DISTWIDTH] IN BLOOD BY AUTOMATED COUNT: 15.4 % (ref 12.3–15.4)
FLUAV SUBTYP SPEC NAA+PROBE: NOT DETECTED
FLUBV RNA ISLT QL NAA+PROBE: NOT DETECTED
GLOBULIN UR ELPH-MCNC: 2.7 GM/DL
GLUCOSE SERPL-MCNC: 72 MG/DL (ref 65–99)
GLUCOSE UR STRIP-MCNC: NEGATIVE MG/DL
HADV DNA SPEC NAA+PROBE: NOT DETECTED
HCOV 229E RNA SPEC QL NAA+PROBE: NOT DETECTED
HCOV HKU1 RNA SPEC QL NAA+PROBE: NOT DETECTED
HCOV NL63 RNA SPEC QL NAA+PROBE: NOT DETECTED
HCOV OC43 RNA SPEC QL NAA+PROBE: NOT DETECTED
HCT VFR BLD AUTO: 23.3 % (ref 37.5–51)
HGB BLD-MCNC: 7.5 G/DL (ref 13–17.7)
HGB UR QL STRIP.AUTO: NEGATIVE
HMPV RNA NPH QL NAA+NON-PROBE: NOT DETECTED
HPIV1 RNA ISLT QL NAA+PROBE: NOT DETECTED
HPIV2 RNA SPEC QL NAA+PROBE: NOT DETECTED
HPIV3 RNA NPH QL NAA+PROBE: NOT DETECTED
HPIV4 P GENE NPH QL NAA+PROBE: NOT DETECTED
HYALINE CASTS UR QL AUTO: ABNORMAL /LPF
KETONES UR QL STRIP: NEGATIVE
LEUKOCYTE ESTERASE UR QL STRIP.AUTO: NEGATIVE
LYMPHOCYTES # BLD AUTO: 0.5 10*3/MM3 (ref 0.7–3.1)
LYMPHOCYTES NFR BLD AUTO: 3.5 % (ref 19.6–45.3)
M PNEUMO IGG SER IA-ACNC: NOT DETECTED
MAGNESIUM SERPL-MCNC: 2.2 MG/DL (ref 1.6–2.4)
MCH RBC QN AUTO: 29.4 PG (ref 26.6–33)
MCHC RBC AUTO-ENTMCNC: 32.2 G/DL (ref 31.5–35.7)
MCV RBC AUTO: 91.4 FL (ref 79–97)
MONOCYTES # BLD AUTO: 0.5 10*3/MM3 (ref 0.1–0.9)
MONOCYTES NFR BLD AUTO: 3.6 % (ref 5–12)
NEUTROPHILS NFR BLD AUTO: 13.3 10*3/MM3 (ref 1.7–7)
NEUTROPHILS NFR BLD AUTO: 91.5 % (ref 42.7–76)
NITRITE UR QL STRIP: NEGATIVE
NRBC BLD AUTO-RTO: 0.2 /100 WBC (ref 0–0.2)
NT-PROBNP SERPL-MCNC: 6284 PG/ML (ref 0–1800)
PH UR STRIP.AUTO: <=5 [PH] (ref 5–8)
PLATELET # BLD AUTO: 275 10*3/MM3 (ref 140–450)
PMV BLD AUTO: 8.5 FL (ref 6–12)
POTASSIUM SERPL-SCNC: 4.5 MMOL/L (ref 3.5–5.2)
PROT SERPL-MCNC: 6.3 G/DL (ref 6–8.5)
PROT UR QL STRIP: ABNORMAL
QT INTERVAL: 409 MS
RBC # BLD AUTO: 2.55 10*6/MM3 (ref 4.14–5.8)
RBC # UR STRIP: ABNORMAL /HPF
RBC MORPH BLD: NORMAL
REF LAB TEST METHOD: ABNORMAL
RH BLD: POSITIVE
RHINOVIRUS RNA SPEC NAA+PROBE: NOT DETECTED
RSV RNA NPH QL NAA+NON-PROBE: NOT DETECTED
SARS-COV-2 RNA NPH QL NAA+NON-PROBE: NOT DETECTED
SMALL PLATELETS BLD QL SMEAR: ADEQUATE
SODIUM SERPL-SCNC: 142 MMOL/L (ref 136–145)
SP GR UR STRIP: 1.02 (ref 1–1.03)
SQUAMOUS #/AREA URNS HPF: ABNORMAL /HPF
T&S EXPIRATION DATE: NORMAL
TROPONIN T SERPL-MCNC: 0.35 NG/ML (ref 0–0.03)
TSH SERPL DL<=0.05 MIU/L-ACNC: 2.69 UIU/ML (ref 0.27–4.2)
UROBILINOGEN UR QL STRIP: ABNORMAL
WBC # UR STRIP: ABNORMAL /HPF
WBC MORPH BLD: NORMAL
WBC NRBC COR # BLD: 14.6 10*3/MM3 (ref 3.4–10.8)

## 2022-05-25 PROCEDURE — 86850 RBC ANTIBODY SCREEN: CPT | Performed by: PHYSICIAN ASSISTANT

## 2022-05-25 PROCEDURE — 71045 X-RAY EXAM CHEST 1 VIEW: CPT

## 2022-05-25 PROCEDURE — 83880 ASSAY OF NATRIURETIC PEPTIDE: CPT | Performed by: PHYSICIAN ASSISTANT

## 2022-05-25 PROCEDURE — 85007 BL SMEAR W/DIFF WBC COUNT: CPT | Performed by: PHYSICIAN ASSISTANT

## 2022-05-25 PROCEDURE — 86900 BLOOD TYPING SEROLOGIC ABO: CPT | Performed by: PHYSICIAN ASSISTANT

## 2022-05-25 PROCEDURE — 0202U NFCT DS 22 TRGT SARS-COV-2: CPT | Performed by: PHYSICIAN ASSISTANT

## 2022-05-25 PROCEDURE — 81001 URINALYSIS AUTO W/SCOPE: CPT | Performed by: PHYSICIAN ASSISTANT

## 2022-05-25 PROCEDURE — 93005 ELECTROCARDIOGRAM TRACING: CPT | Performed by: EMERGENCY MEDICINE

## 2022-05-25 PROCEDURE — 93005 ELECTROCARDIOGRAM TRACING: CPT

## 2022-05-25 PROCEDURE — 36415 COLL VENOUS BLD VENIPUNCTURE: CPT | Performed by: EMERGENCY MEDICINE

## 2022-05-25 PROCEDURE — 99285 EMERGENCY DEPT VISIT HI MDM: CPT

## 2022-05-25 PROCEDURE — 80053 COMPREHEN METABOLIC PANEL: CPT | Performed by: PHYSICIAN ASSISTANT

## 2022-05-25 PROCEDURE — 83605 ASSAY OF LACTIC ACID: CPT

## 2022-05-25 PROCEDURE — 86901 BLOOD TYPING SEROLOGIC RH(D): CPT | Performed by: PHYSICIAN ASSISTANT

## 2022-05-25 PROCEDURE — 83735 ASSAY OF MAGNESIUM: CPT | Performed by: PHYSICIAN ASSISTANT

## 2022-05-25 PROCEDURE — 25010000002 FUROSEMIDE PER 20 MG: Performed by: PHYSICIAN ASSISTANT

## 2022-05-25 PROCEDURE — 85025 COMPLETE CBC W/AUTO DIFF WBC: CPT | Performed by: PHYSICIAN ASSISTANT

## 2022-05-25 PROCEDURE — 99223 1ST HOSP IP/OBS HIGH 75: CPT | Performed by: HOSPITALIST

## 2022-05-25 PROCEDURE — 84484 ASSAY OF TROPONIN QUANT: CPT | Performed by: PHYSICIAN ASSISTANT

## 2022-05-25 PROCEDURE — 25010000002 CEFTRIAXONE PER 250 MG: Performed by: PHYSICIAN ASSISTANT

## 2022-05-25 PROCEDURE — 84443 ASSAY THYROID STIM HORMONE: CPT | Performed by: PHYSICIAN ASSISTANT

## 2022-05-25 PROCEDURE — 86901 BLOOD TYPING SEROLOGIC RH(D): CPT

## 2022-05-25 PROCEDURE — 86900 BLOOD TYPING SEROLOGIC ABO: CPT

## 2022-05-25 PROCEDURE — 87040 BLOOD CULTURE FOR BACTERIA: CPT | Performed by: PHYSICIAN ASSISTANT

## 2022-05-25 RX ORDER — BUMETANIDE 1 MG/1
1 TABLET ORAL EVERY OTHER DAY
COMMUNITY
End: 2022-06-01 | Stop reason: HOSPADM

## 2022-05-25 RX ORDER — CETIRIZINE HYDROCHLORIDE 10 MG/1
10 TABLET ORAL DAILY
Status: DISCONTINUED | OUTPATIENT
Start: 2022-05-26 | End: 2022-06-01 | Stop reason: HOSPADM

## 2022-05-25 RX ORDER — PANTOPRAZOLE SODIUM 40 MG/10ML
40 INJECTION, POWDER, LYOPHILIZED, FOR SOLUTION INTRAVENOUS ONCE
Status: COMPLETED | OUTPATIENT
Start: 2022-05-25 | End: 2022-05-25

## 2022-05-25 RX ORDER — ACETAMINOPHEN 160 MG
2000 TABLET,DISINTEGRATING ORAL DAILY
Status: CANCELLED | OUTPATIENT
Start: 2022-05-25

## 2022-05-25 RX ORDER — MELATONIN
2000 DAILY
Status: DISCONTINUED | OUTPATIENT
Start: 2022-05-26 | End: 2022-06-01 | Stop reason: HOSPADM

## 2022-05-25 RX ORDER — CLOPIDOGREL BISULFATE 75 MG/1
75 TABLET ORAL DAILY
Status: DISCONTINUED | OUTPATIENT
Start: 2022-05-25 | End: 2022-05-26

## 2022-05-25 RX ORDER — FAMOTIDINE 20 MG/1
20 TABLET, FILM COATED ORAL DAILY
Status: CANCELLED | OUTPATIENT
Start: 2022-05-25

## 2022-05-25 RX ORDER — CLOPIDOGREL BISULFATE 75 MG/1
75 TABLET ORAL DAILY
Status: CANCELLED | OUTPATIENT
Start: 2022-05-25

## 2022-05-25 RX ORDER — ASPIRIN 81 MG/1
81 TABLET ORAL DAILY
Status: CANCELLED | OUTPATIENT
Start: 2022-05-25

## 2022-05-25 RX ORDER — SODIUM CHLORIDE 0.9 % (FLUSH) 0.9 %
10 SYRINGE (ML) INJECTION AS NEEDED
Status: DISCONTINUED | OUTPATIENT
Start: 2022-05-25 | End: 2022-06-01 | Stop reason: HOSPADM

## 2022-05-25 RX ORDER — HYDROCODONE BITARTRATE AND ACETAMINOPHEN 10; 325 MG/1; MG/1
1 TABLET ORAL EVERY 4 HOURS PRN
Status: DISCONTINUED | OUTPATIENT
Start: 2022-05-25 | End: 2022-06-01 | Stop reason: HOSPADM

## 2022-05-25 RX ORDER — FUROSEMIDE 10 MG/ML
40 INJECTION INTRAMUSCULAR; INTRAVENOUS
Status: DISCONTINUED | OUTPATIENT
Start: 2022-05-25 | End: 2022-05-25

## 2022-05-25 RX ORDER — NITROGLYCERIN 0.4 MG/1
0.4 TABLET SUBLINGUAL
Status: DISCONTINUED | OUTPATIENT
Start: 2022-05-25 | End: 2022-06-01 | Stop reason: HOSPADM

## 2022-05-25 RX ORDER — FAMOTIDINE 20 MG/1
20 TABLET, FILM COATED ORAL DAILY
Status: DISCONTINUED | OUTPATIENT
Start: 2022-05-26 | End: 2022-05-26

## 2022-05-25 RX ORDER — LANOLIN ALCOHOL/MO/W.PET/CERES
1000 CREAM (GRAM) TOPICAL 2 TIMES DAILY
COMMUNITY
Start: 2021-01-01

## 2022-05-25 RX ORDER — TAMSULOSIN HYDROCHLORIDE 0.4 MG/1
0.4 CAPSULE ORAL DAILY
Status: CANCELLED | OUTPATIENT
Start: 2022-05-25

## 2022-05-25 RX ORDER — CETIRIZINE HYDROCHLORIDE 10 MG/1
10 TABLET ORAL DAILY
Status: CANCELLED | OUTPATIENT
Start: 2022-05-25

## 2022-05-25 RX ORDER — DOXYCYCLINE 100 MG/1
100 TABLET ORAL EVERY 12 HOURS SCHEDULED
Refills: 0 | Status: DISCONTINUED | OUTPATIENT
Start: 2022-05-25 | End: 2022-05-29

## 2022-05-25 RX ORDER — FUROSEMIDE 10 MG/ML
40 INJECTION INTRAMUSCULAR; INTRAVENOUS
Status: DISCONTINUED | OUTPATIENT
Start: 2022-05-26 | End: 2022-05-26

## 2022-05-25 RX ORDER — NITROGLYCERIN 0.3 MG/1
0.3 TABLET SUBLINGUAL
Status: CANCELLED | OUTPATIENT
Start: 2022-05-25

## 2022-05-25 RX ORDER — LANOLIN ALCOHOL/MO/W.PET/CERES
1000 CREAM (GRAM) TOPICAL 2 TIMES DAILY
Status: DISCONTINUED | OUTPATIENT
Start: 2022-05-26 | End: 2022-06-01 | Stop reason: HOSPADM

## 2022-05-25 RX ORDER — FUROSEMIDE 10 MG/ML
40 INJECTION INTRAMUSCULAR; INTRAVENOUS ONCE
Status: COMPLETED | OUTPATIENT
Start: 2022-05-25 | End: 2022-05-25

## 2022-05-25 RX ORDER — TAMSULOSIN HYDROCHLORIDE 0.4 MG/1
0.4 CAPSULE ORAL DAILY
Status: DISCONTINUED | OUTPATIENT
Start: 2022-05-26 | End: 2022-06-01 | Stop reason: HOSPADM

## 2022-05-25 RX ORDER — GEMFIBROZIL 600 MG/1
600 TABLET, FILM COATED ORAL
COMMUNITY
End: 2022-11-22

## 2022-05-25 RX ORDER — HYDROCODONE BITARTRATE AND ACETAMINOPHEN 10; 325 MG/1; MG/1
1 TABLET ORAL EVERY 4 HOURS PRN
Status: CANCELLED | OUTPATIENT
Start: 2022-05-25

## 2022-05-25 RX ORDER — FERROUS SULFATE TAB EC 324 MG (65 MG FE EQUIVALENT) 324 (65 FE) MG
325 TABLET DELAYED RESPONSE ORAL
Status: DISCONTINUED | OUTPATIENT
Start: 2022-05-26 | End: 2022-05-25

## 2022-05-25 RX ORDER — ATORVASTATIN CALCIUM 10 MG/1
10 TABLET, FILM COATED ORAL DAILY
Refills: 1 | Status: DISCONTINUED | OUTPATIENT
Start: 2022-05-26 | End: 2022-06-01 | Stop reason: HOSPADM

## 2022-05-25 RX ORDER — AMLODIPINE BESYLATE 5 MG/1
10 TABLET ORAL DAILY
Status: CANCELLED | OUTPATIENT
Start: 2022-05-25

## 2022-05-25 RX ORDER — ATORVASTATIN CALCIUM 20 MG/1
10 TABLET, FILM COATED ORAL DAILY
Refills: 1 | Status: CANCELLED | OUTPATIENT
Start: 2022-05-25

## 2022-05-25 RX ORDER — FERROUS SULFATE TAB EC 324 MG (65 MG FE EQUIVALENT) 324 (65 FE) MG
325 TABLET DELAYED RESPONSE ORAL
Status: CANCELLED | OUTPATIENT
Start: 2022-05-26

## 2022-05-25 RX ADMIN — FUROSEMIDE 40 MG: 10 INJECTION, SOLUTION INTRAMUSCULAR; INTRAVENOUS at 15:59

## 2022-05-25 RX ADMIN — Medication 10 ML: at 22:42

## 2022-05-25 RX ADMIN — HYDROCODONE BITARTRATE AND ACETAMINOPHEN 1 TABLET: 10; 325 TABLET ORAL at 22:43

## 2022-05-25 RX ADMIN — CEFTRIAXONE 1 G: 1 INJECTION, POWDER, FOR SOLUTION INTRAMUSCULAR; INTRAVENOUS at 16:02

## 2022-05-25 RX ADMIN — DOXYCYCLINE 100 MG: 100 TABLET ORAL at 22:42

## 2022-05-25 RX ADMIN — METOPROLOL TARTRATE 25 MG: 25 TABLET, FILM COATED ORAL at 22:42

## 2022-05-25 RX ADMIN — DOXYCYCLINE 100 MG: 100 INJECTION, POWDER, LYOPHILIZED, FOR SOLUTION INTRAVENOUS at 16:34

## 2022-05-25 RX ADMIN — CLOPIDOGREL BISULFATE 75 MG: 75 TABLET ORAL at 19:17

## 2022-05-25 RX ADMIN — PANTOPRAZOLE SODIUM 40 MG: 40 INJECTION, POWDER, FOR SOLUTION INTRAVENOUS at 15:59

## 2022-05-26 ENCOUNTER — APPOINTMENT (OUTPATIENT)
Dept: CARDIOLOGY | Facility: HOSPITAL | Age: 82
End: 2022-05-26

## 2022-05-26 ENCOUNTER — TELEPHONE (OUTPATIENT)
Dept: CASE MANAGEMENT | Facility: OTHER | Age: 82
End: 2022-05-26

## 2022-05-26 PROBLEM — Z87.19 HISTORY OF DUODENAL ULCER: Status: ACTIVE | Noted: 2022-05-25

## 2022-05-26 PROBLEM — D64.9 ANEMIA: Status: ACTIVE | Noted: 2022-05-25

## 2022-05-26 PROBLEM — K92.1 MELENA: Status: ACTIVE | Noted: 2022-05-25

## 2022-05-26 LAB
BASOPHILS # BLD AUTO: 0 10*3/MM3 (ref 0–0.2)
BASOPHILS NFR BLD AUTO: 0.1 % (ref 0–1.5)
DEPRECATED RDW RBC AUTO: 52.1 FL (ref 37–54)
EOSINOPHIL # BLD AUTO: 0 10*3/MM3 (ref 0–0.4)
EOSINOPHIL NFR BLD AUTO: 0.3 % (ref 0.3–6.2)
ERYTHROCYTE [DISTWIDTH] IN BLOOD BY AUTOMATED COUNT: 15.6 % (ref 12.3–15.4)
HCT VFR BLD AUTO: 22.8 % (ref 37.5–51)
HGB BLD-MCNC: 7.4 G/DL (ref 13–17.7)
IRON 24H UR-MRATE: 15 MCG/DL (ref 59–158)
IRON SATN MFR SERPL: 6 % (ref 20–50)
LYMPHOCYTES # BLD AUTO: 0.7 10*3/MM3 (ref 0.7–3.1)
LYMPHOCYTES NFR BLD AUTO: 6.4 % (ref 19.6–45.3)
MCH RBC QN AUTO: 29.7 PG (ref 26.6–33)
MCHC RBC AUTO-ENTMCNC: 32.4 G/DL (ref 31.5–35.7)
MCV RBC AUTO: 91.7 FL (ref 79–97)
MONOCYTES # BLD AUTO: 0.9 10*3/MM3 (ref 0.1–0.9)
MONOCYTES NFR BLD AUTO: 8.5 % (ref 5–12)
NEUTROPHILS NFR BLD AUTO: 84.7 % (ref 42.7–76)
NEUTROPHILS NFR BLD AUTO: 9.2 10*3/MM3 (ref 1.7–7)
NRBC BLD AUTO-RTO: 0.5 /100 WBC (ref 0–0.2)
PLATELET # BLD AUTO: 273 10*3/MM3 (ref 140–450)
PMV BLD AUTO: 7.6 FL (ref 6–12)
QT INTERVAL: 393 MS
RBC # BLD AUTO: 2.49 10*6/MM3 (ref 4.14–5.8)
TIBC SERPL-MCNC: 241 MCG/DL (ref 298–536)
TRANSFERRIN SERPL-MCNC: 162 MG/DL (ref 200–360)
TROPONIN T SERPL-MCNC: 0.43 NG/ML (ref 0–0.03)
TROPONIN T SERPL-MCNC: 0.47 NG/ML (ref 0–0.03)
WBC NRBC COR # BLD: 10.8 10*3/MM3 (ref 3.4–10.8)

## 2022-05-26 PROCEDURE — 97530 THERAPEUTIC ACTIVITIES: CPT

## 2022-05-26 PROCEDURE — 93306 TTE W/DOPPLER COMPLETE: CPT

## 2022-05-26 PROCEDURE — 84484 ASSAY OF TROPONIN QUANT: CPT | Performed by: INTERNAL MEDICINE

## 2022-05-26 PROCEDURE — 25010000002 NA FERRIC GLUC CPLX PER 12.5 MG: Performed by: NURSE PRACTITIONER

## 2022-05-26 PROCEDURE — 25010000002 FUROSEMIDE PER 20 MG: Performed by: INTERNAL MEDICINE

## 2022-05-26 PROCEDURE — 99232 SBSQ HOSP IP/OBS MODERATE 35: CPT | Performed by: HOSPITALIST

## 2022-05-26 PROCEDURE — 83540 ASSAY OF IRON: CPT | Performed by: NURSE PRACTITIONER

## 2022-05-26 PROCEDURE — 99222 1ST HOSP IP/OBS MODERATE 55: CPT | Performed by: INTERNAL MEDICINE

## 2022-05-26 PROCEDURE — 84484 ASSAY OF TROPONIN QUANT: CPT | Performed by: NURSE PRACTITIONER

## 2022-05-26 PROCEDURE — 97162 PT EVAL MOD COMPLEX 30 MIN: CPT

## 2022-05-26 PROCEDURE — 84466 ASSAY OF TRANSFERRIN: CPT | Performed by: NURSE PRACTITIONER

## 2022-05-26 PROCEDURE — 85025 COMPLETE CBC W/AUTO DIFF WBC: CPT | Performed by: HOSPITALIST

## 2022-05-26 PROCEDURE — 93306 TTE W/DOPPLER COMPLETE: CPT | Performed by: INTERNAL MEDICINE

## 2022-05-26 PROCEDURE — 25010000002 FUROSEMIDE PER 20 MG: Performed by: HOSPITALIST

## 2022-05-26 RX ORDER — SODIUM, POTASSIUM,MAG SULFATES 17.5-3.13G
1 SOLUTION, RECONSTITUTED, ORAL ORAL 2 TIMES DAILY
Status: COMPLETED | OUTPATIENT
Start: 2022-05-26 | End: 2022-05-27

## 2022-05-26 RX ORDER — CLOPIDOGREL BISULFATE 75 MG/1
75 TABLET ORAL DAILY
Status: DISCONTINUED | OUTPATIENT
Start: 2022-05-28 | End: 2022-06-01 | Stop reason: HOSPADM

## 2022-05-26 RX ORDER — FUROSEMIDE 10 MG/ML
40 INJECTION INTRAMUSCULAR; INTRAVENOUS 3 TIMES DAILY
Status: DISCONTINUED | OUTPATIENT
Start: 2022-05-26 | End: 2022-05-27

## 2022-05-26 RX ORDER — PANTOPRAZOLE SODIUM 40 MG/10ML
40 INJECTION, POWDER, LYOPHILIZED, FOR SOLUTION INTRAVENOUS
Status: DISCONTINUED | OUTPATIENT
Start: 2022-05-26 | End: 2022-05-27

## 2022-05-26 RX ADMIN — METOPROLOL TARTRATE 25 MG: 25 TABLET, FILM COATED ORAL at 08:10

## 2022-05-26 RX ADMIN — Medication 1 BOTTLE: at 10:44

## 2022-05-26 RX ADMIN — PANTOPRAZOLE SODIUM 40 MG: 40 INJECTION, POWDER, FOR SOLUTION INTRAVENOUS at 09:07

## 2022-05-26 RX ADMIN — HYDROCODONE BITARTRATE AND ACETAMINOPHEN 1 TABLET: 10; 325 TABLET ORAL at 03:44

## 2022-05-26 RX ADMIN — PANTOPRAZOLE SODIUM 40 MG: 40 INJECTION, POWDER, FOR SOLUTION INTRAVENOUS at 16:45

## 2022-05-26 RX ADMIN — FAMOTIDINE 20 MG: 20 TABLET ORAL at 08:10

## 2022-05-26 RX ADMIN — DOXYCYCLINE 100 MG: 100 TABLET ORAL at 21:34

## 2022-05-26 RX ADMIN — SODIUM CHLORIDE 125 MG: 9 INJECTION, SOLUTION INTRAVENOUS at 10:44

## 2022-05-26 RX ADMIN — DOXYCYCLINE 100 MG: 100 TABLET ORAL at 08:10

## 2022-05-26 RX ADMIN — CETIRIZINE HYDROCHLORIDE 10 MG: 10 TABLET, FILM COATED ORAL at 08:10

## 2022-05-26 RX ADMIN — FUROSEMIDE 40 MG: 10 INJECTION, SOLUTION INTRAMUSCULAR; INTRAVENOUS at 08:08

## 2022-05-26 RX ADMIN — FUROSEMIDE 40 MG: 10 INJECTION, SOLUTION INTRAMUSCULAR; INTRAVENOUS at 21:35

## 2022-05-26 RX ADMIN — TAMSULOSIN HYDROCHLORIDE 0.4 MG: 0.4 CAPSULE ORAL at 08:10

## 2022-05-26 RX ADMIN — HYDROCODONE BITARTRATE AND ACETAMINOPHEN 1 TABLET: 10; 325 TABLET ORAL at 11:29

## 2022-05-26 RX ADMIN — METOPROLOL TARTRATE 25 MG: 25 TABLET, FILM COATED ORAL at 21:35

## 2022-05-26 RX ADMIN — ATORVASTATIN CALCIUM 10 MG: 10 TABLET, FILM COATED ORAL at 08:10

## 2022-05-26 RX ADMIN — CLOPIDOGREL BISULFATE 75 MG: 75 TABLET ORAL at 08:10

## 2022-05-26 RX ADMIN — CYANOCOBALAMIN TAB 1000 MCG 1000 MCG: 1000 TAB at 08:10

## 2022-05-26 RX ADMIN — Medication 2000 UNITS: at 08:10

## 2022-05-26 RX ADMIN — Medication 10 ML: at 08:08

## 2022-05-26 RX ADMIN — CYANOCOBALAMIN TAB 1000 MCG 1000 MCG: 1000 TAB at 21:35

## 2022-05-26 RX ADMIN — Medication 10 ML: at 21:35

## 2022-05-26 RX ADMIN — FUROSEMIDE 40 MG: 10 INJECTION, SOLUTION INTRAMUSCULAR; INTRAVENOUS at 16:44

## 2022-05-26 NOTE — TELEPHONE ENCOUNTER
RN-ACM HRCM outreach closed d/t 5/25/22 [facility] admit w PCP update Leanne Hickman. Was UTRx1 LVM on 5/24/22.

## 2022-05-27 ENCOUNTER — ANESTHESIA EVENT (OUTPATIENT)
Dept: GASTROENTEROLOGY | Facility: HOSPITAL | Age: 82
End: 2022-05-27

## 2022-05-27 ENCOUNTER — ANESTHESIA (OUTPATIENT)
Dept: GASTROENTEROLOGY | Facility: HOSPITAL | Age: 82
End: 2022-05-27

## 2022-05-27 LAB
ALBUMIN SERPL-MCNC: 3.7 G/DL (ref 3.5–5.2)
ALBUMIN/GLOB SERPL: 1.4 G/DL
ALP SERPL-CCNC: 90 U/L (ref 39–117)
ALT SERPL W P-5'-P-CCNC: 62 U/L (ref 1–41)
ANION GAP SERPL CALCULATED.3IONS-SCNC: 15 MMOL/L (ref 5–15)
AST SERPL-CCNC: 51 U/L (ref 1–40)
BASOPHILS # BLD AUTO: 0.1 10*3/MM3 (ref 0–0.2)
BASOPHILS NFR BLD AUTO: 0.7 % (ref 0–1.5)
BILIRUB SERPL-MCNC: 0.3 MG/DL (ref 0–1.2)
BUN SERPL-MCNC: 49 MG/DL (ref 8–23)
BUN/CREAT SERPL: 23.3 (ref 7–25)
CALCIUM SPEC-SCNC: 9.3 MG/DL (ref 8.6–10.5)
CHLORIDE SERPL-SCNC: 99 MMOL/L (ref 98–107)
CO2 SERPL-SCNC: 25 MMOL/L (ref 22–29)
CREAT SERPL-MCNC: 2.1 MG/DL (ref 0.76–1.27)
DEPRECATED RDW RBC AUTO: 48.6 FL (ref 37–54)
EGFRCR SERPLBLD CKD-EPI 2021: 31 ML/MIN/1.73
EOSINOPHIL # BLD AUTO: 0.6 10*3/MM3 (ref 0–0.4)
EOSINOPHIL NFR BLD AUTO: 5.6 % (ref 0.3–6.2)
ERYTHROCYTE [DISTWIDTH] IN BLOOD BY AUTOMATED COUNT: 15.1 % (ref 12.3–15.4)
GLOBULIN UR ELPH-MCNC: 2.6 GM/DL
GLUCOSE BLDC GLUCOMTR-MCNC: 72 MG/DL (ref 70–105)
GLUCOSE BLDC GLUCOMTR-MCNC: 86 MG/DL (ref 70–105)
GLUCOSE SERPL-MCNC: 99 MG/DL (ref 65–99)
HCT VFR BLD AUTO: 25.3 % (ref 37.5–51)
HGB BLD-MCNC: 8.1 G/DL (ref 13–17.7)
LYMPHOCYTES # BLD AUTO: 1.2 10*3/MM3 (ref 0.7–3.1)
LYMPHOCYTES NFR BLD AUTO: 10.9 % (ref 19.6–45.3)
MCH RBC QN AUTO: 28.8 PG (ref 26.6–33)
MCHC RBC AUTO-ENTMCNC: 32.1 G/DL (ref 31.5–35.7)
MCV RBC AUTO: 89.9 FL (ref 79–97)
MONOCYTES # BLD AUTO: 0.8 10*3/MM3 (ref 0.1–0.9)
MONOCYTES NFR BLD AUTO: 7.6 % (ref 5–12)
NEUTROPHILS NFR BLD AUTO: 75.2 % (ref 42.7–76)
NEUTROPHILS NFR BLD AUTO: 8.1 10*3/MM3 (ref 1.7–7)
NRBC BLD AUTO-RTO: 0.5 /100 WBC (ref 0–0.2)
PLATELET # BLD AUTO: 349 10*3/MM3 (ref 140–450)
PMV BLD AUTO: 7.8 FL (ref 6–12)
POTASSIUM SERPL-SCNC: 4.1 MMOL/L (ref 3.5–5.2)
PROT SERPL-MCNC: 6.3 G/DL (ref 6–8.5)
RBC # BLD AUTO: 2.82 10*6/MM3 (ref 4.14–5.8)
SODIUM SERPL-SCNC: 139 MMOL/L (ref 136–145)
TROPONIN T SERPL-MCNC: 0.57 NG/ML (ref 0–0.03)
WBC NRBC COR # BLD: 10.8 10*3/MM3 (ref 3.4–10.8)

## 2022-05-27 PROCEDURE — 82962 GLUCOSE BLOOD TEST: CPT

## 2022-05-27 PROCEDURE — 25010000002 PROPOFOL 200 MG/20ML EMULSION: Performed by: STUDENT IN AN ORGANIZED HEALTH CARE EDUCATION/TRAINING PROGRAM

## 2022-05-27 PROCEDURE — 88342 IMHCHEM/IMCYTCHM 1ST ANTB: CPT | Performed by: INTERNAL MEDICINE

## 2022-05-27 PROCEDURE — 25010000002 FUROSEMIDE PER 20 MG: Performed by: INTERNAL MEDICINE

## 2022-05-27 PROCEDURE — 0DJD8ZZ INSPECTION OF LOWER INTESTINAL TRACT, VIA NATURAL OR ARTIFICIAL OPENING ENDOSCOPIC: ICD-10-PCS | Performed by: INTERNAL MEDICINE

## 2022-05-27 PROCEDURE — 0W3P8ZZ CONTROL BLEEDING IN GASTROINTESTINAL TRACT, VIA NATURAL OR ARTIFICIAL OPENING ENDOSCOPIC: ICD-10-PCS | Performed by: INTERNAL MEDICINE

## 2022-05-27 PROCEDURE — 99232 SBSQ HOSP IP/OBS MODERATE 35: CPT | Performed by: NURSE PRACTITIONER

## 2022-05-27 PROCEDURE — 85025 COMPLETE CBC W/AUTO DIFF WBC: CPT | Performed by: NURSE PRACTITIONER

## 2022-05-27 PROCEDURE — 88305 TISSUE EXAM BY PATHOLOGIST: CPT | Performed by: INTERNAL MEDICINE

## 2022-05-27 PROCEDURE — 99232 SBSQ HOSP IP/OBS MODERATE 35: CPT | Performed by: HOSPITALIST

## 2022-05-27 PROCEDURE — 25010000002 NA FERRIC GLUC CPLX PER 12.5 MG: Performed by: NURSE PRACTITIONER

## 2022-05-27 PROCEDURE — 84484 ASSAY OF TROPONIN QUANT: CPT | Performed by: INTERNAL MEDICINE

## 2022-05-27 PROCEDURE — 0DB78ZX EXCISION OF STOMACH, PYLORUS, VIA NATURAL OR ARTIFICIAL OPENING ENDOSCOPIC, DIAGNOSTIC: ICD-10-PCS | Performed by: INTERNAL MEDICINE

## 2022-05-27 PROCEDURE — 80053 COMPREHEN METABOLIC PANEL: CPT | Performed by: NURSE PRACTITIONER

## 2022-05-27 RX ORDER — ONDANSETRON 4 MG/1
4 TABLET, FILM COATED ORAL EVERY 6 HOURS PRN
Status: DISCONTINUED | OUTPATIENT
Start: 2022-05-27 | End: 2022-06-01 | Stop reason: HOSPADM

## 2022-05-27 RX ORDER — FUROSEMIDE 40 MG/1
40 TABLET ORAL
Status: DISCONTINUED | OUTPATIENT
Start: 2022-05-27 | End: 2022-06-01 | Stop reason: HOSPADM

## 2022-05-27 RX ORDER — PANTOPRAZOLE SODIUM 40 MG/1
40 TABLET, DELAYED RELEASE ORAL
Status: DISCONTINUED | OUTPATIENT
Start: 2022-05-27 | End: 2022-06-01 | Stop reason: HOSPADM

## 2022-05-27 RX ORDER — FUROSEMIDE 10 MG/ML
40 INJECTION INTRAMUSCULAR; INTRAVENOUS EVERY 12 HOURS
Status: DISCONTINUED | OUTPATIENT
Start: 2022-05-27 | End: 2022-05-27

## 2022-05-27 RX ORDER — GLYCOPYRROLATE 0.2 MG/ML
INJECTION INTRAMUSCULAR; INTRAVENOUS AS NEEDED
Status: DISCONTINUED | OUTPATIENT
Start: 2022-05-27 | End: 2022-05-27 | Stop reason: SURG

## 2022-05-27 RX ORDER — SODIUM CHLORIDE 9 MG/ML
50 INJECTION, SOLUTION INTRAVENOUS CONTINUOUS
Status: DISCONTINUED | OUTPATIENT
Start: 2022-05-27 | End: 2022-05-28

## 2022-05-27 RX ORDER — LIDOCAINE HYDROCHLORIDE 20 MG/ML
INJECTION, SOLUTION EPIDURAL; INFILTRATION; INTRACAUDAL; PERINEURAL AS NEEDED
Status: DISCONTINUED | OUTPATIENT
Start: 2022-05-27 | End: 2022-05-27 | Stop reason: SURG

## 2022-05-27 RX ORDER — PROPOFOL 10 MG/ML
INJECTION, EMULSION INTRAVENOUS AS NEEDED
Status: DISCONTINUED | OUTPATIENT
Start: 2022-05-27 | End: 2022-05-27 | Stop reason: SURG

## 2022-05-27 RX ORDER — ONDANSETRON 2 MG/ML
4 INJECTION INTRAMUSCULAR; INTRAVENOUS EVERY 6 HOURS PRN
Status: DISCONTINUED | OUTPATIENT
Start: 2022-05-27 | End: 2022-06-01 | Stop reason: HOSPADM

## 2022-05-27 RX ADMIN — SODIUM CHLORIDE 50 ML/HR: 9 INJECTION, SOLUTION INTRAVENOUS at 14:18

## 2022-05-27 RX ADMIN — Medication 2000 UNITS: at 08:53

## 2022-05-27 RX ADMIN — DOXYCYCLINE 100 MG: 100 TABLET ORAL at 21:15

## 2022-05-27 RX ADMIN — Medication 1 BOTTLE: at 04:17

## 2022-05-27 RX ADMIN — DOXYCYCLINE 100 MG: 100 TABLET ORAL at 08:53

## 2022-05-27 RX ADMIN — METOPROLOL TARTRATE 12.5 MG: 25 TABLET, FILM COATED ORAL at 21:15

## 2022-05-27 RX ADMIN — PANTOPRAZOLE SODIUM 40 MG: 40 TABLET, DELAYED RELEASE ORAL at 18:01

## 2022-05-27 RX ADMIN — CYANOCOBALAMIN TAB 1000 MCG 1000 MCG: 1000 TAB at 08:53

## 2022-05-27 RX ADMIN — LIDOCAINE HYDROCHLORIDE 40 MG: 20 INJECTION, SOLUTION EPIDURAL; INFILTRATION; INTRACAUDAL; PERINEURAL at 14:45

## 2022-05-27 RX ADMIN — METOPROLOL TARTRATE 25 MG: 25 TABLET, FILM COATED ORAL at 05:48

## 2022-05-27 RX ADMIN — CYANOCOBALAMIN TAB 1000 MCG 1000 MCG: 1000 TAB at 21:16

## 2022-05-27 RX ADMIN — HYDROCODONE BITARTRATE AND ACETAMINOPHEN 1 TABLET: 10; 325 TABLET ORAL at 05:51

## 2022-05-27 RX ADMIN — PANTOPRAZOLE SODIUM 40 MG: 40 INJECTION, POWDER, FOR SOLUTION INTRAVENOUS at 08:54

## 2022-05-27 RX ADMIN — PROPOFOL 300 MG: 10 INJECTION, EMULSION INTRAVENOUS at 14:47

## 2022-05-27 RX ADMIN — SODIUM CHLORIDE 125 MG: 9 INJECTION, SOLUTION INTRAVENOUS at 08:53

## 2022-05-27 RX ADMIN — FUROSEMIDE 40 MG: 10 INJECTION, SOLUTION INTRAMUSCULAR; INTRAVENOUS at 08:54

## 2022-05-27 RX ADMIN — GLYCOPYRROLATE 0.2 MG: 0.2 INJECTION INTRAMUSCULAR; INTRAVENOUS at 14:46

## 2022-05-27 RX ADMIN — Medication 10 ML: at 21:14

## 2022-05-27 RX ADMIN — FUROSEMIDE 40 MG: 40 TABLET ORAL at 18:01

## 2022-05-27 RX ADMIN — CETIRIZINE HYDROCHLORIDE 10 MG: 10 TABLET, FILM COATED ORAL at 08:53

## 2022-05-27 RX ADMIN — HYDROCODONE BITARTRATE AND ACETAMINOPHEN 1 TABLET: 10; 325 TABLET ORAL at 10:12

## 2022-05-27 RX ADMIN — ATORVASTATIN CALCIUM 10 MG: 10 TABLET, FILM COATED ORAL at 08:53

## 2022-05-27 RX ADMIN — TAMSULOSIN HYDROCHLORIDE 0.4 MG: 0.4 CAPSULE ORAL at 10:12

## 2022-05-27 RX ADMIN — HYDROCODONE BITARTRATE AND ACETAMINOPHEN 1 TABLET: 10; 325 TABLET ORAL at 18:01

## 2022-05-27 NOTE — ANESTHESIA POSTPROCEDURE EVALUATION
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, gastric antrum biopsy (N/A ) Diagnosis:       Anemia, unspecified type      History of duodenal ulcer      Melena      (Anemia, unspecified type [D64.9])      (History of duodenal ulcer [Z87.19])      (Melena [K92.1])    Surgeons: Terry Holliday MD Provider: Babak Tripathi MD    Anesthesia Type: MAC ASA Status: 4 - Emergent          Anesthesia Type: MAC    Vitals  Vitals Value Taken Time   /54 05/27/22 1553   Temp     Pulse 56 05/27/22 1553   Resp 11 05/27/22 1553   SpO2 97 % 05/27/22 1553           Post Anesthesia Care and Evaluation    Patient location during evaluation: PACU  Patient participation: complete - patient participated  Level of consciousness: awake  Pain scale: See nurse's notes for pain score.  Pain management: adequate  Airway patency: patent  Anesthetic complications: No anesthetic complications  PONV Status: none  Cardiovascular status: acceptable  Respiratory status: acceptable  Hydration status: acceptable    Comments: Patient seen and examined postoperatively; vital signs stable; SpO2 greater than or equal to 90%; cardiopulmonary status stable; nausea/vomiting adequately controlled; pain adequately controlled; no apparent anesthesia complications; patient discharged from anesthesia care when discharge criteria were met

## 2022-05-27 NOTE — ANESTHESIA PREPROCEDURE EVALUATION
Anesthesia Evaluation     Patient summary reviewed and Nursing notes reviewed   no history of anesthetic complications:  NPO Solid Status: > 8 hours  NPO Liquid Status: > 2 hours           Airway   Mallampati: II  TM distance: >3 FB  Neck ROM: full  No difficulty expected  Dental - normal exam     Pulmonary - normal exam   (+) pneumonia stable , COPD severe, home oxygen, sleep apnea on CPAP,   Cardiovascular - normal exam    ECG reviewed    (+) hypertension, past MI  3-6 months, CAD, cardiac stents Drug eluting stent within the past 12 months hyperlipidemia,     ROS comment: Cath 1/28/22  FINDINGS:     1. HEMODYNAMICS:     Aortic pressure: 154/82 mmHg  LVEDP: 5 to 10 mmHg  Gradient across aortic valve on pullback: No gradient across aortic valve     2. LEFT VENTRICULOGRAPHY: 50 to 55%      3. CORONARY ANGIOGRAPHY:  A: Left main coronary artery: 10% plaque in the mid to distal segment  B: Left anterior descending artery: Diffuse disease in the mid to distal segment.  It ranges up to 40%.  No high-grade stenosis in LAD  C: Left circumflex coronary artery: Mid to distal LCx is diffusely diseased and feeds into a small OM.  This segment appears to be a very small caliber vessel up to 1.5 mm.  It was not amenable to intervention  D: Right coronary artery: 80 to 90% stenosis in proximal segment and 40% diffuse disease in mid to distal segment and PDA has 30 to 40% stenosis in the midsegment.  RCA was dominant vessel.  RADHA-3 flow was present before and after the intervention. Successfully stented.    Currently elevated troponin.  Seen by cardiology who thinks it is demand ischemia due to ongoing blood loss and think he would benefit from scope.    Neuro/Psych  (+) CVA,    GI/Hepatic/Renal/Endo    (+)  hiatal hernia, GERD, GI bleeding lower active bleeding, renal disease CRI, diabetes mellitus,     Musculoskeletal     (+) arthralgias, neck pain,   Abdominal  - normal exam   Substance History - negative use     OB/GYN  negative ob/gyn ROS         Other   arthritis, blood dyscrasia anemia,   history of cancer                  Anesthesia Plan    ASA 4 - emergent     MAC   total IV anesthesia  intravenous induction     Anesthetic plan, all risks, benefits, and alternatives have been provided, discussed and informed consent has been obtained with: patient.  Use of blood products discussed with patient  Consented to blood products.   Plan discussed with CAA.        CODE STATUS:    Level Of Support Discussed With: Patient  Code Status (Patient has no pulse and is not breathing): CPR (Attempt to Resuscitate)  Medical Interventions (Patient has pulse or is breathing): Full Support

## 2022-05-28 LAB
ALBUMIN SERPL-MCNC: 3.3 G/DL (ref 3.5–5.2)
ANION GAP SERPL CALCULATED.3IONS-SCNC: 16 MMOL/L (ref 5–15)
BASOPHILS # BLD AUTO: 0 10*3/MM3 (ref 0–0.2)
BASOPHILS NFR BLD AUTO: 0.4 % (ref 0–1.5)
BUN SERPL-MCNC: 47 MG/DL (ref 8–23)
BUN/CREAT SERPL: 25.3 (ref 7–25)
CALCIUM SPEC-SCNC: 9.3 MG/DL (ref 8.6–10.5)
CHLORIDE SERPL-SCNC: 97 MMOL/L (ref 98–107)
CO2 SERPL-SCNC: 25 MMOL/L (ref 22–29)
CREAT SERPL-MCNC: 1.86 MG/DL (ref 0.76–1.27)
DEPRECATED RDW RBC AUTO: 48.6 FL (ref 37–54)
EGFRCR SERPLBLD CKD-EPI 2021: 35.9 ML/MIN/1.73
EOSINOPHIL # BLD AUTO: 0.7 10*3/MM3 (ref 0–0.4)
EOSINOPHIL NFR BLD AUTO: 5.8 % (ref 0.3–6.2)
ERYTHROCYTE [DISTWIDTH] IN BLOOD BY AUTOMATED COUNT: 15.3 % (ref 12.3–15.4)
GLUCOSE SERPL-MCNC: 170 MG/DL (ref 65–99)
HCT VFR BLD AUTO: 27.1 % (ref 37.5–51)
HGB BLD-MCNC: 8.4 G/DL (ref 13–17.7)
LYMPHOCYTES # BLD AUTO: 0.9 10*3/MM3 (ref 0.7–3.1)
LYMPHOCYTES NFR BLD AUTO: 7.9 % (ref 19.6–45.3)
MAGNESIUM SERPL-MCNC: 2 MG/DL (ref 1.6–2.4)
MCH RBC QN AUTO: 28 PG (ref 26.6–33)
MCHC RBC AUTO-ENTMCNC: 31.1 G/DL (ref 31.5–35.7)
MCV RBC AUTO: 89.9 FL (ref 79–97)
MONOCYTES # BLD AUTO: 0.9 10*3/MM3 (ref 0.1–0.9)
MONOCYTES NFR BLD AUTO: 8.1 % (ref 5–12)
NEUTROPHILS NFR BLD AUTO: 77.8 % (ref 42.7–76)
NEUTROPHILS NFR BLD AUTO: 8.6 10*3/MM3 (ref 1.7–7)
NRBC BLD AUTO-RTO: 0.1 /100 WBC (ref 0–0.2)
PHOSPHATE SERPL-MCNC: 3.9 MG/DL (ref 2.5–4.5)
PLATELET # BLD AUTO: 400 10*3/MM3 (ref 140–450)
PMV BLD AUTO: 8.2 FL (ref 6–12)
POTASSIUM SERPL-SCNC: 3.4 MMOL/L (ref 3.5–5.2)
RBC # BLD AUTO: 3.01 10*6/MM3 (ref 4.14–5.8)
SODIUM SERPL-SCNC: 138 MMOL/L (ref 136–145)
WBC NRBC COR # BLD: 11.1 10*3/MM3 (ref 3.4–10.8)

## 2022-05-28 PROCEDURE — 83735 ASSAY OF MAGNESIUM: CPT | Performed by: INTERNAL MEDICINE

## 2022-05-28 PROCEDURE — 97166 OT EVAL MOD COMPLEX 45 MIN: CPT

## 2022-05-28 PROCEDURE — 99232 SBSQ HOSP IP/OBS MODERATE 35: CPT | Performed by: HOSPITALIST

## 2022-05-28 PROCEDURE — 25010000002 NA FERRIC GLUC CPLX PER 12.5 MG: Performed by: NURSE PRACTITIONER

## 2022-05-28 PROCEDURE — 80069 RENAL FUNCTION PANEL: CPT | Performed by: INTERNAL MEDICINE

## 2022-05-28 PROCEDURE — 85025 COMPLETE CBC W/AUTO DIFF WBC: CPT | Performed by: INTERNAL MEDICINE

## 2022-05-28 RX ORDER — POTASSIUM CHLORIDE 20 MEQ/1
40 TABLET, EXTENDED RELEASE ORAL ONCE
Status: COMPLETED | OUTPATIENT
Start: 2022-05-28 | End: 2022-05-28

## 2022-05-28 RX ADMIN — DOXYCYCLINE 100 MG: 100 TABLET ORAL at 08:51

## 2022-05-28 RX ADMIN — CLOPIDOGREL BISULFATE 75 MG: 75 TABLET ORAL at 08:52

## 2022-05-28 RX ADMIN — HYDROCODONE BITARTRATE AND ACETAMINOPHEN 1 TABLET: 10; 325 TABLET ORAL at 21:31

## 2022-05-28 RX ADMIN — SODIUM CHLORIDE 125 MG: 9 INJECTION, SOLUTION INTRAVENOUS at 08:52

## 2022-05-28 RX ADMIN — Medication 10 ML: at 21:32

## 2022-05-28 RX ADMIN — ATORVASTATIN CALCIUM 10 MG: 10 TABLET, FILM COATED ORAL at 08:52

## 2022-05-28 RX ADMIN — DOXYCYCLINE 100 MG: 100 TABLET ORAL at 21:31

## 2022-05-28 RX ADMIN — TAMSULOSIN HYDROCHLORIDE 0.4 MG: 0.4 CAPSULE ORAL at 08:51

## 2022-05-28 RX ADMIN — CYANOCOBALAMIN TAB 1000 MCG 1000 MCG: 1000 TAB at 08:51

## 2022-05-28 RX ADMIN — CYANOCOBALAMIN TAB 1000 MCG 1000 MCG: 1000 TAB at 21:31

## 2022-05-28 RX ADMIN — HYDROCODONE BITARTRATE AND ACETAMINOPHEN 1 TABLET: 10; 325 TABLET ORAL at 16:29

## 2022-05-28 RX ADMIN — Medication 10 ML: at 08:52

## 2022-05-28 RX ADMIN — PANTOPRAZOLE SODIUM 40 MG: 40 TABLET, DELAYED RELEASE ORAL at 16:28

## 2022-05-28 RX ADMIN — HYDROCODONE BITARTRATE AND ACETAMINOPHEN 1 TABLET: 10; 325 TABLET ORAL at 08:51

## 2022-05-28 RX ADMIN — METOPROLOL TARTRATE 12.5 MG: 25 TABLET, FILM COATED ORAL at 21:31

## 2022-05-28 RX ADMIN — PANTOPRAZOLE SODIUM 40 MG: 40 TABLET, DELAYED RELEASE ORAL at 08:52

## 2022-05-28 RX ADMIN — POTASSIUM CHLORIDE 40 MEQ: 1500 TABLET, EXTENDED RELEASE ORAL at 16:29

## 2022-05-28 RX ADMIN — METOPROLOL TARTRATE 12.5 MG: 25 TABLET, FILM COATED ORAL at 08:51

## 2022-05-28 RX ADMIN — FUROSEMIDE 40 MG: 40 TABLET ORAL at 08:52

## 2022-05-28 RX ADMIN — CETIRIZINE HYDROCHLORIDE 10 MG: 10 TABLET, FILM COATED ORAL at 08:52

## 2022-05-28 RX ADMIN — Medication 2000 UNITS: at 08:52

## 2022-05-28 RX ADMIN — FUROSEMIDE 40 MG: 40 TABLET ORAL at 16:29

## 2022-05-29 LAB
ALBUMIN SERPL-MCNC: 3.1 G/DL (ref 3.5–5.2)
ANION GAP SERPL CALCULATED.3IONS-SCNC: 13 MMOL/L (ref 5–15)
ANISOCYTOSIS BLD QL: ABNORMAL
BASO STIPL COARSE BLD QL SMEAR: ABNORMAL
BASOPHILS # BLD MANUAL: 0.1 10*3/MM3 (ref 0–0.2)
BASOPHILS NFR BLD MANUAL: 1 % (ref 0–1.5)
BUN SERPL-MCNC: 42 MG/DL (ref 8–23)
BUN/CREAT SERPL: 19.7 (ref 7–25)
CALCIUM SPEC-SCNC: 9.1 MG/DL (ref 8.6–10.5)
CHLORIDE SERPL-SCNC: 101 MMOL/L (ref 98–107)
CO2 SERPL-SCNC: 27 MMOL/L (ref 22–29)
CREAT SERPL-MCNC: 2.13 MG/DL (ref 0.76–1.27)
DEPRECATED RDW RBC AUTO: 49.4 FL (ref 37–54)
EGFRCR SERPLBLD CKD-EPI 2021: 30.5 ML/MIN/1.73
EOSINOPHIL # BLD MANUAL: 0.51 10*3/MM3 (ref 0–0.4)
EOSINOPHIL NFR BLD MANUAL: 5 % (ref 0.3–6.2)
ERYTHROCYTE [DISTWIDTH] IN BLOOD BY AUTOMATED COUNT: 15.4 % (ref 12.3–15.4)
GLUCOSE SERPL-MCNC: 125 MG/DL (ref 65–99)
HCT VFR BLD AUTO: 26.6 % (ref 37.5–51)
HGB BLD-MCNC: 8.2 G/DL (ref 13–17.7)
LYMPHOCYTES # BLD MANUAL: 1.11 10*3/MM3 (ref 0.7–3.1)
LYMPHOCYTES NFR BLD MANUAL: 5 % (ref 5–12)
MCH RBC QN AUTO: 27.9 PG (ref 26.6–33)
MCHC RBC AUTO-ENTMCNC: 30.9 G/DL (ref 31.5–35.7)
MCV RBC AUTO: 90.4 FL (ref 79–97)
METAMYELOCYTES NFR BLD MANUAL: 1 % (ref 0–0)
MONOCYTES # BLD: 0.51 10*3/MM3 (ref 0.1–0.9)
MYELOCYTES NFR BLD MANUAL: 1 % (ref 0–0)
NEUTROPHILS # BLD AUTO: 7.68 10*3/MM3 (ref 1.7–7)
NEUTROPHILS NFR BLD MANUAL: 76 % (ref 42.7–76)
PHOSPHATE SERPL-MCNC: 3.8 MG/DL (ref 2.5–4.5)
PLATELET # BLD AUTO: 357 10*3/MM3 (ref 140–450)
PMV BLD AUTO: 8.2 FL (ref 6–12)
POIKILOCYTOSIS BLD QL SMEAR: ABNORMAL
POLYCHROMASIA BLD QL SMEAR: ABNORMAL
POTASSIUM SERPL-SCNC: 4 MMOL/L (ref 3.5–5.2)
RBC # BLD AUTO: 2.95 10*6/MM3 (ref 4.14–5.8)
SCAN SLIDE: NORMAL
SMALL PLATELETS BLD QL SMEAR: ADEQUATE
SODIUM SERPL-SCNC: 141 MMOL/L (ref 136–145)
VARIANT LYMPHS NFR BLD MANUAL: 11 % (ref 19.6–45.3)
WBC MORPH BLD: NORMAL
WBC NRBC COR # BLD: 10.1 10*3/MM3 (ref 3.4–10.8)

## 2022-05-29 PROCEDURE — 99232 SBSQ HOSP IP/OBS MODERATE 35: CPT | Performed by: INTERNAL MEDICINE

## 2022-05-29 PROCEDURE — 85025 COMPLETE CBC W/AUTO DIFF WBC: CPT | Performed by: INTERNAL MEDICINE

## 2022-05-29 PROCEDURE — 85007 BL SMEAR W/DIFF WBC COUNT: CPT | Performed by: INTERNAL MEDICINE

## 2022-05-29 PROCEDURE — 99232 SBSQ HOSP IP/OBS MODERATE 35: CPT | Performed by: HOSPITALIST

## 2022-05-29 PROCEDURE — 97110 THERAPEUTIC EXERCISES: CPT

## 2022-05-29 PROCEDURE — 97116 GAIT TRAINING THERAPY: CPT

## 2022-05-29 PROCEDURE — 80069 RENAL FUNCTION PANEL: CPT | Performed by: INTERNAL MEDICINE

## 2022-05-29 RX ORDER — ASPIRIN 81 MG/1
81 TABLET ORAL DAILY
Status: DISCONTINUED | OUTPATIENT
Start: 2022-05-29 | End: 2022-06-01 | Stop reason: HOSPADM

## 2022-05-29 RX ADMIN — HYDROCODONE BITARTRATE AND ACETAMINOPHEN 1 TABLET: 10; 325 TABLET ORAL at 23:17

## 2022-05-29 RX ADMIN — HYDROCODONE BITARTRATE AND ACETAMINOPHEN 1 TABLET: 10; 325 TABLET ORAL at 08:30

## 2022-05-29 RX ADMIN — CYANOCOBALAMIN TAB 1000 MCG 1000 MCG: 1000 TAB at 20:24

## 2022-05-29 RX ADMIN — CLOPIDOGREL BISULFATE 75 MG: 75 TABLET ORAL at 08:34

## 2022-05-29 RX ADMIN — DOXYCYCLINE 100 MG: 100 TABLET ORAL at 08:29

## 2022-05-29 RX ADMIN — TAMSULOSIN HYDROCHLORIDE 0.4 MG: 0.4 CAPSULE ORAL at 08:29

## 2022-05-29 RX ADMIN — ASPIRIN 81 MG: 81 TABLET, COATED ORAL at 12:53

## 2022-05-29 RX ADMIN — PANTOPRAZOLE SODIUM 40 MG: 40 TABLET, DELAYED RELEASE ORAL at 08:30

## 2022-05-29 RX ADMIN — CYANOCOBALAMIN TAB 1000 MCG 1000 MCG: 1000 TAB at 08:30

## 2022-05-29 RX ADMIN — FUROSEMIDE 40 MG: 40 TABLET ORAL at 17:28

## 2022-05-29 RX ADMIN — CETIRIZINE HYDROCHLORIDE 10 MG: 10 TABLET, FILM COATED ORAL at 08:29

## 2022-05-29 RX ADMIN — Medication 10 ML: at 08:30

## 2022-05-29 RX ADMIN — FUROSEMIDE 40 MG: 40 TABLET ORAL at 08:29

## 2022-05-29 RX ADMIN — Medication 2000 UNITS: at 08:29

## 2022-05-29 RX ADMIN — HYDROCODONE BITARTRATE AND ACETAMINOPHEN 1 TABLET: 10; 325 TABLET ORAL at 13:01

## 2022-05-29 RX ADMIN — HYDROCODONE BITARTRATE AND ACETAMINOPHEN 1 TABLET: 10; 325 TABLET ORAL at 17:34

## 2022-05-29 RX ADMIN — ATORVASTATIN CALCIUM 10 MG: 10 TABLET, FILM COATED ORAL at 08:29

## 2022-05-29 RX ADMIN — PANTOPRAZOLE SODIUM 40 MG: 40 TABLET, DELAYED RELEASE ORAL at 17:28

## 2022-05-30 LAB
ALBUMIN SERPL-MCNC: 3.4 G/DL (ref 3.5–5.2)
ANION GAP SERPL CALCULATED.3IONS-SCNC: 13 MMOL/L (ref 5–15)
ANISOCYTOSIS BLD QL: ABNORMAL
BACTERIA SPEC AEROBE CULT: NORMAL
BACTERIA SPEC AEROBE CULT: NORMAL
BASOPHILS # BLD MANUAL: 0.19 10*3/MM3 (ref 0–0.2)
BASOPHILS NFR BLD MANUAL: 2 % (ref 0–1.5)
BUN SERPL-MCNC: 39 MG/DL (ref 8–23)
BUN/CREAT SERPL: 21.1 (ref 7–25)
CALCIUM SPEC-SCNC: 9.8 MG/DL (ref 8.6–10.5)
CHLORIDE SERPL-SCNC: 102 MMOL/L (ref 98–107)
CO2 SERPL-SCNC: 28 MMOL/L (ref 22–29)
CREAT SERPL-MCNC: 1.85 MG/DL (ref 0.76–1.27)
DEPRECATED RDW RBC AUTO: 52.1 FL (ref 37–54)
EGFRCR SERPLBLD CKD-EPI 2021: 36.1 ML/MIN/1.73
EOSINOPHIL # BLD MANUAL: 0.28 10*3/MM3 (ref 0–0.4)
EOSINOPHIL NFR BLD MANUAL: 3 % (ref 0.3–6.2)
ERYTHROCYTE [DISTWIDTH] IN BLOOD BY AUTOMATED COUNT: 16 % (ref 12.3–15.4)
GLUCOSE SERPL-MCNC: 111 MG/DL (ref 65–99)
HCT VFR BLD AUTO: 28.2 % (ref 37.5–51)
HGB BLD-MCNC: 8.7 G/DL (ref 13–17.7)
HYPOCHROMIA BLD QL: ABNORMAL
LYMPHOCYTES # BLD MANUAL: 1.5 10*3/MM3 (ref 0.7–3.1)
LYMPHOCYTES NFR BLD MANUAL: 8 % (ref 5–12)
MCH RBC QN AUTO: 27.8 PG (ref 26.6–33)
MCHC RBC AUTO-ENTMCNC: 30.9 G/DL (ref 31.5–35.7)
MCV RBC AUTO: 90.2 FL (ref 79–97)
MONOCYTES # BLD: 0.75 10*3/MM3 (ref 0.1–0.9)
MYELOCYTES NFR BLD MANUAL: 2 % (ref 0–0)
NEUTROPHILS # BLD AUTO: 6.49 10*3/MM3 (ref 1.7–7)
NEUTROPHILS NFR BLD MANUAL: 66 % (ref 42.7–76)
NEUTS BAND NFR BLD MANUAL: 3 % (ref 0–5)
NEUTS HYPERSEG # BLD: ABNORMAL 10*3/UL
PHOSPHATE SERPL-MCNC: 4.2 MG/DL (ref 2.5–4.5)
PLAT MORPH BLD: NORMAL
PLATELET # BLD AUTO: 417 10*3/MM3 (ref 140–450)
PMV BLD AUTO: 7.8 FL (ref 6–12)
POTASSIUM SERPL-SCNC: 3.9 MMOL/L (ref 3.5–5.2)
RBC # BLD AUTO: 3.13 10*6/MM3 (ref 4.14–5.8)
SCAN SLIDE: NORMAL
SODIUM SERPL-SCNC: 143 MMOL/L (ref 136–145)
VARIANT LYMPHS NFR BLD MANUAL: 16 % (ref 19.6–45.3)
WBC NRBC COR # BLD: 9.4 10*3/MM3 (ref 3.4–10.8)

## 2022-05-30 PROCEDURE — 99232 SBSQ HOSP IP/OBS MODERATE 35: CPT | Performed by: INTERNAL MEDICINE

## 2022-05-30 PROCEDURE — 99232 SBSQ HOSP IP/OBS MODERATE 35: CPT | Performed by: HOSPITALIST

## 2022-05-30 PROCEDURE — 80069 RENAL FUNCTION PANEL: CPT | Performed by: INTERNAL MEDICINE

## 2022-05-30 PROCEDURE — 85025 COMPLETE CBC W/AUTO DIFF WBC: CPT | Performed by: INTERNAL MEDICINE

## 2022-05-30 PROCEDURE — 85007 BL SMEAR W/DIFF WBC COUNT: CPT | Performed by: INTERNAL MEDICINE

## 2022-05-30 RX ORDER — AMLODIPINE BESYLATE 5 MG/1
5 TABLET ORAL
Status: DISCONTINUED | OUTPATIENT
Start: 2022-05-30 | End: 2022-06-01 | Stop reason: HOSPADM

## 2022-05-30 RX ADMIN — Medication 10 ML: at 20:00

## 2022-05-30 RX ADMIN — HYDROCODONE BITARTRATE AND ACETAMINOPHEN 1 TABLET: 10; 325 TABLET ORAL at 19:05

## 2022-05-30 RX ADMIN — PANTOPRAZOLE SODIUM 40 MG: 40 TABLET, DELAYED RELEASE ORAL at 15:44

## 2022-05-30 RX ADMIN — Medication 2000 UNITS: at 08:52

## 2022-05-30 RX ADMIN — ATORVASTATIN CALCIUM 10 MG: 10 TABLET, FILM COATED ORAL at 08:52

## 2022-05-30 RX ADMIN — FUROSEMIDE 40 MG: 40 TABLET ORAL at 15:44

## 2022-05-30 RX ADMIN — AMLODIPINE BESYLATE 5 MG: 5 TABLET ORAL at 15:44

## 2022-05-30 RX ADMIN — CYANOCOBALAMIN TAB 1000 MCG 1000 MCG: 1000 TAB at 20:00

## 2022-05-30 RX ADMIN — HYDROCODONE BITARTRATE AND ACETAMINOPHEN 1 TABLET: 10; 325 TABLET ORAL at 08:56

## 2022-05-30 RX ADMIN — CLOPIDOGREL BISULFATE 75 MG: 75 TABLET ORAL at 08:52

## 2022-05-30 RX ADMIN — PANTOPRAZOLE SODIUM 40 MG: 40 TABLET, DELAYED RELEASE ORAL at 08:52

## 2022-05-30 RX ADMIN — FUROSEMIDE 40 MG: 40 TABLET ORAL at 08:52

## 2022-05-30 RX ADMIN — TAMSULOSIN HYDROCHLORIDE 0.4 MG: 0.4 CAPSULE ORAL at 08:52

## 2022-05-30 RX ADMIN — CYANOCOBALAMIN TAB 1000 MCG 1000 MCG: 1000 TAB at 08:52

## 2022-05-30 RX ADMIN — CETIRIZINE HYDROCHLORIDE 10 MG: 10 TABLET, FILM COATED ORAL at 08:52

## 2022-05-30 RX ADMIN — ASPIRIN 81 MG: 81 TABLET, COATED ORAL at 08:52

## 2022-05-31 LAB
ALBUMIN SERPL-MCNC: 3.9 G/DL (ref 3.5–5.2)
ANION GAP SERPL CALCULATED.3IONS-SCNC: 16 MMOL/L (ref 5–15)
ANISOCYTOSIS BLD QL: ABNORMAL
BASO STIPL COARSE BLD QL SMEAR: ABNORMAL
BUN SERPL-MCNC: 36 MG/DL (ref 8–23)
BUN/CREAT SERPL: 19.5 (ref 7–25)
CALCIUM SPEC-SCNC: 10 MG/DL (ref 8.6–10.5)
CHLORIDE SERPL-SCNC: 97 MMOL/L (ref 98–107)
CO2 SERPL-SCNC: 26 MMOL/L (ref 22–29)
CREAT SERPL-MCNC: 1.85 MG/DL (ref 0.76–1.27)
DEPRECATED RDW RBC AUTO: 49.4 FL (ref 37–54)
EGFRCR SERPLBLD CKD-EPI 2021: 36.1 ML/MIN/1.73
EOSINOPHIL # BLD MANUAL: 0.2 10*3/MM3 (ref 0–0.4)
EOSINOPHIL NFR BLD MANUAL: 2 % (ref 0.3–6.2)
ERYTHROCYTE [DISTWIDTH] IN BLOOD BY AUTOMATED COUNT: 15.4 % (ref 12.3–15.4)
GIANT PLATELETS: ABNORMAL
GLUCOSE SERPL-MCNC: 120 MG/DL (ref 65–99)
HCT VFR BLD AUTO: 30.8 % (ref 37.5–51)
HGB BLD-MCNC: 10 G/DL (ref 13–17.7)
LYMPHOCYTES # BLD MANUAL: 1.39 10*3/MM3 (ref 0.7–3.1)
LYMPHOCYTES NFR BLD MANUAL: 2 % (ref 5–12)
MCH RBC QN AUTO: 28.9 PG (ref 26.6–33)
MCHC RBC AUTO-ENTMCNC: 32.4 G/DL (ref 31.5–35.7)
MCV RBC AUTO: 89.3 FL (ref 79–97)
MONOCYTES # BLD: 0.2 10*3/MM3 (ref 0.1–0.9)
NEUTROPHILS # BLD AUTO: 8.12 10*3/MM3 (ref 1.7–7)
NEUTROPHILS NFR BLD MANUAL: 79 % (ref 42.7–76)
NEUTS BAND NFR BLD MANUAL: 3 % (ref 0–5)
PHOSPHATE SERPL-MCNC: 3.8 MG/DL (ref 2.5–4.5)
PLATELET # BLD AUTO: 427 10*3/MM3 (ref 140–450)
PMV BLD AUTO: 7.5 FL (ref 6–12)
POTASSIUM SERPL-SCNC: 3.4 MMOL/L (ref 3.5–5.2)
RBC # BLD AUTO: 3.45 10*6/MM3 (ref 4.14–5.8)
SCAN SLIDE: NORMAL
SODIUM SERPL-SCNC: 139 MMOL/L (ref 136–145)
VARIANT LYMPHS NFR BLD MANUAL: 14 % (ref 19.6–45.3)
WBC MORPH BLD: NORMAL
WBC NRBC COR # BLD: 9.9 10*3/MM3 (ref 3.4–10.8)

## 2022-05-31 PROCEDURE — 80069 RENAL FUNCTION PANEL: CPT | Performed by: INTERNAL MEDICINE

## 2022-05-31 PROCEDURE — 85007 BL SMEAR W/DIFF WBC COUNT: CPT | Performed by: INTERNAL MEDICINE

## 2022-05-31 PROCEDURE — 99232 SBSQ HOSP IP/OBS MODERATE 35: CPT | Performed by: INTERNAL MEDICINE

## 2022-05-31 PROCEDURE — 97116 GAIT TRAINING THERAPY: CPT

## 2022-05-31 PROCEDURE — 97530 THERAPEUTIC ACTIVITIES: CPT

## 2022-05-31 PROCEDURE — 85025 COMPLETE CBC W/AUTO DIFF WBC: CPT | Performed by: INTERNAL MEDICINE

## 2022-05-31 PROCEDURE — 99232 SBSQ HOSP IP/OBS MODERATE 35: CPT | Performed by: HOSPITALIST

## 2022-05-31 PROCEDURE — 97110 THERAPEUTIC EXERCISES: CPT

## 2022-05-31 RX ORDER — FUROSEMIDE 40 MG/1
40 TABLET ORAL 2 TIMES DAILY
Qty: 60 TABLET | Refills: 0 | Status: SHIPPED | OUTPATIENT
Start: 2022-05-31 | End: 2022-07-05 | Stop reason: SDUPTHER

## 2022-05-31 RX ORDER — POTASSIUM CHLORIDE 20 MEQ/1
20 TABLET, EXTENDED RELEASE ORAL DAILY
Status: DISCONTINUED | OUTPATIENT
Start: 2022-06-01 | End: 2022-06-01 | Stop reason: HOSPADM

## 2022-05-31 RX ORDER — POTASSIUM CHLORIDE 20 MEQ/1
40 TABLET, EXTENDED RELEASE ORAL ONCE
Status: COMPLETED | OUTPATIENT
Start: 2022-05-31 | End: 2022-05-31

## 2022-05-31 RX ADMIN — POTASSIUM CHLORIDE 40 MEQ: 20 TABLET, EXTENDED RELEASE ORAL at 09:48

## 2022-05-31 RX ADMIN — CETIRIZINE HYDROCHLORIDE 10 MG: 10 TABLET, FILM COATED ORAL at 08:17

## 2022-05-31 RX ADMIN — FUROSEMIDE 40 MG: 40 TABLET ORAL at 17:43

## 2022-05-31 RX ADMIN — HYDROCODONE BITARTRATE AND ACETAMINOPHEN 1 TABLET: 10; 325 TABLET ORAL at 01:04

## 2022-05-31 RX ADMIN — AMLODIPINE BESYLATE 5 MG: 5 TABLET ORAL at 08:15

## 2022-05-31 RX ADMIN — FUROSEMIDE 40 MG: 40 TABLET ORAL at 08:15

## 2022-05-31 RX ADMIN — HYDROCODONE BITARTRATE AND ACETAMINOPHEN 1 TABLET: 10; 325 TABLET ORAL at 12:30

## 2022-05-31 RX ADMIN — CYANOCOBALAMIN TAB 1000 MCG 1000 MCG: 1000 TAB at 08:16

## 2022-05-31 RX ADMIN — ASPIRIN 81 MG: 81 TABLET, COATED ORAL at 08:15

## 2022-05-31 RX ADMIN — Medication 10 ML: at 08:14

## 2022-05-31 RX ADMIN — HYDROCODONE BITARTRATE AND ACETAMINOPHEN 1 TABLET: 10; 325 TABLET ORAL at 06:48

## 2022-05-31 RX ADMIN — ATORVASTATIN CALCIUM 10 MG: 10 TABLET, FILM COATED ORAL at 08:16

## 2022-05-31 RX ADMIN — HYDROCODONE BITARTRATE AND ACETAMINOPHEN 1 TABLET: 10; 325 TABLET ORAL at 23:11

## 2022-05-31 RX ADMIN — CYANOCOBALAMIN TAB 1000 MCG 1000 MCG: 1000 TAB at 21:09

## 2022-05-31 RX ADMIN — Medication 10 ML: at 19:39

## 2022-05-31 RX ADMIN — HYDROCODONE BITARTRATE AND ACETAMINOPHEN 1 TABLET: 10; 325 TABLET ORAL at 17:47

## 2022-05-31 RX ADMIN — PANTOPRAZOLE SODIUM 40 MG: 40 TABLET, DELAYED RELEASE ORAL at 06:31

## 2022-05-31 RX ADMIN — TAMSULOSIN HYDROCHLORIDE 0.4 MG: 0.4 CAPSULE ORAL at 08:15

## 2022-05-31 RX ADMIN — PANTOPRAZOLE SODIUM 40 MG: 40 TABLET, DELAYED RELEASE ORAL at 17:43

## 2022-05-31 RX ADMIN — CLOPIDOGREL BISULFATE 75 MG: 75 TABLET ORAL at 08:15

## 2022-05-31 RX ADMIN — Medication 2000 UNITS: at 08:15

## 2022-06-01 ENCOUNTER — APPOINTMENT (OUTPATIENT)
Dept: RESPIRATORY THERAPY | Facility: HOSPITAL | Age: 82
End: 2022-06-01

## 2022-06-01 ENCOUNTER — HOME HEALTH ADMISSION (OUTPATIENT)
Dept: HOME HEALTH SERVICES | Facility: HOME HEALTHCARE | Age: 82
End: 2022-06-01

## 2022-06-01 ENCOUNTER — READMISSION MANAGEMENT (OUTPATIENT)
Dept: CALL CENTER | Facility: HOSPITAL | Age: 82
End: 2022-06-01

## 2022-06-01 VITALS
WEIGHT: 141.98 LBS | HEART RATE: 74 BPM | OXYGEN SATURATION: 85 % | HEIGHT: 65 IN | DIASTOLIC BLOOD PRESSURE: 66 MMHG | SYSTOLIC BLOOD PRESSURE: 133 MMHG | BODY MASS INDEX: 23.65 KG/M2 | TEMPERATURE: 97.7 F | RESPIRATION RATE: 18 BRPM

## 2022-06-01 LAB
ALBUMIN SERPL-MCNC: 3.8 G/DL (ref 3.5–5.2)
ANION GAP SERPL CALCULATED.3IONS-SCNC: 11 MMOL/L (ref 5–15)
ANISOCYTOSIS BLD QL: NORMAL
BH CV ECHO MEAS - ACS: 1.87 CM
BH CV ECHO MEAS - AO MAX PG: 9 MMHG
BH CV ECHO MEAS - AO MEAN PG: 4.7 MMHG
BH CV ECHO MEAS - AO ROOT DIAM: 3.4 CM
BH CV ECHO MEAS - AO V2 MAX: 150.2 CM/SEC
BH CV ECHO MEAS - AO V2 VTI: 35.5 CM
BH CV ECHO MEAS - AVA(I,D): 3.8 CM2
BH CV ECHO MEAS - EDV(CUBED): 80.1 ML
BH CV ECHO MEAS - EDV(MOD-SP4): 77.2 ML
BH CV ECHO MEAS - EF(MOD-BP): 66 %
BH CV ECHO MEAS - EF(MOD-SP4): 66.3 %
BH CV ECHO MEAS - ESV(CUBED): 26.6 ML
BH CV ECHO MEAS - ESV(MOD-SP4): 26 ML
BH CV ECHO MEAS - FS: 30.7 %
BH CV ECHO MEAS - IVS/LVPW: 0.96 CM
BH CV ECHO MEAS - IVSD: 1.02 CM
BH CV ECHO MEAS - LA DIMENSION: 4.1 CM
BH CV ECHO MEAS - LV DIASTOLIC VOL/BSA (35-75): 44.9 CM2
BH CV ECHO MEAS - LV MASS(C)D: 150.8 GRAMS
BH CV ECHO MEAS - LV MAX PG: 9.5 MMHG
BH CV ECHO MEAS - LV MEAN PG: 3.7 MMHG
BH CV ECHO MEAS - LV SYSTOLIC VOL/BSA (12-30): 15.1 CM2
BH CV ECHO MEAS - LV V1 MAX: 154.2 CM/SEC
BH CV ECHO MEAS - LV V1 VTI: 36.1 CM
BH CV ECHO MEAS - LVIDD: 4.3 CM
BH CV ECHO MEAS - LVIDS: 3 CM
BH CV ECHO MEAS - LVOT AREA: 3.7 CM2
BH CV ECHO MEAS - LVOT DIAM: 2.17 CM
BH CV ECHO MEAS - LVPWD: 1.06 CM
BH CV ECHO MEAS - MR MAX PG: 64.9 MMHG
BH CV ECHO MEAS - MR MAX VEL: 402.9 CM/SEC
BH CV ECHO MEAS - MV A MAX VEL: 105.8 CM/SEC
BH CV ECHO MEAS - MV DEC SLOPE: 521.2 CM/SEC2
BH CV ECHO MEAS - MV DEC TIME: 0.23 MSEC
BH CV ECHO MEAS - MV E MAX VEL: 121.4 CM/SEC
BH CV ECHO MEAS - MV E/A: 1.15
BH CV ECHO MEAS - MV MAX PG: 7 MMHG
BH CV ECHO MEAS - MV MEAN PG: 2.04 MMHG
BH CV ECHO MEAS - MV V2 VTI: 53.5 CM
BH CV ECHO MEAS - MVA(VTI): 2.5 CM2
BH CV ECHO MEAS - PA ACC TIME: 0.09 SEC
BH CV ECHO MEAS - PA PR(ACCEL): 37.5 MMHG
BH CV ECHO MEAS - PA V2 MAX: 142 CM/SEC
BH CV ECHO MEAS - PULM A REVS DUR: 0.11 SEC
BH CV ECHO MEAS - PULM A REVS VEL: 34.7 CM/SEC
BH CV ECHO MEAS - PULM DIAS VEL: 63.8 CM/SEC
BH CV ECHO MEAS - PULM S/D: 1.16
BH CV ECHO MEAS - PULM SYS VEL: 73.9 CM/SEC
BH CV ECHO MEAS - QP/QS: 0.74
BH CV ECHO MEAS - RAP SYSTOLE: 3 MMHG
BH CV ECHO MEAS - RV MAX PG: 3.4 MMHG
BH CV ECHO MEAS - RV V1 MAX: 91.5 CM/SEC
BH CV ECHO MEAS - RV V1 VTI: 17.6 CM
BH CV ECHO MEAS - RVDD: 2.45 CM
BH CV ECHO MEAS - RVOT DIAM: 2.7 CM
BH CV ECHO MEAS - RVSP: 35.9 MMHG
BH CV ECHO MEAS - SI(MOD-SP4): 29.8 ML/M2
BH CV ECHO MEAS - SV(LVOT): 133.5 ML
BH CV ECHO MEAS - SV(MOD-SP4): 51.2 ML
BH CV ECHO MEAS - SV(RVOT): 98.4 ML
BH CV ECHO MEAS - TR MAX PG: 32.9 MMHG
BH CV ECHO MEAS - TR MAX VEL: 286.6 CM/SEC
BUN SERPL-MCNC: 38 MG/DL (ref 8–23)
BUN/CREAT SERPL: 16.8 (ref 7–25)
CALCIUM SPEC-SCNC: 9.9 MG/DL (ref 8.6–10.5)
CHLORIDE SERPL-SCNC: 99 MMOL/L (ref 98–107)
CO2 SERPL-SCNC: 29 MMOL/L (ref 22–29)
CREAT SERPL-MCNC: 2.26 MG/DL (ref 0.76–1.27)
DEPRECATED RDW RBC AUTO: 49 FL (ref 37–54)
EGFRCR SERPLBLD CKD-EPI 2021: 28.4 ML/MIN/1.73
EOSINOPHIL # BLD MANUAL: 0.19 10*3/MM3 (ref 0–0.4)
EOSINOPHIL NFR BLD MANUAL: 2 % (ref 0.3–6.2)
ERYTHROCYTE [DISTWIDTH] IN BLOOD BY AUTOMATED COUNT: 15.5 % (ref 12.3–15.4)
GLUCOSE SERPL-MCNC: 166 MG/DL (ref 65–99)
HCT VFR BLD AUTO: 31.8 % (ref 37.5–51)
HGB BLD-MCNC: 9.9 G/DL (ref 13–17.7)
HYPOCHROMIA BLD QL: NORMAL
LAB AP CASE REPORT: NORMAL
LYMPHOCYTES # BLD MANUAL: 2.57 10*3/MM3 (ref 0.7–3.1)
LYMPHOCYTES NFR BLD MANUAL: 5 % (ref 5–12)
MAXIMAL PREDICTED HEART RATE: 139 BPM
MCH RBC QN AUTO: 27.8 PG (ref 26.6–33)
MCHC RBC AUTO-ENTMCNC: 31 G/DL (ref 31.5–35.7)
MCV RBC AUTO: 89.5 FL (ref 79–97)
MONOCYTES # BLD: 0.48 10*3/MM3 (ref 0.1–0.9)
NEUTROPHILS # BLD AUTO: 6.27 10*3/MM3 (ref 1.7–7)
NEUTROPHILS NFR BLD MANUAL: 65 % (ref 42.7–76)
NEUTS BAND NFR BLD MANUAL: 1 % (ref 0–5)
PATH REPORT.FINAL DX SPEC: NORMAL
PATH REPORT.GROSS SPEC: NORMAL
PHOSPHATE SERPL-MCNC: 3.4 MG/DL (ref 2.5–4.5)
PLAT MORPH BLD: NORMAL
PLATELET # BLD AUTO: 467 10*3/MM3 (ref 140–450)
PMV BLD AUTO: 7.7 FL (ref 6–12)
POTASSIUM SERPL-SCNC: 4 MMOL/L (ref 3.5–5.2)
RBC # BLD AUTO: 3.56 10*6/MM3 (ref 4.14–5.8)
SCAN SLIDE: NORMAL
SODIUM SERPL-SCNC: 139 MMOL/L (ref 136–145)
STOMATOCYTES BLD QL SMEAR: NORMAL
STRESS TARGET HR: 118 BPM
VARIANT LYMPHS NFR BLD MANUAL: 27 % (ref 19.6–45.3)
WBC MORPH BLD: NORMAL
WBC NRBC COR # BLD: 9.5 10*3/MM3 (ref 3.4–10.8)

## 2022-06-01 PROCEDURE — 99239 HOSP IP/OBS DSCHRG MGMT >30: CPT | Performed by: HOSPITALIST

## 2022-06-01 PROCEDURE — 97116 GAIT TRAINING THERAPY: CPT

## 2022-06-01 PROCEDURE — 94618 PULMONARY STRESS TESTING: CPT

## 2022-06-01 PROCEDURE — 94799 UNLISTED PULMONARY SVC/PX: CPT

## 2022-06-01 PROCEDURE — 80069 RENAL FUNCTION PANEL: CPT | Performed by: INTERNAL MEDICINE

## 2022-06-01 PROCEDURE — 85025 COMPLETE CBC W/AUTO DIFF WBC: CPT | Performed by: INTERNAL MEDICINE

## 2022-06-01 PROCEDURE — 97110 THERAPEUTIC EXERCISES: CPT

## 2022-06-01 PROCEDURE — 99232 SBSQ HOSP IP/OBS MODERATE 35: CPT | Performed by: INTERNAL MEDICINE

## 2022-06-01 PROCEDURE — 93270 REMOTE 30 DAY ECG REV/REPORT: CPT

## 2022-06-01 PROCEDURE — 85007 BL SMEAR W/DIFF WBC COUNT: CPT | Performed by: INTERNAL MEDICINE

## 2022-06-01 RX ADMIN — CLOPIDOGREL BISULFATE 75 MG: 75 TABLET ORAL at 08:05

## 2022-06-01 RX ADMIN — FUROSEMIDE 40 MG: 40 TABLET ORAL at 08:05

## 2022-06-01 RX ADMIN — HYDROCODONE BITARTRATE AND ACETAMINOPHEN 1 TABLET: 10; 325 TABLET ORAL at 06:43

## 2022-06-01 RX ADMIN — CETIRIZINE HYDROCHLORIDE 10 MG: 10 TABLET, FILM COATED ORAL at 08:05

## 2022-06-01 RX ADMIN — POTASSIUM CHLORIDE 20 MEQ: 1500 TABLET, EXTENDED RELEASE ORAL at 08:05

## 2022-06-01 RX ADMIN — Medication 2000 UNITS: at 08:05

## 2022-06-01 RX ADMIN — TAMSULOSIN HYDROCHLORIDE 0.4 MG: 0.4 CAPSULE ORAL at 08:05

## 2022-06-01 RX ADMIN — ATORVASTATIN CALCIUM 10 MG: 10 TABLET, FILM COATED ORAL at 08:05

## 2022-06-01 RX ADMIN — ASPIRIN 81 MG: 81 TABLET, COATED ORAL at 08:11

## 2022-06-01 RX ADMIN — CYANOCOBALAMIN TAB 1000 MCG 1000 MCG: 1000 TAB at 08:05

## 2022-06-01 RX ADMIN — HYDROCODONE BITARTRATE AND ACETAMINOPHEN 1 TABLET: 10; 325 TABLET ORAL at 11:33

## 2022-06-01 RX ADMIN — PANTOPRAZOLE SODIUM 40 MG: 40 TABLET, DELAYED RELEASE ORAL at 06:30

## 2022-06-01 RX ADMIN — AMLODIPINE BESYLATE 5 MG: 5 TABLET ORAL at 08:05

## 2022-06-01 RX ADMIN — Medication 10 ML: at 08:02

## 2022-06-01 NOTE — PROGRESS NOTES
Exercise Oximetry    Patient Name:Kailash Cooper   MRN: [id number]   Date: 06/01/22             ROOM AIR BASELINE   SpO2% 85   Heart Rate 79   Blood Pressure      EXERCISE ON ROOM AIR SpO2% EXERCISE ON O2 @ 4 LPM SpO2%   1 MINUTE 85 1 MINUTE 85 placed on 2 liters.   2 MINUTES  2 MINUTES Increased to  3 liters,89f%   3 MINUTES  3 MINUTES Increased to 4 liters, 02 sat increased to 93%   4 MINUTES  4 MINUTES    5 MINUTES  5 MINUTES    6 MINUTES  6 MINUTES               Distance Walked   Distance Walked   Dyspnea (Mora Scale)   Dyspnea (Mora Scale)   Fatigue (Mora Scale)   Fatigue (Mora Scale)   SpO2% Post Exercise   SpO2% Post Exercise   HR Post Exercise   HR Post Exercise   Time to Recovery   Time to Recovery     Comments: Sa02 85% on room air after patient walk to bathroom and back on room air.  Placed patient on 02 @ 2 liters, patients sat increased to 88%.  Increased 02 to 3 liters, sat increased to 89%.  Increased 02 to 4 liters, sat increased to 93%.  Patient was unable to continue walk due to soa and fatigue.  Post saturation at rest 94% on 4 liters, HR 78.

## 2022-06-01 NOTE — PROGRESS NOTES
"NEPHROLOGY PROGRESS NOTE------[facility]    [facility]  [phone]  Jordan Liu MD      Patient Care Team:  Madelyn Márquez APRN as PCP - General (Nurse Practitioner)  Marcia Lujan MD as Consulting Physician (Nephrology)      Provider:  Jordan Liu MD  Patient Name: Kailash Cooper  :  1940    SUBJECTIVE:  F/u CKD.  No chest pain or SOA      Medication:  amLODIPine, 5 mg, Oral, Q24H  aspirin, 81 mg, Oral, Daily  atorvastatin, 10 mg, Oral, Daily  cetirizine, 10 mg, Oral, Daily  cholecalciferol, 2,000 Units, Oral, Daily  clopidogrel, 75 mg, Oral, Daily  furosemide, 40 mg, Oral, BID  pantoprazole, 40 mg, Oral, BID AC  potassium chloride, 20 mEq, Oral, Daily  tamsulosin, 0.4 mg, Oral, Daily  vitamin B-12, 1,000 mcg, Oral, BID           OBJECTIVE    Vital Sign Min/Max for last 24 hours  Temp  Min: 97.6 °F (36.4 °C)  Max: 98.4 °F (36.9 °C)   BP  Min: 126/68  Max: 148/71   Pulse  Min: 70  Max: 75   Resp  Min: 16  Max: 20   SpO2  Min: 96 %  Max: 100 %   No data recorded   Weight  Min: 64.4 kg (141 lb 15.6 oz)  Max: 64.4 kg (141 lb 15.6 oz)     Flowsheet Rows    Flowsheet Row First Filed Value   Admission Height 165.1 cm (65\") Documented at 2022 1318   Admission Weight 68 kg (150 lb) Documented at 2022 1318          I/O this shift:  In: 480 [P.O.:480]  Out: -   I/O last 3 completed shifts:  In: 840 [P.O.:840]  Out: 1600 [Urine:1600]    Physical Exam:  General Appearance: alert, appears stated age and cooperative  Head: normocephalic, without obvious abnormality and atraumatic  Eyes: conjunctivae and sclerae normal and no icterus  Neck: supple and no JVD  Lungs: Diminished breath sounds  Heart: regular rhythm & normal rate and normal S1, S2  Chest: Wall no abnormalities observed  Abdomen: normal bowel sounds and soft non-tender  Extremities: moves extremities well, no edema, no cyanosis and no redness  Skin: no bleeding, " bruising or rash, turgor normal, color normal and no lesions noted  Neurologic: Alert, and oriented. No focal deficits    Labs:    WBC WBC   Date Value Ref Range Status   06/01/2022 9.50 3.40 - 10.80 10*3/mm3 Final   05/31/2022 9.90 3.40 - 10.80 10*3/mm3 Final   05/30/2022 9.40 3.40 - 10.80 10*3/mm3 Final      HGB Hemoglobin   Date Value Ref Range Status   06/01/2022 9.9 (L) 13.0 - 17.7 g/dL Final   05/31/2022 10.0 (L) 13.0 - 17.7 g/dL Final   05/30/2022 8.7 (L) 13.0 - 17.7 g/dL Final      HCT Hematocrit   Date Value Ref Range Status   06/01/2022 31.8 (L) 37.5 - 51.0 % Final   05/31/2022 30.8 (L) 37.5 - 51.0 % Final   05/30/2022 28.2 (L) 37.5 - 51.0 % Final      Platlets No results found for: LABPLAT   MCV MCV   Date Value Ref Range Status   06/01/2022 89.5 79.0 - 97.0 fL Final   05/31/2022 89.3 79.0 - 97.0 fL Final   05/30/2022 90.2 79.0 - 97.0 fL Final          Sodium Sodium   Date Value Ref Range Status   06/01/2022 139 136 - 145 mmol/L Final   05/31/2022 139 136 - 145 mmol/L Final   05/30/2022 143 136 - 145 mmol/L Final      Potassium Potassium   Date Value Ref Range Status   06/01/2022 4.0 3.5 - 5.2 mmol/L Final   05/31/2022 3.4 (L) 3.5 - 5.2 mmol/L Final   05/30/2022 3.9 3.5 - 5.2 mmol/L Final      Chloride Chloride   Date Value Ref Range Status   06/01/2022 99 98 - 107 mmol/L Final   05/31/2022 97 (L) 98 - 107 mmol/L Final   05/30/2022 102 98 - 107 mmol/L Final      CO2 CO2   Date Value Ref Range Status   06/01/2022 29.0 22.0 - 29.0 mmol/L Final   05/31/2022 26.0 22.0 - 29.0 mmol/L Final   05/30/2022 28.0 22.0 - 29.0 mmol/L Final      BUN BUN   Date Value Ref Range Status   06/01/2022 38 (H) 8 - 23 mg/dL Final   05/31/2022 36 (H) 8 - 23 mg/dL Final   05/30/2022 39 (H) 8 - 23 mg/dL Final      Creatinine Creatinine   Date Value Ref Range Status   06/01/2022 2.26 (H) 0.76 - 1.27 mg/dL Final   05/31/2022 1.85 (H) 0.76 - 1.27 mg/dL Final   05/30/2022 1.85 (H) 0.76 - 1.27 mg/dL Final      Calcium Calcium   Date  Value Ref Range Status   06/01/2022 9.9 8.6 - 10.5 mg/dL Final   05/31/2022 10.0 8.6 - 10.5 mg/dL Final   05/30/2022 9.8 8.6 - 10.5 mg/dL Final      PO4 No components found for: PO4   Albumin Albumin   Date Value Ref Range Status   06/01/2022 3.80 3.50 - 5.20 g/dL Final   05/31/2022 3.90 3.50 - 5.20 g/dL Final   05/30/2022 3.40 (L) 3.50 - 5.20 g/dL Final      Magnesium No results found for: MG   Uric Acid No components found for: URIC ACID     Imaging Results (Last 72 Hours)     ** No results found for the last 72 hours. **          Results for orders placed during the hospital encounter of 05/25/22    XR Chest 1 View    Narrative  DATE OF EXAM:  5/25/2022 2:33 PM    PROCEDURE:  XR CHEST 1 VW-    INDICATIONS:  CHF/COPD Protocol    COMPARISON:  2 view chest x-ray 05/23/2022, AP chest x-ray 01/28/2022, AP chest x-ray  01/26/2022, AP chest x-ray 06/12/2021    TECHNIQUE:  Single radiographic AP view of the chest was obtained.    FINDINGS:  Interstitial and airspace opacities throughout the entire left lung and  in the right lower lung appear similar compared to 05/23/2022 chest  x-ray, but increased compared to 01/28/2022 chest x-ray. There is  relative sparing of the right upper lung. No pneumothorax or large  pleural effusion is seen. Cardia mediastinal contours are stable.    Impression  Stable appearance of mixed interstitial and airspace opacities  throughout the entire left lung and in the right lower lung compared to  05/23/2022 chest x-ray, but increased compared to 01/28/2022 chest  x-ray. Findings may be related to pulmonary edema or infection  superimposed on chronic interstitial lung disease.    Electronically Signed By-Maeve Kennedy MD On:5/25/2022 2:41 PM  This report was finalized on [id number] by  Maeve Kennedy MD.      Results for orders placed in visit on 05/23/22    XR Chest 2 View    Narrative  DATE OF EXAM:  5/23/2022 2:49 PM    PROCEDURE:  XR CHEST 2 VW-    INDICATIONS:  low 02 sats;  R06.02-Shortness of breath    COMPARISON:  AP portable chest 1/28/2022.    TECHNIQUE:  Two radiologic views of the chest.    FINDINGS:  New dense consolidation has developed in the left lower lobe. Patchy  alveolar and interstitial infiltrates are seen throughout the left lung,  in the right upper lobe appears relatively clear. Heart size is normal.  No definite pleural effusion or pneumothorax or acute osseous  abnormality.    Impression  New dense right lower lobe consolidation. Correlate clinically for  pneumonia. Interstitial and alveolar infiltrates suggestive of pneumonia  throughout the left hemithorax.    Electronically Signed By-Sasha Rios MD On:5/23/2022 3:19 PM  This report was finalized on [id number] by  Sasha Rios MD.      Results for orders placed during the hospital encounter of 01/26/22    XR Chest 1 View    Narrative  DATE OF EXAM:  1/28/2022 8:26 AM    PROCEDURE:  XR CHEST 1 VW-    INDICATIONS:  shortness of breath; J18.9-Pneumonia, unspecified organism; R07.9-Chest  pain, unspecified; N17.9-Acute kidney failure, unspecified;  R09.02-Hypoxemia; I20.0-Unstable angina    COMPARISON:  1/26/2022    TECHNIQUE:  Single radiographic AP view of the chest was obtained.    FINDINGS:  There are groundglass and interstitial opacities in both lungs with  focal airspace disease in the left lung base. There appears to been some  improvement in the left upper lobe, although the films were obtained in  different projections (previous film was lordotic). No new pulmonary  abnormality demonstrated. No pneumothorax    Impression  Bilateral pulmonary infiltrates suspicious for Covid pattern of  pneumonia. There appears to been some improvement in the left upper  lobe, although this may in part be an artifact related to differences in  projection of the 2 films    Electronically Signed By-Josh Cruz On:1/28/2022 8:32 AM  This report was finalized on [id number] by  Josh Cruz, .      Results for  orders placed during the hospital encounter of 01/26/22    Duplex Venous Lower Extremity - Bilateral CAR    Interpretation Summary  · Normal bilateral lower extremity venous duplex scan.        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  Renal ultrasound in January showed atrophic right kidney and cortical thinning in the left kidney.  UA with no blood, minimal protein present.  · Hypertension  · Diabetes  · Acute pulmonary edema  · Bilateral pneumonia  · History of coronary artery disease  · Anemia- acute on chronic.  s/p IV iron.  Status post Procrit  · Acute hypoxic respiratory failure  · Acute on chronic diastolic heart failure  · BPH-on Flomax  · Bradycardia-off beta-blocker.  Cardiology following     Creatinine slightly up but mostly stable.  Continue Lasix 40 mg p.o. twice daily  F/u 2-4 weeks  Likely home today        Jordan Liu MD  [facility]  [phone]  06/01/22  11:19 EDT

## 2022-06-01 NOTE — CASE MANAGEMENT/SOCIAL WORK
Case Management Discharge Note      Final Note: Home with [facility]    Provided Post Acute Provider List?: Yes  Post Acute Provider List: Home Health  Provided Post Acute Provider Quality & Resource List?: Yes  Post Acute Provider Quality and Resource List: Inpatient Rehab, Home Health  Delivered To: Patient  Method of Delivery: In person        Home Medical Care Coordination complete.    Service Provider Selected Services Address Phone Fax Patient Preferred    [facility]  Home Rehabilitation [address] [facility] [address] [phone] --              Transportation Services  Private: Car    Final Discharge Disposition Code: 06 - home with home health care

## 2022-06-01 NOTE — CASE MANAGEMENT/SOCIAL WORK
Continued Stay Note  [facility]     Patient Name: Kailash Cooper  MRN: [id number]  Today's Date: 6/1/2022    Admit Date: 5/25/2022     Discharge Plan     Row Name 06/01/22 1122       Plan    Plan SEVERIANO vikysavana denied, patient discharge home with HH    Patient/Family in Agreement with Plan yes    Provided Post Acute Provider List? Yes    Post Acute Provider List Home Health    Plan Comments SIRTONJA neri denied per Hayley pool, P2P offered. Explained to patient and daughter P2P process, patient decided to not pursue and go home with home health. Provided list of home health agencies to patient and daughter, first choice [facility], second choice [facility]. Message sent to MD to place HH order. Referral sent to [facility], message sent to yrn Fofana, pending acceptance. Patient wears O2 at home, would like portable O2 tank to carry, patient obtains O2 from [facility]. Patient will require 6 minute walk before [facility] can deliver O2 tank, patient and daughter aware. Message sent to MD and RN to order 6 minute walk.              Met with patient in room wearing PPE: mask     Maintained distance greater than six feet and spent less than 15 minutes in the room.    UPDATE 1246:    Patient will require 4L continuous O2 per RT. [facility] referral sent, latricia Perla notified. Will deliver tanks to bedside, will inform patient [employer] tank can take up to 3 months for delivery.     Shell Yun RN

## 2022-06-01 NOTE — THERAPY TREATMENT NOTE
Subjective: Pt agreeable to therapeutic plan of care. Pt agreed that rwx helps with stability    Objective:     Bed mobility - SBA  Transfers - CGA and with rolling walker  Ambulation - 40 feet CGA and with rolling walker    Vitals: Pt on 4L O2, sats 93>99%, HR 73>105.    Pain: 0 VAS  Education: Provided education on importance of mobility and skilled verbal / tactile cueing throughout intervention.     Assessment: Kailash Cooper presents with functional mobility impairments which indicate the need for skilled intervention. Tolerating session today without incident. Pt performed seated LE exs x 10. Didn't get as SOA today but had incr in HR, rwx seemed to help with balance and less exertion.  Will continue to follow and progress as tolerated.     Plan/Recommendations:   High Intensity Therapy recommended post-acute care. This is recommended as therapy feels the patient would require 5-6 days per week, 2-3 hours per day. At this time, inpatient rehabilitation (acute rehab) would be the first choice and SNF would be second.. Pt requires no DME at discharge.     Pt desires Inpatient Rehabilitation placement at discharge. Pt cooperative; agreeable to therapeutic recommendations and plan of care.         Basic Mobility 6-click:  Rollin = Total, A lot = 2, A little = 3; 4 = None  Supine>Sit:   1 = Total, A lot = 2, A little = 3; 4 = None   Sit>Stand with arms:  1 = Total, A lot = 2, A little = 3; 4 = None  Bed>Chair:   1 = Total, A lot = 2, A little = 3; 4 = None  Ambulate in room:  1 = Total, A lot = 2, A little = 3; 4 = None  3-5 Steps with railin = Total, A lot = 2, A little = 3; 4 = None  Score: 18    Post-Tx Position: Up in Chair and Call light and personal items within reach  PPE: gloves, surgical mask, eyewear protection

## 2022-06-01 NOTE — PROGRESS NOTES
LOS: 7 days   Admiting Physician- No att. providers found    Reason For Followup:    GI bleed  Anemia  CAD  Coronary artery stenting  Coronary insufficiency  Bradycardia    Subjective     No chest pain or shortness of breath    Objective     Blood pressure is stable    Review of Systems:   Review of Systems   Constitutional: Negative for chills and fever.   HENT: Negative for ear discharge and nosebleeds.    Eyes: Negative for discharge and redness.   Cardiovascular: Negative for chest pain, orthopnea, palpitations, paroxysmal nocturnal dyspnea and syncope.   Respiratory: Positive for shortness of breath. Negative for cough and wheezing.    Endocrine: Negative for heat intolerance.   Skin: Negative for rash.   Musculoskeletal: Positive for arthritis, back pain and joint pain. Negative for myalgias.   Gastrointestinal: Negative for abdominal pain, melena, nausea and vomiting.   Genitourinary: Negative for dysuria and hematuria.   Neurological: Negative for dizziness, light-headedness, numbness and tremors.   Psychiatric/Behavioral: Negative for depression. The patient is not nervous/anxious.          Vital Signs  Vitals:    05/31/22 2048 06/01/22 0343 06/01/22 0801 06/01/22 1232   BP: 126/68 148/71 133/66    BP Location: Left arm  Left arm    Patient Position: Lying Lying Lying    Pulse: 75 70 74    Resp: 20 18     Temp: 97.6 °F (36.4 °C) 98.3 °F (36.8 °C) 97.7 °F (36.5 °C)    TempSrc: Oral Oral Oral    SpO2: 100% 100%  (!) 85%   Weight:  64.4 kg (141 lb 15.6 oz)     Height:         Wt Readings from Last 1 Encounters:   06/01/22 64.4 kg (141 lb 15.6 oz)       Intake/Output Summary (Last 24 hours) at 6/1/2022 1735  Last data filed at 6/1/2022 1340  Gross per 24 hour   Intake 960 ml   Output 1000 ml   Net -40 ml     Physical Exam:  Constitutional:       Appearance: Well-developed.   Eyes:      General: No scleral icterus.        Right eye: No discharge.   HENT:      Head: Normocephalic and atraumatic.   Neck:       Thyroid: No thyromegaly.      Lymphadenopathy: No cervical adenopathy.   Pulmonary:      Effort: Pulmonary effort is normal. No respiratory distress.      Breath sounds: Normal breath sounds. No wheezing. No rales.   Cardiovascular:      Normal rate. Regular rhythm.      No gallop.   Edema:     Peripheral edema absent.   Abdominal:      Tenderness: There is no abdominal tenderness.   Skin:     Findings: No erythema or rash.   Neurological:      Mental Status: Alert and oriented to person, place, and time.         Results Review:   Lab Results (last 24 hours)     Procedure Component Value Units Date/Time    Manual Differential [[id number]] Collected: 06/01/22 0155    Specimen: Blood Updated: 06/01/22 0326     Neutrophil % 65.0 %      Lymphocyte % 27.0 %      Monocyte % 5.0 %      Eosinophil % 2.0 %      Bands %  1.0 %      Neutrophils Absolute 6.27 10*3/mm3      Lymphocytes Absolute 2.57 10*3/mm3      Monocytes Absolute 0.48 10*3/mm3      Eosinophils Absolute 0.19 10*3/mm3      Anisocytosis Slight/1+     Hypochromia Slight/1+     Stomatocytes Slight/1+     WBC Morphology Normal     Platelet Morphology Normal    CBC & Differential [[id number]]  (Abnormal) Collected: 06/01/22 0155    Specimen: Blood Updated: 06/01/22 0326    Narrative:      The following orders were created for panel order CBC & Differential.  Procedure                               Abnormality         Status                     ---------                               -----------         ------                     CBC Auto Differential[[id number]]        Abnormal            Final result               Scan Slide[[id number]]                                       Final result                 Please view results for these tests on the individual orders.    Scan Slide [[id number]] Collected: 06/01/22 0155    Specimen: Blood Updated: 06/01/22 0326     Scan Slide --     Comment: See Manual Differential Results       CBC Auto Differential [[id number]]  (Abnormal)  Collected: 06/01/22 0155    Specimen: Blood Updated: 06/01/22 0326     WBC 9.50 10*3/mm3      RBC 3.56 10*6/mm3      Hemoglobin 9.9 g/dL      Hematocrit 31.8 %      MCV 89.5 fL      MCH 27.8 pg      MCHC 31.0 g/dL      RDW 15.5 %      RDW-SD 49.0 fl      MPV 7.7 fL      Platelets 467 10*3/mm3     Narrative:      The previously reported component NRBC is no longer being reported. Previous result was 0.1 /100 WBC (Reference Range: 0.0-0.2 /100 WBC) on 6/1/2022 at 0231 EDT.    Renal Function Panel [[id number]]  (Abnormal) Collected: 06/01/22 0155    Specimen: Blood Updated: 06/01/22 0246     Glucose 166 mg/dL      BUN 38 mg/dL      Creatinine 2.26 mg/dL      Sodium 139 mmol/L      Potassium 4.0 mmol/L      Chloride 99 mmol/L      CO2 29.0 mmol/L      Calcium 9.9 mg/dL      Albumin 3.80 g/dL      Phosphorus 3.4 mg/dL      Anion Gap 11.0 mmol/L      BUN/Creatinine Ratio 16.8     eGFR 28.4 mL/min/1.73      Comment: National Kidney Foundation and American Society of Nephrology (ASN) Task Force recommended calculation based on the Chronic Kidney Disease Epidemiology Collaboration (CKD-EPI) equation refit without adjustment for race.       Narrative:      GFR Normal >60  Chronic Kidney Disease <60  Kidney Failure <15          Imaging Results (Last 72 Hours)     ** No results found for the last 72 hours. **        ECG/EMG Results (most recent)     Procedure Component Value Units Date/Time    ECG 12 Lead [[id number]] Collected: 05/25/22 1331     Updated: 05/25/22 1332     QT Interval 409 ms     Narrative:      HEART RATE= 60  bpm  RR Interval= 992  ms  VT Interval= 177  ms  P Horizontal Axis= 48  deg  P Front Axis= 38  deg  QRSD Interval= 148  ms  QT Interval= 409  ms  QRS Axis= -61  deg  T Wave Axis= -23  deg  - ABNORMAL ECG -  Sinus rhythm  RBBB and LAFB  Abnormal T, consider ischemia, lateral leads  Electronically Signed By:   Date and Time of Study: 2022-05-25 13:31:11    SCANNED - TELEMETRY   [[id number]] Resulted: 05/25/22      Updated: 05/26/22 1231    SCANNED - TELEMETRY   [[id number]] Resulted: 05/25/22     Updated: 05/26/22 1336    SCANNED - TELEMETRY   [[id number]] Resulted: 05/25/22     Updated: 05/31/22 0903    SCANNED - TELEMETRY   [[id number]] Resulted: 05/25/22     Updated: 05/31/22 1437    SCANNED - TELEMETRY   [[id number]] Resulted: 05/25/22     Updated: 05/31/22 1523    SCANNED - TELEMETRY   [[id number]] Resulted: 05/25/22     Updated: 05/31/22 1558    SCANNED - TELEMETRY   [[id number]] Resulted: 05/25/22     Updated: 06/01/22 0022    SCANNED - TELEMETRY   [[id number]] Resulted: 05/25/22     Updated: 06/01/22 0743    Adult Transthoracic Echo Complete W/ Cont if Necessary Per Protocol [[id number]] Resulted: 06/01/22 0816     Updated: 06/01/22 0824     Target HR (85%) 118 bpm      Max. Pred. HR (100%) 139 bpm      ACS 1.87 cm      Ao root diam 3.4 cm      Ao pk levi 150.2 cm/sec      Ao V2 VTI 35.5 cm      TAMI(I,D) 3.8 cm2      EDV(cubed) 80.1 ml      EDV(MOD-sp4) 77.2 ml      EF(MOD-bp) 66.0 %      EF(MOD-sp4) 66.3 %      ESV(cubed) 26.6 ml      ESV(MOD-sp4) 26.0 ml      IVS/LVPW 0.96 cm      LV mass(C)d 150.8 grams      LV V1 max PG 9.5 mmHg      LV V1 mean PG 3.7 mmHg      LV V1 max 154.2 cm/sec      LVPWd 1.06 cm      MR max PG 64.9 mmHg      MV dec slope 521.2 cm/sec2      MV dec time 0.23 msec      MV V2 VTI 53.5 cm      MVA(VTI) 2.50 cm2      PA acc time 0.09 sec      PA pr(Accel) 37.5 mmHg      PA V2 max 142.0 cm/sec      Pulm A Revs Levi 34.7 cm/sec      RAP systole 3.0 mmHg      RV V1 max PG 3.4 mmHg      RV V1 max 91.5 cm/sec      RV V1 VTI 17.6 cm      RVIDd 2.45 cm      RVSP(TR) 35.9 mmHg      SI(MOD-sp4) 29.8 ml/m2      SV(LVOT) 133.5 ml      SV(MOD-sp4) 51.2 ml      SV(RVOT) 98.4 ml      TR max PG 32.9 mmHg      Ao max PG 9.0 mmHg      Ao mean PG 4.7 mmHg      FS 30.7 %      IVSd 1.02 cm      LA dimension (2D)  4.1 cm      LV V1 VTI 36.1 cm      LVIDd 4.3 cm      LVIDs 3.0 cm      LVOT area 3.7 cm2      LVOT diam 2.17  cm      MV E/A 1.15     MV max PG 7.0 mmHg      MV mean PG 2.04 mmHg      Pulm S/D 1.16     Qp/Qs 0.74     RVOT diam 2.7 cm      MR max levi 402.9 cm/sec      MV A max levi 105.8 cm/sec      MV E max levi 121.4 cm/sec      Pulm A Revs Dur 0.11 sec      Pulm Austin Levi 63.8 cm/sec      Pulm Sys Levi 73.9 cm/sec      TR max levi 286.6 cm/sec      LV Austin Vol (BSA corrected) 44.9 cm2      LV Sys Vol (BSA corrected) 15.1 cm2     Narrative:      · Left ventricular systolic function is normal.  · Left ventricular ejection fraction is 55 to 60%  · Left ventricular diastolic function was normal.  · Calculated right ventricular systolic pressure from tricuspid   regurgitation is 35.9 mmHg.       SCANNED - TELEMETRY   [[id number]] Resulted: 05/25/22     Updated: 06/01/22 1044    SCANNED - TELEMETRY   [[id number]] Resulted: 05/25/22     Updated: 06/01/22 1136    SCANNED - TELEMETRY   [[id number]] Resulted: 05/25/22     Updated: 06/01/22 1227    SCANNED - TELEMETRY   [[id number]] Resulted: 05/25/22     Updated: 06/01/22 1343    SCANNED - TELEMETRY   [[id number]] Resulted: 05/25/22     Updated: 06/01/22 1433    SCANNED - TELEMETRY   [[id number]] Resulted: 05/25/22     Updated: 06/01/22 1447    SCANNED - TELEMETRY   [[id number]] Resulted: 05/25/22     Updated: 06/01/22 1523        CBC    Results from last 7 days   Lab Units 06/01/22  0155 05/31/22  0429 05/30/22  0537 05/29/22  0337 05/28/22  0157 05/27/22  0004 05/26/22  0526   WBC 10*3/mm3 9.50 9.90 9.40 10.10 11.10* 10.80 10.80   HEMOGLOBIN g/dL 9.9* 10.0* 8.7* 8.2* 8.4* 8.1* 7.4*   PLATELETS 10*3/mm3 467* 427 417 357 400 349 273     BMP   Results from last 7 days   Lab Units 06/01/22  0155 05/31/22  0429 05/30/22  0537 05/29/22  0337 05/28/22  0157 05/27/22  0004   SODIUM mmol/L 139 139 143 141 138 139   POTASSIUM mmol/L 4.0 3.4* 3.9 4.0 3.4* 4.1   CHLORIDE mmol/L 99 97* 102 101 97* 99   CO2 mmol/L 29.0 26.0 28.0 27.0 25.0 25.0   BUN mg/dL 38* 36* 39* 42* 47* 49*   CREATININE mg/dL 2.26*  1.85* 1.85* 2.13* 1.86* 2.10*   GLUCOSE mg/dL 166* 120* 111* 125* 170* 99   MAGNESIUM mg/dL  --   --   --   --  2.0  --    PHOSPHORUS mg/dL 3.4 3.8 4.2 3.8 3.9  --      CMP   Results from last 7 days   Lab Units 06/01/22  0155 05/31/22  0429 05/30/22  0537 05/29/22  0337 05/28/22  0157 05/27/22  0004   SODIUM mmol/L 139 139 143 141 138 139   POTASSIUM mmol/L 4.0 3.4* 3.9 4.0 3.4* 4.1   CHLORIDE mmol/L 99 97* 102 101 97* 99   CO2 mmol/L 29.0 26.0 28.0 27.0 25.0 25.0   BUN mg/dL 38* 36* 39* 42* 47* 49*   CREATININE mg/dL 2.26* 1.85* 1.85* 2.13* 1.86* 2.10*   GLUCOSE mg/dL 166* 120* 111* 125* 170* 99   ALBUMIN g/dL 3.80 3.90 3.40* 3.10* 3.30* 3.70   BILIRUBIN mg/dL  --   --   --   --   --  0.3   ALK PHOS U/L  --   --   --   --   --  90   AST (SGOT) U/L  --   --   --   --   --  51*   ALT (SGPT) U/L  --   --   --   --   --  62*     Cardiac Studies:  Echo- Results for orders placed during the hospital encounter of 05/25/22    Adult Transthoracic Echo Complete W/ Cont if Necessary Per Protocol    Interpretation Summary  · Left ventricular systolic function is normal.  · Left ventricular ejection fraction is 55 to 60%  · Left ventricular diastolic function was normal.  · Calculated right ventricular systolic pressure from tricuspid regurgitation is 35.9 mmHg.    Stress Myoview-  Cath-      Medication Review:   Scheduled Meds:  Continuous Infusions:No current facility-administered medications for this encounter.    PRN Meds:.      Assessment & Plan   Patient Active Problem List   Diagnosis   • Chronic bilateral low back pain without sciatica   • History of malignant neoplasm of thoracic cavity structure   • Allergic rhinitis   • Anemia due to stage 3 chronic kidney disease (HCC)   • Acute conjunctivitis   • Osteoarthritis   • Encounter for screening for malignant neoplasm of prostate   • Enlarged prostate without lower urinary tract symptoms (luts)   • Fatigue   • Gastroesophageal reflux disease   • Gastrointestinal  hemorrhage   • History of transient ischemic attack   • Hydronephrosis   • Hyperkalemia   • Mixed hyperlipidemia   • Essential hypertension   • Hypogonadism   • Male erectile disorder (CODE)   • Obstructive sleep apnea   • Osteomalacia   • Secondary hyperparathyroidism of renal origin (HCC)   • Status post patent foramen ovale closure   • Essential tremor   • Vitamin D deficiency   • Type 2 diabetes mellitus (HCC)   • Chronic kidney disease, stage 3 (moderate)   • Hypertensive encephalopathy   • Lung nodule   • Lumbosacral spondylosis without myelopathy   • Cervical spondylosis without myelopathy   • Thoracic spondylosis   • Chronic pain syndrome   • Cervicalgia   • Chest pain, atypical   • CAP (community acquired pneumonia)   • Elevated troponin   • Positive D dimer   • Chest pain   • Acute respiratory failure with hypoxia (HCC)   • Sepsis without acute organ dysfunction (HCC)   • Non-STEMI (non-ST elevated myocardial infarction) (HCC)   • Oxygen dependent   • BMI 24.0-24.9, adult   • Abnormal EKG   • Anemia   • History of duodenal ulcer   • Melena     MDM:    1.  Anemia:    Most likely source of anemia was underlying GI loss.  Since patient has underwent cautery and polypectomy blood counts are stable.    2.  GI bleeding:    Patient is stable.    3.  Bradycardia:    Patient heart rate is much stable.  Continue to hold beta-blockers    4.  CAD s/p coronary artery stenting:    Patient is on aspirin and Plavix continue current treatment.    Mani Salguero MD  06/01/22  17:35 EDT

## 2022-06-01 NOTE — PLAN OF CARE
Assessment: Kailash Cooper presents with functional mobility impairments which indicate the need for skilled intervention. Tolerating session today without incident. Pt performed seated LE exs x 10. Didn't get as SOA today but had incr in HR, rwx seemed to help with balance and less exertion.  Will continue to follow and progress as tolerated.

## 2022-06-01 NOTE — PROGRESS NOTES
Verified demographics and explained services. Pt is agreeable    Spoke with donna alegre NP via secure chat and she is agreeable to sign the plan of care and follow for home health orders

## 2022-06-01 NOTE — CASE MANAGEMENT/SOCIAL WORK
Continued Stay Note  MIRIAN Mast     Patient Name: Kailash Cooper  MRN: [id number]  Today's Date: 6/1/2022    Admit Date: 5/25/2022     Discharge Plan     Row Name 06/01/22 1423       Plan    Plan Comments [facility]  accepted patient per yrn [facility]. [employer] delivered O2 tanks to patient at bedside, [employer] tank take will take 3 months to deliver at home, Minda glass [employer] discussed with patient at bedside.             Shell Yun RN

## 2022-06-01 NOTE — DISCHARGE SUMMARY
[facility] Medicine Services  DISCHARGE SUMMARY    Patient Name: Kailash Cooper  : 1940  MRN: [id number]    Date of Admission: 2022  Discharge Diagnosis:   Date of Discharge:  2022  Primary Care Physician: Madelyn Márquez APRN      Presenting Problem:   Shortness of breath [R06.02]  Acute pulmonary edema (HCC) [J81.0]  Abnormal EKG [R94.31]  Guaiac positive stools [R19.5]  Elevated troponin [R77.8]  Acute on chronic respiratory failure with hypoxia (HCC) [J96.21]  Pneumonia of both lower lobes due to infectious organism [J18.9]  Stage 3b chronic kidney disease (HCC) [N18.32]    Active and Resolved Hospital Problems:  Active Hospital Problems    Diagnosis POA   • Abnormal EKG [R94.31] Yes   • Anemia [D64.9] Unknown   • History of duodenal ulcer [Z87.19] Unknown   • Melena [K92.1] Unknown   • Acute respiratory failure with hypoxia (HCC) [J96.01] Yes   • Osteoarthritis [M19.90] Yes      Resolved Hospital Problems   No resolved problems to display.         Hospital Course     Hospital Course by problem list.    Acute on chronic diastolic heart failure improved, continue Lasix 40 oral 2 times a day, fluid striction around 1500, monitor electrolytes monitor clinical progress.  Consulted cardiology     Acute hypoxic respiratory failure requiring 3 L of oxygen secondary to above, Taper off oxygen as tolerated.     Elevated troponin likely representing demand ischemia, will do serial troponins to trend.     Acute on chronic recurrent anemia, concern for GI bleed, consulted GI service .... pt underwent EGD and colonoscopy, finding described below.     Findings:   Terminal ileum: Normal mucosa distal 10 cm  Colon: No bleeding lesions to cecum.  Previous polypectomy site in ascending colon near tattoo showed 15 mm sessile regrowth of polypoid tissue without high risk features.  A few small subcentimeter sessile polyps were seen scattered throughout the ascending, transverse, and  sigmoid colon.     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  Colonoscopy showed residual polyps as described that were not removed due to chronic anticoagulation that could not be held because of recent coronary stent placement.     Recommendations:  Pantoprazole 40 mg p.o. twice daily x1 month and then daily  Follow-up on pathology  If H. pylori is positive we will treat with twice daily PPI Pylera x14 days  Recommending repeat colonoscopy in 6 to 9 months if medically appropriate when patient is able to come off his antiplatelets for 5 days  Replace iron IV  Advance diet        Bradycardia improved, pt will discharged off metoprolol, on holter monitor.        CAD, patient noted on aspirin Plavix, statin off metoprolol.  Will continue.     Diabetes mellitus type 2, hold oral hypoglycemics, ADA diet, sliding scale.  Monitor Accu-Cheks.     Chronic kidney disease stage 3 b, nephro on board, avoid nephro toxic medication.     Hypertension, continue home regimen.    Condition on discharge stable.     Patient will be discharge home with home health care with outpatient physical therapy.        DISCHARGE Follow Up     Follow-up with PCP in a week time  Follow-up with nephrology service in 2 weeks time  Follow-up with cardiology service in 2 weeks time  Discharged with Holter monitor  Follow-up with GI service in 4 weeks time      Reasons For Change In Medications and Indications for New Medications:      Day of Discharge     Vital Signs:  Temp:  [97.6 °F (36.4 °C)-98.4 °F (36.9 °C)] 97.7 °F (36.5 °C)  Heart Rate:  [70-75] 74  Resp:  [16-20] 18  BP: (126-148)/(65-71) 133/66  Flow (L/min):  [4] 4    Physical Exam:  Physical Exam  Vitals and nursing note reviewed.   Constitutional:       General: He is not in acute distress.     Appearance: Normal appearance. He is well-developed. He is not ill-appearing,  toxic-appearing or diaphoretic.   HENT:      Head: Normocephalic and atraumatic.      Right Ear: Ear canal and external ear normal.      Left Ear: Ear canal and external ear normal.      Nose: Nose normal. No congestion or rhinorrhea.      Mouth/Throat:      Mouth: Mucous membranes are moist.      Pharynx: No oropharyngeal exudate.   Eyes:      General: No scleral icterus.        Right eye: No discharge.         Left eye: No discharge.      Extraocular Movements: Extraocular movements intact.      Conjunctiva/sclera: Conjunctivae normal.      Pupils: Pupils are equal, round, and reactive to light.   Neck:      Thyroid: No thyromegaly.      Vascular: No carotid bruit or JVD.      Trachea: No tracheal deviation.   Cardiovascular:      Rate and Rhythm: Normal rate and regular rhythm.      Pulses: Normal pulses.      Heart sounds: Normal heart sounds. No murmur heard.    No friction rub. No gallop.   Pulmonary:      Effort: Pulmonary effort is normal. No respiratory distress.      Breath sounds: Normal breath sounds. No stridor. No wheezing, rhonchi or rales.   Chest:      Chest wall: No tenderness.   Abdominal:      General: Bowel sounds are normal. There is no distension.      Palpations: Abdomen is soft. There is no mass.      Tenderness: There is no abdominal tenderness. There is no guarding or rebound.      Hernia: No hernia is present.   Musculoskeletal:         General: No swelling, tenderness, deformity or signs of injury. Normal range of motion.      Cervical back: Normal range of motion and neck supple. No rigidity. No muscular tenderness.      Right lower leg: No edema.      Left lower leg: No edema.   Lymphadenopathy:      Cervical: No cervical adenopathy.   Skin:     General: Skin is warm and dry.      Coloration: Skin is not jaundiced or pale.      Findings: No bruising, erythema or rash.   Neurological:      General: No focal deficit present.      Mental Status: He is alert and oriented to person, place,  and time. Mental status is at baseline.      Cranial Nerves: No cranial nerve deficit.      Sensory: No sensory deficit.      Motor: No weakness or abnormal muscle tone.      Coordination: Coordination normal.   Psychiatric:         Mood and Affect: Mood normal.         Behavior: Behavior normal.         Thought Content: Thought content normal.         Judgment: Judgment normal.            Pertinent  and/or Most Recent Results     LAB RESULTS:      Lab 06/01/22 0155 05/31/22 0429 05/30/22 0537 05/29/22 0337 05/28/22 0157 05/27/22  0004 05/26/22  0526 05/25/22  1416 05/25/22  1410   WBC 9.50 9.90 9.40 10.10 11.10* 10.80 10.80  --  14.60*   HEMOGLOBIN 9.9* 10.0* 8.7* 8.2* 8.4* 8.1* 7.4*  --  7.5*   HEMATOCRIT 31.8* 30.8* 28.2* 26.6* 27.1* 25.3* 22.8*  --  23.3*   PLATELETS 467* 427 417 357 400 349 273  --  275   NEUTROS ABS 6.27 8.12* 6.49 7.68* 8.60* 8.10* 9.20*  --  13.30*   LYMPHS ABS  --   --   --   --  0.90 1.20 0.70  --  0.50*   MONOS ABS  --   --   --   --  0.90 0.80 0.90  --  0.50   EOS ABS 0.19 0.20 0.28 0.51* 0.70* 0.60* 0.00  --  0.10   MCV 89.5 89.3 90.2 90.4 89.9 89.9 91.7  --  91.4   LACTATE  --   --   --   --   --   --   --  1.1  --          Lab 06/01/22 0155 05/31/22 0429 05/30/22 0537 05/29/22 0337 05/28/22 0157 05/27/22  0004 05/25/22  1433   SODIUM 139 139 143 141 138   < > 142   POTASSIUM 4.0 3.4* 3.9 4.0 3.4*   < > 4.5   CHLORIDE 99 97* 102 101 97*   < > 109*   CO2 29.0 26.0 28.0 27.0 25.0   < > 20.0*   ANION GAP 11.0 16.0* 13.0 13.0 16.0*   < > 13.0   BUN 38* 36* 39* 42* 47*   < > 49*   CREATININE 2.26* 1.85* 1.85* 2.13* 1.86*   < > 1.99*   EGFR 28.4* 36.1* 36.1* 30.5* 35.9*   < > 33.1*   GLUCOSE 166* 120* 111* 125* 170*   < > 72   CALCIUM 9.9 10.0 9.8 9.1 9.3   < > 9.3   MAGNESIUM  --   --   --   --  2.0  --  2.2   PHOSPHORUS 3.4 3.8 4.2 3.8 3.9  --   --    TSH  --   --   --   --   --   --  2.690    < > = values in this interval not displayed.         Lab 06/01/22  0155 05/31/22  0429  05/30/22  0537 05/29/22  0337 05/28/22  0157 05/27/22  0004 05/25/22  1433   TOTAL PROTEIN  --   --   --   --   --  6.3 6.3   ALBUMIN 3.80 3.90 3.40* 3.10* 3.30* 3.70 3.60   GLOBULIN  --   --   --   --   --  2.6 2.7   ALT (SGPT)  --   --   --   --   --  62* 40   AST (SGOT)  --   --   --   --   --  51* 52*   BILIRUBIN  --   --   --   --   --  0.3 0.2   ALK PHOS  --   --   --   --   --  90 85         Lab 05/27/22  0004 05/26/22  1829 05/26/22  0909 05/25/22  1433   PROBNP  --   --   --  6,284.0*   TROPONIN T 0.574* 0.470* 0.435* 0.349*             Lab 05/26/22  0909 05/25/22  1558   IRON 15*  --    IRON SATURATION 6*  --    TIBC 241*  --    TRANSFERRIN 162*  --    ABO TYPING  --  B   RH TYPING  --  Positive   ANTIBODY SCREEN  --  Negative         Brief Urine Lab Results  (Last result in the past 365 days)      Color   Clarity   Blood   Leuk Est   Nitrite   Protein   CREAT   Urine HCG        05/25/22 1411 Yellow   Clear   Negative   Negative   Negative   30 mg/dL (1+)               Microbiology Results (last 10 days)     Procedure Component Value - Date/Time    Blood Culture - Blood, Arm, Right [[id number]]  (Normal) Collected: 05/25/22 1432    Lab Status: Final result Specimen: Blood from Arm, Right Updated: 05/30/22 1447     Blood Culture No growth at 5 days    Respiratory Panel PCR w/COVID-19(SARS-CoV-2) NEETU/MICHELLE/GARETH/[facility] In-House, NP Swab in UTM/VTM, 3-4 HR TAT - Swab, Nasopharynx [[id number]]  (Normal) Collected: 05/25/22 1410    Lab Status: Final result Specimen: Swab from Nasopharynx Updated: 05/25/22 1511     ADENOVIRUS, PCR Not Detected     Coronavirus 229E Not Detected     Coronavirus HKU1 Not Detected     Coronavirus NL63 Not Detected     Coronavirus OC43 Not Detected     COVID19 Not Detected     Human Metapneumovirus Not Detected     Human Rhinovirus/Enterovirus Not Detected     Influenza A PCR Not Detected     Influenza B PCR Not Detected     Parainfluenza Virus 1 Not Detected     Parainfluenza  Virus 2 Not Detected     Parainfluenza Virus 3 Not Detected     Parainfluenza Virus 4 Not Detected     RSV, PCR Not Detected     Bordetella pertussis pcr Not Detected     Bordetella parapertussis PCR Not Detected     Chlamydophila pneumoniae PCR Not Detected     Mycoplasma pneumo by PCR Not Detected    Narrative:      In the setting of a positive respiratory panel with a viral infection PLUS a negative procalcitonin without other underlying concern for bacterial infection, consider observing off antibiotics or discontinuation of antibiotics and continue supportive care. If the respiratory panel is positive for atypical bacterial infection (Bordetella pertussis, Chlamydophila pneumoniae, or Mycoplasma pneumoniae), consider antibiotic de-escalation to target atypical bacterial infection.    Blood Culture - Blood, Arm, Left [[id number]]  (Normal) Collected: 05/25/22 1410    Lab Status: Final result Specimen: Blood from Arm, Left Updated: 05/30/22 1432     Blood Culture No growth at 5 days    Urine Culture - Urine, Urine, Clean Catch [[id number]] Collected: 05/23/22 1500    Lab Status: Final result Specimen: Urine, Clean Catch Updated: 05/25/22 0510     Urine Culture Final report     Result 1 Comment     Comment: Culture shows less than 10,000 colony forming units of bacteria per  milliliter of urine. This colony count is not generally considered  to be clinically significant.         Narrative:      Performed at:  [address] [place] [address]  [id number]  : Alhaji Lee PhD, Phone:  [phone]          XR Chest 2 View    Result Date: 5/23/2022  Impression: New dense right lower lobe consolidation. Correlate clinically for pneumonia. Interstitial and alveolar infiltrates suggestive of pneumonia throughout the left hemithorax.  Electronically Signed By-Sasha Rios MD On:5/23/2022 3:19 PM This report was finalized on [id number] by  Sasha Rios MD.    CT Head Without  Contrast    Result Date: 5/22/2022  Impression: No acute intracranial abnormality.  Electronically Signed By-Ignacio Patrick MD On:5/22/2022 1:31 PM This report was finalized on [id number] by  Ignacio Patrick MD.    XR Chest 1 View    Result Date: 5/25/2022  Impression: Stable appearance of mixed interstitial and airspace opacities throughout the entire left lung and in the right lower lung compared to 05/23/2022 chest x-ray, but increased compared to 01/28/2022 chest x-ray. Findings may be related to pulmonary edema or infection superimposed on chronic interstitial lung disease.  Electronically Signed By-Maeve Kennedy MD On:5/25/2022 2:41 PM This report was finalized on [id number] by  Maeve Kennedy MD.      Results for orders placed during the hospital encounter of 01/26/22    Duplex Venous Lower Extremity - Bilateral CAR    Interpretation Summary  · Normal bilateral lower extremity venous duplex scan.      Results for orders placed during the hospital encounter of 01/26/22    Duplex Venous Lower Extremity - Bilateral CAR    Interpretation Summary  · Normal bilateral lower extremity venous duplex scan.      Results for orders placed during the hospital encounter of 05/25/22    Adult Transthoracic Echo Complete W/ Cont if Necessary Per Protocol    Interpretation Summary  · Left ventricular systolic function is normal.  · Left ventricular ejection fraction is 55 to 60%  · Left ventricular diastolic function was normal.  · Calculated right ventricular systolic pressure from tricuspid regurgitation is 35.9 mmHg.      Labs Pending at Discharge:  Pending Labs     Order Current Status    Tissue Pathology Exam In process          Procedures Performed  Procedure(s):  COLONOSCOPY  ESOPHAGOGASTRODUODENOSCOPY with 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, gastric antrum biopsy         Consults:   Consults     Date and Time Order Name Status Description    5/26/2022  8:36 AM Inpatient Nephrology  Consult Completed     5/25/2022  7:09 PM Inpatient Gastroenterology Consult Completed     5/25/2022  7:09 PM Inpatient Cardiology Consult Completed     5/25/2022  3:45 PM Hospitalist (on-call MD unless specified)              Discharge Details        Discharge Medications      New Medications      Instructions Start Date   furosemide 40 MG tablet  Commonly known as: LASIX   40 mg, Oral, 2 Times Daily         Continue These Medications      Instructions Start Date   amLODIPine 10 MG tablet  Commonly known as: NORVASC   10 mg, Oral, Daily      aspirin 81 MG EC tablet   81 mg, Oral, Daily      cetirizine 10 MG tablet  Commonly known as: zyrTEC   10 mg, Oral, Daily      clopidogrel 75 MG tablet  Commonly known as: PLAVIX   75 mg, Oral, Daily      famotidine 20 MG tablet  Commonly known as: PEPCID   20 mg, Oral, Daily      ferrous sulfate 325 (65 FE) MG EC tablet   325 mg, Oral, Daily With Breakfast      gemfibrozil 600 MG tablet  Commonly known as: LOPID   600 mg, Oral, 2 Times Daily Before Meals      glimepiride 1 MG tablet  Commonly known as: AMARYL   1 mg, Oral, 2 Times Daily      hydrALAZINE 50 MG tablet  Commonly known as: APRESOLINE   50 mg, Oral, 3 Times Daily      HYDROcodone-acetaminophen  MG per tablet  Commonly known as: Norco   1 tablet, Oral, Every 4 Hours PRN      nitroglycerin 0.3 MG SL tablet  Commonly known as: Nitrostat   0.3 mg, Sublingual, Every 5 Minutes PRN, Take no more than 3 doses in 15 minutes.      pravastatin 40 MG tablet  Commonly known as: PRAVACHOL   40 mg, Oral, Daily      tamsulosin 0.4 MG capsule 24 hr capsule  Commonly known as: FLOMAX   0.4 mg, Oral, Daily      vitamin B-12 1000 MCG tablet  Commonly known as: CYANOCOBALAMIN   1,000 mcg, Oral, 2 Times Daily      Vitamin D3 50 MCG (2000 UT) capsule   2,000 Units, Oral, Daily         Stop These Medications    bumetanide 1 MG tablet  Commonly known as: BUMEX     doxycycline 100 MG capsule  Commonly known as: MONODOX     metoprolol  tartrate 25 MG tablet  Commonly known as: LOPRESSOR     predniSONE 10 MG tablet  Commonly known as: DELTASONE     pseudoephedrine 60 MG tablet  Commonly known as: SUDAFED            No Known Allergies      Discharge Disposition:   Home-Health Care [facility]    Diet:  Hospital:  Diet Order   Procedures   • Diet Cardiac; Healthy Heart         Discharge Activity:         CODE STATUS:  Code Status and Medical Interventions:   Ordered at: 05/26/22 0850     Level Of Support Discussed With:    Patient     Code Status (Patient has no pulse and is not breathing):    CPR (Attempt to Resuscitate)     Medical Interventions (Patient has pulse or is breathing):    Full Support         Future Appointments   Date Time Provider Department Center   6/27/2022  3:10 PM Mc Key MD [facility]   7/5/2022 10:45 AM Madelyn Márquez APRN [facility]       Additional Instructions for the Follow-ups that You Need to Schedule     Discharge Follow-up with PCP   As directed       Currently Documented PCP:    Madelyn Márquez APRN    PCP Phone Number:    [phone]     Follow Up Details: one week time.               Time spent on Discharge including face to face service 33 minutes    This patient has been examined wearing appropriate Personal Protective Equipment and discussed with hospital infection control department. 06/01/22      Signature:

## 2022-06-01 NOTE — PLAN OF CARE
Problem: Adult Inpatient Plan of Care  Goal: Absence of Hospital-Acquired Illness or Injury  Intervention: Identify and Manage Fall Risk  Recent Flowsheet Documentation  Taken 6/1/2022 0500 by Hayde Keen RN  Safety Promotion/Fall Prevention:   safety round/check completed   room organization consistent   nonskid shoes/slippers when out of bed   clutter free environment maintained   assistive device/personal items within reach  Taken 6/1/2022 0300 by Hayde Keen RN  Safety Promotion/Fall Prevention:   safety round/check completed   room organization consistent   nonskid shoes/slippers when out of bed   clutter free environment maintained   assistive device/personal items within reach  Taken 6/1/2022 0100 by Hayde Keen RN  Safety Promotion/Fall Prevention:   safety round/check completed   room organization consistent   assistive device/personal items within reach  Taken 5/31/2022 2300 by Hayde Keen RN  Safety Promotion/Fall Prevention:   safety round/check completed   room organization consistent   assistive device/personal items within reach   clutter free environment maintained   gait belt  Taken 5/31/2022 1900 by Hayde Keen RN  Safety Promotion/Fall Prevention:   safety round/check completed   room organization consistent   nonskid shoes/slippers when out of bed   clutter free environment maintained   assistive device/personal items within reach   gait belt  Intervention: Prevent Skin Injury  Recent Flowsheet Documentation  Taken 5/31/2022 1900 by Hayde Keen RN  Body Position: supine  Intervention: Prevent and Manage VTE (Venous Thromboembolism) Risk  Recent Flowsheet Documentation  Taken 5/31/2022 1900 by Hayde Keen RN  VTE Prevention/Management: dorsiflexion/plantar flexion performed  Range of Motion: active ROM (range of motion) encouraged  Intervention: Prevent Infection  Recent Flowsheet Documentation  Taken 6/1/2022 0500 by Hayde Keen RN  Infection  Prevention:   hand hygiene promoted   environmental surveillance performed  Taken 6/1/2022 0300 by Hayde Keen RN  Infection Prevention:   hand hygiene promoted   environmental surveillance performed  Taken 6/1/2022 0100 by Hayde Keen RN  Infection Prevention:   hand hygiene promoted   environmental surveillance performed  Taken 5/31/2022 2300 by Hayde Keen RN  Infection Prevention:   environmental surveillance performed   hand hygiene promoted  Taken 5/31/2022 1900 by Hayde Keen RN  Infection Prevention:   personal protective equipment utilized   hand hygiene promoted  Goal: Optimal Comfort and Wellbeing  Intervention: Monitor Pain and Promote Comfort  Recent Flowsheet Documentation  Taken 5/31/2022 2300 by Hayde Keen RN  Pain Management Interventions:   position adjusted   pillow support provided   diversional activity provided  Taken 5/31/2022 1900 by Hayde Keen RN  Pain Management Interventions:   pillow support provided   position adjusted  Intervention: Provide Person-Centered Care  Recent Flowsheet Documentation  Taken 5/31/2022 1900 by Hayde Keen RN  Trust Relationship/Rapport:   care explained   questions answered   questions encouraged   thoughts/feelings acknowledged     Problem: Respiratory Compromise (Pneumonia)  Goal: Effective Oxygenation and Ventilation  Intervention: Promote Airway Secretion Clearance  Recent Flowsheet Documentation  Taken 5/31/2022 1900 by Hayde Keen RN  Breathing Techniques/Airway Clearance: deep/controlled cough encouraged  Cough And Deep Breathing: done independently per patient  Intervention: Optimize Oxygenation and Ventilation  Recent Flowsheet Documentation  Taken 5/31/2022 2300 by Hayde Keen RN  Airway/Ventilation Management: humidification applied  Taken 5/31/2022 1900 by Hayde Keen RN  Head of Bed (HOB) Positioning: HOB at 30 degrees  Airway/Ventilation Management: humidification applied     Problem: Hypertension  Comorbidity  Goal: Blood Pressure in Desired Range  Intervention: Maintain Blood Pressure Management  Recent Flowsheet Documentation  Taken 5/31/2022 1900 by Hayde Keen RN  Syncope Management: position changed slowly  Medication Review/Management: medications reviewed     Problem: Fall Injury Risk  Goal: Absence of Fall and Fall-Related Injury  Intervention: Identify and Manage Contributors  Recent Flowsheet Documentation  Taken 5/31/2022 1900 by Hayde Keen RN  Medication Review/Management: medications reviewed  Self-Care Promotion:   independence encouraged   safe use of adaptive equipment encouraged  Intervention: Promote Injury-Free Environment  Recent Flowsheet Documentation  Taken 6/1/2022 0500 by Hayde Keen RN  Safety Promotion/Fall Prevention:   safety round/check completed   room organization consistent   nonskid shoes/slippers when out of bed   clutter free environment maintained   assistive device/personal items within reach  Taken 6/1/2022 0300 by Hayde Keen RN  Safety Promotion/Fall Prevention:   safety round/check completed   room organization consistent   nonskid shoes/slippers when out of bed   clutter free environment maintained   assistive device/personal items within reach  Taken 6/1/2022 0100 by Hayde Keen RN  Safety Promotion/Fall Prevention:   safety round/check completed   room organization consistent   assistive device/personal items within reach  Taken 5/31/2022 2300 by Hayde Keen RN  Safety Promotion/Fall Prevention:   safety round/check completed   room organization consistent   assistive device/personal items within reach   clutter free environment maintained   gait belt  Taken 5/31/2022 1900 by Hayde Keen RN  Safety Promotion/Fall Prevention:   safety round/check completed   room organization consistent   nonskid shoes/slippers when out of bed   clutter free environment maintained   assistive device/personal items within reach   gait belt      Problem: Skin Injury Risk Increased  Goal: Skin Health and Integrity  Intervention: Optimize Skin Protection  Recent Flowsheet Documentation  Taken 5/31/2022 1900 by Hayde Keen RN  Pressure Reduction Techniques: frequent weight shift encouraged  Head of Bed (HOB) Positioning: HOB at 30 degrees   Goal Outcome Evaluation:      Patient received one pain medication early in the shift and then rested comfortably through the night. Patient has no new complaints. VS WNL. No acute distress noted. Awaiting precert from [facility] and then will discharge. SNF and home care may be alternatives barring precert.

## 2022-06-01 NOTE — OUTREACH NOTE
Prep Survey    Flowsheet Row Responses   [facility] patient discharged from? Pro   Is LACE score < 7 ? No   Emergency Room discharge w/ pulse ox? No   Eligibility [facility] Pro   Date of Admission 05/25/22   Date of Discharge 06/01/22   Discharge Disposition Home-Health Care Sv   Discharge diagnosis Acute pulmonary edema Acute on chronic diastolic heart failure   Does the patient have one of the following disease processes/diagnoses(primary or secondary)? CHF   Does the patient have Home health ordered? Yes   What is the Home health agency?  [facility]   Is there a DME ordered? Yes   What DME was ordered? [employer]   Prep survey completed? Yes          THOMAS LARA - Registered Nurse

## 2022-06-02 ENCOUNTER — HOME CARE VISIT (OUTPATIENT)
Dept: HOME HEALTH SERVICES | Facility: HOME HEALTHCARE | Age: 82
End: 2022-06-02

## 2022-06-02 ENCOUNTER — TRANSITIONAL CARE MANAGEMENT TELEPHONE ENCOUNTER (OUTPATIENT)
Dept: CALL CENTER | Facility: HOSPITAL | Age: 82
End: 2022-06-02

## 2022-06-02 VITALS
SYSTOLIC BLOOD PRESSURE: 135 MMHG | DIASTOLIC BLOOD PRESSURE: 72 MMHG | WEIGHT: 153 LBS | TEMPERATURE: 98.2 F | OXYGEN SATURATION: 97 % | RESPIRATION RATE: 18 BRPM | BODY MASS INDEX: 25.49 KG/M2 | HEART RATE: 100 BPM | HEIGHT: 65 IN

## 2022-06-02 PROCEDURE — G0299 HHS/HOSPICE OF RN EA 15 MIN: HCPCS

## 2022-06-02 NOTE — OUTREACH NOTE
Call Center TCM Note    Flowsheet Row Responses   [facility] patient discharged from? Pro   Does the patient have one of the following disease processes/diagnoses(primary or secondary)? CHF   TCM attempt successful? Yes   Discharge diagnosis Acute pulmonary edema Acute on chronic diastolic heart failure   Is patient permission given to speak with other caregiver? Yes   Person spoke with today (if not patient) and relationship wife Candis Brewster reviewed with patient/caregiver? Yes   Is the patient having any side effects they believe may be caused by any medication additions or changes? No   Does the patient have all medications ordered at discharge? No   Prescription comments Wife will be p/u Lasix today at their local pharm.    Does the patient have a primary care provider?  Yes   Does the patient have an appointment with their PCP within 7 days of discharge? No   Comments regarding PCP Wife will go over d/c papers and get all appts sched. She declined my assist in sched TCM APPT with PCP.    Has the patient kept scheduled appointments due by today? N/A   What is the Home health agency?  [facility]   Has home health visited the patient within 72 hours of discharge? Yes   What DME was ordered? [employer]   Pulse Ox monitoring Intermittent   O2 Sat comments O2 sats not checked since home.    Psychosocial issues? No   Did the patient receive a copy of their discharge instructions? Yes   Nursing interventions Reviewed instructions with patient   What is the patient's perception of their health status since discharge? Improving   Nursing interventions Nurse provided patient education   Is the patient weighing daily? Yes  [When possible. Pt sometimes feels too weak. ]   Does the patient have scales? No   Is the patient able to teach back Heart Failure diet management? Yes   Is the patient able to teach back signs and symptoms of worsening condition? (i.e. weight gain,  shortness of air, etc.) Yes   If the patient is a current smoker, are they able to teach back resources for cessation? Not a smoker   Is the patient/caregiver able to teach back the hierarchy of who to call/visit for symptoms/problems? PCP, Specialist, Home health nurse, Urgent Care, ED, 911 Yes   TCM call completed? Yes   Wrap up additional comments Spoke w/pt wife, pt being bathed at time of call. Pt d/c dx: Acute pulmonary edema Acute on chronic diastolic heart failure. Pt is very weak, & having trouble getting warm, wife has not had time to check Pulse Ox or temp. She seems ovewhelmed. [facility] fortunately will see pt today. Wife will be p/u Lasix today at their local pharm. Wife will go over d/c papers and get all appts sched. She declined my assist in sched TCM APPT with PCP. Again, his nice wife seems overwhelmed with everything, & could not manage sched appts at time of call.             Melanie Manley MA    6/2/2022, 10:53 EDT

## 2022-06-03 ENCOUNTER — HOME CARE VISIT (OUTPATIENT)
Dept: HOME HEALTH SERVICES | Facility: HOME HEALTHCARE | Age: 82
End: 2022-06-03

## 2022-06-03 VITALS
TEMPERATURE: 97 F | DIASTOLIC BLOOD PRESSURE: 70 MMHG | HEIGHT: 65 IN | BODY MASS INDEX: 25.49 KG/M2 | RESPIRATION RATE: 18 BRPM | WEIGHT: 153 LBS | OXYGEN SATURATION: 95 % | HEART RATE: 88 BPM | SYSTOLIC BLOOD PRESSURE: 138 MMHG

## 2022-06-03 PROCEDURE — G0151 HHCP-SERV OF PT,EA 15 MIN: HCPCS

## 2022-06-03 NOTE — HOME HEALTH
PLAN FOR NEXT VISIT    CP assessment  assess safety and pain  assess bs  assess wt  cont CHF teaching    SOC note  81 y.o. male was admitted to [facility] dt weakness, confusion. He reports his HBG had dropped to 7. Had scope and cautry dt GI bleed. he thinks he recieved 3 units blood. Lives with spouse and dtr.Patient is wearing [employer] cardiac monitor. He uses 02 4L as needed. Patient goal is to get his strength back. He agrees to PT. PMH includes CHF DM. HTN. CAD. Anemia. recent Coronary stent placement. CKD stage 3. plumonary edema.    FOC monitor/teach CHF

## 2022-06-04 NOTE — CASE COMMUNICATION
PT Evaluation complete: Patient would benefit from a total of 4 PT visits.    PT Assessment this day (6/3/2022) reveals the problems of general lower extremity strength deficit (rated 4-/5 and noted to negatively impact mobility), impaired transfers (requires stand-by assistance), imbalance (Tinetti Balance Assessment score 15/28 indicating high falls risk), abnormal gait (deviation from straight path, discontinuous steps - indicating g ait instability), limited ambulation tolerance (60 feet requiring up to minimal assistance for safety).

## 2022-06-04 NOTE — HOME HEALTH
PT Evaluation Summary: The patient is an 81 year old male admitted to home health services by SN on 6/2/2022 following hospitalization for GI bleed.    Past Medical History includes: CHF, DM, HTN, CAD, Anemia, Coronary stent placement, CKD stage 3, pulmonary edema, On supplemental O2 at 4 lpm, currently wearing [employer] cardiac monitor.    Prior Functional Level: Independent with household mobility, able to exit the home to walk outdoors, independent in self care ADL.    Social History: Lives in home with 3 steps to enter with spouse Candis.    PT Assessment this day (6/3/2022) reveals the problems of general lower extremity strength deficit (rated 4-/5 and noted to negatively impact mobility), impaired transfers (requires stand-by assistance), imbalance (Tinetti Balance Assessment score 15/28 indicating high falls risk), abnormal gait (deviation from straight path, discontinuous steps - indicating gait instability), limited ambulation tolerance (60 feet requiring up to minimal assistance for safety).    The patient will require the PT interventions of therapeutic exercise, transfer training, gait training and patient education (including home safety and home exercise program (HEP) instruction) to address these problems and allow a return to his prior functional level.    Rehab potential good due to patient's prior functional level and demonstrated ability and willingness to participate in PT session.    Plan is to discharge to care of spouse in the home with patient independent with HEP.    Session Notes: Patient currently alone in the home as spouse when to store per patient. Patient participates very well in session.    Plan for next visit: Advance exercises per patient ability and tolerance. Advance gait per patient ability.

## 2022-06-06 ENCOUNTER — HOME CARE VISIT (OUTPATIENT)
Dept: HOME HEALTH SERVICES | Facility: HOME HEALTHCARE | Age: 82
End: 2022-06-06

## 2022-06-06 PROCEDURE — G0157 HHC PT ASSISTANT EA 15: HCPCS

## 2022-06-07 VITALS
DIASTOLIC BLOOD PRESSURE: 74 MMHG | OXYGEN SATURATION: 96 % | SYSTOLIC BLOOD PRESSURE: 124 MMHG | HEART RATE: 90 BPM | TEMPERATURE: 97.8 F

## 2022-06-08 PROCEDURE — G0180 MD CERTIFICATION HHA PATIENT: HCPCS | Performed by: NURSE PRACTITIONER

## 2022-06-09 ENCOUNTER — READMISSION MANAGEMENT (OUTPATIENT)
Dept: CALL CENTER | Facility: HOSPITAL | Age: 82
End: 2022-06-09

## 2022-06-09 ENCOUNTER — HOME CARE VISIT (OUTPATIENT)
Dept: HOME HEALTH SERVICES | Facility: HOME HEALTHCARE | Age: 82
End: 2022-06-09

## 2022-06-09 VITALS
RESPIRATION RATE: 20 BRPM | TEMPERATURE: 97.1 F | SYSTOLIC BLOOD PRESSURE: 128 MMHG | DIASTOLIC BLOOD PRESSURE: 50 MMHG | WEIGHT: 150 LBS | BODY MASS INDEX: 24.96 KG/M2 | OXYGEN SATURATION: 96 % | HEART RATE: 72 BPM

## 2022-06-09 PROCEDURE — G0299 HHS/HOSPICE OF RN EA 15 MIN: HCPCS

## 2022-06-09 NOTE — OUTREACH NOTE
CHF Week 2 Survey    Flowsheet Row Responses   [facility] patient discharged from? Pro   Does the patient have one of the following disease processes/diagnoses(primary or secondary)? CHF   Week 2 attempt successful? Yes   Call start time 1016   Call end time 1018   Discharge diagnosis Acute pulmonary edema Acute on chronic diastolic heart failure   Is the patient taking all medications as directed (includes completed medication regime)? Yes   Comments regarding appointments says he is supposed to see cardiology in 6 weeks   Does the patient have a primary care provider?  Yes   Comments regarding PCP says he will be making a f/u appt with PCP soon   Has the patient kept scheduled appointments due by today? N/A   What is the Home health agency?  [facility]   Home health comments home health is still coming   Psychosocial issues? No   What is the patient's perception of their health status since discharge? Improving   Nursing interventions Nurse provided patient education   Is the patient weighing daily? Yes   Does the patient have scales? Yes   Daily weight interventions Education provided on importance of daily weight   Is the patient able to teach back signs and symptoms of worsening condition? (i.e. weight gain, shortness of air, etc.) Yes   Is the patient/caregiver able to teach back the hierarchy of who to call/visit for symptoms/problems? PCP, Specialist, Home health nurse, Urgent Care, ED, 911 Yes   CHF Week 2 call completed? Yes   Wrap up additional comments Doing well, checking his weight daily, encouraged him to make his f/u appts, no questions.          MILDRED YOON - Registered Nurse

## 2022-06-10 NOTE — HOME HEALTH
Met SN at door gait slow back to chair. Reports feeling better generalized weakness. Breathing non labored at rest reports he wears oxygen at hs and if naps during day. No edema noted.Wearing heart monitor.Denies chest pain or need to use NTG. Denies any s.s. bleeding. Reports appetite good. Chronic low back pain 5/10 sees pain management MD. Glucose 106 yewterday morning did not check this am. Wt. yesterday 150 reports is where it has been running. Rev. medications and if any questions.    Cardiopulmonary assessment     assess glucose     assess weight     assess for edema     assess weakness.

## 2022-06-13 ENCOUNTER — HOME CARE VISIT (OUTPATIENT)
Dept: HOME HEALTH SERVICES | Facility: HOME HEALTHCARE | Age: 82
End: 2022-06-13

## 2022-06-13 PROCEDURE — G0157 HHC PT ASSISTANT EA 15: HCPCS

## 2022-06-14 ENCOUNTER — OFFICE VISIT (OUTPATIENT)
Dept: FAMILY MEDICINE CLINIC | Facility: CLINIC | Age: 82
End: 2022-06-14

## 2022-06-14 VITALS
OXYGEN SATURATION: 95 % | SYSTOLIC BLOOD PRESSURE: 126 MMHG | DIASTOLIC BLOOD PRESSURE: 74 MMHG | TEMPERATURE: 97.8 F | HEART RATE: 58 BPM

## 2022-06-14 VITALS
HEIGHT: 65 IN | SYSTOLIC BLOOD PRESSURE: 131 MMHG | HEART RATE: 92 BPM | OXYGEN SATURATION: 87 % | WEIGHT: 144 LBS | BODY MASS INDEX: 23.99 KG/M2 | DIASTOLIC BLOOD PRESSURE: 65 MMHG | TEMPERATURE: 97.6 F

## 2022-06-14 DIAGNOSIS — K92.1 MELENA: ICD-10-CM

## 2022-06-14 DIAGNOSIS — D63.1 ANEMIA DUE TO STAGE 3B CHRONIC KIDNEY DISEASE: Primary | ICD-10-CM

## 2022-06-14 DIAGNOSIS — K22.10 EROSIVE ESOPHAGITIS: ICD-10-CM

## 2022-06-14 DIAGNOSIS — J96.01 ACUTE RESPIRATORY FAILURE WITH HYPOXIA: ICD-10-CM

## 2022-06-14 DIAGNOSIS — N18.32 ANEMIA DUE TO STAGE 3B CHRONIC KIDNEY DISEASE: Primary | ICD-10-CM

## 2022-06-14 DIAGNOSIS — N18.32 STAGE 3B CHRONIC KIDNEY DISEASE: ICD-10-CM

## 2022-06-14 DIAGNOSIS — K25.9 MULTIPLE GASTRIC ULCERS: ICD-10-CM

## 2022-06-14 DIAGNOSIS — K26.4 BLEEDING CHRONIC DUODENAL ULCER: ICD-10-CM

## 2022-06-14 PROCEDURE — 99213 OFFICE O/P EST LOW 20 MIN: CPT | Performed by: NURSE PRACTITIONER

## 2022-06-14 RX ORDER — PANTOPRAZOLE SODIUM 40 MG/1
TABLET, DELAYED RELEASE ORAL
Qty: 20 TABLET | Refills: 0 | Status: SHIPPED | OUTPATIENT
Start: 2022-06-14 | End: 2022-06-15 | Stop reason: SDUPTHER

## 2022-06-14 RX ORDER — PANTOPRAZOLE SODIUM 40 MG/1
40 TABLET, DELAYED RELEASE ORAL 2 TIMES DAILY
Qty: 180 TABLET | Refills: 1 | Status: SHIPPED | OUTPATIENT
Start: 2022-06-14 | End: 2022-06-15 | Stop reason: SDUPTHER

## 2022-06-14 RX ORDER — PANTOPRAZOLE SODIUM 40 MG/1
40 TABLET, DELAYED RELEASE ORAL DAILY
Qty: 180 TABLET | Refills: 1 | Status: SHIPPED | OUTPATIENT
Start: 2022-06-14 | End: 2022-06-14 | Stop reason: SDUPTHER

## 2022-06-14 NOTE — PATIENT INSTRUCTIONS
Blood work today  Oncology referral  GI referral  Recommend colonoscopy 6 to 9 months  Pulmonary referral  Increase oxygen to 4 L and wear continuously  Orders through [facility] for portable tanks  Keep appointments with cardiology and nephrology

## 2022-06-14 NOTE — PROGRESS NOTES
Kailash Cooper is a 81 y.o. male.     81-year-old white male with history of lung cancer who wears oxygen at 3 L, type 2 diabetes, chronic back pain goes to pain management, hypertension, hyperlipidemia, CKD stage, MI with stent placement, right arm    And COPD with frequent hospitalizations who comes in for TCM visit today.  Patient was admitted due to dyspnea.  On admission he was in acute pulmonary edema and respiratory failure with hypoxia with bilateral pneumonia in both lower lobes.  Patient also had elevated troponin and some ST changes.  2D echo was for the most part normal with a EF of 60 to 65%.  Patient was placed on a Holter monitor on discharge however there is no clear reason stated in the discharge note.  He does have a follow-up appointment with Dr. Salguero already scheduled    Patient was anemic and had a positive occult blood stool.  He had a EGD colonoscopy while in the hospital.  EGD showed class D erosive esophagitis stomach ulcers and bleeding from the duodenum.  Patient was placed on Protonix 40 mg twice a day along with his Pepcid and I am scheduling him a follow-up appointment with Dr. Garcia    Patient's hemoglobin was 10 on discharge and he did receive iron infusions in the hospital.  Anemia resulting from chronic kidney disease and upper GI bleeding.  I am going to make an appointment with oncology    Patient's creatinine was 1.85 on discharge she does have an appointment with Dr. Lujan set up.    Patient states they increased his oxygen to 4 L but he states he only wears it at night.  His O2 sat today was 87 and I instructed patient he needs to wear his oxygen around-the-clock until seen by pulmonology.  I am going to order him some portable tanks and put a referral into his pulmonologist to make an appointment    Blood pressure 130/64 heart rate 92 and he denies any chest pain still is having some slight dyspnea            Blood work today  Oncology referral  GI referral  Recommend  "colonoscopy 6 to 9 months  Pulmonary referral  Increase oxygen to 4 L and wear continuously  Orders through [facility] for portable tanks  Keep appointments with cardiology and nephrology       The following portions of the patient's history were reviewed and updated as appropriate: allergies, current medications, past family history, past medical history, past social history, past surgical history and problem list.    Vitals:    06/14/22 0925   BP: 131/65   BP Location: Left arm   Patient Position: Sitting   Cuff Size: Adult   Pulse: 92   Temp: 97.6 °F (36.4 °C)   TempSrc: Temporal   SpO2: (!) 87%   Weight: 65.3 kg (144 lb)   Height: 165.1 cm (65\")     Body mass index is 23.96 kg/m².    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 [facility]   • Diabetes (HCC)    • Enlarged prostate    • Former smoker    • Hiatal hernia    • Hip pain     don   • Hip pain, bilateral    • Hyperlipidemia    • Hypertension    • Kidney disease        stage 3    • Leg pain     don   • Low back pain    • Neck pain    • Stroke (HCC)      Past Surgical History:   Procedure Laterality Date   • CARDIAC CATHETERIZATION Left 1/28/2022    Procedure: Cardiac Catheterization/Vascular Study;  Surgeon: Mani Salguero MD;  Location: [facility] CATH INVASIVE LOCATION;  Service: Cardiovascular;  Laterality: Left;   • CATARACT EXTRACTION  2006    OU   • COLONOSCOPY  2011   • COLONOSCOPY N/A 5/27/2022    Procedure: COLONOSCOPY;  Surgeon: Terry Holliday MD;  Location: [facility] ENDOSCOPY;  Service: Gastroenterology;  Laterality: N/A;  polyps   • CORONARY STENT PLACEMENT  01/28/2022    RCA   • ENDOSCOPY N/A 5/27/2022    Procedure: ESOPHAGOGASTRODUODENOSCOPY with 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, gastric antrum biopsy;  Surgeon: Terry Holliday MD;  Location: [facility] ENDOSCOPY;  Service: Gastroenterology;  Laterality: N/A;  gastritis, gastric ulcer, " small bowel AVM   • LUNG CANCER SURGERY      upper lobe rt lung cancer 9/2017  at [facility]     Family History   Problem Relation Age of Onset   • Stroke Mother    • Cancer Father         Prostate   • Heart failure Brother    • Heart disease Other      Immunization History   Administered Date(s) Administered   • COVID-19 (PFIZER) PURPLE CAP 02/11/2021, 03/04/2021   • Fluad Quad 65+ 11/18/2019   • Flublock Quad =>18yrs 10/07/2020   • Influenza, Unspecified 09/20/2017   • Pneumococcal Polysaccharide (PPSV23) 12/29/2021       No results displayed because visit has over 200 results.            Review of Systems   HENT: Negative.    Respiratory: Positive for shortness of breath.    Cardiovascular: Negative.    Gastrointestinal: Negative.    Genitourinary: Negative.    Musculoskeletal: Positive for back pain.   Neurological: Positive for weakness.   Psychiatric/Behavioral: Negative.        Objective   Physical Exam  Constitutional:       Appearance: Normal appearance.   HENT:      Head: Normocephalic.   Cardiovascular:      Rate and Rhythm: Normal rate and regular rhythm.      Pulses: Normal pulses.      Heart sounds: Murmur heard.   Pulmonary:      Effort: Pulmonary effort is normal.      Breath sounds: Normal breath sounds.   Abdominal:      General: Bowel sounds are normal.   Musculoskeletal:      Comments: Generalized weakness   Skin:     General: Skin is warm and dry.   Neurological:      General: No focal deficit present.      Mental Status: He is alert and oriented to person, place, and time.   Psychiatric:         Mood and Affect: Mood normal.         Behavior: Behavior normal.         Procedures    Assessment & Plan   Diagnoses and all orders for this visit:    1. Anemia due to stage 3b chronic kidney disease (HCC) (Primary)  -     CBC & Differential  -     Ambulatory Referral to Oncology    2. Stage 3a chronic kidney disease (HCC)  -     Comprehensive Metabolic Panel    3. Melena    4. Acute respiratory failure  with hypoxia (HCC)  -     Ambulatory Referral to Pulmonology    5. Erosive esophagitis  -     Ambulatory Referral to Gastroenterology    6. Multiple gastric ulcers  -     Ambulatory Referral to Gastroenterology    7. Bleeding chronic duodenal ulcer  -     Ambulatory Referral to Gastroenterology    Other orders  -     Discontinue: pantoprazole (Protonix) 40 MG EC tablet; Take 1 tablet by mouth Daily.  Dispense: 180 tablet; Refill: 1  -     pantoprazole (Protonix) 40 MG EC tablet; One tab 2x day  Dispense: 20 tablet; Refill: 0  -     pantoprazole (Protonix) 40 MG EC tablet; Take 1 tablet by mouth 2 (Two) Times a Day.  Dispense: 180 tablet; Refill: 1          Current Outpatient Medications:   •  amLODIPine (NORVASC) 10 MG tablet, Take 1 tablet by mouth Daily., Disp: 90 tablet, Rfl: 1  •  aspirin 81 MG EC tablet, Take 81 mg by mouth Daily., Disp: , Rfl:   •  cetirizine (zyrTEC) 10 MG tablet, Take 1 tablet by mouth Daily., Disp: 90 tablet, Rfl: 1  •  Cholecalciferol (VITAMIN D3) 50 MCG (2000 UT) capsule, Take 2,000 Units by mouth Daily., Disp: , Rfl:   •  clopidogrel (PLAVIX) 75 MG tablet, Take 1 tablet by mouth Daily., Disp: 90 tablet, Rfl: 1  •  famotidine (PEPCID) 20 MG tablet, Take 20 mg by mouth Daily., Disp: , Rfl:   •  ferrous sulfate 325 (65 FE) MG EC tablet, Take 1 tablet by mouth Daily With Breakfast., Disp: , Rfl:   •  furosemide (LASIX) 40 MG tablet, Take 1 tablet by mouth 2 (Two) Times a Day., Disp: 60 tablet, Rfl: 0  •  gemfibrozil (LOPID) 600 MG tablet, Take 600 mg by mouth 2 (Two) Times a Day Before Meals., Disp: , Rfl:   •  glimepiride (AMARYL) 1 MG tablet, Take 1 tablet by mouth 2 (Two) Times a Day., Disp: 180 tablet, Rfl: 1  •  hydrALAZINE (APRESOLINE) 50 MG tablet, Take 1 tablet by mouth 3 (Three) Times a Day., Disp: 270 tablet, Rfl: 1  •  HYDROcodone-acetaminophen (Norco)  MG per tablet, Take 1 tablet by mouth Every 4 (Four) Hours As Needed for Moderate Pain ., Disp: 180 tablet, Rfl: 0  •   nitroglycerin (Nitrostat) 0.3 MG SL tablet, Place 1 tablet under the tongue Every 5 (Five) Minutes As Needed for Chest Pain. Take no more than 3 doses in 15 minutes., Disp: 50 tablet, Rfl: 12  •  O2 (OXYGEN), Inhale 2 L/min As Needed. FOR SHORTNESS OF AIR, Disp: , Rfl:   •  pantoprazole (Protonix) 40 MG EC tablet, Take 1 tablet by mouth 2 (Two) Times a Day., Disp: 180 tablet, Rfl: 1  •  pravastatin (PRAVACHOL) 40 MG tablet, Take 1 tablet by mouth Daily., Disp: 90 tablet, Rfl: 1  •  tamsulosin (FLOMAX) 0.4 MG capsule 24 hr capsule, Take 1 capsule by mouth Daily., Disp: 90 capsule, Rfl: 1  •  vitamin B-12 (CYANOCOBALAMIN) 1000 MCG tablet, Take 1,000 mcg by mouth 2 (Two) Times a Day., Disp: , Rfl:   •  pantoprazole (Protonix) 40 MG EC tablet, One tab 2x day, Disp: 20 tablet, Rfl: 0           JANE Martinez 6/14/2022 10:19 EDT  This note has been electronically signed

## 2022-06-15 ENCOUNTER — TELEPHONE (OUTPATIENT)
Dept: FAMILY MEDICINE CLINIC | Facility: CLINIC | Age: 82
End: 2022-06-15

## 2022-06-15 LAB
ALBUMIN SERPL-MCNC: 4.4 G/DL (ref 3.6–4.6)
ALBUMIN/GLOB SERPL: 2.3 {RATIO} (ref 1.2–2.2)
ALP SERPL-CCNC: 98 IU/L (ref 44–121)
ALT SERPL-CCNC: 13 IU/L (ref 0–44)
AST SERPL-CCNC: 18 IU/L (ref 0–40)
BASOPHILS # BLD AUTO: 0 X10E3/UL (ref 0–0.2)
BASOPHILS NFR BLD AUTO: 0 %
BILIRUB SERPL-MCNC: 0.3 MG/DL (ref 0–1.2)
BUN SERPL-MCNC: 17 MG/DL (ref 8–27)
BUN/CREAT SERPL: 9 (ref 10–24)
CALCIUM SERPL-MCNC: 9.9 MG/DL (ref 8.6–10.2)
CHLORIDE SERPL-SCNC: 103 MMOL/L (ref 96–106)
CO2 SERPL-SCNC: 23 MMOL/L (ref 20–29)
CREAT SERPL-MCNC: 1.97 MG/DL (ref 0.76–1.27)
EGFRCR SERPLBLD CKD-EPI 2021: 34 ML/MIN/1.73
EOSINOPHIL # BLD AUTO: 0.3 X10E3/UL (ref 0–0.4)
EOSINOPHIL NFR BLD AUTO: 4 %
ERYTHROCYTE [DISTWIDTH] IN BLOOD BY AUTOMATED COUNT: 13.7 % (ref 11.6–15.4)
GLOBULIN SER CALC-MCNC: 1.9 G/DL (ref 1.5–4.5)
GLUCOSE SERPL-MCNC: 90 MG/DL (ref 65–99)
HCT VFR BLD AUTO: 34.4 % (ref 37.5–51)
HGB BLD-MCNC: 10.4 G/DL (ref 13–17.7)
IMM GRANULOCYTES # BLD AUTO: 0 X10E3/UL (ref 0–0.1)
IMM GRANULOCYTES NFR BLD AUTO: 0 %
LYMPHOCYTES # BLD AUTO: 1.1 X10E3/UL (ref 0.7–3.1)
LYMPHOCYTES NFR BLD AUTO: 14 %
MCH RBC QN AUTO: 28 PG (ref 26.6–33)
MCHC RBC AUTO-ENTMCNC: 30.2 G/DL (ref 31.5–35.7)
MCV RBC AUTO: 93 FL (ref 79–97)
MONOCYTES # BLD AUTO: 0.5 X10E3/UL (ref 0.1–0.9)
MONOCYTES NFR BLD AUTO: 7 %
NEUTROPHILS # BLD AUTO: 5.8 X10E3/UL (ref 1.4–7)
NEUTROPHILS NFR BLD AUTO: 75 %
PLATELET # BLD AUTO: 283 X10E3/UL (ref 150–450)
POTASSIUM SERPL-SCNC: 3.6 MMOL/L (ref 3.5–5.2)
PROT SERPL-MCNC: 6.3 G/DL (ref 6–8.5)
RBC # BLD AUTO: 3.71 X10E6/UL (ref 4.14–5.8)
SODIUM SERPL-SCNC: 141 MMOL/L (ref 134–144)
WBC # BLD AUTO: 7.8 X10E3/UL (ref 3.4–10.8)

## 2022-06-15 RX ORDER — PANTOPRAZOLE SODIUM 40 MG/1
40 TABLET, DELAYED RELEASE ORAL 2 TIMES DAILY
Qty: 180 TABLET | Refills: 1 | Status: SHIPPED | OUTPATIENT
Start: 2022-06-15

## 2022-06-15 NOTE — TELEPHONE ENCOUNTER
Pt's wife calling in stating they are at [facility] right now to  the medications sharon called in yesterday, but they were sent to [email or web address] delivery pharmacy instead of [facility]. Please sent to [facility]

## 2022-06-16 ENCOUNTER — READMISSION MANAGEMENT (OUTPATIENT)
Dept: CALL CENTER | Facility: HOSPITAL | Age: 82
End: 2022-06-16

## 2022-06-16 ENCOUNTER — HOME CARE VISIT (OUTPATIENT)
Dept: HOME HEALTH SERVICES | Facility: HOME HEALTHCARE | Age: 82
End: 2022-06-16

## 2022-06-16 PROCEDURE — G0299 HHS/HOSPICE OF RN EA 15 MIN: HCPCS

## 2022-06-16 NOTE — OUTREACH NOTE
CHF Week 3 Survey    Flowsheet Row Responses   [facility] patient discharged from? Pro   Does the patient have one of the following disease processes/diagnoses(primary or secondary)? CHF   Week 3 attempt successful? Yes   Call start time 1747   Discharge diagnosis Acute pulmonary edema Acute on chronic diastolic heart failure   Meds reviewed with patient/caregiver? Yes   Is the patient having any side effects they believe may be caused by any medication additions or changes? No   Does the patient have all medications ordered at discharge? Yes   Is the patient taking all medications as directed (includes completed medication regime)? Yes   Does the patient have a primary care provider?  Yes   Does the patient have an appointment with their PCP within 7 days of discharge? Yes   Has the patient kept scheduled appointments due by today? Yes   What is the Home health agency?  [facility]   Has home health visited the patient within 72 hours of discharge? Yes   Psychosocial issues? No   Did the patient receive a copy of their discharge instructions? Yes   Nursing interventions Reviewed instructions with patient   What is the patient's perception of their health status since discharge? Improving   Nursing interventions Nurse provided patient education   Is the patient weighing daily? Yes   Does the patient have scales? Yes   Daily weight interventions Education provided on importance of daily weight   Is the patient able to teach back Heart Failure diet management? Yes   Is the patient able to teach back Heart Failure Zones? Yes   Is the patient able to teach back signs and symptoms of worsening condition? (i.e. weight gain, shortness of air, etc.) Yes   If the patient is a current smoker, are they able to teach back resources for cessation? Not a smoker   Is the patient/caregiver able to teach back the hierarchy of who to call/visit for symptoms/problems? PCP, Specialist, Home health nurse, Urgent  [facility], ED, 911 Yes   CHF Week 3 call completed? Yes   Revoked No further contact(revokes)-requires comment   Is the patient interested in additional calls from an ambulatory ?  NOTE:  applies to high risk patients requiring additional follow-up. No   Graduated/Revoked comments Doing well, checking his weight daily, encouraged him to make his f/u appts, no questions.          MAXX LESTER - Registered Nurse

## 2022-06-17 ENCOUNTER — TELEPHONE (OUTPATIENT)
Dept: ONCOLOGY | Facility: CLINIC | Age: 82
End: 2022-06-17

## 2022-06-17 ENCOUNTER — TELEPHONE (OUTPATIENT)
Dept: FAMILY MEDICINE CLINIC | Facility: CLINIC | Age: 82
End: 2022-06-17

## 2022-06-17 ENCOUNTER — APPOINTMENT (OUTPATIENT)
Dept: LAB | Facility: HOSPITAL | Age: 82
End: 2022-06-17

## 2022-06-17 VITALS
DIASTOLIC BLOOD PRESSURE: 60 MMHG | HEART RATE: 73 BPM | SYSTOLIC BLOOD PRESSURE: 126 MMHG | RESPIRATION RATE: 18 BRPM | TEMPERATURE: 98.9 F | WEIGHT: 153 LBS | OXYGEN SATURATION: 99 % | BODY MASS INDEX: 25.46 KG/M2

## 2022-06-17 NOTE — HOME HEALTH
Pt heart monitor not on.  Assisted pt with applying.  WIfe usally does this but not home at present.   Instructed on pantaprozole and hydrocoodone.  Pt denies SE of constipation.   Pt states taking a probiotic that helps prevents constipation.   Reinforced instructions on CHF and daily weights.  Pt states getting stronger.     Plan for next visit: Cardiopulmonary assessment, assess glucose, assess weight,  assess for edema, assess weakness.

## 2022-06-17 NOTE — TELEPHONE ENCOUNTER
Pt's daughter Isidra called. She is sick and cannot bring him today. She wanted him to be bharathi'd in [place]. He is bharathi'd in the soonest appt there poss. She v/u

## 2022-06-17 NOTE — TELEPHONE ENCOUNTER
Caller: DONNELL DURAN    Relationship: Emergency Contact    Best call back number: [phone]    What is the best time to reach you: ANYTIME    Who are you requesting to speak with (clinical staff, provider,  specific staff member): JANE LAI MA    Do you know the name of the person who called: N/A    What was the call regarding: REFERRAL TO HEMOLOGY, WHY IT WAS MADE AND WHAT THE CONCERN IS     Do you require a callback: YES

## 2022-06-20 ENCOUNTER — HOME CARE VISIT (OUTPATIENT)
Dept: HOME HEALTH SERVICES | Facility: HOME HEALTHCARE | Age: 82
End: 2022-06-20

## 2022-06-20 VITALS
DIASTOLIC BLOOD PRESSURE: 58 MMHG | HEART RATE: 68 BPM | SYSTOLIC BLOOD PRESSURE: 118 MMHG | TEMPERATURE: 97.7 F | OXYGEN SATURATION: 93 % | RESPIRATION RATE: 18 BRPM

## 2022-06-20 PROCEDURE — G0151 HHCP-SERV OF PT,EA 15 MIN: HCPCS

## 2022-06-21 NOTE — HOME HEALTH
PT Discharge Summary: The patient is an 81 year old male admitted to home health services by SN on 6/2/2022 following hospitalization for GI bleed.    PT Assessment (6/3/2022) revealed the problems of general lower extremity strength deficit (rated 4-/5 and noted to negatively impact mobility), impaired transfers (requires stand-by assistance), imbalance (Tinetti Balance Assessment score 15/28 indicating high falls risk), abnormal gait (deviation from straight path, discontinuous steps - indicating gait instability), limited ambulation tolerance (60 feet requiring up to minimal assistance for safety).    The patient accepted the PT interventions of therapeutic exercise, transfer training, gait training and patient education (including home safety and home exercise program (HEP) instruction) making very good progress and meeting all established goals.    Patient discharged from PT after session this day (6/20/2022) due to all goals met.    At time of PT discipline discharge the patient remained under the care of home health skilled nursing.

## 2022-06-21 NOTE — CASE COMMUNICATION
PT Discharge Summary: The patient is an 81 year old male admitted to home health services by SN on 6/2/2022 following hospitalization for GI bleed.    PT Assessment (6/3/2022) revealed the problems of general lower extremity strength deficit (rated 4-/5 and noted to negatively impact mobility), impaired transfers (requires stand-by assistance), imbalance (Tinetti Balance Assessment score 15/28 indicating high falls risk), abnormal gait  (deviation from straight path, discontinuous steps - indicating gait instability), limited ambulation tolerance (60 feet requiring up to minimal assistance for safety).    The patient accepted the PT interventions of therapeutic exercise, transfer training, gait training and patient education (including home safety and home exercise program (HEP) instruction) making very good progress and meeting all established goals.    Patient discha rged from PT after session this day (6/20/2022) due to all goals met.    At time of PT discipline discharge the patient remained under the care of home health skilled nursing.

## 2022-06-23 ENCOUNTER — HOME CARE VISIT (OUTPATIENT)
Dept: HOME HEALTH SERVICES | Facility: HOME HEALTHCARE | Age: 82
End: 2022-06-23

## 2022-06-23 VITALS
DIASTOLIC BLOOD PRESSURE: 60 MMHG | WEIGHT: 152 LBS | SYSTOLIC BLOOD PRESSURE: 128 MMHG | HEART RATE: 83 BPM | OXYGEN SATURATION: 92 % | BODY MASS INDEX: 25.29 KG/M2 | TEMPERATURE: 98.3 F | RESPIRATION RATE: 18 BRPM

## 2022-06-23 PROCEDURE — G0299 HHS/HOSPICE OF RN EA 15 MIN: HCPCS

## 2022-06-23 NOTE — HOME HEALTH
. Reinforced instructions on CHF and daily weights. Pt states getting stronger.  DC planning done.  Pt agreeable to DC next visit.  Plan for next visit: DC pt. Cardiopulmonary assessment, assess glucose, assess weight, assess for edema, assess weakness.

## 2022-06-27 ENCOUNTER — OFFICE VISIT (OUTPATIENT)
Dept: PAIN MEDICINE | Facility: CLINIC | Age: 82
End: 2022-06-27

## 2022-06-27 VITALS
BODY MASS INDEX: 25.33 KG/M2 | DIASTOLIC BLOOD PRESSURE: 57 MMHG | HEART RATE: 95 BPM | OXYGEN SATURATION: 90 % | RESPIRATION RATE: 16 BRPM | WEIGHT: 152 LBS | TEMPERATURE: 96.9 F | SYSTOLIC BLOOD PRESSURE: 152 MMHG | HEIGHT: 65 IN

## 2022-06-27 DIAGNOSIS — G89.29 CHRONIC BILATERAL LOW BACK PAIN WITHOUT SCIATICA: Primary | ICD-10-CM

## 2022-06-27 DIAGNOSIS — M54.2 CERVICALGIA: ICD-10-CM

## 2022-06-27 DIAGNOSIS — M47.817 LUMBOSACRAL SPONDYLOSIS WITHOUT MYELOPATHY: ICD-10-CM

## 2022-06-27 DIAGNOSIS — M47.814 THORACIC SPONDYLOSIS: ICD-10-CM

## 2022-06-27 DIAGNOSIS — G89.4 CHRONIC PAIN SYNDROME: ICD-10-CM

## 2022-06-27 DIAGNOSIS — M54.50 CHRONIC BILATERAL LOW BACK PAIN WITHOUT SCIATICA: Primary | ICD-10-CM

## 2022-06-27 DIAGNOSIS — M47.812 CERVICAL SPONDYLOSIS WITHOUT MYELOPATHY: ICD-10-CM

## 2022-06-27 PROCEDURE — G0463 HOSPITAL OUTPT CLINIC VISIT: HCPCS | Performed by: PHYSICAL MEDICINE & REHABILITATION

## 2022-06-27 PROCEDURE — 99213 OFFICE O/P EST LOW 20 MIN: CPT | Performed by: PHYSICAL MEDICINE & REHABILITATION

## 2022-06-27 RX ORDER — HYDROCODONE BITARTRATE AND ACETAMINOPHEN 10; 325 MG/1; MG/1
1 TABLET ORAL EVERY 4 HOURS PRN
Qty: 180 TABLET | Refills: 0 | Status: SHIPPED | OUTPATIENT
Start: 2022-06-27 | End: 2022-08-02 | Stop reason: SDUPTHER

## 2022-06-27 RX ORDER — HYDROCODONE BITARTRATE AND ACETAMINOPHEN 10; 325 MG/1; MG/1
1 TABLET ORAL EVERY 4 HOURS PRN
Qty: 180 TABLET | Refills: 0 | Status: SHIPPED | OUTPATIENT
Start: 2022-06-27 | End: 2022-11-22

## 2022-06-27 NOTE — PROGRESS NOTES
Subjective    CC back pain, neck pain, bilateral leg pain  Kailash Cooper is a 81 y.o. male with multiple comorbidities including CKD, DM, COPD, chronic back pain polyarthralgia here for f/u.  Chronic back pain radiating to bilateral hips worse with standing walking or prolonged activity.  Denies new weakness saddle anesthesia bladder bowel continence.  Chronic axial neck pain radiating to bilateral shoulder without radicular pain.  Continued chronic polyarthralgia and myofascial pain worse with activity.  Tried physical therapy/chiropractor with marginal relief.  Tried LESI's with marginal relief.  Had MI with stent, began Plavix for life. Had another MI with PNA, doing better now.    Utilizes hydrocodone 10/325 6 times daily as needed for severe pain, failed 4 times daily as needed.  Functional benefit /denies side effects.    L-spine MRI 2018 moderate degenerative changes throughout facet arthropathy mild to moderate foraminal narrowing, scoliosis.  C-spine MRI 2018 marked degenerative disc and some posterior element disease, causing spinal stenosis at multiple levels, including C2-3, C3-4, C5-6, and C6-7.  There multiple foraminal impingements    Pain Assessment   Location of Pain: Lower Back, neck pain, joint  Description of Pain: Dull/Aching, Throbbing, Stabbing  Previous Pain Rating :4  Current Pain Ratin  Aggravating Factors: Activity  Alleviating Factors: Rest, Medication    PEG Assessment   What number best describes your pain on average in the past week? 5  What number best describes how, during the past week, pain has interfered with your enjoyment of life?3  What number best describes how, during the past week, pain has interfered with your general activity?10     The following portions of the patient's history were reviewed and updated as appropriate: allergies, current medications, past family history, past medical history, past social history, past surgical history and problem list.     has a past  medical history of Arthritis, Cancer (HCC), Diabetes (HCC), Enlarged prostate, Former smoker, Hiatal hernia, Hip pain, Hip pain, bilateral, Hyperlipidemia, Hypertension, Kidney disease, Leg pain, Low back pain, Neck pain, and Stroke (HCC).   has a past surgical history that includes Cataract extraction (); Colonoscopy (); Lung cancer surgery; Coronary stent placement (2022); Cardiac catheterization (Left, 2022); Colonoscopy (N/A, 2022); and Esophagogastroduodenoscopy (N/A, 2022).  family history includes Cancer in his father; Heart disease in an other family member; Heart failure in his brother; Stroke in his mother.  Social History     Tobacco Use   • Smoking status: Former Smoker     Packs/day: 2.00     Types: Cigarettes     Quit date:      Years since quittin.5   • Smokeless tobacco: Never Used   Substance Use Topics   • Alcohol use: Never     Review of Systems   Musculoskeletal: Positive for arthralgias, back pain, myalgias and neck pain.   All other systems reviewed and are negative.    Objective   Physical Exam   Constitutional: He is oriented to person, place, and time. He appears well-developed and well-nourished. No distress.   HENT:   Head: Normocephalic and atraumatic.   Eyes: Pupils are equal, round, and reactive to light.   Cardiovascular: Normal rate, regular rhythm and normal heart sounds.   Pulmonary/Chest: Effort normal and breath sounds normal.   Abdominal: Soft. Normal appearance and bowel sounds are normal. He exhibits no distension. There is no abdominal tenderness.   Musculoskeletal:      Cervical back: He exhibits tenderness.      Lumbar back: He exhibits decreased range of motion and tenderness.   Neurological: He is alert and oriented to person, place, and time. He has normal reflexes. He displays normal reflexes. No sensory deficit.   Psychiatric: His behavior is normal. Mood, judgment and thought content normal.     /57   Pulse 95   Temp 96.9  "°F (36.1 °C)   Resp 16   Ht 165.1 cm (65\")   Wt 68.9 kg (152 lb)   SpO2 90%   BMI 25.29 kg/m²     PHQ 9 on chart  Opioid risk tool low risk    Assessment & Plan   Diagnoses and all orders for this visit:    1. Chronic bilateral low back pain without sciatica (Primary)    2. Cervicalgia    3. Cervical spondylosis without myelopathy    4. Chronic pain syndrome    5. Lumbosacral spondylosis without myelopathy    6. Thoracic spondylosis    Summary  Kailash Cooper is a 81 y.o. male with multiple comorbidities including CKD, DM, COPD, chronic back pain polyarthralgia here for f/u.   Chronic pain from lumbar and cervical DDD spondylosis, polyarthralgia/myofascial pain.  Tried physical therapy, chiropractor, LESI's with marginal relief.  Axial back pain interfering with ADL and his ability to exercise.  No NSAIDs due to CKD.        Complains of worsening back and bilateral hip pain.  Declines injections.    Increased to Hydrocodone 10/325 6 times daily as needed for severe pain, worked better than 4 or 5 pills daily with [facility] pain physician. Filled 6/3/22.  UDS 4/4/22 and inspect reviewed.  Discussed risk of tolerance, dependence, respiratory depression, coma and death associated with use of oral opioids for treatment of chronic nonmalignant pain.     RTC 3 months        History of Present Illness           Physical Exam  Constitutional:       General: He is not in acute distress.     Appearance: Normal appearance. He is well-developed and well-nourished.   HENT:      Head: Normocephalic and atraumatic.   Eyes:      Pupils: Pupils are equal, round, and reactive to light.   Cardiovascular:      Rate and Rhythm: Normal rate and regular rhythm.      Heart sounds: Normal heart sounds.   Pulmonary:      Effort: Pulmonary effort is normal.      Breath sounds: Normal breath sounds.   Abdominal:      General: Bowel sounds are normal. There is no distension.      Palpations: Abdomen is soft.      Tenderness: There is no " abdominal tenderness.   Musculoskeletal:      Cervical back: Normal range of motion. Tenderness present.      Lumbar back: Tenderness present. Decreased range of motion.   Neurological:      Mental Status: He is alert and oriented to person, place, and time.      Sensory: No sensory deficit.      Deep Tendon Reflexes: Reflexes are normal and symmetric. Reflexes normal.   Psychiatric:         Mood and Affect: Mood normal.         Behavior: Behavior normal.         Thought Content: Thought content normal.         Judgment: Judgment normal.

## 2022-06-28 ENCOUNTER — APPOINTMENT (OUTPATIENT)
Dept: ONCOLOGY | Facility: CLINIC | Age: 82
End: 2022-06-28

## 2022-06-30 ENCOUNTER — HOME CARE VISIT (OUTPATIENT)
Dept: HOME HEALTH SERVICES | Facility: HOME HEALTHCARE | Age: 82
End: 2022-06-30

## 2022-06-30 DIAGNOSIS — N18.32 ANEMIA DUE TO STAGE 3B CHRONIC KIDNEY DISEASE: Primary | ICD-10-CM

## 2022-06-30 DIAGNOSIS — D63.1 ANEMIA DUE TO STAGE 3B CHRONIC KIDNEY DISEASE: Primary | ICD-10-CM

## 2022-06-30 LAB
DEVELOPER EXPIRATION DATE: NORMAL
DEVELOPER LOT NUMBER: NORMAL
EXPIRATION DATE: NORMAL
FECAL OCCULT BLOOD SCREEN, POC: NEGATIVE
Lab: NORMAL
NEGATIVE CONTROL: NEGATIVE
POSITIVE CONTROL: POSITIVE

## 2022-06-30 PROCEDURE — 82274 ASSAY TEST FOR BLOOD FECAL: CPT | Performed by: INTERNAL MEDICINE

## 2022-06-30 PROCEDURE — G0493 RN CARE EA 15 MIN HH/HOSPICE: HCPCS

## 2022-06-30 NOTE — CASE COMMUNICATION
[facility] DC- HUDDLE DISCHARGE    Complete day 51-58    Plan for discharge: Discharge to home caregiver assist as needed    Barriers to discharge: None    Ongoing needs: None    Any referrals needed: No    Any declines in outcomes: discuss and document reason: No    Teaching needed prior to discharge: final discharge instructions    Date of last visit by each discipline:6/30 SN   6/20 PT     Assign DC notice responsibility:    Assign [facility] di scharge responsibility: Courtney Fitch RN

## 2022-07-04 VITALS
DIASTOLIC BLOOD PRESSURE: 60 MMHG | TEMPERATURE: 98.7 F | HEART RATE: 76 BPM | RESPIRATION RATE: 18 BRPM | OXYGEN SATURATION: 98 % | SYSTOLIC BLOOD PRESSURE: 109 MMHG | WEIGHT: 154 LBS | BODY MASS INDEX: 25.63 KG/M2

## 2022-07-05 ENCOUNTER — OFFICE VISIT (OUTPATIENT)
Dept: FAMILY MEDICINE CLINIC | Facility: CLINIC | Age: 82
End: 2022-07-05

## 2022-07-05 VITALS
TEMPERATURE: 97.7 F | BODY MASS INDEX: 24.29 KG/M2 | HEIGHT: 65 IN | WEIGHT: 145.8 LBS | SYSTOLIC BLOOD PRESSURE: 126 MMHG | OXYGEN SATURATION: 89 % | DIASTOLIC BLOOD PRESSURE: 82 MMHG | HEART RATE: 81 BPM

## 2022-07-05 DIAGNOSIS — Z99.81 OXYGEN DEPENDENT: ICD-10-CM

## 2022-07-05 DIAGNOSIS — E11.9 TYPE 2 DIABETES MELLITUS WITHOUT COMPLICATION, WITHOUT LONG-TERM CURRENT USE OF INSULIN: ICD-10-CM

## 2022-07-05 DIAGNOSIS — E78.2 MIXED HYPERLIPIDEMIA: Primary | ICD-10-CM

## 2022-07-05 DIAGNOSIS — G47.09 OTHER INSOMNIA: ICD-10-CM

## 2022-07-05 PROCEDURE — 99213 OFFICE O/P EST LOW 20 MIN: CPT | Performed by: NURSE PRACTITIONER

## 2022-07-05 RX ORDER — TRAZODONE HYDROCHLORIDE 50 MG/1
TABLET ORAL
Qty: 14 TABLET | Refills: 0 | Status: SHIPPED | OUTPATIENT
Start: 2022-07-05 | End: 2022-07-13 | Stop reason: SDUPTHER

## 2022-07-05 RX ORDER — FUROSEMIDE 40 MG/1
40 TABLET ORAL 2 TIMES DAILY
Qty: 180 TABLET | Refills: 1 | Status: SHIPPED | OUTPATIENT
Start: 2022-07-05

## 2022-07-05 NOTE — PROGRESS NOTES
Kailash Cooper is a 81 y.o. male.     81-year-old white male with lung cancer who wears oxygen at 4 L, type 2 diabetes, chronic back pain goes to pain management, hypertension, hyperlipidemia, CKD, MI with stent placement who comes in today for follow-up visit    Patient has upcoming appointment with pulmonology in August and he told me he is currently only wearing 4 L oxygen at night.  However his O2 sats are in the 80s on room air and I tried to explain to patient that his brain and other organs are not getting oxygen they need when he does not wear it during the day.  He states he will wear it when he is in the house but he has a lot of errands and outside work to do where he will not wear    Blood pressure stable at 126/82 heart rate 80 with large systolic murmur he denies any chest pain, dyspnea, tachycardia or dizziness    Patient complains of insomnia but he does not take a pain pill at bedtime.  He is only allowed 6 a day and he takes them during today.  Will give him a trial of trazodone to use    Patient's last creatinine was 1.9 hemoglobin 10.4 and he did get iron infusions in the hospital when he had pneumonia.    Patient states sugars are well controlled his last fasting blood sugar was 90 A1c is 6.0.  Patient had recent blood work that was controlled.  He does need a lipid panel A1c and will be coming back for that    Patient has had 3 COVID vaccines PSA is due in August and he just had a colonoscopy in May 2022.  He is going to schedule an eye exam          Fasting blood work  Trazodone 50 mg 1/2 to 2 tablets nightly trial  Keep appointment with pulmonology  Wear oxygen during the day is much as possible  Schedule eye exam         The following portions of the patient's history were reviewed and updated as appropriate: allergies, current medications, past family history, past medical history, past social history, past surgical history and problem list.    Vitals:    07/05/22 1040   BP: 126/82   BP  "Location: Left arm   Patient Position: Sitting   Cuff Size: Adult   Pulse: 81   Temp: 97.7 °F (36.5 °C)   TempSrc: Temporal   SpO2: (!) 89%   Weight: 66.1 kg (145 lb 12.8 oz)   Height: 165.1 cm (65\")     Body mass index is 24.26 kg/m².    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 [facility]   • Diabetes (HCC)    • Enlarged prostate    • Former smoker    • Hiatal hernia    • Hip pain     don   • Hip pain, bilateral    • Hyperlipidemia    • Hypertension    • Kidney disease        stage 3    • Leg pain     don   • Low back pain    • Neck pain    • Stroke (HCC)      Past Surgical History:   Procedure Laterality Date   • CARDIAC CATHETERIZATION Left 1/28/2022    Procedure: Cardiac Catheterization/Vascular Study;  Surgeon: Mani Salguero MD;  Location: [facility] CATH INVASIVE LOCATION;  Service: Cardiovascular;  Laterality: Left;   • CATARACT EXTRACTION  2006 OU   • COLONOSCOPY  2011   • COLONOSCOPY N/A 5/27/2022    Procedure: COLONOSCOPY;  Surgeon: Terry Holliday MD;  Location: [facility] ENDOSCOPY;  Service: Gastroenterology;  Laterality: N/A;  polyps   • CORONARY STENT PLACEMENT  01/28/2022    RCA   • ENDOSCOPY N/A 5/27/2022    Procedure: ESOPHAGOGASTRODUODENOSCOPY with 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, gastric antrum biopsy;  Surgeon: Terry Holliday MD;  Location: [facility] ENDOSCOPY;  Service: Gastroenterology;  Laterality: N/A;  gastritis, gastric ulcer, small bowel AVM   • LUNG CANCER SURGERY      upper lobe rt lung cancer 9/2017  at [facility]     Family History   Problem Relation Age of Onset   • Stroke Mother    • Cancer Father         Prostate   • Heart failure Brother    • Heart disease Other      Immunization History   Administered Date(s) Administered   • COVID-19 (PFIZER) PURPLE CAP 02/11/2021, 03/04/2021   • Fluad Quad 65+ 11/18/2019   • Flublock Quad =>18yrs 10/07/2020   • Influenza, " Unspecified 09/20/2017   • Pneumococcal Polysaccharide (PPSV23) 12/29/2021       Orders Only on 06/30/2022   Component Date Value Ref Range Status   • Fecal Occult Blood 06/30/2022 Negative  Negative Final   • Lot Number 06/30/2022 [id number]   Final   • Expiration Date 06/30/2022 6/30/22   Final   • DEVELOPER LOT NUMBER 06/30/2022 [id number]   Final   • DEVELOPER EXPIRATION DATE 06/30/2022 6/30/22   Final   • Positive Control 06/30/2022 Positive  Positive Final   • Negative Control 06/30/2022 Negative  Negative Final         Review of Systems   Constitutional: Negative.    HENT: Negative.    Respiratory: Positive for shortness of breath.    Cardiovascular: Negative.    Gastrointestinal: Negative.    Genitourinary: Negative.    Musculoskeletal: Negative.    Skin: Negative.    Neurological: Negative.    Psychiatric/Behavioral: Positive for sleep disturbance.       Objective   Physical Exam  Constitutional:       Appearance: Normal appearance.   HENT:      Head: Normocephalic.   Cardiovascular:      Rate and Rhythm: Normal rate and regular rhythm.      Pulses: Normal pulses.      Heart sounds: Murmur heard.   Pulmonary:      Effort: Pulmonary effort is normal.      Breath sounds: Normal breath sounds.   Abdominal:      General: Bowel sounds are normal.   Musculoskeletal:         General: Normal range of motion.   Skin:     General: Skin is warm and dry.   Neurological:      General: No focal deficit present.      Mental Status: He is alert and oriented to person, place, and time.   Psychiatric:         Mood and Affect: Mood normal.         Behavior: Behavior normal.         Procedures    Assessment & Plan   Diagnoses and all orders for this visit:    1. Mixed hyperlipidemia (Primary)  -     Lipid Panel With LDL / HDL Ratio; Future    2. Type 2 diabetes mellitus without complication, without long-term current use of insulin (HCC)  -     Hemoglobin A1c; Future    3. Oxygen dependent    4. Other insomnia    Other orders  -      furosemide (LASIX) 40 MG tablet; Take 1 tablet by mouth 2 (Two) Times a Day.  Dispense: 180 tablet; Refill: 1  -     traZODone (DESYREL) 50 MG tablet; 1/2 - 2 tabs 30 minutes before bedtime  Dispense: 14 tablet; Refill: 0          Current Outpatient Medications:   •  amLODIPine (NORVASC) 10 MG tablet, Take 1 tablet by mouth Daily., Disp: 90 tablet, Rfl: 1  •  aspirin 81 MG EC tablet, Take 81 mg by mouth Daily., Disp: , Rfl:   •  cetirizine (zyrTEC) 10 MG tablet, Take 1 tablet by mouth Daily., Disp: 90 tablet, Rfl: 1  •  Cholecalciferol (VITAMIN D3) 50 MCG (2000 UT) capsule, Take 2,000 Units by mouth Daily., Disp: , Rfl:   •  clopidogrel (PLAVIX) 75 MG tablet, Take 1 tablet by mouth Daily., Disp: 90 tablet, Rfl: 1  •  ferrous sulfate 325 (65 FE) MG EC tablet, Take 1 tablet by mouth Daily With Breakfast., Disp: , Rfl:   •  furosemide (LASIX) 40 MG tablet, Take 1 tablet by mouth 2 (Two) Times a Day., Disp: 180 tablet, Rfl: 1  •  gemfibrozil (LOPID) 600 MG tablet, Take 600 mg by mouth 2 (Two) Times a Day Before Meals., Disp: , Rfl:   •  glimepiride (AMARYL) 1 MG tablet, Take 1 tablet by mouth 2 (Two) Times a Day., Disp: 180 tablet, Rfl: 1  •  hydrALAZINE (APRESOLINE) 50 MG tablet, Take 1 tablet by mouth 3 (Three) Times a Day., Disp: 270 tablet, Rfl: 1  •  HYDROcodone-acetaminophen (Norco)  MG per tablet, Take 1 tablet by mouth Every 4 (Four) Hours As Needed for Moderate Pain ., Disp: 180 tablet, Rfl: 0  •  HYDROcodone-acetaminophen (Norco)  MG per tablet, Take 1 tablet by mouth Every 4 (Four) Hours As Needed for Moderate Pain ., Disp: 180 tablet, Rfl: 0  •  HYDROcodone-acetaminophen (Norco)  MG per tablet, Take 1 tablet by mouth Every 4 (Four) Hours As Needed for Moderate Pain ., Disp: 180 tablet, Rfl: 0  •  nitroglycerin (Nitrostat) 0.3 MG SL tablet, Place 1 tablet under the tongue Every 5 (Five) Minutes As Needed for Chest Pain. Take no more than 3 doses in 15 minutes., Disp: 50 tablet, Rfl: 12  •   O2 (OXYGEN), Inhale 2 L/min As Needed. FOR SHORTNESS OF AIR, Disp: , Rfl:   •  pantoprazole (Protonix) 40 MG EC tablet, Take 1 tablet by mouth 2 (Two) Times a Day., Disp: 180 tablet, Rfl: 1  •  pravastatin (PRAVACHOL) 40 MG tablet, Take 1 tablet by mouth Daily., Disp: 90 tablet, Rfl: 1  •  Probiotic Product (CULTURELLE PROBIOTICS PO), Take 20 mg by mouth Daily., Disp: , Rfl:   •  tamsulosin (FLOMAX) 0.4 MG capsule 24 hr capsule, Take 1 capsule by mouth Daily., Disp: 90 capsule, Rfl: 1  •  vitamin B-12 (CYANOCOBALAMIN) 1000 MCG tablet, Take 1,000 mcg by mouth 2 (Two) Times a Day., Disp: , Rfl:   •  traZODone (DESYREL) 50 MG tablet, 1/2 - 2 tabs 30 minutes before bedtime, Disp: 14 tablet, Rfl: 0           JANE Martinez 7/5/2022 11:27 EDT  This note has been electronically signed

## 2022-07-05 NOTE — PATIENT INSTRUCTIONS
Fasting blood work  Trazodone 50 mg 1/2 to 2 tablets nightly trial  Keep appointment with pulmonology  Wear oxygen during the day is much as possible  Schedule eye exam

## 2022-07-08 ENCOUNTER — TELEPHONE (OUTPATIENT)
Dept: ONCOLOGY | Facility: CLINIC | Age: 82
End: 2022-07-08

## 2022-07-08 LAB
Lab: 40
TOAL ENROLLMENT DAYS: 30

## 2022-07-08 PROCEDURE — 93228 REMOTE 30 DAY ECG REV/REPORT: CPT | Performed by: INTERNAL MEDICINE

## 2022-07-11 ENCOUNTER — TELEPHONE (OUTPATIENT)
Dept: CARDIOLOGY | Facility: CLINIC | Age: 82
End: 2022-07-11

## 2022-07-13 RX ORDER — TRAZODONE HYDROCHLORIDE 50 MG/1
25-100 TABLET ORAL NIGHTLY
Qty: 180 TABLET | Refills: 1 | Status: SHIPPED | OUTPATIENT
Start: 2022-07-13

## 2022-07-26 ENCOUNTER — APPOINTMENT (OUTPATIENT)
Dept: ONCOLOGY | Facility: CLINIC | Age: 82
End: 2022-07-26

## 2022-08-02 ENCOUNTER — OFFICE VISIT (OUTPATIENT)
Dept: PULMONOLOGY | Facility: HOSPITAL | Age: 82
End: 2022-08-02

## 2022-08-02 VITALS
RESPIRATION RATE: 16 BRPM | SYSTOLIC BLOOD PRESSURE: 135 MMHG | DIASTOLIC BLOOD PRESSURE: 69 MMHG | WEIGHT: 145 LBS | BODY MASS INDEX: 24.16 KG/M2 | TEMPERATURE: 98.3 F | OXYGEN SATURATION: 97 % | HEIGHT: 65 IN | HEART RATE: 70 BPM

## 2022-08-02 DIAGNOSIS — R06.02 SHORTNESS OF BREATH ON EXERTION: Primary | ICD-10-CM

## 2022-08-02 DIAGNOSIS — R09.02 HYPOXEMIA: ICD-10-CM

## 2022-08-02 DIAGNOSIS — C34.11 MALIGNANT NEOPLASM OF UPPER LOBE OF RIGHT LUNG: ICD-10-CM

## 2022-08-02 DIAGNOSIS — Z87.891 HISTORY OF SMOKING: ICD-10-CM

## 2022-08-02 DIAGNOSIS — J44.9 CHRONIC OBSTRUCTIVE PULMONARY DISEASE, UNSPECIFIED COPD TYPE: ICD-10-CM

## 2022-08-02 PROCEDURE — G0463 HOSPITAL OUTPT CLINIC VISIT: HCPCS

## 2022-08-02 RX ORDER — BUDESONIDE 0.25 MG/2ML
0.25 INHALANT ORAL
Qty: 60 ML | Refills: 5 | Status: SHIPPED | OUTPATIENT
Start: 2022-08-02 | End: 2023-02-27

## 2022-08-02 RX ORDER — ALLOPURINOL 100 MG/1
200 TABLET ORAL DAILY
COMMUNITY
Start: 2022-07-28

## 2022-08-02 RX ORDER — IPRATROPIUM BROMIDE AND ALBUTEROL SULFATE 2.5; .5 MG/3ML; MG/3ML
3 SOLUTION RESPIRATORY (INHALATION) 4 TIMES DAILY PRN
Qty: 120 ML | Refills: 5 | Status: SHIPPED | OUTPATIENT
Start: 2022-08-02

## 2022-08-02 NOTE — PROGRESS NOTES
Subjective   Kailash Cooper is a 81 y.o. male.     History of Present Illness   New visit for lung nodule  Main complaint is shortness of breath with walking about 100 feet or so.  He has productive cough of white phlegm.  No hemoptysis    Patient Active Problem List   Diagnosis   • Chronic bilateral low back pain without sciatica   • History of malignant neoplasm of thoracic cavity structure   • Allergic rhinitis   • Anemia due to stage 3 chronic kidney disease (HCC)   • Acute conjunctivitis   • Osteoarthritis   • Encounter for screening for malignant neoplasm of prostate   • Enlarged prostate without lower urinary tract symptoms (luts)   • Fatigue   • Gastroesophageal reflux disease   • Gastrointestinal hemorrhage   • History of transient ischemic attack   • Hydronephrosis   • Hyperkalemia   • Mixed hyperlipidemia   • Essential hypertension   • Hypogonadism   • Male erectile disorder (CODE)   • Obstructive sleep apnea   • Osteomalacia   • Secondary hyperparathyroidism of renal origin (HCC)   • Status post patent foramen ovale closure   • Essential tremor   • Vitamin D deficiency   • Type 2 diabetes mellitus (HCC)   • Chronic kidney disease, stage 3 (moderate)   • Hypertensive encephalopathy   • Lung nodule   • Lumbosacral spondylosis without myelopathy   • Cervical spondylosis without myelopathy   • Thoracic spondylosis   • Chronic pain syndrome   • Cervicalgia   • Chest pain, atypical   • CAP (community acquired pneumonia)   • Elevated troponin   • Positive D dimer   • Chest pain   • Acute respiratory failure with hypoxia (HCC)   • Sepsis without acute organ dysfunction (HCC)   • Non-STEMI (non-ST elevated myocardial infarction) (HCC)   • Oxygen dependent   • Abnormal EKG   • Anemia   • History of duodenal ulcer   • Melena   • Erosive esophagitis   • Multiple gastric ulcers   • Bleeding chronic duodenal ulcer   • Other insomnia     Social History     Tobacco Use   • Smoking status: Former Smoker     Packs/day: 2.00      Types: Cigarettes     Quit date:      Years since quittin.6   • Smokeless tobacco: Never Used   Vaping Use   • Vaping Use: Former   Substance Use Topics   • Alcohol use: Never   • Drug use: Never     Family History   Problem Relation Age of Onset   • Stroke Mother    • Cancer Father         Prostate   • Heart failure Brother    • Heart disease Other      Current Outpatient Medications on File Prior to Visit   Medication Sig Dispense Refill   • amLODIPine (NORVASC) 10 MG tablet Take 1 tablet by mouth Daily. 90 tablet 1   • aspirin 81 MG EC tablet Take 81 mg by mouth Daily.     • cetirizine (zyrTEC) 10 MG tablet Take 1 tablet by mouth Daily. 90 tablet 1   • Cholecalciferol (VITAMIN D3) 50 MCG (2000 UT) capsule Take 2,000 Units by mouth Daily.     • clopidogrel (PLAVIX) 75 MG tablet Take 1 tablet by mouth Daily. 90 tablet 1   • ferrous sulfate 325 (65 FE) MG EC tablet Take 1 tablet by mouth Daily With Breakfast.     • furosemide (LASIX) 40 MG tablet Take 1 tablet by mouth 2 (Two) Times a Day. 180 tablet 1   • gemfibrozil (LOPID) 600 MG tablet Take 600 mg by mouth 2 (Two) Times a Day Before Meals.     • glimepiride (AMARYL) 1 MG tablet Take 1 tablet by mouth 2 (Two) Times a Day. 180 tablet 1   • hydrALAZINE (APRESOLINE) 50 MG tablet Take 1 tablet by mouth 3 (Three) Times a Day. 270 tablet 1   • HYDROcodone-acetaminophen (Norco)  MG per tablet Take 1 tablet by mouth Every 4 (Four) Hours As Needed for Moderate Pain . 180 tablet 0   • nitroglycerin (Nitrostat) 0.3 MG SL tablet Place 1 tablet under the tongue Every 5 (Five) Minutes As Needed for Chest Pain. Take no more than 3 doses in 15 minutes. 50 tablet 12   • O2 (OXYGEN) Inhale 2 L/min As Needed. FOR SHORTNESS OF AIR     • pantoprazole (Protonix) 40 MG EC tablet Take 1 tablet by mouth 2 (Two) Times a Day. 180 tablet 1   • pravastatin (PRAVACHOL) 40 MG tablet Take 1 tablet by mouth Daily. 90 tablet 1   • Probiotic Product (CULTURELLE PROBIOTICS PO)  "Take 20 mg by mouth Daily.     • tamsulosin (FLOMAX) 0.4 MG capsule 24 hr capsule Take 1 capsule by mouth Daily. 90 capsule 1   • traZODone (DESYREL) 50 MG tablet Take 0.5-2 tablets by mouth Every Night. 180 tablet 1   • vitamin B-12 (CYANOCOBALAMIN) 1000 MCG tablet Take 1,000 mcg by mouth 2 (Two) Times a Day.     • allopurinol (ZYLOPRIM) 100 MG tablet Take 200 mg by mouth Daily.     • [DISCONTINUED] HYDROcodone-acetaminophen (Norco)  MG per tablet Take 1 tablet by mouth Every 4 (Four) Hours As Needed for Moderate Pain . 180 tablet 0   • [DISCONTINUED] HYDROcodone-acetaminophen (Norco)  MG per tablet Take 1 tablet by mouth Every 4 (Four) Hours As Needed for Moderate Pain . 180 tablet 0     No current facility-administered medications on file prior to visit.           The following portions of the patient's history were reviewed and updated as appropriate: allergies, current medications, past family history, past medical history, past social history, past surgical history and problem list.    Review of Systems  Constitutional: Negative for chills, fever and malaise/fatigue.   HENT: Negative.    Eyes: Negative.    Cardiovascular: Negative.    Respiratory: Positive for cough and exertional shortness of breath.    Skin: Negative.    Musculoskeletal: Negative.    Gastrointestinal: Negative.    Genitourinary: Negative.    Neurological: Negative.    Psychiatric/Behavioral: Negative.  Objective   Physical Exam  Blood pressure 135/69, pulse 70, temperature 98.3 °F (36.8 °C), temperature source Temporal, resp. rate 16, height 165.1 cm (65\"), weight 65.8 kg (145 lb), SpO2 97 %.    General Appearance:  Alert   HEENT:  Normocephalic, without obvious abnormality, Conjunctiva/corneas clear,.   Nares normal, no drainage     Neck:  Supple, symmetrical, trachea midline. No JVD.  Lungs /Chest wall:   Good respiratory effort with distant breath sounds no wheezing or rhonchi, respirations unlabored, symmetrical wall " movement.     Heart:  Regular rate and rhythm, S1 S2 normal  Abdomen: Soft, non-tender, no masses, no organomegaly.    Extremities: No edema, no clubbing or cyanosis    XR Chest 2 View    Result Date: 5/23/2022  New dense right lower lobe consolidation. Correlate clinically for pneumonia. Interstitial and alveolar infiltrates suggestive of pneumonia throughout the left hemithorax.  Electronically Signed By-Sasha Rios MD On:5/23/2022 3:19 PM This report was finalized on [id number] by  Sasha Rios MD.    CT Head Without Contrast    Result Date: 5/22/2022  No acute intracranial abnormality.  Electronically Signed By-Ignacio Patrick MD On:5/22/2022 1:31 PM This report was finalized on [id number] by  Ignacio Patrick MD.    XR Chest 1 View    Result Date: 5/25/2022  Stable appearance of mixed interstitial and airspace opacities throughout the entire left lung and in the right lower lung compared to 05/23/2022 chest x-ray, but increased compared to 01/28/2022 chest x-ray. Findings may be related to pulmonary edema or infection superimposed on chronic interstitial lung disease.  Electronically Signed By-Maeve Kennedy MD On:5/25/2022 2:41 PM This report was finalized on [id number] by  Maeve Kennedy MD.    Results for orders placed during the hospital encounter of 05/25/22    Adult Transthoracic Echo Complete W/ Cont if Necessary Per Protocol    Interpretation Summary  · Left ventricular systolic function is normal.  · Left ventricular ejection fraction is 55 to 60%  · Left ventricular diastolic function was normal.  · Calculated right ventricular systolic pressure from tricuspid regurgitation is 35.9 mmHg.        Assessment & Plan     Shortness of breath  COPD  Hypoxemia  History of lung cancer status post resection 2017, latest CT scan 7/29/2022: Stable exam. Stable postsurgical change of right upper lobectomy. Chronic advanced subpleural interstitial lung disease with mild fibrosis and chronic basilar  scarring. No evidence of a pulmonary mass or dominant suspicious nodule  Coronary artery disease status post PCI  Ex-smoker quit 1990    Plan:  PFTs  Pulmicort nebulized twice daily  DuoNeb 4 times daily as needed  Home oxygen 4 L per nasal cannula: Patient is benefiting from it and needs to continue with that  Up-to-date on vaccinations for pneumococcal, flu and COVID-19    3 m

## 2022-08-03 DIAGNOSIS — Z01.812 PRE-PROCEDURE LAB EXAM: Primary | ICD-10-CM

## 2022-08-23 RX ORDER — AMLODIPINE BESYLATE 10 MG/1
10 TABLET ORAL DAILY
Qty: 90 TABLET | Refills: 1 | Status: SHIPPED | OUTPATIENT
Start: 2022-08-23 | End: 2023-03-15 | Stop reason: SDUPTHER

## 2022-08-23 RX ORDER — TAMSULOSIN HYDROCHLORIDE 0.4 MG/1
1 CAPSULE ORAL DAILY
Qty: 90 CAPSULE | Refills: 1 | Status: SHIPPED | OUTPATIENT
Start: 2022-08-23

## 2022-08-26 NOTE — TELEPHONE ENCOUNTER
PATIENT IS ASKING TO GET SHORT SUPPLY OF THE TAMSULOSIN SENT ASAP TO HIS LOCAL PHARMACY WHILE HE WAITS ON THE MAIL ORDER. HE IS OUT OF THIS MEDICATION AND NEEDS THIS TO URINATE.      PLEASE ADVISE    CALLBACK NUMBER IS  [phone]      CONFIRMED PHARMACY  [facility] [address] [place] [address] [facility] [phone] [place] [phone]

## 2022-09-09 NOTE — PROGRESS NOTES
Date of Office Visit: 2022  Encounter Provider: Dr. Mani Salguero    Place of Service: [facility] CARDIOLOGY [place]  Patient Name: Kailash Cooper  :1940  Madelyn Márquez APRN    Chief Complaint   Patient presents with   • Coronary Artery Disease   • Hypertension   • Hyperlipidemia     History of Present Illness    I am pleased to see Mr. Cooper in my office today as a follow-up.    As you know, patient is 81-year-old white gentleman whose past medical history significant for hypertension, hyperlipidemia, diabetes mellitus, CAD, coronary artery stenting, who came today for follow-up.    In 2022, patient was admitted at [facility] with symptom of shortness of breath and chest pain.  He was diagnosed with pneumonia.  Patient developed acute kidney injury.  Once the patient is stabilized patient underwent cardiac catheterization and it showed high-grade stenosis of RCA and patient underwent successful stenting.  LAD had mild disease and LCx was free of significant disease.  Echocardiogram showed ejection fraction of 60 to 65%.  LVH was noted no significant valvular heart disease present.    Patient came today for 6-month follow-up.  Patient is slowly gaining his normal weight back.  Patient is still wheelchair-bound but he is able to walk short distances without any difficulty.  Patient denies any chest pain or tightness or heaviness.  No orthopnea PND no syncope or presyncope.    In May 2022 patient underwent echocardiogram which showed EF of 55 to 60%.  Mild tricuspid regurgitation was noted.  Mild to moderate mitral regurgitation was noted.    Patient is stable from coronary artery standpoint.  Patient had stenting 2022 when he is feeling better.  Patient is slowly regaining his strength back.  At this stage I will continue current therapy.  I refilled his Plavix today..        Past Medical History:   Diagnosis Date   • Arthritis    • Cancer (HCC)     bone cancer upper lobe  rt lung 9/2017 [facility]   • Diabetes (HCC)    • Enlarged prostate    • Former smoker    • Hiatal hernia    • Hip pain     don   • Hip pain, bilateral    • Hyperlipidemia    • Hypertension    • Kidney disease        stage 3    • Leg pain     don   • Low back pain    • Neck pain    • Stroke (HCC)          Past Surgical History:   Procedure Laterality Date   • CARDIAC CATHETERIZATION Left 1/28/2022    Procedure: Cardiac Catheterization/Vascular Study;  Surgeon: Mani Salguero MD;  Location: [facility] CATH INVASIVE LOCATION;  Service: Cardiovascular;  Laterality: Left;   • CATARACT EXTRACTION  2006    OU   • COLONOSCOPY  2011   • COLONOSCOPY N/A 5/27/2022    Procedure: COLONOSCOPY;  Surgeon: Terry Holliday MD;  Location: [facility] ENDOSCOPY;  Service: Gastroenterology;  Laterality: N/A;  polyps   • CORONARY STENT PLACEMENT  01/28/2022    RCA   • ENDOSCOPY N/A 5/27/2022    Procedure: ESOPHAGOGASTRODUODENOSCOPY with 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, gastric antrum biopsy;  Surgeon: Terry Holliday MD;  Location: [facility] ENDOSCOPY;  Service: Gastroenterology;  Laterality: N/A;  gastritis, gastric ulcer, small bowel AVM   • LUNG CANCER SURGERY      upper lobe rt lung cancer 9/2017  at [facility]           Current Outpatient Medications:   •  allopurinol (ZYLOPRIM) 100 MG tablet, Take 200 mg by mouth Daily., Disp: , Rfl:   •  amLODIPine (NORVASC) 10 MG tablet, Take 1 tablet by mouth Daily., Disp: 90 tablet, Rfl: 1  •  aspirin 81 MG EC tablet, Take 81 mg by mouth Daily., Disp: , Rfl:   •  cetirizine (zyrTEC) 10 MG tablet, Take 1 tablet by mouth Daily., Disp: 90 tablet, Rfl: 1  •  Cholecalciferol (VITAMIN D3) 50 MCG (2000 UT) capsule, Take 2,000 Units by mouth Daily., Disp: , Rfl:   •  clopidogrel (PLAVIX) 75 MG tablet, Take 1 tablet by mouth Daily., Disp: 90 tablet, Rfl: 3  •  ferrous sulfate 325 (65 FE) MG EC tablet, Take 1 tablet by mouth Daily With Breakfast., Disp: , Rfl:   •   furosemide (LASIX) 40 MG tablet, Take 1 tablet by mouth 2 (Two) Times a Day., Disp: 180 tablet, Rfl: 1  •  gemfibrozil (LOPID) 600 MG tablet, Take 600 mg by mouth 2 (Two) Times a Day Before Meals., Disp: , Rfl:   •  glimepiride (AMARYL) 1 MG tablet, Take 1 tablet by mouth 2 (Two) Times a Day., Disp: 180 tablet, Rfl: 1  •  hydrALAZINE (APRESOLINE) 50 MG tablet, Take 1 tablet by mouth 3 (Three) Times a Day., Disp: 270 tablet, Rfl: 1  •  HYDROcodone-acetaminophen (Norco)  MG per tablet, Take 1 tablet by mouth Every 4 (Four) Hours As Needed for Moderate Pain ., Disp: 180 tablet, Rfl: 0  •  ipratropium-albuterol (DUO-NEB) 0.5-2.5 mg/3 ml nebulizer, Take 3 mL by nebulization 4 (Four) Times a Day As Needed for Wheezing., Disp: 120 mL, Rfl: 5  •  nitroglycerin (Nitrostat) 0.3 MG SL tablet, Place 1 tablet under the tongue Every 5 (Five) Minutes As Needed for Chest Pain. Take no more than 3 doses in 15 minutes., Disp: 50 tablet, Rfl: 12  •  O2 (OXYGEN), Inhale 2 L/min As Needed. FOR SHORTNESS OF AIR, Disp: , Rfl:   •  pantoprazole (Protonix) 40 MG EC tablet, Take 1 tablet by mouth 2 (Two) Times a Day., Disp: 180 tablet, Rfl: 1  •  pravastatin (PRAVACHOL) 40 MG tablet, Take 1 tablet by mouth Daily., Disp: 90 tablet, Rfl: 1  •  Probiotic Product (CULTURELLE PROBIOTICS PO), Take 20 mg by mouth Daily., Disp: , Rfl:   •  tamsulosin (FLOMAX) 0.4 MG capsule 24 hr capsule, Take 1 capsule by mouth Daily., Disp: 90 capsule, Rfl: 1  •  traZODone (DESYREL) 50 MG tablet, Take 0.5-2 tablets by mouth Every Night., Disp: 180 tablet, Rfl: 1  •  vitamin B-12 (CYANOCOBALAMIN) 1000 MCG tablet, Take 1,000 mcg by mouth 2 (Two) Times a Day., Disp: , Rfl:   •  budesonide (PULMICORT) 0.25 MG/2ML nebulizer solution, Take 2 mL by nebulization Daily for 30 days., Disp: 60 mL, Rfl: 5      Social History     Socioeconomic History   • Marital status:      Spouse name: Candis   • Number of children: 5   Tobacco Use   • Smoking status: Former  "Smoker     Packs/day: 2.00     Types: Cigarettes     Quit date:      Years since quittin.7   • Smokeless tobacco: Never Used   Vaping Use   • Vaping Use: Former   Substance and Sexual Activity   • Alcohol use: Never   • Drug use: Never   • Sexual activity: Defer     Partners: Female     Birth control/protection: None         Review of Systems   Constitutional: Positive for malaise/fatigue. Negative for chills and fever.   HENT: Negative for ear discharge and nosebleeds.    Eyes: Negative for discharge and redness.   Cardiovascular: Negative for chest pain, orthopnea, palpitations, paroxysmal nocturnal dyspnea and syncope.   Respiratory: Positive for shortness of breath. Negative for cough and wheezing.    Endocrine: Negative for heat intolerance.   Skin: Negative for rash.   Musculoskeletal: Positive for arthritis. Negative for myalgias.   Gastrointestinal: Negative for abdominal pain, melena, nausea and vomiting.   Genitourinary: Negative for dysuria and hematuria.   Neurological: Negative for dizziness, light-headedness, numbness and tremors.   Psychiatric/Behavioral: Negative for depression. The patient is not nervous/anxious.        Procedures    Procedures    No orders to display           Objective:    /62   Pulse 62   Ht 165.1 cm (65\")   Wt 65.8 kg (145 lb)   SpO2 93%   BMI 24.13 kg/m²         Constitutional:       Appearance: Well-developed.   Eyes:      General: No scleral icterus.        Right eye: No discharge.   HENT:      Head: Normocephalic and atraumatic.   Neck:      Thyroid: No thyromegaly.      Lymphadenopathy: No cervical adenopathy.   Pulmonary:      Effort: Pulmonary effort is normal. No respiratory distress.      Breath sounds: Normal breath sounds. No wheezing. No rales.   Cardiovascular:      Normal rate. Regular rhythm.      No gallop.   Abdominal:      Tenderness: There is no abdominal tenderness.   Skin:     Findings: No erythema or rash.   Neurological:      Mental " Status: Alert and oriented to person, place, and time.             Assessment:       Diagnosis Plan   1. Mixed hyperlipidemia     2. Essential hypertension     3. Coronary artery disease involving native coronary artery of native heart without angina pectoris              Plan:       MDM:    1 CAD:    Patient underwent stenting of RCA.  Patient is doing well.  Continue DAPT    2.  Hypertension:    Blood pressure is very well controlled.  Patient is on hydralazine and amlodipine.    3.  Hyperlipidemia:    Patient is on pravastatin.  Recent LDL is 89.  Repeat LDL in future    4.  Diabetes mellitus:    Patient is on Amaryl.  Repeat Hb 1 AC

## 2022-09-12 ENCOUNTER — OFFICE VISIT (OUTPATIENT)
Dept: CARDIOLOGY | Facility: CLINIC | Age: 82
End: 2022-09-12

## 2022-09-12 VITALS
HEIGHT: 65 IN | OXYGEN SATURATION: 93 % | WEIGHT: 145 LBS | HEART RATE: 62 BPM | DIASTOLIC BLOOD PRESSURE: 62 MMHG | BODY MASS INDEX: 24.16 KG/M2 | SYSTOLIC BLOOD PRESSURE: 137 MMHG

## 2022-09-12 DIAGNOSIS — I10 ESSENTIAL HYPERTENSION: Chronic | ICD-10-CM

## 2022-09-12 DIAGNOSIS — I25.10 CORONARY ARTERY DISEASE INVOLVING NATIVE CORONARY ARTERY OF NATIVE HEART WITHOUT ANGINA PECTORIS: ICD-10-CM

## 2022-09-12 DIAGNOSIS — E78.2 MIXED HYPERLIPIDEMIA: Primary | ICD-10-CM

## 2022-09-12 PROCEDURE — 99214 OFFICE O/P EST MOD 30 MIN: CPT | Performed by: INTERNAL MEDICINE

## 2022-09-12 RX ORDER — CLOPIDOGREL BISULFATE 75 MG/1
75 TABLET ORAL DAILY
Qty: 90 TABLET | Refills: 3
Start: 2022-09-12 | End: 2022-09-20

## 2022-09-20 RX ORDER — CLOPIDOGREL BISULFATE 75 MG/1
TABLET ORAL
Qty: 90 TABLET | Refills: 1 | Status: SHIPPED | OUTPATIENT
Start: 2022-09-20 | End: 2022-09-21 | Stop reason: SDUPTHER

## 2022-09-21 ENCOUNTER — TELEPHONE (OUTPATIENT)
Dept: CARDIOLOGY | Facility: CLINIC | Age: 82
End: 2022-09-21

## 2022-09-21 RX ORDER — CLOPIDOGREL BISULFATE 75 MG/1
75 TABLET ORAL DAILY
Qty: 14 TABLET | Refills: 0 | Status: SHIPPED | OUTPATIENT
Start: 2022-09-21 | End: 2022-11-28 | Stop reason: SDUPTHER

## 2022-09-21 NOTE — TELEPHONE ENCOUNTER
Caller: Kailash Cooper    Relationship: Self    Best call back number: [phone]    What is the best time to reach you: ANY    Who are you requesting to speak with (clinical staff, provider,  specific staff member): ANY      What was the call regarding: PT IS STILL WAITING ON THE [facility] TO DELIVER HIS PLAVIX, HE IS WONDERING IF DR SANCHEZ COULD SEND A 10 DAY SUPPLY TO THE [facility]  [address] [facility]    PT STATES HE HAS 4 PILLS LEFT.     Do you require a callback: IF NEEDED

## 2022-09-26 ENCOUNTER — OFFICE VISIT (OUTPATIENT)
Dept: PAIN MEDICINE | Facility: CLINIC | Age: 82
End: 2022-09-26

## 2022-09-26 VITALS
OXYGEN SATURATION: 94 % | WEIGHT: 145 LBS | HEIGHT: 65 IN | DIASTOLIC BLOOD PRESSURE: 61 MMHG | RESPIRATION RATE: 16 BRPM | SYSTOLIC BLOOD PRESSURE: 135 MMHG | HEART RATE: 65 BPM | BODY MASS INDEX: 24.16 KG/M2

## 2022-09-26 DIAGNOSIS — G89.29 CHRONIC BILATERAL LOW BACK PAIN WITHOUT SCIATICA: ICD-10-CM

## 2022-09-26 DIAGNOSIS — M47.814 THORACIC SPONDYLOSIS: ICD-10-CM

## 2022-09-26 DIAGNOSIS — G89.4 CHRONIC PAIN SYNDROME: ICD-10-CM

## 2022-09-26 DIAGNOSIS — M54.2 CERVICALGIA: Primary | ICD-10-CM

## 2022-09-26 DIAGNOSIS — M47.817 LUMBOSACRAL SPONDYLOSIS WITHOUT MYELOPATHY: ICD-10-CM

## 2022-09-26 DIAGNOSIS — M54.50 CHRONIC BILATERAL LOW BACK PAIN WITHOUT SCIATICA: ICD-10-CM

## 2022-09-26 PROCEDURE — G0463 HOSPITAL OUTPT CLINIC VISIT: HCPCS | Performed by: PHYSICAL MEDICINE & REHABILITATION

## 2022-09-26 PROCEDURE — 99214 OFFICE O/P EST MOD 30 MIN: CPT | Performed by: PHYSICAL MEDICINE & REHABILITATION

## 2022-09-26 RX ORDER — OXYCODONE AND ACETAMINOPHEN 10; 325 MG/1; MG/1
1 TABLET ORAL EVERY 6 HOURS PRN
Qty: 120 TABLET | Refills: 0 | Status: SHIPPED | OUTPATIENT
Start: 2022-09-26 | End: 2022-11-22

## 2022-09-26 RX ORDER — OXYCODONE AND ACETAMINOPHEN 10; 325 MG/1; MG/1
1 TABLET ORAL EVERY 6 HOURS PRN
Qty: 120 TABLET | Refills: 0 | Status: SHIPPED | OUTPATIENT
Start: 2022-09-26 | End: 2022-12-21 | Stop reason: SDUPTHER

## 2022-09-26 NOTE — PROGRESS NOTES
Subjective    CC back pain, neck pain, bilateral leg pain  Kailash Cooper is a 82 y.o. male with multiple comorbidities including CKD, DM, COPD, chronic back pain polyarthralgia here for f/u.  Chronic back pain radiating to bilateral hips worse with standing walking or prolonged activity.  Denies new weakness saddle anesthesia bladder bowel continence.  Chronic axial neck pain radiating to bilateral shoulder without radicular pain.  Continued chronic polyarthralgia and myofascial pain worse with activity.  Tried physical therapy/chiropractor with marginal relief.  Tried LESI's with marginal relief.  Had MI with stent, began Plavix for life. Had another MI with PNA, doing better now.    Utilizes hydrocodone 10/325 6 times daily as needed for severe pain, failed 4 times daily as needed.  Functional benefit /denies side effects.    L-spine MRI 2018 moderate degenerative changes throughout facet arthropathy mild to moderate foraminal narrowing, scoliosis.  C-spine MRI 2018 marked degenerative disc and some posterior element disease, causing spinal stenosis at multiple levels, including C2-3, C3-4, C5-6, and C6-7.  There multiple foraminal impingements    Pain Assessment   Location of Pain: Lower Back, neck pain, joint  Description of Pain: Dull/Aching, Throbbing, Stabbing  Previous Pain Rating :6  Current Pain Ratin  Aggravating Factors: Activity  Alleviating Factors: Rest, Medication    PEG Assessment   What number best describes your pain on average in the past week? 5  What number best describes how, during the past week, pain has interfered with your enjoyment of life?3  What number best describes how, during the past week, pain has interfered with your general activity?10     The following portions of the patient's history were reviewed and updated as appropriate: allergies, current medications, past family history, past medical history, past social history, past surgical history and problem list.     has a past  medical history of Arthritis, Cancer (HCC), Diabetes (HCC), Enlarged prostate, Former smoker, Hiatal hernia, Hip pain, Hip pain, bilateral, Hyperlipidemia, Hypertension, Kidney disease, Leg pain, Low back pain, Neck pain, and Stroke (HCC).   has a past surgical history that includes Cataract extraction (); Colonoscopy (); Lung cancer surgery; Coronary stent placement (2022); Cardiac catheterization (Left, 2022); Colonoscopy (N/A, 2022); and Esophagogastroduodenoscopy (N/A, 2022).  family history includes Cancer in his father; Heart disease in an other family member; Heart failure in his brother; Stroke in his mother.  Social History     Tobacco Use   • Smoking status: Former Smoker     Packs/day: 2.00     Types: Cigarettes     Quit date:      Years since quittin.7   • Smokeless tobacco: Never Used   Substance Use Topics   • Alcohol use: Never     Review of Systems   Musculoskeletal: Positive for arthralgias, back pain, myalgias and neck pain.   All other systems reviewed and are negative.    Objective   Physical Exam   Constitutional: He is oriented to person, place, and time. He appears well-developed and well-nourished. No distress.   HENT:   Head: Normocephalic and atraumatic.   Eyes: Pupils are equal, round, and reactive to light.   Cardiovascular: Normal rate, regular rhythm and normal heart sounds.   Pulmonary/Chest: Effort normal and breath sounds normal.   Abdominal: Soft. Normal appearance and bowel sounds are normal. He exhibits no distension. There is no abdominal tenderness.   Musculoskeletal:      Cervical back: He exhibits tenderness.      Lumbar back: He exhibits decreased range of motion and tenderness.   Neurological: He is alert and oriented to person, place, and time. He has normal reflexes. He displays normal reflexes. No sensory deficit.   Psychiatric: His behavior is normal. Mood, judgment and thought content normal.     /61   Pulse 65   Resp 16    "Ht 165.1 cm (65\")   Wt 65.8 kg (145 lb)   SpO2 94%   BMI 24.13 kg/m²     PHQ 9 on chart  Opioid risk tool low risk    Assessment & Plan   Diagnoses and all orders for this visit:    1. Cervicalgia (Primary)    2. Chronic bilateral low back pain without sciatica    3. Chronic pain syndrome    4. Lumbosacral spondylosis without myelopathy    5. Thoracic spondylosis    Summary  Kailash Cooper is a 82 y.o. male with multiple comorbidities including CKD, DM, COPD, chronic back pain polyarthralgia here for f/u.   Chronic pain from lumbar and cervical DDD spondylosis, polyarthralgia/myofascial pain.  Tried physical therapy, chiropractor, LESI's with marginal relief.  Axial back pain interfering with ADLs and his ability to exercise.  No NSAIDs due to CKD.        Complains of worsening back and bilateral hip pain.  Declines injections.    Increased to Hydrocodone 10/325 6 times daily as needed for severe pain, worked better than 4 or 5 pills daily with [facility] pain physician, tolerant. Rotate to Percocet 10mg QID prn. Filled 9/1/22.  UDS 4/4/22 and inspect reviewed.  Discussed risk of tolerance, dependence, respiratory depression, coma and death associated with use of oral opioids for treatment of chronic nonmalignant pain.     RTC 3 months        History of Present Illness             "

## 2022-09-28 ENCOUNTER — APPOINTMENT (OUTPATIENT)
Dept: RESPIRATORY THERAPY | Facility: HOSPITAL | Age: 82
End: 2022-09-28

## 2022-09-28 ENCOUNTER — HOSPITAL ENCOUNTER (OUTPATIENT)
Dept: RESPIRATORY THERAPY | Facility: HOSPITAL | Age: 82
Discharge: HOME | End: 2022-09-28

## 2022-09-28 VITALS — OXYGEN SATURATION: 96 % | HEART RATE: 64 BPM | RESPIRATION RATE: 12 BRPM

## 2022-09-28 DIAGNOSIS — R09.02 HYPOXEMIA: ICD-10-CM

## 2022-09-28 DIAGNOSIS — J44.9 CHRONIC OBSTRUCTIVE PULMONARY DISEASE, UNSPECIFIED COPD TYPE: ICD-10-CM

## 2022-09-28 DIAGNOSIS — R06.02 SHORTNESS OF BREATH ON EXERTION: ICD-10-CM

## 2022-09-28 PROCEDURE — 94729 DIFFUSING CAPACITY: CPT

## 2022-09-28 PROCEDURE — 94060 EVALUATION OF WHEEZING: CPT

## 2022-09-28 PROCEDURE — 94618 PULMONARY STRESS TESTING: CPT

## 2022-09-28 PROCEDURE — 94727 GAS DIL/WSHOT DETER LNG VOL: CPT

## 2022-09-28 PROCEDURE — A9270 NON-COVERED ITEM OR SERVICE: HCPCS | Performed by: INTERNAL MEDICINE

## 2022-09-28 PROCEDURE — 63710000001 ALBUTEROL SULFATE HFA 108 (90 BASE) MCG/ACT AEROSOL SOLUTION 6.7 G INHALER: Performed by: INTERNAL MEDICINE

## 2022-09-28 RX ORDER — ALBUTEROL SULFATE 90 UG/1
2 AEROSOL, METERED RESPIRATORY (INHALATION) ONCE
Status: COMPLETED | OUTPATIENT
Start: 2022-09-28 | End: 2022-09-28

## 2022-09-28 RX ADMIN — ALBUTEROL SULFATE 2 PUFF: 108 INHALANT RESPIRATORY (INHALATION) at 12:09

## 2022-09-28 NOTE — PROGRESS NOTES
Exercise Oximetry    Patient Name:Kailash Cooper   MRN: [id number]   Date: 09/28/22             ROOM AIR BASELINE   SpO2%   96   Heart Rate   64   Blood Pressure      EXERCISE ON ROOM AIR SpO2% EXERCISE ON O2 @   2 LPM SpO2%   1 MINUTE   96 1 MINUTE   99   2 MINUTES   93 2 MINUTES   98   3 MINUTES   92 3 MINUTES   97   4 MINUTES   90 4 MINUTES   96   5 MINUTES   88 5 MINUTES   96   6 MINUTES  6 MINUTES   96              Distance Walked   Distance Walked   1000 feet   Dyspnea (Mora Scale)   Dyspnea (Mora Scale)  8   Fatigue (Mora Scale)   Fatigue (Mora Scale)  8   SpO2% Post Exercise   SpO2% Post Exercise  98 on 2 lpm NaO2   HR Post Exercise   HR Post Exercise  71   Time to Recovery   Time to Recovery  2 minutes     Comments: Pt ambulated on room air for approximately 5 minutes.  Sat dropped to 88% on room air.  Pt placed on 2 lpm NaO2.     Pt has home oxygen through [facility].

## 2022-10-17 RX ORDER — PRAVASTATIN SODIUM 40 MG
TABLET ORAL
Qty: 90 TABLET | Refills: 1 | Status: SHIPPED | OUTPATIENT
Start: 2022-10-17 | End: 2022-11-25

## 2022-10-27 ENCOUNTER — TELEPHONE (OUTPATIENT)
Dept: CARDIOLOGY | Facility: CLINIC | Age: 82
End: 2022-10-27

## 2022-10-27 NOTE — TELEPHONE ENCOUNTER
Spoke with the wife and let her know. I left a message for the [facility] to call the patient and set up an appt. For him in Feb. 2023

## 2022-10-27 NOTE — TELEPHONE ENCOUNTER
Caller: BRIAN DURAN    Relationship: Emergency Contact    Best call back number: [phone]    What medications are you currently taking:   Current Outpatient Medications on File Prior to Visit   Medication Sig Dispense Refill   • allopurinol (ZYLOPRIM) 100 MG tablet Take 200 mg by mouth Daily.     • amLODIPine (NORVASC) 10 MG tablet Take 1 tablet by mouth Daily. 90 tablet 1   • aspirin 81 MG EC tablet Take 81 mg by mouth Daily.     • budesonide (PULMICORT) 0.25 MG/2ML nebulizer solution Take 2 mL by nebulization Daily for 30 days. 60 mL 5   • cetirizine (zyrTEC) 10 MG tablet Take 1 tablet by mouth Daily. 90 tablet 1   • Cholecalciferol (VITAMIN D3) 50 MCG (2000 UT) capsule Take 2,000 Units by mouth Daily.     • clopidogrel (PLAVIX) 75 MG tablet Take 1 tablet by mouth Daily. 14 tablet 0   • ferrous sulfate 325 (65 FE) MG EC tablet Take 1 tablet by mouth Daily With Breakfast.     • furosemide (LASIX) 40 MG tablet Take 1 tablet by mouth 2 (Two) Times a Day. 180 tablet 1   • gemfibrozil (LOPID) 600 MG tablet Take 600 mg by mouth 2 (Two) Times a Day Before Meals.     • glimepiride (AMARYL) 1 MG tablet Take 1 tablet by mouth 2 (Two) Times a Day. 180 tablet 1   • hydrALAZINE (APRESOLINE) 50 MG tablet Take 1 tablet by mouth 3 (Three) Times a Day. 270 tablet 1   • HYDROcodone-acetaminophen (Norco)  MG per tablet Take 1 tablet by mouth Every 4 (Four) Hours As Needed for Moderate Pain . 180 tablet 0   • ipratropium-albuterol (DUO-NEB) 0.5-2.5 mg/3 ml nebulizer Take 3 mL by nebulization 4 (Four) Times a Day As Needed for Wheezing. 120 mL 5   • nitroglycerin (Nitrostat) 0.3 MG SL tablet Place 1 tablet under the tongue Every 5 (Five) Minutes As Needed for Chest Pain. Take no more than 3 doses in 15 minutes. 50 tablet 12   • O2 (OXYGEN) Inhale 2 L/min As Needed. FOR SHORTNESS OF AIR     • oxyCODONE-acetaminophen (Percocet)  MG per tablet Take 1 tablet by mouth Every 6 (Six) Hours As Needed for Moderate Pain. 120  tablet 0   • oxyCODONE-acetaminophen (Percocet)  MG per tablet Take 1 tablet by mouth Every 6 (Six) Hours As Needed for Moderate Pain. 120 tablet 0   • oxyCODONE-acetaminophen (Percocet)  MG per tablet Take 1 tablet by mouth Every 6 (Six) Hours As Needed for Moderate Pain. 120 tablet 0   • pantoprazole (Protonix) 40 MG EC tablet Take 1 tablet by mouth 2 (Two) Times a Day. 180 tablet 1   • pravastatin (PRAVACHOL) 40 MG tablet TAKE 1 TABLET DAILY 90 tablet 1   • Probiotic Product (CULTURELLE PROBIOTICS PO) Take 20 mg by mouth Daily.     • tamsulosin (FLOMAX) 0.4 MG capsule 24 hr capsule Take 1 capsule by mouth Daily. 90 capsule 1   • traZODone (DESYREL) 50 MG tablet Take 0.5-2 tablets by mouth Every Night. 180 tablet 1   • vitamin B-12 (CYANOCOBALAMIN) 1000 MCG tablet Take 1,000 mcg by mouth 2 (Two) Times a Day.       No current facility-administered medications on file prior to visit.          When did you start taking these medications: 09/21/22    Which medication are you concerned about: CLOPIDOGREL 75MG    Who prescribed you this medication: DR. SANCHEZ    What are your concerns: PATIENT HAS BEEN STAYING VERY COLD, WIFE STATED HE IS ALWAYS FREEZING AND WOULD LIKE TO KNOW IF THERE I ANOTHER BLOOD THINNER HE CAN BE STARTED ON    How long have you had these concerns: SINCE HE STARTED THE MEDICATION

## 2022-11-16 ENCOUNTER — OFFICE VISIT (OUTPATIENT)
Dept: PULMONOLOGY | Facility: HOSPITAL | Age: 82
End: 2022-11-16

## 2022-11-16 VITALS
HEIGHT: 65 IN | RESPIRATION RATE: 16 BRPM | DIASTOLIC BLOOD PRESSURE: 58 MMHG | TEMPERATURE: 97.8 F | BODY MASS INDEX: 22.49 KG/M2 | SYSTOLIC BLOOD PRESSURE: 122 MMHG | OXYGEN SATURATION: 94 % | HEART RATE: 47 BPM | WEIGHT: 135 LBS

## 2022-11-16 DIAGNOSIS — J44.9 CHRONIC OBSTRUCTIVE PULMONARY DISEASE, UNSPECIFIED COPD TYPE: Primary | ICD-10-CM

## 2022-11-16 DIAGNOSIS — J96.21 ACUTE ON CHRONIC RESPIRATORY FAILURE WITH HYPOXIA: ICD-10-CM

## 2022-11-16 DIAGNOSIS — Z99.81 OXYGEN DEPENDENT: ICD-10-CM

## 2022-11-16 PROCEDURE — G0463 HOSPITAL OUTPT CLINIC VISIT: HCPCS

## 2022-11-16 NOTE — PROGRESS NOTES
Subjective   Kailahs Cooper is a 82 y.o. male.     History of Present Illness   Follow-up on COPD and hypoxemia.  Symptoms has been stable, mainly dyspnea on exertion  Using oxygen 3 L per nasal cannula  Using his nebulized medications  He lost a lot of weight earlier but now he is starting to gain it back again with improvement in his appetite    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 [facility]   • Diabetes (HCC)    • Enlarged prostate    • Former smoker    • Hiatal hernia    • Hip pain     don   • Hip pain, bilateral    • Hyperlipidemia    • Hypertension    • Kidney disease        stage 3    • Leg pain     don   • Low back pain    • Neck pain    • Stroke (HCC)      Current Outpatient Medications on File Prior to Visit   Medication Sig Dispense Refill   • allopurinol (ZYLOPRIM) 100 MG tablet Take 200 mg by mouth Daily.     • amLODIPine (NORVASC) 10 MG tablet Take 1 tablet by mouth Daily. 90 tablet 1   • aspirin 81 MG EC tablet Take 81 mg by mouth Daily.     • cetirizine (zyrTEC) 10 MG tablet Take 1 tablet by mouth Daily. 90 tablet 1   • Cholecalciferol (VITAMIN D3) 50 MCG (2000 UT) capsule Take 2,000 Units by mouth Daily.     • clopidogrel (PLAVIX) 75 MG tablet Take 1 tablet by mouth Daily. 14 tablet 0   • ferrous sulfate 325 (65 FE) MG EC tablet Take 1 tablet by mouth Daily With Breakfast.     • furosemide (LASIX) 40 MG tablet Take 1 tablet by mouth 2 (Two) Times a Day. 180 tablet 1   • gemfibrozil (LOPID) 600 MG tablet Take 600 mg by mouth 2 (Two) Times a Day Before Meals.     • glimepiride (AMARYL) 1 MG tablet Take 1 tablet by mouth 2 (Two) Times a Day. 180 tablet 1   • hydrALAZINE (APRESOLINE) 50 MG tablet Take 1 tablet by mouth 3 (Three) Times a Day. 270 tablet 1   • ipratropium-albuterol (DUO-NEB) 0.5-2.5 mg/3 ml nebulizer Take 3 mL by nebulization 4 (Four) Times a Day As Needed for Wheezing. 120 mL 5   • nitroglycerin (Nitrostat) 0.3 MG SL  tablet Place 1 tablet under the tongue Every 5 (Five) Minutes As Needed for Chest Pain. Take no more than 3 doses in 15 minutes. 50 tablet 12   • O2 (OXYGEN) Inhale 2 L/min As Needed. FOR SHORTNESS OF AIR     • oxyCODONE-acetaminophen (Percocet)  MG per tablet Take 1 tablet by mouth Every 6 (Six) Hours As Needed for Moderate Pain. 120 tablet 0   • oxyCODONE-acetaminophen (Percocet)  MG per tablet Take 1 tablet by mouth Every 6 (Six) Hours As Needed for Moderate Pain. 120 tablet 0   • oxyCODONE-acetaminophen (Percocet)  MG per tablet Take 1 tablet by mouth Every 6 (Six) Hours As Needed for Moderate Pain. 120 tablet 0   • pantoprazole (Protonix) 40 MG EC tablet Take 1 tablet by mouth 2 (Two) Times a Day. 180 tablet 1   • pravastatin (PRAVACHOL) 40 MG tablet TAKE 1 TABLET DAILY 90 tablet 1   • Probiotic Product (CULTURELLE PROBIOTICS PO) Take 20 mg by mouth Daily.     • tamsulosin (FLOMAX) 0.4 MG capsule 24 hr capsule Take 1 capsule by mouth Daily. 90 capsule 1   • traZODone (DESYREL) 50 MG tablet Take 0.5-2 tablets by mouth Every Night. 180 tablet 1   • vitamin B-12 (CYANOCOBALAMIN) 1000 MCG tablet Take 1,000 mcg by mouth 2 (Two) Times a Day.     • budesonide (PULMICORT) 0.25 MG/2ML nebulizer solution Take 2 mL by nebulization Daily for 30 days. 60 mL 5   • HYDROcodone-acetaminophen (Norco)  MG per tablet Take 1 tablet by mouth Every 4 (Four) Hours As Needed for Moderate Pain . 180 tablet 0     No current facility-administered medications on file prior to visit.     Social History     Tobacco Use   • Smoking status: Former     Packs/day: 2.00     Types: Cigarettes     Quit date:      Years since quittin.8   • Smokeless tobacco: Never   Vaping Use   • Vaping Use: Former   Substance Use Topics   • Alcohol use: Never   • Drug use: Never     Family History   Problem Relation Age of Onset   • Stroke Mother    • Cancer Father         Prostate   • Heart failure Brother    • Heart disease  "Other        The following portions of the patient's history were reviewed and updated as appropriate: allergies, current medications, past family history, past medical history, past social history, past surgical history and problem list.    Review of Systems    Objective   Physical Exam  Blood pressure 122/58, pulse (!) 47, temperature 97.8 °F (36.6 °C), temperature source Oral, resp. rate 16, height 165.1 cm (65\"), weight 61.2 kg (135 lb), SpO2 94 %.    General Appearance:  Alert   HEENT:  Normocephalic, without obvious abnormality, Conjunctiva/corneas clear,.   Nares normal, no drainage     Neck:  Supple, symmetrical, trachea midline. No JVD.  Lungs /Chest wall:   Distant breath sounds but equal bilaterally no wheezing or rhonchi, respirations unlabored, symmetrical wall movement.     Heart:  Regular rate and rhythm, S1 S2 normal  Abdomen: Soft, non-tender, no masses, no organomegaly.    Extremities: No edema, no clubbing or cyanosis    10/03/22  12:18 EDT     Spirometry: FEV1/FVC 78%, FVC 70%, FEV1 79%  Spirometry demonstrates no airway obstruction.     Post Bronchodilator  Following the inhalation of a bronchodilator, there is no significant change in airway obstruction. This does not necessrily mean that chronic bronchodilator therapy may not be useful.     MVV  The reduction in maximum voluntary ventilation reflects the reduced FEV1.     Lung Volume: TLC 69%  Lung volumes are consistent with moderate restrictive lung disease.     Diffusion: DLCO 37%    Assessment & Plan     Shortness of breath  COPD: Repeated PFTs 10/3/2022 with moderate restrictive disease TLC 69% but severe decrease in DLCO 37%  Hypoxemia  History of lung cancer status post resection 2017, latest CT scan 7/29/2022: Stable exam. Stable postsurgical change of right upper lobectomy. Chronic advanced subpleural interstitial lung disease with mild fibrosis and chronic basilar scarring. No evidence of a pulmonary mass or dominant suspicious " nodule  Coronary artery disease status post PCI  Ex-smoker quit 1990     Plan:    Pulmicort nebulized twice daily  DuoNeb 4 times daily as needed  Home oxygen 3 L per nasal cannula: Patient is benefiting from it and needs to continue with that  Advised patient to stay up-to-date on vaccinations for pneumococcal, flu and COVID-19

## 2022-11-21 ENCOUNTER — OFFICE VISIT (OUTPATIENT)
Dept: FAMILY MEDICINE CLINIC | Facility: CLINIC | Age: 82
End: 2022-11-21

## 2022-11-21 VITALS
DIASTOLIC BLOOD PRESSURE: 90 MMHG | HEART RATE: 76 BPM | HEIGHT: 65 IN | WEIGHT: 138.6 LBS | SYSTOLIC BLOOD PRESSURE: 180 MMHG | OXYGEN SATURATION: 95 % | TEMPERATURE: 97.1 F | BODY MASS INDEX: 23.09 KG/M2

## 2022-11-21 DIAGNOSIS — M47.817 LUMBOSACRAL SPONDYLOSIS WITHOUT MYELOPATHY: ICD-10-CM

## 2022-11-21 DIAGNOSIS — E11.9 TYPE 2 DIABETES MELLITUS WITHOUT COMPLICATION, WITHOUT LONG-TERM CURRENT USE OF INSULIN: Primary | ICD-10-CM

## 2022-11-21 DIAGNOSIS — R53.83 OTHER FATIGUE: ICD-10-CM

## 2022-11-21 DIAGNOSIS — R68.89 INTOLERANCE TO COLD: ICD-10-CM

## 2022-11-21 DIAGNOSIS — N18.32 ANEMIA DUE TO STAGE 3B CHRONIC KIDNEY DISEASE: Chronic | ICD-10-CM

## 2022-11-21 DIAGNOSIS — M54.42 ACUTE LEFT-SIDED LOW BACK PAIN WITH LEFT-SIDED SCIATICA: ICD-10-CM

## 2022-11-21 DIAGNOSIS — D63.1 ANEMIA DUE TO STAGE 3B CHRONIC KIDNEY DISEASE: Chronic | ICD-10-CM

## 2022-11-21 PROCEDURE — 99214 OFFICE O/P EST MOD 30 MIN: CPT | Performed by: NURSE PRACTITIONER

## 2022-11-21 RX ORDER — CYCLOBENZAPRINE HCL 10 MG
10 TABLET ORAL NIGHTLY PRN
Qty: 30 TABLET | Refills: 2 | Status: SHIPPED | OUTPATIENT
Start: 2022-11-21 | End: 2022-11-22

## 2022-11-21 RX ORDER — PREDNISONE 10 MG/1
TABLET ORAL
Qty: 19 TABLET | Refills: 0 | Status: SHIPPED | OUTPATIENT
Start: 2022-11-21 | End: 2022-11-22

## 2022-11-21 RX ORDER — HYDRALAZINE HYDROCHLORIDE 50 MG/1
50 TABLET, FILM COATED ORAL 3 TIMES DAILY
Qty: 270 TABLET | Refills: 1 | Status: SHIPPED | OUTPATIENT
Start: 2022-11-21

## 2022-11-21 NOTE — PROGRESS NOTES
Kailash Cooper is a 82 y.o. male.     History of Present Illness  82-year-old white male with lung cancer who wears oxygen, type 2 diabetes, chronic back pain goes to pain management, hypertension, hyperlipidemia, CKD, MI with stent placement who comes in today with wife for follow-up visit    Blood pressure elevated 180/90 heart rate 76.  He denies any chest pain, dyspnea, tachycardia or dizziness.  Patient states at home his pressure runs lower he just gets aggravated that when he comes in here is high.  We added hydralazine last visit due to his kidney function and I told him that the pressures he showed me from home are adequate    Patient's main complaint is severe left sciatic pain going down into hip and left leg that is been going on about 4 days.  Wife states it hurts so bad he has to use crutches to walk.  He states if he lays flat usually the pain will diminish he is able to sleep.  I Blanka get a lumbar x-ray and place him on some steroids unfortunately he cannot take anti-inflammatories due to his kidney function.  I am also ordering some physical therapy through home health for his back    Patient also complains of being cold all the time part of this is his weight loss which has been pretty dramatic in the past year.  He is also lost another 6 pounds since I saw him in July with a weight of 139.  He also is anemic due to his kidney disease which is also contributing however I will be checking thyroid levels today          Blood work today  Lumbar x-ray  Prednisone 10 mg taper dose  Home health referral         The following portions of the patient's history were reviewed and updated as appropriate: allergies, current medications, past family history, past medical history, past social history, past surgical history and problem list.    Vitals:    11/21/22 1515 11/21/22 1516   BP: (!) 195/75 180/90   BP Location: Right arm Right arm   Patient Position: Sitting Sitting   Cuff Size: Adult Adult   Pulse:  "76    Temp: 97.1 °F (36.2 °C)    TempSrc: Infrared    SpO2: 95%    Weight: 62.9 kg (138 lb 9.6 oz)    Height: 165.1 cm (65\")      Body mass index is 23.06 kg/m².    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 [facility]   • Diabetes (HCC)    • Enlarged prostate    • Former smoker    • Hiatal hernia    • Hip pain     don   • Hip pain, bilateral    • Hyperlipidemia    • Hypertension    • Kidney disease        stage 3    • Leg pain     don   • Low back pain    • Neck pain    • Stroke (HCC)      Past Surgical History:   Procedure Laterality Date   • CARDIAC CATHETERIZATION Left 1/28/2022    Procedure: Cardiac Catheterization/Vascular Study;  Surgeon: Mani Salguero MD;  Location: [facility] CATH INVASIVE LOCATION;  Service: Cardiovascular;  Laterality: Left;   • CATARACT EXTRACTION  2006 OU   • COLONOSCOPY  2011   • COLONOSCOPY N/A 5/27/2022    Procedure: COLONOSCOPY;  Surgeon: Terry Holliday MD;  Location: [facility] ENDOSCOPY;  Service: Gastroenterology;  Laterality: N/A;  polyps   • CORONARY STENT PLACEMENT  01/28/2022    RCA   • ENDOSCOPY N/A 5/27/2022    Procedure: ESOPHAGOGASTRODUODENOSCOPY with 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, gastric antrum biopsy;  Surgeon: Terry Holliday MD;  Location: [facility] ENDOSCOPY;  Service: Gastroenterology;  Laterality: N/A;  gastritis, gastric ulcer, small bowel AVM   • LUNG CANCER SURGERY      upper lobe rt lung cancer 9/2017  at [facility]     Family History   Problem Relation Age of Onset   • Stroke Mother    • Cancer Father         Prostate   • Heart failure Brother    • Heart disease Other      Immunization History   Administered Date(s) Administered   • COVID-19 (PFIZER) PURPLE CAP 02/11/2021, 03/04/2021   • Flu Vaccine Split Quad 09/20/2017   • Fluad Quad 65+ 11/18/2019   • Flublock Quad =>18yrs 10/07/2020   • Fluzone High Dose =>65 Years ([employer] ONLY) 10/22/2015 "   • Fluzone High-Dose 65+yrs 11/18/2019   • H1N1 All Forms 01/08/2010   • Hepatitis B 08/08/2013, 09/18/2013, 02/11/2014   • Influenza Quad Vaccine (Inpatient) 10/20/2016   • Influenza, Unspecified 09/20/2017   • PPD Test 09/07/2010, 09/13/2011, 09/25/2012, 07/16/2013, 05/27/2014, 06/02/2015, 05/24/2016   • Pneumococcal Polysaccharide (PPSV23) 10/20/2016, 12/29/2021       Results Encounter on 07/10/2022   Component Date Value Ref Range Status   • Total Cholesterol 07/08/2022 164  100 - 199 mg/dL Final   • Triglycerides 07/08/2022 176 (H)  0 - 149 mg/dL Final   • HDL Cholesterol 07/08/2022 45  >39 mg/dL Final   • VLDL Cholesterol Ankti 07/08/2022 30  5 - 40 mg/dL Final   • LDL Chol Calc ([facility]) 07/08/2022 89  0 - 99 mg/dL Final   • LDL/HDL RATIO 07/08/2022 2.0  0.0 - 3.6 ratio Final    Comment:                                     LDL/HDL Ratio                                              Men  Women                                1/2 Avg.Risk  1.0    1.5                                    Avg.Risk  3.6    3.2                                 2X Avg.Risk  6.2    5.0                                 3X Avg.Risk  8.0    6.1     • Hemoglobin A1C 07/08/2022 5.5  4.8 - 5.6 % Final    Comment:          Prediabetes: 5.7 - 6.4           Diabetes: >6.4           Glycemic control for adults with diabetes: <7.0           Review of Systems   Constitutional: Positive for fatigue.        Feels cold all the time   HENT: Negative.    Respiratory: Positive for shortness of breath.    Cardiovascular: Negative.    Gastrointestinal: Negative.    Genitourinary: Negative.    Musculoskeletal: Positive for back pain.   Skin: Negative.    Neurological: Negative.    Psychiatric/Behavioral: Negative.        Objective   Physical Exam  Constitutional:       Appearance: Normal appearance.   Cardiovascular:      Rate and Rhythm: Normal rate and regular rhythm.      Pulses: Normal pulses.   Pulmonary:      Effort: Pulmonary effort is normal.      Breath  sounds: Normal breath sounds.   Abdominal:      General: Bowel sounds are normal.   Musculoskeletal:      Comments: In wheelchair wife states he has to have crutches to walk   Skin:     General: Skin is warm and dry.   Neurological:      General: No focal deficit present.      Mental Status: He is alert and oriented to person, place, and time.   Psychiatric:         Mood and Affect: Mood normal.         Behavior: Behavior normal.         Procedures    Assessment & Plan   Diagnoses and all orders for this visit:    1. Type 2 diabetes mellitus without complication, without long-term current use of insulin ([facility]) (Primary)  -     Comprehensive Metabolic Panel    2. Other fatigue  -     TSH+Free T4  -     T3    3. Acute left-sided low back pain with left-sided sciatica  -     XR Spine Lumbar 2 or 3 View; Future    4. Anemia due to stage 3b chronic kidney disease ([facility])    5. Lumbosacral spondylosis without myelopathy    6. Intolerance to cold    Other orders  -     predniSONE (DELTASONE) 10 MG tablet; Take 3 tablets by mouth Daily for 3 days, THEN 2 tablets Daily for 3 days, THEN 1 tablet Daily for 2 days, THEN 0.5 tablets Daily for 4 days.  Dispense: 19 tablet; Refill: 0  -     cyclobenzaprine (FLEXERIL) 10 MG tablet; Take 1 tablet by mouth At Night As Needed for Muscle Spasms.  Dispense: 30 tablet; Refill: 2  -     hydrALAZINE (APRESOLINE) 50 MG tablet; Take 1 tablet by mouth 3 (Three) Times a Day.  Dispense: 270 tablet; Refill: 1          Current Outpatient Medications:   •  allopurinol (ZYLOPRIM) 100 MG tablet, Take 200 mg by mouth Daily., Disp: , Rfl:   •  amLODIPine (NORVASC) 10 MG tablet, Take 1 tablet by mouth Daily., Disp: 90 tablet, Rfl: 1  •  aspirin 81 MG EC tablet, Take 81 mg by mouth Daily., Disp: , Rfl:   •  cetirizine (zyrTEC) 10 MG tablet, Take 1 tablet by mouth Daily., Disp: 90 tablet, Rfl: 1  •  Cholecalciferol (VITAMIN D3) 50 MCG (2000 UT) capsule, Take 2,000 Units by mouth Daily., Disp: , Rfl:   •   clopidogrel (PLAVIX) 75 MG tablet, Take 1 tablet by mouth Daily., Disp: 14 tablet, Rfl: 0  •  ferrous sulfate 325 (65 FE) MG EC tablet, Take 1 tablet by mouth Daily With Breakfast., Disp: , Rfl:   •  furosemide (LASIX) 40 MG tablet, Take 1 tablet by mouth 2 (Two) Times a Day., Disp: 180 tablet, Rfl: 1  •  gemfibrozil (LOPID) 600 MG tablet, Take 600 mg by mouth 2 (Two) Times a Day Before Meals., Disp: , Rfl:   •  glimepiride (AMARYL) 1 MG tablet, Take 1 tablet by mouth 2 (Two) Times a Day., Disp: 180 tablet, Rfl: 1  •  hydrALAZINE (APRESOLINE) 50 MG tablet, Take 1 tablet by mouth 3 (Three) Times a Day., Disp: 270 tablet, Rfl: 1  •  HYDROcodone-acetaminophen (Norco)  MG per tablet, Take 1 tablet by mouth Every 4 (Four) Hours As Needed for Moderate Pain ., Disp: 180 tablet, Rfl: 0  •  ipratropium-albuterol (DUO-NEB) 0.5-2.5 mg/3 ml nebulizer, Take 3 mL by nebulization 4 (Four) Times a Day As Needed for Wheezing., Disp: 120 mL, Rfl: 5  •  nitroglycerin (Nitrostat) 0.3 MG SL tablet, Place 1 tablet under the tongue Every 5 (Five) Minutes As Needed for Chest Pain. Take no more than 3 doses in 15 minutes., Disp: 50 tablet, Rfl: 12  •  O2 (OXYGEN), Inhale 2 L/min As Needed. FOR SHORTNESS OF AIR, Disp: , Rfl:   •  oxyCODONE-acetaminophen (Percocet)  MG per tablet, Take 1 tablet by mouth Every 6 (Six) Hours As Needed for Moderate Pain., Disp: 120 tablet, Rfl: 0  •  oxyCODONE-acetaminophen (Percocet)  MG per tablet, Take 1 tablet by mouth Every 6 (Six) Hours As Needed for Moderate Pain., Disp: 120 tablet, Rfl: 0  •  oxyCODONE-acetaminophen (Percocet)  MG per tablet, Take 1 tablet by mouth Every 6 (Six) Hours As Needed for Moderate Pain., Disp: 120 tablet, Rfl: 0  •  pantoprazole (Protonix) 40 MG EC tablet, Take 1 tablet by mouth 2 (Two) Times a Day., Disp: 180 tablet, Rfl: 1  •  pravastatin (PRAVACHOL) 40 MG tablet, TAKE 1 TABLET DAILY, Disp: 90 tablet, Rfl: 1  •  Probiotic Product (CULTURELLE PROBIOTICS  PO), Take 20 mg by mouth Daily., Disp: , Rfl:   •  tamsulosin (FLOMAX) 0.4 MG capsule 24 hr capsule, Take 1 capsule by mouth Daily., Disp: 90 capsule, Rfl: 1  •  traZODone (DESYREL) 50 MG tablet, Take 0.5-2 tablets by mouth Every Night., Disp: 180 tablet, Rfl: 1  •  vitamin B-12 (CYANOCOBALAMIN) 1000 MCG tablet, Take 1,000 mcg by mouth 2 (Two) Times a Day., Disp: , Rfl:   •  budesonide (PULMICORT) 0.25 MG/2ML nebulizer solution, Take 2 mL by nebulization Daily for 30 days., Disp: 60 mL, Rfl: 5  •  cyclobenzaprine (FLEXERIL) 10 MG tablet, Take 1 tablet by mouth At Night As Needed for Muscle Spasms., Disp: 30 tablet, Rfl: 2  •  predniSONE (DELTASONE) 10 MG tablet, Take 3 tablets by mouth Daily for 3 days, THEN 2 tablets Daily for 3 days, THEN 1 tablet Daily for 2 days, THEN 0.5 tablets Daily for 4 days., Disp: 19 tablet, Rfl: 0           JANE Martinez 11/21/2022 16:39 EST  This note has been electronically signed

## 2022-11-22 ENCOUNTER — HOME CARE VISIT (OUTPATIENT)
Dept: HOME HEALTH SERVICES | Facility: HOME HEALTHCARE | Age: 82
End: 2022-11-22

## 2022-11-22 ENCOUNTER — HOME HEALTH ADMISSION (OUTPATIENT)
Dept: HOME HEALTH SERVICES | Facility: HOME HEALTHCARE | Age: 82
End: 2022-11-22

## 2022-11-22 VITALS
OXYGEN SATURATION: 97 % | TEMPERATURE: 98 F | HEIGHT: 65 IN | WEIGHT: 140 LBS | DIASTOLIC BLOOD PRESSURE: 70 MMHG | BODY MASS INDEX: 23.32 KG/M2 | HEART RATE: 78 BPM | SYSTOLIC BLOOD PRESSURE: 148 MMHG | RESPIRATION RATE: 18 BRPM

## 2022-11-22 LAB
ALBUMIN SERPL-MCNC: 5 G/DL (ref 3.6–4.6)
ALBUMIN/GLOB SERPL: 2.9 {RATIO} (ref 1.2–2.2)
ALP SERPL-CCNC: 74 IU/L (ref 44–121)
ALT SERPL-CCNC: 14 IU/L (ref 0–44)
AST SERPL-CCNC: 19 IU/L (ref 0–40)
BILIRUB SERPL-MCNC: 0.5 MG/DL (ref 0–1.2)
BUN SERPL-MCNC: 14 MG/DL (ref 8–27)
BUN/CREAT SERPL: 9 (ref 10–24)
CALCIUM SERPL-MCNC: 10.1 MG/DL (ref 8.6–10.2)
CHLORIDE SERPL-SCNC: 104 MMOL/L (ref 96–106)
CO2 SERPL-SCNC: 22 MMOL/L (ref 20–29)
CREAT SERPL-MCNC: 1.5 MG/DL (ref 0.76–1.27)
EGFRCR SERPLBLD CKD-EPI 2021: 46 ML/MIN/1.73
GLOBULIN SER CALC-MCNC: 1.7 G/DL (ref 1.5–4.5)
GLUCOSE SERPL-MCNC: 111 MG/DL (ref 70–99)
POTASSIUM SERPL-SCNC: 3.6 MMOL/L (ref 3.5–5.2)
PROT SERPL-MCNC: 6.7 G/DL (ref 6–8.5)
SODIUM SERPL-SCNC: 142 MMOL/L (ref 134–144)
T3 SERPL-MCNC: 80 NG/DL (ref 71–180)
T4 FREE SERPL-MCNC: 1.24 NG/DL (ref 0.82–1.77)
TSH SERPL DL<=0.005 MIU/L-ACNC: 2.31 UIU/ML (ref 0.45–4.5)

## 2022-11-22 PROCEDURE — G0151 HHCP-SERV OF PT,EA 15 MIN: HCPCS

## 2022-11-23 ENCOUNTER — HOME CARE VISIT (OUTPATIENT)
Dept: HOME HEALTH SERVICES | Facility: HOME HEALTHCARE | Age: 82
End: 2022-11-23

## 2022-11-23 NOTE — HOME HEALTH
"PT Admission Summary: The patient is an 82 year old male admitted to home health services by PT this day (11/22/2022) after referral from PCP, Madelyn Márquez due to acute severe back pain. Patient reporting a 5 day history of severe back pain with movement.  Reportedly xrays showed no hip/pelvic or spinal fractures but increased bone spurs per patient. Patient diagnosed with lumbosacral spondylosis.    Primary reason for home health care: Severe pain related to lumbosacral spondylosis.    Past Medical History includes: MI (2022), CAD, cardiac stent, COPD, supplemental oxygen 3 lpm prn, anemia, diabetes type 2, GERD, hyperlipidemia, BPH, insomnia, vitamin B12 deficiency, CKD, chronic back pain, polyarthralgia, lumbar and cervical DDD spondylosis, polyarthralgia/myofascial pain.  DME: crutches, walker, cane.    Prior Functional Level: History of some limitations in mobility due to chronic pain, but able to ambulate household distances independently.    Social History: lives in home with 3 step entry with spouse, Candis. The patient names his spouse, Candis Cooper ([phone]) as his healthcare representative.  Patient stated goal: \"Get out of pain.\"  Password: Birdi.    PT Assessment this day (11/22/2022) reveals the problems of severe pain (up to 9/10), lower extremity weakness (strength rated 4/5 except left hip extension 3+/5 with weakness noted to negatively impact balance, transfers and gait), imbalance (Tinetti Balance Assessment score 10/28 indicating a high falls risk), abnormal gait (antalgic), limited ambulation ability (limited to 30 feet with crutches requiring no less than stand-by assistance for safety).    The patient will benefit from the PT interventions of therapeutic exercise, transfer training, gait training and patient education (including home safety and home exercise program (HEP) instruction and instruction in modalities for pain).     Rehab potential good due to prior functional level and " availability of caregiver assistance.    Plan is to discharge with HEP to care of spouse with patient reporting less pain and demonstrating decreased falls risk.    Session Notes: Patient reporting severe pain. Noted to be resting in recliner when PT arrives, using crutches to ambulate.    Plan for next visit: Continue with current treatments as initiated this day. Add light exercises per patient tolerance avoiding pushing through pain.

## 2022-11-23 NOTE — CASE COMMUNICATION
"[facility] – HUDDLE START OF CARE    Caregiver Status: Spouse, Candis  Availability: 24/7  Ability to meet patient's needs:good  Willingness:good    Risk for Hospitalization: High    Patient stated goal: \"Get out of pain.\"    Services required to achieve goals: PT    Potential Issues for goal attainment: Severe pain    Problems identified: The patient is an 82 year old male admitted to home health services by PT this day (11/22/2022) afte r referral from PCP, Madelyn Márquez due to acute severe back pain. Patient reporting a 5 day history of severe back pain with movement.  Reportedly xrays showed no hip/pelvic or spinal fractures but increased bone spurs per patient. Patient diagnosed with lumbosacral spondylosis.    Primary reason for home health care: Severe pain related to lumbosacral spondylosis.    Knowledge deficits: Interventions to address pain.    Functional sta tus and safety:  PT Assessment this day (11/22/2022) reveals the problems of severe pain (up to 9/10), lower extremity weakness (strength rated 4/5 except left hip extension 3+/5 with weakness noted to negatively impact balance, transfers and gait), imbalance (Tinetti Balance Assessment score 10/28 indicating a high falls risk), abnormal gait (antalgic), limited ambulation ability (limited to 30 feet with crutches requiring no less than  stand-by assistance for safety).    Any Duplication of Services: no    Environmental issues: 3 step entry    Equipment/Adjunct Services:  Devices for care present in the home: crutches, cane, walker  Devices needed and not present: n/a    Community resources involved/needed:  none    Any additional needs: none noted    Based upon record review and collaboration conference, the recommended frequency for this patient is:     SN-----    P T----- 1WK1, 2WK3    OT----    ST-----    HHA---    MSW---         Please note that above is a recommendation only and that the plan of care should reflect the patient's clinical needs " and goals. If in agreement, please complete note. If a different frequency is necessary, please explain in note box and proceed per patients clinical needs.

## 2022-11-28 ENCOUNTER — TELEPHONE (OUTPATIENT)
Dept: CARDIOLOGY | Facility: CLINIC | Age: 82
End: 2022-11-28

## 2022-11-28 ENCOUNTER — HOME CARE VISIT (OUTPATIENT)
Dept: HOME HEALTH SERVICES | Facility: HOME HEALTHCARE | Age: 82
End: 2022-11-28

## 2022-11-28 PROCEDURE — G0157 HHC PT ASSISTANT EA 15: HCPCS

## 2022-11-28 PROCEDURE — G0180 MD CERTIFICATION HHA PATIENT: HCPCS | Performed by: NURSE PRACTITIONER

## 2022-11-28 RX ORDER — CLOPIDOGREL BISULFATE 75 MG/1
75 TABLET ORAL DAILY
Qty: 14 TABLET | Refills: 0 | Status: SHIPPED | OUTPATIENT
Start: 2022-11-28 | End: 2023-03-15 | Stop reason: SDUPTHER

## 2022-11-28 NOTE — TELEPHONE ENCOUNTER
Caller: BRIAN DURAN    Relationship: Emergency Contact    Best call back number: [phone]    Requested Prescriptions:   Requested Prescriptions     Pending Prescriptions Disp Refills   • clopidogrel (PLAVIX) 75 MG tablet 14 tablet 0     Sig: Take 1 tablet by mouth Daily. Indications: Ischemic Heart Disease        Pharmacy where request should be sent: [facility] [address] [place] [address] [id number] [phone] [place] [phone] FX     Additional details provided by patient: PT CALLING IN REFILL BEFORE HE IS OUT OF MEDICINE    Does the patient have less than a 3 day supply:  [] Yes  [x] No    Tr Bobo Rep   11/28/22 10:40 EST

## 2022-11-29 VITALS
HEART RATE: 80 BPM | SYSTOLIC BLOOD PRESSURE: 130 MMHG | TEMPERATURE: 97.5 F | OXYGEN SATURATION: 98 % | DIASTOLIC BLOOD PRESSURE: 78 MMHG

## 2022-12-01 ENCOUNTER — HOME CARE VISIT (OUTPATIENT)
Dept: HOME HEALTH SERVICES | Facility: HOME HEALTHCARE | Age: 82
End: 2022-12-01

## 2022-12-01 NOTE — CASE COMMUNICATION
Informational Purposes Only:  Per home health agency protocol, attending must be notified of any cancelled visits.    Patient not seen for PT visit scheduled this day (12/1/2022) per patient request/consent due to insurance has not yet authorized additional visits.    Plan is to contact patient 12/5/2022 and to determine if additional visits are authorized.

## 2022-12-05 ENCOUNTER — HOME CARE VISIT (OUTPATIENT)
Dept: HOME HEALTH SERVICES | Facility: HOME HEALTHCARE | Age: 82
End: 2022-12-05

## 2022-12-05 VITALS
HEART RATE: 92 BPM | SYSTOLIC BLOOD PRESSURE: 138 MMHG | OXYGEN SATURATION: 99 % | RESPIRATION RATE: 18 BRPM | DIASTOLIC BLOOD PRESSURE: 70 MMHG | TEMPERATURE: 97.3 F

## 2022-12-05 PROCEDURE — G0151 HHCP-SERV OF PT,EA 15 MIN: HCPCS

## 2022-12-06 NOTE — CASE COMMUNICATION
PTA visit on 12/8/2022 must be moved back to this PT and changed to a missed visit note due to limited insurance authorization and per patient request.    PTA visit on 12/12/2022 must be changed to a PT OASIS discharage and moved back to this PT due to limited insurance authorization and per patient request..

## 2022-12-06 NOTE — HOME HEALTH
Session Notes: Patient states that pain is not as widespread as initially and generally less. Patient asserts that he has been performing home exercise program (HEP) as directed.  Further discussed limited insurance authorization. Patient verbalizes understanding and requests being seen only one additional time by PT (per authorized visits) with discharge to follow.    Plan for next visit: Assess response to exercise, finalize HEP, discharge to home exercise program.

## 2022-12-08 ENCOUNTER — HOME CARE VISIT (OUTPATIENT)
Dept: HOME HEALTH SERVICES | Facility: HOME HEALTHCARE | Age: 82
End: 2022-12-08

## 2022-12-08 NOTE — CASE COMMUNICATION
Informational Purposes Only:  Per home health agency protocol, MD must be notified of any cancelled visits.    PT visit scheduled this day (12/08/2022) cancelled per patient request. Plan is to see patient for PT in the home as next scheduled on 12/12/2022.

## 2022-12-12 ENCOUNTER — HOME CARE VISIT (OUTPATIENT)
Dept: HOME HEALTH SERVICES | Facility: HOME HEALTHCARE | Age: 82
End: 2022-12-12

## 2022-12-12 VITALS
HEART RATE: 57 BPM | DIASTOLIC BLOOD PRESSURE: 64 MMHG | OXYGEN SATURATION: 96 % | SYSTOLIC BLOOD PRESSURE: 124 MMHG | TEMPERATURE: 97.3 F | RESPIRATION RATE: 18 BRPM

## 2022-12-12 PROCEDURE — G0151 HHCP-SERV OF PT,EA 15 MIN: HCPCS

## 2022-12-13 NOTE — HOME HEALTH
Session Notes: Patient asserts that he has been doing much better recently. When questioned he reports less pain and improved mobility including decreased dependence upon crutches for mobility.    Plan for next visit: Reassessment, home exercise program (HEP) review/finalization, likely discharge to HEP.

## 2022-12-14 ENCOUNTER — HOME CARE VISIT (OUTPATIENT)
Dept: HOME HEALTH SERVICES | Facility: HOME HEALTHCARE | Age: 82
End: 2022-12-14

## 2022-12-14 NOTE — CASE COMMUNICATION
Patient discussed during case conference this day (12/14/2022):  Plan is to discharge patient due to sufficient progress towards goals/goals met at next scheduled PT visit 12/15/2022.

## 2022-12-15 ENCOUNTER — HOME CARE VISIT (OUTPATIENT)
Dept: HOME HEALTH SERVICES | Facility: HOME HEALTHCARE | Age: 82
End: 2022-12-15

## 2022-12-15 VITALS
OXYGEN SATURATION: 97 % | TEMPERATURE: 97.2 F | DIASTOLIC BLOOD PRESSURE: 72 MMHG | SYSTOLIC BLOOD PRESSURE: 140 MMHG | HEART RATE: 88 BPM | RESPIRATION RATE: 18 BRPM

## 2022-12-15 PROCEDURE — G0151 HHCP-SERV OF PT,EA 15 MIN: HCPCS

## 2022-12-16 NOTE — HOME HEALTH
PT Discharge Summary: The patient is an 82 year old male admitted to home health services by PT this day (11/22/2022) after referral from PCP, Madelyn Márquez due to acute severe back pain. Patient reporting a 5 day history of severe back pain with movement.    Initial PT Assessment (11/22/2022) revealed the problems of severe pain (up to 9/10), lower extremity weakness (strength rated 4/5 except left hip extension 3+/5 with weakness noted to negatively impact balance, transfers and gait), imbalance (Tinetti Balance Assessment score 10/28 indicating a high falls risk), abnormal gait (antalgic), limited ambulation ability (limited to 30 feet with crutches requiring no less than stand-by assistance for safety).    The patient accepted the PT interventions of therapeutic exercise, transfer training, gait training and patient education (including home safety and home exercise program (HEP) instruction) making very good progress and meeting all established goals.    Patient discharged from PT after session this day (12/15/2022) as planned with patient consent and due to all goals met.    Session Notes: Patient consents to discharge from PT after session today.  Patient asserts that he continues to improve since last PT visit. Patient additionally advises that he no longer needs/uses crutches.    Returns to pain management on 12/21/2022.

## 2022-12-16 NOTE — CASE COMMUNICATION
PT Discharge Summary: The patient is an 82 year old male admitted to home health services by PT this day (11/22/2022) after referral from PCP, Madelyn Márquez due to acute severe back pain. Patient reporting a 5 day history of severe back pain with movement.    Initial PT Assessment (11/22/2022) revealed the problems of severe pain (up to 9/10), lower extremity weakness (strength rated 4/5 except left hip extension 3+/5 with weakness not ed to negatively impact balance, transfers and gait), imbalance (Tinetti Balance Assessment score 10/28 indicating a high falls risk), abnormal gait (antalgic), limited ambulation ability (limited to 30 feet with crutches requiring no less than stand-by assistance for safety).    The patient accepted the PT interventions of therapeutic exercise, transfer training, gait training and patient education (including home safety and home exerc ise program (HEP) instruction) making very good progress and meeting all established goals.    Patient discharged from PT after session this day (12/15/2022) as planned with patient consent and due to all goals met.

## 2022-12-21 ENCOUNTER — OFFICE VISIT (OUTPATIENT)
Dept: PAIN MEDICINE | Facility: CLINIC | Age: 82
End: 2022-12-21

## 2022-12-21 VITALS
HEART RATE: 88 BPM | RESPIRATION RATE: 16 BRPM | SYSTOLIC BLOOD PRESSURE: 154 MMHG | OXYGEN SATURATION: 97 % | DIASTOLIC BLOOD PRESSURE: 73 MMHG

## 2022-12-21 DIAGNOSIS — M54.2 CERVICALGIA: Primary | ICD-10-CM

## 2022-12-21 DIAGNOSIS — M47.817 LUMBOSACRAL SPONDYLOSIS WITHOUT MYELOPATHY: ICD-10-CM

## 2022-12-21 DIAGNOSIS — M54.50 CHRONIC BILATERAL LOW BACK PAIN WITHOUT SCIATICA: ICD-10-CM

## 2022-12-21 DIAGNOSIS — G89.4 CHRONIC PAIN SYNDROME: ICD-10-CM

## 2022-12-21 DIAGNOSIS — M47.814 THORACIC SPONDYLOSIS: ICD-10-CM

## 2022-12-21 DIAGNOSIS — M47.812 CERVICAL SPONDYLOSIS WITHOUT MYELOPATHY: ICD-10-CM

## 2022-12-21 DIAGNOSIS — G89.29 CHRONIC BILATERAL LOW BACK PAIN WITHOUT SCIATICA: ICD-10-CM

## 2022-12-21 PROCEDURE — 99213 OFFICE O/P EST LOW 20 MIN: CPT | Performed by: PHYSICAL MEDICINE & REHABILITATION

## 2022-12-21 PROCEDURE — G0463 HOSPITAL OUTPT CLINIC VISIT: HCPCS | Performed by: PHYSICAL MEDICINE & REHABILITATION

## 2022-12-21 RX ORDER — OXYCODONE AND ACETAMINOPHEN 10; 325 MG/1; MG/1
1 TABLET ORAL EVERY 6 HOURS PRN
Qty: 120 TABLET | Refills: 0 | Status: SHIPPED | OUTPATIENT
Start: 2022-12-21 | End: 2023-03-20 | Stop reason: SDUPTHER

## 2022-12-21 RX ORDER — OXYCODONE AND ACETAMINOPHEN 10; 325 MG/1; MG/1
1 TABLET ORAL EVERY 6 HOURS PRN
Qty: 120 TABLET | Refills: 0 | Status: SHIPPED | OUTPATIENT
Start: 2022-12-21 | End: 2023-02-02 | Stop reason: SDUPTHER

## 2022-12-21 NOTE — PROGRESS NOTES
Subjective    CC back pain, neck pain, bilateral leg pain  Kailash Cooper Jr. is a 82 y.o. male with multiple comorbidities including CKD, DM, COPD, chronic back pain polyarthralgia here for f/u.  Chronic back pain radiating to bilateral hips worse with standing walking or prolonged activity.  Denies new weakness saddle anesthesia bladder bowel continence.  Chronic axial neck pain radiating to bilateral shoulder without radicular pain.  Continued chronic polyarthralgia and myofascial pain worse with activity.  Tried physical therapy/chiropractor with marginal relief.  Tried LESI's with marginal relief.  Had MI with stent, began Plavix for life. Had another MI with PNA, doing better now.    Utilizes hydrocodone 10/325 6 times daily as needed for severe pain, failed 4 times daily as needed.  Functional benefit /denies side effects.    L-spine MRI 2018 moderate degenerative changes throughout facet arthropathy mild to moderate foraminal narrowing, scoliosis.  C-spine MRI 2018 marked degenerative disc and some posterior element disease, causing spinal stenosis at multiple levels, including C2-3, C3-4, C5-6, and C6-7.  There multiple foraminal impingements    Pain Assessment   Location of Pain: Lower Back, neck pain, joint  Description of Pain: Dull/Aching, Throbbing, Stabbing  Previous Pain Rating :6  Current Pain Ratin  Aggravating Factors: Activity  Alleviating Factors: Rest, Medication    PEG Assessment   What number best describes your pain on average in the past week? 5  What number best describes how, during the past week, pain has interfered with your enjoyment of life?3  What number best describes how, during the past week, pain has interfered with your general activity?10     The following portions of the patient's history were reviewed and updated as appropriate: allergies, current medications, past family history, past medical history, past social history, past surgical history and problem list.     has a  past medical history of Arthritis, Cancer (HCC), Diabetes (HCC), Enlarged prostate, Former smoker, Hiatal hernia, Hip pain, Hip pain, bilateral, Hyperlipidemia, Hypertension, Kidney disease, Leg pain, Low back pain, Neck pain, and Stroke (HCC).   has a past surgical history that includes Cataract extraction (); Colonoscopy (); Lung cancer surgery; Coronary stent placement (2022); Cardiac catheterization (Left, 2022); Colonoscopy (N/A, 2022); and Esophagogastroduodenoscopy (N/A, 2022).  family history includes Cancer in his father; Heart disease in an other family member; Heart failure in his brother; Stroke in his mother.  Social History     Tobacco Use   • Smoking status: Former     Packs/day: 2.00     Types: Cigarettes     Quit date:      Years since quittin.9   • Smokeless tobacco: Never   Substance Use Topics   • Alcohol use: Never     Review of Systems   Musculoskeletal: Positive for arthralgias, back pain, myalgias and neck pain.   All other systems reviewed and are negative.    Objective   Physical Exam   Constitutional: He is oriented to person, place, and time. He appears well-developed and well-nourished. No distress.   HENT:   Head: Normocephalic and atraumatic.   Eyes: Pupils are equal, round, and reactive to light.   Cardiovascular: Normal rate, regular rhythm and normal heart sounds.   Pulmonary/Chest: Effort normal and breath sounds normal.   Abdominal: Soft. Normal appearance and bowel sounds are normal. He exhibits no distension. There is no abdominal tenderness.   Musculoskeletal:      Cervical back: He exhibits tenderness.      Lumbar back: He exhibits decreased range of motion and tenderness.   Neurological: He is alert and oriented to person, place, and time. He has normal reflexes. He displays normal reflexes. No sensory deficit.   Psychiatric: His behavior is normal. Mood, judgment and thought content normal.     /73   Pulse 88   Resp 16   SpO2  97%     PHQ 9 on chart  Opioid risk tool low risk    Assessment & Plan   Diagnoses and all orders for this visit:    1. Cervicalgia (Primary)    2. Chronic bilateral low back pain without sciatica    3. Cervical spondylosis without myelopathy    4. Chronic pain syndrome    5. Lumbosacral spondylosis without myelopathy    6. Thoracic spondylosis    Summary  Kailash Cooper is a 82 y.o. male with multiple comorbidities including CKD, DM, COPD, chronic back pain polyarthralgia here for f/u.   Chronic pain from lumbar and cervical DDD spondylosis, polyarthralgia/myofascial pain.  Tried physical therapy, chiropractor, LESI's with marginal relief.  Axial back pain interfering with ADLs and his ability to exercise.  No NSAIDs due to CKD.        Complains of worsening back and bilateral hip pain.  Declines injections.    Increased to Hydrocodone 10/325 6 times daily as needed for severe pain, worked better than 4 or 5 pills daily with [facility] pain physician, tolerant. Rotated to Percocet 10mg QID prn, continue. Filled 11/30/22.  UDS 4/4/22 and inspect reviewed.  Discussed risk of tolerance, dependence, respiratory depression, coma and death associated with use of oral opioids for treatment of chronic nonmalignant pain.     RTC 3 months        History of Present Illness

## 2023-02-01 ENCOUNTER — TELEPHONE (OUTPATIENT)
Dept: PAIN MEDICINE | Facility: CLINIC | Age: 83
End: 2023-02-01
Payer: MEDICARE

## 2023-02-01 NOTE — TELEPHONE ENCOUNTER
Caller: CATHERNIE DURAN    Relationship: SELF    Best call back number: [phone]    Requested Prescriptions: OXYCODONE 10MG    Pharmacy where request should be sent: [facility] [place]    Additional details provided by patient: WILL BE OUT ON 2/5    Does the patient have less than a 3 day supply:  [] Yes  [x] No    Would you like a call back once the refill request has been completed: [x] Yes [] No    If the office needs to give you a call back, can they leave a voicemail: [x] Yes [] No    Ct Nguyen   02/01/23 15:36 EST

## 2023-02-02 ENCOUNTER — TELEPHONE (OUTPATIENT)
Dept: PAIN MEDICINE | Facility: CLINIC | Age: 83
End: 2023-02-02

## 2023-02-02 DIAGNOSIS — M54.2 CERVICALGIA: ICD-10-CM

## 2023-02-02 NOTE — TELEPHONE ENCOUNTER
It appears that pt should have prescriptions at the pharmacy.  Left message for pt and told him to call if he has any issues

## 2023-02-02 NOTE — TELEPHONE ENCOUNTER
Caller: MONTANA DURANROBERTO    Relationship: Emergency Contact    Best call back number:      Requested Prescriptions:   Requested Prescriptions     Pending Prescriptions Disp Refills   • oxyCODONE-acetaminophen (Percocet)  MG per tablet 120 tablet 0     Sig: Take 1 tablet by mouth Every 6 (Six) Hours As Needed for Moderate Pain.        Pharmacy where request should be sent: [facility] [address] [place], [place] [phone] [address] [phone] FX     Additional details provided by patient: INSURANCE WOULD ONLY LET HIM GET A 7 DAY SUPPLY. HE WILL RUN OUT ON 2/5/23. PATIENTS WIFE WOULD LIKE THE REFILL ORDER PUT IN BEFORE THEN SO THAT THEY ARE READY TO BE PICKED UP ON THAT Monday.    Does the patient have less than a 3 day supply:  [x] Yes  [] No    Would you like a call back once the refill request has been completed: [x] Yes [] No    If the office needs to give you a call back, can they leave a voicemail: [x] Yes [] No    Tr Rosario Rep   02/02/23 08:50 EST

## 2023-02-02 NOTE — TELEPHONE ENCOUNTER
PT WIFE REQUESTING A CALL BACK REGARDING OXYCODONE REFILL REQUEST. UNABLE TO WARM TRANSFER. PLEASE ADVISE

## 2023-02-03 RX ORDER — OXYCODONE AND ACETAMINOPHEN 10; 325 MG/1; MG/1
1 TABLET ORAL EVERY 6 HOURS PRN
Qty: 92 TABLET | Refills: 0 | Status: SHIPPED | OUTPATIENT
Start: 2023-02-03 | End: 2023-03-20 | Stop reason: SDUPTHER

## 2023-02-24 PROBLEM — I25.10 CORONARY ARTERY DISEASE INVOLVING NATIVE CORONARY ARTERY OF NATIVE HEART WITHOUT ANGINA PECTORIS: Status: ACTIVE | Noted: 2023-02-24

## 2023-02-24 NOTE — PROGRESS NOTES
Date of Office Visit: 2023  Encounter Provider: Dr. Mani Salguero  Place of Service: [facility] CARDIOLOGY [place]  Patient Name: Kailash Cooper Jr.  :1940  Madelyn Márquez APRN    Chief Complaint   Patient presents with   • Follow-up   • Coronary Artery Disease     History of Present Illness    I am pleased to see Mr. Cooper in my office today as a follow-up.    As you know, patient is 82-year-old white gentleman whose past medical history significant for hypertension, hyperlipidemia, diabetes mellitus, CAD, coronary artery stenting, who came today for follow-up.    In 2022, patient was admitted at [facility] with symptom of shortness of breath and chest pain.  He was diagnosed with pneumonia.  Patient developed acute kidney injury.  Once the patient is stabilized patient underwent cardiac catheterization and it showed high-grade stenosis of RCA and patient underwent successful stenting.  LAD had mild disease and LCx was free of significant disease.  Echocardiogram showed ejection fraction of 60 to 65%.  LVH was noted no significant valvular heart disease present.    Patient came today for follow-up.  Patient is feeling better.  His symptom of shortness of breath is slowly improving.  His energy level is improving.  He denies any chest pain.  Patient is able to walk now.  He was on wheelchair before.  Patient denies any chest pain or tightness or heaviness.  No orthopnea PND no syncope or presyncope.  No leg edema noted.    EKG showed normal sinus rhythm.  Right bundle branch block is noted.  Left anterior fascicular block is noted.    At this stage, I am very pleased with the patient progress.  Blood pressure is controlled.  He is getting his strength.  Patient can stop aspirin and Plavix prior to colonoscopy and restarted afterwards.  Current treatment would be continued        Past Medical History:   Diagnosis Date   • Arthritis    • Cancer (HCC)     bone cancer upper lobe rt  lung 9/2017 [facility]   • Diabetes (HCC)    • Enlarged prostate    • Former smoker    • Hiatal hernia    • Hip pain     don   • Hip pain, bilateral    • Hyperlipidemia    • Hypertension    • Kidney disease        stage 3    • Leg pain     don   • Low back pain    • Neck pain    • Stroke (HCC)          Past Surgical History:   Procedure Laterality Date   • CARDIAC CATHETERIZATION Left 1/28/2022    Procedure: Cardiac Catheterization/Vascular Study;  Surgeon: Mani Salguero MD;  Location: [facility] CATH INVASIVE LOCATION;  Service: Cardiovascular;  Laterality: Left;   • CATARACT EXTRACTION  2006    OU   • COLONOSCOPY  2011   • COLONOSCOPY N/A 5/27/2022    Procedure: COLONOSCOPY;  Surgeon: Terry Holliday MD;  Location: [facility] ENDOSCOPY;  Service: Gastroenterology;  Laterality: N/A;  polyps   • CORONARY STENT PLACEMENT  01/28/2022    RCA   • ENDOSCOPY N/A 5/27/2022    Procedure: ESOPHAGOGASTRODUODENOSCOPY with 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, gastric antrum biopsy;  Surgeon: Terry Holliday MD;  Location: [facility] ENDOSCOPY;  Service: Gastroenterology;  Laterality: N/A;  gastritis, gastric ulcer, small bowel AVM   • LUNG CANCER SURGERY      upper lobe rt lung cancer 9/2017  at [facility]           Current Outpatient Medications:   •  allopurinol (ZYLOPRIM) 100 MG tablet, Take 200 mg by mouth Daily. Indications: Disorder of Excessive Uric Acid in the Blood, Disp: , Rfl:   •  amLODIPine (NORVASC) 10 MG tablet, Take 1 tablet by mouth Daily., Disp: 90 tablet, Rfl: 1  •  aspirin 81 MG EC tablet, Take 81 mg by mouth Daily. Indications: Heart Attack Affecting Area Damaged by Previous Attack, Disp: , Rfl:   •  budesonide (PULMICORT) 0.25 MG/2ML nebulizer solution, Take 2 mL by nebulization Daily for 30 days., Disp: 60 mL, Rfl: 5  •  cetirizine (zyrTEC) 10 MG tablet, Take 1 tablet by mouth Daily., Disp: 90 tablet, Rfl: 1  •  clopidogrel (PLAVIX) 75 MG tablet, Take 1 tablet by mouth  Daily. Indications: Ischemic Heart Disease, Disp: 14 tablet, Rfl: 0  •  cyclobenzaprine (FLEXERIL) 10 MG tablet, Take 10 mg by mouth 3 (Three) Times a Day As Needed for Muscle Spasms., Disp: , Rfl:   •  ferrous sulfate 325 (65 FE) MG EC tablet, Take 1 tablet by mouth Daily With Breakfast., Disp: , Rfl:   •  furosemide (LASIX) 40 MG tablet, Take 1 tablet by mouth 2 (Two) Times a Day., Disp: 180 tablet, Rfl: 1  •  glimepiride (AMARYL) 1 MG tablet, Take 1 mg by mouth Every Morning Before Breakfast., Disp: , Rfl:   •  hydrALAZINE (APRESOLINE) 50 MG tablet, Take 1 tablet by mouth 3 (Three) Times a Day., Disp: 270 tablet, Rfl: 1  •  ipratropium-albuterol (DUO-NEB) 0.5-2.5 mg/3 ml nebulizer, Take 3 mL by nebulization 4 (Four) Times a Day As Needed for Wheezing., Disp: 120 mL, Rfl: 5  •  nitroglycerin (Nitrostat) 0.3 MG SL tablet, Place 1 tablet under the tongue Every 5 (Five) Minutes As Needed for Chest Pain. Take no more than 3 doses in 15 minutes., Disp: 50 tablet, Rfl: 12  •  O2 (OXYGEN), Inhale 3 L/min As Needed (shortness of air). Indications: COPD, Disp: , Rfl:   •  oxyCODONE-acetaminophen (Percocet)  MG per tablet, Take 1 tablet by mouth Every 6 (Six) Hours As Needed for Moderate Pain. Indications: Pain, Disp: 120 tablet, Rfl: 0  •  pantoprazole (Protonix) 40 MG EC tablet, Take 1 tablet by mouth 2 (Two) Times a Day., Disp: 180 tablet, Rfl: 1  •  pravastatin (PRAVACHOL) 40 MG tablet, Take 40 mg by mouth Daily. Indications: High Amount of Fats in the Blood, Disp: , Rfl:   •  pregabalin (LYRICA) 50 MG capsule, Take 50 mg by mouth 2 (Two) Times a Day., Disp: , Rfl:   •  Probiotic Product (CULTURELLE PROBIOTICS PO), Take 20 mg by mouth Daily. Indications: diabetic supplement, Disp: , Rfl:   •  tamsulosin (FLOMAX) 0.4 MG capsule 24 hr capsule, Take 1 capsule by mouth Daily., Disp: 90 capsule, Rfl: 1  •  vitamin B-12 (CYANOCOBALAMIN) 1000 MCG tablet, Take 1,000 mcg by mouth 2 (Two) Times a Day. Indications:  "Inadequate Vitamin B12, Disp: , Rfl:   •  oxyCODONE-acetaminophen (Percocet)  MG per tablet, Take 1 tablet by mouth Every 6 (Six) Hours As Needed for Moderate Pain., Disp: 120 tablet, Rfl: 0  •  oxyCODONE-acetaminophen (Percocet)  MG per tablet, Take 1 tablet by mouth Every 6 (Six) Hours As Needed for Moderate Pain., Disp: 92 tablet, Rfl: 0  •  traZODone (DESYREL) 50 MG tablet, Take 0.5-2 tablets by mouth Every Night., Disp: 180 tablet, Rfl: 1      Social History     Socioeconomic History   • Marital status:      Spouse name: Candis   • Number of children: 5   Tobacco Use   • Smoking status: Former     Packs/day: 2.00     Types: Cigarettes     Quit date:      Years since quittin.1   • Smokeless tobacco: Never   Vaping Use   • Vaping Use: Former   Substance and Sexual Activity   • Alcohol use: Never   • Drug use: Never   • Sexual activity: Defer     Partners: Female     Birth control/protection: None         Review of Systems   Constitutional: Negative for chills and fever.   HENT: Negative for ear discharge and nosebleeds.    Eyes: Negative for discharge and redness.   Cardiovascular: Negative for chest pain, orthopnea, palpitations, paroxysmal nocturnal dyspnea and syncope.   Respiratory: Positive for shortness of breath. Negative for cough and wheezing.    Endocrine: Negative for heat intolerance.   Skin: Negative for rash.   Musculoskeletal: Negative for arthritis and myalgias.   Gastrointestinal: Negative for abdominal pain, melena, nausea and vomiting.   Genitourinary: Negative for dysuria and hematuria.   Neurological: Negative for dizziness, light-headedness, numbness and tremors.   Psychiatric/Behavioral: Negative for depression. The patient is not nervous/anxious.        Procedures    Procedures    No orders to display           Objective:    /58 (BP Location: Left arm, Patient Position: Sitting, Cuff Size: Adult)   Pulse 96   Ht 165.1 cm (65\")   Wt 64.2 kg (141 lb 9.6 " oz)   SpO2 94%   BMI 23.56 kg/m²         Constitutional:       Appearance: Well-developed.   Eyes:      General: No scleral icterus.        Right eye: No discharge.   HENT:      Head: Normocephalic and atraumatic.   Neck:      Thyroid: No thyromegaly.      Lymphadenopathy: No cervical adenopathy.   Pulmonary:      Effort: Pulmonary effort is normal. No respiratory distress.      Breath sounds: Normal breath sounds. No wheezing. No rales.   Cardiovascular:      Normal rate. Regular rhythm.      No gallop.   Abdominal:      Tenderness: There is no abdominal tenderness.   Skin:     Findings: No erythema or rash.   Neurological:      Mental Status: Alert and oriented to person, place, and time.             Assessment:       Diagnosis Plan   1. Essential hypertension        2. Mixed hyperlipidemia        3. Type 2 diabetes mellitus without complication, without long-term current use of insulin (HCC)        4. Abnormal EKG        5. Coronary artery disease involving native coronary artery of native heart without angina pectoris                 Plan:       MDM:    1.  Hypertension:    Blood pressure is controlled current treatment with hydralazine and amlodipine would be continued    2.  Hyperlipidemia:    Patient is on pravastatin.  AST ALT are normal repeat lipid panel testing    3.  CAD:    Patient is on aspirin and Plavix.  Continue that.    4.  Preop clearance:    Patient is being considered for colonoscopy.  Patient can stop aspirin and Plavix 5 to 7 days prior to it and restarted afterwards.

## 2023-02-27 ENCOUNTER — OFFICE VISIT (OUTPATIENT)
Dept: CARDIOLOGY | Facility: CLINIC | Age: 83
End: 2023-02-27
Payer: MEDICARE

## 2023-02-27 VITALS
SYSTOLIC BLOOD PRESSURE: 113 MMHG | OXYGEN SATURATION: 94 % | DIASTOLIC BLOOD PRESSURE: 58 MMHG | HEIGHT: 65 IN | BODY MASS INDEX: 23.59 KG/M2 | WEIGHT: 141.6 LBS | HEART RATE: 96 BPM

## 2023-02-27 DIAGNOSIS — R94.31 ABNORMAL EKG: ICD-10-CM

## 2023-02-27 DIAGNOSIS — I25.10 CORONARY ARTERY DISEASE INVOLVING NATIVE CORONARY ARTERY OF NATIVE HEART WITHOUT ANGINA PECTORIS: ICD-10-CM

## 2023-02-27 DIAGNOSIS — E78.2 MIXED HYPERLIPIDEMIA: ICD-10-CM

## 2023-02-27 DIAGNOSIS — I10 ESSENTIAL HYPERTENSION: Primary | Chronic | ICD-10-CM

## 2023-02-27 DIAGNOSIS — E11.9 TYPE 2 DIABETES MELLITUS WITHOUT COMPLICATION, WITHOUT LONG-TERM CURRENT USE OF INSULIN: ICD-10-CM

## 2023-02-27 PROCEDURE — 99214 OFFICE O/P EST MOD 30 MIN: CPT | Performed by: INTERNAL MEDICINE

## 2023-02-27 RX ORDER — GLIMEPIRIDE 1 MG/1
1 TABLET ORAL
COMMUNITY
End: 2023-03-15 | Stop reason: SDUPTHER

## 2023-02-27 RX ORDER — PREGABALIN 50 MG/1
50 CAPSULE ORAL 2 TIMES DAILY
COMMUNITY

## 2023-02-27 RX ORDER — CYCLOBENZAPRINE HCL 10 MG
10 TABLET ORAL 3 TIMES DAILY PRN
COMMUNITY

## 2023-02-28 ENCOUNTER — TELEPHONE (OUTPATIENT)
Dept: CARDIOLOGY | Facility: CLINIC | Age: 83
End: 2023-02-28
Payer: MEDICARE

## 2023-03-15 ENCOUNTER — OFFICE VISIT (OUTPATIENT)
Dept: FAMILY MEDICINE CLINIC | Facility: CLINIC | Age: 83
End: 2023-03-15
Payer: MEDICARE

## 2023-03-15 VITALS
DIASTOLIC BLOOD PRESSURE: 60 MMHG | HEART RATE: 88 BPM | SYSTOLIC BLOOD PRESSURE: 126 MMHG | HEIGHT: 65 IN | OXYGEN SATURATION: 88 % | TEMPERATURE: 97.3 F | BODY MASS INDEX: 23.93 KG/M2 | WEIGHT: 143.6 LBS

## 2023-03-15 DIAGNOSIS — Z12.5 SCREENING PSA (PROSTATE SPECIFIC ANTIGEN): ICD-10-CM

## 2023-03-15 DIAGNOSIS — N18.9 ANEMIA DUE TO CHRONIC KIDNEY DISEASE, UNSPECIFIED CKD STAGE: ICD-10-CM

## 2023-03-15 DIAGNOSIS — N18.32 STAGE 3B CHRONIC KIDNEY DISEASE: ICD-10-CM

## 2023-03-15 DIAGNOSIS — Z00.00 PREVENTATIVE HEALTH CARE: ICD-10-CM

## 2023-03-15 DIAGNOSIS — E87.5 HYPERKALEMIA: ICD-10-CM

## 2023-03-15 DIAGNOSIS — D63.1 ANEMIA DUE TO CHRONIC KIDNEY DISEASE, UNSPECIFIED CKD STAGE: ICD-10-CM

## 2023-03-15 DIAGNOSIS — E11.9 TYPE 2 DIABETES MELLITUS WITHOUT COMPLICATION, WITHOUT LONG-TERM CURRENT USE OF INSULIN: ICD-10-CM

## 2023-03-15 DIAGNOSIS — G47.33 OBSTRUCTIVE SLEEP APNEA: ICD-10-CM

## 2023-03-15 DIAGNOSIS — E78.2 MIXED HYPERLIPIDEMIA: Primary | ICD-10-CM

## 2023-03-15 PROCEDURE — G0439 PPPS, SUBSEQ VISIT: HCPCS | Performed by: NURSE PRACTITIONER

## 2023-03-15 PROCEDURE — 3074F SYST BP LT 130 MM HG: CPT | Performed by: NURSE PRACTITIONER

## 2023-03-15 PROCEDURE — 96160 PT-FOCUSED HLTH RISK ASSMT: CPT | Performed by: NURSE PRACTITIONER

## 2023-03-15 PROCEDURE — 3078F DIAST BP <80 MM HG: CPT | Performed by: NURSE PRACTITIONER

## 2023-03-15 RX ORDER — PRAVASTATIN SODIUM 40 MG
40 TABLET ORAL DAILY
Qty: 90 TABLET | Refills: 1 | Status: SHIPPED | OUTPATIENT
Start: 2023-03-15

## 2023-03-15 RX ORDER — AMLODIPINE BESYLATE 10 MG/1
10 TABLET ORAL DAILY
Qty: 90 TABLET | Refills: 1 | Status: SHIPPED | OUTPATIENT
Start: 2023-03-15

## 2023-03-15 RX ORDER — CLOPIDOGREL BISULFATE 75 MG/1
75 TABLET ORAL DAILY
Qty: 90 TABLET | Refills: 3 | Status: SHIPPED | OUTPATIENT
Start: 2023-03-15

## 2023-03-15 RX ORDER — GLIMEPIRIDE 1 MG/1
1 TABLET ORAL
Qty: 180 TABLET | Refills: 0 | Status: SHIPPED | OUTPATIENT
Start: 2023-03-15

## 2023-03-15 NOTE — PROGRESS NOTES
"    Kailash Cooper Jr. is a 82 y.o. male.     History of Present Illness  82-year-old white male with history of lung cancer who wears oxygen, type 2 diabetes, chronic back pain goes to pain management, hypertension, hyperlipidemia, CKD, MI with stent placement who comes in today for Medicare wellness visit    Blood pressure 126/60 heart rate 88 he denies any chest pain, tachycardia or dizziness.  Patient does not have a small enough oxygen tank to carry with him unguinal try to order him one of the ones that makes his own oxygen through [employer].  His sat for that reason is low today at 88%.  He does have a pulmonologist and has not seen him since November    Patient's last creatinine had improved quite a bit and we will be rechecking labs again today.  His last hemoglobin was 10.4 which had come up from 9.9 we will see if it has gone down again he is taking iron at home    Patient is up-to-date on all immunizations he has a colonoscopy scheduled for April 25 this year but he still needs to schedule an eye exam.  We will be doing PSA with blood work although patient did have a prostate biopsy last year that was negative.  Patient has finally gained 5 pounds with a weight of 144 and a BMI of 24            Fasting blood work  Portable oxygen tank  Keep appointment for colonoscopy on the 25th April  Follow-up 6 months           The following portions of the patient's history were reviewed and updated as appropriate: allergies, current medications, past family history, past medical history, past social history, past surgical history and problem list.    Vitals:    03/15/23 1403   BP: 126/60   BP Location: Right arm   Patient Position: Sitting   Cuff Size: Adult   Pulse: 88   Temp: 97.3 °F (36.3 °C)   TempSrc: Temporal   SpO2: (!) 88%   Weight: 65.1 kg (143 lb 9.6 oz)   Height: 165.1 cm (65\")     Body mass index is 23.9 kg/m².    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 CATH INVASIVE LOCATION;  Service: Cardiovascular;  Laterality: Left;   • CATARACT EXTRACTION  2006    OU   • COLONOSCOPY  2011   • COLONOSCOPY N/A 5/27/2022    Procedure: COLONOSCOPY;  Surgeon: Terry Holliday MD;  Location: [facility] ENDOSCOPY;  Service: Gastroenterology;  Laterality: N/A;  polyps   • CORONARY STENT PLACEMENT  01/28/2022    RCA   • ENDOSCOPY N/A 5/27/2022    Procedure: ESOPHAGOGASTRODUODENOSCOPY with 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, gastric antrum biopsy;  Surgeon: Terry Holliday MD;  Location: [facility] ENDOSCOPY;  Service: Gastroenterology;  Laterality: N/A;  gastritis, gastric ulcer, small bowel AVM   • LUNG CANCER SURGERY      upper lobe rt lung cancer 9/2017  at [facility]     Family History   Problem Relation Age of Onset   • Stroke Mother    • Cancer Father         Prostate   • Heart failure Brother    • Heart disease Other      Immunization History   Administered Date(s) Administered   • COVID-19 (PFIZER) PURPLE CAP 02/11/2021, 03/04/2021   • Flu Vaccine Split Quad 09/20/2017   • Fluad Quad 65+ 11/18/2019   • Flublock Quad =>18yrs 10/07/2020   • Fluzone High Dose =>65 Years ([employer] ONLY) 10/22/2015   • Fluzone High-Dose 65+yrs 11/18/2019   • H1N1 All Forms 01/08/2010   • Hepatitis B 08/08/2013, 09/18/2013, 02/11/2014   • Influenza Quad Vaccine (Inpatient) 10/20/2016   • Influenza, Unspecified 09/20/2017   • PPD Test 09/07/2010, 09/13/2011, 09/25/2012,  07/16/2013, 05/27/2014, 06/02/2015, 05/24/2016   • Pneumococcal Polysaccharide (PPSV23) 10/20/2016, 12/29/2021       Office Visit on 11/21/2022   Component Date Value Ref Range Status   • TSH 11/21/2022 2.310  0.450 - 4.500 uIU/mL Final   • Free T4 11/21/2022 1.24  0.82 - 1.77 ng/dL Final   • T3, Total 11/21/2022 80  71 - 180 ng/dL Final   • Glucose 11/21/2022 111 (H)  70 - 99 mg/dL Final   • BUN 11/21/2022 14  8 - 27 mg/dL Final   • Creatinine 11/21/2022 1.50 (H)  0.76 - 1.27 mg/dL Final   • EGFR Result 11/21/2022 46 (L)  >59 mL/min/1.73 Final   • BUN/Creatinine Ratio 11/21/2022 9 (L)  10 - 24 Final   • Sodium 11/21/2022 142  134 - 144 mmol/L Final   • Potassium 11/21/2022 3.6  3.5 - 5.2 mmol/L Final   • Chloride 11/21/2022 104  96 - 106 mmol/L Final   • Total CO2 11/21/2022 22  20 - 29 mmol/L Final   • Calcium 11/21/2022 10.1  8.6 - 10.2 mg/dL Final   • Total Protein 11/21/2022 6.7  6.0 - 8.5 g/dL Final   • Albumin 11/21/2022 5.0 (H)  3.6 - 4.6 g/dL Final   • Globulin 11/21/2022 1.7  1.5 - 4.5 g/dL Final   • A/G Ratio 11/21/2022 2.9 (H)  1.2 - 2.2 Final   • Total Bilirubin 11/21/2022 0.5  0.0 - 1.2 mg/dL Final   • Alkaline Phosphatase 11/21/2022 74  44 - 121 IU/L Final   • AST (SGOT) 11/21/2022 19  0 - 40 IU/L Final   • ALT (SGPT) 11/21/2022 14  0 - 44 IU/L Final         Review of Systems   Constitutional: Negative.    HENT: Negative.    Respiratory: Positive for shortness of breath.    Cardiovascular: Negative.    Gastrointestinal: Negative.    Genitourinary: Negative.    Skin: Negative.    Neurological: Negative.    Psychiatric/Behavioral: Negative.        Objective   Physical Exam  Constitutional:       Appearance: Normal appearance.   HENT:      Head: Normocephalic.   Cardiovascular:      Rate and Rhythm: Normal rate and regular rhythm.      Pulses: Normal pulses.      Heart sounds: Normal heart sounds.   Pulmonary:      Effort: Pulmonary effort is normal.      Comments: Lungs diminished  Abdominal:       General: Bowel sounds are normal.   Musculoskeletal:         General: Normal range of motion.   Skin:     General: Skin is warm and dry.   Neurological:      General: No focal deficit present.      Mental Status: He is alert and oriented to person, place, and time.   Psychiatric:         Mood and Affect: Mood normal.         Behavior: Behavior normal.         Procedures    Assessment & Plan   Diagnoses and all orders for this visit:    1. Mixed hyperlipidemia (Primary)  -     Lipid Panel With LDL / HDL Ratio; Future    2. Type 2 diabetes mellitus without complication, without long-term current use of insulin (HCC)  -     Comprehensive Metabolic Panel; Future  -     Hemoglobin A1c; Future    3. Hyperkalemia    4. Anemia due to chronic kidney disease, unspecified CKD stage  -     CBC & Differential; Future    5. Preventative health care  -     TSH+Free T4; Future  -     T3; Future  -     Hepatitis C antibody; Future    6. Screening PSA (prostate specific antigen)  -     PSA Screen; Future    7. Stage 3b chronic kidney disease (HCC)    8. Obstructive sleep apnea    Other orders  -     glimepiride (AMARYL) 1 MG tablet; Take 1 tablet by mouth Every Morning Before Breakfast.  Dispense: 180 tablet; Refill: 0  -     amLODIPine (NORVASC) 10 MG tablet; Take 1 tablet by mouth Daily. Indications: High Blood Pressure Disorder  Dispense: 90 tablet; Refill: 1  -     pravastatin (PRAVACHOL) 40 MG tablet; Take 1 tablet by mouth Daily. Indications: High Amount of Fats in the Blood  Dispense: 90 tablet; Refill: 1          Current Outpatient Medications:   •  allopurinol (ZYLOPRIM) 100 MG tablet, Take 2 tablets by mouth Daily. Indications: Disorder of Excessive Uric Acid in the Blood, Disp: , Rfl:   •  amLODIPine (NORVASC) 10 MG tablet, Take 1 tablet by mouth Daily. Indications: High Blood Pressure Disorder, Disp: 90 tablet, Rfl: 1  •  aspirin 81 MG EC tablet, Take 1 tablet by mouth Daily. Indications: Heart Attack Affecting Area  Damaged by Previous Attack, Disp: , Rfl:   •  cetirizine (zyrTEC) 10 MG tablet, Take 1 tablet by mouth Daily., Disp: 90 tablet, Rfl: 1  •  clopidogrel (PLAVIX) 75 MG tablet, Take 1 tablet by mouth Daily. Indications: Ischemic Heart Disease, Disp: 90 tablet, Rfl: 3  •  cyclobenzaprine (FLEXERIL) 10 MG tablet, Take 1 tablet by mouth 3 (Three) Times a Day As Needed for Muscle Spasms., Disp: , Rfl:   •  ferrous sulfate 325 (65 FE) MG EC tablet, Take 1 tablet by mouth Daily With Breakfast., Disp: , Rfl:   •  furosemide (LASIX) 40 MG tablet, Take 1 tablet by mouth 2 (Two) Times a Day., Disp: 180 tablet, Rfl: 1  •  glimepiride (AMARYL) 1 MG tablet, Take 1 tablet by mouth Every Morning Before Breakfast., Disp: 180 tablet, Rfl: 0  •  hydrALAZINE (APRESOLINE) 50 MG tablet, Take 1 tablet by mouth 3 (Three) Times a Day., Disp: 270 tablet, Rfl: 1  •  ipratropium-albuterol (DUO-NEB) 0.5-2.5 mg/3 ml nebulizer, Take 3 mL by nebulization 4 (Four) Times a Day As Needed for Wheezing., Disp: 120 mL, Rfl: 5  •  nitroglycerin (Nitrostat) 0.3 MG SL tablet, Place 1 tablet under the tongue Every 5 (Five) Minutes As Needed for Chest Pain. Take no more than 3 doses in 15 minutes., Disp: 50 tablet, Rfl: 12  •  O2 (OXYGEN), Inhale 3 L/min As Needed (shortness of air). Indications: COPD, Disp: , Rfl:   •  oxyCODONE-acetaminophen (Percocet)  MG per tablet, Take 1 tablet by mouth Every 6 (Six) Hours As Needed for Moderate Pain. Indications: Pain, Disp: 120 tablet, Rfl: 0  •  oxyCODONE-acetaminophen (Percocet)  MG per tablet, Take 1 tablet by mouth Every 6 (Six) Hours As Needed for Moderate Pain., Disp: 120 tablet, Rfl: 0  •  oxyCODONE-acetaminophen (Percocet)  MG per tablet, Take 1 tablet by mouth Every 6 (Six) Hours As Needed for Moderate Pain., Disp: 92 tablet, Rfl: 0  •  pantoprazole (Protonix) 40 MG EC tablet, Take 1 tablet by mouth 2 (Two) Times a Day., Disp: 180 tablet, Rfl: 1  •  pravastatin (PRAVACHOL) 40 MG tablet, Take  1 tablet by mouth Daily. Indications: High Amount of Fats in the Blood, Disp: 90 tablet, Rfl: 1  •  pregabalin (LYRICA) 50 MG capsule, Take 1 capsule by mouth 2 (Two) Times a Day., Disp: , Rfl:   •  Probiotic Product (CULTURELLE PROBIOTICS PO), Take 20 mg by mouth Daily. Indications: diabetic supplement, Disp: , Rfl:   •  tamsulosin (FLOMAX) 0.4 MG capsule 24 hr capsule, Take 1 capsule by mouth Daily., Disp: 90 capsule, Rfl: 1  •  traZODone (DESYREL) 50 MG tablet, Take 0.5-2 tablets by mouth Every Night., Disp: 180 tablet, Rfl: 1  •  vitamin B-12 (CYANOCOBALAMIN) 1000 MCG tablet, Take 1 tablet by mouth 2 (Two) Times a Day. Indications: Inadequate Vitamin B12, Disp: , Rfl:   •  budesonide (PULMICORT) 0.25 MG/2ML nebulizer solution, Take 2 mL by nebulization Daily for 30 days., Disp: 60 mL, Rfl: 5           JANE Martinez 3/15/2023 15:25 EDT  This note has been electronically signed

## 2023-03-15 NOTE — PATIENT INSTRUCTIONS
Fasting blood work  Portable oxygen tank  Keep appointment for colonoscopy on the 25th April  Follow-up 6 months

## 2023-03-20 ENCOUNTER — OFFICE VISIT (OUTPATIENT)
Dept: PAIN MEDICINE | Facility: CLINIC | Age: 83
End: 2023-03-20
Payer: MEDICARE

## 2023-03-20 VITALS
HEART RATE: 74 BPM | RESPIRATION RATE: 16 BRPM | SYSTOLIC BLOOD PRESSURE: 144 MMHG | OXYGEN SATURATION: 97 % | DIASTOLIC BLOOD PRESSURE: 63 MMHG

## 2023-03-20 DIAGNOSIS — M47.812 CERVICAL SPONDYLOSIS WITHOUT MYELOPATHY: ICD-10-CM

## 2023-03-20 DIAGNOSIS — G89.29 CHRONIC BILATERAL LOW BACK PAIN WITHOUT SCIATICA: Primary | ICD-10-CM

## 2023-03-20 DIAGNOSIS — G89.4 CHRONIC PAIN SYNDROME: ICD-10-CM

## 2023-03-20 DIAGNOSIS — M54.50 CHRONIC BILATERAL LOW BACK PAIN WITHOUT SCIATICA: Primary | ICD-10-CM

## 2023-03-20 DIAGNOSIS — Z79.899 ENCOUNTER FOR LONG-TERM (CURRENT) USE OF HIGH-RISK MEDICATION: ICD-10-CM

## 2023-03-20 DIAGNOSIS — M54.2 CERVICALGIA: ICD-10-CM

## 2023-03-20 DIAGNOSIS — M47.817 LUMBOSACRAL SPONDYLOSIS WITHOUT MYELOPATHY: ICD-10-CM

## 2023-03-20 PROCEDURE — G0463 HOSPITAL OUTPT CLINIC VISIT: HCPCS | Performed by: PHYSICAL MEDICINE & REHABILITATION

## 2023-03-20 PROCEDURE — 3077F SYST BP >= 140 MM HG: CPT | Performed by: PHYSICAL MEDICINE & REHABILITATION

## 2023-03-20 PROCEDURE — 99213 OFFICE O/P EST LOW 20 MIN: CPT | Performed by: PHYSICAL MEDICINE & REHABILITATION

## 2023-03-20 PROCEDURE — 3078F DIAST BP <80 MM HG: CPT | Performed by: PHYSICAL MEDICINE & REHABILITATION

## 2023-03-20 PROCEDURE — 1125F AMNT PAIN NOTED PAIN PRSNT: CPT | Performed by: PHYSICAL MEDICINE & REHABILITATION

## 2023-03-20 RX ORDER — OXYCODONE AND ACETAMINOPHEN 10; 325 MG/1; MG/1
1 TABLET ORAL EVERY 6 HOURS PRN
Qty: 120 TABLET | Refills: 0 | Status: SHIPPED | OUTPATIENT
Start: 2023-03-20

## 2023-03-20 RX ORDER — OXYCODONE AND ACETAMINOPHEN 10; 325 MG/1; MG/1
1 TABLET ORAL EVERY 6 HOURS PRN
Qty: 92 TABLET | Refills: 0 | Status: SHIPPED | OUTPATIENT
Start: 2023-03-20 | End: 2023-03-30 | Stop reason: SDUPTHER

## 2023-03-20 NOTE — PROGRESS NOTES
Subjective    CC back pain, neck pain, bilateral leg pain  Kailash Cooper Jr. is a 82 y.o. male with multiple comorbidities including CKD, DM, COPD, chronic back pain polyarthralgia here for f/u.  Chronic back pain radiating to bilateral hips worse with standing walking or prolonged activity.  Denies new weakness saddle anesthesia bladder bowel continence.  Chronic axial neck pain radiating to bilateral shoulder without radicular pain.  Continued chronic polyarthralgia and myofascial pain worse with activity.  Tried physical therapy/chiropractor with marginal relief.  Tried LESI's with marginal relief.  Had MI with stent, began Plavix for life. Had another MI with PNA, doing better now.    Utilized hydrocodone 10/325 6 times daily as needed for severe pain, failed 4 times daily as needed. Rotated to Percocet 10mg QID prn. Functional benefit /denies side effects.    L-spine MRI 2018 moderate degenerative changes throughout facet arthropathy mild to moderate foraminal narrowing, scoliosis.  C-spine MRI 2018 marked degenerative disc and some posterior element disease, causing spinal stenosis at multiple levels, including C2-3, C3-4, C5-6, and C6-7.  There multiple foraminal impingements    Pain Assessment   Location of Pain: Lower Back, neck pain, joint  Description of Pain: Dull/Aching, Throbbing, Stabbing  Previous Pain Rating :6  Current Pain Ratin  Aggravating Factors: Activity  Alleviating Factors: Rest, Medication    PEG Assessment   What number best describes your pain on average in the past week? 5  What number best describes how, during the past week, pain has interfered with your enjoyment of life?3  What number best describes how, during the past week, pain has interfered with your general activity?10     The following portions of the patient's history were reviewed and updated as appropriate: allergies, current medications, past family history, past medical history, past social history, past surgical  history and problem list.     has a past medical history of Arthritis, Cancer (HCC), Diabetes (HCC), Enlarged prostate, Former smoker, Hiatal hernia, Hip pain, Hip pain, bilateral, Hyperlipidemia, Hypertension, Kidney disease, Leg pain, Low back pain, Neck pain, and Stroke (HCC).   has a past surgical history that includes Cataract extraction (); Colonoscopy (); Lung cancer surgery; Coronary stent placement (2022); Cardiac catheterization (Left, 2022); Colonoscopy (N/A, 2022); and Esophagogastroduodenoscopy (N/A, 2022).  family history includes Cancer in his father; Heart disease in an other family member; Heart failure in his brother; Stroke in his mother.  Social History     Tobacco Use   • Smoking status: Former     Packs/day: 2.00     Types: Cigarettes     Quit date:      Years since quittin.2   • Smokeless tobacco: Never   Substance Use Topics   • Alcohol use: Never     Review of Systems   Musculoskeletal: Positive for arthralgias, back pain, myalgias and neck pain.   All other systems reviewed and are negative.    Objective   Physical Exam   Constitutional: He is oriented to person, place, and time. He appears well-developed and well-nourished. No distress.   HENT:   Head: Normocephalic and atraumatic.   Eyes: Pupils are equal, round, and reactive to light.   Cardiovascular: Normal rate, regular rhythm and normal heart sounds.   Pulmonary/Chest: Effort normal and breath sounds normal.   Abdominal: Soft. Normal appearance and bowel sounds are normal. He exhibits no distension. There is no abdominal tenderness.   Musculoskeletal:      Cervical back: He exhibits tenderness.      Lumbar back: He exhibits decreased range of motion and tenderness.   Neurological: He is alert and oriented to person, place, and time. He has normal reflexes. He displays normal reflexes. No sensory deficit.   Psychiatric: His behavior is normal. Mood, judgment and thought content normal.     BP  144/63   Pulse 74   Resp 16   SpO2 97%     PHQ 9 on chart  Opioid risk tool low risk    Assessment & Plan   Diagnoses and all orders for this visit:    1. Chronic bilateral low back pain without sciatica (Primary)    2. Cervicalgia    3. Cervical spondylosis without myelopathy    4. Chronic pain syndrome    5. Lumbosacral spondylosis without myelopathy    Summary  Kailash Cooper is a 82 y.o. male with multiple comorbidities including CKD, DM, COPD, chronic back pain polyarthralgia here for f/u.   Chronic pain from lumbar and cervical DDD spondylosis, polyarthralgia/myofascial pain.  Tried physical therapy, chiropractor, LESI's with marginal relief.  Axial back pain interfering with ADLs and his ability to exercise.  No NSAIDs due to CKD.        Complains of worsening back and bilateral hip pain.  Declines injections.    Increased to Hydrocodone 10/325 6 times daily as needed for severe pain, worked better than 4 or 5 pills daily with [facility] pain physician, tolerant. Rotated to Percocet 10mg QID prn, continue. Filled 2/28/23  UDS 4/4/22 and inspect reviewed.  Discussed risk of tolerance, dependence, respiratory depression, coma and death associated with use of oral opioids for treatment of chronic nonmalignant pain.     RTC 3 months for f/u        History of Present Illness

## 2023-03-30 ENCOUNTER — TELEPHONE (OUTPATIENT)
Dept: PAIN MEDICINE | Facility: CLINIC | Age: 83
End: 2023-03-30

## 2023-03-30 DIAGNOSIS — M54.2 CERVICALGIA: ICD-10-CM

## 2023-03-30 RX ORDER — OXYCODONE AND ACETAMINOPHEN 10; 325 MG/1; MG/1
1 TABLET ORAL EVERY 6 HOURS PRN
Qty: 28 TABLET | Refills: 0 | Status: SHIPPED | OUTPATIENT
Start: 2023-03-30 | End: 2023-03-30

## 2023-03-30 NOTE — TELEPHONE ENCOUNTER
Caller: BRIAN DURAN    Relationship to patient: Emergency Contact    Patient is needing: PATIENTS SPOUSE CALLED AGAIN.  SHE IS TRYING  TO GET THIS TAKEN CARE OF ASAP AND UNSURE OF WHAT TO DO.  PLEASE CONTACT HER

## 2023-03-30 NOTE — TELEPHONE ENCOUNTER
Spoke to patients wife. The rx for this month was for 92pills they want the remainder sent ASAP. Inspect in chart.

## 2023-03-30 NOTE — TELEPHONE ENCOUNTER
Caller: RBIAN DURAN    Relationship: SELF    Best call back number: [phone]    Requested Prescriptions:   OXYCODONE    Pharmacy where request should be sent:  [facility]    Last office visit with prescribing clinician: 3/20/2023   Last telemedicine visit with prescribing clinician: 6/26/2023   Next office visit with prescribing clinician: 6/26/2023     Additional details provided by patient: IT SHOULD BE THE 10 MG AND HE ONLY RECEIVED 92 TABLETS.  HE IS SUPPOSED TO  TABLETS.      Does the patient have less than a 3 day supply:  [x] Yes  [] No    Would you like a call back once the refill request has been completed: [x] Yes [] No    If the office needs to give you a call back, can they leave a voicemail: [x] Yes [] No    Tr Black Rep   03/30/23 09:26 EDT

## 2023-04-10 ENCOUNTER — TELEPHONE (OUTPATIENT)
Dept: PAIN MEDICINE | Facility: CLINIC | Age: 83
End: 2023-04-10
Payer: MEDICARE

## 2023-04-10 DIAGNOSIS — M54.2 CERVICALGIA: ICD-10-CM

## 2023-04-10 NOTE — TELEPHONE ENCOUNTER
.    Caller: BRIAN ROGER    Relationship: SELF    Best call back number: [phone]    Requested Prescriptions:   OXYCODONE    Pharmacy where request should be sent:  [facility]    Last office visit with prescribing clinician: 3/20/2023   Last telemedicine visit with prescribing clinician: 6/26/2023   Next office visit with prescribing clinician: 6/26/2023     Additional details provided by patient: HE GOT 92 AND HE WAS SUPPOSED TO HAVE 120.  PATIENT WANTED TO KNOW IF A REMINDER CAN BE SENT VIA TEXT WHEN THE REFILL IS COMPLETE    Does the patient have less than a 3 day supply:  [x] Yes  [] No    Would you like a call back once the refill request has been completed: [x] Yes [] No    If the office needs to give you a call back, can they leave a voicemail: [x] Yes [] No    Tr Black Rep   04/10/23 08:39 EDT

## 2023-04-11 NOTE — TELEPHONE ENCOUNTER
PT SPOUSE CALLING TO FIND OUT STATUS OF PREVIOUS REQUEST. UNABLE TO WARM TRANSFER. PLEASE RETURN CALL TO PT SPOUSE [phone]

## 2023-04-12 RX ORDER — OXYCODONE AND ACETAMINOPHEN 10; 325 MG/1; MG/1
1 TABLET ORAL EVERY 6 HOURS PRN
Qty: 28 TABLET | Refills: 0 | Status: SHIPPED | OUTPATIENT
Start: 2023-04-12 | End: 2023-04-12

## 2023-04-13 ENCOUNTER — TELEPHONE (OUTPATIENT)
Dept: FAMILY MEDICINE CLINIC | Facility: CLINIC | Age: 83
End: 2023-04-13
Payer: MEDICARE

## 2023-04-13 NOTE — TELEPHONE ENCOUNTER
Caller: Kailash Cooper Jr.    Relationship to patient: Self    Best call back number: [phone]    Patient is needing:     PATIENT SAID [facility] NEEDS THE PULSE DOSE ON THE PATIENT'S OXYGEN ORDER FOR A PORTABLE NEBULIZER, HE SAID THIS IS LIKE HOW MANY LITERS HE IS USING     THEY CANNOT FULFILL HIS ORDER WITHOUT THIS INFORMATION

## 2023-04-18 DIAGNOSIS — M54.2 CERVICALGIA: ICD-10-CM

## 2023-04-18 RX ORDER — OXYCODONE AND ACETAMINOPHEN 10; 325 MG/1; MG/1
1 TABLET ORAL EVERY 6 HOURS PRN
Qty: 28 TABLET | Refills: 0 | Status: SHIPPED | OUTPATIENT
Start: 2023-04-18 | End: 2023-04-18

## 2023-04-18 NOTE — TELEPHONE ENCOUNTER
He only had 92 filled on 03/30 he still needs 28 pills for this month. I do not see another refill until 05/29. If I missed something just cancel the order.

## 2023-05-18 DIAGNOSIS — J44.9 CHRONIC OBSTRUCTIVE PULMONARY DISEASE, UNSPECIFIED COPD TYPE: Primary | ICD-10-CM

## 2023-05-18 RX ORDER — IPRATROPIUM BROMIDE AND ALBUTEROL SULFATE 2.5; .5 MG/3ML; MG/3ML
3 SOLUTION RESPIRATORY (INHALATION) 4 TIMES DAILY PRN
Qty: 1080 ML | Refills: 1 | Status: SHIPPED | OUTPATIENT
Start: 2023-05-18

## 2023-05-18 RX ORDER — BUDESONIDE 0.25 MG/2ML
0.25 INHALANT ORAL
Qty: 180 ML | Refills: 1 | Status: SHIPPED | OUTPATIENT
Start: 2023-05-18

## 2023-05-26 RX ORDER — PANTOPRAZOLE SODIUM 40 MG/1
TABLET, DELAYED RELEASE ORAL
Qty: 180 TABLET | Refills: 0 | Status: SHIPPED | OUTPATIENT
Start: 2023-05-26

## 2023-07-13 ENCOUNTER — TELEPHONE (OUTPATIENT)
Dept: FAMILY MEDICINE CLINIC | Facility: CLINIC | Age: 83
End: 2023-07-13

## 2023-07-13 NOTE — TELEPHONE ENCOUNTER
Caller: Kailash Cooper Jr.    Relationship: Self    Best call back number: [phone]     What is the medical concern/diagnosis: BACK ISSUES    What specialty or service is being requested: CHIROPRACTOR    What is the provider, practice or medical service name: [facility] [place]    What is the office location: [address] [place] [address]    What is the office phone number: [phone]    Any additional details: [facility] IS SCHEDULED FOR NEXT TUESDAY AT 10:45 AND JUST NEEDS REFERRAL SENT

## 2023-07-17 NOTE — TELEPHONE ENCOUNTER
I spoke with Gilma, daughter, advised her that we dont usually do referrals to chiro and they do not need one.

## 2023-07-25 RX ORDER — HYDRALAZINE HYDROCHLORIDE 50 MG/1
TABLET, FILM COATED ORAL
Qty: 270 TABLET | Refills: 0 | Status: SHIPPED | OUTPATIENT
Start: 2023-07-25

## 2023-08-01 RX ORDER — CYCLOBENZAPRINE HCL 10 MG
TABLET ORAL
Qty: 90 TABLET | Refills: 0 | Status: SHIPPED | OUTPATIENT
Start: 2023-08-01

## 2023-08-16 ENCOUNTER — OFFICE VISIT (OUTPATIENT)
Dept: PULMONOLOGY | Facility: HOSPITAL | Age: 83
End: 2023-08-16
Payer: MEDICARE

## 2023-08-16 VITALS
WEIGHT: 133 LBS | HEIGHT: 65 IN | OXYGEN SATURATION: 98 % | BODY MASS INDEX: 22.16 KG/M2 | SYSTOLIC BLOOD PRESSURE: 133 MMHG | DIASTOLIC BLOOD PRESSURE: 62 MMHG | RESPIRATION RATE: 14 BRPM | HEART RATE: 62 BPM

## 2023-08-16 DIAGNOSIS — Z99.81 OXYGEN DEPENDENT: Primary | ICD-10-CM

## 2023-08-16 DIAGNOSIS — J44.9 CHRONIC OBSTRUCTIVE PULMONARY DISEASE, UNSPECIFIED COPD TYPE: ICD-10-CM

## 2023-08-16 DIAGNOSIS — Z87.891 FORMER SMOKER: ICD-10-CM

## 2023-08-16 PROCEDURE — G0463 HOSPITAL OUTPT CLINIC VISIT: HCPCS

## 2023-08-16 NOTE — PROGRESS NOTES
HPI:  Follow-up on COPD and hypoxemia.  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 Indications: Disorder of Excessive Uric Acid in the Blood      amLODIPine (NORVASC) 10 MG tablet Take 1 tablet by mouth Daily. Indications: High Blood Pressure Disorder 90 tablet 1    budesonide (PULMICORT) 0.25 MG/2ML nebulizer solution Take 2 mL by nebulization Daily. Indications: Chronic Obstructive Lung Disease 180 mL 1    cetirizine (zyrTEC) 10 MG tablet Take 1 tablet by mouth Daily. 90 tablet 1    clopidogrel (PLAVIX) 75 MG tablet Take 1 tablet by mouth Daily. Indications: Ischemic Heart Disease 90 tablet 3    cyclobenzaprine (FLEXERIL) 10 MG tablet TAKE 1 TABLET BY MOUTH AT NIGHT AS NEEDED FOR MUSCLE SPASM 90 tablet 0    ferrous sulfate 325 (65 FE) MG EC tablet Take 1 tablet by mouth Daily With Breakfast.      furosemide (LASIX) 40 MG tablet Take 1 tablet by mouth 2 (Two) Times a Day. 180 tablet 1    glimepiride (AMARYL) 1 MG tablet Take 1 tablet by mouth Every Morning Before Breakfast. 180 tablet 0    hydrALAZINE (APRESOLINE) 50 MG tablet TAKE 1 TABLET BY MOUTH THREE TIMES DAILY 270 tablet 0    HYDROcodone-acetaminophen (Norco)  MG per tablet Take 1 tablet by mouth Every 4 (Four) Hours As Needed for Moderate Pain. 180 tablet 0    HYDROcodone-acetaminophen (Norco)  MG  per tablet Take 1 tablet by mouth Every 4 (Four) Hours As Needed for Moderate Pain. 180 tablet 0    HYDROcodone-acetaminophen (Norco)  MG per tablet Take 1 tablet by mouth Every 4 (Four) Hours As Needed for Moderate Pain. 180 tablet 0    ipratropium-albuterol (DUO-NEB) 0.5-2.5 mg/3 ml nebulizer Take 3 mL by nebulization 4 (Four) Times a Day As Needed for Wheezing. Indications: Chronic Obstructive Lung Disease 1080 mL 1    nitroglycerin (Nitrostat) 0.3 MG SL tablet Place 1 tablet under the tongue Every 5 (Five) Minutes As Needed for Chest Pain. Take no more than 3 doses in 15 minutes. 50 tablet 12    O2 (OXYGEN) Inhale 3 L/min As Needed (shortness of air). Indications: COPD      pantoprazole (PROTONIX) 40 MG EC tablet Take 1 tablet by mouth twice daily 180 tablet 0    pravastatin (PRAVACHOL) 40 MG tablet Take 1 tablet by mouth Daily. Indications: High Amount of Fats in the Blood 90 tablet 1    pregabalin (LYRICA) 50 MG capsule Take 1 capsule by mouth 2 (Two) Times a Day. 180 capsule 1    Probiotic Product (CULTURELLE PROBIOTICS PO) Take 20 mg by mouth Daily. Indications: diabetic supplement      tamsulosin (FLOMAX) 0.4 MG capsule 24 hr capsule Take 1 capsule by mouth Daily. 90 capsule 1    traZODone (DESYREL) 50 MG tablet Take 0.5-2 tablets by mouth Every Night. 180 tablet 1    vitamin B-12 (CYANOCOBALAMIN) 1000 MCG tablet Take 1 tablet by mouth 2 (Two) Times a Day. Indications: Inadequate Vitamin B12      [DISCONTINUED] aspirin 81 MG EC tablet Take 1 tablet by mouth Daily. Indications: Heart Attack Affecting Area Damaged by Previous Attack      [DISCONTINUED] oxyCODONE-acetaminophen (PERCOCET)  MG per tablet Take 1 tablet by mouth Every 6 (Six) Hours As Needed for Moderate Pain.       No current facility-administered medications on file prior to visit.        Social History     Tobacco Use    Smoking status: Former     Packs/day: 2.00     Types: Cigarettes     Quit date: 1990     Years since quitting:  "33.6     Passive exposure: Past    Smokeless tobacco: Never   Vaping Use    Vaping Use: Never used   Substance Use Topics    Alcohol use: Never    Drug use: Never        Family History   Problem Relation Age of Onset    Stroke Mother     Cancer Father         Prostate    Heart failure Brother     Heart disease Other         Review of system:  Constitutional: Negative for chills, fever and malaise/fatigue.   HENT: Negative.    Eyes: Negative.    Cardiovascular: Negative.    Respiratory: Positive for chronic exertional shortness of breath.    Skin: Negative.    Musculoskeletal: Negative.    Gastrointestinal: Negative.    Genitourinary: Negative.    Neurological: Negative.    Psychiatric/Behavioral: Negative.    Physical exam:  Blood pressure 133/62, pulse 62, resp. rate 14, height 165.1 cm (65\"), weight 60.3 kg (133 lb), SpO2 98 %.    General Appearance:  Alert   HEENT:  Normocephalic, without obvious abnormality, Conjunctiva/corneas clear,.   Nares normal, no drainage     Neck:  Supple, symmetrical, trachea midline. No JVD.  Lungs /Chest wall:   Good air entry bilaterally without wheezing or rhonchi, respirations unlabored, symmetrical wall movement.     Heart:  Regular rate and rhythm, S1 S2 normal  Abdomen: Soft, non-tender, no masses, no organomegaly.    Extremities: No edema, no clubbing or cyanosis    No radiology results for the last 90 days.   Results for orders placed during the hospital encounter of 05/25/22    Adult Transthoracic Echo Complete W/ Cont if Necessary Per Protocol    Interpretation Summary  ú Left ventricular systolic function is normal.  ú Left ventricular ejection fraction is 55 to 60%  ú Left ventricular diastolic function was normal.  ú Calculated right ventricular systolic pressure from tricuspid regurgitation is 35.9 mmHg.        Assessment and plan:    COPD: Repeated PFTs 10/3/2022 with moderate restrictive disease TLC 69% but severe decrease in DLCO 37%  Hypoxemia  History of lung " cancer status post resection 2017, latest CT scan 7/29/2022: Stable exam. Stable postsurgical change of right upper lobectomy. Chronic advanced subpleural interstitial lung disease with mild fibrosis and chronic basilar scarring. No evidence of a pulmonary mass or dominant suspicious nodule  Coronary artery disease status post PCI  Ex-smoker quit 1990     Plan:     Pulmicort nebulized twice daily  DuoNeb 4 times daily as needed  Home oxygen 3 L per nasal cannula: Patient is benefiting from it and needs to continue with that  Follow-up in 6-month  Patient is advised to stay up-to-date on immunizations for flu, pneumococcal and COVID-19

## 2023-09-03 RX ORDER — PANTOPRAZOLE SODIUM 40 MG/1
TABLET, DELAYED RELEASE ORAL
Qty: 180 TABLET | Refills: 0 | Status: SHIPPED | OUTPATIENT
Start: 2023-09-03

## 2023-09-18 RX ORDER — AMLODIPINE BESYLATE 10 MG/1
TABLET ORAL
Qty: 90 TABLET | Refills: 0 | Status: SHIPPED | OUTPATIENT
Start: 2023-09-18

## 2023-09-25 ENCOUNTER — OFFICE VISIT (OUTPATIENT)
Dept: PAIN MEDICINE | Facility: CLINIC | Age: 83
End: 2023-09-25

## 2023-09-25 VITALS
HEART RATE: 81 BPM | SYSTOLIC BLOOD PRESSURE: 151 MMHG | RESPIRATION RATE: 16 BRPM | OXYGEN SATURATION: 97 % | DIASTOLIC BLOOD PRESSURE: 69 MMHG

## 2023-09-25 DIAGNOSIS — G89.4 CHRONIC PAIN SYNDROME: ICD-10-CM

## 2023-09-25 DIAGNOSIS — M47.814 THORACIC SPONDYLOSIS: ICD-10-CM

## 2023-09-25 DIAGNOSIS — M47.817 LUMBOSACRAL SPONDYLOSIS WITHOUT MYELOPATHY: ICD-10-CM

## 2023-09-25 DIAGNOSIS — M47.812 CERVICAL SPONDYLOSIS WITHOUT MYELOPATHY: ICD-10-CM

## 2023-09-25 DIAGNOSIS — G89.29 CHRONIC BILATERAL LOW BACK PAIN WITHOUT SCIATICA: Primary | ICD-10-CM

## 2023-09-25 DIAGNOSIS — M54.2 CERVICALGIA: ICD-10-CM

## 2023-09-25 DIAGNOSIS — M54.50 CHRONIC BILATERAL LOW BACK PAIN WITHOUT SCIATICA: Primary | ICD-10-CM

## 2023-09-25 PROCEDURE — G0463 HOSPITAL OUTPT CLINIC VISIT: HCPCS | Performed by: PHYSICAL MEDICINE & REHABILITATION

## 2023-09-25 RX ORDER — HYDROCODONE BITARTRATE AND ACETAMINOPHEN 10; 325 MG/1; MG/1
1 TABLET ORAL EVERY 4 HOURS PRN
Qty: 180 TABLET | Refills: 0 | Status: SHIPPED | OUTPATIENT
Start: 2023-09-25

## 2023-09-25 RX ORDER — HYDROCODONE BITARTRATE AND ACETAMINOPHEN 10; 325 MG/1; MG/1
1 TABLET ORAL EVERY 4 HOURS PRN
Qty: 180 TABLET | Refills: 0 | Status: SHIPPED | OUTPATIENT
Start: 2023-09-25 | End: 2023-09-27 | Stop reason: SDUPTHER

## 2023-09-25 NOTE — PROGRESS NOTES
Subjective    CC back pain, neck pain, bilateral leg pain  Kailash Cooper Jr. is a 83 y.o. male with multiple comorbidities including CKD, DM, COPD, chronic back pain polyarthralgia here for f/u.  Chronic back pain radiating to bilateral hips worse with standing walking or prolonged activity.  Denies new weakness saddle anesthesia bladder bowel continence.  Chronic axial neck pain radiating to bilateral shoulder without radicular pain.  Continued chronic polyarthralgia and myofascial pain worse with activity.  Tried physical therapy/chiropractor with marginal relief.  Tried LESI's with marginal relief.  Had MI with stent, began Plavix for life. Had another MI with PNA, doing better now.    Utilized hydrocodone 10/325 6 times daily as needed for severe pain, failed 4 times daily as needed. Rotated to Percocet 10mg QID prn. Functional benefit /denies side effects.    L-spine MRI 2018 moderate degenerative changes throughout facet arthropathy mild to moderate foraminal narrowing, scoliosis.  C-spine MRI 2018 marked degenerative disc and some posterior element disease, causing spinal stenosis at multiple levels, including C2-3, C3-4, C5-6, and C6-7.  There multiple foraminal impingements    Pain Assessment   Location of Pain: Lower Back, neck pain, joint  Description of Pain: Dull/Aching, Throbbing, Stabbing  Previous Pain Rating :6  Current Pain Ratin  Aggravating Factors: Activity  Alleviating Factors: Rest, Medication    PEG Assessment   What number best describes your pain on average in the past week? 5  What number best describes how, during the past week, pain has interfered with your enjoyment of life?3  What number best describes how, during the past week, pain has interfered with your general activity?10     The following portions of the patient's history were reviewed and updated as appropriate: allergies, current medications, past family history, past medical history, past social history, past surgical  history and problem list.     has a past medical history of Arthritis, Cancer, Diabetes, Enlarged prostate, Former smoker, Hiatal hernia, Hip pain, Hip pain, bilateral, Hyperlipidemia, Hypertension, Kidney disease, Leg pain, Low back pain, Neck pain, and Stroke.   has a past surgical history that includes Cataract extraction (); Colonoscopy (); Lung cancer surgery; Coronary stent placement (2022); Cardiac catheterization (Left, 2022); Colonoscopy (N/A, 2022); and Esophagogastroduodenoscopy (N/A, 2022).  family history includes Cancer in his father; Heart disease in an other family member; Heart failure in his brother; Stroke in his mother.  Social History     Tobacco Use   • Smoking status: Former     Packs/day: 2.00     Types: Cigarettes     Quit date:      Years since quittin.7     Passive exposure: Past   • Smokeless tobacco: Never   Substance Use Topics   • Alcohol use: Never     Review of Systems   Musculoskeletal:  Positive for arthralgias, back pain, myalgias and neck pain.   All other systems reviewed and are negative.  Objective   Physical Exam   Constitutional: He is oriented to person, place, and time. He appears well-developed and well-nourished. No distress.   HENT:   Head: Normocephalic and atraumatic.   Eyes: Pupils are equal, round, and reactive to light.   Cardiovascular: Normal rate, regular rhythm and normal heart sounds.   Pulmonary/Chest: Effort normal and breath sounds normal.   Abdominal: Soft. Normal appearance and bowel sounds are normal. He exhibits no distension. There is no abdominal tenderness.   Musculoskeletal:      Cervical back: He exhibits tenderness.   Neurological: He is alert and oriented to person, place, and time. He has normal reflexes. He displays normal reflexes. No sensory deficit.   Psychiatric: His behavior is normal. Mood, judgment and thought content normal.   /69   Pulse 81   Resp 16   SpO2 97%     PHQ 9 on chart  Opioid  risk tool low risk    Assessment & Plan   Diagnoses and all orders for this visit:    1. Chronic bilateral low back pain without sciatica (Primary)    2. Cervicalgia    3. Cervical spondylosis without myelopathy    4. Chronic pain syndrome    5. Lumbosacral spondylosis without myelopathy    6. Thoracic spondylosis    Summary  Kailash Cooper is a 82 y.o. male with multiple comorbidities including CKD, DM, COPD, chronic back pain polyarthralgia here for f/u.   Chronic pain from lumbar and cervical DDD spondylosis, polyarthralgia/myofascial pain.  Tried physical therapy, chiropractor, LESI's with marginal relief.  Axial back pain interfering with ADLs and his ability to exercise.  No NSAIDs due to CKD.        Complains of worsening back and bilateral hip pain.  Declines injections.    Increased to Hydrocodone 10/325 6 times daily as needed for severe pain, worked better than 4 or 5 pills daily with [facility] pain physician, tolerant. Rotated to Percocet 10mg QID prn, rotate back to Norco 10mg q4h prn. Filled 5/29/23  UDS 3/20/23 and inspect reviewed.  Discussed risk of tolerance, dependence, respiratory depression, coma and death associated with use of oral opioids for treatment of chronic nonmalignant pain.     RTC 3 months for f/u        History of Present Illness         Physical Exam  Constitutional:       General: He is not in acute distress.     Appearance: Normal appearance. He is well-developed and well-nourished.   HENT:      Head: Normocephalic and atraumatic.   Eyes:      Pupils: Pupils are equal, round, and reactive to light.   Cardiovascular:      Rate and Rhythm: Normal rate and regular rhythm.      Heart sounds: Normal heart sounds.   Pulmonary:      Effort: Pulmonary effort is normal.      Breath sounds: Normal breath sounds.   Abdominal:      General: Bowel sounds are normal. There is no distension.      Palpations: Abdomen is soft.      Tenderness: There is no abdominal tenderness.   Musculoskeletal:       Cervical back: Tenderness present.   Neurological:      Mental Status: He is alert and oriented to person, place, and time.      Sensory: No sensory deficit.      Deep Tendon Reflexes: Reflexes are normal and symmetric. Reflexes normal.   Psychiatric:         Mood and Affect: Mood normal.         Behavior: Behavior normal.         Thought Content: Thought content normal.         Judgment: Judgment normal.

## 2023-09-26 ENCOUNTER — TELEPHONE (OUTPATIENT)
Dept: PAIN MEDICINE | Facility: CLINIC | Age: 83
End: 2023-09-26
Payer: MEDICARE

## 2023-09-26 DIAGNOSIS — G89.29 CHRONIC BILATERAL LOW BACK PAIN WITHOUT SCIATICA: ICD-10-CM

## 2023-09-26 DIAGNOSIS — M54.50 CHRONIC BILATERAL LOW BACK PAIN WITHOUT SCIATICA: ICD-10-CM

## 2023-09-26 NOTE — TELEPHONE ENCOUNTER
Caller: BRIAN DURAN    Relationship: Emergency Contact    Best call back number: [phone]    Requested Prescriptions:   Requested Prescriptions      No prescriptions requested or ordered in this encounter        Pharmacy where request should be sent:  [facility] [place]     Last office visit with prescribing clinician: 9/25/2023   Last telemedicine visit with prescribing clinician: Visit date not found   Next office visit with prescribing clinician: 12/11/2023     ADDITIONAL INFO: [facility] NEED[facility] TO BE CALL[facility] TO SEE IF THEY HAVE RX IN STOCK     Does the patient have less than a 3 day supply:  [x] Yes  [] No    Would you like a call back once the refill request has been completed: [x] Yes [] No    If the office needs to give you a call back, can they leave a voicemail: [x] Yes [] No    Tr Rosario   09/26/23 09:31 EDT

## 2023-09-27 RX ORDER — HYDROCODONE BITARTRATE AND ACETAMINOPHEN 10; 325 MG/1; MG/1
1 TABLET ORAL EVERY 4 HOURS PRN
Qty: 180 TABLET | Refills: 0 | Status: SHIPPED | OUTPATIENT
Start: 2023-09-27

## 2023-09-27 NOTE — TELEPHONE ENCOUNTER
Caller: FRANDY DURANISAURO    Relationship: Emergency Contact    Best call back number: [phone]    Requested Prescriptions:   Requested Prescriptions      No prescriptions requested or ordered in this encounter    HYDROcodone-acetaminophen (Norco)  MG per tablet     Pharmacy where request should be sent:  [facility]  [address] [place] [address] [phone]    Last office visit with prescribing clinician: 9/25/2023   Last telemedicine visit with prescribing clinician: Visit date not found   Next office visit with prescribing clinician: 12/11/2023     Additional details provided by patient: PATIENT IS COMPLETELY OUT [facility] DOES NOT HAVE RX     Does the patient have less than a 3 day supply:  [x] Yes  [] No    Would you like a call back once the refill request has been completed: [] Yes [x] No    If the office needs to give you a call back, can they leave a voicemail: [] Yes [x] No    Tr Clark   09/27/23 09:25 EDT

## 2023-10-20 ENCOUNTER — TELEPHONE (OUTPATIENT)
Dept: PAIN MEDICINE | Facility: CLINIC | Age: 83
End: 2023-10-20
Payer: MEDICARE

## 2023-10-20 NOTE — TELEPHONE ENCOUNTER
Caller: PATIENT    Relationship to patient: SELF    Best call back number: [phone]    Patient is needing: PATIENT WAS CALLING TO DISCUSS SWITCHING HIS MEDICATION FROM HYDROCODONE-ACETAMINOPHEN 10 MG TO OXYCODONE DUE TO HAVING A HARD TIME FINDING HYDROCODONE IN STOCK. PATIENT WOULD LIKE A CALL BACK TO DISCUSS HIS REQUEST TO SWITCH MEDICATIONS AND MENTIONED HE WOULD BE DUE FOR A REFILL IN A FEW DAYS. THANK YOU!

## 2023-10-21 RX ORDER — GLIMEPIRIDE 1 MG/1
1 TABLET ORAL
Qty: 180 TABLET | Refills: 0 | Status: SHIPPED | OUTPATIENT
Start: 2023-10-21

## 2023-10-23 NOTE — TELEPHONE ENCOUNTER
Caller: BRIAN DURAN    Relationship: [employer]OUSE    Best call back number:     Requested Prescriptions:   OXYCODONE       Pharmacy where request should be sent:    [facility] [address] [place], [place] - [address] [phone] [place] [phone]        Last office visit with prescribing clinician: 9/25/2023   Last telemedicine visit with prescribing clinician: Visit date not found   Next office visit with prescribing clinician: 12/11/2023     Additional details provided by patient: WAS SUGGESTED THEY TRY SOMETHING ELSE     Does the patient have less than a 3 day supply:  [x] Yes  [] No    Would you like a call back once the refill request has been completed: [] Yes [x] No    If the office needs to give you a call back, can they leave a voicemail: [] Yes [x] No    Tr Barajas Rep   10/23/23 15:41 EDT

## 2023-11-21 RX ORDER — FUROSEMIDE 40 MG/1
40 TABLET ORAL 2 TIMES DAILY
Qty: 180 TABLET | Refills: 0 | Status: SHIPPED | OUTPATIENT
Start: 2023-11-21

## 2023-12-11 ENCOUNTER — TELEPHONE (OUTPATIENT)
Dept: PAIN MEDICINE | Facility: CLINIC | Age: 83
End: 2023-12-11
Payer: MEDICARE

## 2023-12-11 ENCOUNTER — OFFICE VISIT (OUTPATIENT)
Dept: PAIN MEDICINE | Facility: CLINIC | Age: 83
End: 2023-12-11
Payer: MEDICARE

## 2023-12-11 VITALS
OXYGEN SATURATION: 98 % | HEART RATE: 83 BPM | SYSTOLIC BLOOD PRESSURE: 159 MMHG | DIASTOLIC BLOOD PRESSURE: 71 MMHG | RESPIRATION RATE: 16 BRPM

## 2023-12-11 DIAGNOSIS — M47.814 THORACIC SPONDYLOSIS: ICD-10-CM

## 2023-12-11 DIAGNOSIS — G89.4 CHRONIC PAIN SYNDROME: ICD-10-CM

## 2023-12-11 DIAGNOSIS — M54.2 CERVICALGIA: ICD-10-CM

## 2023-12-11 DIAGNOSIS — M47.812 CERVICAL SPONDYLOSIS WITHOUT MYELOPATHY: ICD-10-CM

## 2023-12-11 DIAGNOSIS — G89.29 CHRONIC BILATERAL LOW BACK PAIN WITHOUT SCIATICA: Primary | ICD-10-CM

## 2023-12-11 DIAGNOSIS — M47.817 LUMBOSACRAL SPONDYLOSIS WITHOUT MYELOPATHY: ICD-10-CM

## 2023-12-11 DIAGNOSIS — M54.50 CHRONIC BILATERAL LOW BACK PAIN WITHOUT SCIATICA: Primary | ICD-10-CM

## 2023-12-11 PROCEDURE — 1125F AMNT PAIN NOTED PAIN PRSNT: CPT | Performed by: PHYSICAL MEDICINE & REHABILITATION

## 2023-12-11 PROCEDURE — G0463 HOSPITAL OUTPT CLINIC VISIT: HCPCS | Performed by: PHYSICAL MEDICINE & REHABILITATION

## 2023-12-11 PROCEDURE — 3077F SYST BP >= 140 MM HG: CPT | Performed by: PHYSICAL MEDICINE & REHABILITATION

## 2023-12-11 PROCEDURE — 1159F MED LIST DOCD IN RCRD: CPT | Performed by: PHYSICAL MEDICINE & REHABILITATION

## 2023-12-11 PROCEDURE — 3078F DIAST BP <80 MM HG: CPT | Performed by: PHYSICAL MEDICINE & REHABILITATION

## 2023-12-11 PROCEDURE — 1160F RVW MEDS BY RX/DR IN RCRD: CPT | Performed by: PHYSICAL MEDICINE & REHABILITATION

## 2023-12-11 PROCEDURE — 99214 OFFICE O/P EST MOD 30 MIN: CPT | Performed by: PHYSICAL MEDICINE & REHABILITATION

## 2023-12-11 RX ORDER — MORPHINE SULFATE 15 MG/1
15 TABLET, FILM COATED, EXTENDED RELEASE ORAL 3 TIMES DAILY
Qty: 90 TABLET | Refills: 0 | Status: SHIPPED | OUTPATIENT
Start: 2023-12-11

## 2023-12-11 NOTE — PROGRESS NOTES
Subjective    CC back pain, neck pain, bilateral leg pain  Kailash Cooper Jr. is a 83 y.o. male with multiple comorbidities including CKD, DM, COPD, chronic back pain polyarthralgia here for f/u.  Chronic back pain radiating to bilateral hips worse with standing walking or prolonged activity.  Denies new weakness saddle anesthesia bladder bowel continence.  Chronic axial neck pain radiating to bilateral shoulder without radicular pain.  Continued chronic polyarthralgia and myofascial pain worse with activity.  Tried physical therapy/chiropractor with marginal relief.  Tried LESI's with marginal relief.  Had MI with stent, began Plavix for life. Had another MI with PNA, doing better now.    Utilized hydrocodone 10/325 6 times daily as needed for severe pain, failed 4 times daily as needed. Rotated to Percocet 10mg QID prn. Functional benefit /denies side effects.    L-spine MRI 2018 moderate degenerative changes throughout facet arthropathy mild to moderate foraminal narrowing, scoliosis.  C-spine MRI 2018 marked degenerative disc and some posterior element disease, causing spinal stenosis at multiple levels, including C2-3, C3-4, C5-6, and C6-7.  There multiple foraminal impingements    Pain Assessment   Location of Pain: Lower Back, neck pain, joint  Description of Pain: Dull/Aching, Throbbing, Stabbing  Previous Pain Rating :6  Current Pain Ratin  Aggravating Factors: Activity  Alleviating Factors: Rest, Medication    PEG Assessment   What number best describes your pain on average in the past week? 5  What number best describes how, during the past week, pain has interfered with your enjoyment of life?3  What number best describes how, during the past week, pain has interfered with your general activity?10     The following portions of the patient's history were reviewed and updated as appropriate: allergies, current medications, past family history, past medical history, past social history, past surgical  history and problem list.     has a past medical history of Arthritis, Cancer, Diabetes, Enlarged prostate, Former smoker, Hiatal hernia, Hip pain, Hip pain, bilateral, Hyperlipidemia, Hypertension, Kidney disease, Leg pain, Low back pain, Neck pain, and Stroke.   has a past surgical history that includes Cataract extraction (); Colonoscopy (); Lung cancer surgery; Coronary stent placement (2022); Cardiac catheterization (Left, 2022); Colonoscopy (N/A, 2022); and Esophagogastroduodenoscopy (N/A, 2022).  family history includes Cancer in his father; Heart disease in an other family member; Heart failure in his brother; Stroke in his mother.  Social History     Tobacco Use    Smoking status: Former     Packs/day: 2     Types: Cigarettes     Quit date:      Years since quittin.9     Passive exposure: Past    Smokeless tobacco: Never   Substance Use Topics    Alcohol use: Never     Review of Systems   Musculoskeletal:  Positive for arthralgias, back pain, myalgias and neck pain.   All other systems reviewed and are negative.    Objective   Physical Exam   Constitutional: He is oriented to person, place, and time. He appears well-developed and well-nourished. No distress.   HENT:   Head: Normocephalic and atraumatic.   Eyes: Pupils are equal, round, and reactive to light.   Cardiovascular: Normal rate, regular rhythm and normal heart sounds.   Pulmonary/Chest: Effort normal and breath sounds normal.   Abdominal: Soft. Normal appearance and bowel sounds are normal. He exhibits no distension. There is no abdominal tenderness.   Musculoskeletal:      Cervical back: He exhibits tenderness.   Neurological: He is alert and oriented to person, place, and time. He has normal reflexes. He displays normal reflexes. No sensory deficit.   Psychiatric: His behavior is normal. Mood, judgment and thought content normal.     /71   Pulse 83   Resp 16   SpO2 98%     PHQ 9 on chart  Opioid  risk tool low risk    Assessment & Plan   Diagnoses and all orders for this visit:    1. Chronic bilateral low back pain without sciatica (Primary)    2. Cervicalgia    3. Cervical spondylosis without myelopathy    4. Chronic pain syndrome    5. Lumbosacral spondylosis without myelopathy    6. Thoracic spondylosis    Summary  Kailash Cooper is a 83 y.o. male with multiple comorbidities including CKD, DM, COPD, chronic back pain polyarthralgia here for f/u.   Chronic pain from lumbar and cervical DDD spondylosis, polyarthralgia/myofascial pain.  Tried physical therapy, chiropractor, LESI's with marginal relief.  Axial back pain interfering with ADLs and his ability to exercise.  No NSAIDs due to CKD.        Complains of worsening back and bilateral hip pain.  Declines injections.    Increased to Hydrocodone 10/325 6 times daily as needed for severe pain, worked better than 4 or 5 pills daily with [facility] [place] pain physician, tolerant. Rotated to Percocet 10mg QID prn, rotated back to Norco 10mg q4h prn. Filled 11/27/23. Rotate to MS-Contin 15mg TID for relative dosage reduction.  UDS 3/20/23 and inspect reviewed.  Discussed risk of tolerance, dependence, respiratory depression, coma and death associated with use of oral opioids for treatment of chronic nonmalignant pain.     RTC 3 months for f/u        Back Pain                Clear bilaterally, pupils equal, round and reactive to light. EOMI intact.

## 2023-12-11 NOTE — TELEPHONE ENCOUNTER
Tried to return pt's call- left VM that pt will need to discuss medication change during office visit today

## 2023-12-11 NOTE — TELEPHONE ENCOUNTER
Caller: MS DURAN    Relationship: WIFE    Best call back number:   [phone]    Requested Prescriptions: OXYCODONE  Requested Prescriptions        No prescriptions requested or ordered in this encounter         Pharmacy where request should be sent: [facility] [address] [facility] [place] - [address] [phone] [address] [phone] [place]      Last office visit with prescribing clinician: 9/25/2023   Last telemedicine visit with prescribing clinician: Visit date not found   Next office visit with prescribing clinician: 12/11/2023      Additional details provided by patient: MS DURAN STATED SHE HAS CALLED EVERYWHERE AND COULD NOT FIND A PHARMACY THAT HAS NORCO 10. SHE WOULD LIKE TO GO BACK TO OXYCODONE. SHE ALSO STATED THE NORCO 10 IS NOT HELPING THE PATIENTS PAIN.     Does the patient have less than a 3 day supply:  [x] Yes  [] No     Would you like a call back once the refill request has been completed: [x] Yes [] No     If the office needs to give you a call back, can they leave a voicemail: [x] Yes [] No     Tr Her Rep   12/11/23 09:54 EST

## 2023-12-16 RX ORDER — PANTOPRAZOLE SODIUM 40 MG/1
TABLET, DELAYED RELEASE ORAL
Qty: 180 TABLET | Refills: 0 | Status: SHIPPED | OUTPATIENT
Start: 2023-12-16

## 2023-12-21 RX ORDER — AMLODIPINE BESYLATE 10 MG/1
TABLET ORAL
Qty: 90 TABLET | Refills: 0 | Status: SHIPPED | OUTPATIENT
Start: 2023-12-21

## 2023-12-21 RX ORDER — HYDRALAZINE HYDROCHLORIDE 50 MG/1
TABLET, FILM COATED ORAL
Qty: 270 TABLET | Refills: 0 | Status: SHIPPED | OUTPATIENT
Start: 2023-12-21

## 2024-01-10 ENCOUNTER — OFFICE VISIT (OUTPATIENT)
Dept: PAIN MEDICINE | Facility: CLINIC | Age: 84
End: 2024-01-10
Payer: MEDICARE

## 2024-01-10 VITALS
OXYGEN SATURATION: 98 % | RESPIRATION RATE: 16 BRPM | DIASTOLIC BLOOD PRESSURE: 66 MMHG | HEART RATE: 80 BPM | SYSTOLIC BLOOD PRESSURE: 144 MMHG

## 2024-01-10 DIAGNOSIS — M54.50 CHRONIC BILATERAL LOW BACK PAIN WITHOUT SCIATICA: Primary | ICD-10-CM

## 2024-01-10 DIAGNOSIS — M54.2 CERVICALGIA: ICD-10-CM

## 2024-01-10 DIAGNOSIS — M47.814 THORACIC SPONDYLOSIS: ICD-10-CM

## 2024-01-10 DIAGNOSIS — M47.817 LUMBOSACRAL SPONDYLOSIS WITHOUT MYELOPATHY: ICD-10-CM

## 2024-01-10 DIAGNOSIS — G89.29 CHRONIC BILATERAL LOW BACK PAIN WITHOUT SCIATICA: Primary | ICD-10-CM

## 2024-01-10 DIAGNOSIS — M47.812 CERVICAL SPONDYLOSIS WITHOUT MYELOPATHY: ICD-10-CM

## 2024-01-10 DIAGNOSIS — G89.4 CHRONIC PAIN SYNDROME: ICD-10-CM

## 2024-01-10 PROCEDURE — G0463 HOSPITAL OUTPT CLINIC VISIT: HCPCS | Performed by: PHYSICAL MEDICINE & REHABILITATION

## 2024-01-10 RX ORDER — MORPHINE SULFATE 30 MG/1
30 TABLET, FILM COATED, EXTENDED RELEASE ORAL 2 TIMES DAILY
Qty: 60 TABLET | Refills: 0 | Status: SHIPPED | OUTPATIENT
Start: 2024-01-10

## 2024-01-10 NOTE — PROGRESS NOTES
Subjective    CC back pain, neck pain, bilateral leg pain  Kailash Cooper Jr. is a 83 y.o. male with multiple comorbidities including CKD, DM, COPD, chronic back pain polyarthralgia here for f/u.  Chronic back pain radiating to bilateral hips worse with standing walking or prolonged activity.  Denies new weakness saddle anesthesia bladder bowel continence.  Chronic axial neck pain radiating to bilateral shoulder without radicular pain.  Continued chronic polyarthralgia and myofascial pain worse with activity.  Tried physical therapy/chiropractor with marginal relief.  Tried LESI's with marginal relief.  Had MI with stent, began Plavix for life. Had another MI with PNA, doing better now.    Utilized hydrocodone 10/325 6 times daily as needed for severe pain, failed 4 times daily as needed. Rotated to Percocet 10mg QID prn. Functional benefit /denies side effects.    L-spine MRI 2018 moderate degenerative changes throughout facet arthropathy mild to moderate foraminal narrowing, scoliosis.  C-spine MRI 2018 marked degenerative disc and some posterior element disease, causing spinal stenosis at multiple levels, including C2-3, C3-4, C5-6, and C6-7.  There multiple foraminal impingements    Pain Assessment   Location of Pain: Lower Back, neck pain, joint  Description of Pain: Dull/Aching, Throbbing, Stabbing  Previous Pain Rating :6  Current Pain Ratin  Aggravating Factors: Activity  Alleviating Factors: Rest, Medication    PEG Assessment   What number best describes your pain on average in the past week? 5  What number best describes how, during the past week, pain has interfered with your enjoyment of life?3  What number best describes how, during the past week, pain has interfered with your general activity?10     The following portions of the patient's history were reviewed and updated as appropriate: allergies, current medications, past family history, past medical history, past social history, past surgical  history and problem list.     has a past medical history of Arthritis, Cancer, Diabetes, Enlarged prostate, Former smoker, Hiatal hernia, Hip pain, Hip pain, bilateral, Hyperlipidemia, Hypertension, Kidney disease, Leg pain, Low back pain, Neck pain, and Stroke.   has a past surgical history that includes Cataract extraction (); Colonoscopy (); Lung cancer surgery; Coronary stent placement (2022); Cardiac catheterization (Left, 2022); Colonoscopy (N/A, 2022); and Esophagogastroduodenoscopy (N/A, 2022).  family history includes Cancer in his father; Heart disease in an other family member; Heart failure in his brother; Stroke in his mother.  Social History     Tobacco Use    Smoking status: Former     Packs/day: 2     Types: Cigarettes     Quit date:      Years since quittin.0     Passive exposure: Past    Smokeless tobacco: Never   Substance Use Topics    Alcohol use: Never     Review of Systems   Musculoskeletal:  Positive for arthralgias, back pain, myalgias and neck pain.   All other systems reviewed and are negative.    Objective   Physical Exam   Constitutional: He is oriented to person, place, and time. He appears well-developed and well-nourished. No distress.   HENT:   Head: Normocephalic and atraumatic.   Eyes: Pupils are equal, round, and reactive to light.   Cardiovascular: Normal rate, regular rhythm and normal heart sounds.   Pulmonary/Chest: Effort normal and breath sounds normal.   Abdominal: Soft. Normal appearance and bowel sounds are normal. He exhibits no distension. There is no abdominal tenderness.   Musculoskeletal:      Cervical back: He exhibits tenderness.   Neurological: He is alert and oriented to person, place, and time. He has normal reflexes. He displays normal reflexes. No sensory deficit.   Psychiatric: His behavior is normal. Mood, judgment and thought content normal.     /66   Pulse 80   Resp 16   SpO2 98%     PHQ 9 on chart  Opioid  risk tool low risk    Assessment & Plan   Diagnoses and all orders for this visit:    1. Chronic bilateral low back pain without sciatica (Primary)    2. Chronic pain syndrome    3. Cervicalgia    4. Cervical spondylosis without myelopathy    5. Lumbosacral spondylosis without myelopathy    6. Thoracic spondylosis    Summary  Kailash Cooper is a 83 y.o. male with multiple comorbidities including CKD, DM, COPD, chronic back pain polyarthralgia here for f/u.   Chronic pain from lumbar and cervical DDD spondylosis, polyarthralgia/myofascial pain.  Tried physical therapy, chiropractor, LESI's with marginal relief.  Axial back pain interfering with ADLs and his ability to exercise.  No NSAIDs due to CKD.        Complains of worsening back and bilateral hip pain.  Declines injections.    Increased to Hydrocodone 10/325 6 times daily as needed for severe pain, worked better than 4 or 5 pills daily with [facility] [place] pain physician, tolerant. Rotated to Percocet 10mg QID prn, rotated back to Norco 10mg q4h prn. Rotated to MS-Contin 15mg TID for relative dosage reduction, increase to 30mg BID. Filled 12/27/23.  UDS 3/20/23 and inspect reviewed.  Discussed risk of tolerance, dependence, respiratory depression, coma and death associated with use of oral opioids for treatment of chronic nonmalignant pain.     RTC 3 months for f/u        Back Pain

## 2024-01-29 ENCOUNTER — OFFICE VISIT (OUTPATIENT)
Dept: FAMILY MEDICINE CLINIC | Facility: CLINIC | Age: 84
End: 2024-01-29
Payer: MEDICARE

## 2024-01-29 VITALS
DIASTOLIC BLOOD PRESSURE: 62 MMHG | HEART RATE: 83 BPM | HEIGHT: 65 IN | TEMPERATURE: 97.8 F | SYSTOLIC BLOOD PRESSURE: 120 MMHG | WEIGHT: 126 LBS | OXYGEN SATURATION: 95 % | BODY MASS INDEX: 20.99 KG/M2

## 2024-01-29 DIAGNOSIS — E78.2 MIXED HYPERLIPIDEMIA: Primary | ICD-10-CM

## 2024-01-29 DIAGNOSIS — Z12.11 COLON CANCER SCREENING: ICD-10-CM

## 2024-01-29 DIAGNOSIS — D50.9 IRON DEFICIENCY ANEMIA, UNSPECIFIED IRON DEFICIENCY ANEMIA TYPE: ICD-10-CM

## 2024-01-29 DIAGNOSIS — N18.32 STAGE 3B CHRONIC KIDNEY DISEASE: ICD-10-CM

## 2024-01-29 DIAGNOSIS — Z99.81 OXYGEN DEPENDENT: ICD-10-CM

## 2024-01-29 DIAGNOSIS — R63.6 UNDERWEIGHT: ICD-10-CM

## 2024-01-29 DIAGNOSIS — E11.22 TYPE 2 DIABETES MELLITUS WITH STAGE 3B CHRONIC KIDNEY DISEASE, WITHOUT LONG-TERM CURRENT USE OF INSULIN: ICD-10-CM

## 2024-01-29 DIAGNOSIS — N18.32 TYPE 2 DIABETES MELLITUS WITH STAGE 3B CHRONIC KIDNEY DISEASE, WITHOUT LONG-TERM CURRENT USE OF INSULIN: ICD-10-CM

## 2024-01-29 DIAGNOSIS — Z00.00 PREVENTATIVE HEALTH CARE: ICD-10-CM

## 2024-01-29 RX ORDER — FUROSEMIDE 40 MG/1
40 TABLET ORAL 2 TIMES DAILY
Qty: 180 TABLET | Refills: 0 | OUTPATIENT
Start: 2024-01-29

## 2024-01-29 RX ORDER — FUROSEMIDE 40 MG/1
40 TABLET ORAL DAILY
Qty: 90 TABLET | Refills: 1 | Status: SHIPPED | OUTPATIENT
Start: 2024-01-29

## 2024-01-29 NOTE — PROGRESS NOTES
"    Kailash Cooper Jr. is a 83 y.o. male.     History of Present Illness  83-year-old white male with history of lung cancer who wears oxygen, type 2 diabetes, chronic back pain and goes to pain management, hypertension, hyperlipidemia, CKD, MI with stent placement who comes in today for follow-up visit    Blood pressure 120/62 heart rate 82 he denies any chest pain, dyspnea, tachycardia or dizziness    Patient has lost 18 pounds since I saw him in March she looks very frail and is complaining of fatigue and weakness.  He has not had any blood work in almost a year we will be ordering fasting labs.  He is on super B complex and B12 vitamins.  Advised him to do health shakes 3 times a day and to try to increase his food intake    Pain management just increased his medication to morphine which is not helping with fatigue    Patient states blood sugars are usually under 130 his last A1c was actually normal at 5.9.  He has not been to nephrology since May 2023 as last creatinine 1.9 we will be checking labs to see if there is anything else going on contributing to his fatigue.      Weight is 126 with a BMI of 21.  Has had 2 COVID vaccines up-to-date on eye exam PSA is due in March 2024 his next colonoscopy is due in April 2024 and I ordered the colonoscopy gave him the number to call and schedule it        He is       The following portions of the patient's history were reviewed and updated as appropriate: allergies, current medications, past family history, past medical history, past social history, past surgical history, and problem list.    Vitals:    01/29/24 1532   BP: 120/62   BP Location: Right arm   Patient Position: Sitting   Cuff Size: Adult   Pulse: 83   Temp: 97.8 °F (36.6 °C)   TempSrc: Infrared   SpO2: 95%   Weight: 57.2 kg (126 lb)   Height: 165.1 cm (65\")     Body mass index is 20.97 kg/m².    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 [facility]" Diabetes     Enlarged prostate     Former smoker     Hiatal hernia     Hip pain     don    Hip pain, bilateral     Hyperlipidemia     Hypertension     Kidney disease        stage 3     Leg pain     don    Low back pain     Neck pain     Stroke      Past Surgical History:   Procedure Laterality Date    CARDIAC CATHETERIZATION Left 1/28/2022    Procedure: Cardiac Catheterization/Vascular Study;  Surgeon: Mani Salguero MD;  Location: [facility] CATH INVASIVE LOCATION;  Service: Cardiovascular;  Laterality: Left;    CATARACT EXTRACTION  2006    [facility]    COLONOSCOPY  2011    COLONOSCOPY N/A 5/27/2022    Procedure: COLONOSCOPY;  Surgeon: Terry Holliday MD;  Location: [facility] ENDOSCOPY;  Service: Gastroenterology;  Laterality: N/A;  polyps    CORONARY STENT PLACEMENT  01/28/2022    RCA    ENDOSCOPY N/A 5/27/2022    Procedure: ESOPHAGOGASTRODUODENOSCOPY with 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, gastric antrum biopsy;  Surgeon: Terry Holliday MD;  Location: [facility] ENDOSCOPY;  Service: Gastroenterology;  Laterality: N/A;  gastritis, gastric ulcer, small bowel AVM    LUNG CANCER SURGERY      upper lobe rt lung cancer 9/2017  at [facility]     Family History   Problem Relation Age of Onset    Stroke Mother     Cancer Father         Prostate    Heart failure Brother     Heart disease Other      Immunization History   Administered Date(s) Administered    COVID-19 (PFIZER) Purple Cap Monovalent 02/11/2021, 03/04/2021    Fluad Quad 65+ 11/18/2019    Flublock Quad =>18yrs 10/07/2020    Fluzone High Dose =>65 Years ([employer] ONLY) 10/22/2015    Fluzone High-Dose 65+yrs 11/18/2019    H1N1 All Forms 01/08/2010    Hepatitis B Adult/Adolescent IM 08/08/2013, 09/18/2013, 02/11/2014    Influenza Quad Vaccine (Inpatient) 10/20/2016    Influenza Seasonal Injectable 09/20/2017    Influenza, Unspecified 09/20/2017    PPD Test 09/07/2010, 09/13/2011, 09/25/2012, 07/16/2013, 05/27/2014, 06/02/2015,  05/24/2016    Pneumococcal Polysaccharide (PPSV23) 10/20/2016, 12/29/2021       Results Encounter on 03/20/2023   Component Date Value Ref Range Status    WBC 03/17/2023 6.8  3.4 - 10.8 x10E3/uL Final    RBC 03/17/2023 3.89 (L)  4.14 - 5.80 x10E6/uL Final    Hemoglobin 03/17/2023 11.8 (L)  13.0 - 17.7 g/dL Final    Hematocrit 03/17/2023 36.4 (L)  37.5 - 51.0 % Final    MCV 03/17/2023 94  79 - 97 fL Final    MCH 03/17/2023 30.3  26.6 - 33.0 pg Final    MCHC 03/17/2023 32.4  31.5 - 35.7 g/dL Final    RDW 03/17/2023 13.3  11.6 - 15.4 % Final    Platelets 03/17/2023 247  150 - 450 x10E3/uL Final    Neutrophil Rel % 03/17/2023 71  Not Estab. % Final    Lymphocyte Rel % 03/17/2023 18  Not Estab. % Final    Monocyte Rel % 03/17/2023 6  Not Estab. % Final    Eosinophil Rel % 03/17/2023 4  Not Estab. % Final    Basophil Rel % 03/17/2023 1  Not Estab. % Final    Neutrophils Absolute 03/17/2023 4.8  1.4 - 7.0 x10E3/uL Final    Lymphocytes Absolute 03/17/2023 1.2  0.7 - 3.1 x10E3/uL Final    Monocytes Absolute 03/17/2023 0.4  0.1 - 0.9 x10E3/uL Final    Eosinophils Absolute 03/17/2023 0.3  0.0 - 0.4 x10E3/uL Final    Basophils Absolute 03/17/2023 0.1  0.0 - 0.2 x10E3/uL Final    Immature Granulocyte Rel % 03/17/2023 0  Not Estab. % Final    Immature Grans Absolute 03/17/2023 0.0  0.0 - 0.1 x10E3/uL Final    Glucose 03/17/2023 92  70 - 99 mg/dL Final    BUN 03/17/2023 26  8 - 27 mg/dL Final    Creatinine 03/17/2023 1.90 (H)  0.76 - 1.27 mg/dL Final    EGFR Result 03/17/2023 35 (L)  >59 mL/min/1.73 Final    BUN/Creatinine Ratio 03/17/2023 14  10 - 24 Final    Sodium 03/17/2023 142  134 - 144 mmol/L Final    Potassium 03/17/2023 4.2  3.5 - 5.2 mmol/L Final    Chloride 03/17/2023 102  96 - 106 mmol/L Final    Total CO2 03/17/2023 26  20 - 29 mmol/L Final    Calcium 03/17/2023 9.7  8.6 - 10.2 mg/dL Final    Total Protein 03/17/2023 6.1  6.0 - 8.5 g/dL Final    Albumin 03/17/2023 4.3  3.6 - 4.6 g/dL Final    Globulin 03/17/2023 1.8   1.5 - 4.5 g/dL Final    A/G Ratio 03/17/2023 2.4 (H)  1.2 - 2.2 Final    Total Bilirubin 03/17/2023 0.4  0.0 - 1.2 mg/dL Final    Alkaline Phosphatase 03/17/2023 92  44 - 121 IU/L Final    AST (SGOT) 03/17/2023 14  0 - 40 IU/L Final    ALT (SGPT) 03/17/2023 13  0 - 44 IU/L Final    Total Cholesterol 03/17/2023 188  100 - 199 mg/dL Final    Triglycerides 03/17/2023 237 (H)  0 - 149 mg/dL Final    HDL Cholesterol 03/17/2023 48  >39 mg/dL Final    VLDL Cholesterol Ankit 03/17/2023 40  5 - 40 mg/dL Final    LDL Chol Calc (NIH) 03/17/2023 100 (H)  0 - 99 mg/dL Final    LDL/HDL RATIO 03/17/2023 2.1  0.0 - 3.6 ratio Final    Comment:                                     LDL/HDL Ratio                                              Men  Women                                1/2 Avg.Risk  1.0    1.5                                    Avg.Risk  3.6    3.2                                 2X Avg.Risk  6.2    5.0                                 3X Avg.Risk  8.0    6.1      Hemoglobin A1C 03/17/2023 5.9 (H)  4.8 - 5.6 % Final    Comment:          Prediabetes: 5.7 - 6.4           Diabetes: >6.4           Glycemic control for adults with diabetes: <7.0      TSH 03/17/2023 3.160  0.450 - 4.500 uIU/mL Final    Free T4 03/17/2023 1.17  0.82 - 1.77 ng/dL Final    T3, Total 03/17/2023 95  71 - 180 ng/dL Final    PSA 03/17/2023 3.1  0.0 - 4.0 ng/mL Final    Comment: Roche ECLIA methodology.  According to the American Urological Association, Serum PSA should  decrease and remain at undetectable levels after radical  prostatectomy. The AUA defines biochemical recurrence as an initial  PSA value 0.2 ng/mL or greater followed by a subsequent confirmatory  PSA value 0.2 ng/mL or greater.  Values obtained with different assay methods or kits cannot be used  interchangeably. Results cannot be interpreted as absolute evidence  of the presence or absence of malignant disease.      Hep C Virus Ab 03/17/2023 Non Reactive  Non Reactive Final    Comment:  HCV antibody alone does not differentiate between previously  resolved infection and active infection. Equivocal and Reactive  HCV antibody results should be followed up with an HCV RNA test  to support the diagnosis of active HCV infection.           Review of Systems   Constitutional:  Positive for fatigue and unexpected weight loss.   HENT: Negative.     Respiratory: Negative.     Cardiovascular: Negative.    Gastrointestinal: Negative.    Genitourinary: Negative.    Musculoskeletal:  Positive for back pain and neck pain.   Skin: Negative.    Neurological:  Positive for weakness.   Psychiatric/Behavioral: Negative.         Objective   Physical Exam  Constitutional:       Appearance: Normal appearance.   HENT:      Head: Normocephalic.   Cardiovascular:      Rate and Rhythm: Normal rate and regular rhythm.      Pulses: Normal pulses.      Heart sounds: Normal heart sounds.   Pulmonary:      Effort: Pulmonary effort is normal.      Breath sounds: Normal breath sounds.   Abdominal:      General: Bowel sounds are normal.   Musculoskeletal:         General: Normal range of motion.   Skin:     General: Skin is warm.   Neurological:      General: No focal deficit present.      Mental Status: He is alert and oriented to person, place, and time.   Psychiatric:         Mood and Affect: Mood normal.         Behavior: Behavior normal.         Procedures    Assessment & Plan   Diagnoses and all orders for this visit:    1. Mixed hyperlipidemia (Primary)  -     Lipid Panel With LDL / HDL Ratio; Future    2. Type 2 diabetes mellitus without complication, without long-term current use of insulin  -     Hemoglobin A1c; Future    3. Stage 3b chronic kidney disease  -     Comprehensive Metabolic Panel; Future    4. Iron deficiency anemia, unspecified iron deficiency anemia type  -     CBC & Differential; Future    5. Preventative health care  -     TSH+Free T4; Future  -     T3; Future    6. Oxygen dependent    7. Underweight    8.  BMI 20.0-20.9, adult    Other orders  -     furosemide (LASIX) 40 MG tablet; Take 1 tablet by mouth Daily.  Dispense: 90 tablet; Refill: 1           Current Outpatient Medications:     allopurinol (ZYLOPRIM) 100 MG tablet, Take 2 tablets by mouth Daily. Indications: Disorder of Excessive Uric Acid in the Blood, Disp: , Rfl:     amLODIPine (NORVASC) 10 MG tablet, TAKE 1 TABLET BY MOUTH ONCE DAILY FOR HIGH BLOOD PRESSURE, Disp: 90 tablet, Rfl: 0    budesonide (PULMICORT) 0.25 MG/2ML nebulizer solution, Take 2 mL by nebulization Daily. Indications: Chronic Obstructive Lung Disease, Disp: 180 mL, Rfl: 1    cetirizine (zyrTEC) 10 MG tablet, Take 1 tablet by mouth Daily., Disp: 90 tablet, Rfl: 1    clopidogrel (PLAVIX) 75 MG tablet, Take 1 tablet by mouth Daily. Indications: Ischemic Heart Disease, Disp: 90 tablet, Rfl: 3    cyclobenzaprine (FLEXERIL) 10 MG tablet, TAKE 1 TABLET BY MOUTH AT NIGHT AS NEEDED FOR MUSCLE SPASM, Disp: 90 tablet, Rfl: 0    ferrous sulfate 325 (65 FE) MG EC tablet, Take 1 tablet by mouth Daily With Breakfast., Disp: , Rfl:     furosemide (LASIX) 40 MG tablet, Take 1 tablet by mouth Daily., Disp: 90 tablet, Rfl: 1    glimepiride (AMARYL) 1 MG tablet, TAKE 1 TABLET BY MOUTH ONCE DAILY IN THE MORNING BEFORE BREAKFAST, Disp: 180 tablet, Rfl: 0    hydrALAZINE (APRESOLINE) 50 MG tablet, TAKE 1 TABLET BY MOUTH THREE TIMES DAILY, Disp: 270 tablet, Rfl: 0    ipratropium-albuterol (DUO-NEB) 0.5-2.5 mg/3 ml nebulizer, Take 3 mL by nebulization 4 (Four) Times a Day As Needed for Wheezing. Indications: Chronic Obstructive Lung Disease, Disp: 1080 mL, Rfl: 1    Morphine (MS CONTIN) 30 MG 12 hr tablet, Take 1 tablet by mouth 2 (Two) Times a Day., Disp: 60 tablet, Rfl: 0    nitroglycerin (Nitrostat) 0.3 MG SL tablet, Place 1 tablet under the tongue Every 5 (Five) Minutes As Needed for Chest Pain. Take no more than 3 doses in 15 minutes., Disp: 50 tablet, Rfl: 12    O2 (OXYGEN), Inhale 3 L/min As Needed  (shortness of air). Indications: COPD, Disp: , Rfl:     pantoprazole (PROTONIX) 40 MG EC tablet, Take 1 tablet by mouth twice daily, Disp: 180 tablet, Rfl: 0    pravastatin (PRAVACHOL) 40 MG tablet, Take 1 tablet by mouth Daily. Indications: High Amount of Fats in the Blood, Disp: 90 tablet, Rfl: 1    pregabalin (LYRICA) 50 MG capsule, Take 1 capsule by mouth 2 (Two) Times a Day., Disp: 180 capsule, Rfl: 1    Probiotic Product (CULTURELLE PROBIOTICS PO), Take 20 mg by mouth Daily. Indications: diabetic supplement, Disp: , Rfl:     tamsulosin (FLOMAX) 0.4 MG capsule 24 hr capsule, Take 1 capsule by mouth Daily., Disp: 90 capsule, Rfl: 1    traZODone (DESYREL) 50 MG tablet, Take 0.5-2 tablets by mouth Every Night., Disp: 180 tablet, Rfl: 1    vitamin B-12 (CYANOCOBALAMIN) 1000 MCG tablet, Take 1 tablet by mouth 2 (Two) Times a Day. Indications: Inadequate Vitamin B12, Disp: , Rfl:            Madelyn Márquez, JANE 1/29/2024 16:27 EST  This note has been electronically signed

## 2024-01-29 NOTE — PATIENT INSTRUCTIONS
Fasting blood work  Health shakes 3 times a day  Increase calorie intake  Monitor weight at home  Follow-up 3 6 months

## 2024-02-01 ENCOUNTER — TELEPHONE (OUTPATIENT)
Dept: FAMILY MEDICINE CLINIC | Facility: CLINIC | Age: 84
End: 2024-02-01
Payer: MEDICARE

## 2024-02-01 LAB
ALBUMIN SERPL-MCNC: 4.3 G/DL (ref 3.7–4.7)
ALBUMIN/GLOB SERPL: 2 {RATIO} (ref 1.2–2.2)
ALP SERPL-CCNC: 98 IU/L (ref 44–121)
ALT SERPL-CCNC: 14 IU/L (ref 0–44)
AST SERPL-CCNC: 18 IU/L (ref 0–40)
BASOPHILS # BLD AUTO: 0.1 X10E3/UL (ref 0–0.2)
BASOPHILS NFR BLD AUTO: 1 %
BILIRUB SERPL-MCNC: 0.4 MG/DL (ref 0–1.2)
BUN SERPL-MCNC: 25 MG/DL (ref 8–27)
BUN/CREAT SERPL: 15 (ref 10–24)
CALCIUM SERPL-MCNC: 10.4 MG/DL (ref 8.6–10.2)
CHLORIDE SERPL-SCNC: 101 MMOL/L (ref 96–106)
CHOLEST SERPL-MCNC: 194 MG/DL (ref 100–199)
CO2 SERPL-SCNC: 25 MMOL/L (ref 20–29)
CREAT SERPL-MCNC: 1.68 MG/DL (ref 0.76–1.27)
EGFRCR SERPLBLD CKD-EPI 2021: 40 ML/MIN/1.73
EOSINOPHIL # BLD AUTO: 0.2 X10E3/UL (ref 0–0.4)
EOSINOPHIL NFR BLD AUTO: 3 %
ERYTHROCYTE [DISTWIDTH] IN BLOOD BY AUTOMATED COUNT: 12.8 % (ref 11.6–15.4)
GLOBULIN SER CALC-MCNC: 2.2 G/DL (ref 1.5–4.5)
GLUCOSE SERPL-MCNC: 99 MG/DL (ref 70–99)
HBA1C MFR BLD: 5.7 % (ref 4.8–5.6)
HCT VFR BLD AUTO: 38.3 % (ref 37.5–51)
HDLC SERPL-MCNC: 64 MG/DL
HGB BLD-MCNC: 12.7 G/DL (ref 13–17.7)
IMM GRANULOCYTES # BLD AUTO: 0 X10E3/UL (ref 0–0.1)
IMM GRANULOCYTES NFR BLD AUTO: 0 %
LDLC SERPL CALC-MCNC: 100 MG/DL (ref 0–99)
LDLC/HDLC SERPL: 1.6 RATIO (ref 0–3.6)
LYMPHOCYTES # BLD AUTO: 1.2 X10E3/UL (ref 0.7–3.1)
LYMPHOCYTES NFR BLD AUTO: 17 %
MCH RBC QN AUTO: 31.1 PG (ref 26.6–33)
MCHC RBC AUTO-ENTMCNC: 33.2 G/DL (ref 31.5–35.7)
MCV RBC AUTO: 94 FL (ref 79–97)
MONOCYTES # BLD AUTO: 0.5 X10E3/UL (ref 0.1–0.9)
MONOCYTES NFR BLD AUTO: 8 %
NEUTROPHILS # BLD AUTO: 4.7 X10E3/UL (ref 1.4–7)
NEUTROPHILS NFR BLD AUTO: 71 %
PLATELET # BLD AUTO: 256 X10E3/UL (ref 150–450)
POTASSIUM SERPL-SCNC: 4.7 MMOL/L (ref 3.5–5.2)
PROT SERPL-MCNC: 6.5 G/DL (ref 6–8.5)
RBC # BLD AUTO: 4.08 X10E6/UL (ref 4.14–5.8)
SODIUM SERPL-SCNC: 143 MMOL/L (ref 134–144)
T3 SERPL-MCNC: 71 NG/DL (ref 71–180)
T4 FREE SERPL-MCNC: 1.37 NG/DL (ref 0.82–1.77)
TRIGL SERPL-MCNC: 173 MG/DL (ref 0–149)
TSH SERPL DL<=0.005 MIU/L-ACNC: 3.71 UIU/ML (ref 0.45–4.5)
VLDLC SERPL CALC-MCNC: 30 MG/DL (ref 5–40)
WBC # BLD AUTO: 6.7 X10E3/UL (ref 3.4–10.8)

## 2024-02-01 NOTE — TELEPHONE ENCOUNTER
Caller: BRIAN DURAN    Relationship to patient: Emergency Contact    Patient is needing: PLEASE CALL THE PATIENT'S WIFE  WITH THE LAB RESULTS.

## 2024-02-05 ENCOUNTER — TELEPHONE (OUTPATIENT)
Dept: CARDIOLOGY | Facility: CLINIC | Age: 84
End: 2024-02-05

## 2024-02-05 ENCOUNTER — TELEPHONE (OUTPATIENT)
Dept: CARDIOLOGY | Facility: CLINIC | Age: 84
End: 2024-02-05
Payer: MEDICARE

## 2024-02-05 NOTE — TELEPHONE ENCOUNTER
Caller: BRIAN DURAN    Relationship to patient: Emergency Contact    Best call back number: [phone]     Chief complaint: SHORTNESS OF BREATH - EKG SHOWED BIFASCICULAR BLOCK    Type of visit: ER FOLLOW UP     Requested date: ASAP     If rescheduling, when is the original appointment:      Additional notes: PATIENT WENT TO THE ER AT [facility].

## 2024-02-05 NOTE — TELEPHONE ENCOUNTER
Caller: ROGERMAISAURO    Relationship: Emergency Contact    Best call back number: [phone]    Caller requesting test results: SPOUSE    What test was performed: EKG, BLOOD WORK     When was the test performed: SATURDAY NIGHT 2.3.24    Where was the test performed: [facility]    Additional notes: SPOUSE IS CALLING TO HAVE DR SANCHEZ REQUEST TEST RESULTS FROM [facility].  PLEASE FAX REQUEST .[phone]

## 2024-02-06 NOTE — TELEPHONE ENCOUNTER
These have not been resulted yet. I see they are in chart, I just forwarded them to Madelyn to review.

## 2024-02-07 ENCOUNTER — OFFICE VISIT (OUTPATIENT)
Dept: PAIN MEDICINE | Facility: CLINIC | Age: 84
End: 2024-02-07
Payer: MEDICARE

## 2024-02-07 VITALS
DIASTOLIC BLOOD PRESSURE: 78 MMHG | HEART RATE: 88 BPM | OXYGEN SATURATION: 99 % | RESPIRATION RATE: 16 BRPM | SYSTOLIC BLOOD PRESSURE: 164 MMHG

## 2024-02-07 DIAGNOSIS — M47.814 THORACIC SPONDYLOSIS: ICD-10-CM

## 2024-02-07 DIAGNOSIS — G89.29 CHRONIC BILATERAL LOW BACK PAIN WITHOUT SCIATICA: Primary | ICD-10-CM

## 2024-02-07 DIAGNOSIS — M54.2 CERVICALGIA: ICD-10-CM

## 2024-02-07 DIAGNOSIS — M47.817 LUMBOSACRAL SPONDYLOSIS WITHOUT MYELOPATHY: ICD-10-CM

## 2024-02-07 DIAGNOSIS — M54.50 CHRONIC BILATERAL LOW BACK PAIN WITHOUT SCIATICA: Primary | ICD-10-CM

## 2024-02-07 DIAGNOSIS — M47.812 CERVICAL SPONDYLOSIS WITHOUT MYELOPATHY: ICD-10-CM

## 2024-02-07 DIAGNOSIS — G89.4 CHRONIC PAIN SYNDROME: ICD-10-CM

## 2024-02-07 PROCEDURE — G0463 HOSPITAL OUTPT CLINIC VISIT: HCPCS | Performed by: PHYSICAL MEDICINE & REHABILITATION

## 2024-02-07 RX ORDER — OXYCODONE AND ACETAMINOPHEN 10; 325 MG/1; MG/1
1 TABLET ORAL EVERY 6 HOURS PRN
Qty: 120 TABLET | Refills: 0 | Status: SHIPPED | OUTPATIENT
Start: 2024-02-07

## 2024-02-07 RX ORDER — OXYCODONE AND ACETAMINOPHEN 10; 325 MG/1; MG/1
1 TABLET ORAL EVERY 6 HOURS PRN
Qty: 120 TABLET | Refills: 0 | Status: SHIPPED | OUTPATIENT
Start: 2024-02-07 | End: 2024-02-12

## 2024-02-07 NOTE — PROGRESS NOTES
Subjective    CC back pain, neck pain, bilateral leg pain  Kailash Cooper Jr. is a 83 y.o. male with multiple comorbidities including CKD, DM, COPD, chronic back pain polyarthralgia here for f/u.  Chronic back pain radiating to bilateral hips worse with standing walking or prolonged activity.  Denies new weakness saddle anesthesia bladder bowel continence.  Chronic axial neck pain radiating to bilateral shoulder without radicular pain.  Continued chronic polyarthralgia and myofascial pain worse with activity.  Tried physical therapy/chiropractor with marginal relief.  Tried LESI's with marginal relief.  Had MI with stent, began Plavix for life. Had another MI with PNA, doing better now.    Utilized hydrocodone 10/325 6 times daily as needed for severe pain, failed 4 times daily as needed. Rotated to Percocet 10mg QID prn. Functional benefit /denies side effects.    L-spine MRI 2018 moderate degenerative changes throughout facet arthropathy mild to moderate foraminal narrowing, scoliosis.  C-spine MRI 2018 marked degenerative disc and some posterior element disease, causing spinal stenosis at multiple levels, including C2-3, C3-4, C5-6, and C6-7.  There multiple foraminal impingements    Pain Assessment   Location of Pain: Lower Back, neck pain, joint  Description of Pain: Dull/Aching, Throbbing, Stabbing  Previous Pain Rating :6  Current Pain Ratin  Aggravating Factors: Activity  Alleviating Factors: Rest, Medication    PEG Assessment   What number best describes your pain on average in the past week? 5  What number best describes how, during the past week, pain has interfered with your enjoyment of life?3  What number best describes how, during the past week, pain has interfered with your general activity?10     The following portions of the patient's history were reviewed and updated as appropriate: allergies, current medications, past family history, past medical history, past social history, past surgical  history and problem list.     has a past medical history of Arthritis, Cancer, Diabetes, Enlarged prostate, Former smoker, Hiatal hernia, Hip pain, Hip pain, bilateral, Hyperlipidemia, Hypertension, Kidney disease, Leg pain, Low back pain, Neck pain, and Stroke.   has a past surgical history that includes Cataract extraction (); Colonoscopy (); Lung cancer surgery; Coronary stent placement (2022); Cardiac catheterization (Left, 2022); Colonoscopy (N/A, 2022); and Esophagogastroduodenoscopy (N/A, 2022).  family history includes Cancer in his father; Heart disease in an other family member; Heart failure in his brother; Stroke in his mother.  Social History     Tobacco Use    Smoking status: Former     Packs/day: 2     Types: Cigarettes     Quit date:      Years since quittin.1     Passive exposure: Past    Smokeless tobacco: Never   Substance Use Topics    Alcohol use: Never     Review of Systems   Musculoskeletal:  Positive for arthralgias, back pain, myalgias and neck pain.   All other systems reviewed and are negative.    Objective   Physical Exam   Constitutional: He is oriented to person, place, and time. He appears well-developed and well-nourished. No distress.   HENT:   Head: Normocephalic and atraumatic.   Eyes: Pupils are equal, round, and reactive to light.   Cardiovascular: Normal rate, regular rhythm and normal heart sounds.   Pulmonary/Chest: Effort normal and breath sounds normal.   Abdominal: Soft. Normal appearance and bowel sounds are normal. He exhibits no distension. There is no abdominal tenderness.   Musculoskeletal:      Cervical back: He exhibits tenderness.   Neurological: He is alert and oriented to person, place, and time. He has normal reflexes. He displays normal reflexes. No sensory deficit.   Psychiatric: His behavior is normal. Mood, judgment and thought content normal.     /78   Pulse 88   Resp 16   SpO2 99%     PHQ 9 on chart  Opioid  risk tool low risk    Assessment & Plan   Diagnoses and all orders for this visit:    1. Chronic bilateral low back pain without sciatica (Primary)    2. Cervicalgia    3. Cervical spondylosis without myelopathy    4. Chronic pain syndrome    5. Lumbosacral spondylosis without myelopathy    6. Thoracic spondylosis    Summary  Kailash Cooper is a 83 y.o. male with multiple comorbidities including CKD, DM, COPD, chronic back pain polyarthralgia here for f/u.   Chronic pain from lumbar and cervical DDD spondylosis, polyarthralgia/myofascial pain.  Tried physical therapy, chiropractor, LESI's with marginal relief.  Axial back pain interfering with ADLs and his ability to exercise.  No NSAIDs due to CKD.        Complains of worsening back and bilateral hip pain.  Declines injections.    Increased to Hydrocodone 10/325 6 times daily as needed for severe pain, worked better than 4 or 5 pills daily with [facility] pain physician, tolerant. Rotated to Percocet 10mg QID prn, rotated back to Norco 10mg q4h prn. Rotated to MS-Contin 15mg TID for relative dosage reduction, increased to 30mg BID, no longer covered. Rotate to Percocet 10mg QID prn. Filled 1/26/24.  UDS 3/20/23 and inspect reviewed.  Discussed risk of tolerance, dependence, respiratory depression, coma and death associated with use of oral opioids for treatment of chronic nonmalignant pain.     RTC 3 months for f/u        Back Pain

## 2024-02-12 ENCOUNTER — OFFICE VISIT (OUTPATIENT)
Dept: CARDIOLOGY | Facility: CLINIC | Age: 84
End: 2024-02-12
Payer: MEDICARE

## 2024-02-12 VITALS
WEIGHT: 126 LBS | OXYGEN SATURATION: 96 % | DIASTOLIC BLOOD PRESSURE: 61 MMHG | HEART RATE: 76 BPM | HEIGHT: 65 IN | BODY MASS INDEX: 20.99 KG/M2 | SYSTOLIC BLOOD PRESSURE: 120 MMHG | RESPIRATION RATE: 16 BRPM

## 2024-02-12 DIAGNOSIS — E78.2 MIXED HYPERLIPIDEMIA: ICD-10-CM

## 2024-02-12 DIAGNOSIS — I10 ESSENTIAL HYPERTENSION: Primary | Chronic | ICD-10-CM

## 2024-02-12 DIAGNOSIS — I25.10 CORONARY ARTERY DISEASE INVOLVING NATIVE CORONARY ARTERY OF NATIVE HEART WITHOUT ANGINA PECTORIS: ICD-10-CM

## 2024-02-12 PROCEDURE — 1160F RVW MEDS BY RX/DR IN RCRD: CPT | Performed by: INTERNAL MEDICINE

## 2024-02-12 PROCEDURE — 1159F MED LIST DOCD IN RCRD: CPT | Performed by: INTERNAL MEDICINE

## 2024-02-12 PROCEDURE — 99214 OFFICE O/P EST MOD 30 MIN: CPT | Performed by: INTERNAL MEDICINE

## 2024-02-12 PROCEDURE — 3074F SYST BP LT 130 MM HG: CPT | Performed by: INTERNAL MEDICINE

## 2024-02-12 PROCEDURE — 3078F DIAST BP <80 MM HG: CPT | Performed by: INTERNAL MEDICINE

## 2024-03-01 ENCOUNTER — TELEPHONE (OUTPATIENT)
Dept: FAMILY MEDICINE CLINIC | Facility: CLINIC | Age: 84
End: 2024-03-01
Payer: MEDICARE

## 2024-03-01 NOTE — TELEPHONE ENCOUNTER
Caller: BRIAN DURAN    Relationship to patient: Emergency Contact    Best call back number: [phone]    Patient is needing:     PATIENT'S WIFE IS CONCERNED BECAUSE PATIENT CANNOT SEEM TO GAIN ANY WEIGHT.     HE IS EATING, BUT HE GETS SICK TO THE STOMACH OCCASIONALLY, AND IS WEAK.     WIFE IS CONCERNED.     PLEASE CALL TO DISCUSS.

## 2024-03-03 RX ORDER — MEGESTROL ACETATE 40 MG/1
40 TABLET ORAL DAILY
Qty: 90 TABLET | Refills: 1 | Status: SHIPPED | OUTPATIENT
Start: 2024-03-03

## 2024-03-25 ENCOUNTER — APPOINTMENT (OUTPATIENT)
Dept: GENERAL RADIOLOGY | Facility: HOSPITAL | Age: 84
End: 2024-03-25
Payer: MEDICARE

## 2024-03-25 ENCOUNTER — HOSPITAL ENCOUNTER (INPATIENT)
Facility: HOSPITAL | Age: 84
LOS: 2 days | Discharge: HOME HEALTH | End: 2024-03-27
Attending: EMERGENCY MEDICINE | Admitting: INTERNAL MEDICINE
Payer: MEDICARE

## 2024-03-25 DIAGNOSIS — J18.9 PNEUMONIA OF BOTH LUNGS DUE TO INFECTIOUS ORGANISM, UNSPECIFIED PART OF LUNG: ICD-10-CM

## 2024-03-25 DIAGNOSIS — R53.1 WEAKNESS: ICD-10-CM

## 2024-03-25 DIAGNOSIS — R79.89 ELEVATED TROPONIN: ICD-10-CM

## 2024-03-25 DIAGNOSIS — J18.9 PNEUMONIA DUE TO INFECTIOUS ORGANISM, UNSPECIFIED LATERALITY, UNSPECIFIED PART OF LUNG: Primary | ICD-10-CM

## 2024-03-25 DIAGNOSIS — Z99.81 OXYGEN DEPENDENT: ICD-10-CM

## 2024-03-25 DIAGNOSIS — J96.01 ACUTE RESPIRATORY FAILURE WITH HYPOXIA: ICD-10-CM

## 2024-03-25 DIAGNOSIS — R06.00 DYSPNEA, UNSPECIFIED TYPE: ICD-10-CM

## 2024-03-25 DIAGNOSIS — R53.83 FATIGUE, UNSPECIFIED TYPE: ICD-10-CM

## 2024-03-25 LAB
ALBUMIN SERPL-MCNC: 4.2 G/DL (ref 3.5–5.2)
ALBUMIN/GLOB SERPL: 1.9 G/DL
ALP SERPL-CCNC: 108 U/L (ref 39–117)
ALT SERPL W P-5'-P-CCNC: 14 U/L (ref 1–41)
ANION GAP SERPL CALCULATED.3IONS-SCNC: 11 MMOL/L (ref 5–15)
AST SERPL-CCNC: 18 U/L (ref 1–40)
B PARAPERT DNA SPEC QL NAA+PROBE: NOT DETECTED
B PERT DNA SPEC QL NAA+PROBE: NOT DETECTED
BASOPHILS # BLD AUTO: 0.04 10*3/MM3 (ref 0–0.2)
BASOPHILS NFR BLD AUTO: 0.4 % (ref 0–1.5)
BILIRUB SERPL-MCNC: 0.4 MG/DL (ref 0–1.2)
BUN SERPL-MCNC: 22 MG/DL (ref 8–23)
BUN/CREAT SERPL: 16.9 (ref 7–25)
C PNEUM DNA NPH QL NAA+NON-PROBE: NOT DETECTED
CALCIUM SPEC-SCNC: 9.9 MG/DL (ref 8.6–10.5)
CHLORIDE SERPL-SCNC: 105 MMOL/L (ref 98–107)
CO2 SERPL-SCNC: 22 MMOL/L (ref 22–29)
CREAT SERPL-MCNC: 1.3 MG/DL (ref 0.76–1.27)
DEPRECATED RDW RBC AUTO: 45 FL (ref 37–54)
EGFRCR SERPLBLD CKD-EPI 2021: 54.5 ML/MIN/1.73
EOSINOPHIL # BLD AUTO: 0.1 10*3/MM3 (ref 0–0.4)
EOSINOPHIL NFR BLD AUTO: 1 % (ref 0.3–6.2)
ERYTHROCYTE [DISTWIDTH] IN BLOOD BY AUTOMATED COUNT: 13.3 % (ref 12.3–15.4)
FLUAV SUBTYP SPEC NAA+PROBE: NOT DETECTED
FLUBV RNA ISLT QL NAA+PROBE: NOT DETECTED
GEN 5 2HR TROPONIN T REFLEX: 50 NG/L
GLOBULIN UR ELPH-MCNC: 2.2 GM/DL
GLUCOSE SERPL-MCNC: 124 MG/DL (ref 65–99)
HADV DNA SPEC NAA+PROBE: NOT DETECTED
HCOV 229E RNA SPEC QL NAA+PROBE: NOT DETECTED
HCOV HKU1 RNA SPEC QL NAA+PROBE: NOT DETECTED
HCOV NL63 RNA SPEC QL NAA+PROBE: NOT DETECTED
HCOV OC43 RNA SPEC QL NAA+PROBE: NOT DETECTED
HCT VFR BLD AUTO: 36 % (ref 37.5–51)
HGB BLD-MCNC: 12 G/DL (ref 13–17.7)
HMPV RNA NPH QL NAA+NON-PROBE: NOT DETECTED
HOLD SPECIMEN: NORMAL
HPIV1 RNA ISLT QL NAA+PROBE: NOT DETECTED
HPIV2 RNA SPEC QL NAA+PROBE: NOT DETECTED
HPIV3 RNA NPH QL NAA+PROBE: NOT DETECTED
HPIV4 P GENE NPH QL NAA+PROBE: NOT DETECTED
IMM GRANULOCYTES # BLD AUTO: 0.07 10*3/MM3 (ref 0–0.05)
IMM GRANULOCYTES NFR BLD AUTO: 0.7 % (ref 0–0.5)
LYMPHOCYTES # BLD AUTO: 0.83 10*3/MM3 (ref 0.7–3.1)
LYMPHOCYTES NFR BLD AUTO: 8.1 % (ref 19.6–45.3)
M PNEUMO IGG SER IA-ACNC: NOT DETECTED
MCH RBC QN AUTO: 30.8 PG (ref 26.6–33)
MCHC RBC AUTO-ENTMCNC: 33.3 G/DL (ref 31.5–35.7)
MCV RBC AUTO: 92.5 FL (ref 79–97)
MONOCYTES # BLD AUTO: 0.48 10*3/MM3 (ref 0.1–0.9)
MONOCYTES NFR BLD AUTO: 4.7 % (ref 5–12)
NEUTROPHILS NFR BLD AUTO: 8.78 10*3/MM3 (ref 1.7–7)
NEUTROPHILS NFR BLD AUTO: 85.1 % (ref 42.7–76)
NRBC BLD AUTO-RTO: 0 /100 WBC (ref 0–0.2)
NT-PROBNP SERPL-MCNC: 408.6 PG/ML (ref 0–1800)
PLATELET # BLD AUTO: 271 10*3/MM3 (ref 140–450)
PMV BLD AUTO: 9.8 FL (ref 6–12)
POTASSIUM SERPL-SCNC: 3.9 MMOL/L (ref 3.5–5.2)
PROCALCITONIN SERPL-MCNC: 0.08 NG/ML (ref 0–0.25)
PROT SERPL-MCNC: 6.4 G/DL (ref 6–8.5)
RBC # BLD AUTO: 3.89 10*6/MM3 (ref 4.14–5.8)
RHINOVIRUS RNA SPEC NAA+PROBE: NOT DETECTED
RSV RNA NPH QL NAA+NON-PROBE: NOT DETECTED
SARS-COV-2 RNA NPH QL NAA+NON-PROBE: NOT DETECTED
SODIUM SERPL-SCNC: 138 MMOL/L (ref 136–145)
TROPONIN T DELTA: -9 NG/L
TROPONIN T SERPL HS-MCNC: 59 NG/L
WBC NRBC COR # BLD AUTO: 10.3 10*3/MM3 (ref 3.4–10.8)
WHOLE BLOOD HOLD COAG: NORMAL
WHOLE BLOOD HOLD SPECIMEN: NORMAL

## 2024-03-25 PROCEDURE — 80053 COMPREHEN METABOLIC PANEL: CPT | Performed by: PHYSICIAN ASSISTANT

## 2024-03-25 PROCEDURE — 84484 ASSAY OF TROPONIN QUANT: CPT | Performed by: PHYSICIAN ASSISTANT

## 2024-03-25 PROCEDURE — 25010000002 CEFTRIAXONE PER 250 MG: Performed by: PHYSICIAN ASSISTANT

## 2024-03-25 PROCEDURE — 36415 COLL VENOUS BLD VENIPUNCTURE: CPT

## 2024-03-25 PROCEDURE — 93005 ELECTROCARDIOGRAM TRACING: CPT

## 2024-03-25 PROCEDURE — 84145 PROCALCITONIN (PCT): CPT | Performed by: PHYSICIAN ASSISTANT

## 2024-03-25 PROCEDURE — 83880 ASSAY OF NATRIURETIC PEPTIDE: CPT | Performed by: PHYSICIAN ASSISTANT

## 2024-03-25 PROCEDURE — 94640 AIRWAY INHALATION TREATMENT: CPT

## 2024-03-25 PROCEDURE — 94799 UNLISTED PULMONARY SVC/PX: CPT

## 2024-03-25 PROCEDURE — 87040 BLOOD CULTURE FOR BACTERIA: CPT | Performed by: PHYSICIAN ASSISTANT

## 2024-03-25 PROCEDURE — 25010000002 METHYLPREDNISOLONE PER 125 MG: Performed by: PHYSICIAN ASSISTANT

## 2024-03-25 PROCEDURE — 71045 X-RAY EXAM CHEST 1 VIEW: CPT

## 2024-03-25 PROCEDURE — 0202U NFCT DS 22 TRGT SARS-COV-2: CPT | Performed by: PHYSICIAN ASSISTANT

## 2024-03-25 PROCEDURE — 99285 EMERGENCY DEPT VISIT HI MDM: CPT

## 2024-03-25 PROCEDURE — 85025 COMPLETE CBC W/AUTO DIFF WBC: CPT | Performed by: PHYSICIAN ASSISTANT

## 2024-03-25 PROCEDURE — 93005 ELECTROCARDIOGRAM TRACING: CPT | Performed by: EMERGENCY MEDICINE

## 2024-03-25 RX ORDER — SODIUM CHLORIDE 9 MG/ML
40 INJECTION, SOLUTION INTRAVENOUS AS NEEDED
Status: DISCONTINUED | OUTPATIENT
Start: 2024-03-25 | End: 2024-03-27 | Stop reason: HOSPADM

## 2024-03-25 RX ORDER — CLOPIDOGREL BISULFATE 75 MG/1
75 TABLET ORAL DAILY
Status: DISCONTINUED | OUTPATIENT
Start: 2024-03-25 | End: 2024-03-27 | Stop reason: HOSPADM

## 2024-03-25 RX ORDER — BISACODYL 10 MG
10 SUPPOSITORY, RECTAL RECTAL DAILY PRN
Status: DISCONTINUED | OUTPATIENT
Start: 2024-03-25 | End: 2024-03-27 | Stop reason: HOSPADM

## 2024-03-25 RX ORDER — FERROUS SULFATE 324(65)MG
324 TABLET, DELAYED RELEASE (ENTERIC COATED) ORAL
Status: DISCONTINUED | OUTPATIENT
Start: 2024-03-26 | End: 2024-03-27 | Stop reason: HOSPADM

## 2024-03-25 RX ORDER — ONDANSETRON 4 MG/1
4 TABLET, ORALLY DISINTEGRATING ORAL EVERY 6 HOURS PRN
Status: DISCONTINUED | OUTPATIENT
Start: 2024-03-25 | End: 2024-03-27 | Stop reason: HOSPADM

## 2024-03-25 RX ORDER — IPRATROPIUM BROMIDE AND ALBUTEROL SULFATE 2.5; .5 MG/3ML; MG/3ML
3 SOLUTION RESPIRATORY (INHALATION)
Status: DISCONTINUED | OUTPATIENT
Start: 2024-03-25 | End: 2024-03-27 | Stop reason: HOSPADM

## 2024-03-25 RX ORDER — ACETAMINOPHEN 325 MG/1
650 TABLET ORAL EVERY 4 HOURS PRN
Status: DISCONTINUED | OUTPATIENT
Start: 2024-03-25 | End: 2024-03-27 | Stop reason: HOSPADM

## 2024-03-25 RX ORDER — TAMSULOSIN HYDROCHLORIDE 0.4 MG/1
0.4 CAPSULE ORAL 2 TIMES DAILY WITH MEALS
Status: DISCONTINUED | OUTPATIENT
Start: 2024-03-25 | End: 2024-03-27 | Stop reason: HOSPADM

## 2024-03-25 RX ORDER — ATORVASTATIN CALCIUM 10 MG/1
10 TABLET, FILM COATED ORAL DAILY
Status: DISCONTINUED | OUTPATIENT
Start: 2024-03-26 | End: 2024-03-27 | Stop reason: HOSPADM

## 2024-03-25 RX ORDER — HEPARIN SODIUM 5000 [USP'U]/ML
5000 INJECTION, SOLUTION INTRAVENOUS; SUBCUTANEOUS EVERY 12 HOURS SCHEDULED
Status: DISCONTINUED | OUTPATIENT
Start: 2024-03-25 | End: 2024-03-27 | Stop reason: HOSPADM

## 2024-03-25 RX ORDER — OXYCODONE HYDROCHLORIDE 5 MG/1
10 TABLET ORAL EVERY 4 HOURS PRN
Status: DISCONTINUED | OUTPATIENT
Start: 2024-03-25 | End: 2024-03-27 | Stop reason: HOSPADM

## 2024-03-25 RX ORDER — METHYLPREDNISOLONE SODIUM SUCCINATE 125 MG/2ML
125 INJECTION, POWDER, LYOPHILIZED, FOR SOLUTION INTRAMUSCULAR; INTRAVENOUS ONCE
Status: COMPLETED | OUTPATIENT
Start: 2024-03-25 | End: 2024-03-25

## 2024-03-25 RX ORDER — ACETAMINOPHEN 160 MG/5ML
650 SOLUTION ORAL EVERY 4 HOURS PRN
Status: DISCONTINUED | OUTPATIENT
Start: 2024-03-25 | End: 2024-03-27 | Stop reason: HOSPADM

## 2024-03-25 RX ORDER — IPRATROPIUM BROMIDE AND ALBUTEROL SULFATE 2.5; .5 MG/3ML; MG/3ML
3 SOLUTION RESPIRATORY (INHALATION)
Status: DISCONTINUED | OUTPATIENT
Start: 2024-03-25 | End: 2024-03-25

## 2024-03-25 RX ORDER — TAMSULOSIN HYDROCHLORIDE 0.4 MG/1
1 CAPSULE ORAL 2 TIMES DAILY
COMMUNITY

## 2024-03-25 RX ORDER — POLYETHYLENE GLYCOL 3350 17 G/17G
17 POWDER, FOR SOLUTION ORAL DAILY PRN
Status: DISCONTINUED | OUTPATIENT
Start: 2024-03-25 | End: 2024-03-27 | Stop reason: HOSPADM

## 2024-03-25 RX ORDER — BISACODYL 5 MG/1
5 TABLET, DELAYED RELEASE ORAL DAILY PRN
Status: DISCONTINUED | OUTPATIENT
Start: 2024-03-25 | End: 2024-03-27 | Stop reason: HOSPADM

## 2024-03-25 RX ORDER — GUAIFENESIN 600 MG/1
600 TABLET, EXTENDED RELEASE ORAL EVERY 12 HOURS SCHEDULED
Status: DISCONTINUED | OUTPATIENT
Start: 2024-03-25 | End: 2024-03-27

## 2024-03-25 RX ORDER — FUROSEMIDE 40 MG/1
40 TABLET ORAL DAILY
Status: DISCONTINUED | OUTPATIENT
Start: 2024-03-26 | End: 2024-03-27 | Stop reason: HOSPADM

## 2024-03-25 RX ORDER — DOXYCYCLINE 100 MG/1
100 CAPSULE ORAL EVERY 12 HOURS SCHEDULED
Qty: 28 CAPSULE | Refills: 0 | Status: DISCONTINUED | OUTPATIENT
Start: 2024-03-26 | End: 2024-03-27

## 2024-03-25 RX ORDER — PANTOPRAZOLE SODIUM 40 MG/1
40 TABLET, DELAYED RELEASE ORAL 2 TIMES DAILY
Status: DISCONTINUED | OUTPATIENT
Start: 2024-03-25 | End: 2024-03-27 | Stop reason: HOSPADM

## 2024-03-25 RX ORDER — IPRATROPIUM BROMIDE AND ALBUTEROL SULFATE 2.5; .5 MG/3ML; MG/3ML
3 SOLUTION RESPIRATORY (INHALATION) ONCE
Status: COMPLETED | OUTPATIENT
Start: 2024-03-25 | End: 2024-03-25

## 2024-03-25 RX ORDER — OXYCODONE HYDROCHLORIDE 5 MG/1
7.5 TABLET ORAL EVERY 4 HOURS PRN
Status: DISCONTINUED | OUTPATIENT
Start: 2024-03-25 | End: 2024-03-25

## 2024-03-25 RX ORDER — ONDANSETRON 2 MG/ML
4 INJECTION INTRAMUSCULAR; INTRAVENOUS EVERY 6 HOURS PRN
Status: DISCONTINUED | OUTPATIENT
Start: 2024-03-25 | End: 2024-03-26

## 2024-03-25 RX ORDER — MEGESTROL ACETATE 40 MG/1
40 TABLET ORAL DAILY
Status: DISCONTINUED | OUTPATIENT
Start: 2024-03-26 | End: 2024-03-27 | Stop reason: HOSPADM

## 2024-03-25 RX ORDER — FAMOTIDINE 20 MG/1
20 TABLET, FILM COATED ORAL DAILY
Status: DISCONTINUED | OUTPATIENT
Start: 2024-03-25 | End: 2024-03-25

## 2024-03-25 RX ORDER — HYDROXYZINE HYDROCHLORIDE 25 MG/1
25 TABLET, FILM COATED ORAL ONCE AS NEEDED
Status: COMPLETED | OUTPATIENT
Start: 2024-03-25 | End: 2024-03-25

## 2024-03-25 RX ORDER — AMOXICILLIN 250 MG
2 CAPSULE ORAL 2 TIMES DAILY PRN
Status: DISCONTINUED | OUTPATIENT
Start: 2024-03-25 | End: 2024-03-27 | Stop reason: HOSPADM

## 2024-03-25 RX ORDER — AMLODIPINE BESYLATE 5 MG/1
10 TABLET ORAL DAILY
Status: DISCONTINUED | OUTPATIENT
Start: 2024-03-25 | End: 2024-03-27 | Stop reason: HOSPADM

## 2024-03-25 RX ORDER — HYDRALAZINE HYDROCHLORIDE 25 MG/1
50 TABLET, FILM COATED ORAL 3 TIMES DAILY
Status: DISCONTINUED | OUTPATIENT
Start: 2024-03-25 | End: 2024-03-27 | Stop reason: HOSPADM

## 2024-03-25 RX ORDER — SODIUM CHLORIDE 0.9 % (FLUSH) 0.9 %
10 SYRINGE (ML) INJECTION AS NEEDED
Status: DISCONTINUED | OUTPATIENT
Start: 2024-03-25 | End: 2024-03-27 | Stop reason: HOSPADM

## 2024-03-25 RX ORDER — ALLOPURINOL 100 MG/1
200 TABLET ORAL DAILY
Status: DISCONTINUED | OUTPATIENT
Start: 2024-03-25 | End: 2024-03-27 | Stop reason: HOSPADM

## 2024-03-25 RX ORDER — SODIUM CHLORIDE 0.9 % (FLUSH) 0.9 %
10 SYRINGE (ML) INJECTION EVERY 12 HOURS SCHEDULED
Status: DISCONTINUED | OUTPATIENT
Start: 2024-03-25 | End: 2024-03-27 | Stop reason: HOSPADM

## 2024-03-25 RX ORDER — ACETAMINOPHEN 650 MG/1
650 SUPPOSITORY RECTAL EVERY 4 HOURS PRN
Status: DISCONTINUED | OUTPATIENT
Start: 2024-03-25 | End: 2024-03-27 | Stop reason: HOSPADM

## 2024-03-25 RX ORDER — BUDESONIDE 0.25 MG/2ML
0.25 INHALANT ORAL
Status: DISCONTINUED | OUTPATIENT
Start: 2024-03-25 | End: 2024-03-27 | Stop reason: HOSPADM

## 2024-03-25 RX ORDER — CETIRIZINE HYDROCHLORIDE 10 MG/1
10 TABLET ORAL DAILY
Status: DISCONTINUED | OUTPATIENT
Start: 2024-03-26 | End: 2024-03-27 | Stop reason: HOSPADM

## 2024-03-25 RX ADMIN — OXYCODONE 10 MG: 5 TABLET ORAL at 23:25

## 2024-03-25 RX ADMIN — TAMSULOSIN HYDROCHLORIDE 0.4 MG: 0.4 CAPSULE ORAL at 20:56

## 2024-03-25 RX ADMIN — PANTOPRAZOLE SODIUM 40 MG: 40 TABLET, DELAYED RELEASE ORAL at 20:56

## 2024-03-25 RX ADMIN — CLOPIDOGREL BISULFATE 75 MG: 75 TABLET ORAL at 20:56

## 2024-03-25 RX ADMIN — HYDROXYZINE HYDROCHLORIDE 25 MG: 25 TABLET, FILM COATED ORAL at 22:44

## 2024-03-25 RX ADMIN — DOXYCYCLINE 100 MG: 100 INJECTION, POWDER, LYOPHILIZED, FOR SOLUTION INTRAVENOUS at 16:40

## 2024-03-25 RX ADMIN — IPRATROPIUM BROMIDE AND ALBUTEROL SULFATE 3 ML: .5; 3 SOLUTION RESPIRATORY (INHALATION) at 20:03

## 2024-03-25 RX ADMIN — CEFTRIAXONE 1000 MG: 1 INJECTION, POWDER, FOR SOLUTION INTRAMUSCULAR; INTRAVENOUS at 15:52

## 2024-03-25 RX ADMIN — HYDRALAZINE HYDROCHLORIDE 50 MG: 25 TABLET ORAL at 20:56

## 2024-03-25 RX ADMIN — METHYLPREDNISOLONE SODIUM SUCCINATE 125 MG: 125 INJECTION, POWDER, FOR SOLUTION INTRAMUSCULAR; INTRAVENOUS at 13:38

## 2024-03-25 RX ADMIN — Medication 10 ML: at 20:57

## 2024-03-25 RX ADMIN — ALLOPURINOL 200 MG: 100 TABLET ORAL at 20:56

## 2024-03-25 RX ADMIN — OXYCODONE 7.5 MG: 5 TABLET ORAL at 18:16

## 2024-03-25 RX ADMIN — AMLODIPINE BESYLATE 10 MG: 5 TABLET ORAL at 20:56

## 2024-03-25 RX ADMIN — IPRATROPIUM BROMIDE AND ALBUTEROL SULFATE 3 ML: .5; 3 SOLUTION RESPIRATORY (INHALATION) at 14:20

## 2024-03-25 RX ADMIN — GUAIFENESIN 600 MG: 600 TABLET, EXTENDED RELEASE ORAL at 20:56

## 2024-03-25 NOTE — ED NOTES
Pt wife reports that the pt is getting aggitated and uncomfortable. I informed ROLAND Rubio of this and let her know that the pt reports his O2 is uncomfortable and drying so I had respiratory add humidity to it and the pt reports that he is unable to tell if his breathing treatment helped or not. ED tech and I pulled the pt up in bed and repositioned him. ROLAND Rubio is going to talk to pt.

## 2024-03-25 NOTE — ED NOTES
I spoke with the pts daughter (hannah [phone])  and gave her an update on the pt and told her that he is being admitted.

## 2024-03-25 NOTE — ED NOTES
Pt to ED room 27 accompanied by his wife. Pt reports SOA that started when he woke up this morning. Pt wears home O2 4LNC at all times. Pt reports that he has had a productive cough with white sputum production that also started this morning. Pt reports nausea that started last night. He denies chest pain, abdominal pain, vomiting and diarrhea. Pt is oriented to self and place but does not know what year it is. Pt has no other complaints at this time.

## 2024-03-25 NOTE — PROGRESS NOTES
Pt complains on nasal congestion, causing SOA. breath sounds clear/diminished. Gave duoneb, pt thinks it may have relieved some symptoms

## 2024-03-25 NOTE — H&P
"[facility] Medicine Services  History & Physical    Patient Name: Kailash Cooper Jr.  : 1940  MRN: [id number]  Primary Care Physician:  Madelyn Márquez APRN  Date of admission: 3/25/2024  Date and Time of Service: 3/25/2024    Subjective      Chief Complaint: pneumonia, fatigue/weakness    History of Present Illness: Kailash Cooper Jr. is a 83 y.o. male with a CMH of Hypertension, hyperlipidemia, CKD stage III, low back pain, stroke, type 2 diabetes, arthritis, COPD on 4 L O2 at home who presented to [facility] on 3/25/2024 with productive cough, dyspnea and progressive weakness.  Patient is accompanied by his wife both reported for the last 2 to 3 weeks patient has been having significantly more weakness generalized, just feeling poorly \"I feel like I am dying\".  Reports the productive cough but no fevers, chills or night sweats.  Progressive dyspnea although he wears his oxygen primarily at night but not during the day 2.    Denies any nausea, emesis.  Reports epigastric abdominal pain that is chronic and not always responsive to home meds.  Denies any diarrhea, constipation, hematochezia or melena.  No dysuria or hematuria.  Quit tobacco, alcohol long ago.  No drugs include marijuana.  Had a prior stroke several years ago.    ED course: On arrival to the emergency room, patient is mildly tachycardic at 103, respiratory 20, saturating 97% on 4 L.  Troponin 59 down trended to 50.  BMP at baseline with renal function.  CBC without leukocytosis.  Respiratory PCR negative.  EKG with 96 bpm, sinus and right bundle branch block, no microinjury.  Chest x-ray with left greater than right pneumonia.  Patient was given DuoNebs, blood cultures drawn and given ceftriaxone and doxycycline.      Review of Systems    Personal History     Past Medical History:   Diagnosis Date    Arthritis     Cancer     bone cancer upper lobe rt lung 2017 [place]    Diabetes     Enlarged prostate     Former smoker     " Hiatal hernia     Hip pain     don    Hip pain, bilateral     Hyperlipidemia     Hypertension     Kidney disease        stage 3     Leg pain     don    Low back pain     Neck pain     Stroke        Past Surgical History:   Procedure Laterality Date    CARDIAC CATHETERIZATION Left 1/28/2022    Procedure: Cardiac Catheterization/Vascular Study;  Surgeon: Mani Salguero MD;  Location: [facility] CATH INVASIVE LOCATION;  Service: Cardiovascular;  Laterality: Left;    CATARACT EXTRACTION  2006    OU    COLONOSCOPY  2011    COLONOSCOPY N/A 5/27/2022    Procedure: COLONOSCOPY;  Surgeon: Terry Holliday MD;  Location: [facility] ENDOSCOPY;  Service: Gastroenterology;  Laterality: N/A;  polyps    CORONARY STENT PLACEMENT  01/28/2022    RCA    ENDOSCOPY N/A 5/27/2022    Procedure: ESOPHAGOGASTRODUODENOSCOPY with 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 Otherwise pertinent FHx was reviewed and not pertinent to current issue.    Social History:  reports that he quit smoking about 34 years ago. His smoking use included cigarettes. He has been exposed to tobacco smoke. He has never used smokeless tobacco. He reports that he does not drink alcohol and does not use drugs.    Home Medications:  Prior to Admission Medications       Prescriptions Last Dose Informant Patient Reported? Taking?    allopurinol (ZYLOPRIM) 100 MG tablet 3/24/2024  Yes Yes    Take 2 tablets by mouth Daily. Indications: Disorder of Excessive Uric Acid in the Blood    amLODIPine (NORVASC) 10 MG tablet 3/24/2024  No Yes     TAKE 1 TABLET BY MOUTH ONCE DAILY FOR HIGH BLOOD PRESSURE    budesonide (PULMICORT) 0.25 MG/2ML nebulizer solution 3/24/2024  No Yes    Take 2 mL by nebulization Daily. Indications: Chronic Obstructive Lung Disease    cetirizine (zyrTEC) 10 MG tablet Past Month  No Yes    Take 1 tablet by mouth Daily.    clopidogrel (PLAVIX) 75 MG tablet 3/24/2024  No Yes    Take 1 tablet by mouth Daily. Indications: Ischemic Heart Disease    ferrous sulfate 325 (65 FE) MG EC tablet 3/24/2024  No Yes    Take 1 tablet by mouth Daily With Breakfast.    furosemide (LASIX) 40 MG tablet 3/24/2024  No Yes    Take 1 tablet by mouth Daily.    hydrALAZINE (APRESOLINE) 50 MG tablet 3/24/2024  No Yes    TAKE 1 TABLET BY MOUTH THREE TIMES DAILY    megestrol (MEGACE) 40 MG tablet 3/24/2024  No Yes    Take 1 tablet by mouth Daily.    pantoprazole (PROTONIX) 40 MG EC tablet 3/24/2024  No Yes    Take 1 tablet by mouth twice daily    pravastatin (PRAVACHOL) 40 MG tablet 3/24/2024  No Yes    Take 1 tablet by mouth Daily. Indications: High Amount of Fats in the Blood    Probiotic Product (CULTURELLE PROBIOTICS PO) 3/24/2024  Yes Yes    Take 20 mg by mouth Daily. Indications: diabetic supplement    tamsulosin (FLOMAX) 0.4 MG capsule 24 hr capsule 3/24/2024  Yes Yes    Take 1 capsule by mouth 2 (Two) Times a Day.    vitamin B-12 (CYANOCOBALAMIN) 1000 MCG tablet 3/24/2024  Yes Yes    Take 1 tablet by mouth 2 (Two) Times a Day. Indications: Inadequate Vitamin B12    nitroglycerin (Nitrostat) 0.3 MG SL tablet   No No    Place 1 tablet under the tongue Every 5 (Five) Minutes As Needed for Chest Pain. Take no more than 3 doses in 15 minutes.    oxyCODONE-acetaminophen (Percocet)  MG per tablet   No No    Take 1 tablet by mouth Every 6 (Six) Hours As Needed for Moderate Pain.              Allergies:  No Known Allergies    Objective      Vitals:   Temp:  [97.8 °F (36.6 °C)] 97.8 °F (36.6 °C)  Heart Rate:  [] 102  Resp:  [13-26] 26  BP:  (150-189)/(72-94) 189/94  Body mass index is 22.32 kg/m².  Physical Exam  Constitutional:       General: He is not in acute distress.     Appearance: He is normal weight. He is not ill-appearing.   HENT:      Nose: Nose normal.      Mouth/Throat:      Mouth: Mucous membranes are dry.   Eyes:      Extraocular Movements: Extraocular movements intact.      Conjunctiva/sclera: Conjunctivae normal.      Pupils: Pupils are equal, round, and reactive to light.   Cardiovascular:      Rate and Rhythm: Normal rate and regular rhythm.      Pulses: Normal pulses.   Pulmonary:      Effort: No respiratory distress.      Breath sounds: Wheezing and rhonchi present.   Abdominal:      General: Abdomen is flat. Bowel sounds are normal.      Palpations: Abdomen is soft.   Musculoskeletal:      Right lower leg: Edema present.   Skin:     General: Skin is warm and dry.   Neurological:      General: No focal deficit present.      Mental Status: He is alert and oriented to person, place, and time.   Psychiatric:         Mood and Affect: Mood normal.         Thought Content: Thought content normal.         Diagnostic Data:  Lab Results (last 24 hours)       Procedure Component Value Units Date/Time    High Sensitivity Troponin T 2Hr [[id number]]  (Abnormal) Collected: 03/25/24 1404    Specimen: Blood from Arm, Right Updated: 03/25/24 1428     HS Troponin T 50 ng/L      Troponin T Delta -9 ng/L     Narrative:      High Sensitive Troponin T Reference Range:  <14.0 ng/L- Negative Female for AMI  <22.0 ng/L- Negative Male for AMI  >=14 - Abnormal Female indicating possible myocardial injury.  >=22 - Abnormal Male indicating possible myocardial injury.   Clinicians would have to utilize clinical acumen, EKG, Troponin, and serial changes to determine if it is an Acute Myocardial Infarction or myocardial injury due to an underlying chronic condition.         Blood Culture - Blood, Arm, Left [[id number]] Collected: 03/25/24 1331    Specimen: Blood  from Arm, Left Updated: 03/25/24 1336    Blood Culture - Blood, Arm, Right [[id number]] Collected: 03/25/24 1319    Specimen: Blood from Arm, Right Updated: 03/25/24 1323    [place] Draw [[id number]] Collected: 03/25/24 1206    Specimen: Blood from Arm, Right Updated: 03/25/24 1315    Narrative:      The following orders were created for panel order [place] Draw.  Procedure                               Abnormality         Status                     ---------                               -----------         ------                     Green Top (Gel)[[id number]]                                                             Lavender Top[[id number]]                                     Final result               Gold Top - SST[[id number]]                                   Final result               Light Blue Top[[id number]]                                   Final result                 Please view results for these tests on the individual orders.    Lavender Top [[id number]] Collected: 03/25/24 1206    Specimen: Blood from Arm, Right Updated: 03/25/24 1315     Extra Tube hold for add-on     Comment: Auto resulted       Gold Top - SST [[id number]] Collected: 03/25/24 1206    Specimen: Blood from Arm, Right Updated: 03/25/24 1315     Extra Tube Hold for add-ons.     Comment: Auto resulted.       Light Blue Top [[id number]] Collected: 03/25/24 1206    Specimen: Blood from Arm, Right Updated: 03/25/24 1315     Extra Tube Hold for add-ons.     Comment: Auto resulted       Procalcitonin [[id number]]  (Normal) Collected: 03/25/24 1206    Specimen: Blood from Arm, Right Updated: 03/25/24 1314     Procalcitonin 0.08 ng/mL     Narrative:      As a Marker for Sepsis (Non-Neonates):    1. <0.5 ng/mL represents a low risk of severe sepsis and/or septic shock.  2. >2 ng/mL represents a high risk of severe sepsis and/or septic shock.    As a Marker for Lower Respiratory Tract Infections that require antibiotic therapy:    PCT on Admission     "Antibiotic Therapy       6-12 Hrs later    >0.5                Strongly Recommended  >0.25 - <0.5        Recommended   0.1 - 0.25          Discouraged              Remeasure/reassess PCT  <0.1                Strongly Discouraged     Remeasure/reassess PCT    As 28 day mortality risk marker: \"Change in Procalcitonin Result\" (>80% or <=80%) if Day 0 (or Day 1) and Day 4 values are available. Refer to http://www.[facility]-pct-calculator.com    Change in PCT <=80%  A decrease of PCT levels below or equal to 80% defines a positive change in PCT test result representing a higher risk for 28-day all-cause mortality of patients diagnosed with severe sepsis for septic shock.    Change in PCT >80%  A decrease of PCT levels of more than 80% defines a negative change in PCT result representing a lower risk for 28-day all-cause mortality of patients diagnosed with severe sepsis or septic shock.       Respiratory Panel PCR w/COVID-19(SARS-CoV-2) NEETU/MICHELLE/GARETH/PAD/COR/RJ In-House, NP Swab in UTM/VTM, 2 HR TAT - Swab, Nasopharynx [[id number]]  (Normal) Collected: 03/25/24 1206    Specimen: Swab from Nasopharynx Updated: 03/25/24 1304     ADENOVIRUS, PCR Not Detected     Coronavirus 229E Not Detected     Coronavirus HKU1 Not Detected     Coronavirus NL63 Not Detected     Coronavirus OC43 Not Detected     COVID19 Not Detected     Human Metapneumovirus Not Detected     Human Rhinovirus/Enterovirus Not Detected     Influenza A PCR Not Detected     Influenza B PCR Not Detected     Parainfluenza Virus 1 Not Detected     Parainfluenza Virus 2 Not Detected     Parainfluenza Virus 3 Not Detected     Parainfluenza Virus 4 Not Detected     RSV, PCR Not Detected     Bordetella pertussis pcr Not Detected     Bordetella parapertussis PCR Not Detected     Chlamydophila pneumoniae PCR Not Detected     Mycoplasma pneumo by PCR Not Detected    Narrative:      In the setting of a positive respiratory panel with a viral infection PLUS a negative " procalcitonin without other underlying concern for bacterial infection, consider observing off antibiotics or discontinuation of antibiotics and continue supportive care. If the respiratory panel is positive for atypical bacterial infection (Bordetella pertussis, Chlamydophila pneumoniae, or Mycoplasma pneumoniae), consider antibiotic de-escalation to target atypical bacterial infection.    Single High Sensitivity Troponin T [[id number]]  (Abnormal) Collected: 03/25/24 1206    Specimen: Blood from Arm, Right Updated: 03/25/24 1246     HS Troponin T 59 ng/L     Narrative:      High Sensitive Troponin T Reference Range:  <14.0 ng/L- Negative Female for AMI  <22.0 ng/L- Negative Male for AMI  >=14 - Abnormal Female indicating possible myocardial injury.  >=22 - Abnormal Male indicating possible myocardial injury.   Clinicians would have to utilize clinical acumen, EKG, Troponin, and serial changes to determine if it is an Acute Myocardial Infarction or myocardial injury due to an underlying chronic condition.         Comprehensive Metabolic Panel [[id number]]  (Abnormal) Collected: 03/25/24 1206    Specimen: Blood from Arm, Right Updated: 03/25/24 1245     Glucose 124 mg/dL      BUN 22 mg/dL      Creatinine 1.30 mg/dL      Sodium 138 mmol/L      Potassium 3.9 mmol/L      Chloride 105 mmol/L      CO2 22.0 mmol/L      Calcium 9.9 mg/dL      Total Protein 6.4 g/dL      Albumin 4.2 g/dL      ALT (SGPT) 14 U/L      AST (SGOT) 18 U/L      Alkaline Phosphatase 108 U/L      Total Bilirubin 0.4 mg/dL      Globulin 2.2 gm/dL      A/G Ratio 1.9 g/dL      BUN/Creatinine Ratio 16.9     Anion Gap 11.0 mmol/L      eGFR 54.5 mL/min/1.73     Narrative:      GFR Normal >60  Chronic Kidney Disease <60  Kidney Failure <15    The GFR formula is only valid for adults with stable renal function between ages 18 and 70.    BNP [[id number]]  (Normal) Collected: 03/25/24 1206    Specimen: Blood from Arm, Right Updated: 03/25/24 1245     proBNP  408.6 pg/mL     Narrative:      This assay is used as an aid in the diagnosis of individuals suspected of having heart failure. It can be used as an aid in the diagnosis of acute decompensated heart failure (ADHF) in patients presenting with signs and symptoms of ADHF to the emergency department (ED). In addition, NT-proBNP of <300 pg/mL indicates ADHF is not likely.    Age Range Result Interpretation  NT-proBNP Concentration (pg/mL:      <50             Positive            >450                   Gray                 300-450                    Negative             <300    50-75           Positive            >900                  Gray                300-900                  Negative            <300      >75             Positive            >1800                  Gray                300-1800                  Negative            <300    CBC & Differential [[id number]]  (Abnormal) Collected: 03/25/24 1206    Specimen: Blood from Arm, Right Updated: 03/25/24 1214    Narrative:      The following orders were created for panel order CBC & Differential.  Procedure                               Abnormality         Status                     ---------                               -----------         ------                     CBC Auto Differential[[id number]]        Abnormal            Final result                 Please view results for these tests on the individual orders.    CBC Auto Differential [[id number]]  (Abnormal) Collected: 03/25/24 1206    Specimen: Blood from Arm, Right Updated: 03/25/24 1214     WBC 10.30 10*3/mm3      RBC 3.89 10*6/mm3      Hemoglobin 12.0 g/dL      Hematocrit 36.0 %      MCV 92.5 fL      MCH 30.8 pg      MCHC 33.3 g/dL      RDW 13.3 %      RDW-SD 45.0 fl      MPV 9.8 fL      Platelets 271 10*3/mm3      Neutrophil % 85.1 %      Lymphocyte % 8.1 %      Monocyte % 4.7 %      Eosinophil % 1.0 %      Basophil % 0.4 %      Immature Grans % 0.7 %      Neutrophils, Absolute 8.78 10*3/mm3       Lymphocytes, Absolute 0.83 10*3/mm3      Monocytes, Absolute 0.48 10*3/mm3      Eosinophils, Absolute 0.10 10*3/mm3      Basophils, Absolute 0.04 10*3/mm3      Immature Grans, Absolute 0.07 10*3/mm3      nRBC 0.0 /100 WBC              Imaging Results (Last 24 Hours)       Procedure Component Value Units Date/Time    XR Chest 1 View [[id number]] Collected: 03/25/24 1215     Updated: 03/25/24 1218    Narrative:      XR CHEST 1 VW    Date of Exam: 3/25/2024 12:12 PM EDT    Indication: CHF/COPD Protocol  CHF/COPD Protocol    Comparison: AP portable chest 5/25/2022    Findings:  Fine interstitial thickening is present in both lungs with ill-defined groundglass alveolar densities seen predominately in the bilateral lower lobes, left greater right and left upper lobe. Heart size is borderline enlarged. No pleural effusion or   pneumothorax or acute osseous abnormalities are identified.      Impression:      1. Mild patchy groundglass alveolar disease in both lungs, greatest in the left lower lobe. Correlate for symptoms of pneumonia  2. Fine interstitial thickening is present in both lungs may represent a component of mild background edema.      Electronically Signed: aSsha Rios MD    3/25/2024 12:16 PM EDT    Workstation ID: [id number]              Assessment & Plan        This is a 83 y.o. male with:    Active and Resolved Problems  Active Hospital Problems    Diagnosis  POA    **Bilateral pneumonia [J18.9]  Yes    Weakness [R53.1]  Unknown      Resolved Hospital Problems   No resolved problems to display.       Kailash Cooper Jr. is a 83 y.o. male with a CMH of Hypertension, hyperlipidemia, CKD stage III, low back pain, stroke, type 2 diabetes, arthritis, COPD on 4 L O2 at home who presented to [facility] on 3/25/2024 with productive cough, dyspnea and progressive weakness.        # Bilateral Community Acquired Pneumonia  # COPD on 4 L O2, at baseline  Patient with history of COPD with 2 to 3 weeks of  progressive dyspnea productive cough and weakness.  On arrival to the emergency room, patient is mildly tachycardic at 103, respiratory 20, saturating 97% on 4 L.  Exam with bibasilar wheezes and scattered rhonchi.  As well as trace peripheral edema.  CBC without leukocytosis.  Respiratory PCR negative.    Chest x-ray with left greater than right pneumonia.  Patient was given DuoNebs, blood cultures drawn and given ceftriaxone and doxycycline.  - follow up blood cultures  - sputum culture  - Ceftriaxone 1g IV every 24 hours + Doxy 100mg BID  - DuoNebs every 6 hours  - Mucinex BID  - Acapella  - IS  - AM labs: CBC, BMP, Mg  - Continue home Pulmicort    # Elevated troponin  Troponin 59 down trended to 50.  BMP at baseline with renal function.  EKG with 96 bpm, sinus and right bundle branch block, no microinjury.  Suspecting demand ischemia. As it downtrended, will not trend. If has chest pain, repeat troponin and get ECG.  - telemetry    # Generalized weakness  Generalized weakness over the last 2 to 3 weeks, progressive.  In the setting of illness.  Physical therapy consult placed.    # Hypertension  Continue home amlodipine 10 mg daily, hydralazine 50 mg daily.  Continue home Lasix 40 mg daily (on exam with trace peripheral edema).     # hyperlipidemia  At home on pravastatin here and atorvastatin.    # History of stroke  Continue home Plavix and statin.    # CKD stage III  Renal function at baseline.  -Avoid nephrotoxic drugs   - Daily BMP    #  type 2 diabetes  Not on any agents at home.     DVT prophylaxis:  No DVT prophylaxis order currently exists.        The patient desires to be as follows:    CODE STATUS:         Admission Status:  I believe this patient meets inpatient status.    Expected Length of Stay: 3 days    PDMP and Medication Dispenses via Sidebar reviewed and consistent with patient reported medications.    I discussed the patient's findings and my recommendations with patient and  family.      Signature:     This document has been electronically signed by Maryse Fenton MD on March 25, 2024 16:30 EDT   [facility] Hospitalist Team

## 2024-03-25 NOTE — PLAN OF CARE
Goal Outcome Evaluation:         Pt admitted to unit. Call light within reach

## 2024-03-25 NOTE — ED PROVIDER NOTES
Subjective   History of Present Illness  Chief Complaint: Shortness of breath    Patient is a 83-year-old male history of diabetes hypertension, COPD, CKD presents to the ER with complaints of shortness of breath that started this morning.  Patient states that he normally wears 4 L per nasal cannula, he is not requiring any additional oxygen.  No chest pain or tightness.  No cough or congestion.  No abdominal pain nausea vomiting.  No lower extremity swelling.  Reports feeling fatigued and short of breath.  No fever or chills.    PCP: Madelyn Márquez    History provided by:  Patient      Review of Systems   Constitutional:  Positive for fatigue. Negative for fever.   HENT:  Negative for sore throat and trouble swallowing.    Eyes: Negative.    Respiratory:  Positive for cough and shortness of breath. Negative for wheezing.    Cardiovascular:  Negative for chest pain.   Gastrointestinal:  Negative for abdominal pain, diarrhea, nausea and vomiting.   Endocrine: Negative.    Genitourinary:  Negative for dysuria.   Musculoskeletal:  Negative for myalgias.   Skin:  Negative for rash.   Allergic/Immunologic: Negative.    Neurological:  Negative for headaches.   Psychiatric/Behavioral:  Negative for behavioral problems.    All other systems reviewed and are negative.      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 [facility]    Diabetes     Enlarged prostate     Former smoker     Hiatal hernia     Hip pain     don    Hip pain, bilateral     Hyperlipidemia     Hypertension     Kidney disease        stage 3     Leg pain     don    Low back pain     Neck pain     Stroke        No Known Allergies    Past Surgical History:   Procedure Laterality Date    CARDIAC CATHETERIZATION Left 1/28/2022    Procedure: Cardiac Catheterization/Vascular Study;  Surgeon: Mani Salguero MD;  Location: [facility] INVASIVE LOCATION;  Service: Cardiovascular;  Laterality: Left;    CATARACT  EXTRACTION      OU    COLONOSCOPY      COLONOSCOPY N/A 2022    Procedure: COLONOSCOPY;  Surgeon: Terry Holliday MD;  Location: [facility] ENDOSCOPY;  Service: Gastroenterology;  Laterality: N/A;  polyps    CORONARY STENT PLACEMENT  2022    RCA    ENDOSCOPY N/A 2022    Procedure: ESOPHAGOGASTRODUODENOSCOPY with 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, gastric antrum biopsy;  Surgeon: Terry Holliday MD;  Location: [facility] ENDOSCOPY;  Service: Gastroenterology;  Laterality: N/A;  gastritis, gastric ulcer, small bowel AVM    LUNG CANCER SURGERY      upper lobe rt lung cancer 2017  at [facility]       Family History   Problem Relation Age of Onset    Stroke Mother     Cancer Father         Prostate    Heart failure Brother     Heart disease Other        Social History     Socioeconomic History    Marital status:      Spouse name: Candis    Number of children: 5   Tobacco Use    Smoking status: Former     Current packs/day: 0.00     Types: Cigarettes     Quit date:      Years since quittin.2     Passive exposure: Past    Smokeless tobacco: Never   Vaping Use    Vaping status: Never Used   Substance and Sexual Activity    Alcohol use: Never    Drug use: Never    Sexual activity: Defer     Partners: Female     Birth control/protection: None           Objective   Physical Exam  Vitals and nursing note reviewed.   Constitutional:       General: He is not in acute distress.     Appearance: He is well-developed and normal weight. He is ill-appearing. He is not diaphoretic.   HENT:      Head: Normocephalic and atraumatic.      Mouth/Throat:      Mouth: Mucous membranes are moist.   Eyes:      Pupils: Pupils are equal, round, and reactive to light.   Cardiovascular:      Rate and Rhythm: Normal rate and regular rhythm.      Heart sounds: No murmur heard.  Pulmonary:      Effort: Tachypnea present.      Breath sounds: Decreased breath sounds and  "wheezing present.   Chest:      Chest wall: No tenderness.   Abdominal:      General: Bowel sounds are normal.      Palpations: Abdomen is soft.   Musculoskeletal:      Cervical back: Normal range of motion.      Right lower leg: No tenderness. No edema.      Left lower leg: No tenderness. No edema.   Skin:     General: Skin is warm.      Capillary Refill: Capillary refill takes less than 2 seconds.   Neurological:      General: No focal deficit present.      Mental Status: He is alert and oriented to person, place, and time.   Psychiatric:         Mood and Affect: Mood normal.         Behavior: Behavior normal.         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 Fenton regarding admission [MC]      ED Course User Index  [MC] Melaine Reese PA    /80   Pulse 104   Temp 98.2 °F (36.8 °C) (Oral)   Resp 24   Ht 160 cm (63\")   Wt 49.9 kg (110 lb 0.2 oz)   SpO2 99%   BMI 19.49 kg/m²   Labs Reviewed   COMPREHENSIVE METABOLIC PANEL - Abnormal; Notable for the following components:       Result Value    Glucose 124 (*)     Creatinine 1.30 (*)     eGFR 54.5 (*)     All other components within normal limits    Narrative:     GFR Normal >60  Chronic Kidney Disease <60  Kidney Failure <15    The GFR formula is only valid for adults with stable renal function between ages 18 and 70.   SINGLE HS TROPONIN T - Abnormal; Notable for the following components:    HS Troponin T 59 (*)     All other components within normal limits    Narrative:     High Sensitive Troponin T Reference Range:  <14.0 ng/L- Negative Female for " AMI  <22.0 ng/L- Negative Male for AMI  >=14 - Abnormal Female indicating possible myocardial injury.  >=22 - Abnormal Male indicating possible myocardial injury.   Clinicians would have to utilize clinical acumen, EKG, Troponin, and serial changes to determine if it is an Acute Myocardial Infarction or myocardial injury due to an underlying chronic condition.        CBC WITH AUTO DIFFERENTIAL - Abnormal; Notable for the following components:    RBC 3.89 (*)     Hemoglobin 12.0 (*)     Hematocrit 36.0 (*)     Neutrophil % 85.1 (*)     Lymphocyte % 8.1 (*)     Monocyte % 4.7 (*)     Immature Grans % 0.7 (*)     Neutrophils, Absolute 8.78 (*)     Immature Grans, Absolute 0.07 (*)     All other components within normal limits   HIGH SENSITIVITIY TROPONIN T 2HR - Abnormal; Notable for the following components:    HS Troponin T 50 (*)     Troponin T Delta -9 (*)     All other components within normal limits    Narrative:     High Sensitive Troponin T Reference Range:  <14.0 ng/L- Negative Female for AMI  <22.0 ng/L- Negative Male for AMI  >=14 - Abnormal Female indicating possible myocardial injury.  >=22 - Abnormal Male indicating possible myocardial injury.   Clinicians would have to utilize clinical acumen, EKG, Troponin, and serial changes to determine if it is an Acute Myocardial Infarction or myocardial injury due to an underlying chronic condition.        RESPIRATORY PANEL PCR W/ COVID-19 (SARS-COV-2), NP SWAB IN UTM/VTP, 2 HR TAT - Normal    Narrative:     In the setting of a positive respiratory panel with a viral infection PLUS a negative procalcitonin without other underlying concern for bacterial infection, consider observing off antibiotics or discontinuation of antibiotics and continue supportive care. If the respiratory panel is positive for atypical bacterial infection (Bordetella pertussis, Chlamydophila pneumoniae, or Mycoplasma pneumoniae), consider antibiotic de-escalation to target atypical bacterial  "infection.   BNP (IN-HOUSE) - Normal    Narrative:     This assay is used as an aid in the diagnosis of individuals suspected of having heart failure. It can be used as an aid in the diagnosis of acute decompensated heart failure (ADHF) in patients presenting with signs and symptoms of ADHF to the emergency department (ED). In addition, NT-proBNP of <300 pg/mL indicates ADHF is not likely.    Age Range Result Interpretation  NT-proBNP Concentration (pg/mL:      <50             Positive            >450                   Gray                 300-450                    Negative             <300    50-75           Positive            >900                  Gray                300-900                  Negative            <300      >75             Positive            >1800                  Gray                300-1800                  Negative            <300   PROCALCITONIN - Normal    Narrative:     As a Marker for Sepsis (Non-Neonates):    1. <0.5 ng/mL represents a low risk of severe sepsis and/or septic shock.  2. >2 ng/mL represents a high risk of severe sepsis and/or septic shock.    As a Marker for Lower Respiratory Tract Infections that require antibiotic therapy:    PCT on Admission    Antibiotic Therapy       6-12 Hrs later    >0.5                Strongly Recommended  >0.25 - <0.5        Recommended   0.1 - 0.25          Discouraged              Remeasure/reassess PCT  <0.1                Strongly Discouraged     Remeasure/reassess PCT    As 28 day mortality risk marker: \"Change in Procalcitonin Result\" (>80% or <=80%) if Day 0 (or Day 1) and Day 4 values are available. Refer to http://www.Traddr.coms-pct-calculator.com    Change in PCT <=80%  A decrease of PCT levels below or equal to 80% defines a positive change in PCT test result representing a higher risk for 28-day all-cause mortality of patients diagnosed with severe sepsis for septic shock.    Change in PCT >80%  A decrease of PCT levels of more than 80% " defines a negative change in PCT result representing a lower risk for 28-day all-cause mortality of patients diagnosed with severe sepsis or septic shock.      BLOOD CULTURE   BLOOD CULTURE   RAINBOW DRAW    Narrative:     The following orders were created for panel order [place] Draw.  Procedure                               Abnormality         Status                     ---------                               -----------         ------                     Green Top (Gel)[[id number]]                                                             Lavender Top[[id number]]                                     Final result               Gold Top - SST[[id number]]                                   Final result               Light Blue Top[[id number]]                                   Final result                 Please view results for these tests on the individual orders.   CBC AND DIFFERENTIAL    Narrative:     The following orders were created for panel order CBC & Differential.  Procedure                               Abnormality         Status                     ---------                               -----------         ------                     CBC Auto Differential[[id number]]        Abnormal            Final result                 Please view results for these tests on the individual orders.   LAVENDER TOP   GOLD TOP - SST   LIGHT BLUE TOP     Medications   sodium chloride 0.9 % flush 10 mL (has no administration in time range)   ipratropium-albuterol (DUO-NEB) nebulizer solution 3 mL (3 mL Nebulization Given by Other 3/25/24 2003)   sodium chloride 0.9 % flush 10 mL (10 mL Intravenous Given 3/25/24 2057)   sodium chloride 0.9 % flush 10 mL (has no administration in time range)   sodium chloride 0.9 % infusion 40 mL (has no administration in time range)   heparin (porcine) 5000 UNIT/ML injection 5,000 Units (5,000 Units Subcutaneous Not Given 3/25/24 2054)   Potassium Replacement - Follow Nurse / BPA Driven Protocol  (has no administration in time range)   Magnesium Standard Dose Replacement - Follow Nurse / BPA Driven Protocol (has no administration in time range)   Phosphorus Replacement - Follow Nurse / BPA Driven Protocol (has no administration in time range)   Calcium Replacement - Follow Nurse / BPA Driven Protocol (has no administration in time range)   acetaminophen (TYLENOL) tablet 650 mg (has no administration in time range)     Or   acetaminophen (TYLENOL) 160 MG/5ML oral solution 650 mg (has no administration in time range)     Or   acetaminophen (TYLENOL) suppository 650 mg (has no administration in time range)   sennosides-docusate (PERICOLACE) 8.6-50 MG per tablet 2 tablet (has no administration in time range)     And   polyethylene glycol (MIRALAX) packet 17 g (has no administration in time range)     And   bisacodyl (DULCOLAX) EC tablet 5 mg (has no administration in time range)     And   bisacodyl (DULCOLAX) suppository 10 mg (has no administration in time range)   ondansetron ODT (ZOFRAN-ODT) disintegrating tablet 4 mg (has no administration in time range)     Or   ondansetron (ZOFRAN) injection 4 mg (has no administration in time range)   cefTRIAXone (ROCEPHIN) 1,000 mg in sodium chloride 0.9 % 100 mL MBP (has no administration in time range)   doxycycline (MONODOX) capsule 100 mg (has no administration in time range)   guaiFENesin (MUCINEX) 12 hr tablet 600 mg (600 mg Oral Given 3/25/24 2056)   allopurinol (ZYLOPRIM) tablet 200 mg (200 mg Oral Given 3/25/24 2056)   amLODIPine (NORVASC) tablet 10 mg (10 mg Oral Given 3/25/24 2056)   budesonide (PULMICORT) nebulizer solution 0.25 mg (0.25 mg Nebulization Not Given 3/25/24 2009)   cetirizine (zyrTEC) tablet 10 mg (has no administration in time range)   clopidogrel (PLAVIX) tablet 75 mg (75 mg Oral Given 3/25/24 2056)   ferrous sulfate EC tablet 324 mg (has no administration in time range)   furosemide (LASIX) tablet 40 mg (has no administration in time range)    hydrALAZINE (APRESOLINE) tablet 50 mg (50 mg Oral Given 3/25/24 2056)   megestrol (MEGACE) tablet 40 mg (has no administration in time range)   pantoprazole (PROTONIX) EC tablet 40 mg (40 mg Oral Given 3/25/24 2056)   atorvastatin (LIPITOR) tablet 10 mg (has no administration in time range)   tamsulosin (FLOMAX) 24 hr capsule 0.4 mg (0.4 mg Oral Given 3/25/24 2056)   oxyCODONE (ROXICODONE) immediate release tablet 10 mg (has no administration in time range)   methylPREDNISolone sodium succinate (SOLU-Medrol) injection 125 mg (125 mg Intravenous Given 3/25/24 1338)   ipratropium-albuterol (DUO-NEB) nebulizer solution 3 mL (3 mL Nebulization Given 3/25/24 1420)   cefTRIAXone (ROCEPHIN) 1,000 mg in sodium chloride 0.9 % 100 mL MBP (0 mg Intravenous Stopped 3/25/24 1640)   doxycycline (VIBRAMYCIN) 100 mg in sodium chloride 0.9 % 100 mL MBP (100 mg Intravenous New Bag 3/25/24 1640)     XR Chest 1 View    Result Date: 3/25/2024  1. Mild patchy groundglass alveolar disease in both lungs, greatest in the left lower lobe. Correlate for symptoms of pneumonia 2. Fine interstitial thickening is present in both lungs may represent a component of mild background edema. Electronically Signed: Sasha Rios MD  3/25/2024 12:16 PM EDT  Workstation ID: [id number]                                            Medical Decision Making  Differential Dx (Includes but not limited to): \COPD exacerbation, CHF exacerbation, pneumonia respiratory failure pulmonary edema pleural effusion  Medical Records Reviewed: 2D Echo 6/1/22  · Left ventricular systolic function is normal.  · Left ventricular ejection fraction is 55 to 60%  · Left ventricular diastolic function was normal.  · Calculated right ventricular systolic pressure from tricuspid regurgitation is 35.9 mmHg.    Labs: On my interpretation CBC no leukocytosis.  Hemoglobin 12.0.  CMP glucose 124 creatinine 1.3.  Troponin 59 followed by 50.  BNP normal.  Respiratory panel  negative.  Imaging: On my interpretation, chest x-ray suspicious for lower lobe pneumonia.  Telemetry: EKG interpretation: Reviewed by myself interpreted by ER attending, sinus rhythm rate of 96 with right bundle tory block, left anterior fascicular block, no acute ST changes.  Testing considered but not ordered: CT head patient denies headache or head injury  Nature of Complaint: Acute  Admission vs Discharge: Admission  Discussion: While in the ED IV was placed and labs were obtained appropriate PPE was worn during exam and throughout all encounters with the patient.  Patient had the above evaluation.  He is afebrile, nontoxic in appearance.  Patient is on his normal 4 L per nasal cannula.  He does report some dryness and irritation to his nostrils he was placed on humidified oxygen.  Chest x-ray did show evidence of pneumonia and he was given Rocephin and doxycycline.  He was also given Solu-Medrol and DuoNeb treatments with some improvement.  Patient will be admitted for IV antibiotics, serial troponins and further breathing treatments.  Family agreeable with plan of admission.  I spoke with Dr. Fenton regarding admission.    Based on the clinical findings at this time I anticipate that the patient will require 2 midnight stay.    Problems Addressed:  Dyspnea, unspecified type: acute illness or injury  Elevated troponin: acute illness or injury  Pneumonia due to infectious organism, unspecified laterality, unspecified part of lung: acute illness or injury    Amount and/or Complexity of Data Reviewed  Labs: ordered. Decision-making details documented in ED Course.  Radiology: ordered. Decision-making details documented in ED Course.  ECG/medicine tests: ordered.    Risk  Prescription drug management.  Decision regarding hospitalization.        Final diagnoses:   Pneumonia due to infectious organism, unspecified laterality, unspecified part of lung   Dyspnea, unspecified type   Elevated troponin       ED  Disposition  ED Disposition       ED Disposition   Decision to Admit    Condition   --    Comment   Level of Care: Med/Surg [1]   Diagnosis: Bilateral pneumonia [768324]   Admitting Physician: ANAND SANCHEZ [[id number]]   Attending Physician: ANAND SANCHEZ [[id number]]   Certification: I Certify That Inpatient Hospital Services Are Medically Necessary For Greater Than 2 Midnights                 No follow-up provider specified.       Medication List        ASK your doctor about these medications      tamsulosin 0.4 MG capsule 24 hr capsule  Commonly known as: FLOMAX  Ask about: Which instructions should I use?                 Melanie Reese PA  03/25/24 [id number]

## 2024-03-25 NOTE — Clinical Note
Level of Care: Telemetry [5]   Admitting Physician: ANAND SANCHEZ [[id number]]   Attending Physician: ANAND SANCHEZ [[id number]]

## 2024-03-26 LAB
ANION GAP SERPL CALCULATED.3IONS-SCNC: 15 MMOL/L (ref 5–15)
BACTERIA UR QL AUTO: NORMAL /HPF
BASOPHILS # BLD AUTO: 0 10*3/MM3 (ref 0–0.2)
BASOPHILS NFR BLD AUTO: 0 % (ref 0–1.5)
BILIRUB UR QL STRIP: NEGATIVE
BUN SERPL-MCNC: 25 MG/DL (ref 8–23)
BUN/CREAT SERPL: 19.5 (ref 7–25)
CALCIUM SPEC-SCNC: 10.2 MG/DL (ref 8.6–10.5)
CHLORIDE SERPL-SCNC: 101 MMOL/L (ref 98–107)
CLARITY UR: CLEAR
CO2 SERPL-SCNC: 21 MMOL/L (ref 22–29)
COLOR UR: YELLOW
CREAT SERPL-MCNC: 1.28 MG/DL (ref 0.76–1.27)
DEPRECATED RDW RBC AUTO: 44.5 FL (ref 37–54)
EGFRCR SERPLBLD CKD-EPI 2021: 55.5 ML/MIN/1.73
EOSINOPHIL # BLD AUTO: 0 10*3/MM3 (ref 0–0.4)
EOSINOPHIL NFR BLD AUTO: 0 % (ref 0.3–6.2)
ERYTHROCYTE [DISTWIDTH] IN BLOOD BY AUTOMATED COUNT: 13.2 % (ref 12.3–15.4)
GLUCOSE SERPL-MCNC: 162 MG/DL (ref 65–99)
GLUCOSE UR STRIP-MCNC: NEGATIVE MG/DL
HCT VFR BLD AUTO: 38.4 % (ref 37.5–51)
HGB BLD-MCNC: 12.7 G/DL (ref 13–17.7)
HGB UR QL STRIP.AUTO: ABNORMAL
HYALINE CASTS UR QL AUTO: NORMAL /LPF
IMM GRANULOCYTES # BLD AUTO: 0.09 10*3/MM3 (ref 0–0.05)
IMM GRANULOCYTES NFR BLD AUTO: 1 % (ref 0–0.5)
KETONES UR QL STRIP: NEGATIVE
LEUKOCYTE ESTERASE UR QL STRIP.AUTO: NEGATIVE
LYMPHOCYTES # BLD AUTO: 0.52 10*3/MM3 (ref 0.7–3.1)
LYMPHOCYTES NFR BLD AUTO: 5.8 % (ref 19.6–45.3)
MCH RBC QN AUTO: 30.5 PG (ref 26.6–33)
MCHC RBC AUTO-ENTMCNC: 33.1 G/DL (ref 31.5–35.7)
MCV RBC AUTO: 92.1 FL (ref 79–97)
MONOCYTES # BLD AUTO: 0.22 10*3/MM3 (ref 0.1–0.9)
MONOCYTES NFR BLD AUTO: 2.4 % (ref 5–12)
NEUTROPHILS NFR BLD AUTO: 8.18 10*3/MM3 (ref 1.7–7)
NEUTROPHILS NFR BLD AUTO: 90.8 % (ref 42.7–76)
NITRITE UR QL STRIP: NEGATIVE
NRBC BLD AUTO-RTO: 0 /100 WBC (ref 0–0.2)
PH UR STRIP.AUTO: 5.5 [PH] (ref 5–8)
PLATELET # BLD AUTO: 304 10*3/MM3 (ref 140–450)
PMV BLD AUTO: 9.9 FL (ref 6–12)
POTASSIUM SERPL-SCNC: 4 MMOL/L (ref 3.5–5.2)
PROT UR QL STRIP: ABNORMAL
QT INTERVAL: 372 MS
QTC INTERVAL: 471 MS
RBC # BLD AUTO: 4.17 10*6/MM3 (ref 4.14–5.8)
RBC # UR STRIP: NORMAL /HPF
REF LAB TEST METHOD: NORMAL
SODIUM SERPL-SCNC: 137 MMOL/L (ref 136–145)
SP GR UR STRIP: 1.02 (ref 1–1.03)
SQUAMOUS #/AREA URNS HPF: NORMAL /HPF
UROBILINOGEN UR QL STRIP: ABNORMAL
WBC # UR STRIP: NORMAL /HPF
WBC NRBC COR # BLD AUTO: 9.01 10*3/MM3 (ref 3.4–10.8)

## 2024-03-26 PROCEDURE — 94799 UNLISTED PULMONARY SVC/PX: CPT

## 2024-03-26 PROCEDURE — 97162 PT EVAL MOD COMPLEX 30 MIN: CPT

## 2024-03-26 PROCEDURE — 80048 BASIC METABOLIC PNL TOTAL CA: CPT | Performed by: STUDENT IN AN ORGANIZED HEALTH CARE EDUCATION/TRAINING PROGRAM

## 2024-03-26 PROCEDURE — 81001 URINALYSIS AUTO W/SCOPE: CPT | Performed by: NURSE PRACTITIONER

## 2024-03-26 PROCEDURE — 25010000002 ZIPRASIDONE MESYLATE PER 10 MG: Performed by: STUDENT IN AN ORGANIZED HEALTH CARE EDUCATION/TRAINING PROGRAM

## 2024-03-26 PROCEDURE — 85025 COMPLETE CBC W/AUTO DIFF WBC: CPT | Performed by: STUDENT IN AN ORGANIZED HEALTH CARE EDUCATION/TRAINING PROGRAM

## 2024-03-26 PROCEDURE — 25010000002 HEPARIN (PORCINE) PER 1000 UNITS: Performed by: STUDENT IN AN ORGANIZED HEALTH CARE EDUCATION/TRAINING PROGRAM

## 2024-03-26 PROCEDURE — 25010000002 CEFTRIAXONE PER 250 MG: Performed by: STUDENT IN AN ORGANIZED HEALTH CARE EDUCATION/TRAINING PROGRAM

## 2024-03-26 RX ORDER — LORAZEPAM 0.5 MG/1
0.5 TABLET ORAL ONCE AS NEEDED
Status: COMPLETED | OUTPATIENT
Start: 2024-03-26 | End: 2024-03-26

## 2024-03-26 RX ORDER — LORAZEPAM 0.5 MG/1
0.5 TABLET ORAL EVERY 6 HOURS PRN
Status: DISCONTINUED | OUTPATIENT
Start: 2024-03-26 | End: 2024-03-27 | Stop reason: HOSPADM

## 2024-03-26 RX ORDER — SODIUM CHLORIDE/ALOE VERA
1 GEL (GRAM) NASAL
Status: DISCONTINUED | OUTPATIENT
Start: 2024-03-26 | End: 2024-03-27 | Stop reason: HOSPADM

## 2024-03-26 RX ADMIN — LORAZEPAM 0.5 MG: 0.5 TABLET ORAL at 04:12

## 2024-03-26 RX ADMIN — HEPARIN SODIUM 5000 UNITS: 5000 INJECTION INTRAVENOUS; SUBCUTANEOUS at 13:09

## 2024-03-26 RX ADMIN — LORAZEPAM 0.5 MG: 0.5 TABLET ORAL at 07:38

## 2024-03-26 RX ADMIN — OXYCODONE 10 MG: 5 TABLET ORAL at 20:07

## 2024-03-26 RX ADMIN — DOXYCYCLINE 100 MG: 100 CAPSULE ORAL at 05:23

## 2024-03-26 RX ADMIN — ZIPRASIDONE MESYLATE 10 MG: 20 INJECTION, POWDER, LYOPHILIZED, FOR SOLUTION INTRAMUSCULAR at 21:53

## 2024-03-26 RX ADMIN — DOXYCYCLINE 100 MG: 100 CAPSULE ORAL at 20:07

## 2024-03-26 RX ADMIN — GUAIFENESIN 600 MG: 600 TABLET, EXTENDED RELEASE ORAL at 20:07

## 2024-03-26 RX ADMIN — OXYCODONE 10 MG: 5 TABLET ORAL at 14:10

## 2024-03-26 RX ADMIN — Medication 1 APPLICATION: at 04:12

## 2024-03-26 RX ADMIN — TAMSULOSIN HYDROCHLORIDE 0.4 MG: 0.4 CAPSULE ORAL at 17:01

## 2024-03-26 RX ADMIN — Medication 10 ML: at 08:30

## 2024-03-26 RX ADMIN — HYDRALAZINE HYDROCHLORIDE 50 MG: 25 TABLET ORAL at 16:00

## 2024-03-26 RX ADMIN — HEPARIN SODIUM 5000 UNITS: 5000 INJECTION INTRAVENOUS; SUBCUTANEOUS at 20:07

## 2024-03-26 RX ADMIN — OXYCODONE 10 MG: 5 TABLET ORAL at 06:32

## 2024-03-26 RX ADMIN — CEFTRIAXONE 1000 MG: 1 INJECTION, POWDER, FOR SOLUTION INTRAMUSCULAR; INTRAVENOUS at 16:00

## 2024-03-26 RX ADMIN — PANTOPRAZOLE SODIUM 40 MG: 40 TABLET, DELAYED RELEASE ORAL at 20:08

## 2024-03-26 RX ADMIN — LORAZEPAM 0.5 MG: 0.5 TABLET ORAL at 12:53

## 2024-03-26 RX ADMIN — IPRATROPIUM BROMIDE AND ALBUTEROL SULFATE 3 ML: .5; 3 SOLUTION RESPIRATORY (INHALATION) at 19:00

## 2024-03-26 RX ADMIN — ALLOPURINOL 200 MG: 100 TABLET ORAL at 13:05

## 2024-03-26 RX ADMIN — MEGESTROL ACETATE 40 MG: 40 TABLET ORAL at 13:06

## 2024-03-26 RX ADMIN — AMLODIPINE BESYLATE 10 MG: 5 TABLET ORAL at 13:05

## 2024-03-26 RX ADMIN — CLOPIDOGREL BISULFATE 75 MG: 75 TABLET ORAL at 13:05

## 2024-03-26 RX ADMIN — HYDRALAZINE HYDROCHLORIDE 50 MG: 25 TABLET ORAL at 20:07

## 2024-03-26 RX ADMIN — CETIRIZINE HYDROCHLORIDE 10 MG: 10 TABLET, FILM COATED ORAL at 13:05

## 2024-03-26 RX ADMIN — LORAZEPAM 0.5 MG: 0.5 TABLET ORAL at 19:15

## 2024-03-26 RX ADMIN — ATORVASTATIN CALCIUM 10 MG: 10 TABLET, FILM COATED ORAL at 13:05

## 2024-03-26 NOTE — DISCHARGE PLACEMENT REQUEST
"Catherine Duran Jr. (83 y.o. Male)       Date of Birth   1940    Social Security Number       Address   [address] [place] [facility]    Home Phone   [phone]    MRN   [id number]       Amish   None    Marital Status                               Admission Date   3/25/24    Admission Type   Emergency    Admitting Provider   Leona Pryor MD    Attending Provider   Leona Pryor MD    Department, Room/Bed   [facility] 3C MEDICAL INPATIENT, 381/1       Discharge Date       Discharge Disposition       Discharge Destination                                 Attending Provider: Leona Pryor MD    Allergies: No Known Allergies    Isolation: None   Infection: None   Code Status: CPR    Ht: 160 cm (63\")   Wt: 49.9 kg (110 lb 0.2 oz)    Admission Cmt: None   Principal Problem: Bilateral pneumonia [J18.9]                   Active Insurance as of 3/25/2024       Primary Coverage       Payor Plan Insurance Group Employer/Plan Group    ANTHEM MEDICARE REPLACEMENT ANTHEM MEDICARE ADVANTAGE [id number]       Payor Plan Address Payor Plan Phone Number Payor Plan Fax Number Effective Dates    [address] [phone]  1/1/2024 - None Entered    [facility] [id number]         Subscriber Name Subscriber Birth Date Member ID       CATHERINE DURAN JR. 1940 [id number]                     Emergency Contacts        (Rel.) Home Phone Work Phone Mobile Phone    BRIAN DURAN (Spouse) [phone] -- [phone]    ROGER DONNELL (Daughter) [phone] -- [phone]                "

## 2024-03-26 NOTE — PLAN OF CARE
Goal Outcome Evaluation:         Pt confused, anxious, and restless throughout shift. Pt has not slept very much within the past 24 hours. Family has complained about anxiety and agitation for the patient, see MAR. Bed alarm on and call light within reach.

## 2024-03-26 NOTE — CASE MANAGEMENT/SOCIAL WORK
Discharge Planning Assessment  [facility]     Patient Name: Kailash Cooper Jr.  MRN: [id number]  Today's Date: 3/26/2024    Admit Date: 3/25/2024    Plan[facility] Will need [facility] order at [place].   Discharge Needs Assessment       Row Name 03/26/24 1459       Living Environment    People in Home spouse;grandchild(parrish)    Name(s) of People in Home wife Candis, and granddaughter Garo    Current Living Arrangements home    Potentially Unsafe Housing Conditions none    In the past 12 months has the electric, gas, oil, or water company threatened to shut off services in your home? No    Primary Care Provided by self    Provides Primary Care For no one    Family Caregiver if Needed spouse;grandchild(parrish), adult    Family Caregiver Names wife Candis, granddaughter Garo, daughter Isidra    Quality of Family Relationships helpful;involved;supportive    Able to Return to Prior Arrangements yes       Resource/Environmental Concerns    Resource/Environmental Concerns none    Transportation Concerns none       Transportation Needs    In the past 12 months, has lack of transportation kept you from medical appointments or from getting medications? no    In the past 12 months, has lack of transportation kept you from meetings, work, or from getting things needed for daily living? No       Food Insecurity    Within the past 12 months, you worried that your food would run out before you got the money to buy more. Never true    Within the past 12 months, the food you bought just didn't last and you didn't have money to get more. Never true       Transition Planning    Patient/Family Anticipates Transition to home;home with family;home with help/services    Patient/Family Anticipated Services at Transition none    Transportation Anticipated family or friend will provide       Discharge Needs Assessment    Readmission Within the Last 30 Days no previous admission in last 30 days    Equipment  Currently Used at Home oxygen;cane, straight  3L [place]    Concerns to be Addressed discharge planning    Anticipated Changes Related to Illness none    Equipment Needed After Discharge none    Discharge Facility/Level of Care Needs home with home health                   Discharge Plan       Row Name 03/26/24 2098       Plan    [facility] [place] need [place] order at [place].    Patient/Family in Agreement with Plan yes    Plan Comments CM met with pt at bedside to discuss discharge needs- pt is confused and daughter Lesile answering CM questions. Pt lives at home with wife Candis and granddaughter Garo, drives on occasion and is normally IADLs. PCP and pharmacy verified- pt enrolled in [facility] as requested. No issues stated affording food/medications or transport, and home environment is safe. Current DME: cane and 3L [place] O2 ([place].) No current HHC, and pending PT recommendations ( per fa;ginger request, [facility] referral made and [facility] contacted- pending acceptance.). Family will provide transportation home.                  Continued Care and Services - Admitted Since 3/25/2024       Home Medical Care       Service Provider Request Status Selected Services Address Phone Fax Patient Preferred    [facility] Pending - Request Sent N/A [address] [facility] [place] [address] [phone] --                     Demographic Summary       Row Name 03/26/24 145       General Information    Admission Type inpatient    Arrived From emergency department    Referral Source admission list    Reason for Consult care coordination/care conference;discharge planning    Preferred Language English       Contact Information    Permission Granted to Share Info With                    Functional Status       Row Name 03/26/24 1453       Functional Status    Usual Activity Tolerance moderate    Current Activity Tolerance fair       Functional Status, IADL     Medications assistive person    Meal Preparation assistive person    Housekeeping assistive person    Laundry assistive person    Shopping assistive person       Mental Status Summary    Recent Changes in Mental Status/Cognitive Functioning memory (recent)       Employment/    Current or Previous Occupation not applicable               Myranda Hawkins RN      Office phone: [phone]  Office fax: [phone]

## 2024-03-26 NOTE — THERAPY EVALUATION
Patient Name: Kailash Cooper Jr.  : 1940    MRN: [id number]                              Today's Date: 3/26/2024       Admit Date: 3/25/2024    Visit Dx:     ICD-10-CM ICD-9-CM   1. Pneumonia due to infectious organism, unspecified laterality, unspecified part of lung  J18.9 486   2. Dyspnea, unspecified type  R06.00 786.09   3. Elevated troponin  R79.89 790.6     Patient Active Problem List   Diagnosis    Chronic bilateral low back pain without sciatica    History of malignant neoplasm of thoracic cavity structure    Allergic rhinitis    Anemia due to stage 3 chronic kidney disease    Acute conjunctivitis    Osteoarthritis    Encounter for screening for malignant neoplasm of prostate    Enlarged prostate without lower urinary tract symptoms (luts)    Fatigue    Gastroesophageal reflux disease    Gastrointestinal hemorrhage    History of transient ischemic attack    Hydronephrosis    Hyperkalemia    Mixed hyperlipidemia    Essential hypertension    Hypogonadism    Male erectile disorder (CODE)    Obstructive sleep apnea    Osteomalacia    Secondary hyperparathyroidism of renal origin    Status post patent foramen ovale closure    Essential tremor    Vitamin D deficiency    Type 2 diabetes mellitus    Chronic kidney disease, stage 3 (moderate)    Hypertensive encephalopathy    Lung nodule    Lumbosacral spondylosis without myelopathy    Cervical spondylosis without myelopathy    Thoracic spondylosis    Chronic pain syndrome    Cervicalgia    Chest pain, atypical    CAP (community acquired pneumonia)    Elevated troponin    Positive D dimer    Chest pain    Acute respiratory failure with hypoxia    Sepsis without acute organ dysfunction    Non-STEMI (non-ST elevated myocardial infarction)    Oxygen dependent    Abnormal EKG    Anemia    History of duodenal ulcer    Melena    Erosive esophagitis    Multiple gastric ulcers    Bleeding chronic duodenal ulcer    Other insomnia    Intolerance to cold    Coronary  artery disease involving native coronary artery of native heart without angina pectoris    Underweight    BMI 20.0-20.9, adult    Bilateral pneumonia    Weakness     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 [facility]    Diabetes     Enlarged prostate     Former smoker     Hiatal hernia     Hip pain     don    Hip pain, bilateral     Hyperlipidemia     Hypertension     Kidney disease        stage 3     Leg pain     don    Low back pain     Neck pain     Stroke      Past Surgical History:   Procedure Laterality Date    CARDIAC CATHETERIZATION Left 1/28/2022    Procedure: Cardiac Catheterization/Vascular Study;  Surgeon: Mani Salguero MD;  Location: [facility] CATH INVASIVE LOCATION;  Service: Cardiovascular;  Laterality: Left;    CATARACT EXTRACTION  2006    OU    COLONOSCOPY  2011    COLONOSCOPY N/A 5/27/2022    Procedure: COLONOSCOPY;  Surgeon: Terry Holliday MD;  Location: [facility] ENDOSCOPY;  Service: Gastroenterology;  Laterality: N/A;  polyps    CORONARY STENT PLACEMENT  01/28/2022    RCA    ENDOSCOPY N/A 5/27/2022    Procedure: ESOPHAGOGASTRODUODENOSCOPY with 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, gastric antrum biopsy;  Surgeon: Terry Holliday MD;  Location: [facility] ENDOSCOPY;  Service: Gastroenterology;  Laterality: N/A;  gastritis, gastric ulcer, small bowel AVM    LUNG CANCER SURGERY      upper lobe rt lung cancer 9/2017  at [facility] Information       Row Name 03/26/24 1533          Physical Therapy Time and Intention    Document Type evaluation  -JV     Mode of Treatment physical therapy  -JV       Row Name 03/26/24 1533          General Information    Patient Profile Reviewed yes  -JV     Prior Level of Function independent:;all household mobility;community mobility;driving;gait;transfer;bed mobility;ADL's  -JV     Existing Precautions/Restrictions fall  -JV     Barriers to Rehab  medically complex;previous functional deficit;hearing deficit  -JV       Row Name 03/26/24 1533          Living Environment    People in Home spouse;child(parrish), adult  -       Row Name 03/26/24 1533          Home Main Entrance    Number of Stairs, Main Entrance three  -JV     Stair Railings, Main Entrance railings safe and in good condition;railings on both sides of stairs  -       Row Name 03/26/24 1533          Stairs Within Home, Primary    Stairs, Within Home, Primary does not have to access basement  -JV     Number of Stairs, Within Home, Primary twelve  -JV       Row Name 03/26/24 1533          Cognition    Orientation Status (Cognition) oriented to;person  -       Row Name 03/26/24 1533          Safety Issues, Functional Mobility    Impairments Affecting Function (Mobility) balance;endurance/activity tolerance;cognition  -JV     Cognitive Impairments, Mobility Safety/Performance awareness, need for assistance;impulsivity;judgment;problem-solving/reasoning;safety precaution awareness;safety precaution follow-through  -JV               User Key  (r) = Recorded By, (t) = Taken By, (c) = Cosigned By      Initials Name Provider Type    J Kenna Church Physical Therapist                   Mobility       Row Name 03/26/24 1558          Bed Mobility    Comment, (Bed Mobility) not observed - pt up in chair  -[facility] Name 03/26/24 1558          Bed-Chair Transfer    Bed-Chair [place] (Transfers) contact guard;minimum assist (75% patient effort);verbal cues  -       Row Name 03/26/24 1558          Sit-Stand Transfer    Sit-Stand [place] (Transfers) contact guard;verbal cues;minimum assist (75% patient effort)  -[facility] Name 03/26/24 1558          Gait/Stairs (Locomotion)    [place] Level (Gait) contact guard;verbal cues;minimum assist (75% patient effort)  -JV     Distance in Feet (Gait) 40  -JV     Deviations/Abnormal Patterns (Gait) fabio decreased;stride length decreased  -JV      Bilateral Gait Deviations forward flexed posture  -JV     Comment, (Gait/Stairs) variable distance pending fatigue; severely forward flexed and unsteady.  -JV               User Key  (r) = Recorded By, (t) = Taken By, (c) = Cosigned By      Initials Name Provider Type    Kenna Pantoja Physical Therapist                   Obj/Interventions       Row Name 03/26/24 1559          Range of Motion Comprehensive    General Range of Motion bilateral lower extremity ROM WFL  -JV       Row Name 03/26/24 1559          Strength Comprehensive (MMT)    Comment, General Manual Muscle Testing (MMT) Assessment BLE grossly 3+/5  -JV       Row Name 03/26/24 1559          Balance    Balance Assessment standing static balance;standing dynamic balance  -JV     Static Standing Balance minimal assist  -JV     Dynamic Standing Balance minimal assist  -JV       Row Name 03/26/24 1559          Sensory Assessment (Somatosensory)    Sensory Assessment (Somatosensory) LE sensation intact  -JV               User Key  (r) = Recorded By, (t) = Taken By, (c) = Cosigned By      Initials Name Provider Type    Kenna Pantoja Physical Therapist                   Goals/Plan       Row Name 03/26/24 1609          Bed Mobility Goal 1 (PT)    Activity/Assistive Device (Bed Mobility Goal 1, PT) bed mobility activities, all  -JV     [place] Level/Cues Needed (Bed Mobility Goal 1, PT) independent  -JV     Time Frame (Bed Mobility Goal 1, PT) long term goal (LTG);2 weeks  -JV       Row Name 03/26/24 1607          Transfer Goal 1 (PT)    Activity/Assistive Device (Transfer Goal 1, PT) sit-to-stand/stand-to-sit;bed-to-chair/chair-to-bed  -JV     [place] Level/Cues Needed (Transfer Goal 1, PT) independent  -JV     Time Frame (Transfer Goal 1, PT) long term goal (LTG);2 weeks  -JV       Row Name 03/26/24 1607          Gait Training Goal 1 (PT)    Activity/Assistive Device (Gait Training Goal 1, PT) gait (walking locomotion);assistive  "device use;improve balance and speed;increase endurance/gait distance  -JV     [place] Level (Gait Training Goal 1, PT) modified independence  -JV     Distance (Gait Training Goal 1, PT) 80 ft  -JV     Time Frame (Gait Training Goal 1, PT) long term goal (LTG);2 weeks  -JV       Row Name 03/26/24 0409          Therapy Assessment/Plan (PT)    Planned Therapy Interventions (PT) balance training;bed mobility training;gait training;home exercise program;patient/family education;strengthening;transfer training  -JV               User Key  (r) = Recorded By, (t) = Taken By, (c) = Cosigned By      Initials Name Provider Type    JV Kenna Church Physical Therapist                   Clinical Impression       Row Name 03/26/24 1650          Pain    Pretreatment Pain Rating 0/10 - no pain  -JV     Posttreatment Pain Rating 0/10 - no pain  -JV       Row Name 03/26/24 1023          Plan of Care Review    Outcome Evaluation Pt is an 84 y/o male presenting to [facility] on 3/25 with productive cough, dyspnea, and progressive weakness.  3/25: chest XR: Mild patchy groundglass alveolar disease in both lungs, greatest in the left lower lobe. Correlate for symptoms of pneumonia.  CMH of Hypertension, hyperlipidemia, CKD stage III, low back pain, stroke, type 2 diabetes, arthritis, COPD on 4 L O2 at home.  At time of PT eval, pt A&O to self only. Pt's wife and daughter at bedside and report pt is typically \"pretty sharp, but some intermittent confusion.\" Pt's wife reports his PLOF (I) with household/community (cane or motorized cart in community) mobility/ambulation; living with wife and adult granddaughter in [facility] with basement (pt does not have to access) with 2-3 steps to enter with ZACH railings. Pt still driving rarely. Denies recent fall history. Pt's wife is working outside of home, but granddaughter provides supervision intermittently during day. At time of PT eval, pt completes transfers and gait with HHA / MIN A for balance " x40 and 60 ft pending fatigue, pt severly forward flexed. Due to functional decline, fall risk, environmental barriers, and need for 24/7 sup/assist, follow-up SNF recommended at time of d/c from [facility] based on eval status.  If pt improves, pt's family desires d/c home with HHPT.  -JV       Row Name 03/26/24 1559          Therapy Assessment/Plan (PT)    Criteria for Skilled Interventions Met (PT) yes;meets criteria;skilled treatment is necessary  -JV     Therapy Frequency (PT) 5 times/wk  -JV     Predicted Duration of Therapy Intervention (PT) until d/c  -JV       Row Name 03/26/24 1559          Positioning and Restraints    Pre-Treatment Position sitting in chair/recliner  -JV     Post Treatment Position chair  -JV     In Chair notified nsg;sitting;call light within reach;encouraged to call for assist;exit alarm on;with family/caregiver  -JV               User Key  (r) = Recorded By, (t) = Taken By, (c) = Cosigned By      Initials Name Provider Type    Kenna Pantoja Physical Therapist                   Outcome Measures       Row Name 03/26/24 0830          How much help from another person do you currently need...    Turning from your back to your side while in flat bed without using bedrails? 4  -SC     Moving from lying on back to sitting on the side of a flat bed without bedrails? 4  -SC     Moving to and from a bed to a chair (including a wheelchair)? 3  -SC     Standing up from a chair using your arms (e.g., wheelchair, bedside chair)? 3  -SC     Climbing 3-5 steps with a railing? 3  -SC     To walk in hospital room? 3  -SC     AM-PAC 6 Clicks Score (PT) 20  -SC     Highest Level of Mobility Goal 6 --> Walk 10 steps or more  -SC               User Key  (r) = Recorded By, (t) = Taken By, (c) = Cosigned By      Initials Name Provider Type    Brynn Darling, RN Registered Nurse                                 Physical Therapy Education       Title: PT OT SLP Therapies (Done)       Topic: Physical Therapy  "(Done)       Point: Mobility training (Done)       Learning Progress Summary             Patient Acceptance, E,TB, VU by ESTEFANI at 3/26/2024 9299                                         User Key       Initials Effective Dates Name Provider Type Discipline    ESTEFANI 03/30/21 -  Kenna Church Physical Therapist PT                  PT Recommendation and Plan  Planned Therapy Interventions (PT): balance training, bed mobility training, gait training, home exercise program, patient/family education, strengthening, transfer training  Outcome Evaluation: Pt is an 84 y/o male presenting to [facility] on 3/25 with productive cough, dyspnea, and progressive weakness.  3/25: chest XR: Mild patchy groundglass alveolar disease in both lungs, greatest in the left lower lobe. Correlate for symptoms of pneumonia.  CMH of Hypertension, hyperlipidemia, CKD stage III, low back pain, stroke, type 2 diabetes, arthritis, COPD on 4 L O2 at home.  At time of PT eval, pt A&O to self only. Pt's wife and daughter at bedside and report pt is typically \"pretty sharp, but some intermittent confusion.\" Pt's wife reports his PLOF (I) with household/community (cane or motorized cart in community) mobility/ambulation; living with wife and adult granddaughter in [facility] with basement (pt does not have to access) with 2-3 steps to enter with ZACH railings. Pt still driving rarely. Denies recent fall history. Pt's wife is working outside of home, but granddaughter provides supervision intermittently during day. At time of PT eval, pt completes transfers and gait with HHA / MIN A for balance x40 and 60 ft pending fatigue, pt severly forward flexed. Due to functional decline, fall risk, environmental barriers, and need for 24/7 sup/assist, follow-up SNF recommended at time of d/c from [facility] based on eval status.  If pt improves, pt's family desires d/c home with HHPT.     Time Calculation:         PT Charges       Row Name 03/26/24 8608             Time Calculation    " Start Time 1521  -JV      Stop Time 1545  -JV      Time Calculation (min) 24 min  -JV      PT Received On 03/26/24  -JV      PT - Next Appointment 03/27/24  -JV      PT Goal Re-Cert Due Date 04/09/24  -JV         Time Calculation- PT    Total Timed Code Minutes- PT 0 minute(s)  -JV                User Key  (r) = Recorded By, (t) = Taken By, (c) = Cosigned By      Initials Name Provider Type    Kenna Pantoja Physical Therapist                  Therapy Charges for Today       Code Description Service Date Service Provider Modifiers Qty    89637992232 HC PT EVAL MOD COMPLEXITY 4 3/26/2024 Kenna Church GP 1            PT G-Codes  AM-PAC 6 Clicks Score (PT): 20  PT Discharge Summary  Anticipated Discharge Disposition (PT): skilled nursing facility    Kenna Church  3/26/2024

## 2024-03-26 NOTE — PLAN OF CARE
"Goal Outcome Evaluation:              Outcome Evaluation: Pt is an 82 y/o male presenting to [facility] on 3/25 with productive cough, dyspnea, and progressive weakness.  3/25: chest XR: Mild patchy groundglass alveolar disease in both lungs, greatest in the left lower lobe. Correlate for symptoms of pneumonia.  CMH of Hypertension, hyperlipidemia, CKD stage III, low back pain, stroke, type 2 diabetes, arthritis, COPD on 4 L O2 at home.  At time of PT eval, pt A&O to self only. Pt's wife and daughter at bedside and report pt is typically \"pretty sharp, but some intermittent confusion.\" Pt's wife reports his PLOF (I) with household/community (cane or motorized cart in community) mobility/ambulation; living with wife and adult granddaughter in [facility] with basement (pt does not have to access) with 2-3 steps to enter with ZACH railings. Pt still driving rarely. Denies recent fall history. Pt's wife is working outside of home, but granddaughter provides supervision intermittently during day. At time of PT eval, pt completes transfers and gait with HHA / MIN A for balance x40 and 60 ft pending fatigue, pt severly forward flexed. Due to functional decline, fall risk, environmental barriers, and need for 24/7 sup/assist, follow-up SNF recommended at time of d/c from [facility] based on eval status.  If pt improves, pt's family desires d/c home with HHPT.      Anticipated Discharge Disposition (PT): skilled nursing facility                        "

## 2024-03-26 NOTE — PLAN OF CARE
Goal Outcome Evaluation:  Plan of Care Reviewed With: patient        Progress: no change  Outcome Evaluation: pt admitted from home d/t SOB/confusion, pt has remained confused and awake the majority of the night, daughter remains at bedside, UA collected this shift, pt up constantly to the bathroom with minimal output, complaints of agitation/restlessness and nostril pain treated per MAR, pt currently on RA d/t pt refusing, with stats in mid to high 90s, no new complaints at this time pt reminded to turn throughout the night.

## 2024-03-26 NOTE — PROGRESS NOTES
"[facility] MEDICINE SERVICE  DAILY PROGRESS NOTE    NAME: Kailash Cooper Jr.  : 1940  MRN: [id number]      LOS: 1 day     PROVIDER OF SERVICE: Leona Pryor MD    Chief Complaint: Bilateral pneumonia    Subjective:     Interval History:  History taken from: patient chart     83 y.o. male with a CMH of Hypertension, hyperlipidemia, CKD stage III, low back pain, stroke, type 2 diabetes, arthritis, COPD on 4 L O2 at home who presented to [facility] on 3/25/2024 with productive cough, dyspnea and progressive weakness.  Patient is accompanied by his wife both reported for the last 2 to 3 weeks patient has been having significantly more weakness generalized, just feeling poorly \"I feel like I am dying\".  Reports the productive cough but no fevers, chills or night sweats.  Progressive dyspnea although he wears his oxygen primarily at night but not during the day 2.     Denies any nausea, emesis.  Reports epigastric abdominal pain that is chronic and not always responsive to home meds.  Denies any diarrhea, constipation, hematochezia or melena.  No dysuria or hematuria.  Quit tobacco, alcohol long ago.  No drugs include marijuana.  Had a prior stroke several years ago.     ED course: On arrival to the emergency room, patient is mildly tachycardic at 103, respiratory 20, saturating 97% on 4 L.  Troponin 59 down trended to 50.  BMP at baseline with renal function.  CBC without leukocytosis.  Respiratory PCR negative.  EKG with 96 bpm, sinus and right bundle branch block, no microinjury.  Chest x-ray with left greater than right pneumonia.  Patient was given DuoNebs, blood cultures drawn and given ceftriaxone and doxycycline.    The patient was seen and examined today at bedside.  Family at bedside.  The patient was answering simple questions but he was little confused.  The patient denied any complaints.  Family said that the patient did not get enough sleep last night in the ED.  I advised them to " let him sleep.  The patient denied any pain or complaints at this point.        Review of Systems:   Pertinent items are noted in HPI, all other systems reviewed and negative     Objective:     Vital Signs  Temp:  [98.2 °F (36.8 °C)-98.3 °F (36.8 °C)] 98.3 °F (36.8 °C)  Heart Rate:  [] 103  Resp:  [13-26] 26  BP: (150-189)/(76-94) 167/80  Flow (L/min):  [3-4] 3   Body mass index is 19.49 kg/m².    Physical Exam  General:          Alert, cooperative, no distress, appears stated age  Head:              Normocephalic, atraumatic, mucous membranes moist   Lungs:            Diminished breath sounds bilaterally  Heart::             Regular rate and rhythm, S1 and S2 normal, no murmur, rub or gallop  Abdomen:Soft, nontender, nondistended, bowel sounds active  Skin:                No rashes or lesions  Neuro/psych:A&O x3. appropriate affect    Scheduled Meds   allopurinol, 200 mg, Oral, Daily  amLODIPine, 10 mg, Oral, Daily  atorvastatin, 10 mg, Oral, Daily  budesonide, 0.25 mg, Nebulization, Daily - RT  cefTRIAXone, 1,000 mg, Intravenous, Q24H  cetirizine, 10 mg, Oral, Daily  clopidogrel, 75 mg, Oral, Daily  doxycycline, 100 mg, Oral, Q12H  ferrous sulfate, 324 mg, Oral, Daily With Breakfast  furosemide, 40 mg, Oral, Daily  guaiFENesin, 600 mg, Oral, Q12H  heparin (porcine), 5,000 Units, Subcutaneous, Q12H  hydrALAZINE, 50 mg, Oral, TID  ipratropium-albuterol, 3 mL, Nebulization, 4x Daily - RT  megestrol, 40 mg, Oral, Daily  pantoprazole, 40 mg, Oral, BID  sodium chloride, 10 mL, Intravenous, Q12H  tamsulosin, 0.4 mg, Oral, BID With Meals       PRN Meds     acetaminophen **OR** acetaminophen **OR** acetaminophen    senna-docusate sodium **AND** polyethylene glycol **AND** bisacodyl **AND** bisacodyl    Calcium Replacement - Follow Nurse / BPA Driven Protocol    LORazepam    Magnesium Standard Dose Replacement - Follow Nurse / BPA Driven Protocol    ondansetron ODT **OR** ondansetron    oxyCODONE    Phosphorus  Replacement - Follow Nurse / BPA Driven Protocol    Potassium Replacement - Follow Nurse / BPA Driven Protocol    saline    sodium chloride    sodium chloride    sodium chloride   Infusions         Diagnostic Data    Results from last 7 days   Lab Units 03/26/24  0424 03/25/24  1206   WBC 10*3/mm3 9.01 10.30   HEMOGLOBIN g/dL 12.7* 12.0*   HEMATOCRIT % 38.4 36.0*   PLATELETS 10*3/mm3 304 271   GLUCOSE mg/dL 162* 124*   CREATININE mg/dL 1.28* 1.30*   BUN mg/dL 25* 22   SODIUM mmol/L 137 138   POTASSIUM mmol/L 4.0 3.9   AST (SGOT) U/L  --  18   ALT (SGPT) U/L  --  14   ALK PHOS U/L  --  108   BILIRUBIN mg/dL  --  0.4   ANION GAP mmol/L 15.0 11.0       XR Chest 1 View    Result Date: 3/25/2024  1. Mild patchy groundglass alveolar disease in both lungs, greatest in the left lower lobe. Correlate for symptoms of pneumonia 2. Fine interstitial thickening is present in both lungs may represent a component of mild background edema. Electronically Signed: Sasha Rios MD  3/25/2024 12:16 PM EDT  Workstation ID: [id number]       I reviewed the patient's new clinical results.    Assessment/Plan:     Active and Resolved Problems  Active Hospital Problems    Diagnosis  POA    **Bilateral pneumonia [J18.9]  Yes    Weakness [R53.1]  Unknown      Resolved Hospital Problems   No resolved problems to display.      Bilateral Community Acquired Pneumonia  COPD on 4 L O2, at baseline  Patient with history of COPD with 2 to 3 weeks of progressive dyspnea productive cough and weakness.    On arrival to the emergency room, patient is mildly tachycardic at 103, respiratory 20, saturating 97% on 4 L.  Exam with bibasilar wheezes and scattered rhonchi.  As well as trace peripheral edema.  CBC without leukocytosis.  Respiratory PCR negative.    Chest x-ray with left greater than right pneumonia.  Patient was given DuoNebs, blood cultures drawn and given ceftriaxone and doxycycline.  - follow up blood cultures  - sputum culture  - Ceftriaxone  1g IV every 24 hours + Doxy 100mg BID  - DuoNebs every 6 hours  - Mucinex BID  - IS       Elevated troponin  Troponin 59 down trended to 50.  BMP at baseline with renal function.  EKG with 96 bpm, sinus and right bundle branch block, no microinjury.  Suspecting demand ischemia. As it downtrended, will not trend.   The patient denied any chest pain today  - telemetry     Generalized weakness  Generalized weakness over the last 2 to 3 weeks, progressive.  In the setting of illness.    PT consult     Hypertension  Continue home amlodipine 10 mg daily, hydralazine 50 mg daily.    Continue home Lasix 40 mg daily    Continue home Pulmicort     hyperlipidemia  At home on pravastatin here and atorvastatin.     History of stroke  Continue home Plavix and statin.     CKD stage III  Renal function at baseline.  -Avoid nephrotoxic drugs   - Daily BMP           DVT prophylaxis:  Medical DVT prophylaxis orders are present.         Code status is   Code Status and Medical Interventions:   Ordered at: 03/26/24 1142     Level Of Support Discussed With:    Patient     Code Status (Patient has no pulse and is not breathing):    CPR (Attempt to Resuscitate)     Medical Interventions (Patient has pulse or is breathing):    Full Support       Plan for disposition: In couple of days  Copied text in this portion of the note has been reviewed and is accurate as of 03/26/2024    Time: 30 minutes    Signature: Electronically signed by Leona Pryor MD, 03/26/24, 11:43 EDT.  [facility] Hospitalist Team

## 2024-03-27 ENCOUNTER — READMISSION MANAGEMENT (OUTPATIENT)
Dept: CALL CENTER | Facility: HOSPITAL | Age: 84
End: 2024-03-27
Payer: MEDICARE

## 2024-03-27 VITALS
HEIGHT: 63 IN | TEMPERATURE: 97.8 F | WEIGHT: 110.01 LBS | BODY MASS INDEX: 19.49 KG/M2 | DIASTOLIC BLOOD PRESSURE: 76 MMHG | SYSTOLIC BLOOD PRESSURE: 139 MMHG | HEART RATE: 125 BPM | OXYGEN SATURATION: 94 % | RESPIRATION RATE: 16 BRPM

## 2024-03-27 PROBLEM — J18.9 PNEUMONIA, UNSPECIFIED ORGANISM: Status: ACTIVE | Noted: 2024-03-27

## 2024-03-27 RX ORDER — AMOXICILLIN AND CLAVULANATE POTASSIUM 875; 125 MG/1; MG/1
1 TABLET, FILM COATED ORAL 2 TIMES DAILY
Qty: 12 TABLET | Refills: 0 | Status: SHIPPED | OUTPATIENT
Start: 2024-03-27 | End: 2024-04-02

## 2024-03-27 RX ORDER — GUAIFENESIN 600 MG/1
1200 TABLET, EXTENDED RELEASE ORAL EVERY 12 HOURS SCHEDULED
Status: DISCONTINUED | OUTPATIENT
Start: 2024-03-27 | End: 2024-03-27 | Stop reason: HOSPADM

## 2024-03-27 RX ADMIN — PANTOPRAZOLE SODIUM 40 MG: 40 TABLET, DELAYED RELEASE ORAL at 11:07

## 2024-03-27 RX ADMIN — HYDRALAZINE HYDROCHLORIDE 50 MG: 25 TABLET ORAL at 11:06

## 2024-03-27 RX ADMIN — CETIRIZINE HYDROCHLORIDE 10 MG: 10 TABLET, FILM COATED ORAL at 11:06

## 2024-03-27 RX ADMIN — OXYCODONE 10 MG: 5 TABLET ORAL at 00:37

## 2024-03-27 RX ADMIN — LORAZEPAM 0.5 MG: 0.5 TABLET ORAL at 00:37

## 2024-03-27 RX ADMIN — CLOPIDOGREL BISULFATE 75 MG: 75 TABLET ORAL at 11:06

## 2024-03-27 RX ADMIN — ALLOPURINOL 200 MG: 100 TABLET ORAL at 11:06

## 2024-03-27 RX ADMIN — MEGESTROL ACETATE 40 MG: 40 TABLET ORAL at 11:06

## 2024-03-27 RX ADMIN — ATORVASTATIN CALCIUM 10 MG: 10 TABLET, FILM COATED ORAL at 11:06

## 2024-03-27 RX ADMIN — AMLODIPINE BESYLATE 10 MG: 5 TABLET ORAL at 11:06

## 2024-03-27 RX ADMIN — OXYCODONE 10 MG: 5 TABLET ORAL at 11:06

## 2024-03-27 RX ADMIN — FUROSEMIDE 40 MG: 40 TABLET ORAL at 11:06

## 2024-03-27 NOTE — OUTREACH NOTE
Prep Survey      Flowsheet Row Responses   [facility] facility patient discharged from? [facility]   Is LACE score < 7 ? No   Eligibility [facility]   Date of Admission 03/25/24   Date of Discharge 03/27/24   Discharge Disposition Home-Health Care Svc   Discharge diagnosis Bilateral pneumonia   Does the patient have one of the following disease processes/diagnoses(primary or secondary)? Pneumonia   Does the patient have Home health ordered? Yes   What is the Home health agency?  [facility]   Is there a DME ordered? No   Prep survey completed? Yes            Ct APONTE - Registered Nurse

## 2024-03-27 NOTE — NURSING NOTE
"Pt very restless and agitated this shift. Pt wanted to take a walk. Pt daughter and NA \"Yvonne\" walked with the pt at each side as they were walking in the [place] NA and daughter states pt put both hands in front of him and bent his knees towards the ground. NA and daughter assisted pt to stand straight up and walked him to his room. No injury occurred. Code 58 called, falls checklist complete.   "

## 2024-03-27 NOTE — CASE MANAGEMENT/SOCIAL WORK
Case Management Discharge Note      Final Note: Home with family and [facility]         Selected Continued Care - Discharged on 3/27/2024 Admission date: 3/25/2024 - Discharge disposition: Home-Health Care [facility]        Home Medical Care Coordination complete.      Service Provider Selected Services Address Phone Fax Patient Preferred    [facility] [address] [facility] [place] [address] [phone] --                    Transportation Services  Private: Car    Final Discharge Disposition Code: 06 - home with home health care

## 2024-03-27 NOTE — CONSULTS
Group: [facility]         CONSULT NOTE    Patient Identification:  Kailash Cooper Jr.  83 y.o.  male  1940  [id number]            Requesting physician: Attending physician    Reason for Consultation: Pneumonia and COPD      History of Present Illness:  83-year-old male with history of COPD, chronic hypoxemia on 4 L of oxygen at home, hypertension, dyslipidemia, former smoker, CKD and previous stroke who presented 3/25/2024 with complaints of progressive shortness of breath, productive cough, generalized weakness for the last 2-3 weeks.  Patient is afebrile, blood pressure 175/84 and he is oxygenating 94% on room air.  Creatinine 1.28, BUN 25, WBCs 9, hemoglobin 12.7.  Respiratory panel is negative.  Chest x-ray shows mild patchy groundglass opacities bilaterally but more than left lower lobe with fine interstitial thickening    Assessment:  Bilateral pneumonia  Chronic hypoxemia on 4 L of oxygen at home  COPD; PFTs 2022 with moderate restrictive disease TLC 69% but severe decrease in DLCO 37%   Chronic pulmonary fibrosis  Episodes of confusion: Currently cleared  CKD  Previous stroke  HLD  HTN  DM    History of non-small cell lung cancer stage Ib status post resection 2017, CT scan 8/19/2023  IMPRESSION:  Stable postoperative and extensive fibrotic, scarring, and emphysematous lung changes, with no current abnormal lung nodule or mass.     coronary artery disease status post PCI  PFO 2017  Ex-smoker quit 1990    Recommendations:  Respiratory status has improved, most of the interstitial opacities are chronic fibrosis, he can be discharged from pulmonary stand point with f/u in the pulmonary clinic in 4 weeks    antibiotics: Rocephin and discontinue doxycycline since respiratory panel is negative: Upon discharge he can be switched to oral Augmentin    Oxygen supplement and titration to maintain saturation 90 to 95%: Currently on room air but previously was requiring 3 L     bronchodilators  Inhaled  corticosteroids  Diuresis  Mucinex  BP controlled  Lipitor  Plavix    Patient is following for the previous lung cancer with annual CT scan at [place] and his last CT scan August 2023 showed no recurrence    DVT/GI prophylaxis  Check daily labs and correct electrolytes as needed  I personally reviewed the radiological studies      Review of Sytems:  Constitutional: Negative for chills, and fever and positive for malaise/fatigue.   HENT: Negative.    Eyes: Negative.    Cardiovascular: Negative.    Respiratory: Positive for cough and shortness of breath.    Skin: Negative.    Musculoskeletal: Negative.    Gastrointestinal: Negative.    Genitourinary: Negative.    Neurological: Negative.    Psychiatric/Behavioral: Negative.    Past Medical History:  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 [facility]    Diabetes     Enlarged prostate     Former smoker     Hiatal hernia     Hip pain     don    Hip pain, bilateral     Hyperlipidemia     Hypertension     Kidney disease        stage 3     Leg pain     don    Low back pain     Neck pain     Stroke        Past Surgical History:  Past Surgical History:   Procedure Laterality Date    CARDIAC CATHETERIZATION Left 1/28/2022    Procedure: Cardiac Catheterization/Vascular Study;  Surgeon: Mani Salguero MD;  Location: [facility] CATH INVASIVE LOCATION;  Service: Cardiovascular;  Laterality: Left;    CATARACT EXTRACTION  2006    OU    COLONOSCOPY  2011    COLONOSCOPY N/A 5/27/2022    Procedure: COLONOSCOPY;  Surgeon: Terry Holliday MD;  Location: [facility] ENDOSCOPY;  Service: Gastroenterology;  Laterality: N/A;  polyps    CORONARY STENT PLACEMENT  01/28/2022    RCA    ENDOSCOPY N/A 5/27/2022    Procedure: ESOPHAGOGASTRODUODENOSCOPY with 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, gastric antrum biopsy;  Surgeon: Terry Holliday MD;  Location: [facility] ENDOSCOPY;  Service:  Gastroenterology;  Laterality: N/A;  gastritis, gastric ulcer, small bowel AVM    LUNG CANCER SURGERY      upper lobe rt lung cancer 9/2017  at [facility] Meds:  Medications Prior to Admission   Medication Sig Dispense Refill Last Dose    allopurinol (ZYLOPRIM) 100 MG tablet Take 2 tablets by mouth Daily. Indications: Disorder of Excessive Uric Acid in the Blood   3/24/2024    amLODIPine (NORVASC) 10 MG tablet TAKE 1 TABLET BY MOUTH ONCE DAILY FOR HIGH BLOOD PRESSURE 90 tablet 0 3/24/2024    budesonide (PULMICORT) 0.25 MG/2ML nebulizer solution Take 2 mL by nebulization Daily. Indications: Chronic Obstructive Lung Disease 180 mL 1 3/24/2024    cetirizine (zyrTEC) 10 MG tablet Take 1 tablet by mouth Daily. 90 tablet 1 Past Month    clopidogrel (PLAVIX) 75 MG tablet Take 1 tablet by mouth Daily. Indications: Ischemic Heart Disease 90 tablet 3 3/24/2024    ferrous sulfate 325 (65 FE) MG EC tablet Take 1 tablet by mouth Daily With Breakfast.   3/24/2024    furosemide (LASIX) 40 MG tablet Take 1 tablet by mouth Daily. 90 tablet 1 3/24/2024    hydrALAZINE (APRESOLINE) 50 MG tablet TAKE 1 TABLET BY MOUTH THREE TIMES DAILY 270 tablet 0 3/24/2024    megestrol (MEGACE) 40 MG tablet Take 1 tablet by mouth Daily. 90 tablet 1 3/24/2024    pantoprazole (PROTONIX) 40 MG EC tablet Take 1 tablet by mouth twice daily 180 tablet 0 3/24/2024    pravastatin (PRAVACHOL) 40 MG tablet Take 1 tablet by mouth Daily. Indications: High Amount of Fats in the Blood 90 tablet 1 3/24/2024    Probiotic Product (CULTURELLE PROBIOTICS PO) Take 20 mg by mouth Daily. Indications: diabetic supplement   3/24/2024    tamsulosin (FLOMAX) 0.4 MG capsule 24 hr capsule Take 1 capsule by mouth 2 (Two) Times a Day.   3/24/2024    vitamin B-12 (CYANOCOBALAMIN) 1000 MCG tablet Take 1 tablet by mouth 2 (Two) Times a Day. Indications: Inadequate Vitamin B12   3/24/2024    nitroglycerin (Nitrostat) 0.3 MG SL tablet Place 1 tablet under the tongue Every 5  "(Five) Minutes As Needed for Chest Pain. Take no more than 3 doses in 15 minutes. 50 tablet 12     oxyCODONE-acetaminophen (Percocet)  MG per tablet Take 1 tablet by mouth Every 6 (Six) Hours As Needed for Moderate Pain. 120 tablet 0        Allergies:  No Known Allergies    Social History:   Social History     Socioeconomic History    Marital status:      Spouse name: Candis    Number of children: 5   Tobacco Use    Smoking status: Former     Current packs/day: 0.00     Types: Cigarettes     Quit date:      Years since quittin.2     Passive exposure: Past    Smokeless tobacco: Never   Vaping Use    Vaping status: Never Used   Substance and Sexual Activity    Alcohol use: Never    Drug use: Never    Sexual activity: Defer     Partners: Female     Birth control/protection: None       Family History:  Family History   Problem Relation Age of Onset    Stroke Mother     Cancer Father         Prostate    Heart failure Brother     Heart disease Other        Physical Exam:  /84 (BP Location: Right arm, Patient Position: Lying)   Pulse 96   Temp 98.3 °F (36.8 °C) (Axillary)   Resp 16   Ht 160 cm (63\")   Wt 49.9 kg (110 lb 0.2 oz)   SpO2 94%   BMI 19.49 kg/m²  Body mass index is 19.49 kg/m². 94% 49.9 kg (110 lb 0.2 oz)  General Appearance:  Alert   HEENT:  Normocephalic, without obvious abnormality, Conjunctiva/corneas clear,.   Nares normal, no drainage     Neck:  Supple, symmetrical, trachea midline. No JVD.  Lungs /Chest wall:   Bilateral basal rhonchi, respirations unlabored, symmetrical wall movement.     Heart:  Regular rate and rhythm, S1 S2 normal  Abdomen: Soft, non-tender, no masses, no organomegaly.    Extremities: No edema, no clubbing or cyanosis    LABS:  Lab Results   Component Value Date    CALCIUM 10.2 2024    PHOS 3.4 2022     Results from last 7 days   Lab Units 24  0424 24  1206   SODIUM mmol/L 137 138   POTASSIUM mmol/L 4.0 3.9   CHLORIDE mmol/L " 101 105   CO2 mmol/L 21.0* 22.0   BUN mg/dL 25* 22   CREATININE mg/dL 1.28* 1.30*   GLUCOSE mg/dL 162* 124*   CALCIUM mg/dL 10.2 9.9   WBC 10*3/mm3 9.01 10.30   HEMOGLOBIN g/dL 12.7* 12.0*   PLATELETS 10*3/mm3 304 271   ALT (SGPT) U/L  --  14   AST (SGOT) U/L  --  18   PROBNP pg/mL  --  408.6   PROCALCITONIN ng/mL  --  0.08     Lab Results   Component Value Date    CKTOTAL 37 (L) 05/14/2017    TROPONINT 50 (H) 03/25/2024     Results from last 7 days   Lab Units 03/25/24  1404 03/25/24  1206   HSTROP T ng/L 50* 59*     Results from last 7 days   Lab Units 03/25/24  1331 03/25/24  1319   BLOODCX  No growth at 24 hours No growth at 24 hours     Results from last 7 days   Lab Units 03/25/24  1206   PROCALCITONIN ng/mL 0.08         Results from last 7 days   Lab Units 03/25/24  1206   ADENOVIRUS DETECTION BY PCR  Not Detected   CORONAVIRUS 229E  Not Detected   CORONAVIRUS HKU1  Not Detected   CORONAVIRUS NL63  Not Detected   CORONAVIRUS OC43  Not Detected   HUMAN METAPNEUMOVIRUS  Not Detected   HUMAN RHINOVIRUS/ENTEROVIRUS  Not Detected   INFLUENZA B PCR  Not Detected   PARAINFLUENZA 1  Not Detected   PARAINFLUENZA VIRUS 2  Not Detected   PARAINFLUENZA VIRUS 3  Not Detected   PARAINFLUENZA VIRUS 4  Not Detected   BORDETELLA PERTUSSIS PCR  Not Detected   CHLAMYDOPHILA PNEUMONIAE PCR  Not Detected   MYCOPLAMA PNEUMO PCR  Not Detected   INFLUENZA A PCR  Not Detected   RSV, PCR  Not Detected         Results from last 7 days   Lab Units 03/25/24  1331 03/25/24  1319   BLOODCX  No growth at 24 hours No growth at 24 hours     Lab Results   Component Value Date    TSH 3.710 01/31/2024     Estimated Creatinine Clearance: 30.9 mL/min (A) (by C-G formula based on SCr of 1.28 mg/dL (H)).  Results from last 7 days   Lab Units 03/26/24  0259   NITRITE UA  Negative   WBC UA /HPF 0-2   BACTERIA UA /HPF None Seen   SQUAM EPITHEL UA /HPF 0-2        Imaging:  Imaging Results (Last 24 Hours)       ** No results found for the last 24 hours.  **              Current Meds:   SCHEDULE  allopurinol, 200 mg, Oral, Daily  amLODIPine, 10 mg, Oral, Daily  atorvastatin, 10 mg, Oral, Daily  budesonide, 0.25 mg, Nebulization, Daily - RT  cefTRIAXone, 1,000 mg, Intravenous, Q24H  cetirizine, 10 mg, Oral, Daily  clopidogrel, 75 mg, Oral, Daily  doxycycline, 100 mg, Oral, Q12H  ferrous sulfate, 324 mg, Oral, Daily With Breakfast  furosemide, 40 mg, Oral, Daily  guaiFENesin, 600 mg, Oral, Q12H  heparin (porcine), 5,000 Units, Subcutaneous, Q12H  hydrALAZINE, 50 mg, Oral, TID  ipratropium-albuterol, 3 mL, Nebulization, 4x Daily - RT  megestrol, 40 mg, Oral, Daily  pantoprazole, 40 mg, Oral, BID  sodium chloride, 10 mL, Intravenous, Q12H  tamsulosin, 0.4 mg, Oral, BID With Meals      Infusions     PRNs    acetaminophen **OR** acetaminophen **OR** acetaminophen    senna-docusate sodium **AND** polyethylene glycol **AND** bisacodyl **AND** bisacodyl    Calcium Replacement - Follow Nurse / BPA Driven Protocol    LORazepam    Magnesium Standard Dose Replacement - Follow Nurse / BPA Driven Protocol    ondansetron ODT **OR** [DISCONTINUED] ondansetron    oxyCODONE    Phosphorus Replacement - Follow Nurse / BPA Driven Protocol    Potassium Replacement - Follow Nurse / BPA Driven Protocol    saline    sodium chloride    sodium chloride    sodium chloride    ziprasidone (GEODON) 10 mg in sterile water (preservative free) 0.5 mL injection        Toshia Jaeger MD  3/27/2024  10:26 EDT      Much of this encounter note is an electronic transcription/translation of spoken language to printed text using Dragon Software.

## 2024-03-27 NOTE — PLAN OF CARE
Goal Outcome Evaluation:               Patient agitated and restless this shift. Patient refused to have morning labs drawn this shift. Patient stable at this time.

## 2024-03-27 NOTE — DISCHARGE SUMMARY
"             [facility] Medicine Services  Discharge Summary    Date of Service: 2024  Patient Name: Kailash Cooper Jr.  : 1940  MRN: [id number]    Date of Admission: 3/25/2024  Discharge Diagnosis: Bilateral community-acquired pneumonia, generalized weakness, elevated troponin, hypertension, hyperlipidemia, history of stroke, CKD  Date of Discharge: 2024  Primary Care Physician: Madelyn Márquez APRN      Presenting Problem:   Elevated troponin [R79.89]  Bilateral pneumonia [J18.9]  Dyspnea, unspecified type [R06.00]  Pneumonia due to infectious organism, unspecified laterality, unspecified part of lung [J18.9]    Active and Resolved Hospital Problems:  Active Hospital Problems    Diagnosis POA    **Bilateral pneumonia [J18.9] Yes    Pneumonia, unspecified organism [J18.9] Yes    Weakness [R53.1] Unknown      Resolved Hospital Problems   No resolved problems to display.         Hospital Course     HPI:  Per the H&P written by Maryse Fenton MD , dated 2024:  \"Kailash Cooper Jr. is a 83 y.o. male with a CMH of Hypertension, hyperlipidemia, CKD stage III, low back pain, stroke, type 2 diabetes, arthritis, COPD on 4 L O2 at home who presented to [facility] on [facility]/25/2024 with productive cough, dyspnea and progressive weakness.  Patient is accompanied by his wife both reported for the last 2 to 3 weeks patient has been having significantly more weakness generalized, just feeling poorly \"I feel like I am dying\".  Reports the productive cough but no fevers, chills or night sweats.  Progressive dyspnea although he wears his oxygen primarily at night but not during the day 2.     Denies any nausea, emesis.  Reports epigastric abdominal pain that is chronic and not always responsive to home meds.  Denies any diarrhea, constipation, hematochezia or melena.  No dysuria or hematuria.  Quit tobacco, alcohol long ago.  No drugs include marijuana.  Had a prior stroke several years ago.     ED " "course: On arrival to the emergency room, patient is mildly tachycardic at 103, respiratory 20, saturating 97% on 4 L.  Troponin 59 down trended to 50.  BMP at baseline with renal function.  CBC without leukocytosis.  Respiratory PCR negative.  EKG with 96 bpm, sinus and right bundle branch block, no microinjury.  Chest x-ray with left greater than right pneumonia.  Patient was given DuoNebs, blood cultures drawn and given ceftriaxone and doxycycline.\"    Hospital Course: The patient was admitted to the medical floor for generalized weakness, bilateral community-acquired pneumonia.  The patient has history of COPD and lung cancer.  The patient had progressive dyspnea and productive cough.  Chest x-ray with left greater than right pneumonia.  The patient was started on ceftriaxone and patient and was discharged on Augmentin.  The patient was evaluated by pulmonology and agreed on oral antibiotic at discharge.  I had a long discussion with the family today including the patient's wife and they all agreed on discharge and they declined SNF but they ask for home health.  The patient was having bouts of delirium in the hospital and the family said that it would be better for him to be at home with familiar surroundings.  The patient will be discharged on oral antibiotic and outpatient follow-up with PCP and pulmonology.  The patient was seen and examined today at bedside.  The patient denied any complaints and he was back to his baseline            DISCHARGE Follow Up Recommendations for labs and diagnostics: Follow-up with PCP and pulmonology      Reasons For Change In Medications and Indications for New Medications:  Augmentin was prescribed for community-acquired pneumonia      Day of Discharge     Vital Signs:  Temp:  [97.8 °F (36.6 °C)-98.3 °F (36.8 °C)] 97.8 °F (36.6 °C)  Heart Rate:  [] 125  Resp:  [16-18] 16  BP: (134-175)/(58-84) 139/76  Flow (L/min):  [3] 3    Physical Exam:  Physical Exam   General:      "     Alert, cooperative, no distress, appears stated age  Head:              Normocephalic, atraumatic, mucous membranes moist   Lungs:            Diminished breath sounds bilaterally  Heart::             Regular rate and rhythm, S1 and S2 normal, no murmur, rub or gallop  Abdomen:Soft, nontender, nondistended, bowel sounds active  Skin:                No rashes or lesions  Neuro/psych:A&O x3. appropriate affect       Pertinent  and/or Most Recent Results     LAB RESULTS:      Lab 03/26/24  0424 03/25/24  1206   WBC 9.01 10.30   HEMOGLOBIN 12.7* 12.0*   HEMATOCRIT 38.4 36.0*   PLATELETS 304 271   NEUTROS ABS 8.18* 8.78*   IMMATURE GRANS (ABS) 0.09* 0.07*   LYMPHS ABS 0.52* 0.83   MONOS ABS 0.22 0.48   EOS ABS 0.00 0.10   MCV 92.1 92.5   PROCALCITONIN  --  0.08         Lab 03/26/24  0424 03/25/24  1206   SODIUM 137 138   POTASSIUM 4.0 3.9   CHLORIDE 101 105   CO2 21.0* 22.0   ANION GAP 15.0 11.0   BUN 25* 22   CREATININE 1.28* 1.30*   EGFR 55.5* 54.5*   GLUCOSE 162* 124*   CALCIUM 10.2 9.9         Lab 03/25/24  1206   TOTAL PROTEIN 6.4   ALBUMIN 4.2   GLOBULIN 2.2   ALT (SGPT) 14   AST (SGOT) 18   BILIRUBIN 0.4   ALK PHOS 108         Lab 03/25/24  1404 03/25/24  1206   PROBNP  --  408.6   HSTROP T 50* 59*                 Brief Urine Lab Results  (Last result in the past 365 days)        Color   Clarity   Blood   Leuk Est   Nitrite   Protein   CREAT   Urine HCG        03/26/24 0259 Yellow   Clear   Trace   Negative   Negative   >=300 mg/dL (3+)                 Microbiology Results (last 10 days)       Procedure Component Value - Date/Time    Blood Culture - Blood, Arm, Left [[id number]]  (Normal) Collected: 03/25/24 1331    Lab Status: Preliminary result Specimen: Blood from Arm, Left Updated: 03/26/24 1346     Blood Culture No growth at 24 hours    Blood Culture - Blood, Arm, Right [[id number]]  (Normal) Collected: 03/25/24 1319    Lab Status: Preliminary result Specimen: Blood from Arm, Right Updated: 03/26/24 8850      Blood Culture No growth at 24 hours    Respiratory Panel PCR w/COVID-19(SARS-CoV-2) NEETU/MICHELLE/GARETH/PAD/COR/RJ In-House, NP Swab in UTM/VTM, 2 HR TAT - Swab, Nasopharynx [[id number]]  (Normal) Collected: 03/25/24 1206    Lab Status: Final result Specimen: Swab from Nasopharynx Updated: 03/25/24 1304     ADENOVIRUS, PCR Not Detected     Coronavirus 229E Not Detected     Coronavirus HKU1 Not Detected     Coronavirus NL63 Not Detected     Coronavirus OC43 Not Detected     COVID19 Not Detected     Human Metapneumovirus Not Detected     Human Rhinovirus/Enterovirus Not Detected     Influenza A PCR Not Detected     Influenza B PCR Not Detected     Parainfluenza Virus 1 Not Detected     Parainfluenza Virus 2 Not Detected     Parainfluenza Virus 3 Not Detected     Parainfluenza Virus 4 Not Detected     RSV, PCR Not Detected     Bordetella pertussis pcr Not Detected     Bordetella parapertussis PCR Not Detected     Chlamydophila pneumoniae PCR Not Detected     Mycoplasma pneumo by PCR Not Detected    Narrative:      In the setting of a positive respiratory panel with a viral infection PLUS a negative procalcitonin without other underlying concern for bacterial infection, consider observing off antibiotics or discontinuation of antibiotics and continue supportive care. If the respiratory panel is positive for atypical bacterial infection (Bordetella pertussis, Chlamydophila pneumoniae, or Mycoplasma pneumoniae), consider antibiotic de-escalation to target atypical bacterial infection.            XR Chest 1 View    Result Date: 3/25/2024  Impression: 1. Mild patchy groundglass alveolar disease in both lungs, greatest in the left lower lobe. Correlate for symptoms of pneumonia 2. Fine interstitial thickening is present in both lungs may represent a component of mild background edema. Electronically Signed: Sasah Rios MD  3/25/2024 12:16 PM EDT  Workstation ID: [id number]     Results for orders placed during the hospital encounter  of 01/26/22    Duplex Venous Lower Extremity - Bilateral CAR    Interpretation Summary  · Normal bilateral lower extremity venous duplex scan.      Results for orders placed during the hospital encounter of 01/26/22    Duplex Venous Lower Extremity - Bilateral CAR    Interpretation Summary  · Normal bilateral lower extremity venous duplex scan.      Results for orders placed during the hospital encounter of 05/25/22    Adult Transthoracic Echo Complete W/ Cont if Necessary Per Protocol    Interpretation Summary  · Left ventricular systolic function is normal.  · Left ventricular ejection fraction is 55 to 60%  · Left ventricular diastolic function was normal.  · Calculated right ventricular systolic pressure from tricuspid regurgitation is 35.9 mmHg.      Labs Pending at Discharge:  Pending Labs       Order Current Status    Blood Culture - Blood, Arm, Left Preliminary result    Blood Culture - Blood, Arm, Right Preliminary result            Procedures Performed           Consults:   Consults       Date and Time Order Name Status Description    3/26/2024  2:57 PM Inpatient Pulmonology Consult Completed     3/25/2024  3:40 PM Hospitalist (on-call MD unless specified)                Discharge Details        Discharge Medications        New Medications        Instructions Start Date   amoxicillin-clavulanate 875-125 MG per tablet  Commonly known as: AUGMENTIN   1 tablet, Oral, 2 Times Daily             Continue These Medications        Instructions Start Date   allopurinol 100 MG tablet  Commonly known as: ZYLOPRIM   200 mg, Oral, Daily      amLODIPine 10 MG tablet  Commonly known as: NORVASC   TAKE 1 TABLET BY MOUTH ONCE DAILY FOR HIGH BLOOD PRESSURE      budesonide 0.25 MG/2ML nebulizer solution  Commonly known as: PULMICORT   0.25 mg, Nebulization, Daily - RT      cetirizine 10 MG tablet  Commonly known as: zyrTEC   10 mg, Oral, Daily      clopidogrel 75 MG tablet  Commonly known as: PLAVIX   75 mg, Oral, Daily       CULTURELLE PROBIOTICS PO   20 mg, Oral, Daily      ferrous sulfate 325 (65 FE) MG EC tablet   325 mg, Oral, Daily With Breakfast      furosemide 40 MG tablet  Commonly known as: LASIX   40 mg, Oral, Daily      hydrALAZINE 50 MG tablet  Commonly known as: APRESOLINE   TAKE 1 TABLET BY MOUTH THREE TIMES DAILY      megestrol 40 MG tablet  Commonly known as: MEGACE   40 mg, Oral, Daily      nitroglycerin 0.3 MG SL tablet  Commonly known as: Nitrostat   0.3 mg, Sublingual, Every 5 Minutes PRN, Take no more than 3 doses in 15 minutes.      oxyCODONE-acetaminophen  MG per tablet  Commonly known as: Percocet   1 tablet, Oral, Every 6 Hours PRN      pantoprazole 40 MG EC tablet  Commonly known as: PROTONIX   Take 1 tablet by mouth twice daily      pravastatin 40 MG tablet  Commonly known as: PRAVACHOL   40 mg, Oral, Daily      tamsulosin 0.4 MG capsule 24 hr capsule  Commonly known as: FLOMAX   1 capsule, Oral, 2 Times Daily      vitamin B-12 1000 MCG tablet  Commonly known as: CYANOCOBALAMIN   1,000 mcg, Oral, 2 Times Daily               No Known Allergies      Discharge Disposition:   Home-Health Care [facility]    Diet:  Hospital:  Diet Order   Procedures    Diet: Diabetic; Consistent Carbohydrate; Fluid Consistency: Thin (IDDSI 0)         Discharge Activity:         CODE STATUS:  Code Status and Medical Interventions:   Ordered at: 03/26/24 1142     Level Of Support Discussed With:    Patient     Code Status (Patient has no pulse and is not breathing):    CPR (Attempt to Resuscitate)     Medical Interventions (Patient has pulse or is breathing):    Full Support         Future Appointments   Date Time Provider Department Center   5/13/2024 10:40 AM Mc Key MD [facility]           Time spent on Discharge including face to face service:  >30 minutes    Signature: Electronically signed by Leona Pryor MD, 03/27/24, 13:16 EDT.  [facility] Hospitalist Team

## 2024-03-27 NOTE — CASE MANAGEMENT/SOCIAL WORK
Continued Stay Note  [facility]     Patient Name: Kailash Cooper Jr.  MRN: [id number]  Today's Date: 3/27/2024    Admit Date: 3/25/2024    Plan: Return home with [facility] (accepted.) Order obtained.   Discharge Plan       Row Name 03/27/24 1432       Plan    Plan Return home with [facility] (accepted.) Order obtained.    Plan Comments CM met with family to obtain additional HHC choices due to [facility] not able to accept. Family requesting [facility](declined due to insurance) and [facility]- referral made and liaison Sasha contacted (accepted- order obtained.)                     Myranda Hawkins RN      Office phone: [phone]  Office fax: [phone]

## 2024-03-27 NOTE — CONSULTS
Nutrition Services    Patient Name: Kailash Cooper Jr.  YOB: 1940  MRN: [id number]  Admission date: 3/25/2024    Comment:    Unable to assess patient in person at this date - pt discharged before able to assess. Pt may be at risk of malnutrition related to recent weight loss and inadequate oral intake.     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 [facility]    Diabetes     Enlarged prostate     Former smoker     Hiatal hernia     Hip pain     don    Hip pain, bilateral     Hyperlipidemia     Hypertension     Kidney disease        stage 3     Leg pain     don    Low back pain     Neck pain     Stroke        Past Surgical History:   Procedure Laterality Date    CARDIAC CATHETERIZATION Left 1/28/2022    Procedure: Cardiac Catheterization/Vascular Study;  Surgeon: Mani Salguero MD;  Location: [facility] CATH INVASIVE LOCATION;  Service: Cardiovascular;  Laterality: Left;    CATARACT EXTRACTION  2006    OU    COLONOSCOPY  2011    COLONOSCOPY N/A 5/27/2022    Procedure: COLONOSCOPY;  Surgeon: Terry Holliday MD;  Location: [facility] ENDOSCOPY;  Service: Gastroenterology;  Laterality: N/A;  polyps    CORONARY STENT PLACEMENT  01/28/2022    RCA    ENDOSCOPY N/A 5/27/2022    Procedure: ESOPHAGOGASTRODUODENOSCOPY with 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, gastric antrum biopsy;  Surgeon: Terry Holliday MD;  Location: [facility] ENDOSCOPY;  Service: Gastroenterology;  Laterality: N/A;  gastritis, gastric ulcer, small bowel AVM    LUNG CANCER SURGERY      upper lobe rt lung cancer 9/2017  at [facility]        Current Problems   Bilateral community-acquired pneumonia  COPD  - pt on 4 L O2 at baseline    Elevated troponin  - suspecting demand ischemia    Generalized weakness  - PT following     HTN    HLD     CKD Stage III  - renal function  "at baseline  - avoid nephrotoxic drugs       Encounter Information        Trending Narrative     3/27 - Pt admitted to [facility] 3/25 with complaints of SOB and fatigue. Pt with pneumonia per chests x-ray. Dietetic intern unable to see patient during this admission - pt with provider during first attempt to visit and had already discharged upon second attempt to visit. During admission, pt had fair-good PO intake. Pt may be at risk of malnutrition at this time r/t recent weight loss and inadequate oral intake.      Anthropometrics        Current Height, Weight Height: 160 cm (63\")  Weight: 49.9 kg (110 lb 0.2 oz) (03/25/24 1815)       Usual Body Weight (UBW) Unknown       Trending Weight Hx     This admission: 3/27 - 110 lbs             PTA: 12.7% wt loss x 1 mo (using wt from 2/12/24)  23% wt loss x 1 year (using wt from 3/15/23)    Wt Readings from Last 30 Encounters:   03/25/24 1815 49.9 kg (110 lb 0.2 oz)   03/25/24 1117 57.2 kg (126 lb)   02/12/24 1236 57.2 kg (126 lb)   01/29/24 1532 57.2 kg (126 lb)   08/16/23 1102 60.3 kg (133 lb)   03/15/23 1403 65.1 kg (143 lb 9.6 oz)   02/27/23 1144 64.2 kg (141 lb 9.6 oz)   11/22/22 1125 63.5 kg (140 lb)   11/21/22 1515 62.9 kg (138 lb 9.6 oz)   11/16/22 1029 61.2 kg (135 lb)   09/26/22 1506 65.8 kg (145 lb)   09/12/22 1150 65.8 kg (145 lb)   08/02/22 1255 65.8 kg (145 lb)   07/05/22 1040 66.1 kg (145 lb 12.8 oz)   06/30/22 1511 69.9 kg (154 lb)   06/27/22 1513 68.9 kg (152 lb)   06/23/22 1221 68.9 kg (152 lb)   06/16/22 1053 69.4 kg (153 lb)   06/14/22 0925 65.3 kg (144 lb)   06/09/22 1514 68 kg (150 lb)   06/03/22 1451 69.4 kg (153 lb)   06/02/22 1425 69.4 kg (153 lb)   06/01/22 0343 64.4 kg (141 lb 15.6 oz)   05/30/22 0228 66.6 kg (146 lb 13.2 oz)   05/29/22 0446 65.9 kg (145 lb 4.5 oz)   05/28/22 0425 63.3 kg (139 lb 8.8 oz)   05/27/22 0455 65 kg (143 lb 4.8 oz)   05/26/22 1417 65.3 kg (144 lb)   05/25/22 2042 65.6 kg (144 lb 10 oz)   05/25/22 1318 68 kg (150 lb) "   05/22/22 1230 68 kg (150 lb)   03/31/22 1304 65.2 kg (143 lb 12.8 oz)   03/10/22 1104 67.6 kg (149 lb)   03/07/22 1336 66.7 kg (147 lb)   02/07/22 1341 75.3 kg (166 lb)   02/07/22 0915 75.3 kg (166 lb)   02/01/22 0500 73 kg (160 lb 15 oz)   01/31/22 0611 74 kg (163 lb 2.3 oz)   01/30/22 0551 74 kg (163 lb 2.3 oz)   01/29/22 0542 68 kg (149 lb 14.6 oz)   01/28/22 2159 67.7 kg (149 lb 4 oz)   01/28/22 0408 66.9 kg (147 lb 7.8 oz)   01/26/22 1933 74.8 kg (165 lb)   01/26/22 1657 72.6 kg (160 lb)   01/26/22 1207 72.6 kg (160 lb)   01/17/22 1420 75.2 kg (165 lb 12.8 oz)      BMI kg/m2 Body mass index is 19.49 kg/m².       Labs        Pertinent Labs Elevated BUN/creatinine - consistent with CKD   Hyperglycemia  Management per attending   Results from last 7 days   Lab Units 03/26/24  0424 03/25/24  1206   SODIUM mmol/L 137 138   POTASSIUM mmol/L 4.0 3.9   CHLORIDE mmol/L 101 105   CO2 mmol/L 21.0* 22.0   BUN mg/dL 25* 22   CREATININE mg/dL 1.28* 1.30*   CALCIUM mg/dL 10.2 9.9   BILIRUBIN mg/dL  --  0.4   ALK PHOS U/L  --  108   ALT (SGPT) U/L  --  14   AST (SGOT) U/L  --  18   GLUCOSE mg/dL 162* 124*     Results from last 7 days   Lab Units 03/26/24  0424   HEMOGLOBIN g/dL 12.7*   HEMATOCRIT % 38.4     Lab Results   Component Value Date    HGBA1C 5.7 (H) 01/31/2024        Medications    Scheduled Medications allopurinol, 200 mg, Oral, Daily  amLODIPine, 10 mg, Oral, Daily  atorvastatin, 10 mg, Oral, Daily  budesonide, 0.25 mg, Nebulization, Daily - RT  cefTRIAXone, 1,000 mg, Intravenous, Q24H  cetirizine, 10 mg, Oral, Daily  clopidogrel, 75 mg, Oral, Daily  doxycycline, 100 mg, Oral, Q12H  ferrous sulfate, 324 mg, Oral, Daily With Breakfast  furosemide, 40 mg, Oral, Daily  guaiFENesin, 600 mg, Oral, Q12H  heparin (porcine), 5,000 Units, Subcutaneous, Q12H  hydrALAZINE, 50 mg, Oral, TID  ipratropium-albuterol, 3 mL, Nebulization, 4x Daily - RT  megestrol, 40 mg, Oral, Daily  pantoprazole, 40 mg, Oral, BID  sodium  chloride, 10 mL, Intravenous, Q12H  tamsulosin, 0.4 mg, Oral, BID With Meals        Infusions      PRN Medications   acetaminophen **OR** acetaminophen **OR** acetaminophen    senna-docusate sodium **AND** polyethylene glycol **AND** bisacodyl **AND** bisacodyl    Calcium Replacement - Follow Nurse / BPA Driven Protocol    LORazepam    Magnesium Standard Dose Replacement - Follow Nurse / BPA Driven Protocol    ondansetron ODT **OR** [DISCONTINUED] ondansetron    oxyCODONE    Phosphorus Replacement - Follow Nurse / BPA Driven Protocol    Potassium Replacement - Follow Nurse / BPA Driven Protocol    saline    sodium chloride    sodium chloride    sodium chloride    ziprasidone (GEODON) 10 mg in sterile water (preservative free) 0.5 mL injection     Physical Findings        Trending Physical   Appearance, NFPE 3/27 - Not completed as patient had discharged before RD able to assess. Pt may be currently at risk of malnutrition r/t inadequate oral intake and unintentional weight loss.    --  Edema  No documented edema     Bowel Function + BM 3/24 - PRN bowel regimen available     Tubes No feeding tube in place     Chewing/Swallowing No documented chewing/swallowing issues     Skin Bilateral nose abrasion - no PIs     --  Current Nutrition Orders & Evaluation of Intake       Oral Nutrition     Food Allergies NKFA   Current PO Diet Diet: Diabetic; Consistent Carbohydrate; Fluid Consistency: Thin (IDDSI 0)   Supplement No supplement ordered   PO Evaluation     Trending % PO Intake 3/27 - 50% average PO intake x last 5 meals    --  Nutritional Risk Screening        NRS-2002 Score          Nutrition Diagnosis         Nutrition Dx Problem 1 Inadequate oral intake related to hypermetabolism in the setting of COPD as evidenced by 50% average PO intake during admission and 12.7% wt loss x 1 month.       Nutrition Dx Problem 2        Intervention Goal         Intervention Goal(s) Encourage PO intake >50%     Nutrition Intervention         RD Action NFPE completed, continue consistent carbohydrate diet as tolerated     Nutrition Prescription          Diet Prescription Consistent carbohydrate   Supplement Prescription No supplement ordered   --  Monitor/Evaluation        Monitor Per protocol, PO intake, Pertinent labs, Weight     Electronically signed by:  Hayde Melara  03/27/24 10:02 EDT

## 2024-03-27 NOTE — PLAN OF CARE
#Delirium     - patient restless, agitated, trying to get out of bed; pulling lines and tubes; not following commands    - possibly 2/2 condition vs sundowning    - failed ativan    - Geodon 10mg IM BID PRN agitation, monitor for toxicity     Electronically signed by Saritha Cantu DO, 03/26/24, 9:38 PM EDT.

## 2024-03-28 ENCOUNTER — TRANSITIONAL CARE MANAGEMENT TELEPHONE ENCOUNTER (OUTPATIENT)
Dept: CALL CENTER | Facility: HOSPITAL | Age: 84
End: 2024-03-28
Payer: MEDICARE

## 2024-03-28 NOTE — OUTREACH NOTE
Call Center TCM Note      Flowsheet Row Responses   [facility] facility patient discharged from? [facility]   Does the patient have one of the following disease processes/diagnoses(primary or secondary)? Pneumonia   TCM attempt successful? No   Unsuccessful attempts Attempt 1            Ct Randle RN    3/28/2024, 13:16 EDT

## 2024-03-28 NOTE — OUTREACH NOTE
Call Center TCM Note      Flowsheet Row Responses   [facility] patient discharged from? [facility]   Does the patient have one of the following disease processes/diagnoses(primary or secondary)? Pneumonia   TCM attempt successful? Yes   Call start time 1646   Call end time 1702   List who call center can speak with pt   Medication alerts for this patient antibiotics.   Meds reviewed with patient/caregiver? Yes   Does the patient have all medications ordered at discharge? Yes   Is the patient taking all medications as directed (includes completed medication regime)? Yes   Comments Pcp hospital f/u appointment scheduled for 4-1-2024 @ 3 pm.   Does the patient have an appointment with their PCP within 7-14 days of discharge? No   Nursing Interventions Assisted patient with making appointment per protocol, Routed TCM call to PCP office   Has home health visited the patient within 72 hours of discharge? Call prior to 72 hours   Pulse Ox monitoring Intermittent   Pulse Ox device source Patient   Psychosocial issues? No   Did the patient receive a copy of their discharge instructions? Yes   Nursing interventions Reviewed instructions with patient   What is the patient's perception of their health status since discharge? Improving   Is the patient/caregiver able to teach back the hierarchy of who to call/visit for symptoms/problems? PCP, Specialist, Home health nurse, Urgent Care, ED, 911 Yes   Is the patient/caregiver able to teach back signs and symptoms of worsening condition: Fever/chills, Shortness of breath, Chest pain   Is the patient/caregiver able to teach back importance of completing antibiotic course of treatment? Yes   TCM call completed? Yes   Wrap up additional comments Spouse reports pt is improving. Continuous 02 in place. Pt did not recieve the amoxicillin. This RN will message pcp office to call pt concerning prescription.   Call end time 1702   Would this patient benefit from a Referral to [facility]  Work? No   Is the patient interested in additional calls from an ambulatory ? No            Ct Randle RN    3/28/2024, 17:05 EDT

## 2024-03-29 NOTE — PROGRESS NOTES
Spoke w/pt concerning abx.  The script was sent to the [facility].  Pt wasn't informed.  I advised pt to call [facility] pharmacy and have rx transferred to local pharmacy.  Pt verbally understood and will call.

## 2024-03-30 LAB
BACTERIA SPEC AEROBE CULT: NORMAL
BACTERIA SPEC AEROBE CULT: NORMAL

## 2024-04-01 ENCOUNTER — OFFICE VISIT (OUTPATIENT)
Dept: FAMILY MEDICINE CLINIC | Facility: CLINIC | Age: 84
End: 2024-04-01
Payer: MEDICARE

## 2024-04-01 ENCOUNTER — TELEPHONE (OUTPATIENT)
Dept: FAMILY MEDICINE CLINIC | Facility: CLINIC | Age: 84
End: 2024-04-01

## 2024-04-01 VITALS
HEART RATE: 93 BPM | WEIGHT: 115 LBS | SYSTOLIC BLOOD PRESSURE: 154 MMHG | BODY MASS INDEX: 20.38 KG/M2 | TEMPERATURE: 97.5 F | DIASTOLIC BLOOD PRESSURE: 62 MMHG | OXYGEN SATURATION: 98 % | HEIGHT: 63 IN

## 2024-04-01 DIAGNOSIS — R53.1 WEAKNESS: ICD-10-CM

## 2024-04-01 DIAGNOSIS — Z12.5 SCREENING PSA (PROSTATE SPECIFIC ANTIGEN): Primary | ICD-10-CM

## 2024-04-01 DIAGNOSIS — J18.9 PNEUMONIA OF BOTH LUNGS DUE TO INFECTIOUS ORGANISM, UNSPECIFIED PART OF LUNG: ICD-10-CM

## 2024-04-01 NOTE — PATIENT INSTRUCTIONS
PSA today  Call make appointment with pulmonology  Find out if insurance will cover physical therapy at the [facility] 2-3 times a day and try to eat more  Report any worsening of breathing or oxygen levels  Follow-up as needed

## 2024-04-01 NOTE — TELEPHONE ENCOUNTER
Home health nurse called and wanted to give a report on Kailash about his Oxygen. He was on 41/2 liters though the nose, and the nurse turned it down to 2 liters and he was doing fine and his O2 was staying at 98 percent, and his lungs sounded clear.

## 2024-04-01 NOTE — PROGRESS NOTES
Kailash Cooper Jr. is a 83 y.o. male.     History of Present Illness  83-year-old white male with history of lung cancer who wears oxygen at 3 L, type 2 diabetes, chronic back pain who goes to pain management, hypertension, hyperlipidemia, CKD, MI with stent placement who comes in today posthospitalization for bilateral pneumonia.    Blood pressure 154/62 heart rate 92 he denies any chest pain, dyspnea, tachycardia or dizziness    Patient is obviously weak from his pneumonia and hospital stay he did have [facility] coming in and apparently the RN came in and changed his oxygen levels down from 4-2L which is considered a medication she has no authority to do.  Also they said the nurse was rude and they really do not want her coming back can they want [facility] home health I will try to order and see if that is covered.  Patient and wife states he really just needs therapy does not really see a need for home health they are going to see if their insurance will cover therapy at the hospital and let me know.  He needs to make a 1 month follow-up appointment with pulmonology    Patient continues to lose weight weight is 115 which is down 11 pounds since January with a BMI of 20.4.  He does eat really well and she is trying to getting to drink health shakes and I suggested getting Atkins and blending fruit into them to see if he likes on that way    His last hemoglobin was 12.7 creatinine 1.28.  He has had 2 COVID vaccines up-to-date on eye exam            PSA today  Call make appointment with pulmonology  Find out if insurance will cover physical therapy at the hospital  [facility] 2-3 times a day and try to eat more  Report any worsening of breathing or oxygen levels  Follow-up as needed         The following portions of the patient's history were reviewed and updated as appropriate: allergies, current medications, past family history, past medical history, past social history, past surgical history,  "and problem list.    Vitals:    04/01/24 1510 04/01/24 1518   BP: 170/72 154/62   BP Location: Right arm Right arm   Patient Position: Sitting Sitting   Cuff Size: Adult Adult   Pulse: 93    Temp: 97.5 °F (36.4 °C)    TempSrc: Infrared    SpO2: 98%    Weight: 52.2 kg (115 lb)    Height: 160 cm (62.99\")      Body mass index is 20.38 kg/m².    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 [facility]    Diabetes     Enlarged prostate     Former smoker     Hiatal hernia     Hip pain     don    Hip pain, bilateral     Hyperlipidemia     Hypertension     Kidney disease        stage 3     Leg pain     don    Low back pain     Neck pain     Stroke      Past Surgical History:   Procedure Laterality Date    CARDIAC CATHETERIZATION Left 1/28/2022    Procedure: Cardiac Catheterization/Vascular Study;  Surgeon: Mani Salguero MD;  Location: [facility] CATH INVASIVE LOCATION;  Service: Cardiovascular;  Laterality: Left;    CATARACT EXTRACTION  2006    OU    COLONOSCOPY  2011    COLONOSCOPY N/A 5/27/2022    Procedure: COLONOSCOPY;  Surgeon: Terry Holliday MD;  Location: [facility] ENDOSCOPY;  Service: Gastroenterology;  Laterality: N/A;  polyps    CORONARY STENT PLACEMENT  01/28/2022    RCA    ENDOSCOPY N/A 5/27/2022    Procedure: ESOPHAGOGASTRODUODENOSCOPY with 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, gastric antrum biopsy;  Surgeon: Terry Holliday MD;  Location: [facility] ENDOSCOPY;  Service: Gastroenterology;  Laterality: N/A;  gastritis, gastric ulcer, small bowel AVM    LUNG CANCER SURGERY      upper lobe rt lung cancer 9/2017  at [facility]     Family History   Problem Relation Age of Onset    Stroke Mother     Cancer Father         Prostate    Heart failure Brother     Heart disease Other      Immunization History   Administered Date(s) Administered    COVID-19 (PFIZER) BIVALENT 12+YRS 11/02/2022    COVID-19 (PFIZER) Purple Cap " Monovalent 02/11/2021, 03/04/2021, 01/06/2022    COVID-19 F23 (PFIZER) 12YRS+ (COMIRNATY) 01/06/2022    FLUAD TRI 65YR+ 11/18/2019    Fluad Quad 65+ 11/18/2019    Flublock Quad =>18yrs 10/07/2020    Fluzone High Dose =>65 Years ([employer] ONLY) 10/22/2015, 10/08/2018, 12/13/2023    Fluzone High-Dose 65+yrs 11/18/2019    H1N1 All Forms 01/08/2010    Hepatitis B Adult/Adolescent IM 08/08/2013, 09/18/2013, 02/11/2014    Influenza Quad Vaccine (Inpatient) 10/20/2016    Influenza Seasonal Injectable 09/20/2017    Influenza, Unspecified 09/20/2017    PPD Test 09/07/2010, 09/13/2011, 09/25/2012, 07/16/2013, 05/27/2014, 06/02/2015, 05/24/2016    Pneumococcal Conjugate 13-Valent (PCV13) 11/02/2017    Pneumococcal Polysaccharide (PPSV23) 10/20/2016, 12/11/2018, 12/29/2021, 12/29/2021       Admission on 03/25/2024, Discharged on 03/27/2024   Component Date Value Ref Range Status    QT Interval 03/25/2024 372  ms Final    QTC Interval 03/25/2024 471  ms Final    Glucose 03/25/2024 124 (H)  65 - 99 mg/dL Final    BUN 03/25/2024 22  8 - 23 mg/dL Final    Creatinine 03/25/2024 1.30 (H)  0.76 - 1.27 mg/dL Final    Sodium 03/25/2024 138  136 - 145 mmol/L Final    Potassium 03/25/2024 3.9  3.5 - 5.2 mmol/L Final    Chloride 03/25/2024 105  98 - 107 mmol/L Final    CO2 03/25/2024 22.0  22.0 - 29.0 mmol/L Final    Calcium 03/25/2024 9.9  8.6 - 10.5 mg/dL Final    Total Protein 03/25/2024 6.4  6.0 - 8.5 g/dL Final    Albumin 03/25/2024 4.2  3.5 - 5.2 g/dL Final    ALT (SGPT) 03/25/2024 14  1 - 41 U/L Final    AST (SGOT) 03/25/2024 18  1 - 40 U/L Final    Alkaline Phosphatase 03/25/2024 108  39 - 117 U/L Final    Total Bilirubin 03/25/2024 0.4  0.0 - 1.2 mg/dL Final    Globulin 03/25/2024 2.2  gm/dL Final    A/G Ratio 03/25/2024 1.9  g/dL Final    BUN/Creatinine Ratio 03/25/2024 16.9  7.0 - 25.0 Final    Anion Gap 03/25/2024 11.0  5.0 - 15.0 mmol/L Final    eGFR 03/25/2024 54.5 (L)  >60.0 mL/min/1.73 Final    proBNP 03/25/2024 408.6  0.0 -  1,800.0 pg/mL Final    HS Troponin T 03/25/2024 59 (C)  <22 ng/L Final    ADENOVIRUS, PCR 03/25/2024 Not Detected  Not Detected Final    Coronavirus 229E 03/25/2024 Not Detected  Not Detected Final    Coronavirus HKU1 03/25/2024 Not Detected  Not Detected Final    Coronavirus NL63 03/25/2024 Not Detected  Not Detected Final    Coronavirus OC43 03/25/2024 Not Detected  Not Detected Final    COVID19 03/25/2024 Not Detected  Not Detected - Ref. Range Final    Human Metapneumovirus 03/25/2024 Not Detected  Not Detected Final    Human Rhinovirus/Enterovirus 03/25/2024 Not Detected  Not Detected Final    Influenza A PCR 03/25/2024 Not Detected  Not Detected Final    Influenza B PCR 03/25/2024 Not Detected  Not Detected Final    Parainfluenza Virus 1 03/25/2024 Not Detected  Not Detected Final    Parainfluenza Virus 2 03/25/2024 Not Detected  Not Detected Final    Parainfluenza Virus 3 03/25/2024 Not Detected  Not Detected Final    Parainfluenza Virus 4 03/25/2024 Not Detected  Not Detected Final    RSV, PCR 03/25/2024 Not Detected  Not Detected Final    Bordetella pertussis pcr 03/25/2024 Not Detected  Not Detected Final    Bordetella parapertussis PCR 03/25/2024 Not Detected  Not Detected Final    Chlamydophila pneumoniae PCR 03/25/2024 Not Detected  Not Detected Final    Mycoplasma pneumo by PCR 03/25/2024 Not Detected  Not Detected Final    Extra Tube 03/25/2024 hold for add-on   Final    Auto resulted    Extra Tube 03/25/2024 Hold for add-ons.   Final    Auto resulted.    Extra Tube 03/25/2024 Hold for add-ons.   Final    Auto resulted    WBC 03/25/2024 10.30  3.40 - 10.80 10*3/mm3 Final    RBC 03/25/2024 3.89 (L)  4.14 - 5.80 10*6/mm3 Final    Hemoglobin 03/25/2024 12.0 (L)  13.0 - 17.7 g/dL Final    Hematocrit 03/25/2024 36.0 (L)  37.5 - 51.0 % Final    MCV 03/25/2024 92.5  79.0 - 97.0 fL Final    MCH 03/25/2024 30.8  26.6 - 33.0 pg Final    MCHC 03/25/2024 33.3  31.5 - 35.7 g/dL Final    RDW 03/25/2024 13.3  12.3 -  15.4 % Final    RDW-SD 03/25/2024 45.0  37.0 - 54.0 fl Final    MPV 03/25/2024 9.8  6.0 - 12.0 fL Final    Platelets 03/25/2024 271  140 - 450 10*3/mm3 Final    Neutrophil % 03/25/2024 85.1 (H)  42.7 - 76.0 % Final    Lymphocyte % 03/25/2024 8.1 (L)  19.6 - 45.3 % Final    Monocyte % 03/25/2024 4.7 (L)  5.0 - 12.0 % Final    Eosinophil % 03/25/2024 1.0  0.3 - 6.2 % Final    Basophil % 03/25/2024 0.4  0.0 - 1.5 % Final    Immature Grans % 03/25/2024 0.7 (H)  0.0 - 0.5 % Final    Neutrophils, Absolute 03/25/2024 8.78 (H)  1.70 - 7.00 10*3/mm3 Final    Lymphocytes, Absolute 03/25/2024 0.83  0.70 - 3.10 10*3/mm3 Final    Monocytes, Absolute 03/25/2024 0.48  0.10 - 0.90 10*3/mm3 Final    Eosinophils, Absolute 03/25/2024 0.10  0.00 - 0.40 10*3/mm3 Final    Basophils, Absolute 03/25/2024 0.04  0.00 - 0.20 10*3/mm3 Final    Immature Grans, Absolute 03/25/2024 0.07 (H)  0.00 - 0.05 10*3/mm3 Final    nRBC 03/25/2024 0.0  0.0 - 0.2 /100 WBC Final    HS Troponin T 03/25/2024 50 (H)  <22 ng/L Final    Troponin T Delta 03/25/2024 -9 (L)  >=-4 - <+4 ng/L Final    Blood Culture 03/25/2024 No growth at 5 days   Final    Blood Culture 03/25/2024 No growth at 5 days   Final    Procalcitonin 03/25/2024 0.08  0.00 - 0.25 ng/mL Final    Glucose 03/26/2024 162 (H)  65 - 99 mg/dL Final    BUN 03/26/2024 25 (H)  8 - 23 mg/dL Final    Creatinine 03/26/2024 1.28 (H)  0.76 - 1.27 mg/dL Final    Sodium 03/26/2024 137  136 - 145 mmol/L Final    Potassium 03/26/2024 4.0  3.5 - 5.2 mmol/L Final    Chloride 03/26/2024 101  98 - 107 mmol/L Final    CO2 03/26/2024 21.0 (L)  22.0 - 29.0 mmol/L Final    Calcium 03/26/2024 10.2  8.6 - 10.5 mg/dL Final    BUN/Creatinine Ratio 03/26/2024 19.5  7.0 - 25.0 Final    Anion Gap 03/26/2024 15.0  5.0 - 15.0 mmol/L Final    eGFR 03/26/2024 55.5 (L)  >60.0 mL/min/1.73 Final    WBC 03/26/2024 9.01  3.40 - 10.80 10*3/mm3 Final    RBC 03/26/2024 4.17  4.14 - 5.80 10*6/mm3 Final    Hemoglobin 03/26/2024 12.7 (L)  13.0  - 17.7 g/dL Final    Hematocrit 03/26/2024 38.4  37.5 - 51.0 % Final    MCV 03/26/2024 92.1  79.0 - 97.0 fL Final    MCH 03/26/2024 30.5  26.6 - 33.0 pg Final    MCHC 03/26/2024 33.1  31.5 - 35.7 g/dL Final    RDW 03/26/2024 13.2  12.3 - 15.4 % Final    RDW-SD 03/26/2024 44.5  37.0 - 54.0 fl Final    MPV 03/26/2024 9.9  6.0 - 12.0 fL Final    Platelets 03/26/2024 304  140 - 450 10*3/mm3 Final    Neutrophil % 03/26/2024 90.8 (H)  42.7 - 76.0 % Final    Lymphocyte % 03/26/2024 5.8 (L)  19.6 - 45.3 % Final    Monocyte % 03/26/2024 2.4 (L)  5.0 - 12.0 % Final    Eosinophil % 03/26/2024 0.0 (L)  0.3 - 6.2 % Final    Basophil % 03/26/2024 0.0  0.0 - 1.5 % Final    Immature Grans % 03/26/2024 1.0 (H)  0.0 - 0.5 % Final    Neutrophils, Absolute 03/26/2024 8.18 (H)  1.70 - 7.00 10*3/mm3 Final    Lymphocytes, Absolute 03/26/2024 0.52 (L)  0.70 - 3.10 10*3/mm3 Final    Monocytes, Absolute 03/26/2024 0.22  0.10 - 0.90 10*3/mm3 Final    Eosinophils, Absolute 03/26/2024 0.00  0.00 - 0.40 10*3/mm3 Final    Basophils, Absolute 03/26/2024 0.00  0.00 - 0.20 10*3/mm3 Final    Immature Grans, Absolute 03/26/2024 0.09 (H)  0.00 - 0.05 10*3/mm3 Final    nRBC 03/26/2024 0.0  0.0 - 0.2 /100 WBC Final    Color, UA 03/26/2024 Yellow  Yellow, Straw Final    Appearance, UA 03/26/2024 Clear  Clear Final    pH, UA 03/26/2024 5.5  5.0 - 8.0 Final    Specific Gravity, UA 03/26/2024 1.019  1.005 - 1.030 Final    Glucose, UA 03/26/2024 Negative  Negative Final    Ketones, UA 03/26/2024 Negative  Negative Final    Bilirubin, UA 03/26/2024 Negative  Negative Final    Blood, UA 03/26/2024 Trace (A)  Negative Final    Protein, UA 03/26/2024 >=300 mg/dL (3+) (A)  Negative Final    Leuk Esterase, UA 03/26/2024 Negative  Negative Final    Nitrite, UA 03/26/2024 Negative  Negative Final    Urobilinogen, UA 03/26/2024 0.2 E.U./dL  0.2 - 1.0 E.U./dL Final    RBC, UA 03/26/2024 0-2  None Seen, 0-2 /HPF Final    WBC, UA 03/26/2024 0-2  None Seen, 0-2 /HPF Final     Urine culture not indicated.    Bacteria, UA 03/26/2024 None Seen  None Seen /HPF Final    Squamous Epithelial Cells, UA 03/26/2024 0-2  None Seen, 0-2 /HPF Final    Hyaline Casts, UA 03/26/2024 None Seen  None Seen /LPF Final    Methodology 03/26/2024 Automated Microscopy   Final         Review of Systems   HENT: Negative.     Respiratory:  Positive for shortness of breath.    Cardiovascular: Negative.    Gastrointestinal: Negative.    Genitourinary: Negative.    Musculoskeletal: Negative.    Skin: Negative.    Neurological:  Positive for weakness.   Psychiatric/Behavioral: Negative.         Objective   Physical Exam  Constitutional:       Appearance: Normal appearance.   HENT:      Head: Normocephalic.   Cardiovascular:      Rate and Rhythm: Normal rate and regular rhythm.      Pulses: Normal pulses.      Heart sounds: Normal heart sounds.   Pulmonary:      Effort: Pulmonary effort is normal.      Breath sounds: Normal breath sounds.   Abdominal:      General: Bowel sounds are normal.   Musculoskeletal:      Comments: Patient week from hospital stay and pneumonia.  Uses walker at home and a wheelchair   Skin:     General: Skin is warm.   Neurological:      General: No focal deficit present.      Mental Status: He is alert and oriented to person, place, and time.   Psychiatric:         Mood and Affect: Mood normal.         Behavior: Behavior normal.         Procedures    Assessment & Plan   Diagnoses and all orders for this visit:    1. Screening PSA (prostate specific antigen) (Primary)  -     PSA Screen    2. Pneumonia of both lungs due to infectious organism, unspecified part of lung  -     Ambulatory Referral to Home Health    3. BMI 20.0-20.9, adult    4. Weakness           Current Outpatient Medications:     allopurinol (ZYLOPRIM) 100 MG tablet, Take 2 tablets by mouth Daily. Indications: Disorder of Excessive Uric Acid in the Blood, Disp: , Rfl:     amLODIPine (NORVASC) 10 MG tablet, TAKE 1 TABLET BY  MOUTH ONCE DAILY FOR HIGH BLOOD PRESSURE, Disp: 90 tablet, Rfl: 0    amoxicillin-clavulanate (AUGMENTIN) 875-125 MG per tablet, Take 1 tablet by mouth 2 (Two) Times a Day for 6 days., Disp: 12 tablet, Rfl: 0    budesonide (PULMICORT) 0.25 MG/2ML nebulizer solution, Take 2 mL by nebulization Daily. Indications: Chronic Obstructive Lung Disease, Disp: 180 mL, Rfl: 1    cetirizine (zyrTEC) 10 MG tablet, Take 1 tablet by mouth Daily., Disp: 90 tablet, Rfl: 1    clopidogrel (PLAVIX) 75 MG tablet, Take 1 tablet by mouth Daily. Indications: Ischemic Heart Disease, Disp: 90 tablet, Rfl: 3    ferrous sulfate 325 (65 FE) MG EC tablet, Take 1 tablet by mouth Daily With Breakfast., Disp: , Rfl:     furosemide (LASIX) 40 MG tablet, Take 1 tablet by mouth Daily., Disp: 90 tablet, Rfl: 1    hydrALAZINE (APRESOLINE) 50 MG tablet, TAKE 1 TABLET BY MOUTH THREE TIMES DAILY, Disp: 270 tablet, Rfl: 0    megestrol (MEGACE) 40 MG tablet, Take 1 tablet by mouth Daily., Disp: 90 tablet, Rfl: 1    nitroglycerin (Nitrostat) 0.3 MG SL tablet, Place 1 tablet under the tongue Every 5 (Five) Minutes As Needed for Chest Pain. Take no more than 3 doses in 15 minutes., Disp: 50 tablet, Rfl: 12    oxyCODONE-acetaminophen (Percocet)  MG per tablet, Take 1 tablet by mouth Every 6 (Six) Hours As Needed for Moderate Pain., Disp: 120 tablet, Rfl: 0    pantoprazole (PROTONIX) 40 MG EC tablet, Take 1 tablet by mouth twice daily, Disp: 180 tablet, Rfl: 0    pravastatin (PRAVACHOL) 40 MG tablet, Take 1 tablet by mouth Daily. Indications: High Amount of Fats in the Blood, Disp: 90 tablet, Rfl: 1    Probiotic Product (CULTURELLE PROBIOTICS PO), Take 20 mg by mouth Daily. Indications: diabetic supplement, Disp: , Rfl:     tamsulosin (FLOMAX) 0.4 MG capsule 24 hr capsule, Take 1 capsule by mouth 2 (Two) Times a Day., Disp: , Rfl:     vitamin B-12 (CYANOCOBALAMIN) 1000 MCG tablet, Take 1 tablet by mouth 2 (Two) Times a Day. Indications: Inadequate Vitamin  B12, Disp: , Rfl:            Madelyn Márquez, APRN 4/1/2024 15:55 EDT  This note has been electronically signed

## 2024-04-02 ENCOUNTER — HOME HEALTH ADMISSION (OUTPATIENT)
Dept: HOME HEALTH SERVICES | Facility: HOME HEALTHCARE | Age: 84
End: 2024-04-02
Payer: COMMERCIAL

## 2024-04-02 LAB — PSA SERPL-MCNC: 2.5 NG/ML (ref 0–4)

## 2024-04-04 RX ORDER — AMLODIPINE BESYLATE 10 MG/1
TABLET ORAL
Qty: 90 TABLET | Refills: 0 | Status: SHIPPED | OUTPATIENT
Start: 2024-04-04

## 2024-04-04 RX ORDER — PANTOPRAZOLE SODIUM 40 MG/1
TABLET, DELAYED RELEASE ORAL
Qty: 180 TABLET | Refills: 0 | Status: SHIPPED | OUTPATIENT
Start: 2024-04-04

## 2024-04-05 ENCOUNTER — TELEPHONE (OUTPATIENT)
Dept: FAMILY MEDICINE CLINIC | Facility: CLINIC | Age: 84
End: 2024-04-05
Payer: MEDICARE

## 2024-04-05 NOTE — TELEPHONE ENCOUNTER
I called pt wife, advised her he will need seen to be properly evaluated, theres nothing available today, I made appt for Monday at 1:00 and advised wife to take him to urgent care or er if he gets worse over the weekend.

## 2024-04-05 NOTE — TELEPHONE ENCOUNTER
Caller: BRIAN DURAN    Relationship: Emergency Contact    Best call back number: [phone]     What medication are you requesting: AUGMENTIN    What are your current symptoms: COUGH     How long have you been experiencing symptoms: A COUPLE OF WEEKS     Have you had these symptoms before:    [x] Yes  [] No    Have you been treated for these symptoms before:   [] Yes  [x] No    If a prescription is needed, what is your preferred pharmacy and phone number:  [facility] [id number] [facility] [address] [phone] [place] [phone]      Additional notes: PATIENTS WIFE STATES HE WAS RELEASED FROM THE HOSPITAL WITH PNEUMONIA AND WAS SENT WITH A PRESCRIPTION OF AUGMENTIN AND PATIENT NEEDS A REFILL

## 2024-04-08 ENCOUNTER — OFFICE VISIT (OUTPATIENT)
Dept: FAMILY MEDICINE CLINIC | Facility: CLINIC | Age: 84
End: 2024-04-08
Payer: MEDICARE

## 2024-04-08 VITALS
SYSTOLIC BLOOD PRESSURE: 130 MMHG | BODY MASS INDEX: 22.68 KG/M2 | DIASTOLIC BLOOD PRESSURE: 60 MMHG | OXYGEN SATURATION: 97 % | HEART RATE: 101 BPM | TEMPERATURE: 97.1 F | WEIGHT: 128 LBS | HEIGHT: 63 IN

## 2024-04-08 DIAGNOSIS — R63.6 UNDERWEIGHT: ICD-10-CM

## 2024-04-08 DIAGNOSIS — J18.9 PNEUMONIA OF BOTH LUNGS DUE TO INFECTIOUS ORGANISM, UNSPECIFIED PART OF LUNG: ICD-10-CM

## 2024-04-08 DIAGNOSIS — R05.9 COUGH, UNSPECIFIED TYPE: Primary | ICD-10-CM

## 2024-04-08 PROCEDURE — 99213 OFFICE O/P EST LOW 20 MIN: CPT | Performed by: NURSE PRACTITIONER

## 2024-04-08 PROCEDURE — 3078F DIAST BP <80 MM HG: CPT | Performed by: NURSE PRACTITIONER

## 2024-04-08 PROCEDURE — 3075F SYST BP GE 130 - 139MM HG: CPT | Performed by: NURSE PRACTITIONER

## 2024-04-08 RX ORDER — AMOXICILLIN AND CLAVULANATE POTASSIUM 875; 125 MG/1; MG/1
1 TABLET, FILM COATED ORAL 2 TIMES DAILY
Qty: 20 TABLET | Refills: 0 | Status: SHIPPED | OUTPATIENT
Start: 2024-04-08

## 2024-04-08 NOTE — PROGRESS NOTES
"    Kailash Cooper Jr. is a 83 y.o. male.     History of Present Illness  83-year-old white male with history of lung cancer who wears oxygen at 3 L, type 2 diabetes, chronic back pain who goes to pain management, hypertension, hyperlipidemia, CKD, MI with stent placement and recent hospitalization for pneumonia who comes in today for follow-up visit    Blood pressure 130/60 heart rate 100 he denies any chest pain, dyspnea, tachycardia or dizziness    Patient states he started getting more short of breath yesterday and been coughing a lot.  Will get another chest x-ray to reassess his pneumonia status.  He is supposed to have made an appointment with pulmonology he is going to ask his wife and make sure that has been done    Weight is 128 today and it was 115 last week so apparently last weeks weight was not appropriate because we weighed him twice today.  His BMI is 22.7.  He has had 2 COVID vaccines and he is up-to-date on eye exams and PSA            Chest x-ray  Make sure you make an appointment with pulmonology  Continue physical therapy at home           The following portions of the patient's history were reviewed and updated as appropriate: allergies, current medications, past family history, past medical history, past social history, past surgical history, and problem list.    Vitals:    04/08/24 1109   BP: 130/60   BP Location: Right arm   Patient Position: Sitting   Cuff Size: Adult   Pulse: 101   Temp: 97.1 °F (36.2 °C)   TempSrc: Infrared   SpO2: 97%   Weight: 58.1 kg (128 lb)   Height: 160 cm (62.99\")     Body mass index is 22.68 kg/m².    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 [place]    Diabetes     Enlarged prostate     Former smoker     Hiatal hernia     Hip pain     don    Hip pain, bilateral     Hyperlipidemia     Hypertension     Kidney disease        stage 3     Leg pain     don    Low back pain     Neck pain     Stroke      Past " Surgical History:   Procedure Laterality Date    CARDIAC CATHETERIZATION Left 1/28/2022    Procedure: Cardiac Catheterization/Vascular Study;  Surgeon: Mani Salguero MD;  Location: [facility] CATH INVASIVE LOCATION;  Service: Cardiovascular;  Laterality: Left;    CATARACT EXTRACTION  2006    [facility]    COLONOSCOPY  2011    COLONOSCOPY N/A 5/27/2022    Procedure: COLONOSCOPY;  Surgeon: Terry Holliday MD;  Location: [facility] ENDOSCOPY;  Service: Gastroenterology;  Laterality: N/A;  polyps    CORONARY STENT PLACEMENT  01/28/2022    RCA    ENDOSCOPY N/A 5/27/2022    Procedure: ESOPHAGOGASTRODUODENOSCOPY with 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, gastric antrum biopsy;  Surgeon: Terry Holliday MD;  Location: [facility] ENDOSCOPY;  Service: Gastroenterology;  Laterality: N/A;  gastritis, gastric ulcer, small bowel AVM    LUNG CANCER SURGERY      upper lobe rt lung cancer 9/2017  at [facility]     Family History   Problem Relation Age of Onset    Stroke Mother     Cancer Father         Prostate    Heart failure Brother     Heart disease Other      Immunization History   Administered Date(s) Administered    COVID-19 (PFIZER) BIVALENT 12+YRS 11/02/2022    COVID-19 (PFIZER) Purple Cap Monovalent 02/11/2021, 03/04/2021, 01/06/2022    COVID-19 F23 (PFIZER) 12YRS+ (COMIRNATY) 01/06/2022    FLUAD TRI 65YR+ 11/18/2019    Fluad Quad 65+ 11/18/2019    Flublock Quad =>18yrs 10/07/2020    Fluzone High Dose =>65 Years ([employer] ONLY) 10/22/2015, 10/08/2018, 12/13/2023    Fluzone High-Dose 65+yrs 11/18/2019    H1N1 All Forms 01/08/2010    Hepatitis B Adult/Adolescent IM 08/08/2013, 09/18/2013, 02/11/2014    Influenza Quad Vaccine (Inpatient) 10/20/2016    Influenza Seasonal Injectable 09/20/2017    Influenza, Unspecified 09/20/2017    PPD Test 09/07/2010, 09/13/2011, 09/25/2012, 07/16/2013, 05/27/2014, 06/02/2015, 05/24/2016    Pneumococcal Conjugate 13-Valent (PCV13) 11/02/2017    Pneumococcal  Polysaccharide (PPSV23) 10/20/2016, 12/11/2018, 12/29/2021, 12/29/2021       Office Visit on 04/01/2024   Component Date Value Ref Range Status    PSA 04/01/2024 2.5  0.0 - 4.0 ng/mL Final    Comment: Roche ECLIA methodology.  According to the American Urological Association, Serum PSA should  decrease and remain at undetectable levels after radical  prostatectomy. The AUA defines biochemical recurrence as an initial  PSA value 0.2 ng/mL or greater followed by a subsequent confirmatory  PSA value 0.2 ng/mL or greater.  Values obtained with different assay methods or kits cannot be used  interchangeably. Results cannot be interpreted as absolute evidence  of the presence or absence of malignant disease.           Review of Systems   Constitutional:  Positive for fatigue.   HENT: Negative.     Respiratory:  Positive for cough and shortness of breath.    Cardiovascular: Negative.    Gastrointestinal: Negative.    Genitourinary: Negative.    Musculoskeletal: Negative.    Skin: Negative.    Neurological: Negative.    Psychiatric/Behavioral: Negative.         Objective   Physical Exam  Constitutional:       Appearance: Normal appearance.   HENT:      Head: Normocephalic.   Cardiovascular:      Rate and Rhythm: Normal rate and regular rhythm.      Pulses: Normal pulses.      Heart sounds: Normal heart sounds.   Pulmonary:      Effort: Pulmonary effort is normal.      Comments: Lungs diminished  Abdominal:      General: Bowel sounds are normal.   Musculoskeletal:         General: Normal range of motion.   Skin:     General: Skin is warm.   Neurological:      General: No focal deficit present.      Mental Status: He is alert and oriented to person, place, and time.   Psychiatric:         Mood and Affect: Mood normal.         Behavior: Behavior normal.         Procedures    Assessment & Plan   Diagnoses and all orders for this visit:    1. Cough, unspecified type (Primary)  -     POCT CHONG SARS-CoV-2 Antigen RUBIA  -     POC  Influenza A / B  -     Cancel: XR Chest 2 View  -     XR Chest 2 View; Future    2. BMI 22.0-22.9, adult    3. Underweight    4. Pneumonia of both lungs due to infectious organism, unspecified part of lung    Other orders  -     amoxicillin-clavulanate (AUGMENTIN) 875-125 MG per tablet; Take 1 tablet by mouth 2 (Two) Times a Day.  Dispense: 20 tablet; Refill: 0           Current Outpatient Medications:     allopurinol (ZYLOPRIM) 100 MG tablet, Take 2 tablets by mouth Daily. Indications: Disorder of Excessive Uric Acid in the Blood, Disp: , Rfl:     amLODIPine (NORVASC) 10 MG tablet, TAKE 1 TABLET BY MOUTH ONCE DAILY FOR HIGH BLOOD PRESSURE, Disp: 90 tablet, Rfl: 0    budesonide (PULMICORT) 0.25 MG/2ML nebulizer solution, Take 2 mL by nebulization Daily. Indications: Chronic Obstructive Lung Disease, Disp: 180 mL, Rfl: 1    cetirizine (zyrTEC) 10 MG tablet, Take 1 tablet by mouth Daily., Disp: 90 tablet, Rfl: 1    clopidogrel (PLAVIX) 75 MG tablet, Take 1 tablet by mouth Daily. Indications: Ischemic Heart Disease, Disp: 90 tablet, Rfl: 3    ferrous sulfate 325 (65 FE) MG EC tablet, Take 1 tablet by mouth Daily With Breakfast., Disp: , Rfl:     furosemide (LASIX) 40 MG tablet, Take 1 tablet by mouth Daily., Disp: 90 tablet, Rfl: 1    hydrALAZINE (APRESOLINE) 50 MG tablet, TAKE 1 TABLET BY MOUTH THREE TIMES DAILY, Disp: 270 tablet, Rfl: 0    megestrol (MEGACE) 40 MG tablet, Take 1 tablet by mouth Daily., Disp: 90 tablet, Rfl: 1    nitroglycerin (Nitrostat) 0.3 MG SL tablet, Place 1 tablet under the tongue Every 5 (Five) Minutes As Needed for Chest Pain. Take no more than 3 doses in 15 minutes., Disp: 50 tablet, Rfl: 12    oxyCODONE-acetaminophen (Percocet)  MG per tablet, Take 1 tablet by mouth Every 6 (Six) Hours As Needed for Moderate Pain., Disp: 120 tablet, Rfl: 0    pantoprazole (PROTONIX) 40 MG EC tablet, Take 1 tablet by mouth twice daily, Disp: 180 tablet, Rfl: 0    pravastatin (PRAVACHOL) 40 MG tablet,  Take 1 tablet by mouth Daily. Indications: High Amount of Fats in the Blood, Disp: 90 tablet, Rfl: 1    Probiotic Product (CULTURELLE PROBIOTICS PO), Take 20 mg by mouth Daily. Indications: diabetic supplement, Disp: , Rfl:     tamsulosin (FLOMAX) 0.4 MG capsule 24 hr capsule, Take 1 capsule by mouth 2 (Two) Times a Day., Disp: , Rfl:     vitamin B-12 (CYANOCOBALAMIN) 1000 MCG tablet, Take 1 tablet by mouth 2 (Two) Times a Day. Indications: Inadequate Vitamin B12, Disp: , Rfl:     amoxicillin-clavulanate (AUGMENTIN) 875-125 MG per tablet, Take 1 tablet by mouth 2 (Two) Times a Day., Disp: 20 tablet, Rfl: 0           Madelyn Márquez, JANE 4/8/2024 11:34 EDT  This note has been electronically signed

## 2024-04-08 NOTE — PATIENT INSTRUCTIONS
Chest x-ray  Make sure you make an appointment with pulmonology  Continue physical therapy at home

## 2024-04-17 ENCOUNTER — TELEPHONE (OUTPATIENT)
Dept: FAMILY MEDICINE CLINIC | Facility: CLINIC | Age: 84
End: 2024-04-17
Payer: MEDICARE

## 2024-04-17 NOTE — TELEPHONE ENCOUNTER
[facility] called and needs a fax sent over stating that Kailash needs to have continuous oxygen  at least 2 to 4 liters. Some where in the hospital and Doctors notes there is really nothing documenting about his oxygen. And she said if we can send something over it will fix her notes .

## 2024-04-18 NOTE — TELEPHONE ENCOUNTER
Spoke with Melanie from [facility], and told her that she would have to contact his pulmonologist and that he was on 3 liters of Oxygen

## 2024-04-22 DIAGNOSIS — G89.29 CHRONIC BILATERAL LOW BACK PAIN WITHOUT SCIATICA: ICD-10-CM

## 2024-04-22 DIAGNOSIS — M54.50 CHRONIC BILATERAL LOW BACK PAIN WITHOUT SCIATICA: ICD-10-CM

## 2024-04-22 RX ORDER — OXYCODONE AND ACETAMINOPHEN 10; 325 MG/1; MG/1
1 TABLET ORAL EVERY 6 HOURS PRN
Qty: 120 TABLET | Refills: 0 | Status: SHIPPED | OUTPATIENT
Start: 2024-04-22

## 2024-04-22 NOTE — TELEPHONE ENCOUNTER
Abdominal pain started approx. 2-3 weeks ago. Pt state this pain has been very intermittent and he has not been able to point a specific food or activity that is causing it. He states this pain starts in the mid upper abdomen then radiates down to the LLQ of the abdomen. Pt states he has vomited a few times due to the pain being so intense. Pt states he had diarrhea on Saturday and Sunday, but has not had it since. He did experience severe pain last night which also resulted in vomiting. Pt denies any fevers/chills. Pt currently denying any pain at this time.       Jeannine Escalante, EDENILSON  02/25/20 5429 Caller: BRIAN     Relationship: [employer]     Best call back number: [phone] (home)       Requested Prescriptions:   Requested Prescriptions     Pending Prescriptions Disp Refills    oxyCODONE-acetaminophen (Percocet)  MG per tablet 120 tablet 0     Sig: Take 1 tablet by mouth Every 6 (Six) Hours As Needed for Moderate Pain.        Pharmacy where request should be sent: [place] [phone] [place] [phone]      Last office visit with prescribing clinician: 2/7/2024   Last telemedicine visit with prescribing clinician: Visit date not found   Next office visit with prescribing clinician: 5/13/2024     Additional details provided by patient:     Does the patient have less than a 3 day supply:  [] Yes  [x] No    Would you like a call back once the refill request has been completed: [x] Yes [] No    If the office needs to give you a call back, can they leave a voicemail: [x] Yes [] No    Eneida Monroy, PCT   04/22/24 09:50 EDT

## 2024-05-02 ENCOUNTER — APPOINTMENT (OUTPATIENT)
Dept: CT IMAGING | Facility: HOSPITAL | Age: 84
End: 2024-05-02
Payer: MEDICARE

## 2024-05-02 ENCOUNTER — HOSPITAL ENCOUNTER (EMERGENCY)
Facility: HOSPITAL | Age: 84
Discharge: HOME | End: 2024-05-02
Payer: MEDICARE

## 2024-05-02 VITALS
OXYGEN SATURATION: 95 % | SYSTOLIC BLOOD PRESSURE: 173 MMHG | BODY MASS INDEX: 21.66 KG/M2 | HEART RATE: 86 BPM | DIASTOLIC BLOOD PRESSURE: 83 MMHG | TEMPERATURE: 97.9 F | RESPIRATION RATE: 20 BRPM | WEIGHT: 130 LBS | HEIGHT: 65 IN

## 2024-05-02 DIAGNOSIS — S39.012A ACUTE MYOFASCIAL STRAIN OF LUMBAR REGION, INITIAL ENCOUNTER: Primary | ICD-10-CM

## 2024-05-02 DIAGNOSIS — G89.29 CHRONIC MIDLINE LOW BACK PAIN WITHOUT SCIATICA: ICD-10-CM

## 2024-05-02 DIAGNOSIS — M54.50 CHRONIC MIDLINE LOW BACK PAIN WITHOUT SCIATICA: ICD-10-CM

## 2024-05-02 LAB
ALBUMIN SERPL-MCNC: 4.6 G/DL (ref 3.5–5.2)
ALBUMIN/GLOB SERPL: 1.7 G/DL
ALP SERPL-CCNC: 87 U/L (ref 39–117)
ALT SERPL W P-5'-P-CCNC: 18 U/L (ref 1–41)
ANION GAP SERPL CALCULATED.3IONS-SCNC: 13 MMOL/L (ref 5–15)
AST SERPL-CCNC: 28 U/L (ref 1–40)
BASOPHILS # BLD AUTO: 0.05 10*3/MM3 (ref 0–0.2)
BASOPHILS NFR BLD AUTO: 0.6 % (ref 0–1.5)
BILIRUB SERPL-MCNC: 0.4 MG/DL (ref 0–1.2)
BUN SERPL-MCNC: 15 MG/DL (ref 8–23)
BUN/CREAT SERPL: 10 (ref 7–25)
CALCIUM SPEC-SCNC: 10.7 MG/DL (ref 8.6–10.5)
CHLORIDE SERPL-SCNC: 102 MMOL/L (ref 98–107)
CO2 SERPL-SCNC: 23 MMOL/L (ref 22–29)
CREAT SERPL-MCNC: 1.5 MG/DL (ref 0.76–1.27)
DEPRECATED RDW RBC AUTO: 44.7 FL (ref 37–54)
EGFRCR SERPLBLD CKD-EPI 2021: 45.9 ML/MIN/1.73
EOSINOPHIL # BLD AUTO: 0.09 10*3/MM3 (ref 0–0.4)
EOSINOPHIL NFR BLD AUTO: 1 % (ref 0.3–6.2)
ERYTHROCYTE [DISTWIDTH] IN BLOOD BY AUTOMATED COUNT: 13.1 % (ref 12.3–15.4)
GLOBULIN UR ELPH-MCNC: 2.7 GM/DL
GLUCOSE SERPL-MCNC: 116 MG/DL (ref 65–99)
HCT VFR BLD AUTO: 37.2 % (ref 37.5–51)
HGB BLD-MCNC: 12.3 G/DL (ref 13–17.7)
HOLD SPECIMEN: NORMAL
HOLD SPECIMEN: NORMAL
IMM GRANULOCYTES # BLD AUTO: 0.05 10*3/MM3 (ref 0–0.05)
IMM GRANULOCYTES NFR BLD AUTO: 0.6 % (ref 0–0.5)
LYMPHOCYTES # BLD AUTO: 1.08 10*3/MM3 (ref 0.7–3.1)
LYMPHOCYTES NFR BLD AUTO: 12.5 % (ref 19.6–45.3)
MCH RBC QN AUTO: 30.9 PG (ref 26.6–33)
MCHC RBC AUTO-ENTMCNC: 33.1 G/DL (ref 31.5–35.7)
MCV RBC AUTO: 93.5 FL (ref 79–97)
MONOCYTES # BLD AUTO: 0.55 10*3/MM3 (ref 0.1–0.9)
MONOCYTES NFR BLD AUTO: 6.4 % (ref 5–12)
NEUTROPHILS NFR BLD AUTO: 6.81 10*3/MM3 (ref 1.7–7)
NEUTROPHILS NFR BLD AUTO: 78.9 % (ref 42.7–76)
NRBC BLD AUTO-RTO: 0 /100 WBC (ref 0–0.2)
PLATELET # BLD AUTO: 281 10*3/MM3 (ref 140–450)
PMV BLD AUTO: 8.7 FL (ref 6–12)
POTASSIUM SERPL-SCNC: 3.7 MMOL/L (ref 3.5–5.2)
PROT SERPL-MCNC: 7.3 G/DL (ref 6–8.5)
RBC # BLD AUTO: 3.98 10*6/MM3 (ref 4.14–5.8)
SODIUM SERPL-SCNC: 138 MMOL/L (ref 136–145)
WBC NRBC COR # BLD AUTO: 8.63 10*3/MM3 (ref 3.4–10.8)
WHOLE BLOOD HOLD COAG: NORMAL
WHOLE BLOOD HOLD SPECIMEN: NORMAL

## 2024-05-02 PROCEDURE — 72131 CT LUMBAR SPINE W/O DYE: CPT

## 2024-05-02 PROCEDURE — 99284 EMERGENCY DEPT VISIT MOD MDM: CPT

## 2024-05-02 PROCEDURE — 93005 ELECTROCARDIOGRAM TRACING: CPT

## 2024-05-02 PROCEDURE — 80053 COMPREHEN METABOLIC PANEL: CPT

## 2024-05-02 PROCEDURE — 85025 COMPLETE CBC W/AUTO DIFF WBC: CPT

## 2024-05-02 RX ORDER — SODIUM CHLORIDE 0.9 % (FLUSH) 0.9 %
10 SYRINGE (ML) INJECTION AS NEEDED
Status: DISCONTINUED | OUTPATIENT
Start: 2024-05-02 | End: 2024-05-02 | Stop reason: HOSPADM

## 2024-05-02 RX ORDER — AMLODIPINE BESYLATE 5 MG/1
10 TABLET ORAL ONCE
Status: COMPLETED | OUTPATIENT
Start: 2024-05-02 | End: 2024-05-02

## 2024-05-02 RX ORDER — CLONIDINE HYDROCHLORIDE 0.1 MG/1
0.1 TABLET ORAL ONCE
Status: COMPLETED | OUTPATIENT
Start: 2024-05-02 | End: 2024-05-02

## 2024-05-02 RX ORDER — LIDOCAINE 4 G/G
1 PATCH TOPICAL ONCE
Status: DISCONTINUED | OUTPATIENT
Start: 2024-05-02 | End: 2024-05-02 | Stop reason: HOSPADM

## 2024-05-02 RX ADMIN — AMLODIPINE BESYLATE 10 MG: 5 TABLET ORAL at 15:51

## 2024-05-02 RX ADMIN — LIDOCAINE 1 PATCH: 4 PATCH TOPICAL at 16:36

## 2024-05-02 RX ADMIN — CLONIDINE HYDROCHLORIDE 0.1 MG: 0.1 TABLET ORAL at 15:51

## 2024-05-02 NOTE — ED PROVIDER NOTES
Subjective   History of Present Illness  Patient is an 83-year-old gentleman who comes in from home with his wife at bedside complaining of acute on chronic low back pain.  He states that he has chronic low back pain but it has been worse over the last several days.      Review of Systems   Constitutional:  Negative for chills, fatigue and fever.   HENT:  Negative for congestion, tinnitus and trouble swallowing.    Eyes:  Negative for photophobia, discharge and redness.   Respiratory:  Negative for cough and shortness of breath.    Cardiovascular:  Negative for chest pain and palpitations.   Gastrointestinal:  Negative for abdominal pain, diarrhea, nausea and vomiting.   Genitourinary:  Negative for dysuria, frequency and urgency.   Musculoskeletal:  Positive for back pain. Negative for joint swelling and myalgias.   Skin:  Negative for rash.   Neurological:  Positive for weakness. Negative for dizziness and headaches.   Psychiatric/Behavioral:  Negative for confusion.    All other systems reviewed and are negative.      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 [place]    Diabetes     Enlarged prostate     Former smoker     Hiatal hernia     Hip pain     don    Hip pain, bilateral     Hyperlipidemia     Hypertension     Kidney disease        stage 3     Leg pain     don    Low back pain     Neck pain     Stroke        No Known Allergies    Past Surgical History:   Procedure Laterality Date    CARDIAC CATHETERIZATION Left 1/28/2022    Procedure: Cardiac Catheterization/Vascular Study;  Surgeon: Mani Salguero MD;  Location: [facility] CATH INVASIVE LOCATION;  Service: Cardiovascular;  Laterality: Left;    CATARACT EXTRACTION  2006    [facility]    COLONOSCOPY  2011    COLONOSCOPY N/A 5/27/2022    Procedure: COLONOSCOPY;  Surgeon: Terry Holliday MD;  Location: [facility] ENDOSCOPY;  Service: Gastroenterology;  Laterality: N/A;  polyps    CORONARY STENT  PLACEMENT  2022    RCA    ENDOSCOPY N/A 2022    Procedure: ESOPHAGOGASTRODUODENOSCOPY with 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, gastric antrum biopsy;  Surgeon: Terry Holliday MD;  Location: [facility] ENDOSCOPY;  Service: Gastroenterology;  Laterality: N/A;  gastritis, gastric ulcer, small bowel AVM    LUNG CANCER SURGERY      upper lobe rt lung cancer 2017  at [facility]       Family History   Problem Relation Age of Onset    Stroke Mother     Cancer Father         Prostate    Heart failure Brother     Heart disease Other        Social History     Socioeconomic History    Marital status:      Spouse name: Candis    Number of children: 5   Tobacco Use    Smoking status: Former     Current packs/day: 0.00     Average packs/day: 1 pack/day for 20.0 years (20.0 ttl pk-yrs)     Types: Cigarettes     Start date:      Quit date:      Years since quittin.3     Passive exposure: Past    Smokeless tobacco: Never   Vaping Use    Vaping status: Never Used   Substance and Sexual Activity    Alcohol use: Never    Drug use: Never    Sexual activity: Defer     Partners: Female     Birth control/protection: None           Objective   Physical Exam  Vitals reviewed.   Constitutional:       General: He is not in acute distress.     Appearance: Normal appearance. He is well-developed. He is not toxic-appearing.   HENT:      Head: Normocephalic and atraumatic.   Eyes:      Conjunctiva/sclera: Conjunctivae normal.      Pupils: Pupils are equal, round, and reactive to light.   Cardiovascular:      Rate and Rhythm: Normal rate and regular rhythm.      Heart sounds: Normal heart sounds.   Pulmonary:      Effort: Pulmonary effort is normal.      Breath sounds: Normal breath sounds.   Abdominal:      General: Bowel sounds are normal.      Palpations: Abdomen is soft.   Musculoskeletal:         General: Normal range of motion.      Cervical back: Normal, normal range of  "motion and neck supple.      Thoracic back: Normal.      Lumbar back: Tenderness present. Decreased range of motion.      Comments: Patient is kyphotic   Skin:     General: Skin is warm and dry.      Capillary Refill: Capillary refill takes 2 to 3 seconds.   Neurological:      General: No focal deficit present.      Mental Status: He is alert and oriented to person, place, and time.      GCS: GCS eye subscore is 4. GCS verbal subscore is 5. GCS motor subscore is 6.   Psychiatric:         Mood and Affect: Mood normal.         Behavior: Behavior normal.         Procedures           ED Course  ED Course as of 05/02/24 1718   Thu May 02, 2024   1600 Marked ready for CAT scan   [KW]   1700 Patient states he feels much better he will be walked by the staff and if he does well he will be discharged home [KW]   1711 Walked short distance with Nursing still would like to go home and feels safe doing so.  [KW]      ED Course User Index  [KW] Eneida Sandoval, APRN    BP (!) 192/86   Pulse 80   Temp 97.9 °F (36.6 °C) (Oral)   Resp 20   Ht 165.1 cm (65\")   Wt 59 kg (130 lb)   SpO2 96%   BMI 21.63 kg/m²   Labs Reviewed   COMPREHENSIVE METABOLIC PANEL - Abnormal; Notable for the following components:       Result Value    Glucose 116 (*)     Creatinine 1.50 (*)     Calcium 10.7 (*)     eGFR 45.9 (*)     All other components within normal limits    Narrative:     GFR Normal >60  Chronic Kidney Disease <60  Kidney Failure <15    The GFR formula is only valid for adults with stable renal function between ages 18 and 70.   CBC WITH AUTO DIFFERENTIAL - Abnormal; Notable for the following components:    RBC 3.98 (*)     Hemoglobin 12.3 (*)     Hematocrit 37.2 (*)     Neutrophil % 78.9 (*)     Lymphocyte % 12.5 (*)     Immature Grans % 0.6 (*)     All other components within normal limits   RAINBOW DRAW    Narrative:     The following orders were created for panel order Many Draw.  Procedure                               " Abnormality         Status                     ---------                               -----------         ------                     Green Top (Gel)[[id number]]                                  Final result               Lavender Top[[id number]]                                     Final result               Gold Top - SST[[id number]]                                   Final result               Light Blue Top[[id number]]                                   Final result                 Please view results for these tests on the individual orders.   CBC AND DIFFERENTIAL    Narrative:     The following orders were created for panel order CBC & Differential.  Procedure                               Abnormality         Status                     ---------                               -----------         ------                     CBC Auto Differential[[id number]]        Abnormal            Final result                 Please view results for these tests on the individual orders.   GREEN TOP   LAVENDER TOP   GOLD TOP - SST   LIGHT BLUE TOP     Medications   sodium chloride 0.9 % flush 10 mL (has no administration in time range)   Lidocaine 4 % 1 patch (1 patch Transdermal Medication Applied 5/2/24 1636)   amLODIPine (NORVASC) tablet 10 mg (10 mg Oral Given 5/2/24 1551)   cloNIDine (CATAPRES) tablet 0.1 mg (0.1 mg Oral Given 5/2/24 1551)     CT Lumbar Spine Without Contrast    Result Date: 5/2/2024  Multilevel severe degenerative changes of the lumbar spine. Electronically Signed: Abner Martini MD  5/2/2024 4:24 PM EDT  Workstation ID: [id number]                                            Medical Decision Making  Patient is an 83-year-old gentleman who states he has low back pain that is acute on chronic he does see pain management for back pain management he states that he has not had any injuries or falls.    Patient had above exam IV was established blood work was obtained and reviewed and interpreted by myself as essentially  normal the patient had a CT of the lumbar spine which showed degenerative disc but no acute changes.  The patient was treated with a lidocaine patch here in the emergency room as well as treating his blood pressure with amlodipine and a clonidine which brought it down nicely as he had not taken his meds today.  No other pain medication was given on reevaluation he states he feels much better he was ambulated by the nursing staff and did not go far but did well he still wishes to go home he was advised to follow-up with pain management for further evaluation and treatment    Amount and/or Complexity of Data Reviewed  Independent Historian: spouse     Details: Wife at bedside  Radiology: ordered and independent interpretation performed. Decision-making details documented in ED Course.  ECG/medicine tests: ordered and independent interpretation performed. Decision-making details documented in ED Course.    Risk  OTC drugs.  Prescription drug management.        Final diagnoses:   Acute myofascial strain of lumbar region, initial encounter   Chronic midline low back pain without sciatica       ED Disposition  ED Disposition       ED Disposition   Discharge    Condition   Stable    Comment   --               Madelyn Márquez, APRN  [address] [place] [id number] [phone]    In 3 days  If symptoms worsen, As needed         Medication List      No changes were made to your prescriptions during this visit.            Eneida Sandoval, APRN  05/02/24 6014

## 2024-05-02 NOTE — DISCHARGE INSTRUCTIONS
Follow-up with pain management for further management of back pain not being controlled by your Percocets at home.    You can use Salonpas lidocaine patches over-the-counter for pain control on top of your pain medication    Return if worse

## 2024-05-03 LAB
QT INTERVAL: 425 MS
QTC INTERVAL: 484 MS

## 2024-05-06 RX ORDER — HYDRALAZINE HYDROCHLORIDE 50 MG/1
TABLET, FILM COATED ORAL
Qty: 270 TABLET | Refills: 0 | Status: SHIPPED | OUTPATIENT
Start: 2024-05-06

## 2024-05-06 RX ORDER — CLOPIDOGREL BISULFATE 75 MG/1
75 TABLET ORAL DAILY
Qty: 90 TABLET | Refills: 0 | Status: SHIPPED | OUTPATIENT
Start: 2024-05-06

## 2024-05-06 RX ORDER — AMOXICILLIN AND CLAVULANATE POTASSIUM 875; 125 MG/1; MG/1
1 TABLET, FILM COATED ORAL 2 TIMES DAILY
Qty: 20 TABLET | Refills: 0 | OUTPATIENT
Start: 2024-05-06

## 2024-05-13 ENCOUNTER — OFFICE VISIT (OUTPATIENT)
Dept: PAIN MEDICINE | Facility: CLINIC | Age: 84
End: 2024-05-13
Payer: MEDICARE

## 2024-05-13 VITALS
RESPIRATION RATE: 16 BRPM | DIASTOLIC BLOOD PRESSURE: 72 MMHG | HEART RATE: 108 BPM | SYSTOLIC BLOOD PRESSURE: 163 MMHG | OXYGEN SATURATION: 95 %

## 2024-05-13 DIAGNOSIS — G89.4 CHRONIC PAIN SYNDROME: ICD-10-CM

## 2024-05-13 DIAGNOSIS — G89.29 CHRONIC BILATERAL LOW BACK PAIN WITHOUT SCIATICA: Primary | ICD-10-CM

## 2024-05-13 DIAGNOSIS — M47.817 LUMBOSACRAL SPONDYLOSIS WITHOUT MYELOPATHY: ICD-10-CM

## 2024-05-13 DIAGNOSIS — Z79.899 HIGH RISK MEDICATION USE: ICD-10-CM

## 2024-05-13 DIAGNOSIS — M54.2 CERVICALGIA: ICD-10-CM

## 2024-05-13 DIAGNOSIS — M54.50 CHRONIC BILATERAL LOW BACK PAIN WITHOUT SCIATICA: Primary | ICD-10-CM

## 2024-05-13 DIAGNOSIS — M47.812 CERVICAL SPONDYLOSIS WITHOUT MYELOPATHY: ICD-10-CM

## 2024-05-13 DIAGNOSIS — M47.814 THORACIC SPONDYLOSIS: ICD-10-CM

## 2024-05-13 PROCEDURE — 1159F MED LIST DOCD IN RCRD: CPT | Performed by: PHYSICAL MEDICINE & REHABILITATION

## 2024-05-13 PROCEDURE — 3077F SYST BP >= 140 MM HG: CPT | Performed by: PHYSICAL MEDICINE & REHABILITATION

## 2024-05-13 PROCEDURE — G0463 HOSPITAL OUTPT CLINIC VISIT: HCPCS | Performed by: PHYSICAL MEDICINE & REHABILITATION

## 2024-05-13 PROCEDURE — 3078F DIAST BP <80 MM HG: CPT | Performed by: PHYSICAL MEDICINE & REHABILITATION

## 2024-05-13 PROCEDURE — 99213 OFFICE O/P EST LOW 20 MIN: CPT | Performed by: PHYSICAL MEDICINE & REHABILITATION

## 2024-05-13 PROCEDURE — 1125F AMNT PAIN NOTED PAIN PRSNT: CPT | Performed by: PHYSICAL MEDICINE & REHABILITATION

## 2024-05-13 PROCEDURE — 1160F RVW MEDS BY RX/DR IN RCRD: CPT | Performed by: PHYSICAL MEDICINE & REHABILITATION

## 2024-05-13 RX ORDER — GLIMEPIRIDE 1 MG/1
1 TABLET ORAL
COMMUNITY
Start: 2024-05-05

## 2024-05-13 RX ORDER — OXYCODONE AND ACETAMINOPHEN 10; 325 MG/1; MG/1
1 TABLET ORAL EVERY 6 HOURS PRN
Qty: 120 TABLET | Refills: 0 | Status: SHIPPED | OUTPATIENT
Start: 2024-05-13

## 2024-05-13 NOTE — PROGRESS NOTES
Subjective    CC back pain, neck pain, bilateral leg pain  Kailash Cooper Jr. is a 83 y.o. male with multiple comorbidities including CKD, DM, COPD, chronic back pain polyarthralgia here for f/u.  Chronic back pain radiating to bilateral hips worse with standing walking or prolonged activity.  Denies new weakness saddle anesthesia bladder bowel continence.  Chronic axial neck pain radiating to bilateral shoulder without radicular pain.  Continued chronic polyarthralgia and myofascial pain worse with activity.  Tried physical therapy/chiropractor with marginal relief.  Tried LESI's with marginal relief.  Had MI with stent, began Plavix for life. Had another MI with PNA, doing better now.    Utilized hydrocodone 10/325 6 times daily as needed for severe pain, failed 4 times daily as needed. Rotated to Percocet 10mg QID prn. Functional benefit /denies side effects.    L-spine MRI 2018 moderate degenerative changes throughout facet arthropathy mild to moderate foraminal narrowing, scoliosis.  C-spine MRI 2018 marked degenerative disc and some posterior element disease, causing spinal stenosis at multiple levels, including C2-3, C3-4, C5-6, and C6-7.  There multiple foraminal impingements    Pain Assessment   Location of Pain: Lower Back, neck pain, joint  Description of Pain: Dull/Aching, Throbbing, Stabbing  Previous Pain Rating :6  Current Pain Ratin  Aggravating Factors: Activity  Alleviating Factors: Rest, Medication    PEG Assessment   What number best describes your pain on average in the past week? 5  What number best describes how, during the past week, pain has interfered with your enjoyment of life?3  What number best describes how, during the past week, pain has interfered with your general activity?10     The following portions of the patient's history were reviewed and updated as appropriate: allergies, current medications, past family history, past medical history, past social history, past surgical  history and problem list.     has a past medical history of Arthritis, Cancer, Diabetes, Enlarged prostate, Former smoker, Hiatal hernia, Hip pain, Hip pain, bilateral, Hyperlipidemia, Hypertension, Kidney disease, Leg pain, Low back pain, Neck pain, and Stroke.   has a past surgical history that includes Cataract extraction (); Colonoscopy (); Lung cancer surgery; Coronary stent placement (2022); Cardiac catheterization (Left, 2022); Colonoscopy (N/A, 2022); and Esophagogastroduodenoscopy (N/A, 2022).  family history includes Cancer in his father; Heart disease in an other family member; Heart failure in his brother; Stroke in his mother.  Social History     Tobacco Use    Smoking status: Former     Current packs/day: 0.00     Average packs/day: 1 pack/day for 20.0 years (20.0 ttl pk-yrs)     Types: Cigarettes     Start date:      Quit date:      Years since quittin.3     Passive exposure: Past    Smokeless tobacco: Never   Substance Use Topics    Alcohol use: Never     Review of Systems   Musculoskeletal:  Positive for arthralgias, back pain, myalgias and neck pain.   All other systems reviewed and are negative.    Objective   Physical Exam   Constitutional: He is oriented to person, place, and time. He appears well-developed and well-nourished. No distress.   HENT:   Head: Normocephalic and atraumatic.   Eyes: Pupils are equal, round, and reactive to light.   Cardiovascular: Normal rate, regular rhythm and normal heart sounds.   Pulmonary/Chest: Effort normal and breath sounds normal.   Abdominal: Soft. Normal appearance and bowel sounds are normal. He exhibits no distension. There is no abdominal tenderness.   Musculoskeletal:      Cervical back: He exhibits tenderness.   Neurological: He is alert and oriented to person, place, and time. He has normal reflexes. He displays normal reflexes. No sensory deficit.   Psychiatric: His behavior is normal. Mood, judgment and  thought content normal.     /72   Pulse 108   Resp 16   SpO2 95%     PHQ 9 on chart  Opioid risk tool low risk    Assessment & Plan   Diagnoses and all orders for this visit:    1. Chronic bilateral low back pain without sciatica (Primary)    2. Chronic pain syndrome    3. Cervical spondylosis without myelopathy    4. Cervicalgia    5. Lumbosacral spondylosis without myelopathy    6. Thoracic spondylosis    Summary  Kailash Cooper is a 83 y.o. male with multiple comorbidities including CKD, DM, COPD, chronic back pain polyarthralgia here for f/u.   Chronic pain from lumbar and cervical DDD spondylosis, polyarthralgia/myofascial pain.  Tried physical therapy, chiropractor, LESI's with marginal relief.  Axial back pain interfering with ADLs and his ability to exercise.  No NSAIDs due to CKD.        Complains of worsening back and bilateral hip pain.  Declines injections.    Increased to Hydrocodone 10/325 6 times daily as needed for severe pain, worked better than 4 or 5 pills daily with [facility] pain physician, tolerant. Rotated to Percocet 10mg QID prn, rotated back to Norco 10mg q4h prn. Rotated to MS-Contin 15mg TID for relative dosage reduction, increased to 30mg BID, no longer covered. Rotated to Percocet 10mg QID prn, working well, continue. Filled 4/25/24.  UDS 3/20/23 and inspect reviewed.  Discussed risk of tolerance, dependence, respiratory depression, coma and death associated with use of oral opioids for treatment of chronic nonmalignant pain.     RTC 3 months for f/u        Back Pain

## 2024-06-12 ENCOUNTER — TELEPHONE (OUTPATIENT)
Dept: PAIN MEDICINE | Facility: HOSPITAL | Age: 84
End: 2024-06-12
Payer: MEDICARE

## 2024-06-12 NOTE — TELEPHONE ENCOUNTER
Pt was called in for a random pill count for prescription Oxycodone. Pt was reached @ 1300 6/12/24. Pt has until 1300 6/13/24 to bring in original bottle with medication. Pt verbalized understanding.

## 2024-06-13 DIAGNOSIS — Z79.899 HIGH RISK MEDICATION USE: Primary | ICD-10-CM

## 2024-06-13 NOTE — TELEPHONE ENCOUNTER
Pt came in this am for pill count.  All pills were identical and they were in the the pharmacy container. There were #44 counted. Count witnessed by carlos mott Cma. Pt filled #120 on 5-25-24.  Count seems appropriate.  Pt's last dose of oxycodone was at 0600  6-13-24. Will await uds results.

## 2024-06-20 ENCOUNTER — OFFICE VISIT (OUTPATIENT)
Dept: FAMILY MEDICINE CLINIC | Facility: CLINIC | Age: 84
End: 2024-06-20
Payer: MEDICARE

## 2024-06-20 VITALS
HEART RATE: 101 BPM | SYSTOLIC BLOOD PRESSURE: 130 MMHG | BODY MASS INDEX: 22.33 KG/M2 | HEIGHT: 65 IN | DIASTOLIC BLOOD PRESSURE: 62 MMHG | OXYGEN SATURATION: 97 % | TEMPERATURE: 97.3 F | WEIGHT: 134 LBS

## 2024-06-20 DIAGNOSIS — R30.0 DYSURIA: Primary | ICD-10-CM

## 2024-06-20 LAB
BILIRUB BLD-MCNC: NEGATIVE MG/DL
CLARITY, POC: CLEAR
COLOR UR: YELLOW
EXPIRATION DATE: ABNORMAL
GLUCOSE UR STRIP-MCNC: NEGATIVE MG/DL
KETONES UR QL: NEGATIVE
LEUKOCYTE EST, POC: NEGATIVE
Lab: ABNORMAL
NITRITE UR-MCNC: NEGATIVE MG/ML
PH UR: 6 [PH] (ref 5–8)
PROT UR STRIP-MCNC: ABNORMAL MG/DL
RBC # UR STRIP: NEGATIVE /UL
SP GR UR: 1.02 (ref 1–1.03)
UROBILINOGEN UR QL: NORMAL

## 2024-06-20 PROCEDURE — 99213 OFFICE O/P EST LOW 20 MIN: CPT | Performed by: NURSE PRACTITIONER

## 2024-06-20 PROCEDURE — 1125F AMNT PAIN NOTED PAIN PRSNT: CPT | Performed by: NURSE PRACTITIONER

## 2024-06-20 PROCEDURE — 81003 URINALYSIS AUTO W/O SCOPE: CPT | Performed by: NURSE PRACTITIONER

## 2024-06-20 PROCEDURE — 3078F DIAST BP <80 MM HG: CPT | Performed by: NURSE PRACTITIONER

## 2024-06-20 PROCEDURE — 3075F SYST BP GE 130 - 139MM HG: CPT | Performed by: NURSE PRACTITIONER

## 2024-06-20 RX ORDER — PHENAZOPYRIDINE HYDROCHLORIDE 100 MG/1
100 TABLET, FILM COATED ORAL 3 TIMES DAILY PRN
Qty: 9 TABLET | Refills: 0 | Status: SHIPPED | OUTPATIENT
Start: 2024-06-20

## 2024-06-20 RX ORDER — CEPHALEXIN 500 MG/1
500 CAPSULE ORAL 2 TIMES DAILY
Qty: 20 CAPSULE | Refills: 0 | Status: SHIPPED | OUTPATIENT
Start: 2024-06-20

## 2024-06-20 NOTE — PATIENT INSTRUCTIONS
UA/urine culture  Keflex 500 mg twice daily x 10 days  Pyridium 100 mg 3 times daily x 3 days  Increase fluid intake  After antibiotics if still having dysuria call office

## 2024-06-20 NOTE — PROGRESS NOTES
"    Kailash Cooper Jr. is a 83 y.o. male.     History of Present Illness  83-year-old white male with history of lung cancer who wears oxygen at 3 L, type 2 diabetes, chronic back pain goes to pain management, hypertension, hyperlipidemia, CKD, MI with stent placement and recent hospitalization for pneumonia who comes in today with complaints of dysuria x 3 days.  Urinalysis just showed protein no bacteria nitrites or blood.  Will do a culture we will treat him on symptoms.  I told patient if after antibiotics he still has symptoms when to go to urology    Blood pressure 130/62 heart rate 100 he denies any chest pain, dyspnea, tachycardia or dizziness    Patient still goes to pain management for back pain he has also seen the [facility] and had injections that have helped with pain    He has had 2 COVID vaccines he is up-to-date on eye exams and his PSA is due in April 2025.          UA/urine culture  Keflex 500 mg twice daily x 10 days  Pyridium 100 mg 3 times daily x 3 days  Increase fluid intake  After antibiotics if still having dysuria call office           The following portions of the patient's history were reviewed and updated as appropriate: allergies, current medications, past family history, past medical history, past social history, past surgical history, and problem list.    Vitals:    06/20/24 0848   BP: 130/62   BP Location: Right arm   Patient Position: Sitting   Cuff Size: Adult   Pulse: 101   Temp: 97.3 °F (36.3 °C)   TempSrc: Infrared   SpO2: 97%   Weight: 60.8 kg (134 lb)   Height: 165.1 cm (65\")     Body mass index is 22.3 kg/m².  BMI is within normal parameters. No other follow-up for BMI required.       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 [facility]    Diabetes     Enlarged prostate     Former smoker     Hiatal hernia     Hip pain     don    Hip pain, bilateral     Hyperlipidemia     Hypertension     Kidney disease     "    stage 3     Leg pain     don    Low back pain     Neck pain     Stroke      Past Surgical History:   Procedure Laterality Date    CARDIAC CATHETERIZATION Left 1/28/2022    Procedure: Cardiac Catheterization/Vascular Study;  Surgeon: Mani Salguero MD;  Location: [facility] CATH INVASIVE LOCATION;  Service: Cardiovascular;  Laterality: Left;    CATARACT EXTRACTION  2006    [facility]    COLONOSCOPY  2011    COLONOSCOPY N/A 5/27/2022    Procedure: COLONOSCOPY;  Surgeon: Terry Holliday MD;  Location: [facility] ENDOSCOPY;  Service: Gastroenterology;  Laterality: N/A;  polyps    CORONARY STENT PLACEMENT  01/28/2022    RCA    ENDOSCOPY N/A 5/27/2022    Procedure: ESOPHAGOGASTRODUODENOSCOPY with 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, gastric antrum biopsy;  Surgeon: Terry Holliday MD;  Location: [facility] ENDOSCOPY;  Service: Gastroenterology;  Laterality: N/A;  gastritis, gastric ulcer, small bowel AVM    LUNG CANCER SURGERY      upper lobe rt lung cancer 9/2017  at [facility]     Family History   Problem Relation Age of Onset    Stroke Mother     Cancer Father         Prostate    Heart failure Brother     Heart disease Other      Immunization History   Administered Date(s) Administered    COVID-19 (PFIZER) BIVALENT 12+YRS 11/02/2022    COVID-19 (PFIZER) Purple Cap Monovalent 02/11/2021, 03/04/2021, 01/06/2022    COVID-19 F23 (PFIZER) 12YRS+ (COMIRNATY) 01/06/2022    FLUAD TRI 65YR+ 11/18/2019    Fluad Quad 65+ 11/18/2019    Flublock Quad =>18yrs 10/07/2020    Fluzone High Dose =>65 Years ([employer] ONLY) 10/22/2015, 10/08/2018, 12/13/2023    Fluzone High-Dose 65+yrs 11/18/2019    H1N1 All Forms 01/08/2010    Hepatitis B Adult/Adolescent IM 08/08/2013, 09/18/2013, 02/11/2014    Influenza Quad Vaccine (Inpatient) 10/20/2016    Influenza Seasonal Injectable 09/20/2017    Influenza, Unspecified 09/20/2017    PPD Test 09/07/2010, 09/13/2011, 09/25/2012, 07/16/2013, 05/27/2014, 06/02/2015,  05/24/2016    Pneumococcal Conjugate 13-Valent (PCV13) 11/02/2017    Pneumococcal Polysaccharide (PPSV23) 10/20/2016, 12/11/2018, 12/29/2021, 12/29/2021       Admission on 05/02/2024, Discharged on 05/02/2024   Component Date Value Ref Range Status    QT Interval 05/02/2024 425  ms Final    QTC Interval 05/02/2024 484  ms Final    Glucose 05/02/2024 116 (H)  65 - 99 mg/dL Final    BUN 05/02/2024 15  8 - 23 mg/dL Final    Creatinine 05/02/2024 1.50 (H)  0.76 - 1.27 mg/dL Final    Sodium 05/02/2024 138  136 - 145 mmol/L Final    Potassium 05/02/2024 3.7  3.5 - 5.2 mmol/L Final    Chloride 05/02/2024 102  98 - 107 mmol/L Final    CO2 05/02/2024 23.0  22.0 - 29.0 mmol/L Final    Calcium 05/02/2024 10.7 (H)  8.6 - 10.5 mg/dL Final    Total Protein 05/02/2024 7.3  6.0 - 8.5 g/dL Final    Albumin 05/02/2024 4.6  3.5 - 5.2 g/dL Final    ALT (SGPT) 05/02/2024 18  1 - 41 U/L Final    AST (SGOT) 05/02/2024 28  1 - 40 U/L Final    Alkaline Phosphatase 05/02/2024 87  39 - 117 U/L Final    Total Bilirubin 05/02/2024 0.4  0.0 - 1.2 mg/dL Final    Globulin 05/02/2024 2.7  gm/dL Final    A/G Ratio 05/02/2024 1.7  g/dL Final    BUN/Creatinine Ratio 05/02/2024 10.0  7.0 - 25.0 Final    Anion Gap 05/02/2024 13.0  5.0 - 15.0 mmol/L Final    eGFR 05/02/2024 45.9 (L)  >60.0 mL/min/1.73 Final    Extra Tube 05/02/2024 Hold for add-ons.   Final    Auto resulted.    Extra Tube 05/02/2024 hold for add-on   Final    Auto resulted    Extra Tube 05/02/2024 Hold for add-ons.   Final    Auto resulted.    Extra Tube 05/02/2024 Hold for add-ons.   Final    Auto resulted    WBC 05/02/2024 8.63  3.40 - 10.80 10*3/mm3 Final    RBC 05/02/2024 3.98 (L)  4.14 - 5.80 10*6/mm3 Final    Hemoglobin 05/02/2024 12.3 (L)  13.0 - 17.7 g/dL Final    Hematocrit 05/02/2024 37.2 (L)  37.5 - 51.0 % Final    MCV 05/02/2024 93.5  79.0 - 97.0 fL Final    MCH 05/02/2024 30.9  26.6 - 33.0 pg Final    MCHC 05/02/2024 33.1  31.5 - 35.7 g/dL Final    RDW 05/02/2024 13.1  12.3  - 15.4 % Final    RDW-SD 05/02/2024 44.7  37.0 - 54.0 fl Final    MPV 05/02/2024 8.7  6.0 - 12.0 fL Final    Platelets 05/02/2024 281  140 - 450 10*3/mm3 Final    Neutrophil % 05/02/2024 78.9 (H)  42.7 - 76.0 % Final    Lymphocyte % 05/02/2024 12.5 (L)  19.6 - 45.3 % Final    Monocyte % 05/02/2024 6.4  5.0 - 12.0 % Final    Eosinophil % 05/02/2024 1.0  0.3 - 6.2 % Final    Basophil % 05/02/2024 0.6  0.0 - 1.5 % Final    Immature Grans % 05/02/2024 0.6 (H)  0.0 - 0.5 % Final    Neutrophils, Absolute 05/02/2024 6.81  1.70 - 7.00 10*3/mm3 Final    Lymphocytes, Absolute 05/02/2024 1.08  0.70 - 3.10 10*3/mm3 Final    Monocytes, Absolute 05/02/2024 0.55  0.10 - 0.90 10*3/mm3 Final    Eosinophils, Absolute 05/02/2024 0.09  0.00 - 0.40 10*3/mm3 Final    Basophils, Absolute 05/02/2024 0.05  0.00 - 0.20 10*3/mm3 Final    Immature Grans, Absolute 05/02/2024 0.05  0.00 - 0.05 10*3/mm3 Final    nRBC 05/02/2024 0.0  0.0 - 0.2 /100 WBC Final         Review of Systems   Constitutional: Negative.    HENT: Negative.     Respiratory: Negative.     Cardiovascular: Negative.    Gastrointestinal: Negative.    Genitourinary:  Positive for dysuria.   Musculoskeletal:  Positive for back pain.   Skin: Negative.    Neurological: Negative.    Psychiatric/Behavioral: Negative.         Objective   Physical Exam  Constitutional:       Appearance: Normal appearance.   HENT:      Head: Normocephalic.   Cardiovascular:      Rate and Rhythm: Normal rate and regular rhythm.      Pulses: Normal pulses.      Heart sounds: Normal heart sounds.   Pulmonary:      Effort: Pulmonary effort is normal.      Breath sounds: Normal breath sounds.   Abdominal:      General: Bowel sounds are normal.   Musculoskeletal:      Comments: Patient basically wheelchair-bound has a lot of pain and back making ambulation painful   Skin:     General: Skin is warm.   Neurological:      General: No focal deficit present.      Mental Status: He is alert and oriented to person,  place, and time.   Psychiatric:         Mood and Affect: Mood normal.         Behavior: Behavior normal.         Procedures    Assessment & Plan   Diagnoses and all orders for this visit:    1. Dysuria (Primary)  -     POC Urinalysis Dipstick, Automated  -     Urine Culture - Urine, Urine, Clean Catch  -     Urine Culture - Urine, Urine, Clean Catch; Future    2. BMI 22.0-22.9, adult    Other orders  -     cephalexin (Keflex) 500 MG capsule; Take 1 capsule by mouth 2 (Two) Times a Day.  Dispense: 20 capsule; Refill: 0  -     phenazopyridine (Pyridium) 100 MG tablet; Take 1 tablet by mouth 3 (Three) Times a Day As Needed for Dysuria.  Dispense: 9 tablet; Refill: 0           Current Outpatient Medications:     allopurinol (ZYLOPRIM) 100 MG tablet, Take 2 tablets by mouth Daily. Indications: Disorder of Excessive Uric Acid in the Blood, Disp: , Rfl:     amLODIPine (NORVASC) 10 MG tablet, TAKE 1 TABLET BY MOUTH ONCE DAILY FOR HIGH BLOOD PRESSURE, Disp: 90 tablet, Rfl: 0    amoxicillin-clavulanate (AUGMENTIN) 875-125 MG per tablet, Take 1 tablet by mouth 2 (Two) Times a Day., Disp: 20 tablet, Rfl: 0    budesonide (PULMICORT) 0.25 MG/2ML nebulizer solution, Take 2 mL by nebulization Daily. Indications: Chronic Obstructive Lung Disease, Disp: 180 mL, Rfl: 1    cetirizine (zyrTEC) 10 MG tablet, Take 1 tablet by mouth Daily., Disp: 90 tablet, Rfl: 1    clopidogrel (PLAVIX) 75 MG tablet, Take 1 tablet by mouth once daily, Disp: 90 tablet, Rfl: 0    ferrous sulfate 325 (65 FE) MG EC tablet, Take 1 tablet by mouth Daily With Breakfast., Disp: , Rfl:     furosemide (LASIX) 40 MG tablet, Take 1 tablet by mouth Daily., Disp: 90 tablet, Rfl: 1    glimepiride (AMARYL) 1 MG tablet, Take 1 tablet by mouth Before Breakfast., Disp: , Rfl:     hydrALAZINE (APRESOLINE) 50 MG tablet, TAKE 1 TABLET BY MOUTH THREE TIMES DAILY, Disp: 270 tablet, Rfl: 0    megestrol (MEGACE) 40 MG tablet, Take 1 tablet by mouth Daily., Disp: 90 tablet, Rfl:  1    nitroglycerin (Nitrostat) 0.3 MG SL tablet, Place 1 tablet under the tongue Every 5 (Five) Minutes As Needed for Chest Pain. Take no more than 3 doses in 15 minutes., Disp: 50 tablet, Rfl: 12    oxyCODONE-acetaminophen (Percocet)  MG per tablet, Take 1 tablet by mouth Every 6 (Six) Hours As Needed for Moderate Pain., Disp: 120 tablet, Rfl: 0    oxyCODONE-acetaminophen (Percocet)  MG per tablet, Take 1 tablet by mouth Every 6 (Six) Hours As Needed for Moderate Pain., Disp: 120 tablet, Rfl: 0    oxyCODONE-acetaminophen (Percocet)  MG per tablet, Take 1 tablet by mouth Every 6 (Six) Hours As Needed for Moderate Pain., Disp: 120 tablet, Rfl: 0    pantoprazole (PROTONIX) 40 MG EC tablet, Take 1 tablet by mouth twice daily, Disp: 180 tablet, Rfl: 0    pravastatin (PRAVACHOL) 40 MG tablet, Take 1 tablet by mouth Daily. Indications: High Amount of Fats in the Blood, Disp: 90 tablet, Rfl: 1    Probiotic Product (CULTURELLE PROBIOTICS PO), Take 20 mg by mouth Daily. Indications: diabetic supplement, Disp: , Rfl:     tamsulosin (FLOMAX) 0.4 MG capsule 24 hr capsule, Take 1 capsule by mouth 2 (Two) Times a Day., Disp: , Rfl:     vitamin B-12 (CYANOCOBALAMIN) 1000 MCG tablet, Take 1 tablet by mouth 2 (Two) Times a Day. Indications: Inadequate Vitamin B12, Disp: , Rfl:     cephalexin (Keflex) 500 MG capsule, Take 1 capsule by mouth 2 (Two) Times a Day., Disp: 20 capsule, Rfl: 0    phenazopyridine (Pyridium) 100 MG tablet, Take 1 tablet by mouth 3 (Three) Times a Day As Needed for Dysuria., Disp: 9 tablet, Rfl: 0           JANE Martinez 6/20/2024 10:01 EDT  This note has been electronically signed

## 2024-06-22 LAB
BACTERIA UR CULT: NO GROWTH
BACTERIA UR CULT: NORMAL

## 2024-07-17 RX ORDER — AMLODIPINE BESYLATE 10 MG/1
TABLET ORAL
Qty: 90 TABLET | Refills: 0 | Status: SHIPPED | OUTPATIENT
Start: 2024-07-17

## 2024-07-17 RX ORDER — PANTOPRAZOLE SODIUM 40 MG/1
TABLET, DELAYED RELEASE ORAL
Qty: 180 TABLET | Refills: 0 | Status: SHIPPED | OUTPATIENT
Start: 2024-07-17

## 2024-07-27 RX ORDER — FUROSEMIDE 40 MG/1
40 TABLET ORAL DAILY
Qty: 90 TABLET | Refills: 0 | Status: SHIPPED | OUTPATIENT
Start: 2024-07-27

## 2024-08-01 ENCOUNTER — OFFICE VISIT (OUTPATIENT)
Dept: FAMILY MEDICINE CLINIC | Facility: CLINIC | Age: 84
End: 2024-08-01
Payer: MEDICARE

## 2024-08-01 VITALS
OXYGEN SATURATION: 91 % | HEART RATE: 106 BPM | HEIGHT: 65 IN | WEIGHT: 133 LBS | BODY MASS INDEX: 22.16 KG/M2 | TEMPERATURE: 97.7 F | DIASTOLIC BLOOD PRESSURE: 64 MMHG | SYSTOLIC BLOOD PRESSURE: 112 MMHG

## 2024-08-01 DIAGNOSIS — N18.32 STAGE 3B CHRONIC KIDNEY DISEASE: ICD-10-CM

## 2024-08-01 DIAGNOSIS — R63.6 UNDERWEIGHT: ICD-10-CM

## 2024-08-01 DIAGNOSIS — D63.1 ANEMIA DUE TO STAGE 3B CHRONIC KIDNEY DISEASE: Chronic | ICD-10-CM

## 2024-08-01 DIAGNOSIS — E11.9 TYPE 2 DIABETES MELLITUS WITHOUT COMPLICATION, WITHOUT LONG-TERM CURRENT USE OF INSULIN: ICD-10-CM

## 2024-08-01 DIAGNOSIS — N18.32 ANEMIA DUE TO STAGE 3B CHRONIC KIDNEY DISEASE: Chronic | ICD-10-CM

## 2024-08-01 DIAGNOSIS — R25.3 MUSCLE TWITCHING: ICD-10-CM

## 2024-08-01 PROCEDURE — G0439 PPPS, SUBSEQ VISIT: HCPCS | Performed by: NURSE PRACTITIONER

## 2024-08-01 PROCEDURE — 3078F DIAST BP <80 MM HG: CPT | Performed by: NURSE PRACTITIONER

## 2024-08-01 PROCEDURE — 99213 OFFICE O/P EST LOW 20 MIN: CPT | Performed by: NURSE PRACTITIONER

## 2024-08-01 PROCEDURE — 1170F FXNL STATUS ASSESSED: CPT | Performed by: NURSE PRACTITIONER

## 2024-08-01 PROCEDURE — 1125F AMNT PAIN NOTED PAIN PRSNT: CPT | Performed by: NURSE PRACTITIONER

## 2024-08-01 PROCEDURE — 3074F SYST BP LT 130 MM HG: CPT | Performed by: NURSE PRACTITIONER

## 2024-08-01 RX ORDER — ROPINIROLE 0.25 MG/1
0.25 TABLET, FILM COATED ORAL NIGHTLY
Qty: 30 TABLET | Refills: 1 | Status: SHIPPED | OUTPATIENT
Start: 2024-08-01

## 2024-08-01 NOTE — PROGRESS NOTES
Subjective   The ABCs of the Annual Wellness Visit  Medicare Wellness Visit      Kailash Cooper Jr. is a 83 y.o. patient who presents for a Medicare Wellness Visit.    The following portions of the patient's history were reviewed and   updated as appropriate: allergies, past family history, past medical history, past social history, past surgical history, and problem list.    Compared to one year ago, the patient's physical   health is the same.  Compared to one year ago, the patient's mental   health is the same.    Recent Hospitalizations:  This patient has had a [facility] admission record on file within the last 365 days.  Current Medical Providers:  Patient Care Team:  Madelyn Márquez APRN as PCP - General (Nurse Practitioner)  Marcia Lujan MD as Consulting Physician (Nephrology)  Mc Key MD as Consulting Physician (Physical Medicine and Rehabilitation)  Gila Hawkins APRN (Nurse Practitioner)  Ct Stevenson APRN as Nurse Practitioner (Family Medicine)  Mani Salguero MD as Consulting Physician (Cardiology)  Toshia Jaeger MD as Consulting Physician (Pulmonary Disease)  Terry Holliday MD as Consulting Physician (Gastroenterology)    Outpatient Medications Prior to Visit   Medication Sig Dispense Refill    allopurinol (ZYLOPRIM) 100 MG tablet Take 2 tablets by mouth Daily. Indications: Disorder of Excessive Uric Acid in the Blood      amLODIPine (NORVASC) 10 MG tablet TAKE 1 TABLET BY MOUTH ONCE DAILY FOR HIGH BLOOD PRESSURE 90 tablet 0    amoxicillin-clavulanate (AUGMENTIN) 875-125 MG per tablet Take 1 tablet by mouth 2 (Two) Times a Day. 20 tablet 0    budesonide (PULMICORT) 0.25 MG/2ML nebulizer solution Take 2 mL by nebulization Daily. Indications: Chronic Obstructive Lung Disease 180 mL 1    cephalexin (Keflex) 500 MG capsule Take 1 capsule by mouth 2 (Two) Times a Day. 20 capsule 0    cetirizine (zyrTEC) 10 MG tablet Take 1 tablet by mouth Daily. 90  tablet 1    clopidogrel (PLAVIX) 75 MG tablet Take 1 tablet by mouth once daily 90 tablet 0    ferrous sulfate 325 (65 FE) MG EC tablet Take 1 tablet by mouth Daily With Breakfast.      furosemide (LASIX) 40 MG tablet Take 1 tablet by mouth once daily 90 tablet 0    glimepiride (AMARYL) 1 MG tablet Take 1 tablet by mouth Before Breakfast.      hydrALAZINE (APRESOLINE) 50 MG tablet TAKE 1 TABLET BY MOUTH THREE TIMES DAILY 270 tablet 0    megestrol (MEGACE) 40 MG tablet Take 1 tablet by mouth Daily. 90 tablet 1    nitroglycerin (Nitrostat) 0.3 MG SL tablet Place 1 tablet under the tongue Every 5 (Five) Minutes As Needed for Chest Pain. Take no more than 3 doses in 15 minutes. 50 tablet 12    oxyCODONE-acetaminophen (Percocet)  MG per tablet Take 1 tablet by mouth Every 6 (Six) Hours As Needed for Moderate Pain. 120 tablet 0    oxyCODONE-acetaminophen (Percocet)  MG per tablet Take 1 tablet by mouth Every 6 (Six) Hours As Needed for Moderate Pain. 120 tablet 0    oxyCODONE-acetaminophen (Percocet)  MG per tablet Take 1 tablet by mouth Every 6 (Six) Hours As Needed for Moderate Pain. 120 tablet 0    pantoprazole (PROTONIX) 40 MG EC tablet Take 1 tablet by mouth twice daily 180 tablet 0    phenazopyridine (Pyridium) 100 MG tablet Take 1 tablet by mouth 3 (Three) Times a Day As Needed for Dysuria. 9 tablet 0    pravastatin (PRAVACHOL) 40 MG tablet Take 1 tablet by mouth Daily. Indications: High Amount of Fats in the Blood 90 tablet 1    Probiotic Product (CULTURELLE PROBIOTICS PO) Take 20 mg by mouth Daily. Indications: diabetic supplement      tamsulosin (FLOMAX) 0.4 MG capsule 24 hr capsule Take 1 capsule by mouth 2 (Two) Times a Day.      vitamin B-12 (CYANOCOBALAMIN) 1000 MCG tablet Take 1 tablet by mouth 2 (Two) Times a Day. Indications: Inadequate Vitamin B12       No facility-administered medications prior to visit.     Opioid medication/s are on active medication list.  and I have evaluated his  active treatment plan and pain score trends (see table).  There were no vitals filed for this visit.  I have reviewed the chart for potential of high risk medication and harmful drug interactions in the elderly.        Aspirin is not on active medication list.      Patient Active Problem List   Diagnosis    Chronic bilateral low back pain without sciatica    History of malignant neoplasm of thoracic cavity structure    Allergic rhinitis    Anemia due to stage 3 chronic kidney disease    Acute conjunctivitis    Osteoarthritis    Encounter for screening for malignant neoplasm of prostate    Enlarged prostate without lower urinary tract symptoms (luts)    Fatigue    Gastroesophageal reflux disease    Gastrointestinal hemorrhage    History of transient ischemic attack    Hydronephrosis    Hyperkalemia    Mixed hyperlipidemia    Essential hypertension    Hypogonadism    Male erectile disorder (CODE)    Obstructive sleep apnea    Osteomalacia    Secondary hyperparathyroidism of renal origin    Status post patent foramen ovale closure    Essential tremor    Vitamin D deficiency    Type 2 diabetes mellitus    Chronic kidney disease, stage 3 (moderate)    Hypertensive encephalopathy    Lung nodule    Lumbosacral spondylosis without myelopathy    Cervical spondylosis without myelopathy    Thoracic spondylosis    Chronic pain syndrome    Cervicalgia    Chest pain, atypical    CAP (community acquired pneumonia)    Elevated troponin    Positive D dimer    Chest pain    Acute respiratory failure with hypoxia    Sepsis without acute organ dysfunction    Non-STEMI (non-ST elevated myocardial infarction)    Oxygen dependent    Abnormal EKG    Anemia    History of duodenal ulcer    Melena    Erosive esophagitis    Multiple gastric ulcers    Bleeding chronic duodenal ulcer    Other insomnia    Intolerance to cold    Coronary artery disease involving native coronary artery of native heart without angina pectoris    Underweight     "Bilateral pneumonia    Weakness    Pneumonia, unspecified organism    BMI 22.0-22.9, adult    Muscle twitching     Advance Care Planning (Click this link to access ACP Navigator)  Advance Directive is not on file.  ACP discussion was held with the patient during this visit. Patient does not have an advance directive, information provided.        Objective   Vitals:    24 1207   BP: 112/64   BP Location: Right arm   Patient Position: Sitting   Cuff Size: Adult   Pulse: 106   Temp: 97.7 °F (36.5 °C)   TempSrc: Infrared   SpO2: 91%   Weight: 60.3 kg (133 lb)   Height: 165.1 cm (65\")       Estimated body mass index is 22.13 kg/m² as calculated from the following:    Height as of this encounter: 165.1 cm (65\").    Weight as of this encounter: 60.3 kg (133 lb).    BMI is within normal parameters. No other follow-up for BMI required.        Does the patient have evidence of cognitive impairment? No                                                                                                Health  Risk Assessment    Smoking Status:  Social History     Tobacco Use   Smoking Status Former    Current packs/day: 0.00    Average packs/day: 1 pack/day for 20.0 years (20.0 ttl pk-yrs)    Types: Cigarettes    Start date:     Quit date:     Years since quittin.5    Passive exposure: Past   Smokeless Tobacco Never     Alcohol Consumption:  Social History     Substance and Sexual Activity   Alcohol Use Never     Fall Risk Screen:  FESTUS Fall Risk Assessment was completed, and patient is at LOW risk for falls.Assessment completed on:2024    Depression Screenin/1/2024     3:04 PM   PHQ-2/PHQ-9 Depression Screening   Little Interest or Pleasure in Doing Things 0-->not at all   Feeling Down, Depressed or Hopeless 0-->not at all   PHQ-9: Brief Depression Severity Measure Score 0     Health Habits and Functional and Cognitive Screenin/1/2024     3:02 PM   Functional & Cognitive Status   Do you " have difficulty preparing food and eating? No   Do you have difficulty bathing yourself, getting dressed or grooming yourself? No   Do you have difficulty using the toilet? No   Do you have difficulty moving around from place to place? No   Do you have trouble with steps or getting out of a bed or a chair? No   Current Diet Well Balanced Diet   Dental Exam Not up to date   Eye Exam Not up to date   Exercise (times per week) 0 times per week   Current Exercises Include No Regular Exercise   Do you need help using the phone?  No   Are you deaf or do you have serious difficulty hearing?  Yes   Do you need help to go to places out of walking distance? No   Do you need help shopping? No   Do you need help preparing meals?  No   Do you need help with housework?  No   Do you need help with laundry? No   Do you need help taking your medications? No   Do you need help managing money? No   Do you ever drive or ride in a car without wearing a seat belt? No   Have you felt unusual stress, anger or loneliness in the last month? No   Who do you live with? Spouse   If you need help, do you have trouble finding someone available to you? No   Have you been bothered in the last four weeks by sexual problems? No   Do you have difficulty concentrating, remembering or making decisions? No             Age-appropriate Screening Schedule:  Refer to the list below for future screening recommendations based on patient's age, sex and/or medical conditions. Orders for these recommended tests are listed in the plan section. The patient has been provided with a written plan.    Health Maintenance List  Health Maintenance   Topic Date Due    URINE MICROALBUMIN  Never done    TDAP/TD VACCINES (1 - Tdap) Never done    ZOSTER VACCINE (1 of 2) Never done    RSV Vaccine - Adults (1 - 1-dose 60+ series) Never done    DIABETIC EYE EXAM  07/23/2020    COVID-19 Vaccine (5 - 2023-24 season) 09/01/2023    ANNUAL WELLNESS VISIT  03/15/2024    HEMOGLOBIN A1C   07/31/2024    INFLUENZA VACCINE  08/01/2024    LIPID PANEL  01/31/2025    Pneumococcal Vaccine 65+  Completed                                                                                                                                                CMS Preventative Services Quick Reference  Risk Factors Identified During Encounter  Fall Risk-High or Moderate: Discussed Fall Prevention in the home    The above risks/problems have been discussed with the patient.  Pertinent information has been shared with the patient in the After Visit Summary.  An After Visit Summary and PPPS were made available to the patient.    Follow Up:   Next Medicare Wellness visit to be scheduled in 1 year.         Additional E&M Note during same encounter follows:  Patient has additional, significant, and separately identifiable condition(s)/problem(s) that require work above and beyond the Medicare Wellness Visit     Chief Complaint  Medicare Wellness-subsequent    Subjective   83-year-old white male with history of lung cancer who wears oxygen at 3 L, type 2 diabetes, chronic back pain and goes to pain management, hypertension, hyperlipidemia, CKD, MI with stent placement and recent hospitalizations for pneumonia who comes in today for Medicare wellness visit    Blood pressure 112/64 heart rate 106 he denies any chest pain, dyspnea, tachycardia or dizziness    Patient states sugars are almost always below 120 in the morning fasting and seem to be doing quite well.    He did have a recent hearing test and was told he needed hearing aids however he going to make sure her insurance will cover it than $9000 cost    He is complaining of arm jerking during the day unguinal place him on a low-dose of ropinirole and see if this helps I cautioned him to let me know if it causes drowsiness    Patient's labs in May were stable mildly anemic due to kidney disease and he does see nephrology.  He is up-to-date on hepatitis C.  We did have  "discussion concerning advanced directive which she does not have it I gave him information to study up on it    Weight is 133 with a BMI of 22.1.  Has had 2 COVID vaccines he still needs to schedule an eye exam.  His next PSA is due in April 2025.  He did attempt to do a colonoscopy last April however he was not able to drink all the stuff that needs to be done to getting cleared out so he still needs to reschedule          Ropinirole 0.25mg daily  Advanced directive information  Reschedule colonoscopy  Keep all appointments with nephrology  Schedule eye exam  Follow-up 6 months          Review of Systems   Constitutional: Negative.    HENT: Negative.     Respiratory:  Positive for shortness of breath.    Cardiovascular: Negative.    Gastrointestinal: Negative.    Neurological:         Arms twitching during the day              Objective   Vital Signs:  /64 (BP Location: Right arm, Patient Position: Sitting, Cuff Size: Adult)   Pulse 106   Temp 97.7 °F (36.5 °C) (Infrared)   Ht 165.1 cm (65\")   Wt 60.3 kg (133 lb)   SpO2 91%   BMI 22.13 kg/m²   Physical Exam  Constitutional:       Appearance: Normal appearance.   HENT:      Head: Normocephalic.   Cardiovascular:      Rate and Rhythm: Normal rate and regular rhythm.      Pulses: Normal pulses.      Heart sounds: Normal heart sounds.   Pulmonary:      Effort: Pulmonary effort is normal.      Breath sounds: Normal breath sounds.   Abdominal:      General: Bowel sounds are normal.   Musculoskeletal:         General: Normal range of motion.   Skin:     General: Skin is warm.   Neurological:      General: No focal deficit present.      Mental Status: He is alert and oriented to person, place, and time.   Psychiatric:         Mood and Affect: Mood normal.         Behavior: Behavior normal.                 Assessment and Plan               BMI 22.0-22.9, adult    Type 2 diabetes mellitus without complication, without long-term current use of " insulin    Underweight    Stage 3b chronic kidney disease    Anemia due to stage 3b chronic kidney disease    Muscle twitching    No orders of the defined types were placed in this encounter.    New Medications Ordered This Visit   Medications    rOPINIRole (REQUIP) 0.25 MG tablet     Sig: Take 1 tablet by mouth Every Night. Take 1 hour before bedtime.     Dispense:  30 tablet     Refill:  1          Follow Up   Return in about 6 months (around 2/1/2025).  Patient was given instructions and counseling regarding his condition or for health maintenance advice. Please see specific information pulled into the AVS if appropriate.

## 2024-08-06 RX ORDER — CLOPIDOGREL BISULFATE 75 MG/1
75 TABLET ORAL DAILY
Qty: 90 TABLET | Refills: 0 | Status: SHIPPED | OUTPATIENT
Start: 2024-08-06

## 2024-08-11 ENCOUNTER — APPOINTMENT (OUTPATIENT)
Dept: CT IMAGING | Facility: HOSPITAL | Age: 84
End: 2024-08-11
Payer: MEDICARE

## 2024-08-11 ENCOUNTER — HOSPITAL ENCOUNTER (EMERGENCY)
Facility: HOSPITAL | Age: 84
Discharge: LEFT BEFORE DISCHARGE | End: 2024-08-12
Attending: STUDENT IN AN ORGANIZED HEALTH CARE EDUCATION/TRAINING PROGRAM | Admitting: STUDENT IN AN ORGANIZED HEALTH CARE EDUCATION/TRAINING PROGRAM
Payer: MEDICARE

## 2024-08-11 VITALS
OXYGEN SATURATION: 97 % | DIASTOLIC BLOOD PRESSURE: 71 MMHG | SYSTOLIC BLOOD PRESSURE: 125 MMHG | HEART RATE: 100 BPM | TEMPERATURE: 98.8 F | RESPIRATION RATE: 18 BRPM | WEIGHT: 137.2 LBS | BODY MASS INDEX: 22.86 KG/M2 | HEIGHT: 65 IN

## 2024-08-11 DIAGNOSIS — R79.89 ELEVATED TROPONIN: ICD-10-CM

## 2024-08-11 DIAGNOSIS — R07.9 CHEST PAIN, UNSPECIFIED TYPE: Primary | ICD-10-CM

## 2024-08-11 DIAGNOSIS — N18.9 CHRONIC KIDNEY DISEASE, UNSPECIFIED CKD STAGE: ICD-10-CM

## 2024-08-11 DIAGNOSIS — R79.89 ELEVATED D-DIMER: ICD-10-CM

## 2024-08-11 DIAGNOSIS — R06.02 SHORTNESS OF BREATH: ICD-10-CM

## 2024-08-11 DIAGNOSIS — R93.89 ABNORMAL CHEST CT: ICD-10-CM

## 2024-08-11 LAB
ALBUMIN SERPL-MCNC: 4.8 G/DL (ref 3.5–5.2)
ALBUMIN/GLOB SERPL: 1.7 G/DL
ALP SERPL-CCNC: 89 U/L (ref 39–117)
ALT SERPL W P-5'-P-CCNC: 22 U/L (ref 1–41)
ANION GAP SERPL CALCULATED.3IONS-SCNC: 15.3 MMOL/L (ref 5–15)
AST SERPL-CCNC: 26 U/L (ref 1–40)
B PARAPERT DNA SPEC QL NAA+PROBE: NOT DETECTED
B PERT DNA SPEC QL NAA+PROBE: NOT DETECTED
BASOPHILS # BLD AUTO: 0.03 10*3/MM3 (ref 0–0.2)
BASOPHILS NFR BLD AUTO: 0.3 % (ref 0–1.5)
BILIRUB SERPL-MCNC: 0.3 MG/DL (ref 0–1.2)
BUN SERPL-MCNC: 21 MG/DL (ref 8–23)
BUN/CREAT SERPL: 13.3 (ref 7–25)
C PNEUM DNA NPH QL NAA+NON-PROBE: NOT DETECTED
CALCIUM SPEC-SCNC: 10.4 MG/DL (ref 8.6–10.5)
CHLORIDE SERPL-SCNC: 103 MMOL/L (ref 98–107)
CO2 SERPL-SCNC: 25.7 MMOL/L (ref 22–29)
CREAT SERPL-MCNC: 1.58 MG/DL (ref 0.76–1.27)
D DIMER PPP FEU-MCNC: 0.86 MG/L (FEU) (ref 0–0.83)
D-LACTATE SERPL-SCNC: 1.2 MMOL/L (ref 0.3–2)
DEPRECATED RDW RBC AUTO: 46.5 FL (ref 37–54)
EGFRCR SERPLBLD CKD-EPI 2021: 43.1 ML/MIN/1.73
EOSINOPHIL # BLD AUTO: 0.2 10*3/MM3 (ref 0–0.4)
EOSINOPHIL NFR BLD AUTO: 2.2 % (ref 0.3–6.2)
ERYTHROCYTE [DISTWIDTH] IN BLOOD BY AUTOMATED COUNT: 13.4 % (ref 12.3–15.4)
FLUAV SUBTYP SPEC NAA+PROBE: NOT DETECTED
FLUBV RNA ISLT QL NAA+PROBE: NOT DETECTED
GEN 5 2HR TROPONIN T REFLEX: 48 NG/L
GLOBULIN UR ELPH-MCNC: 2.8 GM/DL
GLUCOSE BLDC GLUCOMTR-MCNC: 115 MG/DL (ref 70–105)
GLUCOSE BLDC GLUCOMTR-MCNC: 122 MG/DL (ref 70–105)
GLUCOSE SERPL-MCNC: 134 MG/DL (ref 65–99)
HADV DNA SPEC NAA+PROBE: NOT DETECTED
HCOV 229E RNA SPEC QL NAA+PROBE: NOT DETECTED
HCOV HKU1 RNA SPEC QL NAA+PROBE: NOT DETECTED
HCOV NL63 RNA SPEC QL NAA+PROBE: NOT DETECTED
HCOV OC43 RNA SPEC QL NAA+PROBE: NOT DETECTED
HCT VFR BLD AUTO: 41 % (ref 37.5–51)
HGB BLD-MCNC: 13 G/DL (ref 13–17.7)
HMPV RNA NPH QL NAA+NON-PROBE: NOT DETECTED
HOLD SPECIMEN: NORMAL
HOLD SPECIMEN: NORMAL
HPIV1 RNA ISLT QL NAA+PROBE: NOT DETECTED
HPIV2 RNA SPEC QL NAA+PROBE: NOT DETECTED
HPIV3 RNA NPH QL NAA+PROBE: NOT DETECTED
HPIV4 P GENE NPH QL NAA+PROBE: NOT DETECTED
IMM GRANULOCYTES # BLD AUTO: 0.08 10*3/MM3 (ref 0–0.05)
IMM GRANULOCYTES NFR BLD AUTO: 0.9 % (ref 0–0.5)
LYMPHOCYTES # BLD AUTO: 0.88 10*3/MM3 (ref 0.7–3.1)
LYMPHOCYTES NFR BLD AUTO: 9.8 % (ref 19.6–45.3)
M PNEUMO IGG SER IA-ACNC: NOT DETECTED
MCH RBC QN AUTO: 30.7 PG (ref 26.6–33)
MCHC RBC AUTO-ENTMCNC: 31.7 G/DL (ref 31.5–35.7)
MCV RBC AUTO: 96.7 FL (ref 79–97)
MONOCYTES # BLD AUTO: 0.55 10*3/MM3 (ref 0.1–0.9)
MONOCYTES NFR BLD AUTO: 6.1 % (ref 5–12)
NEUTROPHILS NFR BLD AUTO: 7.22 10*3/MM3 (ref 1.7–7)
NEUTROPHILS NFR BLD AUTO: 80.7 % (ref 42.7–76)
NRBC BLD AUTO-RTO: 0 /100 WBC (ref 0–0.2)
PLATELET # BLD AUTO: 282 10*3/MM3 (ref 140–450)
PMV BLD AUTO: 9 FL (ref 6–12)
POTASSIUM SERPL-SCNC: 3.5 MMOL/L (ref 3.5–5.2)
PROCALCITONIN SERPL-MCNC: 0.07 NG/ML (ref 0–0.25)
PROT SERPL-MCNC: 7.6 G/DL (ref 6–8.5)
RBC # BLD AUTO: 4.24 10*6/MM3 (ref 4.14–5.8)
RHINOVIRUS RNA SPEC NAA+PROBE: NOT DETECTED
RSV RNA NPH QL NAA+NON-PROBE: NOT DETECTED
SARS-COV-2 RNA NPH QL NAA+NON-PROBE: NOT DETECTED
SODIUM SERPL-SCNC: 144 MMOL/L (ref 136–145)
TROPONIN T DELTA: -13 NG/L
TROPONIN T SERPL HS-MCNC: 61 NG/L
WBC NRBC COR # BLD AUTO: 8.96 10*3/MM3 (ref 3.4–10.8)
WHOLE BLOOD HOLD COAG: NORMAL
WHOLE BLOOD HOLD SPECIMEN: NORMAL

## 2024-08-11 PROCEDURE — 25010000002 MORPHINE PER 10 MG

## 2024-08-11 PROCEDURE — 83605 ASSAY OF LACTIC ACID: CPT

## 2024-08-11 PROCEDURE — 25510000001 IOPAMIDOL PER 1 ML

## 2024-08-11 PROCEDURE — 87040 BLOOD CULTURE FOR BACTERIA: CPT

## 2024-08-11 PROCEDURE — 93005 ELECTROCARDIOGRAM TRACING: CPT | Performed by: STUDENT IN AN ORGANIZED HEALTH CARE EDUCATION/TRAINING PROGRAM

## 2024-08-11 PROCEDURE — 0202U NFCT DS 22 TRGT SARS-COV-2: CPT

## 2024-08-11 PROCEDURE — G0378 HOSPITAL OBSERVATION PER HR: HCPCS

## 2024-08-11 PROCEDURE — 99285 EMERGENCY DEPT VISIT HI MDM: CPT

## 2024-08-11 PROCEDURE — 71275 CT ANGIOGRAPHY CHEST: CPT

## 2024-08-11 PROCEDURE — 93005 ELECTROCARDIOGRAM TRACING: CPT

## 2024-08-11 PROCEDURE — 80053 COMPREHEN METABOLIC PANEL: CPT

## 2024-08-11 PROCEDURE — 84145 PROCALCITONIN (PCT): CPT | Performed by: NURSE PRACTITIONER

## 2024-08-11 PROCEDURE — 84484 ASSAY OF TROPONIN QUANT: CPT

## 2024-08-11 PROCEDURE — 85379 FIBRIN DEGRADATION QUANT: CPT

## 2024-08-11 PROCEDURE — 96375 TX/PRO/DX INJ NEW DRUG ADDON: CPT

## 2024-08-11 PROCEDURE — 96374 THER/PROPH/DIAG INJ IV PUSH: CPT

## 2024-08-11 PROCEDURE — 36415 COLL VENOUS BLD VENIPUNCTURE: CPT

## 2024-08-11 PROCEDURE — 85025 COMPLETE CBC W/AUTO DIFF WBC: CPT

## 2024-08-11 PROCEDURE — 25010000002 ONDANSETRON PER 1 MG

## 2024-08-11 PROCEDURE — 82948 REAGENT STRIP/BLOOD GLUCOSE: CPT

## 2024-08-11 RX ORDER — FERROUS SULFATE 324(65)MG
324 TABLET, DELAYED RELEASE (ENTERIC COATED) ORAL
Status: DISCONTINUED | OUTPATIENT
Start: 2024-08-12 | End: 2024-08-12 | Stop reason: HOSPADM

## 2024-08-11 RX ORDER — ONDANSETRON 4 MG/1
4 TABLET, ORALLY DISINTEGRATING ORAL EVERY 6 HOURS PRN
Status: DISCONTINUED | OUTPATIENT
Start: 2024-08-11 | End: 2024-08-12 | Stop reason: HOSPADM

## 2024-08-11 RX ORDER — IPRATROPIUM BROMIDE AND ALBUTEROL SULFATE 2.5; .5 MG/3ML; MG/3ML
3 SOLUTION RESPIRATORY (INHALATION) EVERY 4 HOURS PRN
Status: DISCONTINUED | OUTPATIENT
Start: 2024-08-11 | End: 2024-08-12 | Stop reason: HOSPADM

## 2024-08-11 RX ORDER — DEXTROSE MONOHYDRATE 25 G/50ML
25 INJECTION, SOLUTION INTRAVENOUS
Status: DISCONTINUED | OUTPATIENT
Start: 2024-08-11 | End: 2024-08-12 | Stop reason: HOSPADM

## 2024-08-11 RX ORDER — INSULIN LISPRO 100 [IU]/ML
2-7 INJECTION, SOLUTION INTRAVENOUS; SUBCUTANEOUS
Status: DISCONTINUED | OUTPATIENT
Start: 2024-08-11 | End: 2024-08-12 | Stop reason: HOSPADM

## 2024-08-11 RX ORDER — HYDRALAZINE HYDROCHLORIDE 25 MG/1
50 TABLET, FILM COATED ORAL 3 TIMES DAILY
Status: DISCONTINUED | OUTPATIENT
Start: 2024-08-11 | End: 2024-08-12 | Stop reason: HOSPADM

## 2024-08-11 RX ORDER — TAMSULOSIN HYDROCHLORIDE 0.4 MG/1
0.4 CAPSULE ORAL 2 TIMES DAILY
Status: DISCONTINUED | OUTPATIENT
Start: 2024-08-11 | End: 2024-08-12 | Stop reason: HOSPADM

## 2024-08-11 RX ORDER — IBUPROFEN 600 MG/1
1 TABLET ORAL
Status: DISCONTINUED | OUTPATIENT
Start: 2024-08-11 | End: 2024-08-12 | Stop reason: HOSPADM

## 2024-08-11 RX ORDER — OXYCODONE HYDROCHLORIDE 5 MG/1
10 TABLET ORAL EVERY 4 HOURS PRN
Status: DISCONTINUED | OUTPATIENT
Start: 2024-08-11 | End: 2024-08-12 | Stop reason: HOSPADM

## 2024-08-11 RX ORDER — MEGESTROL ACETATE 40 MG/1
40 TABLET ORAL DAILY
Status: DISCONTINUED | OUTPATIENT
Start: 2024-08-12 | End: 2024-08-12 | Stop reason: HOSPADM

## 2024-08-11 RX ORDER — SODIUM CHLORIDE 0.9 % (FLUSH) 0.9 %
10 SYRINGE (ML) INJECTION EVERY 12 HOURS SCHEDULED
Status: DISCONTINUED | OUTPATIENT
Start: 2024-08-11 | End: 2024-08-12 | Stop reason: HOSPADM

## 2024-08-11 RX ORDER — NITROGLYCERIN 0.4 MG/1
0.4 TABLET SUBLINGUAL
Status: DISCONTINUED | OUTPATIENT
Start: 2024-08-11 | End: 2024-08-12 | Stop reason: HOSPADM

## 2024-08-11 RX ORDER — NICOTINE POLACRILEX 4 MG
15 LOZENGE BUCCAL
Status: DISCONTINUED | OUTPATIENT
Start: 2024-08-11 | End: 2024-08-12 | Stop reason: HOSPADM

## 2024-08-11 RX ORDER — ATORVASTATIN CALCIUM 10 MG/1
10 TABLET, FILM COATED ORAL DAILY
Status: DISCONTINUED | OUTPATIENT
Start: 2024-08-12 | End: 2024-08-12 | Stop reason: HOSPADM

## 2024-08-11 RX ORDER — FUROSEMIDE 40 MG/1
40 TABLET ORAL DAILY
Status: DISCONTINUED | OUTPATIENT
Start: 2024-08-12 | End: 2024-08-12 | Stop reason: HOSPADM

## 2024-08-11 RX ORDER — ALUMINA, MAGNESIA, AND SIMETHICONE 2400; 2400; 240 MG/30ML; MG/30ML; MG/30ML
15 SUSPENSION ORAL EVERY 6 HOURS PRN
Status: DISCONTINUED | OUTPATIENT
Start: 2024-08-11 | End: 2024-08-12 | Stop reason: HOSPADM

## 2024-08-11 RX ORDER — POLYETHYLENE GLYCOL 3350 17 G/17G
17 POWDER, FOR SOLUTION ORAL DAILY PRN
Status: DISCONTINUED | OUTPATIENT
Start: 2024-08-11 | End: 2024-08-12 | Stop reason: HOSPADM

## 2024-08-11 RX ORDER — BISACODYL 5 MG/1
5 TABLET, DELAYED RELEASE ORAL DAILY PRN
Status: DISCONTINUED | OUTPATIENT
Start: 2024-08-11 | End: 2024-08-12 | Stop reason: HOSPADM

## 2024-08-11 RX ORDER — ROPINIROLE 0.25 MG/1
0.25 TABLET, FILM COATED ORAL NIGHTLY
Status: DISCONTINUED | OUTPATIENT
Start: 2024-08-11 | End: 2024-08-12 | Stop reason: HOSPADM

## 2024-08-11 RX ORDER — SODIUM CHLORIDE 0.9 % (FLUSH) 0.9 %
10 SYRINGE (ML) INJECTION AS NEEDED
Status: DISCONTINUED | OUTPATIENT
Start: 2024-08-11 | End: 2024-08-12 | Stop reason: HOSPADM

## 2024-08-11 RX ORDER — ALLOPURINOL 100 MG/1
200 TABLET ORAL DAILY
Status: DISCONTINUED | OUTPATIENT
Start: 2024-08-12 | End: 2024-08-12 | Stop reason: HOSPADM

## 2024-08-11 RX ORDER — AMOXICILLIN 250 MG
2 CAPSULE ORAL 2 TIMES DAILY PRN
Status: DISCONTINUED | OUTPATIENT
Start: 2024-08-11 | End: 2024-08-12 | Stop reason: HOSPADM

## 2024-08-11 RX ORDER — UREA 10 %
1000 LOTION (ML) TOPICAL 2 TIMES DAILY
Status: DISCONTINUED | OUTPATIENT
Start: 2024-08-11 | End: 2024-08-12 | Stop reason: HOSPADM

## 2024-08-11 RX ORDER — ONDANSETRON 2 MG/ML
4 INJECTION INTRAMUSCULAR; INTRAVENOUS EVERY 6 HOURS PRN
Status: DISCONTINUED | OUTPATIENT
Start: 2024-08-11 | End: 2024-08-12 | Stop reason: HOSPADM

## 2024-08-11 RX ORDER — BUDESONIDE 0.25 MG/2ML
0.25 INHALANT ORAL
Status: DISCONTINUED | OUTPATIENT
Start: 2024-08-12 | End: 2024-08-12 | Stop reason: HOSPADM

## 2024-08-11 RX ORDER — BISACODYL 10 MG
10 SUPPOSITORY, RECTAL RECTAL DAILY PRN
Status: DISCONTINUED | OUTPATIENT
Start: 2024-08-11 | End: 2024-08-12 | Stop reason: HOSPADM

## 2024-08-11 RX ORDER — ONDANSETRON 2 MG/ML
4 INJECTION INTRAMUSCULAR; INTRAVENOUS ONCE
Status: COMPLETED | OUTPATIENT
Start: 2024-08-11 | End: 2024-08-11

## 2024-08-11 RX ORDER — CLOPIDOGREL BISULFATE 75 MG/1
75 TABLET ORAL DAILY
Status: DISCONTINUED | OUTPATIENT
Start: 2024-08-12 | End: 2024-08-12 | Stop reason: HOSPADM

## 2024-08-11 RX ORDER — SODIUM CHLORIDE 9 MG/ML
40 INJECTION, SOLUTION INTRAVENOUS AS NEEDED
Status: DISCONTINUED | OUTPATIENT
Start: 2024-08-11 | End: 2024-08-12 | Stop reason: HOSPADM

## 2024-08-11 RX ORDER — AMLODIPINE BESYLATE 5 MG/1
10 TABLET ORAL DAILY
Status: DISCONTINUED | OUTPATIENT
Start: 2024-08-12 | End: 2024-08-12 | Stop reason: HOSPADM

## 2024-08-11 RX ORDER — CETIRIZINE HYDROCHLORIDE 10 MG/1
10 TABLET ORAL DAILY
Status: DISCONTINUED | OUTPATIENT
Start: 2024-08-12 | End: 2024-08-12 | Stop reason: HOSPADM

## 2024-08-11 RX ORDER — PANTOPRAZOLE SODIUM 40 MG/1
40 TABLET, DELAYED RELEASE ORAL
Status: DISCONTINUED | OUTPATIENT
Start: 2024-08-11 | End: 2024-08-12 | Stop reason: HOSPADM

## 2024-08-11 RX ADMIN — ROPINIROLE HYDROCHLORIDE 0.25 MG: 0.25 TABLET, FILM COATED ORAL at 22:57

## 2024-08-11 RX ADMIN — ONDANSETRON 4 MG: 2 INJECTION INTRAMUSCULAR; INTRAVENOUS at 16:52

## 2024-08-11 RX ADMIN — CYANOCOBALAMIN TAB 500 MCG 1000 MCG: 500 TAB at 22:57

## 2024-08-11 RX ADMIN — MORPHINE SULFATE 4 MG: 4 INJECTION, SOLUTION INTRAMUSCULAR; INTRAVENOUS at 16:52

## 2024-08-11 RX ADMIN — TAMSULOSIN HYDROCHLORIDE 0.4 MG: 0.4 CAPSULE ORAL at 22:57

## 2024-08-11 RX ADMIN — HYDRALAZINE HYDROCHLORIDE 50 MG: 25 TABLET ORAL at 22:57

## 2024-08-11 RX ADMIN — PANTOPRAZOLE SODIUM 40 MG: 40 TABLET, DELAYED RELEASE ORAL at 22:57

## 2024-08-11 RX ADMIN — IOPAMIDOL 100 ML: 755 INJECTION, SOLUTION INTRAVENOUS at 18:54

## 2024-08-11 NOTE — ED PROVIDER NOTES
Subjective   Chief Complaint   Patient presents with    Shortness of Breath     [place].  Started when he woke up today.  Pt coughing  a little bit.        History of Present Illness  Patient is an 83-year-old male with a past medical history significant for hyperlipidemia, hypertension, enlarged prostate, hiatal hernia, diabetes, kidney disease, stroke, cancer of the lung.  Patient states that he woke up this morning and felt like he was short of breath.  States that he is having little bit of back pain.  Patient denies any recent fever or exposure to illness that he is aware of.  Denies any nausea vomiting or diarrhea.  Denies any rash.  Patient is accompanied by his wife who provides majority of his medical history has he is unsure of the medications that he takes or any of his health concerns.  Patient did flagged positive for simple sepsis in triage and protocol was ordered.  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 [facility]    Diabetes     Enlarged prostate     Former smoker     Hiatal hernia     Hip pain     don    Hip pain, bilateral     Hyperlipidemia     Hypertension     Kidney disease        stage 3     Leg pain     don    Low back pain     Neck pain     Stroke        No Known Allergies    Past Surgical History:   Procedure Laterality Date    CARDIAC CATHETERIZATION Left 1/28/2022    Procedure: Cardiac Catheterization/Vascular Study;  Surgeon: Mani Salguero MD;  Location: [facility] CATH INVASIVE LOCATION;  Service: Cardiovascular;  Laterality: Left;    CATARACT EXTRACTION  2006    [facility]    COLONOSCOPY  2011    COLONOSCOPY N/A 5/27/2022    Procedure: COLONOSCOPY;  Surgeon: Terry Holliday MD;  Location: [facility] ENDOSCOPY;  Service: Gastroenterology;  Laterality: N/A;  polyps    CORONARY STENT PLACEMENT  01/28/2022    RCA    ENDOSCOPY N/A 5/27/2022    Procedure: ESOPHAGOGASTRODUODENOSCOPY with 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, gastric antrum biopsy;  Surgeon: Terry Holliday MD;  Location: [facility] ENDOSCOPY;  Service: Gastroenterology;  Laterality: N/A;  gastritis, gastric ulcer, small bowel AVM    LUNG CANCER SURGERY      upper lobe rt lung cancer 2017  at [facility]       Family History   Problem Relation Age of Onset    Stroke Mother     Cancer Father         Prostate    Heart failure Brother     Heart disease Other        Social History     Socioeconomic History    Marital status:      Spouse name: Candis    Number of children: 5   Tobacco Use    Smoking status: Former     Current packs/day: 0.00     Average packs/day: 1 pack/day for 20.0 years (20.0 ttl pk-yrs)     Types: Cigarettes     Start date:      Quit date:      Years since quittin.6     Passive exposure: Past    Smokeless tobacco: Never   Vaping Use    Vaping status: Never Used   Substance and Sexual Activity    Alcohol use: Never    Drug use: Never    Sexual activity: Defer     Partners: Female     Birth control/protection: None           Objective   Physical Exam  Vitals and nursing note reviewed.   Constitutional:       General: He is not in acute distress.     Appearance: He is well-developed and normal weight. He is ill-appearing. He is not toxic-appearing or diaphoretic.   HENT:      Head: Normocephalic and atraumatic.      Mouth/Throat:      Mouth: Mucous membranes are moist.      Pharynx: Oropharynx is clear.   Eyes:      Extraocular Movements: Extraocular movements intact.      Pupils: Pupils are equal, round, and reactive to light.   Cardiovascular:      Rate and Rhythm: Normal rate. Rhythm irregular.      Pulses: Normal pulses.      Heart sounds: Normal heart sounds.   Pulmonary:      Effort: Pulmonary effort is normal.      Breath sounds: Normal breath sounds.   Abdominal:      General: Bowel sounds are normal.      Palpations: Abdomen is soft.   Musculoskeletal:         General: Normal range  "of motion.      Cervical back: Normal range of motion and neck supple.   Skin:     General: Skin is warm and dry.      Capillary Refill: Capillary refill takes 2 to 3 seconds.   Neurological:      General: No focal deficit present.      Mental Status: He is alert.   Psychiatric:         Mood and Affect: Mood normal.         Behavior: Behavior normal.         Procedures           ED Course  ED Course as of 08/11/24 1938   Sun Aug 11, 2024   1709 CBC & Differential(!)  Essentially normal for this patient [DT]   1710 Comprehensive Metabolic Panel(!)  Values at baseline for patient [DT]   1752 High Sensitivity Troponin T(!!)  Patient's most recent troponins were 50 and 59 respectively and both were drawn 4 months ago [DT]   1752 Respiratory Panel PCR w/COVID-19(SARS-CoV-2) NEETU/MICHELLE/GARETH/PAD/COR/[facility] In-House, NP Swab in UTM/VTM, 2 HR TAT - Swab, Nasopharynx  Negative   [DT]   1754 D-dimer, Quantitative(!)  CT PE ordered [DT]   1853 Waiting for radiology to read CT PE [DT]   1913 Waiting for repeat troponin [DT]      ED Course User Index  [DT] Sierra Herrera R, APRN      /80   Pulse 105   Temp 98.8 °F (37.1 °C)   Resp (!) 42   Ht 165.1 cm (65\")   Wt 62.2 kg (137 lb 3.2 oz)   SpO2 94%   BMI 22.83 kg/m²   Labs Reviewed   COMPREHENSIVE METABOLIC PANEL - Abnormal; Notable for the following components:       Result Value    Glucose 134 (*)     Creatinine 1.58 (*)     Anion Gap 15.3 (*)     eGFR 43.1 (*)     All other components within normal limits    Narrative:     GFR Normal >60  Chronic Kidney Disease <60  Kidney Failure <15    The GFR formula is only valid for adults with stable renal function between ages 18 and 70.   CBC WITH AUTO DIFFERENTIAL - Abnormal; Notable for the following components:    Neutrophil % 80.7 (*)     Lymphocyte % 9.8 (*)     Immature Grans % 0.9 (*)     Neutrophils, Absolute 7.22 (*)     Immature Grans, Absolute 0.08 (*)     All other components within normal limits   D-DIMER, QUANTITATIVE " "- Abnormal; Notable for the following components:    D-Dimer, Quantitative 0.86 (*)     All other components within normal limits    Narrative:     According to the assay 's published package insert, a normal (<0.50 mg/L (FEU)) D-dimer result in conjunction with a non-high clinical probability assessment, excludes deep vein thrombosis (DVT) and pulmonary embolism (PE) with high sensitivity.    D-dimer values increase with age and this can make VTE exclusion of an older population difficult. To address this, the [employer], based on best available evidence and recent guidelines, recommends that clinicians use age-adjusted D-dimer thresholds in patients greater than 50 years of age with: a) a low probability of PE who do not meet all Pulmonary Embolism Rule Out Criteria, or b) in those with intermediate probability of PE.   The formula for an age-adjusted D-dimer cut-off is \"age/100\".  For example, a 60 year old patient would have an age-adjusted cut-off of 0.60 mg/L (FEU) and an 80 year old 0.80 mg/L (FEU).   TROPONIN - Abnormal; Notable for the following components:    HS Troponin T 61 (*)     All other components within normal limits    Narrative:     High Sensitive Troponin T Reference Range:  <14.0 ng/L- Negative Female for AMI  <22.0 ng/L- Negative Male for AMI  >=14 - Abnormal Female indicating possible myocardial injury.  >=22 - Abnormal Male indicating possible myocardial injury.   Clinicians would have to utilize clinical acumen, EKG, Troponin, and serial changes to determine if it is an Acute Myocardial Infarction or myocardial injury due to an underlying chronic condition.        HIGH SENSITIVITIY TROPONIN T 2HR - Abnormal; Notable for the following components:    HS Troponin T 48 (*)     Troponin T Delta -13 (*)     All other components within normal limits    Narrative:     High Sensitive Troponin T Reference Range:  <14.0 ng/L- Negative Female for AMI  <22.0 ng/L- " Negative Male for AMI  >=14 - Abnormal Female indicating possible myocardial injury.  >=22 - Abnormal Male indicating possible myocardial injury.   Clinicians would have to utilize clinical acumen, EKG, Troponin, and serial changes to determine if it is an Acute Myocardial Infarction or myocardial injury due to an underlying chronic condition.        RESPIRATORY PANEL PCR W/ COVID-19 (SARS-COV-2), NP SWAB IN UTM/VTP, 2 HR TAT - Normal    Narrative:     In the setting of a positive respiratory panel with a viral infection PLUS a negative procalcitonin without other underlying concern for bacterial infection, consider observing off antibiotics or discontinuation of antibiotics and continue supportive care. If the respiratory panel is positive for atypical bacterial infection (Bordetella pertussis, Chlamydophila pneumoniae, or Mycoplasma pneumoniae), consider antibiotic de-escalation to target atypical bacterial infection.   POC LACTATE - Normal   BLOOD CULTURE   BLOOD CULTURE   RAINBOW DRAW    Narrative:     The following orders were created for panel order [place] Draw.  Procedure                               Abnormality         Status                     ---------                               -----------         ------                     Green Top (Gel)[[id number]]                                  Final result               Lavender Top[[id number]]                                     Final result               Gold Top - SST[[id number]]                                   Final result               Light Blue Top[[id number]]                                   Final result                 Please view results for these tests on the individual orders.   POC LACTATE   CBC AND DIFFERENTIAL    Narrative:     The following orders were created for panel order CBC & Differential.  Procedure                               Abnormality         Status                     ---------                               -----------          ------                     CBC Auto Differential[[id number]]        Abnormal            Final result                 Please view results for these tests on the individual orders.   GREEN TOP   LAVENDER TOP   GOLD TOP - SST   LIGHT BLUE TOP     Medications   sodium chloride 0.9 % flush 10 mL (has no administration in time range)   morphine injection 4 mg (4 mg Intravenous Given 8/11/24 1652)   ondansetron (ZOFRAN) injection 4 mg (4 mg Intravenous Given 8/11/24 1652)   iopamidol (ISOVUE-370) 76 % injection 100 mL (100 mL Intravenous Given 8/11/24 1854)     CT Angiogram Chest Pulmonary Embolism    Result Date: 8/11/2024  Impression: No evidence of pulmonary embolism. Diffuse reticulations and groundglass opacities in a crazy paving pattern. This may represent pulmonary edema, pneumonia, pneumonitis or worsening interstitial lung disease. Electronically Signed: Neil Issa DO  8/11/2024 7:05 PM EDT  Workstation ID: [id number]             HEART Score: 8                              Medical Decision Making  Problems Addressed:  Abnormal chest CT: complicated acute illness or injury  Chest pain, unspecified type: complicated acute illness or injury  Chronic kidney disease, unspecified CKD stage: complicated acute illness or injury  Elevated d-dimer: complicated acute illness or injury  Elevated troponin: complicated acute illness or injury  Shortness of breath: complicated acute illness or injury    Amount and/or Complexity of Data Reviewed  Labs: ordered. Decision-making details documented in ED Course.  Radiology: ordered.    Risk  Prescription drug management.  Decision regarding hospitalization.      Patient presented with chest pain and shortness of breath.  History obtained from patient and patient's wife.    EKG reviewed: Reviewed and interpreted by Dr. Pedro Luis Singh with sinus tachycardia rate of 102 with multiple PVCs right bundle branch block and a left anterior fascicular block.  Previous EKG on 5/2/2024 shows  "sinus rhythm with a rate of 78 with a right bundle branch block and left anterior fascicular block.    Labs reviewed:  Labs Reviewed   COMPREHENSIVE METABOLIC PANEL - Abnormal; Notable for the following components:       Result Value    Glucose 134 (*)     Creatinine 1.58 (*)     Anion Gap 15.3 (*)     eGFR 43.1 (*)     All other components within normal limits    Narrative:     GFR Normal >60  Chronic Kidney Disease <60  Kidney Failure <15    The GFR formula is only valid for adults with stable renal function between ages 18 and 70.   CBC WITH AUTO DIFFERENTIAL - Abnormal; Notable for the following components:    Neutrophil % 80.7 (*)     Lymphocyte % 9.8 (*)     Immature Grans % 0.9 (*)     Neutrophils, Absolute 7.22 (*)     Immature Grans, Absolute 0.08 (*)     All other components within normal limits   D-DIMER, QUANTITATIVE - Abnormal; Notable for the following components:    D-Dimer, Quantitative 0.86 (*)     All other components within normal limits    Narrative:     According to the assay 's published package insert, a normal (<0.50 mg/L (FEU)) D-dimer result in conjunction with a non-high clinical probability assessment, excludes deep vein thrombosis (DVT) and pulmonary embolism (PE) with high sensitivity.    D-dimer values increase with age and this can make VTE exclusion of an older population difficult. To address this, the [employer], based on best available evidence and recent guidelines, recommends that clinicians use age-adjusted D-dimer thresholds in patients greater than 50 years of age with: a) a low probability of PE who do not meet all Pulmonary Embolism Rule Out Criteria, or b) in those with intermediate probability of PE.   The formula for an age-adjusted D-dimer cut-off is \"age/100\".  For example, a 60 year old patient would have an age-adjusted cut-off of 0.60 mg/L (FEU) and an 80 year old 0.80 mg/L (FEU).   TROPONIN - Abnormal; Notable for the following " components:    HS Troponin T 61 (*)     All other components within normal limits    Narrative:     High Sensitive Troponin T Reference Range:  <14.0 ng/L- Negative Female for AMI  <22.0 ng/L- Negative Male for AMI  >=14 - Abnormal Female indicating possible myocardial injury.  >=22 - Abnormal Male indicating possible myocardial injury.   Clinicians would have to utilize clinical acumen, EKG, Troponin, and serial changes to determine if it is an Acute Myocardial Infarction or myocardial injury due to an underlying chronic condition.        HIGH SENSITIVITIY TROPONIN T 2HR - Abnormal; Notable for the following components:    HS Troponin T 48 (*)     Troponin T Delta -13 (*)     All other components within normal limits    Narrative:     High Sensitive Troponin T Reference Range:  <14.0 ng/L- Negative Female for AMI  <22.0 ng/L- Negative Male for AMI  >=14 - Abnormal Female indicating possible myocardial injury.  >=22 - Abnormal Male indicating possible myocardial injury.   Clinicians would have to utilize clinical acumen, EKG, Troponin, and serial changes to determine if it is an Acute Myocardial Infarction or myocardial injury due to an underlying chronic condition.        RESPIRATORY PANEL PCR W/ COVID-19 (SARS-COV-2), NP SWAB IN UTM/VTP, 2 HR TAT - Normal    Narrative:     In the setting of a positive respiratory panel with a viral infection PLUS a negative procalcitonin without other underlying concern for bacterial infection, consider observing off antibiotics or discontinuation of antibiotics and continue supportive care. If the respiratory panel is positive for atypical bacterial infection (Bordetella pertussis, Chlamydophila pneumoniae, or Mycoplasma pneumoniae), consider antibiotic de-escalation to target atypical bacterial infection.   POC LACTATE - Normal   BLOOD CULTURE   BLOOD CULTURE   RAINBOW DRAW    Narrative:     The following orders were created for panel order [place] Draw.  Procedure                                Abnormality         Status                     ---------                               -----------         ------                     Green Top (Gel)[[id number]]                                  Final result               Lavender Top[[id number]]                                     Final result               Gold Top - SST[[id number]]                                   Final result               Light Blue Top[[id number]]                                   Final result                 Please view results for these tests on the individual orders.   POC LACTATE   CBC AND DIFFERENTIAL    Narrative:     The following orders were created for panel order CBC & Differential.  Procedure                               Abnormality         Status                     ---------                               -----------         ------                     CBC Auto Differential[[id number]]        Abnormal            Final result                 Please view results for these tests on the individual orders.   GREEN TOP   LAVENDER TOP   GOLD TOP - SST   LIGHT BLUE TOP         Imaging reviewed:CT Angiogram Chest Pulmonary Embolism    Result Date: 8/11/2024  Impression: No evidence of pulmonary embolism. Diffuse reticulations and groundglass opacities in a crazy paving pattern. This may represent pulmonary edema, pneumonia, pneumonitis or worsening interstitial lung disease. Electronically Signed: Neil Issa DO  8/11/2024 7:05 PM EDT  Workstation ID: [id number]       Reviewed external records from 8/5/2024 with a spinal surgeon at [facility] [place] for spinal stenosis scoliosis lumbar spondylosis sagittal plane imbalance and lumbar radiculopathy..    Reviewed internal records dated 5/2/2024 for acute myofascial strain of lumbar region and chronic midline low back pain without sciatica in this ED    Differential diagnosis considered: Urinary tract infection,coronary artery disease, ACS    IV established labs  were obtained to show an initial troponin of 61 with a repeat of 48 for a delta of -13.  Patient's D-dimer was elevated at 0.86 CT PE was performed.  Results as above negative for pulmonary embolism.  RVP negative.  Blood cultures pending at time of admission.  CBC essentially normal.  CMP shows glucose 134 creatinine 1.58 and GFR 43.1.  These are baseline for this patient.  3 months ago blood glucose 116 creatinine 1.50 and GFR 45.9.  Lactate 1.2 blood cultures pending at time of admission.  With patient's chest pain and shortness of breath, patient to be admitted to hospitalist service for further evaluation workup and care.      Patient was treated with morphine, Zofran while in the emergency room.     Discussed case with JANE Santillan, who agreed to admit patient on behalf of Dr. Witt.  Final diagnoses:   Chest pain, unspecified type   Shortness of breath   Chronic kidney disease, unspecified CKD stage   Elevated troponin   Elevated d-dimer   Abnormal chest CT       ED Disposition  ED Disposition       ED Disposition   Decision to Admit    Condition   --    Comment   Level of Care: Telemetry [5]   Admitting Physician: LLANES ALVAREZ, CARLOS [[id number]]   Attending Physician: LLANES ALVAREZ, CARLOS [[id number]]                 No follow-up provider specified.       Medication List      No changes were made to your prescriptions during this visit.            Sierra Herrera APRN  08/11/24 1945

## 2024-08-11 NOTE — Clinical Note
Level of Care: Telemetry [5]   Diagnosis: Shortness of breath [786.05.ICD-9-CM]   Admitting Physician: LLANES ALVAREZ, CARLOS [[id number]]

## 2024-08-11 NOTE — H&P
[facility] Medicine Services    Hospitalist History and Physical     Kailash Cooper Jr. : 1940 MRN:[id number] LOS:0 ROOM:      Reason for admission: Shortness of breath     Assessment / Plan     Shortness of breath  Chronic respiratory failure/pulmonary fibrosis on 2L O2 via NC  - CT PE protocol: no evidence of PE, diffuse reticulations and groundglass opacities and a crazy paving pattern.  Recent pulmonary edema, pneumonia, pneumonitis or worsening interstitial lung disease.    - pt on his chronic rate of 2L O2 via NC and not currently in any distress on exam  - WBC normal, afebrile, RVP negative  - HS troponin 61, repeat 48 (pt has chronic troponin elevation 50s-60s)  - blood cultures pending   - duonebs prn, cont home pulmicort  - check procalcitonin   - consult pulmonary for further recommendations  - continuous pulse oximetry     Hypertension  Hyperlipidemia  - Continue home Norvasc, Lasix, hydralazine, atorvastatin    H/o CVA  H/o CAD s/p PCI  - Continue home Plavix, statin    CKD stage III  - BUN 21, creatinine 1.58 (appears baseline)  - monitor BMP    Chronic low back pain  - Continue home oxycodone    RLS  - Continue home Requip    DM type II  - glucose 134  - hold home glimepiride     BPH  - cont home flomax    History of non-small cell lung cancer stage Ib status post resection ,   Former smoker quit     Nutrition:   Diet: Diabetic; Consistent Carbohydrate; Fluid Consistency: Thin (IDDSI 0)     VTE Prophylaxis:  Mechanical VTE prophylaxis orders are present.         History of Present illness     Kailash Cooper Jr. is a 83 y.o. male with PMH of COPD/pulmonary fibrosis on 2L O2 chronically at home, hypertension, hyperlipidemia, previous CVA, CKD stage III, chronic low back pain, DM type II, arthritis who presented to [facility] ED 2024 with complaints of shortness of breath that started upon waking up this morning. His shortness of breath persisted throughout the day and worse upon  exertion. He has had a mild, nonproductive cough. He denied fever, no chest pain. He has felt weak. He denied any nausea, vomiting, or diarrhea. He did use his inhaler at home with some improvement. He did not check his oxygen saturation with a pulse oximeter.    In the ED CT chest showed no evidence of PE, diffuse reticulations and groundglass opacities and a crazy paving pattern.  Recent pulmonary edema, pneumonia, pneumonitis or worsening interstitial lung disease.  WBC normal, lactate normal, BUN 21, creatinine 1.58 which appears to be the patient's baseline.  High-sensitivity troponin 61 with repeat 48 and it appears upon review of the chart that the patient has a chronic elevation of troponin in the 50s to 60s.  Respiratory viral panel is negative.  Blood cultures are pending.  He was given IV Zofran, IV morphine and admitted for further workup of shortness of breath. He denied chest pain to this provider even though the ER mentioned a complaint of chest pain.     Subjective / Review of systems     Review of Systems   Constitutional:  Positive for fatigue.   HENT: Negative.     Eyes: Negative.    Respiratory:  Positive for cough and shortness of breath.    Cardiovascular: Negative.    Gastrointestinal: Negative.    Genitourinary: Negative.    Musculoskeletal: Negative.    Skin: Negative.    Neurological: Negative.    Psychiatric/Behavioral: Negative.          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 [facility]    Diabetes     Enlarged prostate     Former smoker     Hiatal hernia     Hip pain     don    Hip pain, bilateral     Hyperlipidemia     Hypertension     Kidney disease        stage 3     Leg pain     don    Low back pain     Neck pain     Stroke       Past Surgical History:   Procedure Laterality Date    CARDIAC CATHETERIZATION Left 1/28/2022    Procedure: Cardiac  Catheterization/Vascular Study;  Surgeon: Mani Salguero MD;  Location: [facility] CATH INVASIVE LOCATION;  Service: Cardiovascular;  Laterality: Left;    CATARACT EXTRACTION      OU    COLONOSCOPY      COLONOSCOPY N/A 2022    Procedure: COLONOSCOPY;  Surgeon: Terry Holliday MD;  Location: [facility] ENDOSCOPY;  Service: Gastroenterology;  Laterality: N/A;  polyps    CORONARY STENT PLACEMENT  2022    RCA    ENDOSCOPY N/A 2022    Procedure: ESOPHAGOGASTRODUODENOSCOPY with 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, gastric antrum biopsy;  Surgeon: Terry Holliday MD;  Location: [facility] ENDOSCOPY;  Service: Gastroenterology;  Laterality: N/A;  gastritis, gastric ulcer, small bowel AVM    LUNG CANCER SURGERY      upper lobe rt lung cancer 2017  at [facility]      Social History     Socioeconomic History    Marital status:      Spouse name: Candis    Number of children: 5   Tobacco Use    Smoking status: Former     Current packs/day: 0.00     Average packs/day: 1 pack/day for 20.0 years (20.0 ttl pk-yrs)     Types: Cigarettes     Start date:      Quit date:      Years since quittin.6     Passive exposure: Past    Smokeless tobacco: Never   Vaping Use    Vaping status: Never Used   Substance and Sexual Activity    Alcohol use: Never    Drug use: Never    Sexual activity: Defer     Partners: Female     Birth control/protection: None      Family History   Problem Relation Age of Onset    Stroke Mother     Cancer Father         Prostate    Heart failure Brother     Heart disease Other       No Known Allergies   Social Determinants of Health     Tobacco Use: Medium Risk (2024)    Received from [facility]    Patient History     Smoking Tobacco Use: Former     Smokeless Tobacco Use: Never     Passive Exposure: Not on file   Alcohol Use: Not At Risk (3/25/2024)    AUDIT-C     Frequency of Alcohol Consumption: Never     Average Number of  Drinks: Patient does not drink     Frequency of Binge Drinking: Never   Financial Resource Strain: Not on file   Food Insecurity: No Food Insecurity (3/26/2024)    Hunger Vital Sign     Worried About Running Out of Food in the Last Year: Never true     Ran Out of Food in the Last Year: Never true   Transportation Needs: No Transportation Needs (3/26/2024)    PRAPARE - Transportation     Lack of Transportation (Medical): No     Lack of Transportation (Non-Medical): No   Physical Activity: Not on file   Stress: Not on file   Social Connections: Unknown (10/11/2023)    Family and Community Support     Help with Day-to-Day Activities: Not on file     Lonely or Isolated: Not on file   Interpersonal Safety: Not At Risk (8/11/2024)    Abuse Screen     Unsafe at Home or Work/School: no     Feels Threatened by Someone?: no     Does Anyone Keep You from Contacting Others or Doint Things Outside the Home?: no     Physical Sign of Abuse Present: no   Depression: Not at risk (8/1/2024)    PHQ-2     PHQ-2 Score: 0   Housing Stability: Not At Risk (3/26/2024)    Housing Stability     Current Living Arrangements: home     Potentially Unsafe Housing Conditions: none   Utilities: Not At Risk (3/26/2024)    [facility] Utilities     Threatened with loss of utilities: No   Health Literacy: Unknown (3/26/2024)    Education     Help with school or training?: Not on file     Preferred Language: English   Employment: Unknown (10/11/2023)    Employment     Do you want help finding or keeping work or a job?: Not on file   Disabilities: At Risk (3/25/2024)    Disabilities     Concentrating, Remembering, or Making Decisions Difficulty: yes     Doing Errands Independently Difficulty: no        Home Medications     Prior to Admission medications    Medication Sig Start Date End Date Taking? Authorizing Provider   allopurinol (ZYLOPRIM) 100 MG tablet Take 2 tablets by mouth Daily. Indications: Disorder of Excessive Uric Acid in the Blood 7/28/22    Britney Partida MD   amLODIPine (NORVASC) 10 MG tablet TAKE 1 TABLET BY MOUTH ONCE DAILY FOR HIGH BLOOD PRESSURE 7/17/24   Madelyn Márquez APRN   amoxicillin-clavulanate (AUGMENTIN) 875-125 MG per tablet Take 1 tablet by mouth 2 (Two) Times a Day. 4/8/24   Madelyn Márquez APRN   budesonide (PULMICORT) 0.25 MG/2ML nebulizer solution Take 2 mL by nebulization Daily. Indications: Chronic Obstructive Lung Disease 5/18/23   Toshia Jaeger MD   cephalexin (Keflex) 500 MG capsule Take 1 capsule by mouth 2 (Two) Times a Day. 6/20/24   Madelyn Márquez APRN   cetirizine (zyrTEC) 10 MG tablet Take 1 tablet by mouth Daily. 4/30/20   Madelyn Márquez APRN   clopidogrel (PLAVIX) 75 MG tablet Take 1 tablet by mouth once daily 8/6/24   Mani Salguero MD   ferrous sulfate 325 (65 FE) MG EC tablet Take 1 tablet by mouth Daily With Breakfast. 2/9/20   Geovanny Van MD   furosemide (LASIX) 40 MG tablet Take 1 tablet by mouth once daily 7/27/24   Madelyn Márquez APRN   glimepiride (AMARYL) 1 MG tablet Take 1 tablet by mouth Before Breakfast. 5/5/24   Britney Partida MD   hydrALAZINE (APRESOLINE) 50 MG tablet TAKE 1 TABLET BY MOUTH THREE TIMES DAILY 5/6/24   Madelyn Márquez APRN   megestrol (MEGACE) 40 MG tablet Take 1 tablet by mouth Daily. 3/3/24   Madelyn Márquez APRN   nitroglycerin (Nitrostat) 0.3 MG SL tablet Place 1 tablet under the tongue Every 5 (Five) Minutes As Needed for Chest Pain. Take no more than 3 doses in 15 minutes. 1/17/22   Madelyn Márquez APRN   oxyCODONE-acetaminophen (Percocet)  MG per tablet Take 1 tablet by mouth Every 6 (Six) Hours As Needed for Moderate Pain. 5/13/24   Mc Key MD   oxyCODONE-acetaminophen (Percocet)  MG per tablet Take 1 tablet by mouth Every 6 (Six) Hours As Needed for Moderate Pain. 5/13/24   Mc Key MD   oxyCODONE-acetaminophen (Percocet)  MG per tablet Take 1 tablet by mouth Every 6 (Six) Hours As Needed for Moderate Pain.  5/13/24   Mc Key MD   pantoprazole (PROTONIX) 40 MG EC tablet Take 1 tablet by mouth twice daily 7/17/24   Madelyn Márquez APRN   phenazopyridine (Pyridium) 100 MG tablet Take 1 tablet by mouth 3 (Three) Times a Day As Needed for Dysuria. 6/20/24   Madelyn Márquez APRN   pravastatin (PRAVACHOL) 40 MG tablet Take 1 tablet by mouth Daily. Indications: High Amount of Fats in the Blood 3/15/23   Madelyn Márquez APRN   Probiotic Product (CULTURELLE PROBIOTICS PO) Take 20 mg by mouth Daily. Indications: diabetic supplement 6/23/21   Britney Partida MD   rOPINIRole (REQUIP) 0.25 MG tablet Take 1 tablet by mouth Every Night. Take 1 hour before bedtime. 8/1/24   Madelyn Márquez APRN   tamsulosin (FLOMAX) 0.4 MG capsule 24 hr capsule Take 1 capsule by mouth 2 (Two) Times a Day.    Britney Partida MD   vitamin B-12 (CYANOCOBALAMIN) 1000 MCG tablet Take 1 tablet by mouth 2 (Two) Times a Day. Indications: Inadequate Vitamin B12 1/1/21   Britney Partida MD        Objective / Physical Exam     Vital signs:  Temp: 98.8 °F (37.1 °C)  BP: 121/65  Heart Rate: 92  Resp: (!) 42  SpO2: 98 %  Weight: 62.2 kg (137 lb 3.2 oz)    Admission Weight: Weight: 62.2 kg (137 lb 3.2 oz)    Physical Exam  Vitals and nursing note reviewed.   HENT:      Head: Normocephalic and atraumatic.   Eyes:      Extraocular Movements: Extraocular movements intact.      Pupils: Pupils are equal, round, and reactive to light.   Cardiovascular:      Rate and Rhythm: Normal rate and regular rhythm.      Pulses: Normal pulses.      Heart sounds: Normal heart sounds.   Pulmonary:      Effort: Pulmonary effort is normal.      Breath sounds: Examination of the right-upper field reveals rhonchi. Examination of the left-upper field reveals rhonchi.   Abdominal:      General: Bowel sounds are normal.      Palpations: Abdomen is soft.      Tenderness: There is no abdominal tenderness.   Musculoskeletal:         General: Normal range of  motion.   Skin:     General: Skin is warm and dry.   Neurological:      Mental Status: He is alert and oriented to person, place, and time.   Psychiatric:         Mood and Affect: Mood normal.         Behavior: Behavior normal.          Labs     Results from last 7 days   Lab Units 08/11/24  1635   WBC 10*3/mm3 8.96   HEMOGLOBIN g/dL 13.0   HEMATOCRIT % 41.0   PLATELETS 10*3/mm3 282      Results from last 7 days   Lab Units 08/11/24  1635   ALK PHOS U/L 89   AST (SGOT) U/L 26   ALT (SGPT) U/L 22           Results from last 7 days   Lab Units 08/11/24  1635   SODIUM mmol/L 144   POTASSIUM mmol/L 3.5   CHLORIDE mmol/L 103   CO2 mmol/L 25.7   BUN mg/dL 21   CREATININE mg/dL 1.58*   GLUCOSE mg/dL 134*        Imaging     CT Angiogram Chest Pulmonary Embolism    Result Date: 8/11/2024  CT ANGIOGRAM CHEST PULMONARY EMBOLISM Date of Exam: 8/11/2024 6:40 PM EDT Indication: Shortness of breath, chest pain, elevated D-dimer. Comparison: CT chest without contrast 8/9/2017 Technique: Axial CT images were obtained of the chest after the uneventful intravenous administration of iodinated contrast utilizing pulmonary embolism protocol.  Sagittal and coronal reconstructions were performed.  Automated exposure control and iterative reconstruction methods were used. Findings: Pulmonary arteries:Adequate opacification of the pulmonary arteries. No evidence of acute pulmonary embolism. Aorta: Minimal ectasia of the descending thoracic aorta measuring up to 3.0 cm. Vascular calcification of the thoracic aorta. Otherwise unremarkable Lungs and Pleura: Diffuse reticulations and groundglass opacities noted throughout the lungs, in a crazy paving pattern. No definite consolidation. The central airways are patent. No pleural effusion or pneumothorax. Mediastinum/Maliha: Small hiatal hernia. No definite suspicious mass or lymphadenopathy. Heart: Normal in size. No pericardial effusion. Normal RV/LV ratio. Soft Tissue: Unremarkable. Upper  Abdomen: Unremarkable. Bones: No acute osseous abnormality.     Impression: No evidence of pulmonary embolism. Diffuse reticulations and groundglass opacities in a crazy paving pattern. This may represent pulmonary edema, pneumonia, pneumonitis or worsening interstitial lung disease. Electronically Signed: Neil Issa DO  8/11/2024 7:05 PM EDT  Workstation ID: [id number]          Current Medications     Scheduled Meds:  allopurinol, 200 mg, Oral, Daily  amLODIPine, 10 mg, Oral, Daily  atorvastatin, 10 mg, Oral, Daily  budesonide, 0.25 mg, Nebulization, Daily - RT  cetirizine, 10 mg, Oral, Daily  clopidogrel, 75 mg, Oral, Daily  [START ON 8/12/2024] ferrous sulfate, 324 mg, Oral, Daily With Breakfast  furosemide, 40 mg, Oral, Daily  hydrALAZINE, 50 mg, Oral, TID  megestrol, 40 mg, Oral, Daily  pantoprazole, 40 mg, Oral, BID  rOPINIRole, 0.25 mg, Oral, Nightly  sodium chloride, 10 mL, Intravenous, Q12H  tamsulosin, 0.4 mg, Oral, BID  vitamin B-12, 1,000 mcg, Oral, BID         JANE Cavanaugh   [facility] Medicine  08/11/24   20:43 EDT

## 2024-08-12 LAB
QT INTERVAL: 368 MS
QTC INTERVAL: 478 MS

## 2024-08-12 PROCEDURE — G0378 HOSPITAL OBSERVATION PER HR: HCPCS

## 2024-08-12 NOTE — ED NOTES
Wife mentioned to staff at this time that  is usually on 2L NC at home. Patient put on 2L NC by staff.

## 2024-08-12 NOTE — SIGNIFICANT NOTE
Pt hit call light around 2350, PCA answered call light. Pt stated he wanted to leave AMA. RN came into the room and educated patient about benefits of staying and risks of leaving. Daughter at bedside. Both patient and daughter receptive to the information and the patient still wanted to leave. AMA paperwork signed, IVs removed.

## 2024-08-16 LAB
BACTERIA SPEC AEROBE CULT: NORMAL
BACTERIA SPEC AEROBE CULT: NORMAL

## 2024-08-19 ENCOUNTER — OFFICE VISIT (OUTPATIENT)
Dept: PAIN MEDICINE | Facility: CLINIC | Age: 84
End: 2024-08-19
Payer: MEDICARE

## 2024-08-19 VITALS
HEART RATE: 97 BPM | OXYGEN SATURATION: 96 % | DIASTOLIC BLOOD PRESSURE: 85 MMHG | SYSTOLIC BLOOD PRESSURE: 124 MMHG | RESPIRATION RATE: 16 BRPM

## 2024-08-19 DIAGNOSIS — M47.814 THORACIC SPONDYLOSIS: ICD-10-CM

## 2024-08-19 DIAGNOSIS — G89.29 CHRONIC BILATERAL LOW BACK PAIN WITHOUT SCIATICA: Primary | ICD-10-CM

## 2024-08-19 DIAGNOSIS — M47.812 CERVICAL SPONDYLOSIS WITHOUT MYELOPATHY: ICD-10-CM

## 2024-08-19 DIAGNOSIS — G89.4 CHRONIC PAIN SYNDROME: ICD-10-CM

## 2024-08-19 DIAGNOSIS — M54.2 CERVICALGIA: ICD-10-CM

## 2024-08-19 DIAGNOSIS — M54.50 CHRONIC BILATERAL LOW BACK PAIN WITHOUT SCIATICA: Primary | ICD-10-CM

## 2024-08-19 DIAGNOSIS — M47.817 LUMBOSACRAL SPONDYLOSIS WITHOUT MYELOPATHY: ICD-10-CM

## 2024-08-19 PROCEDURE — 1125F AMNT PAIN NOTED PAIN PRSNT: CPT | Performed by: PHYSICAL MEDICINE & REHABILITATION

## 2024-08-19 PROCEDURE — G0463 HOSPITAL OUTPT CLINIC VISIT: HCPCS | Performed by: PHYSICAL MEDICINE & REHABILITATION

## 2024-08-19 PROCEDURE — 99214 OFFICE O/P EST MOD 30 MIN: CPT | Performed by: PHYSICAL MEDICINE & REHABILITATION

## 2024-08-19 PROCEDURE — 1159F MED LIST DOCD IN RCRD: CPT | Performed by: PHYSICAL MEDICINE & REHABILITATION

## 2024-08-19 PROCEDURE — 3074F SYST BP LT 130 MM HG: CPT | Performed by: PHYSICAL MEDICINE & REHABILITATION

## 2024-08-19 PROCEDURE — 1160F RVW MEDS BY RX/DR IN RCRD: CPT | Performed by: PHYSICAL MEDICINE & REHABILITATION

## 2024-08-19 PROCEDURE — 3079F DIAST BP 80-89 MM HG: CPT | Performed by: PHYSICAL MEDICINE & REHABILITATION

## 2024-08-19 RX ORDER — OXYCODONE HYDROCHLORIDE 15 MG/1
15 TABLET ORAL EVERY 6 HOURS PRN
Qty: 120 TABLET | Refills: 0 | Status: SHIPPED | OUTPATIENT
Start: 2024-08-19

## 2024-08-19 NOTE — PROGRESS NOTES
Subjective    CC back pain, neck pain, bilateral leg pain  Kailash Cooper Jr. is a 83 y.o. male with multiple comorbidities including CKD, DM, COPD, chronic back pain polyarthralgia here for f/u.  Chronic back pain radiating to bilateral hips worse with standing walking or prolonged activity.  Denies new weakness saddle anesthesia bladder bowel continence.  Chronic axial neck pain radiating to bilateral shoulder without radicular pain.  Continued chronic polyarthralgia and myofascial pain worse with activity.  Tried physical therapy/chiropractor with marginal relief.  Tried LESI's with marginal relief.  Had MI with stent, began Plavix for life. Had another MI with PNA, doing better now.  Saw [facility], told he is nonsurgical, failed LESIs, should utilize medical management for his low back pain.     Utilized hydrocodone 10/325 6 times daily as needed for severe pain, failed 4 times daily as needed. Rotated to Percocet 10mg QID prn. Functional benefit /denies side effects.    L-spine MRI 2018 moderate degenerative changes throughout facet arthropathy mild to moderate foraminal narrowing, scoliosis.  C-spine MRI 2018 marked degenerative disc and some posterior element disease, causing spinal stenosis at multiple levels, including C2-3, C3-4, C5-6, and C6-7.  There multiple foraminal impingements    Pain Assessment   Location of Pain: Lower Back, neck pain, joint  Description of Pain: Dull/Aching, Throbbing, Stabbing  Previous Pain Rating :6  Current Pain Ratin  Aggravating Factors: Activity  Alleviating Factors: Rest, Medication    PEG Assessment   What number best describes your pain on average in the past week? 5  What number best describes how, during the past week, pain has interfered with your enjoyment of life?3  What number best describes how, during the past week, pain has interfered with your general activity?10     The following portions of the patient's history were reviewed and updated as  appropriate: allergies, current medications, past family history, past medical history, past social history, past surgical history and problem list.     has a past medical history of Arthritis, Cancer, Diabetes, Enlarged prostate, Former smoker, Hiatal hernia, Hip pain, Hip pain, bilateral, Hyperlipidemia, Hypertension, Kidney disease, Leg pain, Low back pain, Neck pain, and Stroke.   has a past surgical history that includes Cataract extraction (); Colonoscopy (); Lung cancer surgery; Coronary stent placement (2022); Cardiac catheterization (Left, 2022); Colonoscopy (N/A, 2022); and Esophagogastroduodenoscopy (N/A, 2022).  family history includes Cancer in his father; Heart disease in an other family member; Heart failure in his brother; Stroke in his mother.  Social History     Tobacco Use    Smoking status: Former     Current packs/day: 0.00     Average packs/day: 1 pack/day for 20.0 years (20.0 ttl pk-yrs)     Types: Cigarettes     Start date:      Quit date:      Years since quittin.6     Passive exposure: Past    Smokeless tobacco: Never   Substance Use Topics    Alcohol use: Never     Review of Systems   Musculoskeletal:  Positive for arthralgias, back pain, myalgias and neck pain.   All other systems reviewed and are negative.    Objective   Physical Exam   Constitutional: He is oriented to person, place, and time. He appears well-developed and well-nourished. No distress.   HENT:   Head: Normocephalic and atraumatic.   Eyes: Pupils are equal, round, and reactive to light.   Cardiovascular: Normal rate, regular rhythm and normal heart sounds.   Pulmonary/Chest: Effort normal and breath sounds normal.   Abdominal: Soft. Normal appearance and bowel sounds are normal. He exhibits no distension. There is no abdominal tenderness.   Musculoskeletal:      Cervical back: He exhibits tenderness.   Neurological: He is alert and oriented to person, place, and time. He has  normal reflexes. He displays normal reflexes. No sensory deficit.   Psychiatric: His behavior is normal. Mood, judgment and thought content normal.     /85   Pulse 97   Resp 16   SpO2 96%     PHQ 9 on chart  Opioid risk tool low risk    Assessment & Plan   Diagnoses and all orders for this visit:    1. Chronic bilateral low back pain without sciatica (Primary)    2. Cervicalgia    3. Cervical spondylosis without myelopathy    4. Chronic pain syndrome    5. Lumbosacral spondylosis without myelopathy    6. Thoracic spondylosis    Summary  Kailash Cooper is a 83 y.o. male with multiple comorbidities including CKD, DM, COPD, chronic back pain polyarthralgia here for f/u.   Chronic pain from lumbar and cervical DDD spondylosis, polyarthralgia/myofascial pain.  Tried physical therapy, chiropractor, LESI's with marginal relief.  Axial back pain interfering with ADLs and his ability to exercise.  No NSAIDs due to CKD.        Complains of worsening back and bilateral hip pain.  Declines injections.    Increased to Hydrocodone 10/325 6 times daily as needed for severe pain, worked better than 4 or 5 pills daily with [facility] [place] pain physician, dora. Rotated to Percocet 10mg QID prn, rotated back to Norco 10mg q4h prn. Rotated to MS-Contin 15mg TID for relative dosage reduction, increased to 30mg BID, no longer covered. Rotated to Percocet 10mg QID prn, no longer working well, increase to Oxycodone 15mg QID prn, discussed we cannot increase any further, he voiced understanding. Filled 7/24/24.  UDS 6/13/24 and inspect reviewed.  Discussed risk of tolerance, dependence, respiratory depression, coma and death associated with use of oral opioids for treatment of chronic nonmalignant pain.     RTC 3 months for f/u        Back Pain

## 2024-09-05 ENCOUNTER — OFFICE VISIT (OUTPATIENT)
Dept: FAMILY MEDICINE CLINIC | Facility: CLINIC | Age: 84
End: 2024-09-05
Payer: MEDICARE

## 2024-09-05 VITALS
DIASTOLIC BLOOD PRESSURE: 62 MMHG | HEART RATE: 92 BPM | TEMPERATURE: 98.2 F | HEIGHT: 65 IN | WEIGHT: 140 LBS | OXYGEN SATURATION: 94 % | SYSTOLIC BLOOD PRESSURE: 145 MMHG | BODY MASS INDEX: 23.32 KG/M2

## 2024-09-05 DIAGNOSIS — J18.9 PNEUMONIA DUE TO INFECTIOUS ORGANISM, UNSPECIFIED LATERALITY, UNSPECIFIED PART OF LUNG: Primary | ICD-10-CM

## 2024-09-05 DIAGNOSIS — R31.9 URINARY TRACT INFECTION WITH HEMATURIA, SITE UNSPECIFIED: ICD-10-CM

## 2024-09-05 DIAGNOSIS — N39.0 URINARY TRACT INFECTION WITH HEMATURIA, SITE UNSPECIFIED: ICD-10-CM

## 2024-09-05 RX ORDER — AZITHROMYCIN 500 MG/1
1 TABLET, FILM COATED ORAL DAILY
COMMUNITY
Start: 2024-09-02

## 2024-09-05 RX ORDER — AMOXICILLIN AND CLAVULANATE POTASSIUM 500; 125 MG/1; MG/1
1 TABLET, FILM COATED ORAL 3 TIMES DAILY
COMMUNITY
Start: 2024-09-02

## 2024-09-05 NOTE — PROGRESS NOTES
"Chief Complaint  er follow up (Er follow up for Pneumonia, uti and dehydration )    History of Present Illness : 2 day hospital admission on 8/31/24. Discharge medications: Augmentin and Azithromycin. Improvement noted today with return of appetite and improved energy level.   Ears:    Pain: no.  Drainage: no  Nose:  Epistaxis: no. Nasal drainage: no. Nasal congestion: no. Post nasal drip: no.   Sneezing: no.  Sinusitis:  Facial pain: no.  Headache: no.   Throat:   Soreness: no.  Dysphagia.  Lungs:  Cough: no.  Pleuritic pain: no.  Smoker: no.  General:  Myalgias: no. Malaise: no.   Fever: 99 resolved.  Chills: no.   Gastroenterology:  Diarrhea: no. Nausea: no. Vomiting: no      Objective     Vital Signs:   /62 (BP Location: Right arm, Patient Position: Sitting, Cuff Size: Adult)   Pulse 92   Temp 98.2 °F (36.8 °C) (Infrared)   Ht 165.1 cm (65\")   Wt 63.5 kg (140 lb)   SpO2 94%   BMI 23.30 kg/m²   Current Outpatient Medications on File Prior to Visit   Medication Sig Dispense Refill    allopurinol (ZYLOPRIM) 100 MG tablet Take 2 tablets by mouth Daily. Indications: Disorder of Excessive Uric Acid in the Blood      amLODIPine (NORVASC) 10 MG tablet TAKE 1 TABLET BY MOUTH ONCE DAILY FOR HIGH BLOOD PRESSURE 90 tablet 0    amoxicillin-clavulanate (AUGMENTIN) 500-125 MG per tablet Take 1 tablet by mouth 3 times a day.      azithromycin (ZITHROMAX) 500 MG tablet Take 1 tablet by mouth Daily.      budesonide (PULMICORT) 0.25 MG/2ML nebulizer solution Take 2 mL by nebulization Daily. Indications: Chronic Obstructive Lung Disease 180 mL 1    cetirizine (zyrTEC) 10 MG tablet Take 1 tablet by mouth Daily. 90 tablet 1    clopidogrel (PLAVIX) 75 MG tablet Take 1 tablet by mouth once daily 90 tablet 0    ferrous sulfate 325 (65 FE) MG EC tablet Take 1 tablet by mouth Daily With Breakfast.      furosemide (LASIX) 40 MG tablet Take 1 tablet by mouth once daily 90 tablet 0    glimepiride (AMARYL) 1 MG tablet Take 1 " tablet by mouth Before Breakfast.      hydrALAZINE (APRESOLINE) 50 MG tablet TAKE 1 TABLET BY MOUTH THREE TIMES DAILY 270 tablet 0    megestrol (MEGACE) 40 MG tablet Take 1 tablet by mouth Daily. 90 tablet 1    nitroglycerin (Nitrostat) 0.3 MG SL tablet Place 1 tablet under the tongue Every 5 (Five) Minutes As Needed for Chest Pain. Take no more than 3 doses in 15 minutes. 50 tablet 12    oxyCODONE (ROXICODONE) 15 MG immediate release tablet Take 1 tablet by mouth Every 6 (Six) Hours As Needed for Severe Pain. 120 tablet 0    oxyCODONE (ROXICODONE) 15 MG immediate release tablet Take 1 tablet by mouth Every 6 (Six) Hours As Needed for Severe Pain. 120 tablet 0    oxyCODONE (ROXICODONE) 15 MG immediate release tablet Take 1 tablet by mouth Every 6 (Six) Hours As Needed for Severe Pain. 120 tablet 0    oxyCODONE-acetaminophen (Percocet)  MG per tablet Take 1 tablet by mouth Every 6 (Six) Hours As Needed for Moderate Pain. 120 tablet 0    pantoprazole (PROTONIX) 40 MG EC tablet Take 1 tablet by mouth twice daily 180 tablet 0    pravastatin (PRAVACHOL) 40 MG tablet Take 1 tablet by mouth Daily. Indications: High Amount of Fats in the Blood 90 tablet 1    Probiotic Product (CULTURELLE PROBIOTICS PO) Take 20 mg by mouth Daily. Indications: diabetic supplement      rOPINIRole (REQUIP) 0.25 MG tablet Take 1 tablet by mouth Every Night. Take 1 hour before bedtime. 30 tablet 1    tamsulosin (FLOMAX) 0.4 MG capsule 24 hr capsule Take 1 capsule by mouth 2 (Two) Times a Day.      vitamin B-12 (CYANOCOBALAMIN) 1000 MCG tablet Take 1 tablet by mouth 2 (Two) Times a Day. Indications: Inadequate Vitamin B12       No current facility-administered medications on file prior to visit.        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 [facility]    Diabetes     Enlarged prostate     Former smoker     Hiatal hernia     Hip pain     don    Hip pain, bilateral      Hyperlipidemia     Hypertension     Kidney disease        stage 3     Leg pain     don    Low back pain     Neck pain     Stroke       Past Surgical History:   Procedure Laterality Date    CARDIAC CATHETERIZATION Left 2022    Procedure: Cardiac Catheterization/Vascular Study;  Surgeon: Mani Salguero MD;  Location: [facility] CATH INVASIVE LOCATION;  Service: Cardiovascular;  Laterality: Left;    CATARACT EXTRACTION      OU    COLONOSCOPY      COLONOSCOPY N/A 2022    Procedure: COLONOSCOPY;  Surgeon: Terry Holliday MD;  Location: [facility] ENDOSCOPY;  Service: Gastroenterology;  Laterality: N/A;  polyps    CORONARY STENT PLACEMENT  2022    RCA    ENDOSCOPY N/A 2022    Procedure: ESOPHAGOGASTRODUODENOSCOPY with 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, gastric antrum biopsy;  Surgeon: Terry Holliday MD;  Location: [facility] ENDOSCOPY;  Service: Gastroenterology;  Laterality: N/A;  gastritis, gastric ulcer, small bowel AVM    LUNG CANCER SURGERY      upper lobe rt lung cancer 2017  at [facility]      Family History   Problem Relation Age of Onset    Stroke Mother     Cancer Father         Prostate    Heart failure Brother     Heart disease Other       Social History     Socioeconomic History    Marital status:      Spouse name: Candis    Number of children: 5   Tobacco Use    Smoking status: Former     Current packs/day: 0.00     Average packs/day: 1 pack/day for 20.0 years (20.0 ttl pk-yrs)     Types: Cigarettes     Start date:      Quit date:      Years since quittin.6     Passive exposure: Past    Smokeless tobacco: Never   Vaping Use    Vaping status: Never Used   Substance and Sexual Activity    Alcohol use: Never    Drug use: Never    Sexual activity: Defer     Partners: Female     Birth control/protection: None         Admission on 2024, Discharged on 2024   Component Date Value Ref Range Status    Glucose  08/11/2024 134 (H)  65 - 99 mg/dL Final    BUN 08/11/2024 21  8 - 23 mg/dL Final    Creatinine 08/11/2024 1.58 (H)  0.76 - 1.27 mg/dL Final    Sodium 08/11/2024 144  136 - 145 mmol/L Final    Potassium 08/11/2024 3.5  3.5 - 5.2 mmol/L Final    Chloride 08/11/2024 103  98 - 107 mmol/L Final    CO2 08/11/2024 25.7  22.0 - 29.0 mmol/L Final    Calcium 08/11/2024 10.4  8.6 - 10.5 mg/dL Final    Total Protein 08/11/2024 7.6  6.0 - 8.5 g/dL Final    Albumin 08/11/2024 4.8  3.5 - 5.2 g/dL Final    ALT (SGPT) 08/11/2024 22  1 - 41 U/L Final    AST (SGOT) 08/11/2024 26  1 - 40 U/L Final    Alkaline Phosphatase 08/11/2024 89  39 - 117 U/L Final    Total Bilirubin 08/11/2024 0.3  0.0 - 1.2 mg/dL Final    Globulin 08/11/2024 2.8  gm/dL Final    A/G Ratio 08/11/2024 1.7  g/dL Final    BUN/Creatinine Ratio 08/11/2024 13.3  7.0 - 25.0 Final    Anion Gap 08/11/2024 15.3 (H)  5.0 - 15.0 mmol/L Final    eGFR 08/11/2024 43.1 (L)  >60.0 mL/min/1.73 Final    Blood Culture 08/11/2024 No growth at 5 days   Final    Blood Culture 08/11/2024 No growth at 5 days   Final    QT Interval 08/11/2024 368  ms Final    QTC Interval 08/11/2024 478  ms Final    WBC 08/11/2024 8.96  3.40 - 10.80 10*3/mm3 Final    RBC 08/11/2024 4.24  4.14 - 5.80 10*6/mm3 Final    Hemoglobin 08/11/2024 13.0  13.0 - 17.7 g/dL Final    Hematocrit 08/11/2024 41.0  37.5 - 51.0 % Final    MCV 08/11/2024 96.7  79.0 - 97.0 fL Final    MCH 08/11/2024 30.7  26.6 - 33.0 pg Final    MCHC 08/11/2024 31.7  31.5 - 35.7 g/dL Final    RDW 08/11/2024 13.4  12.3 - 15.4 % Final    RDW-SD 08/11/2024 46.5  37.0 - 54.0 fl Final    MPV 08/11/2024 9.0  6.0 - 12.0 fL Final    Platelets 08/11/2024 282  140 - 450 10*3/mm3 Final    Neutrophil % 08/11/2024 80.7 (H)  42.7 - 76.0 % Final    Lymphocyte % 08/11/2024 9.8 (L)  19.6 - 45.3 % Final    Monocyte % 08/11/2024 6.1  5.0 - 12.0 % Final    Eosinophil % 08/11/2024 2.2  0.3 - 6.2 % Final    Basophil % 08/11/2024 0.3  0.0 - 1.5 % Final    Immature  Grans % 08/11/2024 0.9 (H)  0.0 - 0.5 % Final    Neutrophils, Absolute 08/11/2024 7.22 (H)  1.70 - 7.00 10*3/mm3 Final    Lymphocytes, Absolute 08/11/2024 0.88  0.70 - 3.10 10*3/mm3 Final    Monocytes, Absolute 08/11/2024 0.55  0.10 - 0.90 10*3/mm3 Final    Eosinophils, Absolute 08/11/2024 0.20  0.00 - 0.40 10*3/mm3 Final    Basophils, Absolute 08/11/2024 0.03  0.00 - 0.20 10*3/mm3 Final    Immature Grans, Absolute 08/11/2024 0.08 (H)  0.00 - 0.05 10*3/mm3 Final    nRBC 08/11/2024 0.0  0.0 - 0.2 /100 WBC Final    Extra Tube 08/11/2024 Hold for add-ons.   Final    Auto resulted.    Extra Tube 08/11/2024 hold for add-on   Final    Auto resulted    Extra Tube 08/11/2024 Hold for add-ons.   Final    Auto resulted.    Extra Tube 08/11/2024 Hold for add-ons.   Final    Auto resulted    ADENOVIRUS, PCR 08/11/2024 Not Detected  Not Detected Final    Coronavirus 229E 08/11/2024 Not Detected  Not Detected Final    Coronavirus HKU1 08/11/2024 Not Detected  Not Detected Final    Coronavirus NL63 08/11/2024 Not Detected  Not Detected Final    Coronavirus OC43 08/11/2024 Not Detected  Not Detected Final    COVID19 08/11/2024 Not Detected  Not Detected - Ref. Range Final    Human Metapneumovirus 08/11/2024 Not Detected  Not Detected Final    Human Rhinovirus/Enterovirus 08/11/2024 Not Detected  Not Detected Final    Influenza A PCR 08/11/2024 Not Detected  Not Detected Final    Influenza B PCR 08/11/2024 Not Detected  Not Detected Final    Parainfluenza Virus 1 08/11/2024 Not Detected  Not Detected Final    Parainfluenza Virus 2 08/11/2024 Not Detected  Not Detected Final    Parainfluenza Virus 3 08/11/2024 Not Detected  Not Detected Final    Parainfluenza Virus 4 08/11/2024 Not Detected  Not Detected Final    RSV, PCR 08/11/2024 Not Detected  Not Detected Final    Bordetella pertussis pcr 08/11/2024 Not Detected  Not Detected Final    Bordetella parapertussis PCR 08/11/2024 Not Detected  Not Detected Final    Chlamydophila  pneumoniae PCR 08/11/2024 Not Detected  Not Detected Final    Mycoplasma pneumo by PCR 08/11/2024 Not Detected  Not Detected Final    D-Dimer, Quantitative 08/11/2024 0.86 (H)  0.00 - 0.83 mg/L (FEU) Final    HS Troponin T 08/11/2024 61 (C)  <22 ng/L Final    Lactate 08/11/2024 1.2  0.3 - 2.0 mmol/L Final    Serial Number: [id number]:  [id number]    HS Troponin T 08/11/2024 48 (H)  <22 ng/L Final    Troponin T Delta 08/11/2024 -13 (L)  >=-4 - <+4 ng/L Final    Procalcitonin 08/11/2024 0.07  0.00 - 0.25 ng/mL Final    Glucose 08/11/2024 122 (H)  70 - 105 mg/dL Final    Serial Number: [id number]:  [id number]    Glucose 08/11/2024 115 (H)  70 - 105 mg/dL Final    Serial Number: [id number]:  [id number]   Orders Only on 06/20/2024   Component Date Value Ref Range Status    Urine Culture 06/20/2024 Final report   Final    Result 1 06/20/2024 No growth   Final   Office Visit on 06/20/2024   Component Date Value Ref Range Status    Color 06/20/2024 Yellow  Yellow, Straw, Dark Yellow, Tegan Final    Clarity, UA 06/20/2024 Clear  Clear Final    Specific Gravity  06/20/2024 1.025  1.005 - 1.030 Final    pH, Urine 06/20/2024 6.0  5.0 - 8.0 Final    Leukocytes 06/20/2024 Negative  Negative Final    Nitrite, UA 06/20/2024 Negative  Negative Final    Protein, POC 06/20/2024 3+ (A)  Negative mg/dL Final    Glucose, UA 06/20/2024 Negative  Negative mg/dL Final    Ketones, UA 06/20/2024 Negative  Negative Final    Urobilinogen, UA 06/20/2024 Normal  Normal, 0.2 E.U./dL Final    Bilirubin 06/20/2024 Negative  Negative Final    Blood, UA 06/20/2024 Negative  Negative Final    Lot Number 06/20/2024 [id number]   Final    Expiration Date 06/20/2024 1,[id number]   Final         Physical Exam   General:  No acute distress,  good energy level, interactive, cooperative with exam.  Eyes:  Normal.  Ears:  Canals: normal  TM: intact, no erythema, air fluid level, bulging, dilated vessels  Nose:  Breathing comfortably through  the nose. Scant clear mucous. Normal pink mucosa, 2+ edema bilaterally.  Sinuses:  Frontal: non-tender  Maxillary: non-tender  Buccal Cavity:  Moist membranes  No oral lesions  Throat:  No mucous draining down the posterior oropharyngeal wall. No erythema. No exudate.  No laryngitis  Lungs:  Clear to auscultation bilaterally, excellent air movement throughout. Egophony in left mid lung field  Voice strong and clear  Cor:  Heart regular rate and rhythm with systolic murmur   Abdomen:  Bowel sounds are normal pitch and frequency x 4 quadrants  No abdominal, suprapubic or CVA tenderness. No palpable mass.  Lymph nodes:  No enlarged anterior or posterior cervical lymph nodes  Neck:  Supple.  No palpable mass  Integument:  Warm, dry, pink without exanthema     Result Review  Data Reviewed:{ Labs  Result Review  Imaging  Med Tab  Media :23}                     Assessment and Plan {CC Problem List  Visit Diagnosis  ROS  Review (Popup)  Health Maintenance  Quality  BestPractice  Medications  SmartSets  SnapShot Encounters  Media :23}   Diagnoses and all orders for this visit:    1. Pneumonia due to infectious organism, unspecified laterality, unspecified part of lung (Primary)  -     XR Chest 2 View; Future    2. Urinary tract infection with hematuria, site unspecified  -     POC Urinalysis Dipstick, Automated          Follow Up {Instructions Charge Capture  Follow-up Communications :23}     Patient was given instructions and counseling regarding his condition or for health maintenance advice. Please see specific information pulled into the AVS (placed there by myself) if appropriate.    Return if symptoms worsen or fail to improve.    MDM   Patient improving. Clinically stable at time of this exam. Continue hydration, antibiotics.  Monitor for signs of developing thrush, discussed.    Gabriela Littlejohn MD

## 2024-09-10 RX ORDER — MEGESTROL ACETATE 40 MG/1
40 TABLET ORAL DAILY
Qty: 90 TABLET | Refills: 0 | Status: SHIPPED | OUTPATIENT
Start: 2024-09-10

## 2024-09-12 ENCOUNTER — TRANSCRIBE ORDERS (OUTPATIENT)
Dept: ADMINISTRATIVE | Facility: HOSPITAL | Age: 84
End: 2024-09-12
Payer: MEDICARE

## 2024-09-12 DIAGNOSIS — Z85.118 H/O: LUNG CANCER: Primary | ICD-10-CM

## 2024-09-30 RX ORDER — MEGESTROL ACETATE 40 MG/1
40 TABLET ORAL DAILY
Qty: 90 TABLET | Refills: 0 | Status: SHIPPED | OUTPATIENT
Start: 2024-09-30

## 2024-10-28 RX ORDER — HYDRALAZINE HYDROCHLORIDE 50 MG/1
50 TABLET, FILM COATED ORAL 3 TIMES DAILY
Qty: 270 TABLET | Refills: 0 | Status: SHIPPED | OUTPATIENT
Start: 2024-10-28

## 2024-10-28 RX ORDER — AMLODIPINE BESYLATE 10 MG/1
TABLET ORAL
Qty: 90 TABLET | Refills: 0 | Status: SHIPPED | OUTPATIENT
Start: 2024-10-28

## 2024-10-28 RX ORDER — CLOPIDOGREL BISULFATE 75 MG/1
75 TABLET ORAL DAILY
Qty: 90 TABLET | Refills: 0 | Status: SHIPPED | OUTPATIENT
Start: 2024-10-28

## 2024-10-28 RX ORDER — PRAVASTATIN SODIUM 40 MG
40 TABLET ORAL DAILY
Qty: 90 TABLET | Refills: 0 | Status: SHIPPED | OUTPATIENT
Start: 2024-10-28

## 2024-10-28 RX ORDER — PANTOPRAZOLE SODIUM 40 MG/1
TABLET, DELAYED RELEASE ORAL
Qty: 180 TABLET | Refills: 0 | Status: SHIPPED | OUTPATIENT
Start: 2024-10-28

## 2024-11-04 ENCOUNTER — TELEPHONE (OUTPATIENT)
Dept: FAMILY MEDICINE CLINIC | Facility: CLINIC | Age: 84
End: 2024-11-04
Payer: MEDICARE

## 2024-11-04 NOTE — TELEPHONE ENCOUNTER
Caller: Kailash Cooper Jr.    Relationship to patient: Self    Best call back number: [phone]    Patient is needing: PATIENT WOULD LIKE A CALLBACK ABOUT HIS OXYGEN TANK NEEDING FILTER REPLACED AND DUST AND LINT ALL OVER THE VENT. PATIENT SAID HIS OXYGEN LEVEL IS GOOD UNTIL HE GETS UP AND HAS TO DO SOME WORK.     PATIENT STATED HE HAS CALLED [facility] [employer] AND THEY SAY THEY KNOW NOTHING ABOUT IT.    PLEASE CALL TO ADVISE.

## 2024-11-18 ENCOUNTER — OFFICE VISIT (OUTPATIENT)
Dept: PAIN MEDICINE | Facility: CLINIC | Age: 84
End: 2024-11-18
Payer: MEDICARE

## 2024-11-18 VITALS
DIASTOLIC BLOOD PRESSURE: 80 MMHG | SYSTOLIC BLOOD PRESSURE: 142 MMHG | OXYGEN SATURATION: 95 % | RESPIRATION RATE: 16 BRPM | HEART RATE: 104 BPM

## 2024-11-18 DIAGNOSIS — M47.814 THORACIC SPONDYLOSIS: ICD-10-CM

## 2024-11-18 DIAGNOSIS — M54.2 CERVICALGIA: ICD-10-CM

## 2024-11-18 DIAGNOSIS — M47.812 CERVICAL SPONDYLOSIS WITHOUT MYELOPATHY: ICD-10-CM

## 2024-11-18 DIAGNOSIS — G89.4 CHRONIC PAIN SYNDROME: ICD-10-CM

## 2024-11-18 DIAGNOSIS — M47.817 LUMBOSACRAL SPONDYLOSIS WITHOUT MYELOPATHY: ICD-10-CM

## 2024-11-18 DIAGNOSIS — G89.29 CHRONIC BILATERAL LOW BACK PAIN WITHOUT SCIATICA: Primary | ICD-10-CM

## 2024-11-18 DIAGNOSIS — M54.50 CHRONIC BILATERAL LOW BACK PAIN WITHOUT SCIATICA: Primary | ICD-10-CM

## 2024-11-18 PROCEDURE — 1125F AMNT PAIN NOTED PAIN PRSNT: CPT | Performed by: PHYSICAL MEDICINE & REHABILITATION

## 2024-11-18 PROCEDURE — 99214 OFFICE O/P EST MOD 30 MIN: CPT | Performed by: PHYSICAL MEDICINE & REHABILITATION

## 2024-11-18 PROCEDURE — 3079F DIAST BP 80-89 MM HG: CPT | Performed by: PHYSICAL MEDICINE & REHABILITATION

## 2024-11-18 PROCEDURE — 3077F SYST BP >= 140 MM HG: CPT | Performed by: PHYSICAL MEDICINE & REHABILITATION

## 2024-11-18 PROCEDURE — G0463 HOSPITAL OUTPT CLINIC VISIT: HCPCS | Performed by: PHYSICAL MEDICINE & REHABILITATION

## 2024-11-18 PROCEDURE — 1159F MED LIST DOCD IN RCRD: CPT | Performed by: PHYSICAL MEDICINE & REHABILITATION

## 2024-11-18 PROCEDURE — 1160F RVW MEDS BY RX/DR IN RCRD: CPT | Performed by: PHYSICAL MEDICINE & REHABILITATION

## 2024-11-18 RX ORDER — OXYCODONE HYDROCHLORIDE 15 MG/1
15 TABLET ORAL EVERY 6 HOURS PRN
Qty: 120 TABLET | Refills: 0 | Status: SHIPPED | OUTPATIENT
Start: 2024-11-18

## 2024-11-18 RX ORDER — ERYTHROMYCIN 5 MG/G
OINTMENT OPHTHALMIC
COMMUNITY
Start: 2024-10-21

## 2024-11-18 NOTE — PROGRESS NOTES
Subjective    CC back pain, neck pain, bilateral leg pain  Kailash Cooper Jr. is a 84 y.o. male with multiple comorbidities including CKD, DM, COPD, chronic back pain polyarthralgia here for f/u.  Chronic back pain radiating to bilateral hips worse with standing walking or prolonged activity.  Denies new weakness saddle anesthesia bladder bowel continence.  Chronic axial neck pain radiating to bilateral shoulder without radicular pain.  Continued chronic polyarthralgia and myofascial pain worse with activity.  Tried physical therapy/chiropractor with marginal relief.  Tried LESI's with marginal relief.  Had MI with stent, began Plavix for life. Had another MI with PNA, doing better now.  Saw [facility], told he is nonsurgical, failed LESIs, should utilize medical management for his low back pain.     Utilized hydrocodone 10/325 6 times daily as needed for severe pain, failed 4 times daily as needed. Rotated to Percocet 10mg QID prn. Functional benefit /denies side effects.    L-spine MRI 2018 moderate degenerative changes throughout facet arthropathy mild to moderate foraminal narrowing, scoliosis.  C-spine MRI 2018 marked degenerative disc and some posterior element disease, causing spinal stenosis at multiple levels, including C2-3, C3-4, C5-6, and C6-7.  There multiple foraminal impingements    Pain Assessment   Location of Pain: Lower Back, neck pain, joint  Description of Pain: Dull/Aching, Throbbing, Stabbing  Previous Pain Rating :6  Current Pain Ratin  Aggravating Factors: Activity  Alleviating Factors: Rest, Medication    PEG Assessment   What number best describes your pain on average in the past week? 5  What number best describes how, during the past week, pain has interfered with your enjoyment of life?3  What number best describes how, during the past week, pain has interfered with your general activity?10     The following portions of the patient's history were reviewed and updated as  appropriate: allergies, current medications, past family history, past medical history, past social history, past surgical history and problem list.     has a past medical history of Arthritis, Cancer, Diabetes, Enlarged prostate, Former smoker, Hiatal hernia, Hip pain, Hip pain, bilateral, Hyperlipidemia, Hypertension, Kidney disease, Leg pain, Low back pain, Neck pain, and Stroke.   has a past surgical history that includes Cataract extraction (); Colonoscopy (); Lung cancer surgery; Coronary stent placement (2022); Cardiac catheterization (Left, 2022); Colonoscopy (N/A, 2022); and Esophagogastroduodenoscopy (N/A, 2022).  family history includes Cancer in his father; Heart disease in an other family member; Heart failure in his brother; Stroke in his mother.  Social History     Tobacco Use    Smoking status: Former     Current packs/day: 0.00     Average packs/day: 1 pack/day for 20.0 years (20.0 ttl pk-yrs)     Types: Cigarettes     Start date:      Quit date:      Years since quittin.8     Passive exposure: Past    Smokeless tobacco: Never   Substance Use Topics    Alcohol use: Never     Review of Systems   Musculoskeletal:  Positive for arthralgias, back pain, myalgias and neck pain.   All other systems reviewed and are negative.    Objective   Physical Exam   Constitutional: He is oriented to person, place, and time. He appears well-developed and well-nourished. No distress.   HENT:   Head: Normocephalic and atraumatic.   Eyes: Pupils are equal, round, and reactive to light.   Cardiovascular: Normal rate, regular rhythm and normal heart sounds.   Pulmonary/Chest: Effort normal and breath sounds normal.   Abdominal: Soft. Normal appearance and bowel sounds are normal. He exhibits no distension. There is no abdominal tenderness.   Musculoskeletal:      Cervical back: He exhibits tenderness.   Neurological: He is alert and oriented to person, place, and time. He has  normal reflexes. He displays normal reflexes. No sensory deficit.   Psychiatric: His behavior is normal. Mood, judgment and thought content normal.     /80   Pulse 104   Resp 16   SpO2 95%     PHQ 9 on chart  Opioid risk tool low risk    Assessment & Plan   Diagnoses and all orders for this visit:    1. Chronic bilateral low back pain without sciatica (Primary)    2. Cervicalgia    3. Cervical spondylosis without myelopathy    4. Chronic pain syndrome    5. Lumbosacral spondylosis without myelopathy    6. Thoracic spondylosis    Summary  Kailash Cooper is a 84 y.o. male with multiple comorbidities including CKD, DM, COPD, chronic back pain polyarthralgia here for f/u.   Chronic pain from lumbar and cervical DDD spondylosis, polyarthralgia/myofascial pain.  Tried physical therapy, chiropractor, LESI's with marginal relief.  Axial back pain interfering with ADLs and his ability to exercise.  No NSAIDs due to CKD.        Complains of worsening back and bilateral hip pain.  Declines injections.    Increased to Hydrocodone 10/325 6 times daily as needed for severe pain, worked better than 4 or 5 pills daily with [facility] pain physician, dora. Rotated to Percocet 10mg QID prn, rotated back to Norco 10mg q4h prn. Rotated to MS-Contin 15mg TID for relative dosage reduction, increased to 30mg BID, no longer covered. Rotated to Percocet 10mg QID prn, no longer working well, increased to Oxycodone 15mg QID prn, discussed we cannot increase any further, he voiced understanding, doing well now. Filled 10/25/24.  Patient's pain is still adequately managed by current medication regimen, is doing well at this strength and dosage, therefore I will continue to prescribe unchanged as the most appropriate course of treatment.    UDS 6/13/24 and inspect reviewed.  Discussed risk of tolerance, dependence, respiratory depression, coma and death associated with use of oral opioids for treatment of chronic nonmalignant pain.      RTC 3 months for f/u        Pain  Associated symptoms include arthralgias, myalgias and neck pain.   Back Pain

## 2024-12-10 RX ORDER — MEGESTROL ACETATE 40 MG/1
40 TABLET ORAL DAILY
Qty: 90 TABLET | Refills: 0 | Status: SHIPPED | OUTPATIENT
Start: 2024-12-10

## 2024-12-13 ENCOUNTER — TELEPHONE (OUTPATIENT)
Dept: FAMILY MEDICINE CLINIC | Facility: CLINIC | Age: 84
End: 2024-12-13
Payer: MEDICARE

## 2024-12-13 NOTE — TELEPHONE ENCOUNTER
Caller: BRIAN DURAN    Relationship: Emergency Contact    Best call back number: [phone]     What was the call regarding:   PATIENT ASKED TO PURCHASE AT OTC MEDICATION AND NEEDS THE NAME OF THE MEDICATION, PLEASE ADVISE      I

## 2024-12-16 NOTE — TELEPHONE ENCOUNTER
RELAY    I spoke with Isidra, daughter. I let her know that I was returning Candis's call regarding an OTC medication she says Madelyn wanted her to get for Kailash.    I don't see any mention of that in chart, and Madelyn does not know either.    We need to know WHAT it was for? Or more clues to figure this out. Daughter will have Candis call back.  Hub can get this info when she calls back.

## 2024-12-16 NOTE — TELEPHONE ENCOUNTER
Name: BRIAN DURAN      Relationship: Emergency Contact      Best Callback Number: [phone]      HUB PROVIDED THE RELAY MESSAGE FROM THE OFFICE      PATIENT: VOICED UNDERSTANDING AND HAS NO FURTHER QUESTIONS AT THIS TIME    ADDITIONAL INFORMATION: WIFE DOESN'T REMEMBER WHAT IT WAS FOR. STATES THAT THEY WILL FIGURE IT OUT.

## 2024-12-17 ENCOUNTER — OFFICE VISIT (OUTPATIENT)
Dept: FAMILY MEDICINE CLINIC | Facility: CLINIC | Age: 84
End: 2024-12-17
Payer: MEDICARE

## 2024-12-17 VITALS
HEIGHT: 65 IN | WEIGHT: 124 LBS | OXYGEN SATURATION: 96 % | BODY MASS INDEX: 20.66 KG/M2 | SYSTOLIC BLOOD PRESSURE: 126 MMHG | DIASTOLIC BLOOD PRESSURE: 62 MMHG | HEART RATE: 99 BPM | TEMPERATURE: 97.1 F

## 2024-12-17 DIAGNOSIS — D64.9 ANEMIA, UNSPECIFIED TYPE: ICD-10-CM

## 2024-12-17 DIAGNOSIS — D22.9 NUMEROUS MOLES: ICD-10-CM

## 2024-12-17 DIAGNOSIS — E87.5 HYPERKALEMIA: Primary | ICD-10-CM

## 2024-12-17 DIAGNOSIS — R53.83 OTHER FATIGUE: ICD-10-CM

## 2024-12-17 DIAGNOSIS — E11.9 TYPE 2 DIABETES MELLITUS WITHOUT COMPLICATION, WITHOUT LONG-TERM CURRENT USE OF INSULIN: ICD-10-CM

## 2024-12-17 DIAGNOSIS — E78.2 MIXED HYPERLIPIDEMIA: ICD-10-CM

## 2024-12-17 PROCEDURE — 1125F AMNT PAIN NOTED PAIN PRSNT: CPT | Performed by: NURSE PRACTITIONER

## 2024-12-17 PROCEDURE — 3074F SYST BP LT 130 MM HG: CPT | Performed by: NURSE PRACTITIONER

## 2024-12-17 PROCEDURE — 3078F DIAST BP <80 MM HG: CPT | Performed by: NURSE PRACTITIONER

## 2024-12-17 PROCEDURE — 99214 OFFICE O/P EST MOD 30 MIN: CPT | Performed by: NURSE PRACTITIONER

## 2024-12-17 NOTE — PROGRESS NOTES
"    Kailash Cooper Jr. is a 84 y.o. male.     History of Present Illness  84-year-old white male with history of lung cancer he wears oxygen at 3 L, type 2 diabetes, chronic back pain and used to go to pain management. Hypertension, hyperlipidemia, CKD 3, MI with stent placement who comes in today for an acute visit    Blood pressure 126/62 heart rate 98 he denies any chest pain, dyspnea, tachycardia or dizziness    Patient has a lot of skin lesions on his forehead and other parts of his body he wants looked at by dermatology.  I gave you the number for [facility] and told him to tell them he wants to be seen in the [place] office    Weight is 124 with a BMI 20.6.  He has had 2 COVID vaccines up-to-date on flu shot he still need to schedule an eye exam.  PSA is due in April 2025 he no longer does colonoscopies due to his age.  He does have a CT of the chest that was ordered September by Gila Hawkins NP on not sure who that is but it was for a lung cancer check.  I gave him the number for [place] to schedule that        Fasting blood work  Make follow-up visit for other issues  Dermatology referral  Schedule CAT scan at [place]         The following portions of the patient's history were reviewed and updated as appropriate: allergies, current medications, past family history, past medical history, past social history, past surgical history, and problem list.    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 [facility]    Diabetes     Enlarged prostate     Former smoker     Hiatal hernia     Hip pain     don    Hip pain, bilateral     Hyperlipidemia     Hypertension     Kidney disease        stage 3     Leg pain     dno    Low " back pain     Neck pain     Stroke      Past Surgical History:   Procedure Laterality Date    CARDIAC CATHETERIZATION Left 1/28/2022    Procedure: Cardiac Catheterization/Vascular Study;  Surgeon: Mani Salguero MD;  Location: [facility] CATH INVASIVE LOCATION;  Service: Cardiovascular;  Laterality: Left;    CATARACT EXTRACTION  2006    OU    COLONOSCOPY  2011    COLONOSCOPY N/A 5/27/2022    Procedure: COLONOSCOPY;  Surgeon: Terry Holliday MD;  Location: [facility] ENDOSCOPY;  Service: Gastroenterology;  Laterality: N/A;  polyps    CORONARY STENT PLACEMENT  01/28/2022    RCA    ENDOSCOPY N/A 5/27/2022    Procedure: ESOPHAGOGASTRODUODENOSCOPY with 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, gastric antrum biopsy;  Surgeon: Terry Holliday MD;  Location: [facility] ENDOSCOPY;  Service: Gastroenterology;  Laterality: N/A;  gastritis, gastric ulcer, small bowel AVM    LUNG CANCER SURGERY      upper lobe rt lung cancer 9/2017  at [facility]     Family History   Problem Relation Age of Onset    Stroke Mother     Cancer Father         Prostate    Heart failure Brother     Heart disease Other      Immunization History   Administered Date(s) Administered    COVID-19 (PFIZER) 12YRS+ (COMIRNATY) 01/06/2022    COVID-19 (PFIZER) BIVALENT 12+YRS 11/02/2022    COVID-19 (PFIZER) Purple Cap Monovalent 02/11/2021, 03/04/2021, 01/06/2022    FLUAD TRI 65YR+ 11/18/2019    Fluad Quad 65+ 11/18/2019    Flublock Quad =>18yrs 10/07/2020    Fluzone  >6mos 10/20/2016    Fluzone High-Dose 65+YRS 10/22/2015, 10/08/2018, 12/13/2023    Fluzone High-Dose 65+yrs 11/18/2019    H1N1 All Forms 01/08/2010    Hepatitis B Adult/Adolescent IM 08/08/2013, 09/18/2013, 02/11/2014    Influenza Seasonal Injectable 09/20/2017    Influenza, Unspecified 09/20/2017    PPD Test 09/07/2010, 09/13/2011, 09/25/2012, 07/16/2013, 05/27/2014, 06/02/2015, 05/24/2016    Pneumococcal Conjugate 13-Valent (PCV13) 11/02/2017, 11/02/2017     Pneumococcal Polysaccharide (PPSV23) 10/20/2016, 12/11/2018, 12/29/2021, 12/29/2021       Admission on 08/11/2024, Discharged on 08/12/2024   Component Date Value Ref Range Status    Glucose 08/11/2024 134 (H)  65 - 99 mg/dL Final    BUN 08/11/2024 21  8 - 23 mg/dL Final    Creatinine 08/11/2024 1.58 (H)  0.76 - 1.27 mg/dL Final    Sodium 08/11/2024 144  136 - 145 mmol/L Final    Potassium 08/11/2024 3.5  3.5 - 5.2 mmol/L Final    Chloride 08/11/2024 103  98 - 107 mmol/L Final    CO2 08/11/2024 25.7  22.0 - 29.0 mmol/L Final    Calcium 08/11/2024 10.4  8.6 - 10.5 mg/dL Final    Total Protein 08/11/2024 7.6  6.0 - 8.5 g/dL Final    Albumin 08/11/2024 4.8  3.5 - 5.2 g/dL Final    ALT (SGPT) 08/11/2024 22  1 - 41 U/L Final    AST (SGOT) 08/11/2024 26  1 - 40 U/L Final    Alkaline Phosphatase 08/11/2024 89  39 - 117 U/L Final    Total Bilirubin 08/11/2024 0.3  0.0 - 1.2 mg/dL Final    Globulin 08/11/2024 2.8  gm/dL Final    A/G Ratio 08/11/2024 1.7  g/dL Final    BUN/Creatinine Ratio 08/11/2024 13.3  7.0 - 25.0 Final    Anion Gap 08/11/2024 15.3 (H)  5.0 - 15.0 mmol/L Final    eGFR 08/11/2024 43.1 (L)  >60.0 mL/min/1.73 Final    Blood Culture 08/11/2024 No growth at 5 days   Final    Blood Culture 08/11/2024 No growth at 5 days   Final    QT Interval 08/11/2024 368  ms Final    QTC Interval 08/11/2024 478  ms Final    WBC 08/11/2024 8.96  3.40 - 10.80 10*3/mm3 Final    RBC 08/11/2024 4.24  4.14 - 5.80 10*6/mm3 Final    Hemoglobin 08/11/2024 13.0  13.0 - 17.7 g/dL Final    Hematocrit 08/11/2024 41.0  37.5 - 51.0 % Final    MCV 08/11/2024 96.7  79.0 - 97.0 fL Final    MCH 08/11/2024 30.7  26.6 - 33.0 pg Final    MCHC 08/11/2024 31.7  31.5 - 35.7 g/dL Final    RDW 08/11/2024 13.4  12.3 - 15.4 % Final    RDW-SD 08/11/2024 46.5  37.0 - 54.0 fl Final    MPV 08/11/2024 9.0  6.0 - 12.0 fL Final    Platelets 08/11/2024 282  140 - 450 10*3/mm3 Final    Neutrophil % 08/11/2024 80.7 (H)  42.7 - 76.0 % Final    Lymphocyte %  08/11/2024 9.8 (L)  19.6 - 45.3 % Final    Monocyte % 08/11/2024 6.1  5.0 - 12.0 % Final    Eosinophil % 08/11/2024 2.2  0.3 - 6.2 % Final    Basophil % 08/11/2024 0.3  0.0 - 1.5 % Final    Immature Grans % 08/11/2024 0.9 (H)  0.0 - 0.5 % Final    Neutrophils, Absolute 08/11/2024 7.22 (H)  1.70 - 7.00 10*3/mm3 Final    Lymphocytes, Absolute 08/11/2024 0.88  0.70 - 3.10 10*3/mm3 Final    Monocytes, Absolute 08/11/2024 0.55  0.10 - 0.90 10*3/mm3 Final    Eosinophils, Absolute 08/11/2024 0.20  0.00 - 0.40 10*3/mm3 Final    Basophils, Absolute 08/11/2024 0.03  0.00 - 0.20 10*3/mm3 Final    Immature Grans, Absolute 08/11/2024 0.08 (H)  0.00 - 0.05 10*3/mm3 Final    nRBC 08/11/2024 0.0  0.0 - 0.2 /100 WBC Final    Extra Tube 08/11/2024 Hold for add-ons.   Final    Auto resulted.    Extra Tube 08/11/2024 hold for add-on   Final    Auto resulted    Extra Tube 08/11/2024 Hold for add-ons.   Final    Auto resulted.    Extra Tube 08/11/2024 Hold for add-ons.   Final    Auto resulted    ADENOVIRUS, PCR 08/11/2024 Not Detected  Not Detected Final    Coronavirus 229E 08/11/2024 Not Detected  Not Detected Final    Coronavirus HKU1 08/11/2024 Not Detected  Not Detected Final    Coronavirus NL63 08/11/2024 Not Detected  Not Detected Final    Coronavirus OC43 08/11/2024 Not Detected  Not Detected Final    COVID19 08/11/2024 Not Detected  Not Detected - Ref. Range Final    Human Metapneumovirus 08/11/2024 Not Detected  Not Detected Final    Human Rhinovirus/Enterovirus 08/11/2024 Not Detected  Not Detected Final    Influenza A PCR 08/11/2024 Not Detected  Not Detected Final    Influenza B PCR 08/11/2024 Not Detected  Not Detected Final    Parainfluenza Virus 1 08/11/2024 Not Detected  Not Detected Final    Parainfluenza Virus 2 08/11/2024 Not Detected  Not Detected Final    Parainfluenza Virus 3 08/11/2024 Not Detected  Not Detected Final    Parainfluenza Virus 4 08/11/2024 Not Detected  Not Detected Final    RSV, PCR 08/11/2024 Not  Detected  Not Detected Final    Bordetella pertussis pcr 08/11/2024 Not Detected  Not Detected Final    Bordetella parapertussis PCR 08/11/2024 Not Detected  Not Detected Final    Chlamydophila pneumoniae PCR 08/11/2024 Not Detected  Not Detected Final    Mycoplasma pneumo by PCR 08/11/2024 Not Detected  Not Detected Final    D-Dimer, Quantitative 08/11/2024 0.86 (H)  0.00 - 0.83 mg/L (FEU) Final    HS Troponin T 08/11/2024 61 (C)  <22 ng/L Final    Lactate 08/11/2024 1.2  0.3 - 2.0 mmol/L Final    Serial Number: [id number]:  [id number]    HS Troponin T 08/11/2024 48 (H)  <22 ng/L Final    Troponin T Delta 08/11/2024 -13 (L)  >=-4 - <+4 ng/L Final    Procalcitonin 08/11/2024 0.07  0.00 - 0.25 ng/mL Final    Glucose 08/11/2024 122 (H)  70 - 105 mg/dL Final    Serial Number: [id number]:  [id number]    Glucose 08/11/2024 115 (H)  70 - 105 mg/dL Final    Serial Number: [id number]:  [id number]         Review of Systems   Constitutional: Negative.    HENT: Negative.     Respiratory: Negative.     Cardiovascular: Negative.    Gastrointestinal: Negative.    Genitourinary: Negative.    Musculoskeletal: Negative.    Skin:  Positive for skin lesions.   Neurological: Negative.    Psychiatric/Behavioral: Negative.         Objective   Physical Exam  Constitutional:       Appearance: Normal appearance.   HENT:      Head: Normocephalic.   Cardiovascular:      Rate and Rhythm: Normal rate and regular rhythm.      Pulses: Normal pulses.      Heart sounds: Normal heart sounds.   Pulmonary:      Effort: Pulmonary effort is normal.      Breath sounds: Normal breath sounds.   Abdominal:      General: Bowel sounds are normal.   Musculoskeletal:         General: Normal range of motion.   Skin:     Comments: Various scaly skin lesions and moles   Neurological:      General: No focal deficit present.      Mental Status: He is alert and oriented to person, place, and time.   Psychiatric:         Mood and Affect: Mood normal.          Behavior: Behavior normal.         Procedures    Assessment & Plan   Diagnoses and all orders for this visit:    1. Hyperkalemia (Primary)    2. Mixed hyperlipidemia  -     Lipid Panel With LDL / HDL Ratio; Future    3. Type 2 diabetes mellitus without complication, without long-term current use of insulin  -     Comprehensive Metabolic Panel; Future  -     Hemoglobin A1c; Future    4. Anemia, unspecified type  -     CBC & Differential; Future    5. Other fatigue  -     TSH+Free T4; Future  -     T3; Future    6. Numerous moles           Current Outpatient Medications:     allopurinol (ZYLOPRIM) 100 MG tablet, Take 2 tablets by mouth Daily. Indications: Disorder of Excessive Uric Acid in the Blood, Disp: , Rfl:     amLODIPine (NORVASC) 10 MG tablet, TAKE 1 TABLET BY MOUTH ONCE DAILY FOR HIGH BLOOD PRESSURE, Disp: 90 tablet, Rfl: 0    budesonide (PULMICORT) 0.25 MG/2ML nebulizer solution, Take 2 mL by nebulization Daily. Indications: Chronic Obstructive Lung Disease, Disp: 180 mL, Rfl: 1    cetirizine (zyrTEC) 10 MG tablet, Take 1 tablet by mouth Daily., Disp: 90 tablet, Rfl: 1    clopidogrel (PLAVIX) 75 MG tablet, Take 1 tablet by mouth once daily, Disp: 90 tablet, Rfl: 0    erythromycin (ROMYCIN) 5 MG/GM ophthalmic ointment, INSTILL 1 APPLICATION IN EACH AFFECTED EYE THREE TIMES DAILY, Disp: , Rfl:     ferrous sulfate 325 (65 FE) MG EC tablet, Take 1 tablet by mouth Daily With Breakfast., Disp: , Rfl:     furosemide (LASIX) 40 MG tablet, Take 1 tablet by mouth once daily, Disp: 90 tablet, Rfl: 0    glimepiride (AMARYL) 1 MG tablet, Take 1 tablet by mouth Before Breakfast., Disp: , Rfl:     hydrALAZINE (APRESOLINE) 50 MG tablet, TAKE 1 TABLET BY MOUTH THREE TIMES DAILY, Disp: 270 tablet, Rfl: 0    megestrol (MEGACE) 40 MG tablet, Take 1 tablet by mouth once daily, Disp: 90 tablet, Rfl: 0    nitroglycerin (Nitrostat) 0.3 MG SL tablet, Place 1 tablet under the tongue Every 5 (Five) Minutes As Needed for Chest  Pain. Take no more than 3 doses in 15 minutes., Disp: 50 tablet, Rfl: 12    oxyCODONE (ROXICODONE) 15 MG immediate release tablet, Take 1 tablet by mouth Every 6 (Six) Hours As Needed for Severe Pain., Disp: 120 tablet, Rfl: 0    oxyCODONE (ROXICODONE) 15 MG immediate release tablet, Take 1 tablet by mouth Every 6 (Six) Hours As Needed for Severe Pain., Disp: 120 tablet, Rfl: 0    oxyCODONE (ROXICODONE) 15 MG immediate release tablet, Take 1 tablet by mouth Every 6 (Six) Hours As Needed for Severe Pain., Disp: 120 tablet, Rfl: 0    oxyCODONE-acetaminophen (Percocet)  MG per tablet, Take 1 tablet by mouth Every 6 (Six) Hours As Needed for Moderate Pain., Disp: 120 tablet, Rfl: 0    pantoprazole (PROTONIX) 40 MG EC tablet, Take 1 tablet by mouth twice daily, Disp: 180 tablet, Rfl: 0    pravastatin (PRAVACHOL) 40 MG tablet, Take 1 tablet by mouth once daily, Disp: 90 tablet, Rfl: 0    Probiotic Product (CULTURELLE PROBIOTICS PO), Take 20 mg by mouth Daily. Indications: diabetic supplement, Disp: , Rfl:     rOPINIRole (REQUIP) 0.25 MG tablet, Take 1 tablet by mouth Every Night. Take 1 hour before bedtime., Disp: 30 tablet, Rfl: 1    tamsulosin (FLOMAX) 0.4 MG capsule 24 hr capsule, Take 1 capsule by mouth 2 (Two) Times a Day., Disp: , Rfl:     vitamin B-12 (CYANOCOBALAMIN) 1000 MCG tablet, Take 1 tablet by mouth 2 (Two) Times a Day. Indications: Inadequate Vitamin B12, Disp: , Rfl:            Madelyn Márquez, APRN 12/17/2024 11:34 EST  This note has been electronically signed

## 2024-12-17 NOTE — PATIENT INSTRUCTIONS
Fasting blood work  Make follow-up visit for other issues  Dermatology referral  Schedule CAT scan at [place]

## 2024-12-26 RX ORDER — POTASSIUM CHLORIDE 1500 MG/1
TABLET, EXTENDED RELEASE ORAL
Qty: 2 TABLET | Refills: 0 | Status: SHIPPED | OUTPATIENT
Start: 2024-12-26

## 2024-12-31 ENCOUNTER — APPOINTMENT (OUTPATIENT)
Dept: OTHER | Facility: HOSPITAL | Age: 84
End: 2024-12-31
Payer: MEDICARE

## 2024-12-31 ENCOUNTER — DOCUMENTATION (OUTPATIENT)
Dept: CARDIOLOGY | Facility: CLINIC | Age: 84
End: 2024-12-31
Payer: MEDICARE

## 2024-12-31 NOTE — PROGRESS NOTES
Spoke to ER physician at [facility] regarding Mr. Kailash Cooper.  He has dementia and was being evaluated in the ER for altered mental state.  His high-sensitivity troponin was noted to be elevated at 1165 and 1300.  He will be transferred to [facility] for further evaluation.  I am unable to view his coronary angiogram from 2022 however he has a history of PCI to RCA.  He will be started on heparin drip and serial troponins will be drawn.  I will assess him for possible cardiac catheterization if needed

## 2025-01-01 ENCOUNTER — APPOINTMENT (OUTPATIENT)
Dept: CT IMAGING | Facility: HOSPITAL | Age: 85
End: 2025-01-01
Payer: MEDICARE

## 2025-01-01 ENCOUNTER — HOSPITAL ENCOUNTER (INPATIENT)
Facility: HOSPITAL | Age: 85
LOS: 7 days | Discharge: HOME | End: 2025-01-08
Attending: STUDENT IN AN ORGANIZED HEALTH CARE EDUCATION/TRAINING PROGRAM | Admitting: INTERNAL MEDICINE
Payer: MEDICARE

## 2025-01-01 ENCOUNTER — TELEPHONE (OUTPATIENT)
Dept: FAMILY MEDICINE CLINIC | Facility: CLINIC | Age: 85
End: 2025-01-01
Payer: MEDICARE

## 2025-01-01 ENCOUNTER — APPOINTMENT (OUTPATIENT)
Dept: CARDIOLOGY | Facility: HOSPITAL | Age: 85
End: 2025-01-01
Payer: MEDICARE

## 2025-01-01 DIAGNOSIS — I25.82 TOTAL OCCLUSION OF CORONARY ARTERY, CHRONIC: ICD-10-CM

## 2025-01-01 DIAGNOSIS — I21.4 NSTEMI (NON-ST ELEVATED MYOCARDIAL INFARCTION): Primary | ICD-10-CM

## 2025-01-01 DIAGNOSIS — I21.A1 TYPE 2 MYOCARDIAL INFARCTION: ICD-10-CM

## 2025-01-01 LAB
ALBUMIN SERPL-MCNC: 3.7 G/DL (ref 3.5–5.2)
ALBUMIN/GLOB SERPL: 1.8 G/DL
ALP SERPL-CCNC: 63 U/L (ref 39–117)
ALT SERPL W P-5'-P-CCNC: 18 U/L (ref 1–41)
ANION GAP SERPL CALCULATED.3IONS-SCNC: 10.2 MMOL/L (ref 5–15)
AORTIC DIMENSIONLESS INDEX: 0.65 (DI)
AST SERPL-CCNC: 36 U/L (ref 1–40)
AV MEAN PRESS GRAD SYS DOP V1V2: 7 MMHG
AV VMAX SYS DOP: 179 CM/SEC
B PARAPERT DNA SPEC QL NAA+PROBE: NOT DETECTED
B PERT DNA SPEC QL NAA+PROBE: NOT DETECTED
BACTERIA UR QL AUTO: NORMAL /HPF
BASOPHILS # BLD AUTO: 0.03 10*3/MM3 (ref 0–0.2)
BASOPHILS NFR BLD AUTO: 0.3 % (ref 0–1.5)
BH CV ECHO MEAS - ACS: 0.7 CM
BH CV ECHO MEAS - AO MAX PG: 12.8 MMHG
BH CV ECHO MEAS - AO V2 VTI: 30 CM
BH CV ECHO MEAS - AVA(I,D): 3.1 CM2
BH CV ECHO MEAS - EDV(CUBED): 85.2 ML
BH CV ECHO MEAS - EDV(MOD-SP4): 77.7 ML
BH CV ECHO MEAS - EF(MOD-SP4): 56.4 %
BH CV ECHO MEAS - ESV(CUBED): 42.9 ML
BH CV ECHO MEAS - ESV(MOD-SP4): 33.9 ML
BH CV ECHO MEAS - FS: 20.5 %
BH CV ECHO MEAS - IVS/LVPW: 1.1 CM
BH CV ECHO MEAS - IVSD: 1.1 CM
BH CV ECHO MEAS - LA DIMENSION: 3.7 CM
BH CV ECHO MEAS - LAT PEAK E' VEL: 6.3 CM/SEC
BH CV ECHO MEAS - LV MASS(C)D: 158.2 GRAMS
BH CV ECHO MEAS - LV MAX PG: 5.4 MMHG
BH CV ECHO MEAS - LV MEAN PG: 3 MMHG
BH CV ECHO MEAS - LV V1 MAX: 116 CM/SEC
BH CV ECHO MEAS - LV V1 VTI: 24.5 CM
BH CV ECHO MEAS - LVIDD: 4.4 CM
BH CV ECHO MEAS - LVIDS: 3.5 CM
BH CV ECHO MEAS - LVOT AREA: 3.8 CM2
BH CV ECHO MEAS - LVOT DIAM: 2.2 CM
BH CV ECHO MEAS - LVPWD: 1 CM
BH CV ECHO MEAS - MED PEAK E' VEL: 7.9 CM/SEC
BH CV ECHO MEAS - MV E MAX VEL: 182 CM/SEC
BH CV ECHO MEAS - MV MAX PG: 12.7 MMHG
BH CV ECHO MEAS - MV MEAN PG: 5 MMHG
BH CV ECHO MEAS - MV V2 VTI: 27.8 CM
BH CV ECHO MEAS - MVA(VTI): 3.4 CM2
BH CV ECHO MEAS - PA V2 MAX: 107.5 CM/SEC
BH CV ECHO MEAS - PULM A REVS DUR: 0.11 SEC
BH CV ECHO MEAS - PULM A REVS VEL: 41.5 CM/SEC
BH CV ECHO MEAS - PULM DIAS VEL: 57.5 CM/SEC
BH CV ECHO MEAS - PULM S/D: 1.29
BH CV ECHO MEAS - PULM SYS VEL: 74.3 CM/SEC
BH CV ECHO MEAS - QP/QS: 0.25
BH CV ECHO MEAS - RV MAX PG: 3.7 MMHG
BH CV ECHO MEAS - RV V1 MAX: 95.8 CM/SEC
BH CV ECHO MEAS - RV V1 VTI: 15 CM
BH CV ECHO MEAS - RVDD: 2.9 CM
BH CV ECHO MEAS - RVOT DIAM: 1.4 CM
BH CV ECHO MEAS - SV(LVOT): 93.1 ML
BH CV ECHO MEAS - SV(MOD-SP4): 43.8 ML
BH CV ECHO MEAS - SV(RVOT): 23.1 ML
BH CV ECHO MEAS - TAPSE (>1.6): 2.44 CM
BH CV ECHO MEAS - TR MAX PG: 30 MMHG
BH CV ECHO MEAS - TR MAX VEL: 274 CM/SEC
BH CV ECHO MEASUREMENTS AVERAGE E/E' RATIO: 25.63
BH CV XLRA - TDI S': 18.3 CM/SEC
BILIRUB SERPL-MCNC: 0.5 MG/DL (ref 0–1.2)
BILIRUB UR QL STRIP: NEGATIVE
BUN SERPL-MCNC: 17 MG/DL (ref 8–23)
BUN/CREAT SERPL: 13.3 (ref 7–25)
C PNEUM DNA NPH QL NAA+NON-PROBE: NOT DETECTED
CALCIUM SPEC-SCNC: 9.7 MG/DL (ref 8.6–10.5)
CHLORIDE SERPL-SCNC: 104 MMOL/L (ref 98–107)
CLARITY UR: CLEAR
CO2 SERPL-SCNC: 25.8 MMOL/L (ref 22–29)
COLOR UR: YELLOW
CREAT SERPL-MCNC: 1.28 MG/DL (ref 0.76–1.27)
D-LACTATE SERPL-SCNC: 0.9 MMOL/L (ref 0.5–2)
DEPRECATED RDW RBC AUTO: 51.9 FL (ref 37–54)
EGFRCR SERPLBLD CKD-EPI 2021: 55.2 ML/MIN/1.73
EOSINOPHIL # BLD AUTO: 0.03 10*3/MM3 (ref 0–0.4)
EOSINOPHIL NFR BLD AUTO: 0.3 % (ref 0.3–6.2)
ERYTHROCYTE [DISTWIDTH] IN BLOOD BY AUTOMATED COUNT: 14.7 % (ref 12.3–15.4)
FLUAV SUBTYP SPEC NAA+PROBE: NOT DETECTED
FLUBV RNA ISLT QL NAA+PROBE: NOT DETECTED
GEN 5 1HR TROPONIN T REFLEX: 258 NG/L
GLOBULIN UR ELPH-MCNC: 2.1 GM/DL
GLUCOSE SERPL-MCNC: 89 MG/DL (ref 65–99)
GLUCOSE UR STRIP-MCNC: NEGATIVE MG/DL
HADV DNA SPEC NAA+PROBE: NOT DETECTED
HCOV 229E RNA SPEC QL NAA+PROBE: NOT DETECTED
HCOV HKU1 RNA SPEC QL NAA+PROBE: NOT DETECTED
HCOV NL63 RNA SPEC QL NAA+PROBE: NOT DETECTED
HCOV OC43 RNA SPEC QL NAA+PROBE: NOT DETECTED
HCT VFR BLD AUTO: 33.7 % (ref 37.5–51)
HGB BLD-MCNC: 11.1 G/DL (ref 13–17.7)
HGB UR QL STRIP.AUTO: ABNORMAL
HMPV RNA NPH QL NAA+NON-PROBE: NOT DETECTED
HPIV1 RNA ISLT QL NAA+PROBE: NOT DETECTED
HPIV2 RNA SPEC QL NAA+PROBE: NOT DETECTED
HPIV3 RNA NPH QL NAA+PROBE: NOT DETECTED
HPIV4 P GENE NPH QL NAA+PROBE: NOT DETECTED
HYALINE CASTS UR QL AUTO: NORMAL /LPF
IMM GRANULOCYTES # BLD AUTO: 0.05 10*3/MM3 (ref 0–0.05)
IMM GRANULOCYTES NFR BLD AUTO: 0.6 % (ref 0–0.5)
KETONES UR QL STRIP: ABNORMAL
L PNEUMO1 AG UR QL IA: NEGATIVE
LEUKOCYTE ESTERASE UR QL STRIP.AUTO: NEGATIVE
LV EF BIPLANE MOD: 56 %
LYMPHOCYTES # BLD AUTO: 0.37 10*3/MM3 (ref 0.7–3.1)
LYMPHOCYTES NFR BLD AUTO: 4.3 % (ref 19.6–45.3)
M PNEUMO IGG SER IA-ACNC: NOT DETECTED
MCH RBC QN AUTO: 32.4 PG (ref 26.6–33)
MCHC RBC AUTO-ENTMCNC: 32.9 G/DL (ref 31.5–35.7)
MCV RBC AUTO: 98.3 FL (ref 79–97)
MONOCYTES # BLD AUTO: 0.5 10*3/MM3 (ref 0.1–0.9)
MONOCYTES NFR BLD AUTO: 5.8 % (ref 5–12)
NEUTROPHILS NFR BLD AUTO: 7.71 10*3/MM3 (ref 1.7–7)
NEUTROPHILS NFR BLD AUTO: 88.7 % (ref 42.7–76)
NITRITE UR QL STRIP: NEGATIVE
NRBC BLD AUTO-RTO: 0 /100 WBC (ref 0–0.2)
PH UR STRIP.AUTO: 6 [PH] (ref 5–8)
PLATELET # BLD AUTO: 236 10*3/MM3 (ref 140–450)
PMV BLD AUTO: 9.3 FL (ref 6–12)
POTASSIUM SERPL-SCNC: 3.7 MMOL/L (ref 3.5–5.2)
PROCALCITONIN SERPL-MCNC: 0.16 NG/ML (ref 0–0.25)
PROT SERPL-MCNC: 5.8 G/DL (ref 6–8.5)
PROT UR QL STRIP: ABNORMAL
RBC # BLD AUTO: 3.43 10*6/MM3 (ref 4.14–5.8)
RBC # UR STRIP: NORMAL /HPF
REF LAB TEST METHOD: NORMAL
RHINOVIRUS RNA SPEC NAA+PROBE: NOT DETECTED
RSV RNA NPH QL NAA+NON-PROBE: NOT DETECTED
S PNEUM AG SPEC QL LA: NEGATIVE
SARS-COV-2 RNA RESP QL NAA+PROBE: NOT DETECTED
SINUS: 3.4 CM
SODIUM SERPL-SCNC: 140 MMOL/L (ref 136–145)
SP GR UR STRIP: 1.01 (ref 1–1.03)
SQUAMOUS #/AREA URNS HPF: NORMAL /HPF
TROPONIN T % DELTA: 2 %
TROPONIN T NUMERIC DELTA: 5 NG/L
TROPONIN T SERPL HS-MCNC: 253 NG/L
UROBILINOGEN UR QL STRIP: ABNORMAL
WBC # UR STRIP: NORMAL /HPF
WBC NRBC COR # BLD AUTO: 8.69 10*3/MM3 (ref 3.4–10.8)

## 2025-01-01 PROCEDURE — 70450 CT HEAD/BRAIN W/O DYE: CPT

## 2025-01-01 PROCEDURE — 99222 1ST HOSP IP/OBS MODERATE 55: CPT | Performed by: INTERNAL MEDICINE

## 2025-01-01 PROCEDURE — 25010000002 LORAZEPAM PER 2 MG: Performed by: NURSE PRACTITIONER

## 2025-01-01 PROCEDURE — 84484 ASSAY OF TROPONIN QUANT: CPT | Performed by: NURSE PRACTITIONER

## 2025-01-01 PROCEDURE — 25010000002 HALOPERIDOL LACTATE PER 5 MG: Performed by: INTERNAL MEDICINE

## 2025-01-01 PROCEDURE — 25010000002 HYDROMORPHONE 1 MG/ML SOLUTION

## 2025-01-01 PROCEDURE — 81001 URINALYSIS AUTO W/SCOPE: CPT

## 2025-01-01 PROCEDURE — 25010000002 CEFTRIAXONE PER 250 MG: Performed by: NURSE PRACTITIONER

## 2025-01-01 PROCEDURE — 93306 TTE W/DOPPLER COMPLETE: CPT

## 2025-01-01 PROCEDURE — 25010000002 HALOPERIDOL LACTATE PER 5 MG

## 2025-01-01 PROCEDURE — 87449 NOS EACH ORGANISM AG IA: CPT | Performed by: NURSE PRACTITIONER

## 2025-01-01 PROCEDURE — 94799 UNLISTED PULMONARY SVC/PX: CPT

## 2025-01-01 PROCEDURE — 94761 N-INVAS EAR/PLS OXIMETRY MLT: CPT

## 2025-01-01 PROCEDURE — 93306 TTE W/DOPPLER COMPLETE: CPT | Performed by: INTERNAL MEDICINE

## 2025-01-01 PROCEDURE — 25010000002 ZIPRASIDONE MESYLATE PER 10 MG: Performed by: INTERNAL MEDICINE

## 2025-01-01 PROCEDURE — 83605 ASSAY OF LACTIC ACID: CPT | Performed by: NURSE PRACTITIONER

## 2025-01-01 PROCEDURE — 84145 PROCALCITONIN (PCT): CPT | Performed by: NURSE PRACTITIONER

## 2025-01-01 PROCEDURE — 80053 COMPREHEN METABOLIC PANEL: CPT | Performed by: NURSE PRACTITIONER

## 2025-01-01 PROCEDURE — 0202U NFCT DS 22 TRGT SARS-COV-2: CPT | Performed by: NURSE PRACTITIONER

## 2025-01-01 PROCEDURE — 85025 COMPLETE CBC W/AUTO DIFF WBC: CPT | Performed by: NURSE PRACTITIONER

## 2025-01-01 PROCEDURE — 87899 AGENT NOS ASSAY W/OPTIC: CPT | Performed by: NURSE PRACTITIONER

## 2025-01-01 PROCEDURE — 94640 AIRWAY INHALATION TREATMENT: CPT

## 2025-01-01 RX ORDER — OXYCODONE HYDROCHLORIDE 5 MG/1
15 TABLET ORAL EVERY 6 HOURS PRN
Status: DISCONTINUED | OUTPATIENT
Start: 2025-01-01 | End: 2025-01-08 | Stop reason: HOSPADM

## 2025-01-01 RX ORDER — IPRATROPIUM BROMIDE AND ALBUTEROL SULFATE 2.5; .5 MG/3ML; MG/3ML
3 SOLUTION RESPIRATORY (INHALATION) EVERY 4 HOURS PRN
Status: DISCONTINUED | OUTPATIENT
Start: 2025-01-01 | End: 2025-01-08

## 2025-01-01 RX ORDER — ENALAPRILAT 1.25 MG/ML
0.62 INJECTION INTRAVENOUS ONCE
Status: COMPLETED | OUTPATIENT
Start: 2025-01-01 | End: 2025-01-01

## 2025-01-01 RX ORDER — SODIUM CHLORIDE 0.9 % (FLUSH) 0.9 %
10 SYRINGE (ML) INJECTION AS NEEDED
Status: DISCONTINUED | OUTPATIENT
Start: 2025-01-01 | End: 2025-01-08 | Stop reason: HOSPADM

## 2025-01-01 RX ORDER — METOPROLOL SUCCINATE 25 MG/1
25 TABLET, EXTENDED RELEASE ORAL
Status: DISCONTINUED | OUTPATIENT
Start: 2025-01-01 | End: 2025-01-01

## 2025-01-01 RX ORDER — BISACODYL 10 MG
10 SUPPOSITORY, RECTAL RECTAL DAILY PRN
Status: DISCONTINUED | OUTPATIENT
Start: 2025-01-01 | End: 2025-01-08 | Stop reason: HOSPADM

## 2025-01-01 RX ORDER — CYCLOBENZAPRINE HCL 10 MG
10 TABLET ORAL NIGHTLY
COMMUNITY

## 2025-01-01 RX ORDER — LORAZEPAM 2 MG/ML
0.5 INJECTION INTRAMUSCULAR EVERY 4 HOURS PRN
Status: DISCONTINUED | OUTPATIENT
Start: 2025-01-01 | End: 2025-01-01

## 2025-01-01 RX ORDER — ONDANSETRON 4 MG/1
4 TABLET, ORALLY DISINTEGRATING ORAL EVERY 6 HOURS PRN
Status: DISCONTINUED | OUTPATIENT
Start: 2025-01-01 | End: 2025-01-08 | Stop reason: HOSPADM

## 2025-01-01 RX ORDER — AMLODIPINE BESYLATE 5 MG/1
10 TABLET ORAL
Status: DISCONTINUED | OUTPATIENT
Start: 2025-01-01 | End: 2025-01-04

## 2025-01-01 RX ORDER — CLOPIDOGREL BISULFATE 75 MG/1
75 TABLET ORAL DAILY
Status: DISCONTINUED | OUTPATIENT
Start: 2025-01-01 | End: 2025-01-08 | Stop reason: HOSPADM

## 2025-01-01 RX ORDER — HYDROMORPHONE HYDROCHLORIDE 1 MG/ML
0.25 INJECTION, SOLUTION INTRAMUSCULAR; INTRAVENOUS; SUBCUTANEOUS ONCE
Status: DISCONTINUED | OUTPATIENT
Start: 2025-01-01 | End: 2025-01-01

## 2025-01-01 RX ORDER — BISACODYL 5 MG/1
5 TABLET, DELAYED RELEASE ORAL DAILY PRN
Status: DISCONTINUED | OUTPATIENT
Start: 2025-01-01 | End: 2025-01-08 | Stop reason: HOSPADM

## 2025-01-01 RX ORDER — LORAZEPAM 2 MG/ML
1 INJECTION INTRAMUSCULAR EVERY 4 HOURS PRN
Status: DISCONTINUED | OUTPATIENT
Start: 2025-01-01 | End: 2025-01-01

## 2025-01-01 RX ORDER — LORAZEPAM 2 MG/ML
0.5 INJECTION INTRAMUSCULAR
Status: DISCONTINUED | OUTPATIENT
Start: 2025-01-01 | End: 2025-01-01

## 2025-01-01 RX ORDER — BUDESONIDE 0.5 MG/2ML
0.5 INHALANT ORAL
Status: DISCONTINUED | OUTPATIENT
Start: 2025-01-01 | End: 2025-01-08 | Stop reason: HOSPADM

## 2025-01-01 RX ORDER — LORAZEPAM 2 MG/ML
1 INJECTION INTRAMUSCULAR ONCE AS NEEDED
Status: COMPLETED | OUTPATIENT
Start: 2025-01-01 | End: 2025-01-01

## 2025-01-01 RX ORDER — HYDRALAZINE HYDROCHLORIDE 25 MG/1
50 TABLET, FILM COATED ORAL 3 TIMES DAILY
Status: DISCONTINUED | OUTPATIENT
Start: 2025-01-01 | End: 2025-01-03

## 2025-01-01 RX ORDER — POLYETHYLENE GLYCOL 3350 17 G/17G
17 POWDER, FOR SOLUTION ORAL DAILY PRN
Status: DISCONTINUED | OUTPATIENT
Start: 2025-01-01 | End: 2025-01-08 | Stop reason: HOSPADM

## 2025-01-01 RX ORDER — HALOPERIDOL 5 MG/ML
0.5 INJECTION INTRAMUSCULAR ONCE
Status: COMPLETED | OUTPATIENT
Start: 2025-01-01 | End: 2025-01-01

## 2025-01-01 RX ORDER — LORAZEPAM 2 MG/ML
0.5 INJECTION INTRAMUSCULAR ONCE AS NEEDED
Status: COMPLETED | OUTPATIENT
Start: 2025-01-01 | End: 2025-01-01

## 2025-01-01 RX ORDER — ASPIRIN 81 MG/1
81 TABLET ORAL DAILY
Status: DISCONTINUED | OUTPATIENT
Start: 2025-01-01 | End: 2025-01-08 | Stop reason: HOSPADM

## 2025-01-01 RX ORDER — ALUMINA, MAGNESIA, AND SIMETHICONE 2400; 2400; 240 MG/30ML; MG/30ML; MG/30ML
15 SUSPENSION ORAL EVERY 6 HOURS PRN
Status: DISCONTINUED | OUTPATIENT
Start: 2025-01-01 | End: 2025-01-08 | Stop reason: HOSPADM

## 2025-01-01 RX ORDER — AMOXICILLIN 250 MG
2 CAPSULE ORAL 2 TIMES DAILY PRN
Status: DISCONTINUED | OUTPATIENT
Start: 2025-01-01 | End: 2025-01-08 | Stop reason: HOSPADM

## 2025-01-01 RX ORDER — ATORVASTATIN CALCIUM 10 MG/1
10 TABLET, FILM COATED ORAL NIGHTLY
Status: DISCONTINUED | OUTPATIENT
Start: 2025-01-01 | End: 2025-01-08 | Stop reason: HOSPADM

## 2025-01-01 RX ORDER — SODIUM CHLORIDE 9 MG/ML
40 INJECTION, SOLUTION INTRAVENOUS AS NEEDED
Status: DISCONTINUED | OUTPATIENT
Start: 2025-01-01 | End: 2025-01-05

## 2025-01-01 RX ORDER — HALOPERIDOL 5 MG/ML
5 INJECTION INTRAMUSCULAR EVERY 4 HOURS PRN
Status: DISCONTINUED | OUTPATIENT
Start: 2025-01-01 | End: 2025-01-02

## 2025-01-01 RX ORDER — METOPROLOL TARTRATE 1 MG/ML
2.5 INJECTION, SOLUTION INTRAVENOUS EVERY 6 HOURS
Status: DISCONTINUED | OUTPATIENT
Start: 2025-01-01 | End: 2025-01-02

## 2025-01-01 RX ORDER — SODIUM CHLORIDE 0.9 % (FLUSH) 0.9 %
10 SYRINGE (ML) INJECTION EVERY 12 HOURS SCHEDULED
Status: DISCONTINUED | OUTPATIENT
Start: 2025-01-01 | End: 2025-01-08 | Stop reason: HOSPADM

## 2025-01-01 RX ORDER — ONDANSETRON 2 MG/ML
4 INJECTION INTRAMUSCULAR; INTRAVENOUS EVERY 6 HOURS PRN
Status: DISCONTINUED | OUTPATIENT
Start: 2025-01-01 | End: 2025-01-08 | Stop reason: HOSPADM

## 2025-01-01 RX ORDER — IPRATROPIUM BROMIDE AND ALBUTEROL SULFATE 2.5; .5 MG/3ML; MG/3ML
3 SOLUTION RESPIRATORY (INHALATION)
Status: DISCONTINUED | OUTPATIENT
Start: 2025-01-01 | End: 2025-01-08

## 2025-01-01 RX ORDER — HALOPERIDOL 5 MG/ML
2 INJECTION INTRAMUSCULAR ONCE
Status: COMPLETED | OUTPATIENT
Start: 2025-01-01 | End: 2025-01-01

## 2025-01-01 RX ADMIN — LORAZEPAM 1 MG: 2 INJECTION INTRAMUSCULAR; INTRAVENOUS at 01:35

## 2025-01-01 RX ADMIN — MUPIROCIN 1 APPLICATION: 20 OINTMENT TOPICAL at 20:13

## 2025-01-01 RX ADMIN — HALOPERIDOL LACTATE 5 MG: 5 INJECTION, SOLUTION INTRAMUSCULAR at 19:52

## 2025-01-01 RX ADMIN — HYDROMORPHONE HYDROCHLORIDE 0.75 MG: 1 INJECTION, SOLUTION INTRAMUSCULAR; INTRAVENOUS; SUBCUTANEOUS at 16:46

## 2025-01-01 RX ADMIN — BUDESONIDE 0.5 MG: 0.5 INHALANT RESPIRATORY (INHALATION) at 06:47

## 2025-01-01 RX ADMIN — HALOPERIDOL LACTATE 0.5 MG: 5 INJECTION, SOLUTION INTRAMUSCULAR at 14:05

## 2025-01-01 RX ADMIN — LORAZEPAM 0.5 MG: 2 INJECTION INTRAMUSCULAR; INTRAVENOUS at 10:04

## 2025-01-01 RX ADMIN — MUPIROCIN 1 APPLICATION: 20 OINTMENT TOPICAL at 01:00

## 2025-01-01 RX ADMIN — LORAZEPAM 0.5 MG: 2 INJECTION INTRAMUSCULAR; INTRAVENOUS at 07:17

## 2025-01-01 RX ADMIN — ZIPRASIDONE MESYLATE 20 MG: 20 INJECTION, POWDER, LYOPHILIZED, FOR SOLUTION INTRAMUSCULAR at 23:50

## 2025-01-01 RX ADMIN — HALOPERIDOL LACTATE 2 MG: 5 INJECTION, SOLUTION INTRAMUSCULAR at 15:05

## 2025-01-01 RX ADMIN — IPRATROPIUM BROMIDE AND ALBUTEROL SULFATE 3 ML: .5; 3 SOLUTION RESPIRATORY (INHALATION) at 06:47

## 2025-01-01 RX ADMIN — CEFTRIAXONE SODIUM 1000 MG: 1 INJECTION, POWDER, FOR SOLUTION INTRAMUSCULAR; INTRAVENOUS at 04:12

## 2025-01-01 RX ADMIN — HALOPERIDOL LACTATE 0.5 MG: 5 INJECTION, SOLUTION INTRAMUSCULAR at 11:57

## 2025-01-01 RX ADMIN — Medication 10 ML: at 01:00

## 2025-01-01 RX ADMIN — BUDESONIDE 0.5 MG: 0.5 INHALANT RESPIRATORY (INHALATION) at 18:12

## 2025-01-01 RX ADMIN — IPRATROPIUM BROMIDE AND ALBUTEROL SULFATE 3 ML: .5; 3 SOLUTION RESPIRATORY (INHALATION) at 12:54

## 2025-01-01 RX ADMIN — Medication 10 ML: at 20:13

## 2025-01-01 RX ADMIN — IPRATROPIUM BROMIDE AND ALBUTEROL SULFATE 3 ML: .5; 3 SOLUTION RESPIRATORY (INHALATION) at 18:12

## 2025-01-01 RX ADMIN — ENALAPRILAT 0.62 MG: 2.5 INJECTION INTRAVENOUS at 21:09

## 2025-01-01 RX ADMIN — LORAZEPAM 0.5 MG: 2 INJECTION INTRAMUSCULAR; INTRAVENOUS at 04:08

## 2025-01-01 RX ADMIN — Medication 10 ML: at 09:00

## 2025-01-01 RX ADMIN — METOPROLOL TARTRATE 2.5 MG: 1 INJECTION, SOLUTION INTRAVENOUS at 20:12

## 2025-01-01 NOTE — CONSULTS
Referring Provider: Yeny Singleton MD    Reason for Consultation: Elevated troponin      Patient Care Team:  Madelyn Márquez APRN as PCP - General (Nurse Practitioner)  Marcia Lujan MD as Consulting Physician (Nephrology)  Mc Key MD as Consulting Physician (Physical Medicine and Rehabilitation)  Gila Hawkins APRN (Nurse Practitioner)  Ct Stevenson APRN as Nurse Practitioner (Family Medicine)  Mani Salguero MD as Consulting Physician (Cardiology)  Toshia Jaeger MD as Consulting Physician (Pulmonary Disease)  Terry Holliday MD as Consulting Physician (Gastroenterology)      SUBJECTIVE     Chief Complaint: Altered mental state/dementia    History of present illness:  Kailash Cooper Jr. is a 84 y.o. 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  Workup revealed elevated troponin and he was transferred to [facility] for further care.  Patient denied any chest pain however history is limited due to dementia patient has been agitated and received Ativan.  Currently resting comfortably in bed.      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 [facility]    Diabetes     Enlarged prostate     Former smoker     Hiatal hernia     Hip pain     don    Hip pain, bilateral     Hyperlipidemia     Hypertension     Kidney disease        stage 3     Leg pain     don    Low back pain     Neck pain     Stroke        Past Surgical History:   Procedure Laterality Date    CARDIAC CATHETERIZATION Left 1/28/2022    Procedure: Cardiac Catheterization/Vascular Study;  Surgeon: Mani Salguero MD;  Location: [facility] INVASIVE LOCATION;  Service: Cardiovascular;  Laterality: Left;     CATARACT EXTRACTION  2006    OU    COLONOSCOPY  2011    COLONOSCOPY N/A 5/27/2022    Procedure: COLONOSCOPY;  Surgeon: Terry Holliday MD;  Location: [facility] ENDOSCOPY;  Service: Gastroenterology;  Laterality: N/A;  polyps    CORONARY STENT PLACEMENT  01/28/2022    RCA    ENDOSCOPY N/A 5/27/2022    Procedure: ESOPHAGOGASTRODUODENOSCOPY with 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, gastric antrum biopsy;  Surgeon: Terry Holliday MD;  Location: [facility] ENDOSCOPY;  Service: Gastroenterology;  Laterality: N/A;  gastritis, gastric ulcer, small bowel AVM    LUNG CANCER SURGERY      upper lobe rt lung cancer 9/2017  at [facility]       Family History   Problem Relation Age of Onset    Stroke Mother     Cancer Father         Prostate    Heart failure Brother     Heart disease Other        Social History     Tobacco Use    Smoking status: Former     Current packs/day: 0.00     Average packs/day: 1 pack/day for 20.0 years (20.0 ttl pk-yrs)     Types: Cigarettes     Start date: 1990     Quit date: 2010     Years since quitting: 15.0     Passive exposure: Past    Smokeless tobacco: Never   Vaping Use    Vaping status: Never Used   Substance Use Topics    Alcohol use: Never    Drug use: Never        Home meds:  Prior to Admission medications    Medication Sig Start Date End Date Taking? Authorizing Provider   allopurinol (ZYLOPRIM) 100 MG tablet Take 2 tablets by mouth Daily. Indications: Disorder of Excessive Uric Acid in the Blood 7/28/22   Provider, MD Britney   amLODIPine (NORVASC) 10 MG tablet TAKE 1 TABLET BY MOUTH ONCE DAILY FOR HIGH BLOOD PRESSURE 10/28/24   Madelyn Márquez APRN   budesonide (PULMICORT) 0.25 MG/2ML nebulizer solution Take 2 mL by nebulization Daily. Indications: Chronic Obstructive Lung Disease 5/18/23   Toshia Jaeger MD   cetirizine (zyrTEC) 10 MG tablet Take 1 tablet by mouth Daily. 4/30/20   Madelyn Márquez APRN   clopidogrel (PLAVIX) 75 MG tablet  Take 1 tablet by mouth once daily 10/28/24   Mani Salguero MD   erythromycin (ROMYCIN) 5 MG/GM ophthalmic ointment INSTILL 1 APPLICATION IN EACH AFFECTED EYE THREE TIMES DAILY 10/21/24   Britney Partida MD   ferrous sulfate 325 (65 FE) MG EC tablet Take 1 tablet by mouth Daily With Breakfast. 2/9/20   Geovanny Van MD   furosemide (LASIX) 40 MG tablet Take 1 tablet by mouth once daily 7/27/24   Madelyn Márquez APRN   glimepiride (AMARYL) 1 MG tablet Take 1 tablet by mouth Before Breakfast. 5/5/24   Britney Partida MD   hydrALAZINE (APRESOLINE) 50 MG tablet TAKE 1 TABLET BY MOUTH THREE TIMES DAILY 10/28/24   Madelyn Márquez APRN   megestrol (MEGACE) 40 MG tablet Take 1 tablet by mouth once daily 12/10/24   Madelyn Márquez APRN   nitroglycerin (Nitrostat) 0.3 MG SL tablet Place 1 tablet under the tongue Every 5 (Five) Minutes As Needed for Chest Pain. Take no more than 3 doses in 15 minutes. 1/17/22   Madelyn Márquez APRN   oxyCODONE (ROXICODONE) 15 MG immediate release tablet Take 1 tablet by mouth Every 6 (Six) Hours As Needed for Severe Pain. 11/18/24   Mc Key MD   oxyCODONE (ROXICODONE) 15 MG immediate release tablet Take 1 tablet by mouth Every 6 (Six) Hours As Needed for Severe Pain. 11/18/24   Mc Key MD   oxyCODONE (ROXICODONE) 15 MG immediate release tablet Take 1 tablet by mouth Every 6 (Six) Hours As Needed for Severe Pain. 11/18/24   Mc Key MD   oxyCODONE-acetaminophen (Percocet)  MG per tablet Take 1 tablet by mouth Every 6 (Six) Hours As Needed for Moderate Pain. 5/13/24   Mc Key MD   pantoprazole (PROTONIX) 40 MG EC tablet Take 1 tablet by mouth twice daily 10/28/24   Madelyn Márquez APRN   potassium chloride (Klor-Con M20) 20 MEQ CR tablet Take 2 tablets now 12/26/24   Madelyn Márquez APRN   pravastatin (PRAVACHOL) 40 MG tablet Take 1 tablet by mouth once daily 10/28/24   Madelyn Márquez APRN   Probiotic Product (CULTURELLE  "PROBIOTICS PO) Take 20 mg by mouth Daily. Indications: diabetic supplement 6/23/21   Provider, MD Britney   rOPINIRole (REQUIP) 0.25 MG tablet Take 1 tablet by mouth Every Night. Take 1 hour before bedtime. 8/1/24   Madelyn Márquez APRN   tamsulosin (FLOMAX) 0.4 MG capsule 24 hr capsule Take 1 capsule by mouth 2 (Two) Times a Day.    Provider, MD Britney   vitamin B-12 (CYANOCOBALAMIN) 1000 MCG tablet Take 1 tablet by mouth 2 (Two) Times a Day. Indications: Inadequate Vitamin B12 1/1/21   Provider, MD Britney       Allergies:     Patient has no known allergies.    Scheduled Meds:budesonide, 0.5 mg, Nebulization, BID - RT  cefTRIAXone, 1,000 mg, Intravenous, Q24H  clopidogrel, 75 mg, Oral, Daily  ipratropium-albuterol, 3 mL, Nebulization, Q6H While Awake - RT  mupirocin, 1 Application, Each Nare, BID  sodium chloride, 10 mL, Intravenous, Q12H      Continuous Infusions:   PRN Meds:  aluminum-magnesium hydroxide-simethicone    senna-docusate sodium **AND** polyethylene glycol **AND** bisacodyl **AND** bisacodyl    ipratropium-albuterol    LORazepam    melatonin    ondansetron ODT **OR** ondansetron    sodium chloride    sodium chloride      OBJECTIVE    Vital Signs  Vitals:    01/01/25 0200 01/01/25 0300 01/01/25 0400 01/01/25 0430   BP: 141/66 135/65 175/98 143/83   BP Location:       Patient Position:       Pulse: 84 80 110 97   Resp:       Temp:       TempSrc:       SpO2: 99% 99% 96% 100%   Weight:       Height:           Flowsheet Rows      Flowsheet Row First Filed Value   Admission Height 165 cm (64.96\") Documented at 01/01/2025 0000   Admission Weight 60.3 kg (132 lb 15 oz) Documented at 01/01/2025 0000            No intake or output data in the 24 hours ending 01/01/25 0547     Telemetry: Sinus rhythm with right bundle branch block    Physical Exam:  The patient is awake but unable to engage in conversation  Vital signs as noted above.  Head and neck revealed no carotid bruits or jugular venous " distention.  No thyromegaly or lymphadenopathy is present  Lungs clear.  No wheezing.  Breath sounds are normal bilaterally.  Heart: Normal first and second heart sounds. No murmur.  No precordial rub is present.  No gallop is present.  Abdomen: Soft and nontender.  No organomegaly is present.  Extremities with good peripheral pulses without any pedal edema.  Skin: Warm and dry.  Musculoskeletal system is grossly normal.  CNS grossly normal.       Results Review:  I have personally reviewed the results from the time of this admission to 1/1/2025 05:47 EST and agree with these findings:  []  Laboratory  []  Microbiology  []  Radiology  []  EKG/Telemetry   []  Cardiology/Vascular   []  Pathology  []  Old records  []  Other:    Most notable findings include:     Lab Results (last 24 hours)       Procedure Component Value Units Date/Time    S. Pneumo Ag Urine or CSF - Urine, Urine, Clean Catch [[id number]]  (Normal) Collected: 01/01/25 0358    Specimen: Urine, Clean Catch Updated: 01/01/25 0429     Strep Pneumo Ag Negative    Legionella Antigen, Urine - Urine, Urine, Clean Catch [[id number]]  (Normal) Collected: 01/01/25 0358    Specimen: Urine, Clean Catch Updated: 01/01/25 0428     LEGIONELLA ANTIGEN, URINE Negative    High Sensitivity Troponin T 1Hr [[id number]]  (Abnormal) Collected: 01/01/25 0323    Specimen: Blood Updated: 01/01/25 0351     HS Troponin T 258 ng/L      Troponin T Numeric Delta 5 ng/L      Troponin T % Delta 2 %     Narrative:      High Sensitive Troponin T Reference Range:  <14.0 ng/L- Negative Female for AMI  <22.0 ng/L- Negative Male for AMI  >=14 - Abnormal Female indicating possible myocardial injury.  >=22 - Abnormal Male indicating possible myocardial injury.   Clinicians would have to utilize clinical acumen, EKG, Troponin, and serial changes to determine if it is an Acute Myocardial Infarction or myocardial injury due to an underlying chronic condition.         High Sensitivity Troponin T  [[id number]]  (Abnormal) Collected: 01/01/25 0212    Specimen: Blood Updated: 01/01/25 0253     HS Troponin T 253 ng/L     Narrative:      High Sensitive Troponin T Reference Range:  <14.0 ng/L- Negative Female for AMI  <22.0 ng/L- Negative Male for AMI  >=14 - Abnormal Female indicating possible myocardial injury.  >=22 - Abnormal Male indicating possible myocardial injury.   Clinicians would have to utilize clinical acumen, EKG, Troponin, and serial changes to determine if it is an Acute Myocardial Infarction or myocardial injury due to an underlying chronic condition.         Comprehensive Metabolic Panel [[id number]]  (Abnormal) Collected: 01/01/25 0212    Specimen: Blood Updated: 01/01/25 0246     Glucose 89 mg/dL      BUN 17 mg/dL      Creatinine 1.28 mg/dL      Sodium 140 mmol/L      Potassium 3.7 mmol/L      Chloride 104 mmol/L      CO2 25.8 mmol/L      Calcium 9.7 mg/dL      Total Protein 5.8 g/dL      Albumin 3.7 g/dL      ALT (SGPT) 18 U/L      AST (SGOT) 36 U/L      Alkaline Phosphatase 63 U/L      Total Bilirubin 0.5 mg/dL      Globulin 2.1 gm/dL      A/G Ratio 1.8 g/dL      BUN/Creatinine Ratio 13.3     Anion Gap 10.2 mmol/L      eGFR 55.2 mL/min/1.73     Narrative:      GFR Categories in Chronic Kidney Disease (CKD)      GFR Category          GFR (mL/min/1.73)    Interpretation  G1                     90 or greater         Normal or high (1)  G2                      60-89                Mild decrease (1)  G3a                   45-59                Mild to moderate decrease  G3b                   30-44                Moderate to severe decrease  G4                    15-29                Severe decrease  G5                    14 or less           Kidney failure          (1)In the absence of evidence of kidney disease, neither GFR category G1 or G2 fulfill the criteria for CKD.    eGFR calculation 2021 CKD-EPI creatinine equation, which does not include race as a factor    Procalcitonin [[id number]]   "(Normal) Collected: 01/01/25 0212    Specimen: Blood Updated: 01/01/25 0246     Procalcitonin 0.16 ng/mL     Narrative:      As a Marker for Sepsis (Non-Neonates):    1. <0.5 ng/mL represents a low risk of severe sepsis and/or septic shock.  2. >2 ng/mL represents a high risk of severe sepsis and/or septic shock.    As a Marker for Lower Respiratory Tract Infections that require antibiotic therapy:    PCT on Admission    Antibiotic Therapy       6-12 Hrs later    >0.5                Strongly Recommended  >0.25 - <0.5        Recommended   0.1 - 0.25          Discouraged              Remeasure/reassess PCT  <0.1                Strongly Discouraged     Remeasure/reassess PCT    As 28 day mortality risk marker: \"Change in Procalcitonin Result\" (>80% or <=80%) if Day 0 (or Day 1) and Day 4 values are available. Refer to [email or web address]    Change in PCT <=80%  A decrease of PCT levels below or equal to 80% defines a positive change in PCT test result representing a higher risk for 28-day all-cause mortality of patients diagnosed with severe sepsis for septic shock.    Change in PCT >80%  A decrease of PCT levels of more than 80% defines a negative change in PCT result representing a lower risk for 28-day all-cause mortality of patients diagnosed with severe sepsis or septic shock.       Lactic Acid, Plasma [[id number]]  (Normal) Collected: 01/01/25 0212    Specimen: Blood Updated: 01/01/25 0236     Lactate 0.9 mmol/L     CBC Auto Differential [[id number]]  (Abnormal) Collected: 01/01/25 0212    Specimen: Blood Updated: 01/01/25 0219     WBC 8.69 10*3/mm3      RBC 3.43 10*6/mm3      Hemoglobin 11.1 g/dL      Hematocrit 33.7 %      MCV 98.3 fL      MCH 32.4 pg      MCHC 32.9 g/dL      RDW 14.7 %      RDW-SD 51.9 fl      MPV 9.3 fL      Platelets 236 10*3/mm3      Neutrophil % 88.7 %      Lymphocyte % 4.3 %      Monocyte % 5.8 %      Eosinophil % 0.3 %      Basophil % 0.3 %      Immature Grans % 0.6 %      " Neutrophils, Absolute 7.71 10*3/mm3      Lymphocytes, Absolute 0.37 10*3/mm3      Monocytes, Absolute 0.50 10*3/mm3      Eosinophils, Absolute 0.03 10*3/mm3      Basophils, Absolute 0.03 10*3/mm3      Immature Grans, Absolute 0.05 10*3/mm3      nRBC 0.0 /100 WBC             Imaging Results (Last 24 Hours)       ** No results found for the last 24 hours. **            LAB RESULTS (LAST 7 DAYS)    CBC  Results from last 7 days   Lab Units 01/01/25  0212   WBC 10*3/mm3 8.69   RBC 10*6/mm3 3.43*   HEMOGLOBIN g/dL 11.1*   HEMATOCRIT % 33.7*   MCV fL 98.3*   PLATELETS 10*3/mm3 236       BMP  Results from last 7 days   Lab Units 01/01/25  0212   SODIUM mmol/L 140   POTASSIUM mmol/L 3.7   CHLORIDE mmol/L 104   CO2 mmol/L 25.8   BUN mg/dL 17   CREATININE mg/dL 1.28*   GLUCOSE mg/dL 89       CMP   Results from last 7 days   Lab Units 01/01/25  0212   SODIUM mmol/L 140   POTASSIUM mmol/L 3.7   CHLORIDE mmol/L 104   CO2 mmol/L 25.8   BUN mg/dL 17   CREATININE mg/dL 1.28*   GLUCOSE mg/dL 89   ALBUMIN g/dL 3.7   BILIRUBIN mg/dL 0.5   ALK PHOS U/L 63   AST (SGOT) U/L 36   ALT (SGPT) U/L 18       BNP        TROPONIN  Results from last 7 days   Lab Units 01/01/25  0323   HSTROP T ng/L 258*       CoAg        Creatinine Clearance  Estimated Creatinine Clearance: 36.6 mL/min (A) (by C-G formula based on SCr of 1.28 mg/dL (H)).    ABG          Radiology  No radiology results for the last day      EKG  I personally viewed and interpreted the patient's EKG/Telemetry data:  No orders to display         Echocardiogram:    Results for orders placed during the hospital encounter of 05/25/22    Adult Transthoracic Echo Complete W/ Cont if Necessary Per Protocol    Interpretation Summary  · Left ventricular systolic function is normal.  · Left ventricular ejection fraction is 55 to 60%  · Left ventricular diastolic function was normal.  · Calculated right ventricular systolic pressure from tricuspid regurgitation is 35.9 mmHg.        Stress  Test:        Cardiac Catheterization:  Results for orders placed during the hospital encounter of 01/26/22    Cardiac Catheterization/Vascular Study    Narrative  Cardiac catheterization/PCI report    DATE OF PROCEDURE: 1/28/2022    INDICATION FOR PROCEDURE:    Unstable angina  Non-ST elevation myocardial infarction  Hypertension    PROCEDURE PERFORMED:    Left heart catheterization  coronary angiography  left ventriculography  Balloon angioplasty and stenting of right coronary artery    FINDINGS:    1. HEMODYNAMICS:    Aortic pressure: 154/82 mmHg    LVEDP: 5 to 10 mmHg    Gradient across aortic valve on pullback: No gradient across aortic valve      2. LEFT VENTRICULOGRAPHY: 50 to 55%      3. CORONARY ANGIOGRAPHY:    A: Left main coronary artery: 10% plaque in the mid to distal segment    B: Left anterior descending artery: Diffuse disease in the mid to distal segment.  It ranges up to 40%.  No high-grade stenosis in LAD    C: Left circumflex coronary artery: Mid to distal LCx is diffusely diseased and feeds into a small OM.  This segment appears to be a very small caliber vessel up to 1.5 mm.  It was not amenable to intervention    D: Right coronary artery: 80 to 90% stenosis in proximal segment and 40% diffuse disease in mid to distal segment and PDA has 30 to 40% stenosis in the midsegment.  RCA was dominant vessel.  RADHA-3 flow was present before and after the intervention.      PROCEDURE COMMENTS:    After informed consent was obtained, the patient was prepped and draped in the usual sterile manner.  Mild to moderate sedation was administered.  Right femoral artery was accessed without difficulty and 6 Mauritian arterial sheath was inserted.  Sheath was flushed with heparinized saline.    Using 6 Mauritian Eri catheters, first left coronary artery and the right coronary was electively engaged and appropriate views were taken.  A 6 Mauritian JR4 catheter was used to cross aortic valve and left heart catheterization  was performed.  Left ventriculography was done in MCCARTHY view    After diagnostic catheterization, we proceeded to the intervention of the right coronary artery.    We advanced a 6 Luxembourger JR4 interventional guide and selective engagement of the right coronary artery was achieved.  We advanced interventional guidewire 0.014 BMW and crossed the lesion and placed in the distal part of the right coronary artery.    We advanced 3.0x20 compliant balloon and the pre-dilatation at nominal pressure.  After the predilatation, this balloon was removed and we advanced 4.0x28 drug-eluting stent and advanced and placed over the lesion and deployed at nominal pressure.  Stenosis decreased from 80 to 90% to less than 10%.  No dissection, perforation or no reflow phenomena was noted.  RADHA flow was 3 before the intervention and RADHA-3 flow was noted after the intervention    Patient was given aspirin, Plavix and heparin during the procedure.  ACT at the end of procedure was 220.  Integrilin bolus was given    Patient tolerated the procedure well.      SUMMARY:    1.  Two-vessel coronary artery disease  2.  High-grade stenosis of proximal RCA with successful stenting  3.  Diffuse disease of mid to distal LCx.  This segment appears to be very small caliber vessel.    RECOMMENDATIONS:    DAPT is recommended.        Other:      ASSESSMENT & PLAN:    Principal Problem:    NSTEMI (non-ST elevated myocardial infarction)    Elevated troponin  High-sensitivity troponin of 253 and 258  ECG with no obvious ischemia  Patient is unable to complain of chest pain  History of PCI to RCA.  Reluctant to offer cardiac catheterization due to altered mental state and absence of chest pain  Recommend medical management of coronary artery disease  Aspirin, Plavix, high intensity statin and beta-blocker  Obtain an echocardiogram to evaluate LV function after ACS    Confusion on top of dementia  May be precipitated by infection or delirium  Started on  antibiotics for possible pneumonia  Received Ativan    Hypertension  Resume amlodipine  Previously on hydralazine    Hyperlipidemia  LDL 97, HDL 59, total cholesterol 190.  Goal LDL less than 55.  Start high intensity statin  A1c is 5.8    Chronic kidney disease  Creatinine 1.3, GFR is 55.2  Not on an ACE inhibitor  Renal function has improved        Max Aden MD  01/01/25  05:47 EST

## 2025-01-01 NOTE — H&P
"    [facility]    Hospitalist History and Physical     Kailash Cooper Jr. : 1940 MRN:[id number] LOS:0 ROOM: [facility]     Reason for admission: NSTEMI (non-ST elevated myocardial infarction)     Assessment / Plan     Non ST elevation myocardial infarction  H/o CAD s/p PCI  - EKG: normal sinus rhythm rate 82, right bundle branch block, left anterior fascicular block.   - HS troponin 1165, repeat 1321  - previous [facility] 2022:   1. \"Two-vessel coronary artery disease  2.  High-grade stenosis of proximal RCA with successful stenting  3.  Diffuse disease of mid to distal LCx.  This segment appears to be very small caliber vessel. DAPT recommended\"  - resume home plavix, statin  - cardiology consulted for further treatment     Possible pneumonia  Acute on chronic respiratory failure/pulmonary fibrosis  - CXR: chronic scarring with superimposed mild interstitial edema or pneumonia  - pt mildly hypoxic from baseline, chronically on 2L O2 via NC now currently on 4L  - WBC 11.4  - check RVP, procalcitonin, urine antigens  - given IV rocephin at [facility] and will continue   - duonebs, continuous pulse oximetry     Confusion in patient with known dementia  - CT head: no acute intracranial bleed, mass effect, or midline shift.  Age compatible atrophy and chronic deep white matter ischemic changes  - family states he gets confused when out of his environment, sundowns  - prn ativan, sitter     Acute on CKD stage III  - BUN 23, creatinine 1.7 at [facility] (previous baseline around 1.5)  - improved to 1.28 currently   - monitor BMP     Hypertension  Hyperlipidemia  - Continue home Norvasc, Lasix, hydralazine, atorvastatin when home meds verified      H/o CVA  - Continue home Plavix, statin    Chronic low back pain  - Continue home oxycodone     RLS  - Continue home Requip     DM type II  - controlled. Glucose normal 89     BPH  - cont home flomax     History of non-small cell lung cancer stage Ib status post " resection 2017,   Former smoker quit 1990       Nutrition:   NPO Diet NPO Type: Strict NPO     VTE Prophylaxis:  Mechanical VTE prophylaxis orders are present.         History of Present illness     Kailash Cooper Jr. is a 84 y.o. male with PMH of COPD/pulmonary fibrosis on 2L O2 chronically at home, hypertension, hyperlipidemia, previous CVA, CKD stage III, chronic low back pain, DM type II, arthritis who presented to [facility] 12/31/2024 with report of     ED workup at [facility]:   EKG showed normal sinus rhythm rate 82, right bundle branch block, left anterior fascicular block.     HS troponin 1165, repeat 1321  Further labs: , sodium 139, potassium 4.1, chloride 106, CO2 26, glucose 101, BUN 23, creatinine 1.7, ALT 21, AST 37, alk phosphatase 68, total bilirubin 0.5, albumin 4.1, calcium 9.7, WBC 11.4, Hgb 10.4, HCT 33.9, .  Urinalysis showed moderate blood, negative nitrates, negative leukocytes.      CT head impression no acute intracranial bleed, mass effect, or midline shift.  Age compatible atrophy and chronic deep white matter ischemic changes. Signed by Dr. Patrick Allison  CXR impression chronic scarring with superimposed mild interstitial edema or pneumonia. Signed by Dr. Patrick Allison    ER documentation states the patient has not complained of chest pain.  He has been restless and trying to climb out of bed so we are giving him Ativan.  Discussed patient with cardiology Dr. Aden and Sharan with the hospitalist. CXR did reveal possible pneumonia so he was given 1G of rocephin    Patient was planking in the bed trying to get out and go to the grocery store. He was given Iv ativan and has been resting. Nurse at bedside stated the patient is able to state his name and follows some directions when asked. Family reported he will sundown and more so when in the hospital.     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 [facility]    Diabetes     Enlarged prostate     Former smoker     Hiatal hernia     Hip pain     don    Hip pain, bilateral     Hyperlipidemia     Hypertension     Kidney disease        stage 3     Leg pain     don    Low back pain     Neck pain     Stroke       Past Surgical History:   Procedure Laterality Date    CARDIAC CATHETERIZATION Left 1/28/2022    Procedure: Cardiac Catheterization/Vascular Study;  Surgeon: Mani Salguero MD;  Location: [facility] CATH INVASIVE LOCATION;  Service: Cardiovascular;  Laterality: Left;    CATARACT EXTRACTION  2006    [facility]    COLONOSCOPY  2011    COLONOSCOPY N/A 5/27/2022    Procedure: COLONOSCOPY;  Surgeon: Terry Holliday MD;  Location: [facility] ENDOSCOPY;  Service: Gastroenterology;  Laterality: N/A;  polyps    CORONARY STENT PLACEMENT  01/28/2022    RCA    ENDOSCOPY N/A 5/27/2022    Procedure: ESOPHAGOGASTRODUODENOSCOPY with 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, gastric antrum biopsy;  Surgeon: Terry Holliday MD;  Location: [facility] ENDOSCOPY;  Service: Gastroenterology;  Laterality: N/A;  gastritis, gastric ulcer, small bowel AVM    LUNG CANCER SURGERY      upper lobe rt lung cancer 9/2017  at [facility]      Social History     Socioeconomic History    Marital status:      Spouse name: Candis    Number of children: 5   Tobacco Use    Smoking status: Former     Current packs/day: 0.00     Average packs/day: 1 pack/day for 20.0 years (20.0 ttl pk-yrs)     Types: Cigarettes     Start date: 1990     Quit date: 2010     Years since quitting: 15.0     Passive exposure: Past    Smokeless tobacco: Never   Vaping Use    Vaping status: Never Used   Substance and Sexual Activity    Alcohol use: Never    Drug use: Never    Sexual activity: Defer     Partners: Female     Birth  control/protection: None      Family History   Problem Relation Age of Onset    Stroke Mother     Cancer Father         Prostate    Heart failure Brother     Heart disease Other       No Known Allergies   Social Drivers of Health     Tobacco Use: Medium Risk (1/1/2025)    Patient History     Smoking Tobacco Use: Former     Smokeless Tobacco Use: Never     Passive Exposure: Past   Alcohol Use: Not At Risk (1/1/2025)    AUDIT-C     Frequency of Alcohol Consumption: Never     Average Number of Drinks: Patient does not drink     Frequency of Binge Drinking: Never   Financial Resource Strain: Not on file   Food Insecurity: No Food Insecurity (3/26/2024)    Hunger Vital Sign     Worried About Running Out of Food in the Last Year: Never true     Ran Out of Food in the Last Year: Never true   Transportation Needs: No Transportation Needs (3/26/2024)    PRAPARE - Transportation     Lack of Transportation (Medical): No     Lack of Transportation (Non-Medical): No   Physical Activity: Not on file   Stress: Not on file   Social Connections: Not on file   Interpersonal Safety: Not At Risk (1/1/2025)    Abuse Screen     Unsafe at Home or Work/School: no     Feels Threatened by Someone?: no     Does Anyone Keep You from Contacting Others or Doint Things Outside the Home?: no     Physical Sign of Abuse Present: no   Depression: Not at risk (8/1/2024)    PHQ-2     PHQ-2 Score: 0   Housing Stability: Not At Risk (1/1/2025)    Housing Stability     Current Living Arrangements: home     Potentially Unsafe Housing Conditions: none   Utilities: Not At Risk (3/26/2024)    [facility] Utilities     Threatened with loss of utilities: No   Health Literacy: Unknown (3/26/2024)    Education     Help with school or training?: Not on file     Preferred Language: English   Employment: Not on file   Disabilities: At Risk (1/1/2025)    Disabilities     Concentrating, Remembering, or Making Decisions Difficulty: yes     Doing Errands Independently  Difficulty: no        Home Medications     Prior to Admission medications    Medication Sig Start Date End Date Taking? Authorizing Provider   allopurinol (ZYLOPRIM) 100 MG tablet Take 2 tablets by mouth Daily. Indications: Disorder of Excessive Uric Acid in the Blood 7/28/22   Britney Partida MD   amLODIPine (NORVASC) 10 MG tablet TAKE 1 TABLET BY MOUTH ONCE DAILY FOR HIGH BLOOD PRESSURE 10/28/24   Madelyn Márquez APRN   budesonide (PULMICORT) 0.25 MG/2ML nebulizer solution Take 2 mL by nebulization Daily. Indications: Chronic Obstructive Lung Disease 5/18/23   Toshia Jaeger MD   cetirizine (zyrTEC) 10 MG tablet Take 1 tablet by mouth Daily. 4/30/20   Madelyn Márquez APRN   clopidogrel (PLAVIX) 75 MG tablet Take 1 tablet by mouth once daily 10/28/24   Mani Salguero MD   erythromycin (ROMYCIN) 5 MG/GM ophthalmic ointment INSTILL 1 APPLICATION IN EACH AFFECTED EYE THREE TIMES DAILY 10/21/24   Britney Partida MD   ferrous sulfate 325 (65 FE) MG EC tablet Take 1 tablet by mouth Daily With Breakfast. 2/9/20   Geovanny Van MD   furosemide (LASIX) 40 MG tablet Take 1 tablet by mouth once daily 7/27/24   Madelyn Márquez APRN   glimepiride (AMARYL) 1 MG tablet Take 1 tablet by mouth Before Breakfast. 5/5/24   Britney Partida MD   hydrALAZINE (APRESOLINE) 50 MG tablet TAKE 1 TABLET BY MOUTH THREE TIMES DAILY 10/28/24   Madelyn Márquez APRN   megestrol (MEGACE) 40 MG tablet Take 1 tablet by mouth once daily 12/10/24   Madelyn Márquez APRN   nitroglycerin (Nitrostat) 0.3 MG SL tablet Place 1 tablet under the tongue Every 5 (Five) Minutes As Needed for Chest Pain. Take no more than 3 doses in 15 minutes. 1/17/22   Madelyn Márquez APRN   oxyCODONE (ROXICODONE) 15 MG immediate release tablet Take 1 tablet by mouth Every 6 (Six) Hours As Needed for Severe Pain. 11/18/24   Mc Key MD   oxyCODONE (ROXICODONE) 15 MG immediate release tablet Take 1 tablet by mouth Every 6 (Six) Hours As Needed for  Severe Pain. 11/18/24   Mc Key MD   oxyCODONE (ROXICODONE) 15 MG immediate release tablet Take 1 tablet by mouth Every 6 (Six) Hours As Needed for Severe Pain. 11/18/24   Mc Key MD   oxyCODONE-acetaminophen (Percocet)  MG per tablet Take 1 tablet by mouth Every 6 (Six) Hours As Needed for Moderate Pain. 5/13/24   Mc Key MD   pantoprazole (PROTONIX) 40 MG EC tablet Take 1 tablet by mouth twice daily 10/28/24   Madelyn Márquez APRN   potassium chloride (Klor-Con M20) 20 MEQ CR tablet Take 2 tablets now 12/26/24   Madelyn Márquez APRN   pravastatin (PRAVACHOL) 40 MG tablet Take 1 tablet by mouth once daily 10/28/24   Madelyn Márquez APRN   Probiotic Product (CULTURELLE PROBIOTICS PO) Take 20 mg by mouth Daily. Indications: diabetic supplement 6/23/21   Britney Partida MD   rOPINIRole (REQUIP) 0.25 MG tablet Take 1 tablet by mouth Every Night. Take 1 hour before bedtime. 8/1/24   Madelyn Márquez APRN   tamsulosin (FLOMAX) 0.4 MG capsule 24 hr capsule Take 1 capsule by mouth 2 (Two) Times a Day.    Britney Partida MD   vitamin B-12 (CYANOCOBALAMIN) 1000 MCG tablet Take 1 tablet by mouth 2 (Two) Times a Day. Indications: Inadequate Vitamin B12 1/1/21   Britney Partida MD        Objective / Physical Exam     Vital signs:  Temp: 98.5 °F (36.9 °C)  BP: 135/65  Heart Rate: 80  Resp: 18  SpO2: 99 %  Weight: 60.3 kg (132 lb 15 oz)    Admission Weight: Weight: 60.3 kg (132 lb 15 oz)    Physical Exam  Vitals and nursing note reviewed.   Constitutional:       General: He is sleeping.   HENT:      Head: Normocephalic and atraumatic.   Eyes:      Extraocular Movements: Extraocular movements intact.      Pupils: Pupils are equal, round, and reactive to light.   Cardiovascular:      Rate and Rhythm: Normal rate and regular rhythm.      Pulses: Normal pulses.      Heart sounds: Normal heart sounds.   Pulmonary:      Effort: Pulmonary effort is normal.      Breath  sounds: Normal breath sounds. Examination of the right-upper field reveals rales. Examination of the left-upper field reveals rales. Examination of the right-lower field reveals decreased breath sounds and rales. Examination of the left-lower field reveals decreased breath sounds and rales.   Abdominal:      General: Bowel sounds are normal.      Palpations: Abdomen is soft.      Tenderness: There is no abdominal tenderness.   Skin:     General: Skin is warm and dry.   Neurological:      General: No focal deficit present.      Mental Status: He is disoriented.      Comments: Moves all extremities spontaneously per bedside RN. Was planking on the bed earlier asking to go to the grocery store prior to ativan  Pt sleeping on exam          Labs     Results from last 7 days   Lab Units 01/01/25  0212   WBC 10*3/mm3 8.69   HEMOGLOBIN g/dL 11.1*   HEMATOCRIT % 33.7*   PLATELETS 10*3/mm3 236      Results from last 7 days   Lab Units 01/01/25  0212   ALK PHOS U/L 63   AST (SGOT) U/L 36   ALT (SGPT) U/L 18           Results from last 7 days   Lab Units 01/01/25  0212   SODIUM mmol/L 140   POTASSIUM mmol/L 3.7   CHLORIDE mmol/L 104   CO2 mmol/L 25.8   BUN mg/dL 17   CREATININE mg/dL 1.28*   GLUCOSE mg/dL 89          Current Medications     Scheduled Meds:  cefTRIAXone, 1,000 mg, Intravenous, Q24H  mupirocin, 1 Application, Each Nare, BID  sodium chloride, 10 mL, Intravenous, Q12H             Jacque Roldan [facility]  01/01/25   03:54 EST

## 2025-01-02 LAB
ANION GAP SERPL CALCULATED.3IONS-SCNC: 19.2 MMOL/L (ref 5–15)
BASOPHILS # BLD AUTO: 0.02 10*3/MM3 (ref 0–0.2)
BASOPHILS NFR BLD AUTO: 0.2 % (ref 0–1.5)
BUN SERPL-MCNC: 19 MG/DL (ref 8–23)
BUN/CREAT SERPL: 16.7 (ref 7–25)
CALCIUM SPEC-SCNC: 10 MG/DL (ref 8.6–10.5)
CHLORIDE SERPL-SCNC: 105 MMOL/L (ref 98–107)
CO2 SERPL-SCNC: 19.8 MMOL/L (ref 22–29)
CREAT SERPL-MCNC: 1.14 MG/DL (ref 0.76–1.27)
DEPRECATED RDW RBC AUTO: 51.8 FL (ref 37–54)
EGFRCR SERPLBLD CKD-EPI 2021: 63.4 ML/MIN/1.73
EOSINOPHIL # BLD AUTO: 0.02 10*3/MM3 (ref 0–0.4)
EOSINOPHIL NFR BLD AUTO: 0.2 % (ref 0.3–6.2)
ERYTHROCYTE [DISTWIDTH] IN BLOOD BY AUTOMATED COUNT: 14.7 % (ref 12.3–15.4)
GEN 5 1HR TROPONIN T REFLEX: 393 NG/L
GLUCOSE SERPL-MCNC: 109 MG/DL (ref 65–99)
HCT VFR BLD AUTO: 42.9 % (ref 37.5–51)
HGB BLD-MCNC: 14.4 G/DL (ref 13–17.7)
IMM GRANULOCYTES # BLD AUTO: 0.08 10*3/MM3 (ref 0–0.05)
IMM GRANULOCYTES NFR BLD AUTO: 0.7 % (ref 0–0.5)
LYMPHOCYTES # BLD AUTO: 0.7 10*3/MM3 (ref 0.7–3.1)
LYMPHOCYTES NFR BLD AUTO: 5.9 % (ref 19.6–45.3)
MCH RBC QN AUTO: 32.4 PG (ref 26.6–33)
MCHC RBC AUTO-ENTMCNC: 33.6 G/DL (ref 31.5–35.7)
MCV RBC AUTO: 96.6 FL (ref 79–97)
MONOCYTES # BLD AUTO: 0.75 10*3/MM3 (ref 0.1–0.9)
MONOCYTES NFR BLD AUTO: 6.4 % (ref 5–12)
NEUTROPHILS NFR BLD AUTO: 10.21 10*3/MM3 (ref 1.7–7)
NEUTROPHILS NFR BLD AUTO: 86.6 % (ref 42.7–76)
NRBC BLD AUTO-RTO: 0 /100 WBC (ref 0–0.2)
PLATELET # BLD AUTO: 310 10*3/MM3 (ref 140–450)
PMV BLD AUTO: 9.5 FL (ref 6–12)
POTASSIUM SERPL-SCNC: 3.5 MMOL/L (ref 3.5–5.2)
RBC # BLD AUTO: 4.44 10*6/MM3 (ref 4.14–5.8)
SODIUM SERPL-SCNC: 144 MMOL/L (ref 136–145)
TROPONIN T % DELTA: -6 %
TROPONIN T NUMERIC DELTA: -26 NG/L
TROPONIN T SERPL HS-MCNC: 419 NG/L
WBC NRBC COR # BLD AUTO: 11.78 10*3/MM3 (ref 3.4–10.8)

## 2025-01-02 PROCEDURE — 92610 EVALUATE SWALLOWING FUNCTION: CPT

## 2025-01-02 PROCEDURE — 84484 ASSAY OF TROPONIN QUANT: CPT | Performed by: NURSE PRACTITIONER

## 2025-01-02 PROCEDURE — 85025 COMPLETE CBC W/AUTO DIFF WBC: CPT | Performed by: NURSE PRACTITIONER

## 2025-01-02 PROCEDURE — 94664 DEMO&/EVAL PT USE INHALER: CPT

## 2025-01-02 PROCEDURE — 99232 SBSQ HOSP IP/OBS MODERATE 35: CPT | Performed by: INTERNAL MEDICINE

## 2025-01-02 PROCEDURE — 94761 N-INVAS EAR/PLS OXIMETRY MLT: CPT

## 2025-01-02 PROCEDURE — 94799 UNLISTED PULMONARY SVC/PX: CPT

## 2025-01-02 PROCEDURE — 80048 BASIC METABOLIC PNL TOTAL CA: CPT | Performed by: NURSE PRACTITIONER

## 2025-01-02 PROCEDURE — 25010000002 MORPHINE PER 10 MG: Performed by: INTERNAL MEDICINE

## 2025-01-02 PROCEDURE — 93010 ELECTROCARDIOGRAM REPORT: CPT | Performed by: INTERNAL MEDICINE

## 2025-01-02 PROCEDURE — 25010000002 MORPHINE PER 10 MG: Performed by: STUDENT IN AN ORGANIZED HEALTH CARE EDUCATION/TRAINING PROGRAM

## 2025-01-02 PROCEDURE — 93005 ELECTROCARDIOGRAM TRACING: CPT | Performed by: STUDENT IN AN ORGANIZED HEALTH CARE EDUCATION/TRAINING PROGRAM

## 2025-01-02 PROCEDURE — 25010000002 CEFTRIAXONE PER 250 MG: Performed by: NURSE PRACTITIONER

## 2025-01-02 RX ORDER — LORAZEPAM 2 MG/ML
1 INJECTION INTRAMUSCULAR
Status: DISCONTINUED | OUTPATIENT
Start: 2025-01-02 | End: 2025-01-04

## 2025-01-02 RX ORDER — METOPROLOL TARTRATE 1 MG/ML
5 INJECTION, SOLUTION INTRAVENOUS EVERY 8 HOURS
Status: DISCONTINUED | OUTPATIENT
Start: 2025-01-02 | End: 2025-01-04

## 2025-01-02 RX ADMIN — METOPROLOL TARTRATE 2.5 MG: 1 INJECTION, SOLUTION INTRAVENOUS at 02:17

## 2025-01-02 RX ADMIN — MORPHINE SULFATE 4 MG: 4 INJECTION, SOLUTION INTRAMUSCULAR; INTRAVENOUS at 16:10

## 2025-01-02 RX ADMIN — BUDESONIDE 0.5 MG: 0.5 INHALANT RESPIRATORY (INHALATION) at 19:08

## 2025-01-02 RX ADMIN — MORPHINE SULFATE 4 MG: 4 INJECTION, SOLUTION INTRAMUSCULAR; INTRAVENOUS at 21:44

## 2025-01-02 RX ADMIN — BUDESONIDE 0.5 MG: 0.5 INHALANT RESPIRATORY (INHALATION) at 06:28

## 2025-01-02 RX ADMIN — MORPHINE SULFATE 4 MG: 4 INJECTION, SOLUTION INTRAMUSCULAR; INTRAVENOUS at 12:13

## 2025-01-02 RX ADMIN — Medication 10 ML: at 20:00

## 2025-01-02 RX ADMIN — Medication 10 ML: at 09:00

## 2025-01-02 RX ADMIN — MUPIROCIN 1 APPLICATION: 20 OINTMENT TOPICAL at 09:29

## 2025-01-02 RX ADMIN — IPRATROPIUM BROMIDE AND ALBUTEROL SULFATE 3 ML: .5; 3 SOLUTION RESPIRATORY (INHALATION) at 19:08

## 2025-01-02 RX ADMIN — MUPIROCIN 1 APPLICATION: 20 OINTMENT TOPICAL at 22:22

## 2025-01-02 RX ADMIN — METOPROLOL TARTRATE 2.5 MG: 1 INJECTION, SOLUTION INTRAVENOUS at 16:10

## 2025-01-02 RX ADMIN — CEFTRIAXONE SODIUM 1000 MG: 1 INJECTION, POWDER, FOR SOLUTION INTRAMUSCULAR; INTRAVENOUS at 04:22

## 2025-01-02 RX ADMIN — METOPROLOL TARTRATE 2.5 MG: 1 INJECTION, SOLUTION INTRAVENOUS at 09:28

## 2025-01-02 RX ADMIN — METOPROLOL TARTRATE 5 MG: 1 INJECTION, SOLUTION INTRAVENOUS at 22:10

## 2025-01-02 RX ADMIN — IPRATROPIUM BROMIDE AND ALBUTEROL SULFATE 3 ML: .5; 3 SOLUTION RESPIRATORY (INHALATION) at 06:28

## 2025-01-02 NOTE — NURSING NOTE
WOCN note:    84 yr old male admitted 1/1/25 with NSTEMI. WOCN consult received for blanchable erythema to the sacrum. Recommend to implement pressure injury prevention measures per protocol and skin care for any episode of incontinence. Will follow as needed.

## 2025-01-02 NOTE — PROGRESS NOTES
"[facility]      Patient Care Team:  Madelyn Márquez APRN as PCP - General (Nurse Practitioner)  Marcia Lujan MD as Consulting Physician (Nephrology)  Mc Key MD as Consulting Physician (Physical Medicine and Rehabilitation)  Gila Hawkins APRN (Nurse Practitioner)  Ct Stevenson APRN as Nurse Practitioner (Family Medicine)  Mani Salguero MD as Consulting Physician (Cardiology)  Toshia Jaeger MD as Consulting Physician (Pulmonary Disease)  Terry Holliday MD as Consulting Physician (Gastroenterology)        Reason for follow-up: Elevated troponins    Subjective    Interval History and ROS:     Patient seen as he is lying in bed, trying to come out of the bed.  Sitter is at bedside.  Bedside RN reports he has been confused and agitated since arrival, did have a short Rest after Geodon, however it took about 5 hours after receiving before it was effective.  Patient is not swallowing, even with sips of water he allows it to spill out of his mouth.  P.o. meds have not been able to be given.    Review of Systems   Unable to perform ROS: Acuity of condition       Objective    Vital Signs  Temp:  [97.4 °F (36.3 °C)-98.7 °F (37.1 °C)] 98.7 °F (37.1 °C)  Heart Rate:  [] 73  Resp:  [10-24] 10  BP: ()/(55-88) 123/63  Oxygen Therapy  SpO2: 96 %  Pulse Oximetry Type: Continuous  Device (Oxygen Therapy): nasal cannula  Flow (L/min) (Oxygen Therapy): 2}  Flowsheet Rows      Flowsheet Row First Filed Value   Admission Height 165 cm (64.96\") Documented at 01/01/2025 0000   Admission Weight 60.3 kg (132 lb 15 oz) Documented at 01/01/2025 0000            Physical Exam  Telemetry: Sinus rhythm with right bundle branch block      Head: Atraumatic, normocephalic  Eyes: No conjunctival injection or redness noted.  No discharge  Neck: No JVD, no lymphadenopathy or carotid bruit  Chest: No obvious deformity noted  Lungs: Air entry is present in all lung zones.  " Decreased breath sounds at the bases.  No crackles or rhonchi  Heart: Normal S1 and S2.  No pericardial rub or murmur  Neuro: Patient is awake and slightly agitated and confused  Psychiatric: Patient appears to be emotionally stable.  No depression or anxiety noted  Extremities: No leg edema noted          Results Review:     I reviewed the patient's new clinical results.    Lab Results (last 24 hours)       Procedure Component Value Units Date/Time    Basic Metabolic Panel [[id number]]  (Abnormal) Collected: 01/03/25 0408    Specimen: Blood Updated: 01/03/25 0445     Glucose 108 mg/dL      BUN 30 mg/dL      Creatinine 1.37 mg/dL      Sodium 143 mmol/L      Potassium 3.8 mmol/L      Chloride 105 mmol/L      CO2 23.4 mmol/L      Calcium 9.8 mg/dL      BUN/Creatinine Ratio 21.9     Anion Gap 14.6 mmol/L      eGFR 50.9 mL/min/1.73     Narrative:      GFR Categories in Chronic Kidney Disease (CKD)      GFR Category          GFR (mL/min/1.73)    Interpretation  G1                     90 or greater         Normal or high (1)  G2                      60-89                Mild decrease (1)  G3a                   45-59                Mild to moderate decrease  G3b                   30-44                Moderate to severe decrease  G4                    15-29                Severe decrease  G5                    14 or less           Kidney failure          (1)In the absence of evidence of kidney disease, neither GFR category G1 or G2 fulfill the criteria for CKD.    eGFR calculation 2021 CKD-EPI creatinine equation, which does not include race as a factor    CBC Auto Differential [[id number]]  (Abnormal) Collected: 01/03/25 0408    Specimen: Blood Updated: 01/03/25 0421     WBC 10.79 10*3/mm3      RBC 3.92 10*6/mm3      Hemoglobin 12.7 g/dL      Hematocrit 38.3 %      MCV 97.7 fL      MCH 32.4 pg      MCHC 33.2 g/dL      RDW 14.8 %      RDW-SD 52.7 fl      MPV 9.8 fL      Platelets 333 10*3/mm3      Neutrophil % 79.7 %       Lymphocyte % 9.8 %      Monocyte % 8.4 %      Eosinophil % 0.9 %      Basophil % 0.4 %      Immature Grans % 0.8 %      Neutrophils, Absolute 8.59 10*3/mm3      Lymphocytes, Absolute 1.06 10*3/mm3      Monocytes, Absolute 0.91 10*3/mm3      Eosinophils, Absolute 0.10 10*3/mm3      Basophils, Absolute 0.04 10*3/mm3      Immature Grans, Absolute 0.09 10*3/mm3      nRBC 0.0 /100 WBC     POC Glucose Once [[id number]]  (Normal) Collected: 01/03/25 0159    Specimen: Blood Updated: 01/03/25 0201     Glucose 102 mg/dL      Comment: Serial Number: [id number]:  [id number]       High Sensitivity Troponin T 1Hr [[id number]]  (Abnormal) Collected: 01/02/25 1207    Specimen: Blood from Arm, Left Updated: 01/02/25 1239     HS Troponin T 393 ng/L      Troponin T Numeric Delta -26 ng/L      Troponin T % Delta -6 %     Narrative:      High Sensitive Troponin T Reference Range:  <14.0 ng/L- Negative Female for AMI  <22.0 ng/L- Negative Male for AMI  >=14 - Abnormal Female indicating possible myocardial injury.  >=22 - Abnormal Male indicating possible myocardial injury.   Clinicians would have to utilize clinical acumen, EKG, Troponin, and serial changes to determine if it is an Acute Myocardial Infarction or myocardial injury due to an underlying chronic condition.                 Imaging Results (Last 24 Hours)       ** No results found for the last 24 hours. **          ECG/EMG Results (most recent)       Procedure Component Value Units Date/Time    Adult Transthoracic Echo Complete W/ Cont if Necessary Per Protocol [[id number]] Resulted: 01/01/25 0931     Updated: 01/01/25 0931     LVIDd 4.4 cm      LVIDs 3.5 cm      IVSd 1.10 cm      LVPWd 1.00 cm      FS 20.5 %      IVS/LVPW 1.10 cm      ESV(cubed) 42.9 ml      EDV(cubed) 85.2 ml      LV mass(C)d 158.2 grams      LVOT area 3.8 cm2      LVOT diam 2.20 cm      EDV(MOD-sp4) 77.7 ml      ESV(MOD-sp4) 33.9 ml      SV(MOD-sp4) 43.8 ml      EF(MOD-sp4) 56.4 %      MV E max alejandra 182.0  cm/sec      Pulm A Revs Dur 0.11 sec      Med Peak E' Levi 7.9 cm/sec      Lat Peak E' Levi 6.3 cm/sec      TR max levi 274.0 cm/sec      Avg E/e' ratio 25.63     SV(LVOT) 93.1 ml      SV(RVOT) 23.1 ml      Qp/Qs 0.25     RVIDd 2.9 cm      TAPSE (>1.6) 2.44 cm      RV S' 18.3 cm/sec      LA dimension (2D)  3.7 cm      Pulm Sys Levi 74.3 cm/sec      Pulm Austin Levi 57.5 cm/sec      Pulm S/D 1.29     Pulm A Revs Levi 41.5 cm/sec      LV V1 max 116.0 cm/sec      LV V1 max PG 5.4 mmHg      LV V1 mean PG 3.0 mmHg      LV V1 VTI 24.5 cm      Ao pk levi 179.0 cm/sec      Ao max PG 12.8 mmHg      Ao mean PG 7.0 mmHg      Ao V2 VTI 30.0 cm      TAMI(I,D) 3.1 cm2      MV max PG 12.7 mmHg      MV mean PG 5.0 mmHg      MV V2 VTI 27.8 cm      MVA(VTI) 3.4 cm2      TR max PG 30.0 mmHg      RVOT diam 1.40 cm      RV V1 max PG 3.7 mmHg      RV V1 max 95.8 cm/sec      RV V1 VTI 15.0 cm      PA V2 max 107.5 cm/sec      ACS 0.70 cm      Sinus 3.4 cm      Dimensionless Index 0.65 (DI)      EF(MOD-bp) 56.0 %     Narrative:        Left ventricular systolic function is normal. Calculated left   ventricular EF = 56% Left ventricular ejection fraction appears to be 56 -   60%.    Left ventricular diastolic function is consistent with (grade II w/high   LAP) pseudonormalization.    Mild to moderate aortic valve stenosis is present.    Estimated right ventricular systolic pressure from tricuspid   regurgitation is normal (<35 mmHg).      Telemetry Scan [[id number]] Resulted: 01/01/25     Updated: 01/02/25 1050    ECG 12 Lead QT Measurement [[id number]] Collected: 01/02/25 1307     Updated: 01/02/25 1308     QT Interval 409 ms      QTC Interval 516 ms     Narrative:      HEART RATE=96  bpm  RR Xxbntpsy=897  ms  CT Jysqpgik=485  ms  P Horizontal Axis=-3  deg  P Front Axis=41  deg  QRSD Eplmngis=676  ms  QT Luvvwxlx=860  ms  SDeM=702  ms  QRS Axis=-75  deg  T Wave Axis=-59  deg  - ABNORMAL ECG -  Sinus rhythm  Right bundle branch block  Abnormal T,  consider ischemia, lateral leads  Date and Time of Study:2025-01-02 13:07:30    ECG 12 Lead QT Measurement [[id number]] Collected: 01/03/25 0826     Updated: 01/03/25 0827     QT Interval 384 ms      QTC Interval 486 ms     Narrative:      HEART RATE=96  bpm  RR Bmftsibs=855  ms  CA Qmbhaduw=726  ms  P Horizontal Axis=-68  deg  P Front Axis=48  deg  QRSD Mefjyrfj=732  ms  QT Gvqsdbla=051  ms  LHwN=331  ms  QRS Axis=-67  deg  T Wave Axis=-60  deg  - ABNORMAL ECG -  Sinus rhythm  Ventricular premature complex  Probable left atrial enlargement  RBBB and LAFB  Abnormal T, consider ischemia, lateral leads  Date and Time of Study:2025-01-03 08:26:18            Medication Review:   I have reviewed the patient's current medication list    Current Facility-Administered Medications:     aluminum-magnesium hydroxide-simethicone (MAALOX MAX) 400-400-40 MG/5ML suspension 15 mL, 15 mL, Oral, Q6H PRN, Jacque Roldan, APRN    amLODIPine (NORVASC) tablet 10 mg, 10 mg, Oral, Q24H, Santos, Lesly, APRN    aspirin EC tablet 81 mg, 81 mg, Oral, Daily, Tom Lesly, APRN    atorvastatin (LIPITOR) tablet 10 mg, 10 mg, Oral, Nightly, Lesly Santos, APRERIC    sennosides-docusate (PERICOLACE) 8.6-50 MG per tablet 2 tablet, 2 tablet, Oral, BID PRN **AND** polyethylene glycol (MIRALAX) packet 17 g, 17 g, Oral, Daily PRN **AND** bisacodyl (DULCOLAX) EC tablet 5 mg, 5 mg, Oral, Daily PRN **AND** bisacodyl (DULCOLAX) suppository 10 mg, 10 mg, Rectal, Daily PRN, Soco Roldanle, APRN    budesonide (PULMICORT) nebulizer solution 0.5 mg, 0.5 mg, Nebulization, BID - RT, Jacque Roldan, APRN, 0.5 mg at 01/03/25 0718    clopidogrel (PLAVIX) tablet 75 mg, 75 mg, Oral, Daily, AchterJacque alanis APRN    hydrALAZINE (APRESOLINE) tablet 50 mg, 50 mg, Oral, TID, Lesly Santos APRN    ipratropium-albuterol (DUO-NEB) nebulizer solution 3 mL, 3 mL, Nebulization, Q6H While Awake - RT, Jacque Roldan APRN, 3 mL at 01/03/25 0714     ipratropium-albuterol (DUO-NEB) nebulizer solution 3 mL, 3 mL, Nebulization, Q4H PRN, Jacque Roldan, APRN    LORazepam (ATIVAN) injection 1 mg, 1 mg, Intravenous, Q3H PRN, Yvonne Barrett APRN, 1 mg at 01/03/25 0503    melatonin tablet 5 mg, 5 mg, Oral, Nightly PRN, Jacque Roldan APRN    metoprolol tartrate (LOPRESSOR) injection 5 mg, 5 mg, Intravenous, Q8H, Mani Salguero MD, 5 mg at 01/02/25 2210    morphine injection 2 mg, 2 mg, Intravenous, Q4H PRN, Tono Fuentes MD    mupirocin (BACTROBAN) 2 % nasal ointment 1 Application, 1 Application, Each Nare, BID, Jacque Roldan APRN, 1 Application at 01/03/25 0823    ondansetron ODT (ZOFRAN-ODT) disintegrating tablet 4 mg, 4 mg, Oral, Q6H PRN **OR** ondansetron (ZOFRAN) injection 4 mg, 4 mg, Intravenous, Q6H PRN, Jacque Roldan APRN    oxyCODONE (ROXICODONE) immediate release tablet 15 mg, 15 mg, Oral, Q6H PRN, Yeny Singleton MD    sodium chloride 0.9 % flush 10 mL, 10 mL, Intravenous, Q12H, Jacque Roldan APRN, 10 mL at 01/03/25 0823    sodium chloride 0.9 % flush 10 mL, 10 mL, Intravenous, PRN, WilmarterSoco alanisle, APRN    sodium chloride 0.9 % infusion 40 mL, 40 mL, Intravenous, PRN, WilmarterJackie alanishelle, APRN    sodium chloride 0.9 % infusion, 100 mL/hr, Intravenous, Continuous, Tono Fuentes MD, Last Rate: 100 mL/hr at 01/03/25 0946, 100 mL/hr at 01/03/25 0946      Assessment & Plan   NSTEMI  Elevated troponin  High-sensitivity troponin of 253 and 258  ECG with no obvious ischemia  Patient is unable to complain of chest pain  History of PCI to RCA.  Holding on cardiac catheterization due to altered mental state and no c/o of chest pain  medical management of coronary artery disease recommended  Aspirin, Plavix, high intensity statin have not been able to be given due to confusion/agitation, pt not taking oral. Beta-blocker being given IV  Echocardiogram done to evaluate LV function after ACS, EF 56-60%. Grade 2 DD,  mild-mod AV stenosis  Currently SR-ST     Confusion on top of dementia  May be precipitated by infection or delirium  [facility] on [facility] for possible [facility], WBC 11.78  No bacteria in UA  He had negative  Pt has been resistant to sedatives, after Haldol, Ativan and Dilaudid, was still trying to elope, Zarina had some results     Hypertension  Currently SBP this am 110-120  Resume amlodipine when pt taking po  Previously on hydralazine as well po, can use IV prn if needed     Hyperlipidemia  LDL 97, HDL 59, total cholesterol 190.  Goal LDL less than 55.  Start high intensity statin  A1c is 5.8     Chronic kidney disease  Creatinine 1.14 today from 1.3, GFR is 63.4  Not on an ACE inhibitor  Renal function has improved       Further orders and recommendations per Dr. Salguero    Patient is seen and examined and findings are verified.  All data is reviewed by me personally.  Assessment and plan formulated by APC was done after discussion with attending.  I spent more than 50% of time in taking care of the patient.    Patient is confused and agitated.    Hemodynamics are stable    Normal S1 and S2.  No pericardial rub or murmur abdominal exam is benign patient is agitated.    I would recommend to have psychiatry consultation.  Patient had elevated troponin probably consistent with non-ST elevation myocardial infarction.  If patient is improved and stable he will need cardiac catheterization.  Blood pressure is currently stable.    Patient cannot take p.o. medicine.  I would recommend to consider NG tube.  In the meanwhile I would increase  metoprolol 5 mg Q8.    Electronically signed by Mani Salguero MD, 01/03/25, 11:32 AM EST.      Mani Salguero MD  01/03/25  11:32 EST

## 2025-01-02 NOTE — CASE MANAGEMENT/SOCIAL WORK
Continued Stay Note   Pro     Patient Name: Kailash Cooper Jr.  MRN: [id number]  Today's Date: 1/2/2025    Admit Date: 1/1/2025    Plan: DC Plan: Pending clinical course and PT/OT evals. From home with family.   Discharge Plan       Row Name 01/02/25 1532       Plan    Plan DC Plan: Pending clinical course and PT/OT evals. From home with family.    Patient/Family in Agreement with Plan unable to assess    Provided Post Acute Provider List? N/A    Provided Post Acute Provider Quality & Resource List? N/A    Plan Comments CM spoke with Hospitalist and nursing for morning rounds and reviewed chart for clinical updates. Nursing reports patient's entire famiy is flu positive and patient spouse is supposed to be tested today. MD anticipates patient will need 2-3 days before discharge ready. DVT prophylaxis being held for 24 hours. Patient having confusion. CM attmepted to contact patient spouse via telephone to discuss admission assessment, IMM, and DC planning. No answer. CM left VM with return contact information. Awaiting response at this time.CM will continue to follow for any additional needs and adjust DC plan accordingly. DC Barriers: Cardiac monitoring, O2@3L nc, confusion, IV abx, and montior labs.                 Expected Discharge Date and Time       Expected Discharge Date Expected Discharge Time    Jan 6, 2025           Phone communication or documentation only- no physical contact with patient or family.      Marjorie Mcfadden RN     Office Phone: [phone]  Office Cell:     [phone]

## 2025-01-02 NOTE — CASE MANAGEMENT/SOCIAL WORK
Discharge Planning Assessment  [facility]     Patient Name: Kailash Cooper Jr.  MRN: [id number]  Today's Date: 1/2/2025    Admit Date: 1/1/2025    Plan: DC Plan: Anticipate routine home with family pending clinical course. Spouse Candis will provide transportation.   Discharge Needs Assessment       Row Name 01/02/25 1618       Living Environment    People in Home spouse    Name(s) of People in Home Candis Cooper    Current Living Arrangements home    Potentially Unsafe Housing Conditions none    In the past 12 months has the electric, gas, oil, or water company threatened to shut off services in your home? No    Primary Care Provided by self    Provides Primary Care For no one    Family Caregiver if Needed spouse    Family Caregiver Names Candis Cooper    Quality of Family Relationships helpful;involved;supportive    Able to Return to Prior Arrangements yes       Resource/Environmental Concerns    Resource/Environmental Concerns none    Transportation Concerns none       Transportation Needs    In the past 12 months, has lack of transportation kept you from medical appointments or from getting medications? no    In the past 12 months, has lack of transportation kept you from meetings, work, or from getting things needed for daily living? No       Food Insecurity    Within the past 12 months, you worried that your food would run out before you got the money to buy more. Never true    Within the past 12 months, the food you bought just didn't last and you didn't have money to get more. Never true       Transition Planning    Patient/Family Anticipates Transition to home with family    Patient/Family Anticipated Services at Transition none    Transportation Anticipated car, drives self;family or friend will provide       Discharge Needs Assessment    Readmission Within the Last 30 Days no previous admission in last 30 days    Equipment Currently Used at Home cane, straight;crutches    Concerns to be Addressed discharge  planning    Do you want help finding or keeping work or a job? Patient unable to answer    Do you want help with school or training? For example, starting or completing job training or getting a high school diploma, GED or equivalent Patient unable to answer    Anticipated Changes Related to Illness none    Equipment Needed After Discharge none    Provided Post Acute Provider List? N/A    Provided Post Acute Provider Quality & Resource List? N/A    Current Discharge Risk cognitively impaired;physical impairment                   Discharge Plan       Row Name 01/02/25 1619       Plan    Plan DC Plan: Anticipate routine home with family pending clinical course. Spouse Candis will provide transportation.    Patient/Family in Agreement with Plan yes    Provided Post Acute Provider List? N/A    Provided Post Acute Provider Quality & Resource List? N/A    Plan Comments CM spoke with patient spouse Candis via telephone to discuss admission assessment and discharge planning. Candis confirms PCP and pharmacy. Candis confirms she is agreeable to meds to bed program at this time. CM updated pharmacy in [facility]. Candis denies any difficulty affording medications but prices of food are difficult, however, the do not go without. Candis is aware of location of local food bank if needed. Candis denies any additional needs for services or DME at this time. Patient has cane and crutches at home. Patient Candis reports patient is independent with ADL's and drives at baseline. Patient spouse will provide transportation when ready for DC. No change to DC barriers from previous note.      Row Name 01/02/25 5081       Plan    Plan DC Plan: Pending clinical course and PT/OT evals. From home with family.    Patient/Family in Agreement with Plan unable to assess    Provided Post Acute Provider List? N/A    Provided Post Acute Provider Quality & Resource List? N/A    Plan Comments FILOMENA spoke with Hospitalist and nursing for morning rounds  and reviewed chart for clinical updates. Nursing reports patient's entire famiy is flu positive and patient spouse is supposed to be tested today. MD anticipates patient will need 2-3 days before discharge ready. DVT prophylaxis being held for 24 hours. Patient having confusion. CM attmepted to contact patient spouse via telephone to discuss admission assessment, IMM, and DC planning. No answer. CM left VM with return contact information. Awaiting response at this time.CM will continue to follow for any additional needs and adjust DC plan accordingly. DC Barriers: Cardiac monitoring, O2@3L nc, confusion, IV abx, and montior labs.                 Expected Discharge Date and Time       Expected Discharge Date Expected Discharge Time    Jan 6, 2025            Demographic Summary       Row Name 01/02/25 1610       General Information    Admission Type inpatient    Arrived From [facility]    Required Notices Provided Important Message from Medicare    Referral Source admission list    Reason for Consult discharge planning    Preferred Language English       Contact Information    Permission Granted to Share Info With                    Functional Status       Row Name 01/02/25 1618       Functional Status    Usual Activity Tolerance moderate    Current Activity Tolerance other (see comments)  GARY       Physical Activity    On average, how many days per week do you engage in moderate to strenuous exercise (like a brisk walk)? 0 days    On average, how many minutes do you engage in exercise at this level? 0 min    Number of minutes of exercise per week 0       Functional Status, IADL    Medications independent    Meal Preparation independent    Housekeeping independent;assistive person    Laundry independent;assistive person    Shopping independent       Mental Status    General Appearance WDL WDL       Mental Status Summary    Recent Changes in Mental Status/Cognitive Functioning mental status        Employment/    Employment Status retired    Current or Previous Occupation not applicable                  Patient Forms       Row Name 01/02/25 1608       Patient Forms    Important Message from Medicare (IMM) Delivered    Delivered to Support person  Patient spouse Candis Cooper    Method of delivery Telephone  Candis verbalized understanding. Copy left at bedside.      Row Name 01/02/25 1530       Patient Forms    Important Message from Medicare (IMM) Attempted    Details of attempted delivery Patient has dementia with increased confusion. CM attempted to contact spouse to discuss IMM. No answer. CM left VM with return contact information. Awaiting response.                    Phone communication or documentation only- no physical contact with patient or family.    Marjorie Mcfadden, RN     Office Phone: [phone]  Office Cell:     [phone]

## 2025-01-02 NOTE — ACP (ADVANCE CARE PLANNING)
Due to cognitive state, patient is inappropriate for advance care planning discussion at this time.

## 2025-01-02 NOTE — PROGRESS NOTES
[facility] MEDICINE SERVICE  DAILY PROGRESS NOTE    NAME: Kailash Cooper Jr.  : 1940  MRN: [id number]      LOS: 1 day     PROVIDER OF SERVICE: Tono Fuentes MD    Chief Complaint: NSTEMI (non-ST elevated myocardial infarction)    Subjective:     Interval History:  History taken from: patient    Acutely delirious.        Review of Systems:   Review of Systems    Objective:     Vital Signs  Temp:  [97.4 °F (36.3 °C)-99.1 °F (37.3 °C)] 97.4 °F (36.3 °C)  Heart Rate:  [] 92  Resp:  [14-37] 24  BP: (118-173)/() 118/60  Flow (L/min) (Oxygen Therapy):  [2-4] 3   Body mass index is 23.26 kg/m².    Physical Exam  Physical Exam  Cardiovascular:      Rate and Rhythm: Normal rate and regular rhythm.      Pulses: Normal pulses.      Heart sounds: Normal heart sounds.   Pulmonary:      Effort: Pulmonary effort is normal.      Breath sounds: Normal breath sounds.   Abdominal:      General: Abdomen is flat.      Palpations: Abdomen is soft.   Neurological:      Mental Status: He is alert.      Comments: Acutely delirious and disoriented            Diagnostic Data    Results from last 7 days   Lab Units 25  0421 25  0212   WBC 10*3/mm3 11.78* 8.69   HEMOGLOBIN g/dL 14.4 11.1*   HEMATOCRIT % 42.9 33.7*   PLATELETS 10*3/mm3 310 236   GLUCOSE mg/dL 109* 89   CREATININE mg/dL 1.14 1.28*   BUN mg/dL 19 17   SODIUM mmol/L 144 140   POTASSIUM mmol/L 3.5 3.7   AST (SGOT) U/L  --  36   ALT (SGPT) U/L  --  18   ALK PHOS U/L  --  63   BILIRUBIN mg/dL  --  0.5   ANION GAP mmol/L 19.2* 10.2       CT Head Without Contrast    Result Date: 2025  Impression: 1.The exam is essentially nondiagnostic due to considerable motion artifact. 2.Volume loss secondary to cerebral atrophy. 3.Suggestion of chronic white matter microvascular ischemia. 4.I cannot exclude a small subdural hematoma or acute ischemia. Electronically Signed: Josiah Lizarraga MD  2025 4:39 PM EST  Workstation ID: [id number]       I reviewed the  "patient's new clinical results.    Assessment/Plan:     Active and Resolved Problems  Active Hospital Problems    Diagnosis  POA    **NSTEMI (non-ST elevated myocardial infarction) [I21.4]  Yes      Resolved Hospital Problems   No resolved problems to display.     Acute delirium  Opioid withdrawal complicating this  -Unable to have his p.o. pain medications because of altered mental status and a failed swallow study.  Would recommend that he receives IV opioids in the interim.  The opioid withdrawal is strongly complicating his delirium symptoms.  Delirium improved strongly with reintroduction of opioid this morning.    Elevated troponin, peak at 419 now downtrending  H/o CAD s/p PCI  - EKG: normal sinus rhythm rate 82, right bundle branch block, left anterior fascicular block.   - HS troponin 1165, repeat 1321  - previous [facility] 1/28/2022:   1. \"Two-vessel coronary artery disease  2.  High-grade stenosis of proximal RCA with successful stenting  3.  Diffuse disease of mid to distal LCx.  This segment appears to be very small caliber vessel. DAPT recommended\"  - resume home plavix, statin  -Noted recommendation for medical management from cardiology team.  Will defer to their expertise.     Possible pneumonia  Chronic respiratory failure/pulmonary fibrosis  - CXR: chronic scarring with superimposed mild interstitial edema or pneumonia  - Appears to be more or less at his baseline oxygen status  - WBC 11.4  - Noted lab results so far.  This does not seem to be driven by bacterial process.  Clinically seems more viral in nature.  DC antibiotics and monitor.  -Supportive care.    Acute on CKD stage III  - BUN 23, creatinine 1.7 at OLF (previous baseline around 1.5)  - improved since admission.  - monitor BMP     Hypertension  Hyperlipidemia  - Continue home Norvasc, Lasix, hydralazine, atorvastatin when home meds verified      H/o CVA  - Continue home Plavix, statin     Chronic low back pain  - Continue home oxycodone   "   RLS  - Continue home Requip     DM type II  - controlled. Glucose normal 89     BPH  - cont home flomax     History of non-small cell lung cancer stage Ib status post resection 2017,   Former smoker quit 1990    VTE Prophylaxis:  Mechanical VTE prophylaxis orders are present.             Disposition Planning:     Barriers to Discharge: Status, cardiology workup  Anticipated Date of Discharge: 1/4/2025  Place of Discharge: Home      Time: 35 minutes     Code Status and Medical Interventions: CPR (Attempt to Resuscitate); Full Support   Ordered at: 01/01/25 0040     Level Of Support Discussed With:    Patient     Code Status (Patient has no pulse and is not breathing):    CPR (Attempt to Resuscitate)     Medical Interventions (Patient has pulse or is breathing):    Full Support       Signature: Electronically signed by Tono Fuentes MD, 01/02/25, 14:21 EST.  [facility] Hospitalist Team

## 2025-01-02 NOTE — THERAPY EVALUATION
Acute Care - Speech Language Pathology   Swallow Initial Evaluation  Pro     Patient Name: Kailash Cooper Jr.  : 1940  MRN: [id number]  Today's Date: 2025               Admit Date: 2025    Visit Dx:   No diagnosis found.  Patient Active Problem List   Diagnosis    Chronic bilateral low back pain without sciatica    History of malignant neoplasm of thoracic cavity structure    Allergic rhinitis    Anemia due to stage 3 chronic kidney disease    Acute conjunctivitis    Osteoarthritis    Encounter for screening for malignant neoplasm of prostate    Enlarged prostate without lower urinary tract symptoms (luts)    Fatigue    Gastroesophageal reflux disease    Gastrointestinal hemorrhage    History of transient ischemic attack    Hydronephrosis    Hyperkalemia    Mixed hyperlipidemia    Essential hypertension    Hypogonadism    Male erectile disorder (CODE)    Obstructive sleep apnea    Osteomalacia    Secondary hyperparathyroidism of renal origin    Status post patent foramen ovale closure    Essential tremor    Vitamin D deficiency    Type 2 diabetes mellitus    Chronic kidney disease, stage 3 (moderate)    Hypertensive encephalopathy    Lung nodule    Lumbosacral spondylosis without myelopathy    Cervical spondylosis without myelopathy    Thoracic spondylosis    Chronic pain syndrome    Cervicalgia    Chest pain, atypical    CAP (community acquired pneumonia)    Elevated troponin    Positive D dimer    Chest pain    Acute respiratory failure with hypoxia    Sepsis without acute organ dysfunction    Non-STEMI (non-ST elevated myocardial infarction)    Oxygen dependent    Abnormal EKG    Anemia    History of duodenal ulcer    Melena    Erosive esophagitis    Multiple gastric ulcers    Bleeding chronic duodenal ulcer    Other insomnia    Intolerance to cold    Coronary artery disease involving native coronary artery of native heart without angina pectoris    Underweight    Bilateral pneumonia    Weakness     Pneumonia, unspecified organism    BMI 22.0-22.9, adult    Muscle twitching    Shortness of breath    NSTEMI (non-ST elevated myocardial infarction)     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 [facility]    Diabetes     Enlarged prostate     Former smoker     Hiatal hernia     Hip pain     don    Hip pain, bilateral     Hyperlipidemia     Hypertension     Kidney disease        stage 3     Leg pain     don    Low back pain     Neck pain     Stroke      Past Surgical History:   Procedure Laterality Date    CARDIAC CATHETERIZATION Left 1/28/2022    Procedure: Cardiac Catheterization/Vascular Study;  Surgeon: Mani Salguero MD;  Location: [facility] CATH INVASIVE LOCATION;  Service: Cardiovascular;  Laterality: Left;    CATARACT EXTRACTION  2006    [facility]    COLONOSCOPY  2011    COLONOSCOPY N/A 5/27/2022    Procedure: COLONOSCOPY;  Surgeon: Terry Holliday MD;  Location: [facility] ENDOSCOPY;  Service: Gastroenterology;  Laterality: N/A;  polyps    CORONARY STENT PLACEMENT  01/28/2022    RCA    ENDOSCOPY N/A 5/27/2022    Procedure: ESOPHAGOGASTRODUODENOSCOPY with 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, gastric antrum biopsy;  Surgeon: Terry Holliday MD;  Location: [facility] ENDOSCOPY;  Service: Gastroenterology;  Laterality: N/A;  gastritis, gastric ulcer, small bowel AVM    LUNG CANCER SURGERY      upper lobe rt lung cancer 9/2017  at [facility]       SLP Recommendation and Plan  SLP Swallowing Diagnosis: oral dysphagia, suspected pharyngeal dysphagia (01/02/25 1500)  SLP Diet Recommendation: NPO (01/02/25 1500)     SLP Rec. for Method of Medication Administration: meds via alternate route (01/02/25 1500)     Monitor for Signs of Aspiration: yes, notify SLP if any concerns (01/02/25 1500)  Recommended Diagnostics: reassess via clinical swallow evaluation (01/02/25 1500)  Swallow Criteria for Skilled Therapeutic  Interventions Met: demonstrates skilled criteria (01/02/25 1500)  Anticipated Discharge Disposition (SLP): unknown (01/02/25 1500)  Rehab Potential/Prognosis, Swallowing: good, to achieve stated therapy goals (01/02/25 1500)  Therapy Frequency (Swallow): 3 days per week, 4 days per week (01/02/25 1500)  Predicted Duration Therapy Intervention (Days): until discharge (01/02/25 1500)  Oral Care Recommendations: Oral Care BID/PRN, Before ice/water (01/02/25 1500)              Document type: evaluation    Subjective information: no complaints    Pt observations: alert; cooperative; agreeable to therapy    Patient effort: good  Patient profile reviewed: yes                                   SWALLOW EVALUATION (Last 72 Hours)       SLP Adult Swallow Evaluation       Row Name 01/02/25 1500       Rehab Evaluation    Comment Pt was seen for skilled ST targeting clinical evaluation of swallow at bedside. Nursing and tech at bedside upon arrival. Pt semi-reclined in bed; pleasant and cooperative to tx and [place]. Oral mechanism examination completed, which revealed edentulous state and general oral motor weakness. Motor speech and voice WFL. Nursing tech reported that pt had orally held thin liquid with meds and let it spill out of his mouth. SLP provided the following: x2 ice chips by spoon; x2 thin liquid by spoon; x2 thin liquid by cup; x5 thin liquid by straw; x2 puree; x3 mech soft. Pt exhibited vertical munching pattern; wet voice; adequate labial seal; suspect severely delayed swallow initiation with oral holding occurring in most thin liquid trials; multiple swallows per drink; productive cough presented x2 instances with thin by straw after orally holding for 10-15 seconds. Based on pt's performance and extended oral holding of PO, it is recommended that he be made NPO.      Recommend: NPO. If NPO status continues, pt may benefit from alternate means of nutrition. ST will continue to follow up and re-evaluate swallow  "function and make further recs as indicated. -MB       General Information    Pertinent History Of Current Problem Per H&P: \"Kailash Cooper Jr. is a 84 y.o. male with hypertension, hyperlipidemia, diabetes, coronary artery disease with PCI to RCA in 2022, COPD/pulmonary fibrosis on home oxygen, chronic kidney disease who was brought in for altered mental state. Workup revealed elevated troponin and he was transferred to [facility] for further care. Patient denied any chest pain however history is limited due to dementia patient has been agitated and received Ativan.\" ST consulted to complete dysphagia eval.  -MB       Oral Motor Structure and Function    Dentition Assessment edentulous;edentulous, dentures not available  -MB    Secretion Management anterior loss  -MB    Mucosal Quality cracked;dry  -MB       Oral Musculature and Cranial Nerve Assessment    Oral Motor General Assessment generalized oral motor weakness  -MB       General Eating/Swallowing Observations    Respiratory Support Currently in Use nasal cannula  -MB    O2 Liters 4L  -MB    Eating/Swallowing Skills self-fed;fed by SLP  -MB    Positioning During Eating upright in bed  -MB    Utensils Used spoon;cup;straw  -MB    Consistencies Trialed ice chips;thin liquids;pureed;mixed consistency;mechanical ground textures  -MB       Clinical Swallow Eval    Oral Prep Phase impaired  -MB    Oral Transit impaired  -MB    Oral Residue WFL  -MB    Pharyngeal Phase suspected pharyngeal impairment  -MB    Esophageal Phase unremarkable  -MB    Clinical Swallow Evaluation Summary see comment  -MB       Oral Prep Concerns    Oral Prep Concerns oral holding;prolonged mastication;poor rotary chew;spits out food prior to swallow  -MB    Oral Holding thin  -MB    Prolonged Mastication mechanical soft  -MB    Poor Rotary Chew mixed consistencies  -MB    Spits Out Food Prior to Swallow thin  -MB       Oral Transit Concerns    Oral Transit Concerns delayed initiation " of bolus transit  -MB    Delayed Intiation of Bolus Transit thin  -MB       Pharyngeal Phase Concerns    Pharyngeal Phase Concerns multiple swallows;cough  -MB    Multiple Swallows thin  -MB    Cough thin  -MB       SLP Evaluation Clinical Impression    SLP Swallowing Diagnosis oral dysphagia;suspected pharyngeal dysphagia  -MB    Functional Impact risk of aspiration/pneumonia  -MB    Rehab Potential/Prognosis, Swallowing good, to achieve stated therapy goals  -MB    Swallow Criteria for Skilled Therapeutic Interventions Met demonstrates skilled criteria  -MB       Recommendations    Therapy Frequency (Swallow) 3 days per week;4 days per week  -MB    Predicted Duration Therapy Intervention (Days) until discharge  -MB    SLP Diet Recommendation NPO  -MB    Recommended Diagnostics reassess via clinical swallow evaluation  -MB    Oral Care Recommendations Oral Care BID/PRN;Before ice/water  -MB    SLP Rec. for Method of Medication Administration meds via alternate route  -MB    Monitor for Signs of Aspiration yes;notify SLP if any concerns  -MB    Anticipated Discharge Disposition (SLP) unknown  -MB       Swallow Goals (SLP)    Swallow LTGs Swallow Long Term Goal (free text)  -MB    Swallow STGs diet tolerance goal selection (SLP)  -MB    Diet Tolerance Goal Selection (SLP) Swallow Short Term Goal 1  -MB       (LTG) Swallow    (LTG) Swallow The patient will maximize swallow function for least restrictive po diet, exhibiting no complications associated with dysphagia, adequate po intake, and demonstrating independent use of safe swallow compensations  -MB    Time Frame (Swallow Long Term Goal) by discharge  -MB    Progress/Outcomes (Swallow Long Term Goal) new goal  -MB       (STG) Swallow 1    (STG) Swallow 1 The patient will participate in ongoing assessment of swallow, including re-evaluation clinically and/or including instrumental assessment of swallow if indicated, to further assess swallow function in  anticipation to initiate a PO diet  -MB    Time Frame (Swallow Short Term Goal 1) by discharge  -MB    Progress/Outcomes (Swallow Short Term Goal 1) new goal  -MB              User Key  (r) = Recorded By, (t) = Taken By, (c) = Cosigned By      Initials Name Effective Dates    Denny Mansfield, SLP 04/30/24 -                     EDUCATION  The patient has been educated in the following areas:   Dysphagia (Swallowing Impairment) NPO rationale.        SLP GOALS       Row Name 01/02/25 1500             (LTG) Swallow    (LTG) Swallow The patient will maximize swallow function for least restrictive po diet, exhibiting no complications associated with dysphagia, adequate po intake, and demonstrating independent use of safe swallow compensations  -MB      Time Frame (Swallow Long Term Goal) by discharge  -MB      Progress/Outcomes (Swallow Long Term Goal) new goal  -MB         (STG) Swallow 1    (STG) Swallow 1 The patient will participate in ongoing assessment of swallow, including re-evaluation clinically and/or including instrumental assessment of swallow if indicated, to further assess swallow function in anticipation to initiate a PO diet  -MB      Time Frame (Swallow Short Term Goal 1) by discharge  -MB      Progress/Outcomes (Swallow Short Term Goal 1) new goal  -MB                User Key  (r) = Recorded By, (t) = Taken By, (c) = Cosigned By      Initials Name Provider Type    Denny Mansfield, SLP Speech and Language Pathologist                         Time Calculation:                SCOT Doan  1/2/2025

## 2025-01-03 ENCOUNTER — APPOINTMENT (OUTPATIENT)
Dept: MRI IMAGING | Facility: HOSPITAL | Age: 85
End: 2025-01-03
Payer: MEDICARE

## 2025-01-03 LAB
ANION GAP SERPL CALCULATED.3IONS-SCNC: 14.6 MMOL/L (ref 5–15)
BASOPHILS # BLD AUTO: 0.04 10*3/MM3 (ref 0–0.2)
BASOPHILS NFR BLD AUTO: 0.4 % (ref 0–1.5)
BUN SERPL-MCNC: 30 MG/DL (ref 8–23)
BUN/CREAT SERPL: 21.9 (ref 7–25)
CALCIUM SPEC-SCNC: 9.8 MG/DL (ref 8.6–10.5)
CHLORIDE SERPL-SCNC: 105 MMOL/L (ref 98–107)
CO2 SERPL-SCNC: 23.4 MMOL/L (ref 22–29)
CREAT SERPL-MCNC: 1.37 MG/DL (ref 0.76–1.27)
DEPRECATED RDW RBC AUTO: 52.7 FL (ref 37–54)
EGFRCR SERPLBLD CKD-EPI 2021: 50.9 ML/MIN/1.73
EOSINOPHIL # BLD AUTO: 0.1 10*3/MM3 (ref 0–0.4)
EOSINOPHIL NFR BLD AUTO: 0.9 % (ref 0.3–6.2)
ERYTHROCYTE [DISTWIDTH] IN BLOOD BY AUTOMATED COUNT: 14.8 % (ref 12.3–15.4)
GLUCOSE BLDC GLUCOMTR-MCNC: 102 MG/DL (ref 70–105)
GLUCOSE SERPL-MCNC: 108 MG/DL (ref 65–99)
HCT VFR BLD AUTO: 38.3 % (ref 37.5–51)
HGB BLD-MCNC: 12.7 G/DL (ref 13–17.7)
IMM GRANULOCYTES # BLD AUTO: 0.09 10*3/MM3 (ref 0–0.05)
IMM GRANULOCYTES NFR BLD AUTO: 0.8 % (ref 0–0.5)
LYMPHOCYTES # BLD AUTO: 1.06 10*3/MM3 (ref 0.7–3.1)
LYMPHOCYTES NFR BLD AUTO: 9.8 % (ref 19.6–45.3)
MCH RBC QN AUTO: 32.4 PG (ref 26.6–33)
MCHC RBC AUTO-ENTMCNC: 33.2 G/DL (ref 31.5–35.7)
MCV RBC AUTO: 97.7 FL (ref 79–97)
MONOCYTES # BLD AUTO: 0.91 10*3/MM3 (ref 0.1–0.9)
MONOCYTES NFR BLD AUTO: 8.4 % (ref 5–12)
NEUTROPHILS NFR BLD AUTO: 79.7 % (ref 42.7–76)
NEUTROPHILS NFR BLD AUTO: 8.59 10*3/MM3 (ref 1.7–7)
NRBC BLD AUTO-RTO: 0 /100 WBC (ref 0–0.2)
PLATELET # BLD AUTO: 333 10*3/MM3 (ref 140–450)
PMV BLD AUTO: 9.8 FL (ref 6–12)
POTASSIUM SERPL-SCNC: 3.8 MMOL/L (ref 3.5–5.2)
RBC # BLD AUTO: 3.92 10*6/MM3 (ref 4.14–5.8)
SODIUM SERPL-SCNC: 143 MMOL/L (ref 136–145)
WBC NRBC COR # BLD AUTO: 10.79 10*3/MM3 (ref 3.4–10.8)

## 2025-01-03 PROCEDURE — 25010000002 MORPHINE PER 10 MG: Performed by: INTERNAL MEDICINE

## 2025-01-03 PROCEDURE — 93010 ELECTROCARDIOGRAM REPORT: CPT | Performed by: INTERNAL MEDICINE

## 2025-01-03 PROCEDURE — 25010000002 LORAZEPAM PER 2 MG

## 2025-01-03 PROCEDURE — 99221 1ST HOSP IP/OBS SF/LOW 40: CPT | Performed by: NURSE PRACTITIONER

## 2025-01-03 PROCEDURE — 99222 1ST HOSP IP/OBS MODERATE 55: CPT

## 2025-01-03 PROCEDURE — 85025 COMPLETE CBC W/AUTO DIFF WBC: CPT | Performed by: NURSE PRACTITIONER

## 2025-01-03 PROCEDURE — 94799 UNLISTED PULMONARY SVC/PX: CPT

## 2025-01-03 PROCEDURE — 94761 N-INVAS EAR/PLS OXIMETRY MLT: CPT

## 2025-01-03 PROCEDURE — 94664 DEMO&/EVAL PT USE INHALER: CPT

## 2025-01-03 PROCEDURE — 82948 REAGENT STRIP/BLOOD GLUCOSE: CPT

## 2025-01-03 PROCEDURE — 92610 EVALUATE SWALLOWING FUNCTION: CPT

## 2025-01-03 PROCEDURE — 25010000002 HYDRALAZINE PER 20 MG: Performed by: STUDENT IN AN ORGANIZED HEALTH CARE EDUCATION/TRAINING PROGRAM

## 2025-01-03 PROCEDURE — 93005 ELECTROCARDIOGRAM TRACING: CPT | Performed by: NURSE PRACTITIONER

## 2025-01-03 PROCEDURE — 70551 MRI BRAIN STEM W/O DYE: CPT

## 2025-01-03 PROCEDURE — 99232 SBSQ HOSP IP/OBS MODERATE 35: CPT | Performed by: INTERNAL MEDICINE

## 2025-01-03 PROCEDURE — 80048 BASIC METABOLIC PNL TOTAL CA: CPT | Performed by: NURSE PRACTITIONER

## 2025-01-03 PROCEDURE — 25810000003 SODIUM CHLORIDE 0.9 % SOLUTION: Performed by: STUDENT IN AN ORGANIZED HEALTH CARE EDUCATION/TRAINING PROGRAM

## 2025-01-03 RX ORDER — TAMSULOSIN HYDROCHLORIDE 0.4 MG/1
0.4 CAPSULE ORAL DAILY
Status: DISCONTINUED | OUTPATIENT
Start: 2025-01-03 | End: 2025-01-08 | Stop reason: HOSPADM

## 2025-01-03 RX ORDER — MORPHINE SULFATE 2 MG/ML
2 INJECTION, SOLUTION INTRAMUSCULAR; INTRAVENOUS EVERY 4 HOURS PRN
Status: DISCONTINUED | OUTPATIENT
Start: 2025-01-03 | End: 2025-01-08 | Stop reason: HOSPADM

## 2025-01-03 RX ORDER — SODIUM CHLORIDE 9 MG/ML
100 INJECTION, SOLUTION INTRAVENOUS CONTINUOUS
Status: DISPENSED | OUTPATIENT
Start: 2025-01-03 | End: 2025-01-04

## 2025-01-03 RX ORDER — HYDRALAZINE HYDROCHLORIDE 20 MG/ML
5 INJECTION INTRAMUSCULAR; INTRAVENOUS EVERY 8 HOURS SCHEDULED
Status: DISCONTINUED | OUTPATIENT
Start: 2025-01-03 | End: 2025-01-04

## 2025-01-03 RX ADMIN — BUDESONIDE 0.5 MG: 0.5 INHALANT RESPIRATORY (INHALATION) at 07:18

## 2025-01-03 RX ADMIN — HYDRALAZINE HYDROCHLORIDE 5 MG: 20 INJECTION INTRAMUSCULAR; INTRAVENOUS at 14:56

## 2025-01-03 RX ADMIN — IPRATROPIUM BROMIDE AND ALBUTEROL SULFATE 3 ML: .5; 3 SOLUTION RESPIRATORY (INHALATION) at 20:44

## 2025-01-03 RX ADMIN — HYDRALAZINE HYDROCHLORIDE 5 MG: 20 INJECTION INTRAMUSCULAR; INTRAVENOUS at 22:15

## 2025-01-03 RX ADMIN — SODIUM CHLORIDE 100 ML/HR: 9 INJECTION, SOLUTION INTRAVENOUS at 09:46

## 2025-01-03 RX ADMIN — METOPROLOL TARTRATE 5 MG: 1 INJECTION, SOLUTION INTRAVENOUS at 22:15

## 2025-01-03 RX ADMIN — MORPHINE SULFATE 4 MG: 4 INJECTION, SOLUTION INTRAMUSCULAR; INTRAVENOUS at 01:29

## 2025-01-03 RX ADMIN — IPRATROPIUM BROMIDE AND ALBUTEROL SULFATE 3 ML: .5; 3 SOLUTION RESPIRATORY (INHALATION) at 07:14

## 2025-01-03 RX ADMIN — ATORVASTATIN CALCIUM 10 MG: 10 TABLET ORAL at 21:03

## 2025-01-03 RX ADMIN — LORAZEPAM 1 MG: 2 INJECTION INTRAMUSCULAR; INTRAVENOUS at 17:19

## 2025-01-03 RX ADMIN — MUPIROCIN 1 APPLICATION: 20 OINTMENT TOPICAL at 21:03

## 2025-01-03 RX ADMIN — BUDESONIDE 0.5 MG: 0.5 INHALANT RESPIRATORY (INHALATION) at 20:38

## 2025-01-03 RX ADMIN — OXYCODONE 15 MG: 5 TABLET ORAL at 22:21

## 2025-01-03 RX ADMIN — Medication 10 ML: at 08:23

## 2025-01-03 RX ADMIN — METOPROLOL TARTRATE 5 MG: 1 INJECTION, SOLUTION INTRAVENOUS at 14:56

## 2025-01-03 RX ADMIN — MUPIROCIN 1 APPLICATION: 20 OINTMENT TOPICAL at 08:23

## 2025-01-03 RX ADMIN — MORPHINE SULFATE 4 MG: 4 INJECTION, SOLUTION INTRAMUSCULAR; INTRAVENOUS at 08:23

## 2025-01-03 RX ADMIN — TAMSULOSIN HYDROCHLORIDE 0.4 MG: 0.4 CAPSULE ORAL at 14:57

## 2025-01-03 RX ADMIN — Medication 10 ML: at 21:03

## 2025-01-03 RX ADMIN — IPRATROPIUM BROMIDE AND ALBUTEROL SULFATE 3 ML: .5; 3 SOLUTION RESPIRATORY (INHALATION) at 11:35

## 2025-01-03 RX ADMIN — LORAZEPAM 1 MG: 2 INJECTION INTRAMUSCULAR; INTRAVENOUS at 05:03

## 2025-01-03 NOTE — PROGRESS NOTES
"Cardiology [place]      Patient Care Team:  Madelyn Márquez APRN as PCP - General (Nurse Practitioner)  Marcia Lujan MD as Consulting Physician (Nephrology)  Mc Key MD as Consulting Physician (Physical Medicine and Rehabilitation)  Gila Hawkins APRN (Nurse Practitioner)  Ct Stevenson APRN as Nurse Practitioner (Family Medicine)  Mani Salguero MD as Consulting Physician (Cardiology)  Toshia Jaeger MD as Consulting Physician (Pulmonary Disease)  Terry Holliday MD as Consulting Physician (Gastroenterology)        Reason for follow-up: Elevated troponin    Subjective    Interval History and ROS:     Remains confused to place/time  Not answering questions appropriately  Mildly tachycardic  Sitter at bedside        Review of Systems   Unable to perform ROS: Mental status change       Objective    Vital Signs  Temp:  [97.4 °F (36.3 °C)-98.3 °F (36.8 °C)] 98 °F (36.7 °C)  Heart Rate:  [] 72  Resp:  [11-24] 11  BP: ()/(55-88) 123/63  Oxygen Therapy  SpO2: 96 %  Pulse Oximetry Type: Continuous  Device (Oxygen Therapy): nasal cannula  Flow (L/min) (Oxygen Therapy): 2}  Flowsheet Rows      Flowsheet Row First Filed Value   Admission Height 165 cm (64.96\") Documented at 01/01/2025 0000   Admission Weight 60.3 kg (132 lb 15 oz) Documented at 01/01/2025 0000            Physical Exam  Telemetry: Sinus rhythm with right bundle branch block  Constitutional:       General: He is confused and attempting to get out of bed  HENT:      Head: Normocephalic and atraumatic.   Eyes:      Extraocular Movements: Extraocular movements intact. .   Cardiovascular:      Rate and Rhythm: Tachycardic , regular rhythm.      Pulses: Normal pulses.      Heart sounds: Normal heart sounds.      No edema, no JVD  Pulmonary:      Effort: Pulmonary effort is normal.      Breath sounds: Difficult to assess due to patient movement, noncompliance with exam  Abdominal:      Palpations: " Abdomen is soft.      Tenderness: No rebound  Skin:     General: Skin is warm and dry.   Neurological:      General: No obvious focal deficits.      Mental Status: He is disoriented.        Results Review:     I reviewed the patient's new clinical results.    Lab Results (last 24 hours)       Procedure Component Value Units Date/Time    Basic Metabolic Panel [[id number]]  (Abnormal) Collected: 01/03/25 0408    Specimen: Blood Updated: 01/03/25 0445     Glucose 108 mg/dL      BUN 30 mg/dL      Creatinine 1.37 mg/dL      Sodium 143 mmol/L      Potassium 3.8 mmol/L      Chloride 105 mmol/L      CO2 23.4 mmol/L      Calcium 9.8 mg/dL      BUN/Creatinine Ratio 21.9     Anion Gap 14.6 mmol/L      eGFR 50.9 mL/min/1.73     Narrative:      GFR Categories in Chronic Kidney Disease (CKD)      GFR Category          GFR (mL/min/1.73)    Interpretation  G1                     90 or greater         Normal or high (1)  G2                      60-89                Mild decrease (1)  G3a                   45-59                Mild to moderate decrease  G3b                   30-44                Moderate to severe decrease  G4                    15-29                Severe decrease  G5                    14 or less           Kidney failure          (1)In the absence of evidence of kidney disease, neither GFR category G1 or G2 fulfill the criteria for CKD.    eGFR calculation 2021 CKD-EPI creatinine equation, which does not include race as a factor    CBC Auto Differential [[id number]]  (Abnormal) Collected: 01/03/25 0408    Specimen: Blood Updated: 01/03/25 0421     WBC 10.79 10*3/mm3      RBC 3.92 10*6/mm3      Hemoglobin 12.7 g/dL      Hematocrit 38.3 %      MCV 97.7 fL      MCH 32.4 pg      MCHC 33.2 g/dL      RDW 14.8 %      RDW-SD 52.7 fl      MPV 9.8 fL      Platelets 333 10*3/mm3      Neutrophil % 79.7 %      Lymphocyte % 9.8 %      Monocyte % 8.4 %      Eosinophil % 0.9 %      Basophil % 0.4 %      Immature Grans % 0.8 %       Neutrophils, Absolute 8.59 10*3/mm3      Lymphocytes, Absolute 1.06 10*3/mm3      Monocytes, Absolute 0.91 10*3/mm3      Eosinophils, Absolute 0.10 10*3/mm3      Basophils, Absolute 0.04 10*3/mm3      Immature Grans, Absolute 0.09 10*3/mm3      nRBC 0.0 /100 WBC     POC Glucose Once [[id number]]  (Normal) Collected: 01/03/25 0159    Specimen: Blood Updated: 01/03/25 0201     Glucose 102 mg/dL      Comment: Serial Number: [id number]:  [id number]       High Sensitivity Troponin T 1Hr [[id number]]  (Abnormal) Collected: 01/02/25 1207    Specimen: Blood from Arm, Left Updated: 01/02/25 1239     HS Troponin T 393 ng/L      Troponin T Numeric Delta -26 ng/L      Troponin T % Delta -6 %     Narrative:      High Sensitive Troponin T Reference Range:  <14.0 ng/L- Negative Female for AMI  <22.0 ng/L- Negative Male for AMI  >=14 - Abnormal Female indicating possible myocardial injury.  >=22 - Abnormal Male indicating possible myocardial injury.   Clinicians would have to utilize clinical acumen, EKG, Troponin, and serial changes to determine if it is an Acute Myocardial Infarction or myocardial injury due to an underlying chronic condition.         High Sensitivity Troponin T [[id number]]  (Abnormal) Collected: 01/02/25 0421    Specimen: Blood Updated: 01/02/25 1105     HS Troponin T 419 ng/L     Narrative:      High Sensitive Troponin T Reference Range:  <14.0 ng/L- Negative Female for AMI  <22.0 ng/L- Negative Male for AMI  >=14 - Abnormal Female indicating possible myocardial injury.  >=22 - Abnormal Male indicating possible myocardial injury.   Clinicians would have to utilize clinical acumen, EKG, Troponin, and serial changes to determine if it is an Acute Myocardial Infarction or myocardial injury due to an underlying chronic condition.                 Imaging Results (Last 24 Hours)       ** No results found for the last 24 hours. **          ECG/EMG Results (most recent)       Procedure Component Value Units  Date/Time    Adult Transthoracic Echo Complete W/ Cont if Necessary Per Protocol [[id number]] Resulted: 01/01/25 0931     Updated: 01/01/25 0931     LVIDd 4.4 cm      LVIDs 3.5 cm      IVSd 1.10 cm      LVPWd 1.00 cm      FS 20.5 %      IVS/LVPW 1.10 cm      ESV(cubed) 42.9 ml      EDV(cubed) 85.2 ml      LV mass(C)d 158.2 grams      LVOT area 3.8 cm2      LVOT diam 2.20 cm      EDV(MOD-sp4) 77.7 ml      ESV(MOD-sp4) 33.9 ml      SV(MOD-sp4) 43.8 ml      EF(MOD-sp4) 56.4 %      MV E max levi 182.0 cm/sec      Pulm A Revs Dur 0.11 sec      Med Peak E' Levi 7.9 cm/sec      Lat Peak E' Levi 6.3 cm/sec      TR max levi 274.0 cm/sec      Avg E/e' ratio 25.63     SV(LVOT) 93.1 ml      SV(RVOT) 23.1 ml      Qp/Qs 0.25     RVIDd 2.9 cm      TAPSE (>1.6) 2.44 cm      RV S' 18.3 cm/sec      LA dimension (2D)  3.7 cm      Pulm Sys Levi 74.3 cm/sec      Pulm Austin Levi 57.5 cm/sec      Pulm S/D 1.29     Pulm A Revs Levi 41.5 cm/sec      LV V1 max 116.0 cm/sec      LV V1 max PG 5.4 mmHg      LV V1 mean PG 3.0 mmHg      LV V1 VTI 24.5 cm      Ao pk levi 179.0 cm/sec      Ao max PG 12.8 mmHg      Ao mean PG 7.0 mmHg      Ao V2 VTI 30.0 cm      TAMI(I,D) 3.1 cm2      MV max PG 12.7 mmHg      MV mean PG 5.0 mmHg      MV V2 VTI 27.8 cm      MVA(VTI) 3.4 cm2      TR max PG 30.0 mmHg      RVOT diam 1.40 cm      RV V1 max PG 3.7 mmHg      RV V1 max 95.8 cm/sec      RV V1 VTI 15.0 cm      PA V2 max 107.5 cm/sec      ACS 0.70 cm      Sinus 3.4 cm      Dimensionless Index 0.65 (DI)      EF(MOD-bp) 56.0 %     Narrative:        Left ventricular systolic function is normal. Calculated left   ventricular EF = 56% Left ventricular ejection fraction appears to be 56 -   60%.    Left ventricular diastolic function is consistent with (grade II w/high   LAP) pseudonormalization.    Mild to moderate aortic valve stenosis is present.    Estimated right ventricular systolic pressure from tricuspid   regurgitation is normal (<35 mmHg).      Telemetry Scan  [[id number]] Resulted: 01/01/25     Updated: 01/02/25 1050    ECG 12 Lead QT Measurement [[id number]] Collected: 01/02/25 1307     Updated: 01/02/25 1308     QT Interval 409 ms      QTC Interval 516 ms     Narrative:      HEART RATE=96  bpm  RR Mwjjnblu=029  ms  ID Ukoxwxwo=390  ms  P Horizontal Axis=-3  deg  P Front Axis=41  deg  QRSD Migpehdm=880  ms  QT Kdqbjzsb=394  ms  LYyM=495  ms  QRS Axis=-75  deg  T Wave Axis=-59  deg  - ABNORMAL ECG -  Sinus rhythm  Right bundle branch block  Abnormal T, consider ischemia, lateral leads  Date and Time of Study:2025-01-02 13:07:30    ECG 12 Lead QT Measurement [[id number]] Collected: 01/03/25 0826     Updated: 01/03/25 0827     QT Interval 384 ms      QTC Interval 486 ms     Narrative:      HEART RATE=96  bpm  RR Ngxtmeeo=812  ms  ID Uhhgnlax=249  ms  P Horizontal Axis=-68  deg  P Front Axis=48  deg  QRSD Hfysdelm=036  ms  QT Bgwkborn=716  ms  QYiK=084  ms  QRS Axis=-67  deg  T Wave Axis=-60  deg  - ABNORMAL ECG -  Sinus rhythm  Ventricular premature complex  Probable left atrial enlargement  RBBB and LAFB  Abnormal T, consider ischemia, lateral leads  Date and Time of Study:2025-01-03 08:26:18            Medication Review:   I have reviewed the patient's current medication list    Current Facility-Administered Medications:     aluminum-magnesium hydroxide-simethicone (MAALOX MAX) 400-400-40 MG/5ML suspension 15 mL, 15 mL, Oral, Q6H PRN, Jacque Roldan, JANE    amLODIPine (NORVASC) tablet 10 mg, 10 mg, Oral, Q24H, Santos, Lesly, APRN    aspirin EC tablet 81 mg, 81 mg, Oral, Daily, Santos, Lesly, APRN    atorvastatin (LIPITOR) tablet 10 mg, 10 mg, Oral, Nightly, Santos, Lesly, APRN    sennosides-docusate (PERICOLACE) 8.6-50 MG per tablet 2 tablet, 2 tablet, Oral, BID PRN **AND** polyethylene glycol (MIRALAX) packet 17 g, 17 g, Oral, Daily PRN **AND** bisacodyl (DULCOLAX) EC tablet 5 mg, 5 mg, Oral, Daily PRN **AND** bisacodyl (DULCOLAX) suppository 10 mg, 10 mg, Rectal,  Daily PRN, Soco Roldanle, APRN    budesonide (PULMICORT) nebulizer solution 0.5 mg, 0.5 mg, Nebulization, BID - RT, Soco Roldanle, APRN, 0.5 mg at 01/03/25 0718    clopidogrel (PLAVIX) tablet 75 mg, 75 mg, Oral, Daily, AchterJackie alanishelle, APRN    hydrALAZINE (APRESOLINE) tablet 50 mg, 50 mg, Oral, TID, Lesly Santos APRN    ipratropium-albuterol (DUO-NEB) nebulizer solution 3 mL, 3 mL, Nebulization, Q6H While Awake - RT, WilmarterSoco alanisle, APRN, 3 mL at 01/03/25 0714    ipratropium-albuterol (DUO-NEB) nebulizer solution 3 mL, 3 mL, Nebulization, Q4H PRN, WilmarterJackie alanishelle, APRN    LORazepam (ATIVAN) injection 1 mg, 1 mg, Intravenous, Q3H PRN, Yvonne Barrett APRN, 1 mg at 01/03/25 0503    melatonin tablet 5 mg, 5 mg, Oral, Nightly PRN, Soco Roldanle, APRN    metoprolol tartrate (LOPRESSOR) injection 5 mg, 5 mg, Intravenous, Q8H, Mani Salguero MD, 5 mg at 01/02/25 2210    morphine injection 2 mg, 2 mg, Intravenous, Q4H PRN, Tono Fuentes MD    mupirocin (BACTROBAN) 2 % nasal ointment 1 Application, 1 Application, Each Nare, BID, Soco Roldanle, APRN, 1 Application at 01/03/25 0823    ondansetron ODT (ZOFRAN-ODT) disintegrating tablet 4 mg, 4 mg, Oral, Q6H PRN **OR** ondansetron (ZOFRAN) injection 4 mg, 4 mg, Intravenous, Q6H PRN, Soco Roldanle, APRN    oxyCODONE (ROXICODONE) immediate release tablet 15 mg, 15 mg, Oral, Q6H PRN, Yeny Singleton MD    sodium chloride 0.9 % flush 10 mL, 10 mL, Intravenous, Q12H, Achterberg, Jacque, APRN, 10 mL at 01/03/25 0823    sodium chloride 0.9 % flush 10 mL, 10 mL, Intravenous, PRN, Achterberg, Jacque, APRN    sodium chloride 0.9 % infusion 40 mL, 40 mL, Intravenous, PRN, Achterberg, Jacque, APRN    sodium chloride 0.9 % infusion, 100 mL/hr, Intravenous, Continuous, EiantonykTono MD, Last Rate: 100 mL/hr at 01/03/25 0946, 100 mL/hr at 01/03/25 0946      Assessment & Plan     NSTEMI  Elevated troponin  High-sensitivity  troponin 253,258, 419, 393  ECG anterolateral changes consistent with ischemia  Patient is unable to complain of chest pain  History of PCI to RCA.  Holding on cardiac catheterization due to altered mental state and no c/o of chest pain  medical management of coronary artery disease recommended  Aspirin, Plavix, high intensity statin have not been able to be given due to confusion/agitation, pt not taking oral. Beta-blocker being given IV  Echocardiogram done to evaluate LV function after ACS, EF 56-60%. Grade 2 DD, mild-mod AV stenosis  Currently SR-ST     Confusion on top of dementia  May be precipitated by infection or delirium  Started on antibiotics for possible pneumonia, films from [facility] taken to XR for reading, WBC 11.78  No bacteria in UA  He had negative  Pt has been resistant to sedatives, after Haldol, Ativan and Dilaudid, was still trying to elope, Zarina had some results  CT head limited due to motion artifact, could not rule out sdh,ischemia     Hypertension  Currently SBP this am 110-120  Resume amlodipine when pt taking po  Previously on hydralazine as well po, can use IV prn if needed     Hyperlipidemia  LDL 97, HDL 59, total cholesterol 190.  Goal LDL less than 55.  Start high intensity statin  A1c is 5.8     Chronic kidney disease  Creatinine 30/1.3        Continues with ongoing confusion/deilirium  Repeat EKG with anterolateral ST changes consistent with ischemia  No pain verbalized  Currently NPO due to failed swallow study, has not been receiving DAPT  No family present  Recommend palliative care to have discussion with family re: goals of treatment  Would be unable to perform invasive ischemic evaluation in setting of acute confusion/delirium  Awaiting repeat swallow study    Further orders and recommendations per Dr. Salguero    Patient is seen and examined and findings are verified.  All data is reviewed by me personally.  Assessment and plan formulated by APC was done after  discussion with attending.  I spent more than 50% of time in taking care of the patient.    Patient is still agitated and patient is given medicine and patient is now sleeping deeply.  Not arousable.    Hemodynamics are stable heart rate is in 60s.    Normal S1 and S2.  No pericardial rub or murmur abdomen is benign.  Neurology status is unclear.  Possibility of possible CVA as there.  Neurology is on the case.  Patient is scheduled for MRI today.    From a cardiac standpoint patient probably has non-ST elevation myocardial infarction there are lateral T wave inversion.  I would recommend that patient be considered for cardiac catheterization but if patient continues to have confusion and noncooperation, patient should be considered for possible palliative care.  I would like to discuss with the patient family but there was no patient family available.    I did talk with his wife on the phone and explained all the situation and possibility of the treatment plan.  In short patient does not improve, mental status standpoint, cardiac workup would be prohibitive.  Patient wants to discuss with her children.    Electronically signed by Mani Salguero MD, 01/03/25, 5:19 PM ANTHONY.      JANE James  01/03/25  10:27 EST

## 2025-01-03 NOTE — NURSING NOTE
Wife currently at bedside, expressed that when she called 911 patient was unable to speak and unable to walk suddenly. Discussed this information with MD, along with patient not having nutrition in 72 hours.

## 2025-01-03 NOTE — PLAN OF CARE
Goal Outcome Evaluation:     Clinical swallow eval completed. Pt appeared awake with eyes closed and somewhat alert, able to follow commands, moderately confused. Upon room entry, pt lying in bed w/ spouse at bedside. Pt on room air.  Pt was NPO at the time of evaluation. Pt is edentulous w/ dentures in place. Pt spouse reported regular/thin liquid is baseline diet and denied swallowing difficulty. Oral motor exam revealed generalized oral motor weakness. Trials assessed: ice chips x2, thin by cup x2, thin by straw x2, puree x2. Pt had good bolus control of ice chip and required assistance for all trials. 4 second swallow initiation. No residue or pocketing during puree. No s/s of penetration/aspiration noted during evaluation. Spouse stated to SLP that they do not want alternate means of nutrition. SLP educated nurse, staff sitter, and spouse on full feeding assistance, do not feed pt if he does not remain awake. All parties verbalized agreement. Per above, pt presents w/ mild-moderate oral dysphagia and not suspecting pharyngeal phase dysphagia. It is recommended pt initiate a puree diet and thin liquids w/ feeding assistance and supervision at meals d/t difficulty feeding self and confusion.  Only feed when pt is fully awake and alert. Meds crushed in puree.     SLP Plan & Recommendation:      - Puree diet and thin liquids; full feeding assistance   - Only feed when pt is fully awake and alert  - Meds: crushed in puree  - Safe swallow strategies: HOB at a 90 degree angle, slow rate of intake, small bites/sips  - ST will continue to complete meal assessment(s) to ensure tolerance and safety of rec'd diet, provide further recs as indicated

## 2025-01-03 NOTE — PLAN OF CARE
Goal Outcome Evaluation:      Speech eval passed but must have someone present when eating and drinking, MRI to finally rule out stroke

## 2025-01-03 NOTE — PROGRESS NOTES
[facility] MEDICINE SERVICE  DAILY PROGRESS NOTE    NAME: Kailash Cooper Jr.  : 1940  MRN: [id number]      LOS: 2 days     PROVIDER OF SERVICE: Tono Fuentes MD    Chief Complaint: NSTEMI (non-ST elevated myocardial infarction)    Subjective:     Interval History:  History taken from: patient    Acutely delirious.        Review of Systems:   Review of Systems    Objective:     Vital Signs  Temp:  [97.4 °F (36.3 °C)-98.7 °F (37.1 °C)] 98.7 °F (37.1 °C)  Heart Rate:  [] 85  Resp:  [10-24] 13  BP: ()/(55-88) 123/63  Flow (L/min) (Oxygen Therapy):  [1-3] 1   Body mass index is 22.6 kg/m².    Physical Exam  Physical Exam  Cardiovascular:      Rate and Rhythm: Normal rate and regular rhythm.      Pulses: Normal pulses.      Heart sounds: Normal heart sounds.   Pulmonary:      Effort: Pulmonary effort is normal.      Breath sounds: Normal breath sounds.   Abdominal:      General: Abdomen is flat.      Palpations: Abdomen is soft.   Neurological:      Mental Status: He is alert.      Comments: Acutely delirious and disoriented            Diagnostic Data    Results from last 7 days   Lab Units 25  0408 25  0421 25  0212   WBC 10*3/mm3 10.79   < > 8.69   HEMOGLOBIN g/dL 12.7*   < > 11.1*   HEMATOCRIT % 38.3   < > 33.7*   PLATELETS 10*3/mm3 333   < > 236   GLUCOSE mg/dL 108*   < > 89   CREATININE mg/dL 1.37*   < > 1.28*   BUN mg/dL 30*   < > 17   SODIUM mmol/L 143   < > 140   POTASSIUM mmol/L 3.8   < > 3.7   AST (SGOT) U/L  --   --  36   ALT (SGPT) U/L  --   --  18   ALK PHOS U/L  --   --  63   BILIRUBIN mg/dL  --   --  0.5   ANION GAP mmol/L 14.6   < > 10.2    < > = values in this interval not displayed.       CT Head Without Contrast    Result Date: 2025  Impression: 1.The exam is essentially nondiagnostic due to considerable motion artifact. 2.Volume loss secondary to cerebral atrophy. 3.Suggestion of chronic white matter microvascular ischemia. 4.I cannot exclude a small  "subdural hematoma or acute ischemia. Electronically Signed: Josiah Lizarraga MD  1/1/2025 4:39 PM EST  Workstation ID: [id number]       I reviewed the patient's new clinical results.    Assessment/Plan:     Active and Resolved Problems  Active Hospital Problems    Diagnosis  POA    **NSTEMI (non-ST elevated myocardial infarction) [I21.4]  Yes      Resolved Hospital Problems   No resolved problems to display.     Acute delirium  Opioid withdrawal complicating this  -Unable to have his p.o. pain medications because of altered mental status and a failed swallow study.  Would recommend that he receives IV opioids in the interim.  The opioid withdrawal is strongly complicating his delirium symptoms.  Delirium improved strongly with reintroduction of opioids, dose cut in half 2/2 sedation 1/3/2025    Elevated troponin, peak at 419 now downtrending  H/o CAD s/p PCI  - EKG: normal sinus rhythm rate 82, right bundle branch block, left anterior fascicular block.   - HS troponin 1165, repeat 1321  - previous [facility] 1/28/2022:   1. \"Two-vessel coronary artery disease  2.  High-grade stenosis of proximal RCA with successful stenting  3.  Diffuse disease of mid to distal LCx.  This segment appears to be very small caliber vessel. DAPT recommended\"  - resume home plavix, statin  -Noted recommendation for medical management from cardiology team.  Will defer to their expertise.     Possible pneumonia  Chronic respiratory failure/pulmonary fibrosis  - CXR: chronic scarring with superimposed mild interstitial edema or pneumonia  - Appears to be more or less at his baseline oxygen status  - WBC 11.4  - Noted lab results so far.  This does not seem to be driven by bacterial process.  Clinically seems more viral in nature.  DC antibiotics and monitor.  -Supportive care.    Acute on CKD stage III - worsening  - BUN 23, creatinine 1.7 at OLF (previous baseline around 1.5)  - improved since admission.  - monitor BMP  - No PO intake 2/2 delirium, " resume 24 hours IVF 1/3/2025     Hypertension  Hyperlipidemia  - Continue home Norvasc, Lasix, hydralazine, atorvastatin when home meds verified      H/o CVA  - Continue home Plavix, statin     Chronic low back pain  - Continue home oxycodone     RLS  - Continue home Requip     DM type II  - controlled. Glucose normal 89     BPH  - cont home flomax     History of non-small cell lung cancer stage Ib status post resection 2017,   Former smoker quit 1990    VTE Prophylaxis:  Mechanical VTE prophylaxis orders are present.             Disposition Planning:     Barriers to Discharge: Status, cardiology workup  Anticipated Date of Discharge: 1/4/2025  Place of Discharge: Home      Time: 35 minutes     Code Status and Medical Interventions: CPR (Attempt to Resuscitate); Full Support   Ordered at: 01/01/25 0040     Level Of Support Discussed With:    Patient     Code Status (Patient has no pulse and is not breathing):    CPR (Attempt to Resuscitate)     Medical Interventions (Patient has pulse or is breathing):    Full Support       Signature: Electronically signed by Tono Fuentes MD, 01/03/25, 11:55 EST.  [facility] Hospitalist Team

## 2025-01-03 NOTE — CASE MANAGEMENT/SOCIAL WORK
Continued Stay Note  [facility]     Patient Name: Kailash Cooper Jr.  MRN: [id number]  Today's Date: 1/3/2025    Admit Date: 1/1/2025    Plan: DC Plan: Anticipate routine home with family pending clinical course. Spouse Candis will provide transportation.   Discharge Plan       Row Name 01/03/25 1349       Plan    Plan DC Plan: Anticipate routine home with family pending clinical course. Spouse Candis will provide transportation.    Patient/Family in Agreement with Plan yes    Provided Post Acute Provider List? N/A    Provided Post Acute Provider Quality & Resource List? N/A    Plan Comments Patient failed swallow for holding food in mouth yesterday. Remains NPO. Patient remains confused but more alert today. Sitter at bedside. CM spoke with Hospitalist and nursing for morning rounds and reviewed chart for clinical updates. Abnormal Head CT discssed along with patient symptoms. Decision for neuro consult to be placed and MRI of brain. Will repeat swallow eval today. MD anticipates several days before DC readiness.CM will continue to follow for any additional needs and adjust DC plan accordingly. DC Barriers: Cardiac monitroing, pending MRI, Confusion, Pending Neuro consult, Pending swallow eval, NPO, O2@2L nc, monitor BP, and monitor labs.               Expected Discharge Date and Time       Expected Discharge Date Expected Discharge Time    Jan 6, 2025           Phone communication or documentation only- no physical contact with patient or family.      Marjorie Mcfadden RN    Office Phone: [phone]  Office Cell:     [phone]

## 2025-01-03 NOTE — CONSULTS
Palliative Care Consultation    Patient Name: Kailash Cooper Jr.  : 1940  MRN: [id number]  Allergies: Patient has no known allergies.    Requesting clinician:  Gina   Reason for consult: Consultation for clarification of goals of care and code status.      Patient Code Status:   Code Status and Medical Interventions: CPR (Attempt to Resuscitate); Full Support   Ordered at: 25 0040     Level Of Support Discussed With:    Patient     Code Status (Patient has no pulse and is not breathing):    CPR (Attempt to Resuscitate)     Medical Interventions (Patient has pulse or is breathing):    Full Support           Chief Complaint:    NSTEMI    History of Present Illness    Kailash Cooper Jr. is a 84 y.o. male who presented to [facility] ED on  as a transfer from [place]. Patient initially presented with NSTEMI. Patient with baseline dementia, unable to report complaints but denied chest pain.     HS troponin 1165, repeat 1321  Further labs: , sodium 139, potassium 4.1, chloride 106, CO2 26, glucose 101, BUN 23, creatinine 1.7, ALT 21, AST 37, alk phosphatase 68, total bilirubin 0.5, albumin 4.1, calcium 9.7, WBC 11.4, Hgb 10.4, HCT 33.9, .  Urinalysis showed moderate blood, negative nitrates, negative leukocytes.       CT head impression no acute intracranial bleed, mass effect, or midline shift.  Age compatible atrophy and chronic deep white matter ischemic changes. Signed by Dr. Patrick Allison  CXR impression chronic scarring with superimposed mild interstitial edema or pneumonia. Signed by Dr. Patrick Allison     Cardiology consulted on arrival. Cardiac cath on hold due to mental status. On medical management at this time. Swallow study pending.     1/3 Palliative consult for goals of care discussion in an acutely ill patient with altered mental status.    VITAL SIGNS:   Temp:  [97.4 °F (36.3 °C)-98.7 °F (37.1 °C)] 98.7 °F (37.1 °C)  Heart Rate:  [] 85  Resp:  [10-24] 13  BP: ()/(55-88)  123/63       PMH: Pulmonary fibrosis, HTN, HLD, CVA, CKD, DM, Arthritis     Past Surgical History:   Procedure Laterality Date    CARDIAC CATHETERIZATION Left 1/28/2022    Procedure: Cardiac Catheterization/Vascular Study;  Surgeon: Mani Salguero MD;  Location: [facility] CATH INVASIVE LOCATION;  Service: Cardiovascular;  Laterality: Left;    CATARACT EXTRACTION  2006    [facility]    COLONOSCOPY  2011    COLONOSCOPY N/A 5/27/2022    Procedure: COLONOSCOPY;  Surgeon: Terry Holliday MD;  Location: [facility] ENDOSCOPY;  Service: Gastroenterology;  Laterality: N/A;  polyps    CORONARY STENT PLACEMENT  01/28/2022    RCA    ENDOSCOPY N/A 5/27/2022    Procedure: ESOPHAGOGASTRODUODENOSCOPY with 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, gastric antrum biopsy;  Surgeon: Terry Holliday MD;  Location: [facility] ENDOSCOPY;  Service: Gastroenterology;  Laterality: N/A;  gastritis, gastric ulcer, small bowel AVM    LUNG CANCER SURGERY      upper lobe rt lung cancer 9/2017  at [facility]       Family History   Problem Relation Age of Onset    Stroke Mother     Cancer Father         Prostate    Heart failure Brother     Heart disease Other        Social History     Tobacco Use    Smoking status: Former     Current packs/day: 0.00     Average packs/day: 1 pack/day for 20.0 years (20.0 ttl pk-yrs)     Types: Cigarettes     Start date: 1990     Quit date: 2010     Years since quitting: 15.0     Passive exposure: Past    Smokeless tobacco: Never   Vaping Use    Vaping status: Never Used   Substance Use Topics    Alcohol use: Never    Drug use: Never           LABS:    Results from last 7 days   Lab Units 01/03/25  0408   WBC 10*3/mm3 10.79   HEMOGLOBIN g/dL 12.7*   HEMATOCRIT % 38.3   PLATELETS 10*3/mm3 333     Results from last 7 days   Lab Units 01/03/25  0408   SODIUM mmol/L 143   POTASSIUM mmol/L 3.8   CHLORIDE mmol/L 105   CO2 mmol/L 23.4   BUN mg/dL 30*   CREATININE mg/dL 1.37*   GLUCOSE mg/dL 108*    CALCIUM mg/dL 9.8     Results from last 7 days   Lab Units 01/03/25  0408 01/02/25  0421 01/01/25  0212   SODIUM mmol/L 143   < > 140   POTASSIUM mmol/L 3.8   < > 3.7   CHLORIDE mmol/L 105   < > 104   CO2 mmol/L 23.4   < > 25.8   BUN mg/dL 30*   < > 17   CREATININE mg/dL 1.37*   < > 1.28*   CALCIUM mg/dL 9.8   < > 9.7   BILIRUBIN mg/dL  --   --  0.5   ALK PHOS U/L  --   --  63   ALT (SGPT) U/L  --   --  18   AST (SGOT) U/L  --   --  36   GLUCOSE mg/dL 108*   < > 89    < > = values in this interval not displayed.         IMAGING STUDIES:  CT Head Without Contrast    Result Date: 1/1/2025  Impression: 1.The exam is essentially nondiagnostic due to considerable motion artifact. 2.Volume loss secondary to cerebral atrophy. 3.Suggestion of chronic white matter microvascular ischemia. 4.I cannot exclude a small subdural hematoma or acute ischemia. Electronically Signed: Josiah Lizarraga MD  1/1/2025 4:39 PM EST  Workstation ID: [id number]       I reviewed the patient's new clinical results including labs, imaging, and vitals.        Scheduled Meds:  amLODIPine, 10 mg, Oral, Q24H  aspirin, 81 mg, Oral, Daily  atorvastatin, 10 mg, Oral, Nightly  budesonide, 0.5 mg, Nebulization, BID - RT  clopidogrel, 75 mg, Oral, Daily  hydrALAZINE, 50 mg, Oral, TID  ipratropium-albuterol, 3 mL, Nebulization, Q6H While Awake - RT  metoprolol tartrate, 5 mg, Intravenous, Q8H  mupirocin, 1 Application, Each Nare, BID  sodium chloride, 10 mL, Intravenous, Q12H      Continuous Infusions:  sodium chloride, 100 mL/hr, Last Rate: 100 mL/hr (01/03/25 0946)        I have reviewed patient's current medication list.     Review of Systems   Unable to perform ROS: Mental status change   All other systems reviewed and are negative.        Physical Exam  Vitals and nursing note reviewed.   Constitutional:       Appearance: He is ill-appearing.   HENT:      Head: Normocephalic and atraumatic.      Nose: Nose normal.   Skin:     General: Skin is dry.    Neurological:      Mental Status: He is disoriented.             PROBLEM LIST:    NSTEMI (non-ST elevated myocardial infarction)          ASSESSMENT/PLAN:    NSTEMI: Cardiology consulted. Troponin 258. Echo showing EF 56-60%. Medical management, cardiac cath on hold due to mental status.     Altered mental status: with baseline dementia. Behavioral issues, restless and agitated. On geodon. Psych consulted for management.     Pneumonia: CXR: chronic scarring with superimposed mild interstitial edema or pneumonia. WBC 11.4. Started on IV antibiotics. Cultures obtained.     HTN/HLD/CKD/RLS/DM: chronic conditions per primary.     These illnesses and their management contribute to the need for a palliative consult and advanced care planning.      ADVANCED CARE PLANNIN/3 Patient is not alert and oriented, unable to participate in a goals of care discussion. No family present at bedside.  spoke with wife this afternoon. She states that prior to this admission, the patient was fairly independent with most ADLs and that this is a change from his baseline. She is planning to come visit with him today and is hoping to discuss his care the Mds. Conversation cut short as patient was driving and asked to end phone call. Pending clinical information regarding psych input and swallow eval before goals of care discussion.       Advanced Directives: Patient does not have advance directive  Health Care Directive on file: No  Health Care Surrogate:      Palliative Performance Scale Score:    Comments:           Decisional Capacity: no  Patient's understanding of illness: unknown  Patient goals of care:  Full code, full intervention      Thank you for this consult and allowing us to participate in patient's plan of care. Palliative Care Team will continue to follow patient.       I spent 45 minutes reviewing providers documentation, medication records, assessing and examining patient, discussing with family,  answering questions, providing guidance about a plan of care, and coordinating care with other healthcare members. More than 50% of time spent face to face discussing disease education, current clinical status, and medication management.       Maria Luz Hernández, APRN  1/3/2025

## 2025-01-03 NOTE — THERAPY TREATMENT NOTE
Acute Care - Speech Language Pathology   Swallow Treatment Note MIRIAN Mast     Patient Name: Kailash Cooper Jr.  : 1940  MRN: [id number]  Today's Date: 1/3/2025               Admit Date: 2025    Visit Dx:   No diagnosis found.  Patient Active Problem List   Diagnosis    Chronic bilateral low back pain without sciatica    History of malignant neoplasm of thoracic cavity structure    Allergic rhinitis    Anemia due to stage 3 chronic kidney disease    Acute conjunctivitis    Osteoarthritis    Encounter for screening for malignant neoplasm of prostate    Enlarged prostate without lower urinary tract symptoms (luts)    Fatigue    Gastroesophageal reflux disease    Gastrointestinal hemorrhage    History of transient ischemic attack    Hydronephrosis    Hyperkalemia    Mixed hyperlipidemia    Essential hypertension    Hypogonadism    Male erectile disorder (CODE)    Obstructive sleep apnea    Osteomalacia    Secondary hyperparathyroidism of renal origin    Status post patent foramen ovale closure    Essential tremor    Vitamin D deficiency    Type 2 diabetes mellitus    Chronic kidney disease, stage 3 (moderate)    Hypertensive encephalopathy    Lung nodule    Lumbosacral spondylosis without myelopathy    Cervical spondylosis without myelopathy    Thoracic spondylosis    Chronic pain syndrome    Cervicalgia    Chest pain, atypical    CAP (community acquired pneumonia)    Elevated troponin    Positive D dimer    Chest pain    Acute respiratory failure with hypoxia    Sepsis without acute organ dysfunction    Non-STEMI (non-ST elevated myocardial infarction)    Oxygen dependent    Abnormal EKG    Anemia    History of duodenal ulcer    Melena    Erosive esophagitis    Multiple gastric ulcers    Bleeding chronic duodenal ulcer    Other insomnia    Intolerance to cold    Coronary artery disease involving native coronary artery of native heart without angina pectoris    Underweight    Bilateral pneumonia    Weakness     Pneumonia, unspecified organism    BMI 22.0-22.9, adult    Muscle twitching    Shortness of breath    NSTEMI (non-ST elevated myocardial infarction)     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 [facility]    Diabetes     Enlarged prostate     Former smoker     Hiatal hernia     Hip pain     don    Hip pain, bilateral     Hyperlipidemia     Hypertension     Kidney disease        stage 3     Leg pain     don    Low back pain     Neck pain     Stroke      Past Surgical History:   Procedure Laterality Date    CARDIAC CATHETERIZATION Left 1/28/2022    Procedure: Cardiac Catheterization/Vascular Study;  Surgeon: Mani Salguero MD;  Location: [facility] CATH INVASIVE LOCATION;  Service: Cardiovascular;  Laterality: Left;    CATARACT EXTRACTION  2006    [facility]    COLONOSCOPY  2011    COLONOSCOPY N/A 5/27/2022    Procedure: COLONOSCOPY;  Surgeon: Terry Holliday MD;  Location: [facility] ENDOSCOPY;  Service: Gastroenterology;  Laterality: N/A;  polyps    CORONARY STENT PLACEMENT  01/28/2022    RCA    ENDOSCOPY N/A 5/27/2022    Procedure: ESOPHAGOGASTRODUODENOSCOPY with 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, gastric antrum biopsy;  Surgeon: Terry Holliday MD;  Location: [facility] ENDOSCOPY;  Service: Gastroenterology;  Laterality: N/A;  gastritis, gastric ulcer, small bowel AVM    LUNG CANCER SURGERY      upper lobe rt lung cancer 9/2017  at [facility]  The patient has been educated in the following areas:   Dysphagia (Swallowing Impairment).        SLP GOALS       Row Name 01/03/25 1300       (LTG) Swallow    (LTG) Swallow The patient will maximize swallow function for least restrictive po diet, exhibiting no complications associated with dysphagia, adequate po intake, and demonstrating independent use of safe swallow compensations  -MS    [place] (Swallow Long Term Goal) independently (over 90% accuracy)  -MS     Time Frame (Swallow Long Term Goal) by discharge  -MS    Progress/Outcomes (Swallow Long Term Goal) goal ongoing  -MS       (STG) Swallow 1    (STG) Swallow 1 The patient will participate in ongoing assessment of swallow, including re-evaluation clinically and/or including instrumental assessment of swallow if indicated, to further assess swallow function in anticipation to initiate a PO diet  -MS    [place] (Swallow Short Term Goal 1) independently (over 90% accuracy)  -MS    Time Frame (Swallow Short Term Goal 1) by discharge  -MS    Progress/Outcomes (Swallow Short Term Goal 1) goal ongoing  -MS    Comment (Swallow Short Term Goal 1) Clinical swallow eval completed. Pt appeared awake with eyes closed and somewhat alert, able to follow commands, moderately confused. Upon room entry, pt lying in bed w/ spouse at bedside. Pt on room air.  Pt was NPO at the time of evaluation. Pt is edentulous w/ dentures in place. Pt spouse reported regular/thin liquid is baseline diet and denied swallowing difficulty. Oral motor exam revealed generalized oral motor weakness. Trials assessed: ice chips x2, thin by cup x2, thin by straw x2, puree x2. Pt had good bolus control of ice chip and required assistance for all trials. 4 second swallow initiation. No residue or pocketing during puree. No s/s of penetration/aspiration noted during evaluation. Spouse stated to SLP that they do not want alternate means of nutrition. SLP educated nurse, staff sitter, and spouse on full feeding assistance, do not feed pt if he does not remain awake. All parties verbalized agreement. Per above, pt presents w/ mild-moderate oral dysphagia and not suspecting pharyngeal phase dysphagia. It is recommended pt initiate a puree diet and thin liquids w/ feeding assistance and supervision at meals d/t difficulty feeding self and confusion.  Only feed when pt is fully awake and alert. Meds crushed in puree.     SLP Plan & Recommendation:      - Puree  diet and thin liquids; full feeding assistance   - Only feed when pt is fully awake and alert  - Meds: crushed in puree  - Safe swallow strategies: HOB at a 90 degree angle, slow rate of intake, small bites/sips  - ST will continue to complete meal assessment(s) to ensure tolerance and safety of rec'd diet, provide further recs as indicated      -MS              User Key  (r) = Recorded By, (t) = Taken By, (c) = Cosigned By      Initials Name Provider Type    Michael Hartman, SLP Speech and Language Pathologist                        SCOT Lou  1/3/2025

## 2025-01-03 NOTE — PLAN OF CARE
Goal Outcome Evaluation:       ST attempted dysphagia evaluation this date. Pt was in a deep sleep and nursing reported they just administered morphine. Nursing also reported pt has been confused, not able to follow commands, and has been without nutrition for 3 days. SLP will check back on pt this afternoon to see if pt is more alert/ awake. SLP recommends alternate means of nutrition at this point due to pt not having nutrition in 3 days.

## 2025-01-03 NOTE — CONSULTS
"  Referring Provider:     Tono Fuentes MD     Reason for Consultation: Delirium     Chief complaint \"I don't feel good\"    Subjective .     History of present illness:  The patient is a 84 y.o. male who was admitted secondary to NSTEMI.   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    Pt seen today oriented to self, very somnolent   Spouse at bedside , hx obtained from spouse  Reports pt has been on long term pain management medication , taking oxycodone 15 mg q6 hours at home, but has not been able to take oral medications secondary to acute medical changes. Expressed concern pt has not received since being in hospital.   Since admission pt has been intermittently confused and agitated. Reported today pt was more alert and appropriate after receiving morphine.      Psych hx obtained from spouse secondary   Denies previous psychiatric hospitalizations   Denies symptoms of psychosis  Denies symptoms of depression  Denies symptoms of anxiety and panic  Denies substance abuse including alcohol and illicit and prescription drugs       The following portions of the patient's history were reviewed and updated as appropriate: allergies, current medications, past family history, past medical history, past social history, past surgical history and problem list.    History    Objective     Vital Signs   /68   Pulse 94   Temp 98.7 °F (37.1 °C) (Axillary)   Resp 13   Ht 165.1 cm (65\")   Wt 61.6 kg (135 lb 12.9 oz)   SpO2 97%   BMI 22.60 kg/m²       MENTAL STATUS EXAM   General Appearance:  Cleanly groomed and dressed  Eye Contact:  Closed  Motor Activity:  Slow  Speech:  Minimal spontaneity  Mood and affect:  Constricted  Thought Process:  Goal-directed  Associations/ Thought Content:  No delusions  Hallucinations:  None  Suicidal Ideations:  Not " present  Homicidal Ideation:  Not present  Sensorium:  Other  Other Comment:  Somnolent  Orientation:  Person  Insight:  Other  Other Comment:  Unable to assess secondary to somnolence  Judgement:  Other  Other Comment:  Unable to assess secondary to somnolence  Reliability:  Other  Other Comment:  Unable to assess secondary to somnolence  Impulse Control:  Fair         Assessment & Plan       NSTEMI (non-ST elevated myocardial infarction)       Assessment: Opioid withdrawal delirium   Treatment Plan: Pt presents somnolent, unable to participate in interview.   Spouse at bedside reported pt has been taking oxycodone 15 mg q6 hours at home for pain management long term, but has been unable to receive secondary to issues with swallowing.   Ativan, haldol, and geodon were reported as ineffective   Morphine was started and pt reported to be improved today.   Monitor for opioid withdrawal, and pain. Pt home dose of Oxycodone 15 mg QID, pt was filling regularly every 30 days, now receiving IV morphine prn.   Continue delirium intervention strategies , pt is high risk secondary to age, inpatient setting, and acute medical conditions.   Limit the use of BZD which can contribute to and cause delirium symptoms.   Will follow    Treatment Plan discussed with: Patient and Family    I discussed the patients findings and my recommendations with patient, family, and nursing staff    I have reviewed and approved the behavioral health treatment plans and problem list. Yes  Thank you for the consult   Referring MD has access to consult report and progress notes in EMR     Thuy Burris DNP, APRN  01/03/25  15:19 EST

## 2025-01-04 LAB
ANION GAP SERPL CALCULATED.3IONS-SCNC: 14 MMOL/L (ref 5–15)
BASOPHILS # BLD AUTO: 0.03 10*3/MM3 (ref 0–0.2)
BASOPHILS NFR BLD AUTO: 0.4 % (ref 0–1.5)
BUN SERPL-MCNC: 26 MG/DL (ref 8–23)
BUN/CREAT SERPL: 19.8 (ref 7–25)
CALCIUM SPEC-SCNC: 9.6 MG/DL (ref 8.6–10.5)
CHLORIDE SERPL-SCNC: 106 MMOL/L (ref 98–107)
CO2 SERPL-SCNC: 23 MMOL/L (ref 22–29)
CREAT SERPL-MCNC: 1.31 MG/DL (ref 0.76–1.27)
DEPRECATED RDW RBC AUTO: 52.9 FL (ref 37–54)
EGFRCR SERPLBLD CKD-EPI 2021: 53.7 ML/MIN/1.73
EOSINOPHIL # BLD AUTO: 0.19 10*3/MM3 (ref 0–0.4)
EOSINOPHIL NFR BLD AUTO: 2.3 % (ref 0.3–6.2)
ERYTHROCYTE [DISTWIDTH] IN BLOOD BY AUTOMATED COUNT: 14.5 % (ref 12.3–15.4)
GLUCOSE SERPL-MCNC: 95 MG/DL (ref 65–99)
HCT VFR BLD AUTO: 35.3 % (ref 37.5–51)
HGB BLD-MCNC: 11.5 G/DL (ref 13–17.7)
IMM GRANULOCYTES # BLD AUTO: 0.08 10*3/MM3 (ref 0–0.05)
IMM GRANULOCYTES NFR BLD AUTO: 1 % (ref 0–0.5)
LYMPHOCYTES # BLD AUTO: 0.9 10*3/MM3 (ref 0.7–3.1)
LYMPHOCYTES NFR BLD AUTO: 11 % (ref 19.6–45.3)
MCH RBC QN AUTO: 32.3 PG (ref 26.6–33)
MCHC RBC AUTO-ENTMCNC: 32.6 G/DL (ref 31.5–35.7)
MCV RBC AUTO: 99.2 FL (ref 79–97)
MONOCYTES # BLD AUTO: 0.64 10*3/MM3 (ref 0.1–0.9)
MONOCYTES NFR BLD AUTO: 7.8 % (ref 5–12)
NEUTROPHILS NFR BLD AUTO: 6.32 10*3/MM3 (ref 1.7–7)
NEUTROPHILS NFR BLD AUTO: 77.5 % (ref 42.7–76)
NRBC BLD AUTO-RTO: 0 /100 WBC (ref 0–0.2)
PLATELET # BLD AUTO: 271 10*3/MM3 (ref 140–450)
PMV BLD AUTO: 9.6 FL (ref 6–12)
POTASSIUM SERPL-SCNC: 3.6 MMOL/L (ref 3.5–5.2)
QT INTERVAL: 384 MS
QTC INTERVAL: 486 MS
RBC # BLD AUTO: 3.56 10*6/MM3 (ref 4.14–5.8)
SODIUM SERPL-SCNC: 143 MMOL/L (ref 136–145)
WBC NRBC COR # BLD AUTO: 8.16 10*3/MM3 (ref 3.4–10.8)

## 2025-01-04 PROCEDURE — 94761 N-INVAS EAR/PLS OXIMETRY MLT: CPT

## 2025-01-04 PROCEDURE — 25010000002 LORAZEPAM PER 2 MG

## 2025-01-04 PROCEDURE — 94799 UNLISTED PULMONARY SVC/PX: CPT

## 2025-01-04 PROCEDURE — 99232 SBSQ HOSP IP/OBS MODERATE 35: CPT

## 2025-01-04 PROCEDURE — 25010000002 HYDRALAZINE PER 20 MG: Performed by: STUDENT IN AN ORGANIZED HEALTH CARE EDUCATION/TRAINING PROGRAM

## 2025-01-04 PROCEDURE — 80048 BASIC METABOLIC PNL TOTAL CA: CPT | Performed by: NURSE PRACTITIONER

## 2025-01-04 PROCEDURE — 25810000003 SODIUM CHLORIDE 0.9 % SOLUTION: Performed by: INTERNAL MEDICINE

## 2025-01-04 PROCEDURE — 94664 DEMO&/EVAL PT USE INHALER: CPT

## 2025-01-04 PROCEDURE — 85025 COMPLETE CBC W/AUTO DIFF WBC: CPT | Performed by: NURSE PRACTITIONER

## 2025-01-04 PROCEDURE — 99232 SBSQ HOSP IP/OBS MODERATE 35: CPT | Performed by: INTERNAL MEDICINE

## 2025-01-04 RX ORDER — AMLODIPINE BESYLATE 5 MG/1
5 TABLET ORAL
Status: DISCONTINUED | OUTPATIENT
Start: 2025-01-05 | End: 2025-01-07

## 2025-01-04 RX ORDER — SODIUM CHLORIDE 9 MG/ML
50 INJECTION, SOLUTION INTRAVENOUS CONTINUOUS
Status: DISPENSED | OUTPATIENT
Start: 2025-01-04 | End: 2025-01-05

## 2025-01-04 RX ORDER — LORAZEPAM 0.5 MG/1
0.5 TABLET ORAL EVERY 8 HOURS PRN
Status: DISPENSED | OUTPATIENT
Start: 2025-01-04 | End: 2025-01-06

## 2025-01-04 RX ADMIN — TAMSULOSIN HYDROCHLORIDE 0.4 MG: 0.4 CAPSULE ORAL at 08:42

## 2025-01-04 RX ADMIN — SODIUM CHLORIDE 50 ML/HR: 9 INJECTION, SOLUTION INTRAVENOUS at 15:20

## 2025-01-04 RX ADMIN — ASPIRIN 81 MG: 81 TABLET, COATED ORAL at 08:42

## 2025-01-04 RX ADMIN — CLOPIDOGREL BISULFATE 75 MG: 75 TABLET ORAL at 08:42

## 2025-01-04 RX ADMIN — AMLODIPINE BESYLATE 10 MG: 5 TABLET ORAL at 08:42

## 2025-01-04 RX ADMIN — LORAZEPAM 1 MG: 2 INJECTION INTRAMUSCULAR; INTRAVENOUS at 01:51

## 2025-01-04 RX ADMIN — BUDESONIDE 0.5 MG: 0.5 INHALANT RESPIRATORY (INHALATION) at 07:35

## 2025-01-04 RX ADMIN — ATORVASTATIN CALCIUM 10 MG: 10 TABLET ORAL at 20:04

## 2025-01-04 RX ADMIN — Medication 10 ML: at 20:04

## 2025-01-04 RX ADMIN — IPRATROPIUM BROMIDE AND ALBUTEROL SULFATE 3 ML: .5; 3 SOLUTION RESPIRATORY (INHALATION) at 19:30

## 2025-01-04 RX ADMIN — MUPIROCIN 1 APPLICATION: 20 OINTMENT TOPICAL at 20:03

## 2025-01-04 RX ADMIN — METOPROLOL TARTRATE 5 MG: 1 INJECTION, SOLUTION INTRAVENOUS at 05:13

## 2025-01-04 RX ADMIN — BUDESONIDE 0.5 MG: 0.5 INHALANT RESPIRATORY (INHALATION) at 19:34

## 2025-01-04 RX ADMIN — HYDRALAZINE HYDROCHLORIDE 5 MG: 20 INJECTION INTRAMUSCULAR; INTRAVENOUS at 05:14

## 2025-01-04 RX ADMIN — IPRATROPIUM BROMIDE AND ALBUTEROL SULFATE 3 ML: .5; 3 SOLUTION RESPIRATORY (INHALATION) at 07:39

## 2025-01-04 RX ADMIN — OXYCODONE 15 MG: 5 TABLET ORAL at 04:07

## 2025-01-04 NOTE — PROGRESS NOTES
Cardiology Progress Note      PATIENT IDENTIFICATION    Name: Kailash Cooper Jr.  Age: 84 y.o. Sex: male : 1940  MRN: [id number]    Requesting Provider    Renée Bermeo MD     LOS: 3 days       Reason For Followup:  Non-STEMI      Subjective:    Interval History:  Seen examined.  Chart and labs reviewed.  Patient is quite somnolent.  Family at bedside.  Discussed medications with nursing.  Patient is now taking oral.  Will discontinue IV hydralazine and IV beta-blocker and transition to oral beta-blocker.  Decrease amlodipine to accommodate for oral beta-blocker.    Review of Systems:  A complete review of systems was undertaken with the pertinent cardiovascular findings listed in history of present illness and all other systems being negative.     Assessment & Plan    Impressions:  Non-STEMI  Altered mental status  History of hypertension  Hyperlipidemia  CKD    Recommendations:  Consideration for cardiac catheterization when/if cognitive status has improved  Conservative cardiac management in the interim  Will discontinue IV hydralazine and IV beta-blocker and transition to oral beta-blocker.  Decrease amlodipine to accommodate for oral beta-blocker.        Objective:    Medication Review:   Scheduled Meds:amLODIPine, 10 mg, Oral, Q24H  aspirin, 81 mg, Oral, Daily  atorvastatin, 10 mg, Oral, Nightly  budesonide, 0.5 mg, Nebulization, BID - RT  clopidogrel, 75 mg, Oral, Daily  hydrALAZINE, 5 mg, Intravenous, Q8H  ipratropium-albuterol, 3 mL, Nebulization, Q6H While Awake - RT  metoprolol tartrate, 5 mg, Intravenous, Q8H  mupirocin, 1 Application, Each Nare, BID  sodium chloride, 10 mL, Intravenous, Q12H  tamsulosin, 0.4 mg, Oral, Daily      Continuous Infusions:   PRN Meds:.  aluminum-magnesium hydroxide-simethicone    senna-docusate sodium **AND** polyethylene glycol **AND** bisacodyl **AND** bisacodyl    ipratropium-albuterol    LORazepam    melatonin    Morphine    ondansetron ODT **OR** ondansetron     oxyCODONE    sodium chloride    sodium chloride      NSTEMI (non-ST elevated myocardial infarction)         Physical Exam:    General: Somnolent no acute distress  Head:  Normocephalic, atraumatic, mucous membranes moist  Eyes:  Conjunctivae/corneas clear, EOMs intact     Neck:  Supple,  no bruit  Lungs:  Coarse and diminished bilaterally  Chest wall: No tenderness  Heart::  Regular rate and rhythm, S1 and S2 normal, 1/6 holosystolic murmur.  No rub or gallop  Abdomen: Soft, nontender, nondistended, bowel sounds active  Extremities: No cyanosis, clubbing, or edema  Pulses: 2+ and symmetric all extremities  Skin:  No rashes or lesions  Neuro/psych: Somnolent.  Still confused.  A little more appropriate today.    Vital Signs:  Vitals:    01/04/25 0742 01/04/25 0800 01/04/25 0900 01/04/25 1000   BP:  119/52 141/74 118/62   Patient Position:  Lying     Pulse: 67 60 93 63   Resp: 11 15     Temp:  98.1 °F (36.7 °C)     TempSrc:  Oral     SpO2: 96% 93%  94%   Weight:       Height:         Wt Readings from Last 1 Encounters:   01/03/25 61.6 kg (135 lb 12.9 oz)       Intake/Output Summary (Last 24 hours) at 1/4/2025 1141  Last data filed at 1/4/2025 0800  Gross per 24 hour   Intake 643.5 ml   Output 900 ml   Net -256.5 ml         Results Review:     CBC    Results from last 7 days   Lab Units 01/04/25  0407 01/03/25  0408 01/02/25  0421 01/01/25  0212   WBC 10*3/mm3 8.16 10.79 11.78* 8.69   HEMOGLOBIN g/dL 11.5* 12.7* 14.4 11.1*   PLATELETS 10*3/mm3 271 333 310 236     Cr Clearance Estimated Creatinine Clearance: 36.6 mL/min (A) (by C-G formula based on SCr of 1.31 mg/dL (H)).  Coag     HbA1C   Lab Results   Component Value Date    HGBA1C 5.8 (H) 12/23/2024    HGBA1C 5.7 (H) 01/31/2024    HGBA1C 5.9 (H) 03/17/2023     Blood Glucose   Glucose   Date/Time Value Ref Range Status   01/03/2025 0159 102 70 - 105 mg/dL Final     Comment:     Serial Number: [id number]:  [id number]     Infection   Results from last 7 days   Lab  "Units 01/01/25  0212   PROCALCITONIN ng/mL 0.16     CMP   Results from last 7 days   Lab Units 01/04/25  0407 01/03/25  0408 01/02/25  0421 01/01/25  0212   SODIUM mmol/L 143 143 144 140   POTASSIUM mmol/L 3.6 3.8 3.5 3.7   CHLORIDE mmol/L 106 105 105 104   CO2 mmol/L 23.0 23.4 19.8* 25.8   BUN mg/dL 26* 30* 19 17   CREATININE mg/dL 1.31* 1.37* 1.14 1.28*   GLUCOSE mg/dL 95 108* 109* 89   ALBUMIN g/dL  --   --   --  3.7   BILIRUBIN mg/dL  --   --   --  0.5   ALK PHOS U/L  --   --   --  63   AST (SGOT) U/L  --   --   --  36   ALT (SGPT) U/L  --   --   --  18     ABG      UA    Results from last 7 days   Lab Units 01/01/25  1746   NITRITE UA  Negative   WBC UA /HPF 0-2   BACTERIA UA /HPF None Seen   SQUAM EPITHEL UA /HPF 0-2     ASHU  No results found for: \"POCMETH\", \"POCAMPHET\", \"POCBARBITUR\", \"POCBENZO\", \"POCCOCAINE\", \"POCOPIATES\", \"POCOXYCODO\", \"POCPHENCYC\", \"POCPROPOXY\", \"POCTHC\", \"POCTRICYC\"  Lysis Labs   Results from last 7 days   Lab Units 01/04/25  0407 01/03/25  0408 01/02/25  0421 01/01/25  0212   HEMOGLOBIN g/dL 11.5* 12.7* 14.4 11.1*   PLATELETS 10*3/mm3 271 333 310 236   CREATININE mg/dL 1.31* 1.37* 1.14 1.28*     Radiology(recent) MRI Brain Without Contrast    Result Date: 1/3/2025  Impression: Motion-degraded exam demonstrating moderate typical chronic and age-related findings. No evidence of acute infarct, hemorrhage or mass effect. Electronically Signed: Ortega Herrera MD  1/3/2025 10:30 PM EST  Workstation ID: [id number]       Results from last 7 days   Lab Units 01/02/25  1207   HSTROP T ng/L 393*       Imaging Results (Last 24 Hours)       Procedure Component Value Units Date/Time    MRI Brain Without Contrast [[id number]] Collected: 01/03/25 2226     Updated: 01/03/25 2232    Narrative:      MRI BRAIN WO CONTRAST    Date of Exam: 1/3/2025 6:28 PM EST    Indication: R/O CVA.     Comparison: CT 1/1/2025.    Technique:  Routine multiplanar/multisequence sequence images of the brain were obtained without " contrast administration.      Findings:  No acute infarct is present on diffusion weighted sequences. Midline structures appear normal and the craniocervical junction is satisfactory. Motion-degraded exam demonstrates typical age-related volume loss and periventricular leukomalacia. No evidence   of intracranial hemorrhage, mass or mass effect. The ventricles are normal in size and configuration. The orbits appear normal. The paranasal sinuses are grossly clear. Intracranial arterial flow voids appear maintained.      Impression:      Impression:  Motion-degraded exam demonstrating moderate typical chronic and age-related findings. No evidence of acute infarct, hemorrhage or mass effect.        Electronically Signed: Ortega Herrera MD    1/3/2025 10:30 PM EST    Workstation ID: [id number]            Cardiac Studies:  Echo- Results for orders placed during the hospital encounter of 01/01/25    Adult Transthoracic Echo Complete W/ Cont if Necessary Per Protocol    Interpretation Summary    Left ventricular systolic function is normal. Calculated left ventricular EF = 56% Left ventricular ejection fraction appears to be 56 - 60%.    Left ventricular diastolic function is consistent with (grade II w/high LAP) pseudonormalization.    Mild to moderate aortic valve stenosis is present.    Estimated right ventricular systolic pressure from tricuspid regurgitation is normal (<35 mmHg).    Stress Myoview-  Cath-        Babak Brown,   01/04/25  11:41 EST    Copied information has been reviewed and is current as of 01/04/25

## 2025-01-04 NOTE — PROGRESS NOTES
Chief complaint delirium     Subjective .     History of present illness:  The patient is a 84 y.o. male who was admitted secondary to  NSTEMI.   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     Pt seen today oriented to self, very somnolent   Spouse at bedside , hx obtained from spouse  Reports pt has been on long term pain management medication , taking oxycodone 15 mg q6 hours at home, but has not been able to take oral medications secondary to acute medical changes. Expressed concern pt has not received since being in hospital.   Since admission pt has been intermittently confused and agitated. Reported today pt was more alert and appropriate after receiving morphine.       Psych hx obtained from spouse secondary   Denies previous psychiatric hospitalizations   Denies symptoms of psychosis  Denies symptoms of depression  Denies symptoms of anxiety and panic  Denies substance abuse including alcohol and illicit and prescription drugs .    1/4/24: Patient seen this morning, accompanied by sitter. He was alert, oriented to self, time, and knew he was at a hospital, though he didn't know which one. Patient previously agitated, but nursing reports this has improved significantly. He is still somewhat confused. Patient had IV lorazepam ordered due to agitation, and he was unable to take PO at the time. This was last given overnight at 151am. Nursing reports he is now about to take pills, and is back on his home oxycodone, and has not needed lorazepam this shift. Patient denies any SI/HI/AVH.       Review of Systems   All systems were reviewed and negative except for:  Behavioral/Psych: positive for  anxiety    The following portions of the patient's history were reviewed and updated as appropriate: allergies, current medications, past family history, past medical  history, past social history, past surgical history and problem list.    History           Medications Prior to Admission   Medication Sig Dispense Refill Last Dose/Taking    allopurinol (ZYLOPRIM) 100 MG tablet Take 2 tablets by mouth Daily. Indications: Disorder of Excessive Uric Acid in the Blood   Past Week    amLODIPine (NORVASC) 10 MG tablet TAKE 1 TABLET BY MOUTH ONCE DAILY FOR HIGH BLOOD PRESSURE 90 tablet 0 Past Week    budesonide (PULMICORT) 0.25 MG/2ML nebulizer solution Take 2 mL by nebulization Daily. Indications: Chronic Obstructive Lung Disease 180 mL 1 Past Week    cetirizine (zyrTEC) 10 MG tablet Take 1 tablet by mouth Daily. 90 tablet 1 Past Week    clopidogrel (PLAVIX) 75 MG tablet Take 1 tablet by mouth once daily 90 tablet 0 Past Week    cyclobenzaprine (FLEXERIL) 10 MG tablet Take 1 tablet by mouth Every Night.   Past Week    ferrous sulfate 325 (65 FE) MG EC tablet Take 1 tablet by mouth Daily With Breakfast.   Past Week    furosemide (LASIX) 40 MG tablet Take 1 tablet by mouth once daily 90 tablet 0 Past Week    glimepiride (AMARYL) 1 MG tablet Take 1 tablet by mouth Before Breakfast.   Past Week    hydrALAZINE (APRESOLINE) 50 MG tablet TAKE 1 TABLET BY MOUTH THREE TIMES DAILY 270 tablet 0 Past Week    megestrol (MEGACE) 40 MG tablet Take 1 tablet by mouth once daily 90 tablet 0 Past Week    oxyCODONE (ROXICODONE) 15 MG immediate release tablet Take 1 tablet by mouth Every 6 (Six) Hours As Needed for Severe Pain. 120 tablet 0 Past Week    pantoprazole (PROTONIX) 40 MG EC tablet Take 1 tablet by mouth twice daily 180 tablet 0 Past Week    potassium chloride (Klor-Con M20) 20 MEQ CR tablet Take 2 tablets now 2 tablet 0 Past Week    pravastatin (PRAVACHOL) 40 MG tablet Take 1 tablet by mouth once daily 90 tablet 0 Past Week    tamsulosin (FLOMAX) 0.4 MG capsule 24 hr capsule Take 1 capsule by mouth 2 (Two) Times a Day.   Past Week    vitamin B-12 (CYANOCOBALAMIN) 1000 MCG tablet Take 1 tablet by  "mouth 2 (Two) Times a Day. Indications: Inadequate Vitamin B12   Past Week    erythromycin (ROMYCIN) 5 MG/GM ophthalmic ointment INSTILL 1 APPLICATION IN EACH AFFECTED EYE THREE TIMES DAILY       nitroglycerin (Nitrostat) 0.3 MG SL tablet Place 1 tablet under the tongue Every 5 (Five) Minutes As Needed for Chest Pain. Take no more than 3 doses in 15 minutes. 50 tablet 12 Unknown    oxyCODONE (ROXICODONE) 15 MG immediate release tablet Take 1 tablet by mouth Every 6 (Six) Hours As Needed for Severe Pain. 120 tablet 0     oxyCODONE (ROXICODONE) 15 MG immediate release tablet Take 1 tablet by mouth Every 6 (Six) Hours As Needed for Severe Pain. 120 tablet 0     oxyCODONE-acetaminophen (Percocet)  MG per tablet Take 1 tablet by mouth Every 6 (Six) Hours As Needed for Moderate Pain. 120 tablet 0     rOPINIRole (REQUIP) 0.25 MG tablet Take 1 tablet by mouth Every Night. Take 1 hour before bedtime. 30 tablet 1 Unknown        Scheduled Meds:  amLODIPine, 10 mg, Oral, Q24H  aspirin, 81 mg, Oral, Daily  atorvastatin, 10 mg, Oral, Nightly  budesonide, 0.5 mg, Nebulization, BID - RT  clopidogrel, 75 mg, Oral, Daily  hydrALAZINE, 5 mg, Intravenous, Q8H  ipratropium-albuterol, 3 mL, Nebulization, Q6H While Awake - RT  metoprolol tartrate, 5 mg, Intravenous, Q8H  mupirocin, 1 Application, Each Nare, BID  sodium chloride, 10 mL, Intravenous, Q12H  tamsulosin, 0.4 mg, Oral, Daily         Continuous Infusions:  sodium chloride, 50 mL/hr        PRN Meds:    aluminum-magnesium hydroxide-simethicone    senna-docusate sodium **AND** polyethylene glycol **AND** bisacodyl **AND** bisacodyl    ipratropium-albuterol    LORazepam    melatonin    Morphine    ondansetron ODT **OR** ondansetron    oxyCODONE    sodium chloride    sodium chloride      Allergies:  Patient has no known allergies.      Objective     Vital Signs   /54 (Patient Position: Lying)   Pulse 77   Temp 97.6 °F (36.4 °C) (Oral)   Resp 12   Ht 165.1 cm (65\")   " Wt 61.6 kg (135 lb 12.9 oz)   SpO2 96%   BMI 22.60 kg/m²     Physical Exam:    MENTAL STATUS EXAM   General Appearance:  Cleanly groomed and dressed  Eye Contact:  Fair  Attitude:  Cooperative  Mood and affect:  Flat  Hopelessness:  Denies  Loneliness: Denies  Thought Process:  Linear  Associations/ Thought Content:  No delusions  Hallucinations:  None  Suicidal Ideations:  Not present  Homicidal Ideation:  Not present  Sensorium:  Alert  Orientation:  Person and time  Immediate Recall, Recent, and Remote Memory:  Deficit noted  Attention Span/ Concentration:  Good  Fund of Knowledge:  Appropriate for age and educational level  Intellectual Functioning:  Average range  Insight:  Fair  Judgement:  Fair  Reliability:  Fair  Impulse Control:  Fair     Result Review:  I have personally reviewed the results from the time of this admission to 1/4/2025 14:34 EST and agree with these findings:  [x]  Laboratory  []  Microbiology  []  Radiology  []  EKG/Telemetry   [x]  Cardiology/Vascular   []  Pathology  []  Old records  []  Other:  Most notable findings include:     Assessment & Plan       NSTEMI (non-ST elevated myocardial infarction)       Assessment: opioid withdrawal delirium    Treatment Plan: Patient presented with altered mental status, previously on oxycodone 15mg Q 6 hours at home for pain. Due to difficulty with swallowing, he had not been receiving that. Had become progressively more confused, agitated, combative. He had not responded to antipsychotics, including haldol and geodon. He had shown improvement with ativan. Patient was started on morphine and had an improvement in mental status. Today, patient is calm, not agitated. He is slightly disoriented, but improving.     Continue to monitor for opioid withdrawal. Continue delirium interventions. Decreased lorazepam to 0.5mg Q 8 hours. Use sparingly/cautiously due to possibly contributing to delirium.     Continue supportive treatment.     Will follow  as needed.   Treatment Plan discussed with: Patient    I discussed the patients findings and my recommendations with patient and nursing staff    I have reviewed and approved the behavioral health treatment plans and problem list. Yes     Referring MD has access to consult report and progress notes in EMR     JANE Sullivan  01/04/25  14:34 EST

## 2025-01-04 NOTE — PROGRESS NOTES
[facility] MEDICINE SERVICE  DAILY PROGRESS NOTE    NAME: Kailash Cooper Jr.  : 1940  MRN: [id number]      LOS: 3 days     PROVIDER OF SERVICE: Renée Bermeo MD    Chief Complaint: NSTEMI (non-ST elevated myocardial infarction)    Subjective:     Interval History:  History taken from: patient    Patient much more awake and oriented, able to answer questions  Denies any chest pain or shortness of breath      Review of Systems:   As above    Objective:     Vital Signs  Temp:  [97.6 °F (36.4 °C)-98.2 °F (36.8 °C)] 98.1 °F (36.7 °C)  Heart Rate:  [] 63  Resp:  [11-22] 15  BP: ()/() 118/62  Flow (L/min) (Oxygen Therapy):  [0-2] 1   Body mass index is 22.6 kg/m².    Physical Exam  AOx3 NAD  RRR S1-S2 audible  Lung CTA  Abdomen soft nontender     Diagnostic Data    Results from last 7 days   Lab Units 25  0407 25  0421 25  0212   WBC 10*3/mm3 8.16   < > 8.69   HEMOGLOBIN g/dL 11.5*   < > 11.1*   HEMATOCRIT % 35.3*   < > 33.7*   PLATELETS 10*3/mm3 271   < > 236   GLUCOSE mg/dL 95   < > 89   CREATININE mg/dL 1.31*   < > 1.28*   BUN mg/dL 26*   < > 17   SODIUM mmol/L 143   < > 140   POTASSIUM mmol/L 3.6   < > 3.7   AST (SGOT) U/L  --   --  36   ALT (SGPT) U/L  --   --  18   ALK PHOS U/L  --   --  63   BILIRUBIN mg/dL  --   --  0.5   ANION GAP mmol/L 14.0   < > 10.2    < > = values in this interval not displayed.       MRI Brain Without Contrast    Result Date: 1/3/2025  Impression: Motion-degraded exam demonstrating moderate typical chronic and age-related findings. No evidence of acute infarct, hemorrhage or mass effect. Electronically Signed: Ortega Herrera MD  1/3/2025 10:30 PM EST  Workstation ID: [id number]       I reviewed the patient's new clinical results.    Assessment/Plan:     Active and Resolved Problems  Active Hospital Problems    Diagnosis  POA    **NSTEMI (non-ST elevated myocardial infarction) [I21.4]  Yes      Resolved Hospital Problems   No resolved  "problems to display.     Acute delirium  Opioid withdrawal complicating this  -Unable to have his p.o. pain medications because of altered mental status and a failed swallow study.  Would recommend that he receives IV opioids in the interim.  The opioid withdrawal is strongly complicating his delirium symptoms.  Delirium improved strongly with reintroduction of opioids, dose cut in half 2/2 sedation 1/3/2025  - More awake today   - psych following      Elevated troponin, peak at 419 now downtrending  H/o CAD s/p PCI  - EKG: normal sinus rhythm rate 82, right bundle branch block, left anterior fascicular block.   - HS troponin 1165, repeat 1321  - previous [facility] 1/28/2022:   1. \"Two-vessel coronary artery disease  2.  High-grade stenosis of proximal RCA with successful stenting  3.  Diffuse disease of mid to distal LCx.  This segment appears to be very small caliber vessel. DAPT recommended\"  - resume home plavix, statin  -Cardiology thinks patient probably has NSTEMI as there is lateral T wave inversion.  They recommend cardiac cath however this was not done previously as patient was confused and noncooperative.  Seems like cardiology did talk to the wife and they are discussing among themselves.   - palliative on board, will continue to discuss with wife regarding goals of care     Possible pneumonia  Chronic respiratory failure/pulmonary fibrosis  - CXR: chronic scarring with superimposed mild interstitial edema or pneumonia  - Appears to be more or less at his baseline oxygen status  - WBC 11.4  - Noted lab results so far.  This does not seem to be driven by bacterial process.  Clinically seems more viral in nature. Antibiotics discontinued. White count stable today, afebrile   -Supportive care.     Acute on CKD stage III - worsening  - BUN 23, creatinine 1.7 at [facility] (previous baseline around 1.5)  - improved since admission.  - trended down to 1.3 today, around baseline  - monitor BMP  - No PO intake 2/2 delirium, " IVF 50ml/hr     Hypertension  Hyperlipidemia  - Continue home Norvasc, Lasix, hydralazine, atorvastatin when home meds verified      H/o CVA  - Continue home Plavix, statin     Chronic low back pain  - Continue home oxycodone     RLS  - Continue home Requip     DM type II  - controlled. Glucose normal 89     BPH  - cont home flomax     History of non-small cell lung cancer stage Ib status post resection 2017,   Former smoker quit 1990      VTE Prophylaxis:  Mechanical VTE prophylaxis orders are present.             Disposition Planning:       Barriers to Discharge: Status, cardiology workup  Anticipated Date of Discharge: 1-2 days  Place of Discharge: Home    Time: 35 minutes     Code Status and Medical Interventions: CPR (Attempt to Resuscitate); Full Support   Ordered at: 01/01/25 0040     Level Of Support Discussed With:    Patient     Code Status (Patient has no pulse and is not breathing):    CPR (Attempt to Resuscitate)     Medical Interventions (Patient has pulse or is breathing):    Full Support       Signature: Electronically signed by Renée Bermeo MD, 01/04/25, 12:16 EST.  [facility] Hospitalist Team

## 2025-01-05 LAB
ANION GAP SERPL CALCULATED.3IONS-SCNC: 9.4 MMOL/L (ref 5–15)
BASOPHILS # BLD AUTO: 0.02 10*3/MM3 (ref 0–0.2)
BASOPHILS NFR BLD AUTO: 0.3 % (ref 0–1.5)
BUN SERPL-MCNC: 25 MG/DL (ref 8–23)
BUN/CREAT SERPL: 19.5 (ref 7–25)
CALCIUM SPEC-SCNC: 9.1 MG/DL (ref 8.6–10.5)
CHLORIDE SERPL-SCNC: 107 MMOL/L (ref 98–107)
CO2 SERPL-SCNC: 25.6 MMOL/L (ref 22–29)
CREAT SERPL-MCNC: 1.28 MG/DL (ref 0.76–1.27)
DEPRECATED RDW RBC AUTO: 52.2 FL (ref 37–54)
EGFRCR SERPLBLD CKD-EPI 2021: 55.2 ML/MIN/1.73
EOSINOPHIL # BLD AUTO: 0.23 10*3/MM3 (ref 0–0.4)
EOSINOPHIL NFR BLD AUTO: 3.1 % (ref 0.3–6.2)
ERYTHROCYTE [DISTWIDTH] IN BLOOD BY AUTOMATED COUNT: 14.4 % (ref 12.3–15.4)
GLUCOSE SERPL-MCNC: 124 MG/DL (ref 65–99)
HCT VFR BLD AUTO: 31.7 % (ref 37.5–51)
HGB BLD-MCNC: 10.1 G/DL (ref 13–17.7)
IMM GRANULOCYTES # BLD AUTO: 0.04 10*3/MM3 (ref 0–0.05)
IMM GRANULOCYTES NFR BLD AUTO: 0.5 % (ref 0–0.5)
LYMPHOCYTES # BLD AUTO: 0.82 10*3/MM3 (ref 0.7–3.1)
LYMPHOCYTES NFR BLD AUTO: 11.2 % (ref 19.6–45.3)
MCH RBC QN AUTO: 31.4 PG (ref 26.6–33)
MCHC RBC AUTO-ENTMCNC: 31.9 G/DL (ref 31.5–35.7)
MCV RBC AUTO: 98.4 FL (ref 79–97)
MONOCYTES # BLD AUTO: 0.55 10*3/MM3 (ref 0.1–0.9)
MONOCYTES NFR BLD AUTO: 7.5 % (ref 5–12)
NEUTROPHILS NFR BLD AUTO: 5.68 10*3/MM3 (ref 1.7–7)
NEUTROPHILS NFR BLD AUTO: 77.4 % (ref 42.7–76)
NRBC BLD AUTO-RTO: 0 /100 WBC (ref 0–0.2)
PLATELET # BLD AUTO: 256 10*3/MM3 (ref 140–450)
PMV BLD AUTO: 9.8 FL (ref 6–12)
POTASSIUM SERPL-SCNC: 3.6 MMOL/L (ref 3.5–5.2)
RBC # BLD AUTO: 3.22 10*6/MM3 (ref 4.14–5.8)
SODIUM SERPL-SCNC: 142 MMOL/L (ref 136–145)
WBC NRBC COR # BLD AUTO: 7.34 10*3/MM3 (ref 3.4–10.8)

## 2025-01-05 PROCEDURE — 94799 UNLISTED PULMONARY SVC/PX: CPT

## 2025-01-05 PROCEDURE — 94761 N-INVAS EAR/PLS OXIMETRY MLT: CPT

## 2025-01-05 PROCEDURE — 92526 ORAL FUNCTION THERAPY: CPT

## 2025-01-05 PROCEDURE — 99232 SBSQ HOSP IP/OBS MODERATE 35: CPT | Performed by: INTERNAL MEDICINE

## 2025-01-05 PROCEDURE — 80048 BASIC METABOLIC PNL TOTAL CA: CPT | Performed by: NURSE PRACTITIONER

## 2025-01-05 PROCEDURE — 85025 COMPLETE CBC W/AUTO DIFF WBC: CPT | Performed by: NURSE PRACTITIONER

## 2025-01-05 PROCEDURE — 94664 DEMO&/EVAL PT USE INHALER: CPT

## 2025-01-05 RX ADMIN — TAMSULOSIN HYDROCHLORIDE 0.4 MG: 0.4 CAPSULE ORAL at 09:04

## 2025-01-05 RX ADMIN — ASPIRIN 81 MG: 81 TABLET, COATED ORAL at 09:01

## 2025-01-05 RX ADMIN — MUPIROCIN 1 APPLICATION: 20 OINTMENT TOPICAL at 09:01

## 2025-01-05 RX ADMIN — OXYCODONE 15 MG: 5 TABLET ORAL at 18:14

## 2025-01-05 RX ADMIN — AMLODIPINE BESYLATE 5 MG: 5 TABLET ORAL at 09:01

## 2025-01-05 RX ADMIN — Medication 10 ML: at 09:05

## 2025-01-05 RX ADMIN — BUDESONIDE 0.5 MG: 0.5 INHALANT RESPIRATORY (INHALATION) at 20:25

## 2025-01-05 RX ADMIN — Medication 12.5 MG: at 20:45

## 2025-01-05 RX ADMIN — IPRATROPIUM BROMIDE AND ALBUTEROL SULFATE 3 ML: .5; 3 SOLUTION RESPIRATORY (INHALATION) at 20:20

## 2025-01-05 RX ADMIN — CLOPIDOGREL BISULFATE 75 MG: 75 TABLET ORAL at 09:01

## 2025-01-05 RX ADMIN — BUDESONIDE 0.5 MG: 0.5 INHALANT RESPIRATORY (INHALATION) at 07:12

## 2025-01-05 RX ADMIN — ATORVASTATIN CALCIUM 10 MG: 10 TABLET ORAL at 20:45

## 2025-01-05 RX ADMIN — Medication 12.5 MG: at 09:01

## 2025-01-05 RX ADMIN — IPRATROPIUM BROMIDE AND ALBUTEROL SULFATE 3 ML: .5; 3 SOLUTION RESPIRATORY (INHALATION) at 07:16

## 2025-01-05 RX ADMIN — Medication 10 ML: at 20:50

## 2025-01-05 NOTE — PLAN OF CARE
Goal Outcome Evaluation:  Plan of Care Reviewed With: patient        Progress: no change  Outcome Evaluation: Pt remains confused off and on but more alert than yesterday. No pain meds given this shift thus far. Vitals stable with -130's systolic. Sitter at bedside

## 2025-01-05 NOTE — PROGRESS NOTES
Cardiology Progress Note      PATIENT IDENTIFICATION    Name: Kailash Cooper Jr.  Age: 84 y.o. Sex: male : 1940  MRN: [id number]    Requesting Provider    Renée Bermeo MD     LOS: 4 days       Reason For Followup:  Non-STEMI      Subjective:    Interval History:  Seen examined.  Chart and labs reviewed.  Patient more alert today.  Oriented to person and place and situation.  No chest pain.  Appears weak and frail.        Review of Systems:  A complete review of systems was undertaken with the pertinent cardiovascular findings listed in history of present illness and all other systems being negative.     Assessment & Plan    Impressions:  Non-STEMI  Altered mental status  History of hypertension  Hyperlipidemia  CKD    Recommendations:  Cognitive status is improving, can consider catheterization later this week if continues this upward trajectory.  Conservative cardiac management in the interim  Continue beta-blocker, statin, and antiplatelet therapy          Objective:    Medication Review:   Scheduled Meds:amLODIPine, 5 mg, Oral, Q24H  aspirin, 81 mg, Oral, Daily  atorvastatin, 10 mg, Oral, Nightly  budesonide, 0.5 mg, Nebulization, BID - RT  clopidogrel, 75 mg, Oral, Daily  ipratropium-albuterol, 3 mL, Nebulization, Q6H While Awake - RT  metoprolol tartrate, 12.5 mg, Oral, Q12H  mupirocin, 1 Application, Each Nare, BID  sodium chloride, 10 mL, Intravenous, Q12H  tamsulosin, 0.4 mg, Oral, Daily      Continuous Infusions:   PRN Meds:.  aluminum-magnesium hydroxide-simethicone    senna-docusate sodium **AND** polyethylene glycol **AND** bisacodyl **AND** bisacodyl    ipratropium-albuterol    LORazepam    melatonin    Morphine    ondansetron ODT **OR** ondansetron    oxyCODONE    sodium chloride      NSTEMI (non-ST elevated myocardial infarction)         Physical Exam:    General: Alert and appropriate.  No distress.  Frail  Head:  Normocephalic, atraumatic, mucous membranes  moist  Eyes:  Conjunctivae/corneas clear, EOMs intact     Neck:  Supple,  no bruit  Lungs:  Coarse and diminished bilaterally  Chest wall: No tenderness  Heart::  Regular rate and rhythm, S1 and S2 normal, 1/6 holosystolic murmur.  No rub or gallop  Abdomen: Soft, nontender, nondistended, bowel sounds active  Extremities: No cyanosis, clubbing, or edema  Pulses: 2+ and symmetric all extremities  Skin:  No rashes or lesions  Neuro/psych: Alert to person place and situation.  No focal deficits.  Frail/weak    Vital Signs:  Vitals:    01/05/25 0716 01/05/25 0719 01/05/25 1100 01/05/25 1500   BP:  144/65  112/47   BP Location:  Right arm  Right arm   Patient Position:  Lying  Lying   Pulse: 77 90     Resp: 23 14 16 22   Temp:  97.8 °F (36.6 °C) 97.8 °F (36.6 °C) 97.8 °F (36.6 °C)   TempSrc:  Oral Oral Oral   SpO2: 94% 95%     Weight:       Height:         Wt Readings from Last 1 Encounters:   01/05/25 67.8 kg (149 lb 7.6 oz)       Intake/Output Summary (Last 24 hours) at 1/5/2025 1658  Last data filed at 1/5/2025 0525  Gross per 24 hour   Intake 773 ml   Output 600 ml   Net 173 ml         Results Review:     CBC    Results from last 7 days   Lab Units 01/05/25  0141 01/04/25  0407 01/03/25  0408 01/02/25  0421 01/01/25  0212   WBC 10*3/mm3 7.34 8.16 10.79 11.78* 8.69   HEMOGLOBIN g/dL 10.1* 11.5* 12.7* 14.4 11.1*   PLATELETS 10*3/mm3 256 271 333 310 236     Cr Clearance Estimated Creatinine Clearance: 41.2 mL/min (A) (by C-G formula based on SCr of 1.28 mg/dL (H)).  Coag     HbA1C   Lab Results   Component Value Date    HGBA1C 5.8 (H) 12/23/2024    HGBA1C 5.7 (H) 01/31/2024    HGBA1C 5.9 (H) 03/17/2023     Blood Glucose   Glucose   Date/Time Value Ref Range Status   01/03/2025 0159 102 70 - 105 mg/dL Final     Comment:     Serial Number: [id number]:  [id number]     Infection   Results from last 7 days   Lab Units 01/01/25  0212   PROCALCITONIN ng/mL 0.16     CMP   Results from last 7 days   Lab Units  "01/05/25  0141 01/04/25  0407 01/03/25  0408 01/02/25 0421 01/01/25  0212   SODIUM mmol/L 142 143 143 144 140   POTASSIUM mmol/L 3.6 3.6 3.8 3.5 3.7   CHLORIDE mmol/L 107 106 105 105 104   CO2 mmol/L 25.6 23.0 23.4 19.8* 25.8   BUN mg/dL 25* 26* 30* 19 17   CREATININE mg/dL 1.28* 1.31* 1.37* 1.14 1.28*   GLUCOSE mg/dL 124* 95 108* 109* 89   ALBUMIN g/dL  --   --   --   --  3.7   BILIRUBIN mg/dL  --   --   --   --  0.5   ALK PHOS U/L  --   --   --   --  63   AST (SGOT) U/L  --   --   --   --  36   ALT (SGPT) U/L  --   --   --   --  18     ABG      UA    Results from last 7 days   Lab Units 01/01/25  1746   NITRITE UA  Negative   WBC UA /HPF 0-2   BACTERIA UA /HPF None Seen   SQUAM EPITHEL UA /HPF 0-2     ASHU  No results found for: \"POCMETH\", \"POCAMPHET\", \"POCBARBITUR\", \"POCBENZO\", \"POCCOCAINE\", \"POCOPIATES\", \"POCOXYCODO\", \"POCPHENCYC\", \"POCPROPOXY\", \"POCTHC\", \"POCTRICYC\"  Lysis Labs   Results from last 7 days   Lab Units 01/05/25  0141 01/04/25  0407 01/03/25  0408 01/02/25 0421 01/01/25  0212   HEMOGLOBIN g/dL 10.1* 11.5* 12.7* 14.4 11.1*   PLATELETS 10*3/mm3 256 271 333 310 236   CREATININE mg/dL 1.28* 1.31* 1.37* 1.14 1.28*     Radiology(recent) MRI Brain Without Contrast    Result Date: 1/3/2025  Impression: Motion-degraded exam demonstrating moderate typical chronic and age-related findings. No evidence of acute infarct, hemorrhage or mass effect. Electronically Signed: Ortega Herrera, MD  1/3/2025 10:30 PM EST  Workstation ID: [id number]       Results from last 7 days   Lab Units 01/02/25  1207   HSTROP T ng/L 393*       Imaging Results (Last 24 Hours)       ** No results found for the last 24 hours. **            Cardiac Studies:  Echo- Results for orders placed during the hospital encounter of 01/01/25    Adult Transthoracic Echo Complete W/ Cont if Necessary Per Protocol    Interpretation Summary    Left ventricular systolic function is normal. Calculated left ventricular EF = 56% Left ventricular ejection " fraction appears to be 56 - 60%.    Left ventricular diastolic function is consistent with (grade II w/high LAP) pseudonormalization.    Mild to moderate aortic valve stenosis is present.    Estimated right ventricular systolic pressure from tricuspid regurgitation is normal (<35 mmHg).    Stress Myoview-  Cath-        Babak Brown,   01/05/25  16:58 EST    Copied information has been reviewed and is current as of 01/05/25

## 2025-01-05 NOTE — PROGRESS NOTES
[facility] MEDICINE SERVICE  DAILY PROGRESS NOTE    NAME: Kailash Cooper Jr.  : 1940  MRN: [id number]      LOS: 4 days     PROVIDER OF SERVICE: Renée Bermeo MD    Chief Complaint: NSTEMI (non-ST elevated myocardial infarction)    Subjective:     Interval History:  History taken from: patient    Patient much more awake and oriented, able to answer questions  Denies any chest pain or shortness of breath      Review of Systems:   Review of Systems    Objective:     Vital Signs  Temp:  [97.6 °F (36.4 °C)-98.8 °F (37.1 °C)] 97.8 °F (36.6 °C)  Heart Rate:  [] 90  Resp:  [12-24] 16  BP: (103-144)/(50-68) 144/65  Flow (L/min) (Oxygen Therapy):  [1] 1   Body mass index is 24.87 kg/m².    Physical Exam  Physical Exam  AOx3 NAD  RRR S1-S2 audible  Lung CTA  Abdomen soft nontender      Diagnostic Data    Results from last 7 days   Lab Units 25  0141 25  0421 25  0212   WBC 10*3/mm3 7.34   < > 8.69   HEMOGLOBIN g/dL 10.1*   < > 11.1*   HEMATOCRIT % 31.7*   < > 33.7*   PLATELETS 10*3/mm3 256   < > 236   GLUCOSE mg/dL 124*   < > 89   CREATININE mg/dL 1.28*   < > 1.28*   BUN mg/dL 25*   < > 17   SODIUM mmol/L 142   < > 140   POTASSIUM mmol/L 3.6   < > 3.7   AST (SGOT) U/L  --   --  36   ALT (SGPT) U/L  --   --  18   ALK PHOS U/L  --   --  63   BILIRUBIN mg/dL  --   --  0.5   ANION GAP mmol/L 9.4   < > 10.2    < > = values in this interval not displayed.       MRI Brain Without Contrast    Result Date: 1/3/2025  Impression: Motion-degraded exam demonstrating moderate typical chronic and age-related findings. No evidence of acute infarct, hemorrhage or mass effect. Electronically Signed: Ortega Herrera MD  1/3/2025 10:30 PM EST  Workstation ID: [id number]       I reviewed the patient's new clinical results.    Assessment/Plan:     Active and Resolved Problems  Active Hospital Problems    Diagnosis  POA    **NSTEMI (non-ST elevated myocardial infarction) [I21.4]  Yes      Resolved Hospital  "Problems   No resolved problems to display.        Acute delirium  Opioid withdrawal complicating this  -Unable to have his p.o. pain medications because of altered mental status and a failed swallow study.  Would recommend that he receives IV opioids in the interim.  The opioid withdrawal is strongly complicating his delirium symptoms.  Delirium improved strongly with reintroduction of opioids, dose cut in half 2/2 sedation 1/3/2025  - More awake today   - psych following      Elevated troponin, peak at 419 now downtrending  H/o CAD s/p PCI  - EKG: normal sinus rhythm rate 82, right bundle branch block, left anterior fascicular block.   - HS troponin 1165, repeat 1321  - previous [facility] 1/28/2022:   1. \"Two-vessel coronary artery disease  2.  High-grade stenosis of proximal RCA with successful stenting  3.  Diffuse disease of mid to distal LCx.  This segment appears to be very small caliber vessel. DAPT recommended\"  - resume home plavix, statin  -Cardiology thinks patient probably has NSTEMI as there is lateral T wave inversion.  They recommend cardiac cath however this was not done previously as patient was confused and noncooperative.  Seems like cardiology did talk to the wife and they are discussing among themselves. Will see if cardiology plans to do anythinh now that patient more awake  - palliative on board, will continue to discuss with wife regarding goals of care      Possible pneumonia  Chronic respiratory failure/pulmonary fibrosis  - CXR: chronic scarring with superimposed mild interstitial edema or pneumonia  - Appears to be more or less at his baseline oxygen status  - WBC 11.4  - Noted lab results so far.  This does not seem to be driven by bacterial process.  Clinically seems more viral in nature. Antibiotics discontinued. White count stable today, afebrile   -Supportive care.     Acute on CKD stage III - worsening  - BUN 23, creatinine 1.7 at OLF (previous baseline around 1.5)  - improved since " admission.  - trended down to 1.3 today, around baseline  - monitor BMP  - creatinine almost back to baseline. D/C fluids, encourage oral hydration      Hypertension  Hyperlipidemia  - Continue home meds  - holding lasix for now since patient seems volume depleted     H/o CVA  - Continue home Plavix, statin     Chronic low back pain  - Continue home oxycodone     RLS  - Continue home Requip     DM type II  - controlled.      BPH  - cont home flomax     History of non-small cell lung cancer stage Ib status post resection 2017,   Former smoker quit 1990      VTE Prophylaxis:  Mechanical VTE prophylaxis orders are present.             Disposition Planning:       Barriers to Discharge: cardiology workup  Anticipated Date of Discharge: 1-2 days  Place of Discharge: Home      Time: 35 minutes     Code Status and Medical Interventions: CPR (Attempt to Resuscitate); Full Support   Ordered at: 01/01/25 0040     Level Of Support Discussed With:    Patient     Code Status (Patient has no pulse and is not breathing):    CPR (Attempt to Resuscitate)     Medical Interventions (Patient has pulse or is breathing):    Full Support       Signature: Electronically signed by Renée Bermeo MD, 01/05/25, 11:26 EST.  [facility] Hospitalist Team

## 2025-01-05 NOTE — THERAPY TREATMENT NOTE
Acute Care - Speech Language Pathology   Swallow Treatment Note MIRIAN Mast     Patient Name: Kailash Cooper Jr.  : 1940  MRN: [id number]  Today's Date: 2025               Admit Date: 2025    Visit Dx:   No diagnosis found.  Patient Active Problem List   Diagnosis    Chronic bilateral low back pain without sciatica    History of malignant neoplasm of thoracic cavity structure    Allergic rhinitis    Anemia due to stage 3 chronic kidney disease    Acute conjunctivitis    Osteoarthritis    Encounter for screening for malignant neoplasm of prostate    Enlarged prostate without lower urinary tract symptoms (luts)    Fatigue    Gastroesophageal reflux disease    Gastrointestinal hemorrhage    History of transient ischemic attack    Hydronephrosis    Hyperkalemia    Mixed hyperlipidemia    Essential hypertension    Hypogonadism    Male erectile disorder (CODE)    Obstructive sleep apnea    Osteomalacia    Secondary hyperparathyroidism of renal origin    Status post patent foramen ovale closure    Essential tremor    Vitamin D deficiency    Type 2 diabetes mellitus    Chronic kidney disease, stage 3 (moderate)    Hypertensive encephalopathy    Lung nodule    Lumbosacral spondylosis without myelopathy    Cervical spondylosis without myelopathy    Thoracic spondylosis    Chronic pain syndrome    Cervicalgia    Chest pain, atypical    CAP (community acquired pneumonia)    Elevated troponin    Positive D dimer    Chest pain    Acute respiratory failure with hypoxia    Sepsis without acute organ dysfunction    Non-STEMI (non-ST elevated myocardial infarction)    Oxygen dependent    Abnormal EKG    Anemia    History of duodenal ulcer    Melena    Erosive esophagitis    Multiple gastric ulcers    Bleeding chronic duodenal ulcer    Other insomnia    Intolerance to cold    Coronary artery disease involving native coronary artery of native heart without angina pectoris    Underweight    Bilateral pneumonia    Weakness     Pneumonia, unspecified organism    BMI 22.0-22.9, adult    Muscle twitching    Shortness of breath    NSTEMI (non-ST elevated myocardial infarction)     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 [facility]    Diabetes     Enlarged prostate     Former smoker     Hiatal hernia     Hip pain     don    Hip pain, bilateral     Hyperlipidemia     Hypertension     Kidney disease        stage 3     Leg pain     don    Low back pain     Neck pain     Stroke      Past Surgical History:   Procedure Laterality Date    CARDIAC CATHETERIZATION Left 1/28/2022    Procedure: Cardiac Catheterization/Vascular Study;  Surgeon: Mani Salguero MD;  Location: [facility] CATH INVASIVE LOCATION;  Service: Cardiovascular;  Laterality: Left;    CATARACT EXTRACTION  2006    OU    COLONOSCOPY  2011    COLONOSCOPY N/A 5/27/2022    Procedure: COLONOSCOPY;  Surgeon: Terry Holliday MD;  Location: [facility] ENDOSCOPY;  Service: Gastroenterology;  Laterality: N/A;  polyps    CORONARY STENT PLACEMENT  01/28/2022    RCA    ENDOSCOPY N/A 5/27/2022    Procedure: ESOPHAGOGASTRODUODENOSCOPY with 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, gastric antrum biopsy;  Surgeon: Terry Holliday MD;  Location: [facility] ENDOSCOPY;  Service: Gastroenterology;  Laterality: N/A;  gastritis, gastric ulcer, small bowel AVM    LUNG CANCER SURGERY      upper lobe rt lung cancer 9/2017  at [facility]       SLP Recommendation and Plan        SWALLOW EVALUATION (Last 72 Hours)            EDUCATION  The patient has been educated in the following areas:   Dysphagia (Swallowing Impairment) Oral Care/Hydration.        SLP GOALS       Row Name 01/05/25 1300       (LTG) Swallow    (LTG) Swallow The patient will maximize swallow function for least restrictive po diet, exhibiting no complications associated with dysphagia, adequate po intake, and demonstrating independent use of safe swallow  compensations  -CB    Mather (Swallow Long Term Goal) independently (over 90% accuracy)  -CB    Time Frame (Swallow Long Term Goal) by discharge  -CB    Progress/Outcomes (Swallow Long Term Goal) goal ongoing  -CB    Comment (Swallow Long Term Goal) Patient was seen for DT/meal at lunch. Patient is currently on puree diet. Patient was sitting upright in bed prior to meal.  Patient was observed eating several bites of puree.  No difficulty was noted. Patient cleared oral cavity effectively without evidence of oral residue. No wet vocal quality was identified. Patient had tendency to hold liquid in oral cavity before intitiating swallow. Noted coughing x 1. Noted intermittent wet vocal quality but it was not consistent. ST will continue to follow to assure safety and adequacy of recommended diet and independent use of safe swallow strategies.  -CB       (STG) Swallow 1    (STG) Swallow 1 The patient will participate in ongoing assessment of swallow, including re-evaluation clinically and/or including instrumental assessment of swallow if indicated, to further assess swallow function in anticipation to initiate a PO diet  -CB    Mather (Swallow Short Term Goal 1) independently (over 90% accuracy)  -CB    Time Frame (Swallow Short Term Goal 1) by discharge  -CB    Progress/Outcomes (Swallow Short Term Goal 1) goal ongoing  -CB    Comment (Swallow Short Term Goal 1) --              User Key  (r) = Recorded By, (t) = Taken By, (c) = Cosigned By      Initials Name Provider Type    Randi James, SLP Speech and Language Pathologist                                     Time Calculation:                Randi Cox SLP  1/5/2025

## 2025-01-06 LAB
ANION GAP SERPL CALCULATED.3IONS-SCNC: 8.6 MMOL/L (ref 5–15)
BASOPHILS # BLD AUTO: 0.03 10*3/MM3 (ref 0–0.2)
BASOPHILS NFR BLD AUTO: 0.4 % (ref 0–1.5)
BUN SERPL-MCNC: 20 MG/DL (ref 8–23)
BUN/CREAT SERPL: 16 (ref 7–25)
CALCIUM SPEC-SCNC: 9.1 MG/DL (ref 8.6–10.5)
CHLORIDE SERPL-SCNC: 105 MMOL/L (ref 98–107)
CO2 SERPL-SCNC: 25.4 MMOL/L (ref 22–29)
CREAT SERPL-MCNC: 1.25 MG/DL (ref 0.76–1.27)
DEPRECATED RDW RBC AUTO: 51.4 FL (ref 37–54)
EGFRCR SERPLBLD CKD-EPI 2021: 56.8 ML/MIN/1.73
EOSINOPHIL # BLD AUTO: 0.24 10*3/MM3 (ref 0–0.4)
EOSINOPHIL NFR BLD AUTO: 3.1 % (ref 0.3–6.2)
ERYTHROCYTE [DISTWIDTH] IN BLOOD BY AUTOMATED COUNT: 14.2 % (ref 12.3–15.4)
GLUCOSE SERPL-MCNC: 118 MG/DL (ref 65–99)
HCT VFR BLD AUTO: 31.7 % (ref 37.5–51)
HGB BLD-MCNC: 10.2 G/DL (ref 13–17.7)
IMM GRANULOCYTES # BLD AUTO: 0.07 10*3/MM3 (ref 0–0.05)
IMM GRANULOCYTES NFR BLD AUTO: 0.9 % (ref 0–0.5)
LYMPHOCYTES # BLD AUTO: 0.95 10*3/MM3 (ref 0.7–3.1)
LYMPHOCYTES NFR BLD AUTO: 12.1 % (ref 19.6–45.3)
MCH RBC QN AUTO: 31.7 PG (ref 26.6–33)
MCHC RBC AUTO-ENTMCNC: 32.2 G/DL (ref 31.5–35.7)
MCV RBC AUTO: 98.4 FL (ref 79–97)
MONOCYTES # BLD AUTO: 0.64 10*3/MM3 (ref 0.1–0.9)
MONOCYTES NFR BLD AUTO: 8.2 % (ref 5–12)
NEUTROPHILS NFR BLD AUTO: 5.9 10*3/MM3 (ref 1.7–7)
NEUTROPHILS NFR BLD AUTO: 75.3 % (ref 42.7–76)
NRBC BLD AUTO-RTO: 0 /100 WBC (ref 0–0.2)
PLATELET # BLD AUTO: 225 10*3/MM3 (ref 140–450)
PMV BLD AUTO: 9.6 FL (ref 6–12)
POTASSIUM SERPL-SCNC: 3.6 MMOL/L (ref 3.5–5.2)
RBC # BLD AUTO: 3.22 10*6/MM3 (ref 4.14–5.8)
SODIUM SERPL-SCNC: 139 MMOL/L (ref 136–145)
WBC NRBC COR # BLD AUTO: 7.83 10*3/MM3 (ref 3.4–10.8)

## 2025-01-06 PROCEDURE — 80048 BASIC METABOLIC PNL TOTAL CA: CPT | Performed by: NURSE PRACTITIONER

## 2025-01-06 PROCEDURE — 25010000002 MORPHINE PER 10 MG: Performed by: STUDENT IN AN ORGANIZED HEALTH CARE EDUCATION/TRAINING PROGRAM

## 2025-01-06 PROCEDURE — 85025 COMPLETE CBC W/AUTO DIFF WBC: CPT | Performed by: NURSE PRACTITIONER

## 2025-01-06 PROCEDURE — 94664 DEMO&/EVAL PT USE INHALER: CPT

## 2025-01-06 PROCEDURE — 94761 N-INVAS EAR/PLS OXIMETRY MLT: CPT

## 2025-01-06 PROCEDURE — 94799 UNLISTED PULMONARY SVC/PX: CPT

## 2025-01-06 PROCEDURE — 99232 SBSQ HOSP IP/OBS MODERATE 35: CPT | Performed by: INTERNAL MEDICINE

## 2025-01-06 RX ORDER — ROPINIROLE 0.25 MG/1
0.25 TABLET, FILM COATED ORAL NIGHTLY
Status: DISCONTINUED | OUTPATIENT
Start: 2025-01-06 | End: 2025-01-06

## 2025-01-06 RX ORDER — PANTOPRAZOLE SODIUM 40 MG/1
40 TABLET, DELAYED RELEASE ORAL 2 TIMES DAILY
Status: DISCONTINUED | OUTPATIENT
Start: 2025-01-06 | End: 2025-01-08 | Stop reason: HOSPADM

## 2025-01-06 RX ORDER — OLANZAPINE 5 MG/1
5 TABLET, ORALLY DISINTEGRATING ORAL ONCE
Status: DISCONTINUED | OUTPATIENT
Start: 2025-01-06 | End: 2025-01-06

## 2025-01-06 RX ORDER — MORPHINE SULFATE 2 MG/ML
2 INJECTION, SOLUTION INTRAMUSCULAR; INTRAVENOUS ONCE
Status: DISCONTINUED | OUTPATIENT
Start: 2025-01-06 | End: 2025-01-06

## 2025-01-06 RX ORDER — ROPINIROLE 0.25 MG/1
0.25 TABLET, FILM COATED ORAL DAILY
Status: DISCONTINUED | OUTPATIENT
Start: 2025-01-06 | End: 2025-01-08 | Stop reason: HOSPADM

## 2025-01-06 RX ORDER — MEGESTROL ACETATE 40 MG/1
40 TABLET ORAL DAILY
Status: DISCONTINUED | OUTPATIENT
Start: 2025-01-06 | End: 2025-01-08 | Stop reason: HOSPADM

## 2025-01-06 RX ADMIN — MORPHINE SULFATE 2 MG: 2 INJECTION, SOLUTION INTRAMUSCULAR; INTRAVENOUS at 16:15

## 2025-01-06 RX ADMIN — IPRATROPIUM BROMIDE AND ALBUTEROL SULFATE 3 ML: .5; 3 SOLUTION RESPIRATORY (INHALATION) at 19:56

## 2025-01-06 RX ADMIN — CLOPIDOGREL BISULFATE 75 MG: 75 TABLET ORAL at 09:54

## 2025-01-06 RX ADMIN — MEGESTROL ACETATE 40 MG: 40 TABLET ORAL at 16:20

## 2025-01-06 RX ADMIN — Medication 12.5 MG: at 20:37

## 2025-01-06 RX ADMIN — MORPHINE SULFATE 2 MG: 2 INJECTION, SOLUTION INTRAMUSCULAR; INTRAVENOUS at 04:29

## 2025-01-06 RX ADMIN — MORPHINE SULFATE 4 MG: 4 INJECTION, SOLUTION INTRAMUSCULAR; INTRAVENOUS at 12:37

## 2025-01-06 RX ADMIN — Medication 5 MG: at 00:46

## 2025-01-06 RX ADMIN — SENNOSIDES AND DOCUSATE SODIUM 2 TABLET: 50; 8.6 TABLET ORAL at 12:37

## 2025-01-06 RX ADMIN — ASPIRIN 81 MG: 81 TABLET, COATED ORAL at 09:54

## 2025-01-06 RX ADMIN — LORAZEPAM 0.5 MG: 0.5 TABLET ORAL at 09:56

## 2025-01-06 RX ADMIN — OXYCODONE 15 MG: 5 TABLET ORAL at 22:40

## 2025-01-06 RX ADMIN — Medication 10 ML: at 20:51

## 2025-01-06 RX ADMIN — POLYETHYLENE GLYCOL 3350 17 G: 17 POWDER, FOR SOLUTION ORAL at 12:37

## 2025-01-06 RX ADMIN — OXYCODONE 15 MG: 5 TABLET ORAL at 03:19

## 2025-01-06 RX ADMIN — AMLODIPINE BESYLATE 5 MG: 5 TABLET ORAL at 09:54

## 2025-01-06 RX ADMIN — MORPHINE SULFATE 2 MG: 2 INJECTION, SOLUTION INTRAMUSCULAR; INTRAVENOUS at 11:43

## 2025-01-06 RX ADMIN — ATORVASTATIN CALCIUM 10 MG: 10 TABLET ORAL at 20:38

## 2025-01-06 RX ADMIN — Medication 12.5 MG: at 09:54

## 2025-01-06 RX ADMIN — IPRATROPIUM BROMIDE AND ALBUTEROL SULFATE 3 ML: .5; 3 SOLUTION RESPIRATORY (INHALATION) at 06:57

## 2025-01-06 RX ADMIN — Medication 5 MG: at 21:05

## 2025-01-06 RX ADMIN — OXYCODONE 15 MG: 5 TABLET ORAL at 09:54

## 2025-01-06 RX ADMIN — ROPINIROLE HYDROCHLORIDE 0.25 MG: 0.25 TABLET, FILM COATED ORAL at 12:37

## 2025-01-06 RX ADMIN — TAMSULOSIN HYDROCHLORIDE 0.4 MG: 0.4 CAPSULE ORAL at 09:54

## 2025-01-06 RX ADMIN — PANTOPRAZOLE SODIUM 40 MG: 40 TABLET, DELAYED RELEASE ORAL at 20:37

## 2025-01-06 RX ADMIN — IPRATROPIUM BROMIDE AND ALBUTEROL SULFATE 3 ML: .5; 3 SOLUTION RESPIRATORY (INHALATION) at 12:31

## 2025-01-06 RX ADMIN — BUDESONIDE 0.5 MG: 0.5 INHALANT RESPIRATORY (INHALATION) at 20:01

## 2025-01-06 RX ADMIN — BUDESONIDE 0.5 MG: 0.5 INHALANT RESPIRATORY (INHALATION) at 07:01

## 2025-01-06 NOTE — PROGRESS NOTES
[facility] MEDICINE SERVICE  DAILY PROGRESS NOTE    NAME: Kailash Cooper Jr.  : 1940  MRN: [id number]      LOS: 5 days     PROVIDER OF SERVICE: Tono Fuentes MD    Chief Complaint: NSTEMI (non-ST elevated myocardial infarction)    Subjective:     Interval History:  History taken from: patient    Patient much more awake and oriented than in the past.      Review of Systems:   Review of Systems    Objective:     Vital Signs  Temp:  [97.7 °F (36.5 °C)-98.4 °F (36.9 °C)] 98.4 °F (36.9 °C)  Heart Rate:  [] 88  Resp:  [16-26] 16  BP: (104-161)/(43-79) 143/74  Flow (L/min) (Oxygen Therapy):  [1-2] 2   Body mass index is 21.73 kg/m².    Physical Exam  Physical Exam  AOx3 NAD  RRR S1-S2 audible  Lung CTA  Abdomen soft nontender      Diagnostic Data    Results from last 7 days   Lab Units 25  0341 25  0421 25  0212   WBC 10*3/mm3 7.83   < > 8.69   HEMOGLOBIN g/dL 10.2*   < > 11.1*   HEMATOCRIT % 31.7*   < > 33.7*   PLATELETS 10*3/mm3 225   < > 236   GLUCOSE mg/dL 118*   < > 89   CREATININE mg/dL 1.25   < > 1.28*   BUN mg/dL 20   < > 17   SODIUM mmol/L 139   < > 140   POTASSIUM mmol/L 3.6   < > 3.7   AST (SGOT) U/L  --   --  36   ALT (SGPT) U/L  --   --  18   ALK PHOS U/L  --   --  63   BILIRUBIN mg/dL  --   --  0.5   ANION GAP mmol/L 8.6   < > 10.2    < > = values in this interval not displayed.       No radiology results for the last day      I reviewed the patient's new clinical results.    Assessment/Plan:     Active and Resolved Problems  Active Hospital Problems    Diagnosis  POA    **NSTEMI (non-ST elevated myocardial infarction) [I21.4]  Yes      Resolved Hospital Problems   No resolved problems to display.        Acute delirium  Opioid withdrawal complicating this  -Defer to SLP on dysphagia.  If not able to tolerate p.o. pain medication, recommend IV given longstanding very high opioid prescriptions.  Previously withdrew from opioids in the hospital  - More awake today   - psych  "following      Elevated troponin, peak at 419 now downtrending  H/o CAD s/p PCI  - EKG: normal sinus rhythm rate 82, right bundle branch block, left anterior fascicular block.   - HS troponin 1165, repeat 1321  - previous [facility] 1/28/2022:   1. \"Two-vessel coronary artery disease  2.  High-grade stenosis of proximal RCA with successful stenting  3.  Diffuse disease of mid to distal LCx.  This segment appears to be very small caliber vessel. DAPT recommended\"  - resume home plavix, statin  -Cardiology thinks patient probably has NSTEMI as there is lateral T wave inversion.  They recommend cardiac cath however this was not done previously as patient was confused and noncooperative.  Cardiology planning cath later this week.  -Palliative consulted by previous hospitalist and patient family wishes him to receive aggressive measures, full code.     Chronic respiratory failure/pulmonary fibrosis  - CXR: chronic scarring with superimposed mild interstitial edema or pneumonia  - Appears to be more or less at his baseline oxygen status     Acute on CKD stage III - worsening  - BUN 23, creatinine 1.7 at OLF (previous baseline around 1.5)  - improved since admission.  -Trending to baseline  - monitor BMP     Hypertension  Hyperlipidemia  - Continue home meds  - holding lasix for now     H/o CVA  - Continue home Plavix, statin     Chronic low back pain  - Continue home oxycodone     RLS  - Continue home Requip     DM type II  - controlled.      BPH  - cont home flomax     History of non-small cell lung cancer stage Ib status post resection 2017,   Former smoker quit 1990      VTE Prophylaxis:  Mechanical VTE prophylaxis orders are present.             Disposition Planning:       Barriers to Discharge: cardiology workup  Anticipated Date of Discharge: 1-2 days  Place of Discharge: Home      Time: 35 minutes     Code Status and Medical Interventions: CPR (Attempt to Resuscitate); Full Support   Ordered at: 01/01/25 0040     Level Of " Support Discussed With:    Patient     Code Status (Patient has no pulse and is not breathing):    CPR (Attempt to Resuscitate)     Medical Interventions (Patient has pulse or is breathing):    Full Support       Signature: Electronically signed by Tono Fuentes MD, 01/06/25, 11:10 [facility].  [facility] Hospitalist Team

## 2025-01-06 NOTE — PROGRESS NOTES
Cardiology Progress Note      PATIENT IDENTIFICATION    Name: Kailash Cooper Jr.  Age: 84 y.o. Sex: male : 1940  MRN: [id number]    Requesting Provider    Tono Feuntes MD     LOS: 5 days       Reason For Followup:  Non-STEMI      Subjective:    Interval History:  Seen examined.  Chart and labs reviewed.  Patient more confused today.  Denies chest pain.        Review of Systems:  A complete review of systems was undertaken with the pertinent cardiovascular findings listed in history of present illness and all other systems being negative.     Assessment & Plan    Impressions:  Non-STEMI  Altered mental status  History of hypertension  Hyperlipidemia  CKD    Recommendations:  Cognitive status had initially improved however it appears to have taken a step back.  Will defer to primary cardiology service as far as timing for catheterization  Conservative cardiac management in the interim  Continue beta-blocker, statin, and antiplatelet therapy          Objective:    Medication Review:   Scheduled Meds:amLODIPine, 5 mg, Oral, Q24H  aspirin, 81 mg, Oral, Daily  atorvastatin, 10 mg, Oral, Nightly  budesonide, 0.5 mg, Nebulization, BID - RT  clopidogrel, 75 mg, Oral, Daily  ipratropium-albuterol, 3 mL, Nebulization, Q6H While Awake - RT  metoprolol tartrate, 12.5 mg, Oral, Q12H  sodium chloride, 10 mL, Intravenous, Q12H  tamsulosin, 0.4 mg, Oral, Daily      Continuous Infusions:   PRN Meds:.  aluminum-magnesium hydroxide-simethicone    senna-docusate sodium **AND** polyethylene glycol **AND** bisacodyl **AND** bisacodyl    ipratropium-albuterol    LORazepam    melatonin    Morphine    ondansetron ODT **OR** ondansetron    oxyCODONE    sodium chloride      NSTEMI (non-ST elevated myocardial infarction)         Physical Exam:    General: Alert and appropriate.  No distress.  Frail  Head:  Normocephalic, atraumatic, mucous membranes moist  Eyes:  Conjunctivae/corneas clear, EOMs intact     Neck:  Supple,  no  "bruit  Lungs:  Coarse and diminished bilaterally  Chest wall: No tenderness  Heart::  Regular rate and rhythm, S1 and S2 normal, 1/6 holosystolic murmur.  No rub or gallop  Abdomen: Soft, nontender, nondistended, bowel sounds active  Extremities: No cyanosis, clubbing, or edema  Pulses: 2+ and symmetric all extremities  Skin:  No rashes or lesions  Neuro/psych: Alert to person place and situation.  No focal deficits.  Frail/weak    Vital Signs:  Vitals:    01/06/25 0700 01/06/25 0701 01/06/25 0704 01/06/25 0723   BP:    143/74   BP Location:    Right arm   Patient Position:    Lying   Pulse: 92 90 86 88   Resp: 22 22 20 16   Temp:    98.4 °F (36.9 °C)   TempSrc:    Oral   SpO2: 91% 95% 98%    Weight:       Height:         Wt Readings from Last 1 Encounters:   01/06/25 59.2 kg (130 lb 9.6 oz)       Intake/Output Summary (Last 24 hours) at 1/6/2025 0936  Last data filed at 1/5/2025 2200  Gross per 24 hour   Intake 350 ml   Output 50 ml   Net 300 ml         Results Review:     CBC    Results from last 7 days   Lab Units 01/06/25  0341 01/05/25  0141 01/04/25  0407 01/03/25  0408 01/02/25  0421 01/01/25  0212   WBC 10*3/mm3 7.83 7.34 8.16 10.79 11.78* 8.69   HEMOGLOBIN g/dL 10.2* 10.1* 11.5* 12.7* 14.4 11.1*   PLATELETS 10*3/mm3 225 256 271 333 310 236     Cr Clearance Estimated Creatinine Clearance: 36.8 mL/min (by C-G formula based on SCr of 1.25 mg/dL).  Coag     HbA1C   Lab Results   Component Value Date    HGBA1C 5.8 (H) 12/23/2024    HGBA1C 5.7 (H) 01/31/2024    HGBA1C 5.9 (H) 03/17/2023     Blood Glucose   No results found for: \"POCGLU\"    Infection   Results from last 7 days   Lab Units 01/01/25  0212   PROCALCITONIN ng/mL 0.16     CMP   Results from last 7 days   Lab Units 01/06/25  0341 01/05/25  0141 01/04/25  0407 01/03/25  0408 01/02/25  0421 01/01/25  0212   SODIUM mmol/L 139 142 143 143 144 140   POTASSIUM mmol/L 3.6 3.6 3.6 3.8 3.5 3.7   CHLORIDE mmol/L 105 107 106 105 105 104   CO2 mmol/L 25.4 25.6 23.0 " "23.4 19.8* 25.8   BUN mg/dL 20 25* 26* 30* 19 17   CREATININE mg/dL 1.25 1.28* 1.31* 1.37* 1.14 1.28*   GLUCOSE mg/dL 118* 124* 95 108* 109* 89   ALBUMIN g/dL  --   --   --   --   --  3.7   BILIRUBIN mg/dL  --   --   --   --   --  0.5   ALK PHOS U/L  --   --   --   --   --  63   AST (SGOT) U/L  --   --   --   --   --  36   ALT (SGPT) U/L  --   --   --   --   --  18     ABG      UA    Results from last 7 days   Lab Units 01/01/25  1746   NITRITE UA  Negative   WBC UA /HPF 0-2   BACTERIA UA /HPF None Seen   SQUAM EPITHEL UA /HPF 0-2     ASHU  No results found for: \"POCMETH\", \"POCAMPHET\", \"POCBARBITUR\", \"POCBENZO\", \"POCCOCAINE\", \"POCOPIATES\", \"POCOXYCODO\", \"POCPHENCYC\", \"POCPROPOXY\", \"POCTHC\", \"POCTRICYC\"  Lysis Labs   Results from last 7 days   Lab Units 01/06/25  0341 01/05/25  0141 01/04/25  0407 01/03/25  0408 01/02/25  0421 01/01/25  0212   HEMOGLOBIN g/dL 10.2* 10.1* 11.5* 12.7* 14.4 11.1*   PLATELETS 10*3/mm3 225 256 271 333 310 236   CREATININE mg/dL 1.25 1.28* 1.31* 1.37* 1.14 1.28*     Radiology(recent) No radiology results for the last day      Results from last 7 days   Lab Units 01/02/25  1207   HSTROP T ng/L 393*       Imaging Results (Last 24 Hours)       ** No results found for the last 24 hours. **            Cardiac Studies:  Echo- Results for orders placed during the hospital encounter of 01/01/25    Adult Transthoracic Echo Complete W/ Cont if Necessary Per Protocol    Interpretation Summary    Left ventricular systolic function is normal. Calculated left ventricular EF = 56% Left ventricular ejection fraction appears to be 56 - 60%.    Left ventricular diastolic function is consistent with (grade II w/high LAP) pseudonormalization.    Mild to moderate aortic valve stenosis is present.    Estimated right ventricular systolic pressure from tricuspid regurgitation is normal (<35 mmHg).    Stress Myoview-  Cath-        Babak Brown DO  01/06/25  09:36 EST    Copied information has been reviewed " and is current as of 01/06/25

## 2025-01-06 NOTE — SIGNIFICANT NOTE
1149: The patient has increasing restlessness and confusion, attempting to get out of bed and chair. Currently, the patient is sitting with the RN at the nurse's station. Charge RN has been contacted to request a sitter, but no sitter is available at this time. Administer morphine per psych team--Yvonne HERMOSILLO.     Sitter was available at 12nn. pt slept from 12:00 PM to 4:00 PM, during which a sitter was present. After the sitter left, the patient attempted to get out of bed.  The Charge RNLakia has been informed again  that the patient would benefit from a sitter tonight. This RN stayed in the room most of the time after the sitter left.     The patient's daughter has been updated and has requested to come to stay with the patient tonight, as a sitter is unavailable. Dgtr said she will try.      Fall precautions remain in place, and care continues.

## 2025-01-06 NOTE — PLAN OF CARE
Goal Outcome Evaluation:      ST attempted to see this pt for meal assessment. PM snack provided, which consisted of puree (pudding) and thin liquid by straw (water). Pt lethargic and declined intake of PO. Appears confused; sitter at bedside. Multiple attempts to exit his bed, but redirected upon each attempt. ST will re-attempt on later time/date when appropriate. Please note if s/sx of aspiration present and make NPO if concerns arise.             Anticipated Discharge Disposition (SLP): unknown

## 2025-01-06 NOTE — CASE MANAGEMENT/SOCIAL WORK
Continued Stay Note  [facility]     Patient Name: Kailash Cooper Jr.  MRN: [id number]  Today's Date: 1/6/2025    Admit Date: 1/1/2025    Plan: DC Plan: Anticipate routine home with family pending clinical course. Spouse Candis will provide transportation.   Discharge Plan       Row Name 01/06/25 1247       Plan    Plan DC Plan: Anticipate routine home with family pending clinical course. Spouse Candis will provide transportation.    Patient/Family in Agreement with Plan yes    Provided Post Acute Provider List? N/A    Provided Post Acute Provider Quality & Resource List? N/A    Plan Comments CM reviewed chart documentation for clinical updates. Patient had been sitter free for 24 hours, however, showing increased confusion today requiring patien to sit at nurses station with staff. Sitter has been again requested.CM will continue to follow for any additional needs and adjust DC plan accordingly. DC Barriers: Cardiac monitoring, increased confusion, monitor labs.                 Expected Discharge Date and Time       Expected Discharge Date Expected Discharge Time    Jan 8, 2025           Phone communication or documentation only- no physical contact with patient or family.      Marjorie Mcfadden RN     Office Phone: [phone]  Office Cell:     [phone]

## 2025-01-07 LAB
ANION GAP SERPL CALCULATED.3IONS-SCNC: 8.7 MMOL/L (ref 5–15)
BASOPHILS # BLD AUTO: 0.04 10*3/MM3 (ref 0–0.2)
BASOPHILS NFR BLD AUTO: 0.5 % (ref 0–1.5)
BUN SERPL-MCNC: 22 MG/DL (ref 8–23)
BUN/CREAT SERPL: 15 (ref 7–25)
CALCIUM SPEC-SCNC: 9.5 MG/DL (ref 8.6–10.5)
CHLORIDE SERPL-SCNC: 103 MMOL/L (ref 98–107)
CO2 SERPL-SCNC: 26.3 MMOL/L (ref 22–29)
CREAT SERPL-MCNC: 1.47 MG/DL (ref 0.76–1.27)
DEPRECATED RDW RBC AUTO: 51.8 FL (ref 37–54)
EGFRCR SERPLBLD CKD-EPI 2021: 46.7 ML/MIN/1.73
EOSINOPHIL # BLD AUTO: 0.26 10*3/MM3 (ref 0–0.4)
EOSINOPHIL NFR BLD AUTO: 3.4 % (ref 0.3–6.2)
ERYTHROCYTE [DISTWIDTH] IN BLOOD BY AUTOMATED COUNT: 14.1 % (ref 12.3–15.4)
GLUCOSE SERPL-MCNC: 96 MG/DL (ref 65–99)
HCT VFR BLD AUTO: 30.6 % (ref 37.5–51)
HGB BLD-MCNC: 10 G/DL (ref 13–17.7)
IMM GRANULOCYTES # BLD AUTO: 0.1 10*3/MM3 (ref 0–0.05)
IMM GRANULOCYTES NFR BLD AUTO: 1.3 % (ref 0–0.5)
LYMPHOCYTES # BLD AUTO: 1.23 10*3/MM3 (ref 0.7–3.1)
LYMPHOCYTES NFR BLD AUTO: 15.9 % (ref 19.6–45.3)
MCH RBC QN AUTO: 32.7 PG (ref 26.6–33)
MCHC RBC AUTO-ENTMCNC: 32.7 G/DL (ref 31.5–35.7)
MCV RBC AUTO: 100 FL (ref 79–97)
MONOCYTES # BLD AUTO: 0.68 10*3/MM3 (ref 0.1–0.9)
MONOCYTES NFR BLD AUTO: 8.8 % (ref 5–12)
NEUTROPHILS NFR BLD AUTO: 5.44 10*3/MM3 (ref 1.7–7)
NEUTROPHILS NFR BLD AUTO: 70.1 % (ref 42.7–76)
NRBC BLD AUTO-RTO: 0 /100 WBC (ref 0–0.2)
PLATELET # BLD AUTO: 216 10*3/MM3 (ref 140–450)
PMV BLD AUTO: 9.8 FL (ref 6–12)
POTASSIUM SERPL-SCNC: 4.2 MMOL/L (ref 3.5–5.2)
RBC # BLD AUTO: 3.06 10*6/MM3 (ref 4.14–5.8)
SODIUM SERPL-SCNC: 138 MMOL/L (ref 136–145)
WBC NRBC COR # BLD AUTO: 7.75 10*3/MM3 (ref 3.4–10.8)

## 2025-01-07 PROCEDURE — 25510000001 IOPAMIDOL PER 1 ML: Performed by: INTERNAL MEDICINE

## 2025-01-07 PROCEDURE — C1894 INTRO/SHEATH, NON-LASER: HCPCS | Performed by: INTERNAL MEDICINE

## 2025-01-07 PROCEDURE — 92526 ORAL FUNCTION THERAPY: CPT

## 2025-01-07 PROCEDURE — 93458 L HRT ARTERY/VENTRICLE ANGIO: CPT | Performed by: INTERNAL MEDICINE

## 2025-01-07 PROCEDURE — 25810000003 SODIUM CHLORIDE 0.9 % SOLUTION: Performed by: INTERNAL MEDICINE

## 2025-01-07 PROCEDURE — C1769 GUIDE WIRE: HCPCS | Performed by: INTERNAL MEDICINE

## 2025-01-07 PROCEDURE — 99232 SBSQ HOSP IP/OBS MODERATE 35: CPT | Performed by: INTERNAL MEDICINE

## 2025-01-07 PROCEDURE — 4A023N7 MEASUREMENT OF CARDIAC SAMPLING AND PRESSURE, LEFT HEART, PERCUTANEOUS APPROACH: ICD-10-PCS | Performed by: INTERNAL MEDICINE

## 2025-01-07 PROCEDURE — 85025 COMPLETE CBC W/AUTO DIFF WBC: CPT | Performed by: NURSE PRACTITIONER

## 2025-01-07 PROCEDURE — 25010000002 MORPHINE PER 10 MG: Performed by: STUDENT IN AN ORGANIZED HEALTH CARE EDUCATION/TRAINING PROGRAM

## 2025-01-07 PROCEDURE — 94664 DEMO&/EVAL PT USE INHALER: CPT

## 2025-01-07 PROCEDURE — 80048 BASIC METABOLIC PNL TOTAL CA: CPT | Performed by: NURSE PRACTITIONER

## 2025-01-07 PROCEDURE — B2151ZZ FLUOROSCOPY OF LEFT HEART USING LOW OSMOLAR CONTRAST: ICD-10-PCS | Performed by: INTERNAL MEDICINE

## 2025-01-07 PROCEDURE — 94799 UNLISTED PULMONARY SVC/PX: CPT

## 2025-01-07 PROCEDURE — 94761 N-INVAS EAR/PLS OXIMETRY MLT: CPT

## 2025-01-07 PROCEDURE — B2111ZZ FLUOROSCOPY OF MULTIPLE CORONARY ARTERIES USING LOW OSMOLAR CONTRAST: ICD-10-PCS | Performed by: INTERNAL MEDICINE

## 2025-01-07 PROCEDURE — 25010000002 MORPHINE PER 10 MG: Performed by: NURSE PRACTITIONER

## 2025-01-07 PROCEDURE — 25010000002 HYDRALAZINE PER 20 MG: Performed by: NURSE PRACTITIONER

## 2025-01-07 PROCEDURE — 25810000003 SODIUM CHLORIDE 0.9 % SOLUTION: Performed by: NURSE PRACTITIONER

## 2025-01-07 PROCEDURE — 25010000002 LIDOCAINE 2% SOLUTION: Performed by: INTERNAL MEDICINE

## 2025-01-07 RX ORDER — HYDRALAZINE HYDROCHLORIDE 20 MG/ML
10 INJECTION INTRAMUSCULAR; INTRAVENOUS EVERY 6 HOURS PRN
Status: DISCONTINUED | OUTPATIENT
Start: 2025-01-07 | End: 2025-01-08 | Stop reason: HOSPADM

## 2025-01-07 RX ORDER — LIDOCAINE HYDROCHLORIDE 20 MG/ML
INJECTION, SOLUTION INFILTRATION; PERINEURAL
Status: DISCONTINUED | OUTPATIENT
Start: 2025-01-07 | End: 2025-01-07 | Stop reason: HOSPADM

## 2025-01-07 RX ORDER — NITROGLYCERIN 0.4 MG/1
0.4 TABLET SUBLINGUAL
Status: DISCONTINUED | OUTPATIENT
Start: 2025-01-07 | End: 2025-01-08 | Stop reason: HOSPADM

## 2025-01-07 RX ORDER — ONDANSETRON 2 MG/ML
4 INJECTION INTRAMUSCULAR; INTRAVENOUS EVERY 6 HOURS PRN
Status: DISCONTINUED | OUTPATIENT
Start: 2025-01-07 | End: 2025-01-07

## 2025-01-07 RX ORDER — ONDANSETRON 4 MG/1
4 TABLET, ORALLY DISINTEGRATING ORAL EVERY 6 HOURS PRN
Status: DISCONTINUED | OUTPATIENT
Start: 2025-01-07 | End: 2025-01-07

## 2025-01-07 RX ORDER — ACETAMINOPHEN 325 MG/1
650 TABLET ORAL EVERY 4 HOURS PRN
Status: DISCONTINUED | OUTPATIENT
Start: 2025-01-07 | End: 2025-01-08 | Stop reason: HOSPADM

## 2025-01-07 RX ORDER — SODIUM CHLORIDE 9 MG/ML
75 INJECTION, SOLUTION INTRAVENOUS CONTINUOUS
Status: DISPENSED | OUTPATIENT
Start: 2025-01-07 | End: 2025-01-07

## 2025-01-07 RX ORDER — DIPHENHYDRAMINE HCL 25 MG
25 CAPSULE ORAL EVERY 6 HOURS PRN
Status: DISCONTINUED | OUTPATIENT
Start: 2025-01-07 | End: 2025-01-08 | Stop reason: HOSPADM

## 2025-01-07 RX ORDER — METOPROLOL SUCCINATE 25 MG/1
25 TABLET, EXTENDED RELEASE ORAL
Status: DISCONTINUED | OUTPATIENT
Start: 2025-01-07 | End: 2025-01-08

## 2025-01-07 RX ORDER — IOPAMIDOL 755 MG/ML
INJECTION, SOLUTION INTRAVASCULAR
Status: DISCONTINUED | OUTPATIENT
Start: 2025-01-07 | End: 2025-01-07 | Stop reason: HOSPADM

## 2025-01-07 RX ADMIN — TAMSULOSIN HYDROCHLORIDE 0.4 MG: 0.4 CAPSULE ORAL at 08:22

## 2025-01-07 RX ADMIN — OXYCODONE 15 MG: 5 TABLET ORAL at 08:21

## 2025-01-07 RX ADMIN — IPRATROPIUM BROMIDE AND ALBUTEROL SULFATE 3 ML: .5; 3 SOLUTION RESPIRATORY (INHALATION) at 18:36

## 2025-01-07 RX ADMIN — OXYCODONE 15 MG: 5 TABLET ORAL at 21:30

## 2025-01-07 RX ADMIN — METOPROLOL SUCCINATE 25 MG: 25 TABLET, EXTENDED RELEASE ORAL at 18:06

## 2025-01-07 RX ADMIN — MORPHINE SULFATE 2 MG: 2 INJECTION, SOLUTION INTRAMUSCULAR; INTRAVENOUS at 16:17

## 2025-01-07 RX ADMIN — IPRATROPIUM BROMIDE AND ALBUTEROL SULFATE 3 ML: .5; 3 SOLUTION RESPIRATORY (INHALATION) at 12:30

## 2025-01-07 RX ADMIN — MORPHINE SULFATE 2 MG: 2 INJECTION, SOLUTION INTRAMUSCULAR; INTRAVENOUS at 20:33

## 2025-01-07 RX ADMIN — BUDESONIDE 0.5 MG: 0.5 INHALANT RESPIRATORY (INHALATION) at 18:40

## 2025-01-07 RX ADMIN — PANTOPRAZOLE SODIUM 40 MG: 40 TABLET, DELAYED RELEASE ORAL at 20:33

## 2025-01-07 RX ADMIN — Medication 10 ML: at 08:23

## 2025-01-07 RX ADMIN — PANTOPRAZOLE SODIUM 40 MG: 40 TABLET, DELAYED RELEASE ORAL at 08:21

## 2025-01-07 RX ADMIN — MEGESTROL ACETATE 40 MG: 40 TABLET ORAL at 08:22

## 2025-01-07 RX ADMIN — ASPIRIN 81 MG: 81 TABLET, COATED ORAL at 08:21

## 2025-01-07 RX ADMIN — HYDRALAZINE HYDROCHLORIDE 10 MG: 20 INJECTION, SOLUTION INTRAMUSCULAR; INTRAVENOUS at 15:27

## 2025-01-07 RX ADMIN — ATORVASTATIN CALCIUM 10 MG: 10 TABLET ORAL at 20:33

## 2025-01-07 RX ADMIN — CLOPIDOGREL BISULFATE 75 MG: 75 TABLET ORAL at 08:22

## 2025-01-07 RX ADMIN — MORPHINE SULFATE 2 MG: 2 INJECTION, SOLUTION INTRAMUSCULAR; INTRAVENOUS at 10:03

## 2025-01-07 RX ADMIN — SODIUM CHLORIDE 75 ML/HR: 9 INJECTION, SOLUTION INTRAVENOUS at 14:47

## 2025-01-07 RX ADMIN — ROPINIROLE HYDROCHLORIDE 0.25 MG: 0.25 TABLET, FILM COATED ORAL at 20:33

## 2025-01-07 RX ADMIN — ACETAMINOPHEN 650 MG: 325 TABLET, FILM COATED ORAL at 18:06

## 2025-01-07 RX ADMIN — Medication 12.5 MG: at 08:22

## 2025-01-07 RX ADMIN — AMLODIPINE BESYLATE 5 MG: 5 TABLET ORAL at 08:22

## 2025-01-07 RX ADMIN — Medication 10 ML: at 20:35

## 2025-01-07 RX ADMIN — MORPHINE SULFATE 2 MG: 2 INJECTION, SOLUTION INTRAMUSCULAR; INTRAVENOUS at 00:06

## 2025-01-07 RX ADMIN — BUDESONIDE 0.5 MG: 0.5 INHALANT RESPIRATORY (INHALATION) at 06:46

## 2025-01-07 RX ADMIN — NALOXEGOL OXALATE 25 MG: 25 TABLET, FILM COATED ORAL at 07:18

## 2025-01-07 RX ADMIN — OXYCODONE 15 MG: 5 TABLET ORAL at 14:38

## 2025-01-07 RX ADMIN — IPRATROPIUM BROMIDE AND ALBUTEROL SULFATE 3 ML: .5; 3 SOLUTION RESPIRATORY (INHALATION) at 06:42

## 2025-01-07 NOTE — CASE MANAGEMENT/SOCIAL WORK
Continued Stay Note  [facility]     Patient Name: Kailash Cooper Jr.  MRN: [id number]  Today's Date: 1/7/2025    Admit Date: 1/1/2025    Plan: DC Plan: Anticipate routine home with family pending clinical course. Spouse Candis will provide transportation.   Discharge Plan       Row Name 01/07/25 1530       Plan    Plan DC Plan: Anticipate routine home with family pending clinical course. Spouse Candis will provide transportation.    Patient/Family in Agreement with Plan yes    Provided Post Acute Provider List? N/A    Provided Post Acute Provider Quality & Resource List? N/A    Plan Comments CM spoke with Hospitalist and nursing for morning rounds and reviewed chart for clinical updates. Hospitalist reports mentation much improved today. Plan for Cardiac cath this afternoon.CM will continue to follow for any additional needs and adjust DC plan accordingly. DC Barriers: Cardiac monitoring, O2@1L nc, post procedure site monitoring, and monitor labs.                 Expected Discharge Date and Time       Expected Discharge Date Expected Discharge Time    Jan 8, 2025           Phone communication or documentation only- no physical contact with patient or family.      Marjorie Mcfadden RN     Office Phone: [phone]  Office Cell:     [phone]

## 2025-01-07 NOTE — THERAPY TREATMENT NOTE
Acute Care - Speech Language Pathology   Swallow Treatment Note MIRIAN Mast     Patient Name: Kailash Cooper Jr.  : 1940  MRN: [id number]  Today's Date: 2025               Admit Date: 2025    Visit Dx:   No diagnosis found.  Patient Active Problem List   Diagnosis    Chronic bilateral low back pain without sciatica    History of malignant neoplasm of thoracic cavity structure    Allergic rhinitis    Anemia due to stage 3 chronic kidney disease    Acute conjunctivitis    Osteoarthritis    Encounter for screening for malignant neoplasm of prostate    Enlarged prostate without lower urinary tract symptoms (luts)    Fatigue    Gastroesophageal reflux disease    Gastrointestinal hemorrhage    History of transient ischemic attack    Hydronephrosis    Hyperkalemia    Mixed hyperlipidemia    Essential hypertension    Hypogonadism    Male erectile disorder (CODE)    Obstructive sleep apnea    Osteomalacia    Secondary hyperparathyroidism of renal origin    Status post patent foramen ovale closure    Essential tremor    Vitamin D deficiency    Type 2 diabetes mellitus    Chronic kidney disease, stage 3 (moderate)    Hypertensive encephalopathy    Lung nodule    Lumbosacral spondylosis without myelopathy    Cervical spondylosis without myelopathy    Thoracic spondylosis    Chronic pain syndrome    Cervicalgia    Chest pain, atypical    CAP (community acquired pneumonia)    Elevated troponin    Positive D dimer    Chest pain    Acute respiratory failure with hypoxia    Sepsis without acute organ dysfunction    Non-STEMI (non-ST elevated myocardial infarction)    Oxygen dependent    Abnormal EKG    Anemia    History of duodenal ulcer    Melena    Erosive esophagitis    Multiple gastric ulcers    Bleeding chronic duodenal ulcer    Other insomnia    Intolerance to cold    Coronary artery disease involving native coronary artery of native heart without angina pectoris    Underweight    Bilateral pneumonia    Weakness     Pneumonia, unspecified organism    BMI 22.0-22.9, adult    Muscle twitching    Shortness of breath    NSTEMI (non-ST elevated myocardial infarction)     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 [facility]    Diabetes     Enlarged prostate     Former smoker     Hiatal hernia     Hip pain     don    Hip pain, bilateral     Hyperlipidemia     Hypertension     Kidney disease        stage 3     Leg pain     don    Low back pain     Neck pain     Stroke      Past Surgical History:   Procedure Laterality Date    CARDIAC CATHETERIZATION Left 1/28/2022    Procedure: Cardiac Catheterization/Vascular Study;  Surgeon: Mani Salguero MD;  Location: [facility] CATH INVASIVE LOCATION;  Service: Cardiovascular;  Laterality: Left;    CATARACT EXTRACTION  2006    OU    COLONOSCOPY  2011    COLONOSCOPY N/A 5/27/2022    Procedure: COLONOSCOPY;  Surgeon: Terry Holliday MD;  Location: [facility] ENDOSCOPY;  Service: Gastroenterology;  Laterality: N/A;  polyps    CORONARY STENT PLACEMENT  01/28/2022    RCA    ENDOSCOPY N/A 5/27/2022    Procedure: ESOPHAGOGASTRODUODENOSCOPY with 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, gastric antrum biopsy;  Surgeon: Terry Holliday MD;  Location: [facility] ENDOSCOPY;  Service: Gastroenterology;  Laterality: N/A;  gastritis, gastric ulcer, small bowel AVM    LUNG CANCER SURGERY      upper lobe rt lung cancer 9/2017  at [facility]       SLP Recommendation and Plan                                                                                          EDUCATION  The patient has been educated in the following areas:   Dysphagia (Swallowing Impairment) Modified Diet Instruction.        SLP GOALS       Row Name 01/07/25 1000       (LTG) Swallow    (LTG) Swallow The patient will maximize swallow function for least restrictive po diet, exhibiting no complications associated with dysphagia, adequate po intake, and  demonstrating independent use of safe swallow compensations  -MB    [place] (Swallow Long Term Goal) independently (over 90% accuracy)  -MB    Time Frame (Swallow Long Term Goal) by discharge  -MB    Progress/Outcomes (Swallow Long Term Goal) goal ongoing  -MB    Comment (Swallow Long Term Goal) Pt was seen for skilled ST targeting meal assessment. Pt was semi-reclined in bed and alert with daughter present upon arrival. Upper and lower dentures cleaned by daughter and self-donned by pt. Meal tray present, which consisted of pureed eggs, ham, and bananas with ice cream and thin liquid by straw. Pt was assessed with x2 bites puree, multiple bites ice cream, ~x6 thin by straw; ~x6 mech ground. Pt demonstrated multiple swallows with each trial of thins and oral holding of ~5 seconds on secondary swallow; min to no residue with puree and mechanical ground textures; functional rotary mastication; wet voice appreciated in ~x1 instance; no further overt s/sx of aspiration present. Pt and daughter endorse that pt has always orally held liquids and state no complaints with any trialed PO. Given pt's performance with PO and clear oral cavity after each trial, he demonstrates appropriateness for an upgrade to mechanical ground solids with thin liquids.     Recommend: Mechanical ground solids/ thin liquids. Please exercise general swallow precautions. ST will continue to follow to ensure safety and tolerance to diet.   -MB       (STG) Swallow 1    (STG) Swallow 1 The patient will participate in ongoing assessment of swallow, including re-evaluation clinically and/or including instrumental assessment of swallow if indicated, to further assess swallow function in anticipation to initiate a PO diet  -MB    [place] (Swallow Short Term Goal 1) independently (over 90% accuracy)  -MB    Time Frame (Swallow Short Term Goal 1) by discharge  -MB    Progress/Outcomes (Swallow Short Term Goal 1) goal ongoing  -MB               User Key  (r) = Recorded By, (t) = Taken By, (c) = Cosigned By      Initials Name Provider Type    Randi James, SLP Speech and Language Pathologist    Denny Mansfield, SLP Speech and Language Pathologist                         Time Calculation:                SCOT Doan  1/7/2025

## 2025-01-07 NOTE — PROGRESS NOTES
[facility] MEDICINE SERVICE  DAILY PROGRESS NOTE    NAME: Kailash Cooper Jr.  : 1940  MRN: [id number]      LOS: 6 days     PROVIDER OF SERVICE: Tono Fuentes MD    Chief Complaint: NSTEMI (non-ST elevated myocardial infarction)    Subjective:     Interval History:  History taken from: patient    Awake alert appropriate      Review of Systems:   Review of Systems    Objective:     Vital Signs  Temp:  [97.6 °F (36.4 °C)-98.4 °F (36.9 °C)] 98.2 °F (36.8 °C)  Heart Rate:  [49-84] 54  Resp:  [9-15] 14  BP: (111-157)/(50-81) 115/51  Flow (L/min) (Oxygen Therapy):  [-25] 1   Body mass index is 22.16 kg/m².    Physical Exam  Physical Exam  AOx3 NAD  RRR S1-S2 audible  Lung CTA  Abdomen soft nontender      Diagnostic Data    Results from last 7 days   Lab Units 25  0603 25  0421 25  0212   WBC 10*3/mm3 7.75   < > 8.69   HEMOGLOBIN g/dL 10.0*   < > 11.1*   HEMATOCRIT % 30.6*   < > 33.7*   PLATELETS 10*3/mm3 216   < > 236   GLUCOSE mg/dL 96   < > 89   CREATININE mg/dL 1.47*   < > 1.28*   BUN mg/dL 22   < > 17   SODIUM mmol/L 138   < > 140   POTASSIUM mmol/L 4.2   < > 3.7   AST (SGOT) U/L  --   --  36   ALT (SGPT) U/L  --   --  18   ALK PHOS U/L  --   --  63   BILIRUBIN mg/dL  --   --  0.5   ANION GAP mmol/L 8.7   < > 10.2    < > = values in this interval not displayed.       No radiology results for the last day      I reviewed the patient's new clinical results.    Assessment/Plan:     Active and Resolved Problems  Active Hospital Problems    Diagnosis  POA    **NSTEMI (non-ST elevated myocardial infarction) [I21.4]  Yes      Resolved Hospital Problems   No resolved problems to display.        Acute delirium-improved  Opioid withdrawal complicating this  -Defer to SLP on dysphagia.  If not able to tolerate p.o. pain medication, recommend IV given longstanding very high opioid prescriptions.  Previously withdrew from opioids in the hospital  -Today is the most awake alert and appropriate that  "I have seen him.     Elevated troponin, peak at 419 now downtrending  H/o CAD s/p PCI  - EKG: normal sinus rhythm rate 82, right bundle branch block, left anterior fascicular block.   - HS troponin 1165, repeat 1321  - previous [facility] 1/28/2022:   1. \"Two-vessel coronary artery disease  2.  High-grade stenosis of proximal RCA with successful stenting  3.  Diffuse disease of mid to distal LCx.  This segment appears to be very small caliber vessel. DAPT recommended\"  - resume home plavix, statin  -Cardiology thinks patient probably has NSTEMI as there is lateral T wave inversion.  They recommend cardiac cath however this was not done previously as patient was confused and noncooperative.  Cardiology planning cath later this week.  -Palliative consulted by previous hospitalist and patient family wishes him to receive aggressive measures, full code.     Chronic respiratory failure/pulmonary fibrosis  - CXR: chronic scarring with superimposed mild interstitial edema or pneumonia  - Appears to be more or less at his baseline oxygen status     Acute on CKD stage III - worsening  - BUN 23, creatinine 1.7 at [facility] (previous baseline around 1.5)  - improved since admission.  -Trending to baseline  - monitor BMP     Hypertension  Hyperlipidemia  - Continue home meds  - holding lasix for now     H/o CVA  - Continue home Plavix, statin     Chronic low back pain  - Continue home oxycodone     RLS  - Continue home Requip     DM type II  - controlled.      BPH  - cont home flomax     History of non-small cell lung cancer stage Ib status post resection 2017,   Former smoker quit 1990      VTE Prophylaxis:  Mechanical VTE prophylaxis orders are present.             Disposition Planning:       Barriers to Discharge: cardiology workup  Anticipated Date of Discharge: 1/9/2025  Place of Discharge: Home      Time: 35 minutes     Code Status and Medical Interventions: CPR (Attempt to Resuscitate); Full Support   Ordered at: 01/01/25 0040     " Level Of Support Discussed With:    Patient     Code Status (Patient has no pulse and is not breathing):    CPR (Attempt to Resuscitate)     Medical Interventions (Patient has pulse or is breathing):    Full Support       Signature: Electronically signed by Tono Fuentes MD, 01/07/25, 13:10 EST.  [facility] Hospitalist Team

## 2025-01-07 NOTE — CONSULTS
Pt educated on cardiac rehab program. Brochure, HH diet packet, stent information, and antiplatelet sheet given to patient. Will follow up with patient after discharge.

## 2025-01-07 NOTE — PROGRESS NOTES
[facility] [place]      Patient Care Team:  Madelyn Márquez APRN as PCP - General (Nurse Practitioner)  Marcia Lujan MD as Consulting Physician (Nephrology)  Mc Key MD as Consulting Physician (Physical Medicine and Rehabilitation)  Gila Hawkins APRN (Nurse Practitioner)  Ct Stevenson APRN as Nurse Practitioner (Family Medicine)  Mani Salguero MD as Consulting Physician (Cardiology)  Toshia Jaeger MD as Consulting Physician (Pulmonary Disease)  Terry Holliday MD as Consulting Physician (Gastroenterology)        Reason for follow-up: Elevated troponin    Subjective    Interval History and ROS:   Patient more awake/alert  Denies chest pain/dyspnea  C/o low back pain  Some confusion reported overnight        Review of Systems   Constitutional: Negative for chills and fever.   HENT:  Negative for ear discharge and nosebleeds.    Eyes:  Negative for discharge and redness.   Cardiovascular:  Negative for chest pain, orthopnea, palpitations, paroxysmal nocturnal dyspnea and syncope.   Respiratory:  Negative for cough, shortness of breath and wheezing.    Endocrine: Negative for heat intolerance.   Skin:  Negative for rash.   Musculoskeletal:  Negative for arthritis and myalgias.   Gastrointestinal:  Negative for abdominal pain, melena, nausea and vomiting.   Genitourinary:  Negative for dysuria and hematuria.   Neurological:  Negative for dizziness, light-headedness, numbness and tremors.   Psychiatric/Behavioral:  Negative for depression. The patient is not nervous/anxious.        Objective    Vital Signs  Temp:  [97.7 °F (36.5 °C)-98.4 °F (36.9 °C)] 98.4 °F (36.9 °C)  Heart Rate:  [49-84] 75  Resp:  [10-20] 20  BP: (115-170)/(50-83) 134/57  Oxygen Therapy  SpO2: 94 %  Pulse Oximetry Type: Continuous  Device (Oxygen Therapy): nasal cannula  Flow (L/min) (Oxygen Therapy): 1  Oxygen Concentration (%): 1}  Flowsheet Rows      Flowsheet Row First Filed Value  "  Admission Height 165 cm (64.96\") Documented at 01/01/2025 0000   Admission Weight 60.3 kg (132 lb 15 oz) Documented at 01/01/2025 0000            Physical Exam  Telemetry: Sinus rhythm with right bundle branch block  Constitutional:       General: He is awake and alert  HENT:      Head: Normocephalic and atraumatic.   Eyes:      Extraocular Movements: Extraocular movements intact. .   Cardiovascular:      Rate and Rhythm: Tachycardic , regular rhythm.      Pulses: Normal pulses.      Heart sounds: Normal heart sounds.      No edema, no JVD  Pulmonary:      Effort: Pulmonary effort is normal.      Breath sounds: diminished bases  Abdominal:      Palpations: Abdomen is soft.      Tenderness: No rebound  Skin:     General: Skin is warm and dry.   Neurological:      General: No obvious focal deficits.      Mental Status: He is oriented      Results Review:     I reviewed the patient's new clinical results.    Lab Results (last 24 hours)       Procedure Component Value Units Date/Time    Basic Metabolic Panel [[id number]]  (Abnormal) Collected: 01/07/25 0603    Specimen: Blood Updated: 01/07/25 0647     Glucose 96 mg/dL      BUN 22 mg/dL      Creatinine 1.47 mg/dL      Sodium 138 mmol/L      Potassium 4.2 mmol/L      Chloride 103 mmol/L      CO2 26.3 mmol/L      Calcium 9.5 mg/dL      BUN/Creatinine Ratio 15.0     Anion Gap 8.7 mmol/L      eGFR 46.7 mL/min/1.73     Narrative:      GFR Categories in Chronic Kidney Disease (CKD)      GFR Category          GFR (mL/min/1.73)    Interpretation  G1                     90 or greater         Normal or high (1)  G2                      60-89                Mild decrease (1)  G3a                   45-59                Mild to moderate decrease  G3b                   30-44                Moderate to severe decrease  G4                    15-29                Severe decrease  G5                    14 or less           Kidney failure          (1)In the absence of evidence of " kidney disease, neither GFR category G1 or G2 fulfill the criteria for CKD.    eGFR calculation 2021 CKD-EPI creatinine equation, which does not include race as a factor    CBC Auto Differential [[id number]]  (Abnormal) Collected: 01/07/25 0603    Specimen: Blood Updated: 01/07/25 0625     WBC 7.75 10*3/mm3      RBC 3.06 10*6/mm3      Hemoglobin 10.0 g/dL      Hematocrit 30.6 %      .0 fL      MCH 32.7 pg      MCHC 32.7 g/dL      RDW 14.1 %      RDW-SD 51.8 fl      MPV 9.8 fL      Platelets 216 10*3/mm3      Neutrophil % 70.1 %      Lymphocyte % 15.9 %      Monocyte % 8.8 %      Eosinophil % 3.4 %      Basophil % 0.5 %      Immature Grans % 1.3 %      Neutrophils, Absolute 5.44 10*3/mm3      Lymphocytes, Absolute 1.23 10*3/mm3      Monocytes, Absolute 0.68 10*3/mm3      Eosinophils, Absolute 0.26 10*3/mm3      Basophils, Absolute 0.04 10*3/mm3      Immature Grans, Absolute 0.10 10*3/mm3      nRBC 0.0 /100 WBC             Imaging Results (Last 24 Hours)       ** No results found for the last 24 hours. **          ECG/EMG Results (most recent)       Procedure Component Value Units Date/Time    Adult Transthoracic Echo Complete W/ Cont if Necessary Per Protocol [[id number]] Resulted: 01/01/25 0931     Updated: 01/01/25 0931     LVIDd 4.4 cm      LVIDs 3.5 cm      IVSd 1.10 cm      LVPWd 1.00 cm      FS 20.5 %      IVS/LVPW 1.10 cm      ESV(cubed) 42.9 ml      EDV(cubed) 85.2 ml      LV mass(C)d 158.2 grams      LVOT area 3.8 cm2      LVOT diam 2.20 cm      EDV(MOD-sp4) 77.7 ml      ESV(MOD-sp4) 33.9 ml      SV(MOD-sp4) 43.8 ml      EF(MOD-sp4) 56.4 %      MV E max levi 182.0 cm/sec      Pulm A Revs Dur 0.11 sec      Med Peak E' Levi 7.9 cm/sec      Lat Peak E' Levi 6.3 cm/sec      TR max levi 274.0 cm/sec      Avg E/e' ratio 25.63     SV(LVOT) 93.1 ml      SV(RVOT) 23.1 ml      Qp/Qs 0.25     RVIDd 2.9 cm      TAPSE (>1.6) 2.44 cm      RV S' 18.3 cm/sec      LA dimension (2D)  3.7 cm      Pulm Sys Levi 74.3 cm/sec       Pulm Austin Levi 57.5 cm/sec      Pulm S/D 1.29     Pulm A Revs Levi 41.5 cm/sec      LV V1 max 116.0 cm/sec      LV V1 max PG 5.4 mmHg      LV V1 mean PG 3.0 mmHg      LV V1 VTI 24.5 cm      Ao pk levi 179.0 cm/sec      Ao max PG 12.8 mmHg      Ao mean PG 7.0 mmHg      Ao V2 VTI 30.0 cm      TAMI(I,D) 3.1 cm2      MV max PG 12.7 mmHg      MV mean PG 5.0 mmHg      MV V2 VTI 27.8 cm      MVA(VTI) 3.4 cm2      TR max PG 30.0 mmHg      RVOT diam 1.40 cm      RV V1 max PG 3.7 mmHg      RV V1 max 95.8 cm/sec      RV V1 VTI 15.0 cm      PA V2 max 107.5 cm/sec      ACS 0.70 cm      Sinus 3.4 cm      Dimensionless Index 0.65 (DI)      EF(MOD-bp) 56.0 %     Narrative:        Left ventricular systolic function is normal. Calculated left   ventricular EF = 56% Left ventricular ejection fraction appears to be 56 -   60%.    Left ventricular diastolic function is consistent with (grade II w/high   LAP) pseudonormalization.    Mild to moderate aortic valve stenosis is present.    Estimated right ventricular systolic pressure from tricuspid   regurgitation is normal (<35 mmHg).      Telemetry Scan [[id number]] Resulted: 01/01/25     Updated: 01/02/25 1050    ECG 12 Lead QT Measurement [[id number]] Collected: 01/02/25 1307     Updated: 01/02/25 1308     QT Interval 409 ms      QTC Interval 516 ms     Narrative:      HEART RATE=96  bpm  RR Puebafmk=847  ms  [place] Aklvdzzp=572  ms  P Horizontal Axis=-3  deg  P Front Axis=41  deg  QRSD Awuhioto=463  ms  QT Qeqxsvea=500  ms  VBrO=526  ms  QRS Axis=-75  deg  T Wave Axis=-59  deg  - ABNORMAL ECG -  Sinus rhythm  Right bundle branch block  Abnormal T, consider ischemia, lateral leads  Date and Time of Study:2025-01-02 13:07:30    ECG 12 Lead QT Measurement [[id number]] Collected: 01/03/25 0826     Updated: 01/04/25 1330     QT Interval 384 ms      QTC Interval 486 ms     Narrative:      HEART RATE=96  bpm  RR Sunxqgzg=066  ms  KY Tgkydvnj=059  ms  P Horizontal Axis=-68  deg  P Front Axis=48   deg  QRSD Agepuwpo=125  ms  QT Bfqbelgs=170  ms  FBkY=188  ms  QRS Axis=-67  deg  T Wave Axis=-60  deg  - ABNORMAL ECG -  Sinus rhythm  Ventricular premature complex  Probable  left atrial enlargement  Right bundle branch block  Left anterior fascicular block  Abnormal T, consider ischemia, lateral leads  When compared with ECG of 02-Jan-2025 13:07:30,  Nonspecific significant change  Electronically Signed By: Vincent Eng ([facility]) 2025-01-04 13:25:01  Date and Time of Study:2025-01-03 08:26:18    Telemetry Scan [[id number]] Resulted: 01/01/25     Updated: 01/06/25 0943    Telemetry Scan [[id number]] Resulted: 01/01/25     Updated: 01/06/25 0946    Telemetry Scan [[id number]] Resulted: 01/01/25     Updated: 01/06/25 1042    Telemetry Scan [[id number]] Resulted: 01/01/25     Updated: 01/06/25 1110    Telemetry Scan [[id number]] Resulted: 01/01/25     Updated: 01/06/25 1133    Telemetry Scan [[id number]] Resulted: 01/01/25     Updated: 01/06/25 1133    Telemetry Scan [[id number]] Resulted: 01/01/25     Updated: 01/07/25 0854    Telemetry Scan [[id number]] Resulted: 01/01/25     Updated: 01/07/25 0941    Telemetry Scan [[id number]] Resulted: 01/01/25     Updated: 01/07/25 1029    Telemetry Scan [[id number]] Resulted: 01/01/25     Updated: 01/07/25 1515            Medication Review:   I have reviewed the patient's current medication list    Current Facility-Administered Medications:     acetaminophen (TYLENOL) tablet 650 mg, 650 mg, Oral, Q4H PRN, Mani Salguero MD    aluminum-magnesium hydroxide-simethicone (MAALOX MAX) 400-400-40 MG/5ML suspension 15 mL, 15 mL, Oral, Q6H PRN, Mani Salguero MD    aspirin EC tablet 81 mg, 81 mg, Oral, Daily, Mani Salguero MD, 81 mg at 01/07/25 0821    atorvastatin (LIPITOR) tablet 10 mg, 10 mg, Oral, Nightly, Mani Salguero MD, 10 mg at 01/06/25 2038    atropine sulfate injection 0.5 mg, 0.5 mg, Intravenous, Q5 Min PRN, Mani Salguero MD    sennosides-docusate (PERICOLACE) 8.6-50 MG per tablet 2  tablet, 2 tablet, Oral, BID PRN, 2 tablet at 01/06/25 1237 **AND** polyethylene glycol (MIRALAX) packet 17 g, 17 g, Oral, Daily PRN, 17 g at 01/06/25 1237 **AND** bisacodyl (DULCOLAX) EC tablet 5 mg, 5 mg, Oral, Daily PRN **AND** bisacodyl (DULCOLAX) suppository 10 mg, 10 mg, Rectal, Daily PRN, Mani Salguero MD    budesonide (PULMICORT) nebulizer solution 0.5 mg, 0.5 mg, Nebulization, BID - RT, Mani Salguero MD, 0.5 mg at 01/07/25 0646    clopidogrel (PLAVIX) tablet 75 mg, 75 mg, Oral, Daily, Mani Salguero MD, 75 mg at 01/07/25 0822    diphenhydrAMINE (BENADRYL) capsule 25 mg, 25 mg, Oral, Q6H PRN, Mani Salguero MD    hydrALAZINE (APRESOLINE) injection 10 mg, 10 mg, Intravenous, Q6H PRN, Taniya Novak APRN, 10 mg at 01/07/25 1527    ipratropium-albuterol (DUO-NEB) nebulizer solution 3 mL, 3 mL, Nebulization, Q6H While Awake - RT, Mani Salguero MD, 3 mL at 01/07/25 1230    ipratropium-albuterol (DUO-NEB) nebulizer solution 3 mL, 3 mL, Nebulization, Q4H PRN, Mani Salguero MD    megestrol (MEGACE) tablet 40 mg, 40 mg, Oral, Daily, Mani Salguero MD, 40 mg at 01/07/25 0822    melatonin tablet 5 mg, 5 mg, Oral, Nightly PRN, Mani Salguero MD, 5 mg at 01/06/25 2105    metoprolol succinate XL (TOPROL-XL) 24 hr tablet 25 mg, 25 mg, Oral, Q24H, Mani Salguero MD    morphine injection 2 mg, 2 mg, Intravenous, Q4H PRN, Taniya Novak APRN, 2 mg at 01/07/25 1003    Naloxegol Oxalate (MOVANTIK) tablet 25 mg, 25 mg, Oral, QAM, Mani Salguero MD, 25 mg at 01/07/25 0718    nitroglycerin (NITROSTAT) SL tablet 0.4 mg, 0.4 mg, Sublingual, Q5 Min PRN, Mani Salguero MD    ondansetron ODT (ZOFRAN-ODT) disintegrating tablet 4 mg, 4 mg, Oral, Q6H PRN **OR** ondansetron (ZOFRAN) injection 4 mg, 4 mg, Intravenous, Q6H PRN, Mani Salguero MD    oxyCODONE (ROXICODONE) immediate release tablet 15 mg, 15 mg, Oral, Q6H PRN, Mani Salguero MD, 15 mg at 01/07/25 1438    pantoprazole (PROTONIX) EC tablet 40 mg, 40 mg, Oral, BID, Mani Salguero MD,  40 mg at 01/07/25 0821    rOPINIRole (REQUIP) tablet 0.25 mg, 0.25 mg, Oral, Daily, Mani Salguero MD, 0.25 mg at 01/06/25 1237    sodium chloride 0.9 % flush 10 mL, 10 mL, Intravenous, Q12H, Mani Salguero MD, 10 mL at 01/07/25 0823    sodium chloride 0.9 % flush 10 mL, 10 mL, Intravenous, PRN, Mani Salguero MD    sodium chloride 0.9 % infusion, 75 mL/hr, Intravenous, Continuous, Taniya Novak APRN, Last Rate: 75 mL/hr at 01/07/25 1447, 75 mL/hr at 01/07/25 1447    tamsulosin (FLOMAX) 24 hr capsule 0.4 mg, 0.4 mg, Oral, Daily, Mani Salguero MD, 0.4 mg at 01/07/25 0822      Assessment & Plan     NSTEMI  Elevated troponin  High-sensitivity troponin 253,258, 419, 393  ECG anterolateral changes consistent with ischemia  History of PCI to RCA.  Holding on cardiac catheterization due to altered mental state and no c/o of chest pain  medical management of coronary artery disease recommended  Aspirin, Plavix, high intensity statin have not been able to be given due to confusion/agitation, pt not taking oral. Beta-blocker being given IV  Echocardiogram done to evaluate LV function after ACS, EF 56-60%. Grade 2 DD, mild-mod AV stenosis  Currently SR  Denies chest pain    Confusion on top of dementia  May be precipitated by infection or delirium  Started on antibiotics for possible pneumonia, films from [facility] taken to XR for reading,   No bacteria in UA  He had negative  Much more alert this am     Hypertension  Stable on current Rx     Hyperlipidemia  LDL 97, HDL 59, total cholesterol 190.  Goal LDL less than 55.  Start high intensity statin  A1c is 5.8     Chronic kidney disease  Utzinlairt50/1.47        Mental status appears more clear  Repeat EKG with anterolateral ST changes consistent with ischemia  No pain verbalized  No family present  D/W Dr. Salguero  Consideration for possible invasive ischemic evaluation after review by Dr. Salguero    Further orders and recommendations per Dr. Salguero    Patient is seen and  examined and findings are verified.  All data is reviewed by me personally.  Assessment and plan formulated by APC was done after discussion with attending.  I spent more than 50% of time in taking care of the patient.    Patient denies any symptom of chest pain or tightness or heaviness.  No orthopnea PND no syncope or presyncope.  No leg edema noted.    Patient is more alert and cooperative today.  After discussion with his daughter we agreed to proceed with cardiac catheterization today.    Hemodynamics are stable    Normal S1 and S2.  No pericardial rub or murmur abdominal exam is benign    Patient underwent cardiac catheterization and patent stent in RCA.  PDA had 50 to 60% stenosis in mid segment and LAD has 40 to 50% stenosis in the midsegment which has not changed from the previous 4 years back.  LCx is 100% occluded in the midsegment.  However LV gram showed distal anterior wall and apex and inferior wall was hypokinetic consistent with Takotsubo syndrome.    At this stage I will recommend to change metoprolol to Toprol-XL 25 mg daily.  If the creatinine is stable tomorrow I would consider losartan.    Electronically signed by Mani Salguero MD, 01/07/25, 4:16 PM EST.             Mani Salguero MD  01/07/25  16:16 EST

## 2025-01-08 ENCOUNTER — READMISSION MANAGEMENT (OUTPATIENT)
Dept: CALL CENTER | Facility: HOSPITAL | Age: 85
End: 2025-01-08
Payer: MEDICARE

## 2025-01-08 VITALS
HEIGHT: 65 IN | RESPIRATION RATE: 17 BRPM | SYSTOLIC BLOOD PRESSURE: 116 MMHG | HEART RATE: 60 BPM | OXYGEN SATURATION: 92 % | TEMPERATURE: 97.9 F | DIASTOLIC BLOOD PRESSURE: 52 MMHG | WEIGHT: 133.2 LBS | BODY MASS INDEX: 22.19 KG/M2

## 2025-01-08 PROBLEM — I25.82 TOTAL OCCLUSION OF CORONARY ARTERY, CHRONIC: Status: ACTIVE | Noted: 2025-01-08

## 2025-01-08 PROBLEM — I21.A1 TYPE 2 MYOCARDIAL INFARCTION: Status: ACTIVE | Noted: 2025-01-08

## 2025-01-08 LAB
ANION GAP SERPL CALCULATED.3IONS-SCNC: 7.4 MMOL/L (ref 5–15)
BASOPHILS # BLD AUTO: 0.03 10*3/MM3 (ref 0–0.2)
BASOPHILS NFR BLD AUTO: 0.4 % (ref 0–1.5)
BUN SERPL-MCNC: 18 MG/DL (ref 8–23)
BUN/CREAT SERPL: 9.6 (ref 7–25)
CALCIUM SPEC-SCNC: 8.7 MG/DL (ref 8.6–10.5)
CHLORIDE SERPL-SCNC: 106 MMOL/L (ref 98–107)
CHOLEST SERPL-MCNC: 134 MG/DL (ref 0–200)
CO2 SERPL-SCNC: 24.6 MMOL/L (ref 22–29)
CREAT SERPL-MCNC: 1.87 MG/DL (ref 0.76–1.27)
DEPRECATED RDW RBC AUTO: 51.8 FL (ref 37–54)
EGFRCR SERPLBLD CKD-EPI 2021: 35 ML/MIN/1.73
EOSINOPHIL # BLD AUTO: 0.22 10*3/MM3 (ref 0–0.4)
EOSINOPHIL NFR BLD AUTO: 3.1 % (ref 0.3–6.2)
ERYTHROCYTE [DISTWIDTH] IN BLOOD BY AUTOMATED COUNT: 14.1 % (ref 12.3–15.4)
GLUCOSE SERPL-MCNC: 146 MG/DL (ref 65–99)
HCT VFR BLD AUTO: 27.9 % (ref 37.5–51)
HDLC SERPL-MCNC: 50 MG/DL (ref 40–60)
HGB BLD-MCNC: 8.9 G/DL (ref 13–17.7)
IMM GRANULOCYTES # BLD AUTO: 0.06 10*3/MM3 (ref 0–0.05)
IMM GRANULOCYTES NFR BLD AUTO: 0.8 % (ref 0–0.5)
LDLC SERPL CALC-MCNC: 66 MG/DL (ref 0–100)
LDLC/HDLC SERPL: 1.3 {RATIO}
LYMPHOCYTES # BLD AUTO: 0.97 10*3/MM3 (ref 0.7–3.1)
LYMPHOCYTES NFR BLD AUTO: 13.5 % (ref 19.6–45.3)
MCH RBC QN AUTO: 32.2 PG (ref 26.6–33)
MCHC RBC AUTO-ENTMCNC: 31.9 G/DL (ref 31.5–35.7)
MCV RBC AUTO: 101.1 FL (ref 79–97)
MONOCYTES # BLD AUTO: 0.58 10*3/MM3 (ref 0.1–0.9)
MONOCYTES NFR BLD AUTO: 8.1 % (ref 5–12)
NEUTROPHILS NFR BLD AUTO: 5.3 10*3/MM3 (ref 1.7–7)
NEUTROPHILS NFR BLD AUTO: 74.1 % (ref 42.7–76)
NRBC BLD AUTO-RTO: 0 /100 WBC (ref 0–0.2)
PLATELET # BLD AUTO: 215 10*3/MM3 (ref 140–450)
PMV BLD AUTO: 10.2 FL (ref 6–12)
POTASSIUM SERPL-SCNC: 3.6 MMOL/L (ref 3.5–5.2)
QT INTERVAL: 409 MS
QTC INTERVAL: 516 MS
RBC # BLD AUTO: 2.76 10*6/MM3 (ref 4.14–5.8)
SODIUM SERPL-SCNC: 138 MMOL/L (ref 136–145)
TRIGL SERPL-MCNC: 94 MG/DL (ref 0–150)
VLDLC SERPL-MCNC: 18 MG/DL (ref 5–40)
WBC NRBC COR # BLD AUTO: 7.16 10*3/MM3 (ref 3.4–10.8)

## 2025-01-08 PROCEDURE — 99232 SBSQ HOSP IP/OBS MODERATE 35: CPT | Performed by: INTERNAL MEDICINE

## 2025-01-08 PROCEDURE — 97162 PT EVAL MOD COMPLEX 30 MIN: CPT

## 2025-01-08 PROCEDURE — 93005 ELECTROCARDIOGRAM TRACING: CPT | Performed by: INTERNAL MEDICINE

## 2025-01-08 PROCEDURE — 85025 COMPLETE CBC W/AUTO DIFF WBC: CPT | Performed by: INTERNAL MEDICINE

## 2025-01-08 PROCEDURE — 80061 LIPID PANEL: CPT | Performed by: INTERNAL MEDICINE

## 2025-01-08 PROCEDURE — 93010 ELECTROCARDIOGRAM REPORT: CPT | Performed by: INTERNAL MEDICINE

## 2025-01-08 PROCEDURE — 92526 ORAL FUNCTION THERAPY: CPT

## 2025-01-08 PROCEDURE — 80048 BASIC METABOLIC PNL TOTAL CA: CPT | Performed by: INTERNAL MEDICINE

## 2025-01-08 PROCEDURE — 25010000002 MORPHINE PER 10 MG: Performed by: STUDENT IN AN ORGANIZED HEALTH CARE EDUCATION/TRAINING PROGRAM

## 2025-01-08 RX ORDER — ASPIRIN 81 MG/1
81 TABLET ORAL DAILY
Qty: 30 TABLET | Refills: 0 | Status: SHIPPED | OUTPATIENT
Start: 2025-01-09

## 2025-01-08 RX ORDER — ATORVASTATIN CALCIUM 10 MG/1
10 TABLET, FILM COATED ORAL NIGHTLY
Qty: 90 TABLET | Refills: 0 | Status: SHIPPED | OUTPATIENT
Start: 2025-01-08

## 2025-01-08 RX ORDER — IPRATROPIUM BROMIDE AND ALBUTEROL SULFATE 2.5; .5 MG/3ML; MG/3ML
3 SOLUTION RESPIRATORY (INHALATION) EVERY 6 HOURS PRN
Status: DISCONTINUED | OUTPATIENT
Start: 2025-01-08 | End: 2025-01-08 | Stop reason: HOSPADM

## 2025-01-08 RX ORDER — METOPROLOL SUCCINATE 25 MG/1
12.5 TABLET, EXTENDED RELEASE ORAL
Qty: 30 TABLET | Refills: 0 | Status: SHIPPED | OUTPATIENT
Start: 2025-01-08

## 2025-01-08 RX ORDER — METOPROLOL SUCCINATE 25 MG/1
12.5 TABLET, EXTENDED RELEASE ORAL
Status: DISCONTINUED | OUTPATIENT
Start: 2025-01-08 | End: 2025-01-08 | Stop reason: HOSPADM

## 2025-01-08 RX ORDER — ISOSORBIDE MONONITRATE 30 MG/1
30 TABLET, EXTENDED RELEASE ORAL DAILY
Qty: 90 TABLET | Refills: 3 | Status: SHIPPED | OUTPATIENT
Start: 2025-01-08

## 2025-01-08 RX ORDER — ISOSORBIDE MONONITRATE 30 MG/1
30 TABLET, EXTENDED RELEASE ORAL
Status: DISCONTINUED | OUTPATIENT
Start: 2025-01-08 | End: 2025-01-08 | Stop reason: HOSPADM

## 2025-01-08 RX ADMIN — Medication 5 MG: at 01:03

## 2025-01-08 RX ADMIN — NALOXEGOL OXALATE 25 MG: 25 TABLET, FILM COATED ORAL at 09:11

## 2025-01-08 RX ADMIN — Medication 10 ML: at 09:11

## 2025-01-08 RX ADMIN — CLOPIDOGREL BISULFATE 75 MG: 75 TABLET ORAL at 09:11

## 2025-01-08 RX ADMIN — OXYCODONE 15 MG: 5 TABLET ORAL at 11:45

## 2025-01-08 RX ADMIN — MORPHINE SULFATE 2 MG: 2 INJECTION, SOLUTION INTRAMUSCULAR; INTRAVENOUS at 09:31

## 2025-01-08 RX ADMIN — PANTOPRAZOLE SODIUM 40 MG: 40 TABLET, DELAYED RELEASE ORAL at 09:11

## 2025-01-08 RX ADMIN — MEGESTROL ACETATE 40 MG: 40 TABLET ORAL at 09:11

## 2025-01-08 RX ADMIN — TAMSULOSIN HYDROCHLORIDE 0.4 MG: 0.4 CAPSULE ORAL at 09:11

## 2025-01-08 RX ADMIN — ASPIRIN 81 MG: 81 TABLET, COATED ORAL at 09:11

## 2025-01-08 RX ADMIN — ACETAMINOPHEN 650 MG: 325 TABLET, FILM COATED ORAL at 01:03

## 2025-01-08 NOTE — CONSULTS
"Nutrition Services    Patient Name: Kailash Cooper Jr.  YOB: 1940  MRN: [id number]  Admission date: 1/1/2025      NUTRITION SCREENING      Trending Narrative: Check on for LOS x 7 days.  Admitted 1/1 for NSTEMI.  Cardiology following.  SLP following, current diet mechanical ground.  Psych following.  Patient with SLP at time of RD visit.  Noted with plans to D/C today.         PO Diet: Diet: Regular/House; Texture: Mechanical Ground (NDD 2); Fluid Consistency: Thin (IDDSI 0)   PO Supplements: None ordered    Trending PO Intake:  0-50%       Nutritionally-Pertinent Medications RDN Reviewed, C/W clinical course         Labs (reviewed below): Reviewed, management per attending      Results from last 7 days   Lab Units 01/08/25  0440 01/07/25  0603 01/06/25  0341   SODIUM mmol/L 138 138 139   POTASSIUM mmol/L 3.6 4.2 3.6   CHLORIDE mmol/L 106 103 105   CO2 mmol/L 24.6 26.3 25.4   BUN mg/dL 18 22 20   CREATININE mg/dL 1.87* 1.47* 1.25   CALCIUM mg/dL 8.7 9.5 9.1   GLUCOSE mg/dL 146* 96 118*     Results from last 7 days   Lab Units 01/08/25  0440   HEMOGLOBIN g/dL 8.9*   HEMATOCRIT % 27.9*   TRIGLYCERIDES mg/dL 94     Lab Results   Component Value Date    HGBA1C 5.8 (H) 12/23/2024          GI Function:  Last documented BM 1/7 (yesterday)       Skin: Blanchable erythema to the sacrum per [facility] note 1/2       Weight Review: Estimated body mass index is 22.17 kg/m² as calculated from the following:    Height as of this encounter: 165.1 cm (65\").    Weight as of this encounter: 60.4 kg (133 lb 3.2 oz).    Comment:   1/8: 133#, 5.6% weight loss x 1 year     Wt Readings from Last 30 Encounters:   01/08/25 0535 60.4 kg (133 lb 3.2 oz)   01/07/25 0637 60.4 kg (133 lb 2.5 oz)   01/06/25 0600 59.2 kg (130 lb 9.6 oz)   01/05/25 0525 67.8 kg (149 lb 7.6 oz)   01/03/25 0500 61.6 kg (135 lb 12.9 oz)   01/02/25 0338 63.4 kg (139 lb 12.4 oz)   01/01/25 0725 59.9 kg (132 lb)   01/01/25 0000 60.3 kg (132 lb 15 oz)   12/17/24 " 1050 56.2 kg (124 lb)   09/05/24 1422 63.5 kg (140 lb)   08/11/24 1539 62.2 kg (137 lb 3.2 oz)   08/01/24 1207 60.3 kg (133 lb)   06/20/24 0848 60.8 kg (134 lb)   05/02/24 1443 59 kg (130 lb)   04/08/24 1109 58.1 kg (128 lb)   04/01/24 1510 52.2 kg (115 lb)   03/25/24 1815 49.9 kg (110 lb 0.2 oz)   03/25/24 1117 57.2 kg (126 lb)   02/12/24 1236 57.2 kg (126 lb)   01/29/24 1532 57.2 kg (126 lb)   08/16/23 1102 60.3 kg (133 lb)   03/15/23 1403 65.1 kg (143 lb 9.6 oz)   02/27/23 1144 64.2 kg (141 lb 9.6 oz)   11/22/22 1125 63.5 kg (140 lb)   11/21/22 1515 62.9 kg (138 lb 9.6 oz)   11/16/22 1029 61.2 kg (135 lb)   09/26/22 1506 65.8 kg (145 lb)   09/12/22 1150 65.8 kg (145 lb)   08/02/22 1255 65.8 kg (145 lb)   07/05/22 1040 66.1 kg (145 lb 12.8 oz)   06/30/22 1511 69.9 kg (154 lb)   06/27/22 1513 68.9 kg (152 lb)   06/23/22 1221 68.9 kg (152 lb)   06/16/22 1053 69.4 kg (153 lb)   06/14/22 0925 65.3 kg (144 lb)   06/09/22 1514 68 kg (150 lb)   06/03/22 1451 69.4 kg (153 lb)   06/02/22 1425 69.4 kg (153 lb)          --       Nutrition Problem Statement: Inadequate oral intake related to intake less than needs as evidenced by PO intakes less than less than 50% PO intakes since admission.         Nutrition Intervention: Add Boost Original BID (Provides 480 kcals, 20 g protein if consumed)     Continue current diet and encourage good PO intakes.          Monitoring/Evaluation Per protocol, I&O, PO intake, Pertinent labs, Weight, Swallow function          RD to follow up per protocol.    Electronically signed by:  Cindy Harman RD  01/08/25 14:07 EST

## 2025-01-08 NOTE — NURSING NOTE
Patient is restless and anxious and want to get out of bed , And wants to go home. Asked pt's dgtr if she can sit with the pt, dgtr said she needs to be at work today or she will lose her job, and no other member of family can come and sit. De escalate this to the charge nurse. Nephrology called for consult. Pt is very high risk for fall.

## 2025-01-08 NOTE — OUTREACH NOTE
Prep Survey      Flowsheet Row Responses   [facility] patient discharged from? [facility]   Is LACE score < 7 ? No   Eligibility [facility]   Date of Admission 01/01/25   Date of Discharge 01/08/25   Discharge Disposition Home-Health Care Svc   Discharge diagnosis Non ST elevation myocardial infarction  H/o CAD s/p PCI   Does the patient have one of the following disease processes/diagnoses(primary or secondary)? Acute MI (STEMI,NSTEMI)   Does the patient have Home health ordered? Yes   What is the Home health agency?  [facility]   Is there a DME ordered? No   Prep survey completed? Yes            THOMAS LARA - Registered Nurse

## 2025-01-08 NOTE — CONSULTS
INITIAL CONSULT NOTE      Patient Name: Kailash Cooper Jr.  : 1940  MRN: [id number]  Primary Care Physician: Madelyn Márquez APRN  Date of admission: 2025    Patient Care Team:  Madelyn Márquez APRN as PCP - General (Nurse Practitioner)  Marcia Lujan MD as Consulting Physician (Nephrology)  Mc Key MD as Consulting Physician (Physical Medicine and Rehabilitation)  Gila Hawkins APRN (Nurse Practitioner)  Ct Stevenson APRN as Nurse Practitioner (Family Medicine)  Mani Salguero MD as Consulting Physician (Cardiology)  Toshia Jaeger MD as Consulting Physician (Pulmonary Disease)  Terry Holliday MD as Consulting Physician (Gastroenterology)        Reason for Consult:       Renal failure  Subjective   History of Present Illness:   Chief Complaint:   No chief complaint on file.    HISTORY:  Kailash Cooper Jr. is a 84 y.o. male with past medical history of chronic kidney disease stage III but has not seen any nephrologist for the last many years.. who presents with Increasing shortness of breath and also with change mental status.  Patient has some history of chronic kidney disease baseline creatinine is around 1.3 but patient has a cardiac cath done yesterday creatinine increased to 1.8.  Seen by both me and Dr. Lujan in the past he can follow-up with either of us after discharge.  Patient to have history of dementia.      Review of systems:  All other review of system unremarkable  Constitutional: No fever, no chills, increased shortness of breath HEENT:  No headache, otalgia, itchy eyes, nasal discharge or sore throat.  Cardiac:  No chest pain,++ dyspnea, orthopnea or PND.  Chest:              No cough, phlegm or wheezing.  Abdomen:  No abdominal pain, nausea or vomiting.  Neuro:  No focal weakness, abnormal movements orseizure like activity.  :   No hematuria, no pyuria, no dysuria, no flank pain.  ROS was otherwise negative except as mentioned in  the [place].       Personal History:     Past Medical History:   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 [facility]    Diabetes     Enlarged prostate     Former smoker     Hiatal hernia     Hip pain     don    Hip pain, bilateral     Hyperlipidemia     Hypertension     Kidney disease        stage 3     Leg pain     don    Low back pain     Neck pain     Stroke        Surgical History:      Past Surgical History:   Procedure Laterality Date    CARDIAC CATHETERIZATION Left 1/28/2022    Procedure: Cardiac Catheterization/Vascular Study;  Surgeon: Mani Salguero MD;  Location: [facility] CATH INVASIVE LOCATION;  Service: Cardiovascular;  Laterality: Left;    CARDIAC CATHETERIZATION N/A 1/7/2025    Procedure: Left Heart Cath;  Surgeon: Mani Salguero MD;  Location: [facility] CATH INVASIVE LOCATION;  Service: Cardiovascular;  Laterality: N/A;    CATARACT EXTRACTION  2006    [facility]    COLONOSCOPY  2011    COLONOSCOPY N/A 5/27/2022    Procedure: COLONOSCOPY;  Surgeon: Terry Holliday MD;  Location: [facility] ENDOSCOPY;  Service: Gastroenterology;  Laterality: N/A;  polyps    CORONARY STENT PLACEMENT  01/28/2022    RCA    ENDOSCOPY N/A 5/27/2022    Procedure: ESOPHAGOGASTRODUODENOSCOPY with 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, gastric antrum biopsy;  Surgeon: Terry Holliday MD;  Location: [facility] ENDOSCOPY;  Service: Gastroenterology;  Laterality: N/A;  gastritis, gastric ulcer, small bowel AVM    LUNG CANCER SURGERY      upper lobe rt lung cancer 9/2017  at [facility]       Family History: family history includes Cancer in his father; Heart disease in an other family member; Heart failure in his brother; Stroke in his mother. Otherwise pertinent FHx was reviewed and unremarkable.     Social History:  reports that he quit smoking about 15 years ago. His smoking use included cigarettes. He started smoking about 35 years ago. He has a  20 pack-year smoking history. He has been exposed to tobacco smoke. He has never used smokeless tobacco. He reports that he does not drink alcohol and does not use drugs.    Medications:  Prior to Admission medications    Medication Sig Start Date End Date Taking? Authorizing Provider   amLODIPine (NORVASC) 10 MG tablet Take 1 tablet by mouth Daily. 1/3/22  Yes Madelyn Márquez APRN   cetirizine (zyrTEC) 10 MG tablet Take 1 tablet by mouth Daily. 4/30/20  Yes Madelyn Márquez APRN   Cholecalciferol (VITAMIN D3) 50 MCG (2000 UT) capsule Take 2,000 Units by mouth Daily.   Yes ProviderBritney MD   famotidine (PEPCID) 20 MG tablet Take 20 mg by mouth Daily.   Yes ProviderBritney MD   ferrous sulfate 325 (65 FE) MG EC tablet Take 1 tablet by mouth Daily With Breakfast. 2/9/20  Yes Geovanny Van MD   glimepiride (AMARYL) 1 MG tablet Take 1 tablet by mouth 2 (Two) Times a Day. 1/3/22  Yes Madelyn Márquez APRN   hydrALAZINE (APRESOLINE) 50 MG tablet Take 1 tablet by mouth 3 (Three) Times a Day. 1/3/22  Yes Madelyn Márquez APRN   HYDROcodone-acetaminophen (NORCO)  MG per tablet Take 1 tablet by mouth Every 4 (Four) Hours As Needed for Severe Pain . 12/13/21  Yes Mc Key MD   ipratropium-albuterol (DUO-NEB) 0.5-2.5 mg/3 ml nebulizer Take 3 mL by nebulization Every 4 (Four) Hours As Needed for Wheezing. 7/7/21  Yes Madelyn Márquez APRN   metoprolol tartrate (LOPRESSOR) 25 MG tablet Take 1 tablet by mouth 2 (Two) Times a Day. 1/3/22  Yes Madelyn Márquez APRN   nitroglycerin (Nitrostat) 0.3 MG SL tablet Place 1 tablet under the tongue Every 5 (Five) Minutes As Needed for Chest Pain. Take no more than 3 doses in 15 minutes. 1/17/22  Yes Madelyn Márquez APRN   pravastatin (PRAVACHOL) 40 MG tablet Take 1 tablet by mouth Daily. 1/3/22  Yes Madelyn Márquez APRN   primidone (MYSOLINE) 50 MG tablet Take 1 tablet by mouth Every Night. 12/29/21  Yes Madelyn Márquez APRN   pseudoephedrine (SUDAFED) 60  MG tablet Take 60 mg by mouth Every 4 (Four) Hours As Needed for Congestion.   Yes ProviderBritney MD   ramipril (ALTACE) 5 MG capsule Take 1 capsule by mouth Every Evening. 1/3/22  Yes Madelyn Márquez APRN   tamsulosin (FLOMAX) 0.4 MG capsule 24 hr capsule Take 1 capsule by mouth Daily. 1/3/22  Yes Madelyn Márquez APRN   torsemide (DEMADEX) 5 MG tablet Take 1 tablet by mouth Daily. 1/3/22  Yes Madelyn Mráquez APRN   vitamin B-12 (CYANOCOBALAMIN) 1000 MCG tablet Take 1 tablet by mouth Daily.  Patient taking differently: Take 1,000 mcg by mouth 2 (Two) Times a Day. 2/9/20  Yes Geovanny Van MD     Scheduled Meds:aspirin, 81 mg, Oral, Daily  atorvastatin, 10 mg, Oral, Nightly  budesonide, 0.5 mg, Nebulization, BID - RT  clopidogrel, 75 mg, Oral, Daily  isosorbide mononitrate, 30 mg, Oral, Q24H  megestrol, 40 mg, Oral, Daily  metoprolol succinate XL, 12.5 mg, Oral, Q24H  pantoprazole, 40 mg, Oral, BID  rOPINIRole, 0.25 mg, Oral, Daily  sodium chloride, 10 mL, Intravenous, Q12H  tamsulosin, 0.4 mg, Oral, Daily      Continuous Infusions:     PRN Meds:  acetaminophen    aluminum-magnesium hydroxide-simethicone    atropine    senna-docusate sodium **AND** polyethylene glycol **AND** bisacodyl **AND** bisacodyl    diphenhydrAMINE    hydrALAZINE    ipratropium-albuterol    melatonin    Morphine    nitroglycerin    ondansetron ODT **OR** ondansetron    oxyCODONE    sodium chloride  Allergies:  No Known Allergies    Objective   Exam:     Vital Signs  Temp:  [97.8 °F (36.6 °C)-99.2 °F (37.3 °C)] 97.9 °F (36.6 °C)  Heart Rate:  [48-90] 57  Resp:  [13-25] 17  BP: ()/(39-72) 116/52  SpO2:  [90 %-100 %] 95 %  on   ;   Device (Oxygen Therapy): room air  Body mass index is 22.17 kg/m².  EXAM  General: Elderly white  male in coughing  Head:      Normocephalic and atraumatic.    Eyes:      PERRL/EOM intact, conjunctivae and sclerae clear without nystagmus.    Neck:      No masses, thyromegaly,  trachea central   Lungs:     Rhonchi mild Rales bilaterally to auscultation.    Heart:      Regular rate and rhythm, no murmur no gallop  Abd:        Soft, nontender, not distended, bowel sounds positive, no shifting dullness.  Msk:        No deformity or scoliosis noted of thoracic or lumbar spine.    Pulses:   Pulses normal in all 4 extremities.    Extremities:        No cyanosis or clubbing--+1 edema.    Neuro:    No focal deficits.   alert oriented x3  Skin:       Intact without lesions or rashes.    Psych:    Alert and cooperative; normal mood and affect; normal attention span       Results Review:  I have personally reviewed most recent Data :  BMP @LABRCNT(creatinine:10)  CBC    Results from last 7 days   Lab Units 01/08/25  0440 01/07/25  0603 01/06/25  0341 01/05/25  0141 01/04/25  0407 01/03/25  0408 01/02/25  0421   WBC 10*3/mm3 7.16 7.75 7.83 7.34 8.16 10.79 11.78*   HEMOGLOBIN g/dL 8.9* 10.0* 10.2* 10.1* 11.5* 12.7* 14.4   PLATELETS 10*3/mm3 215 216 225 256 271 333 310     CMP   Results from last 7 days   Lab Units 01/08/25  0440 01/07/25  0603 01/06/25  0341 01/05/25  0141 01/04/25  0407 01/03/25  0408 01/02/25  0421   SODIUM mmol/L 138 138 139 142 143 143 144   POTASSIUM mmol/L 3.6 4.2 3.6 3.6 3.6 3.8 3.5   CHLORIDE mmol/L 106 103 105 107 106 105 105   CO2 mmol/L 24.6 26.3 25.4 25.6 23.0 23.4 19.8*   BUN mg/dL 18 22 20 25* 26* 30* 19   CREATININE mg/dL 1.87* 1.47* 1.25 1.28* 1.31* 1.37* 1.14   GLUCOSE mg/dL 146* 96 118* 124* 95 108* 109*     ABG      MRI Brain Without Contrast    Result Date: 1/3/2025  Impression: Motion-degraded exam demonstrating moderate typical chronic and age-related findings. No evidence of acute infarct, hemorrhage or mass effect. Electronically Signed: Ortega Herrera MD  1/3/2025 10:30 PM EST  Workstation ID: [id number]    CT Head Without Contrast    Result Date: 1/1/2025  Impression: 1.The exam is essentially nondiagnostic due to considerable motion artifact. 2.Volume loss secondary to cerebral atrophy.  3.Suggestion of chronic white matter microvascular ischemia. 4.I cannot exclude a small subdural hematoma or acute ischemia. Electronically Signed: Josiah Lizarraga MD  1/1/2025 4:39 PM EST  Workstation ID: [id number]       Results for orders placed during the hospital encounter of 01/01/25    Adult Transthoracic Echo Complete W/ Cont if Necessary Per Protocol    Interpretation Summary    Left ventricular systolic function is normal. Calculated left ventricular EF = 56% Left ventricular ejection fraction appears to be 56 - 60%.    Left ventricular diastolic function is consistent with (grade II w/high LAP) pseudonormalization.    Mild to moderate aortic valve stenosis is present.    Estimated right ventricular systolic pressure from tricuspid regurgitation is normal (<35 mmHg).        Assessment & Plan   Assessment and Plan:         NSTEMI (non-ST elevated myocardial infarction)    Total occlusion of coronary artery, chronic    Type 2 myocardial infarction    ASSESSMENT:  Acute kidney injury patient with chronic kidney disease stage III  Chronic kidney disease stage III   history of hypertension  History of sleep apnea  Congestive heart failure  History of TIA  History of diabetes mellitus          Plan:     Some increasing creatinine at this time likely related to to diuretics and status postcardiac catheterization time patient volume status is acceptable  As clinically patient is much better continue same diuretics okay to be discharged later today  Follow-up in the renal clinic in 6 to 8 weeks   repeat labs in 1 week  Cardiac cath done earlier yesterday did show patient has some coronary artery disease only medical management needed.    F manage blood pressure aggressively with   echocardiogram result appreciated EF 56 to 60%.  With grade 2 diastolic dysfunctions.  Continue beta-blocker no need to start ACE inhibitors  Thank you for letting me participate    Note started  by Destin Keene MD,   [facility] kidney  consultant  [phone]      `

## 2025-01-08 NOTE — PLAN OF CARE
Goal Outcome Evaluation:            nephrology and cardiology cleared pt for discharge.

## 2025-01-08 NOTE — PLAN OF CARE
Goal Outcome Evaluation:  Plan of Care Reviewed With: patient           Outcome Evaluation: 85 yo male adm 1/1/25 for acute altered mental status. Was found to have nstemi and acute delirium, acute on chronic respiratory failure. PMH: nsclc, ckd, tremors, MI, cva, copd, cad. At baseline, pt reports he is very independent. Reports he drives and does not use assistive device for mobility. Lives w/ spouse in single level home. Pt reports he has rw and cane at home but does not use. Pt is very confused this date, so questionable historian. Today, pt demonstrates good mm strength and comes to sit and stand w/ cga. However, in standing, he is unable to maintain his balance w/o holding on to furniture. Ambulates w/ rw and cga x 40 ft well. Has significant spinal deformity. Recommend home w/ 24 hr care, home health, and use of his rw at home. Will follow.

## 2025-01-08 NOTE — THERAPY TREATMENT NOTE
Acute Care - Speech Language Pathology   Swallow Treatment Note MIRIAN Mast     Patient Name: Kailash Cooper Jr.  : 1940  MRN: [id number]  Today's Date: 2025               Admit Date: 2025    Visit Dx:     ICD-10-CM ICD-9-CM   1. NSTEMI (non-ST elevated myocardial infarction)  I21.4 410.70     Patient Active Problem List   Diagnosis    Chronic bilateral low back pain without sciatica    History of malignant neoplasm of thoracic cavity structure    Allergic rhinitis    Anemia due to stage 3 chronic kidney disease    Acute conjunctivitis    Osteoarthritis    Encounter for screening for malignant neoplasm of prostate    Enlarged prostate without lower urinary tract symptoms (luts)    Fatigue    Gastroesophageal reflux disease    Gastrointestinal hemorrhage    History of transient ischemic attack    Hydronephrosis    Hyperkalemia    Mixed hyperlipidemia    Essential hypertension    Hypogonadism    Male erectile disorder (CODE)    Obstructive sleep apnea    Osteomalacia    Secondary hyperparathyroidism of renal origin    Status post patent foramen ovale closure    Essential tremor    Vitamin D deficiency    Type 2 diabetes mellitus    Chronic kidney disease, stage 3 (moderate)    Hypertensive encephalopathy    Lung nodule    Lumbosacral spondylosis without myelopathy    Cervical spondylosis without myelopathy    Thoracic spondylosis    Chronic pain syndrome    Cervicalgia    Chest pain, atypical    CAP (community acquired pneumonia)    Elevated troponin    Positive D dimer    Chest pain    Acute respiratory failure with hypoxia    Sepsis without acute organ dysfunction    Non-STEMI (non-ST elevated myocardial infarction)    Oxygen dependent    Abnormal EKG    Anemia    History of duodenal ulcer    Melena    Erosive esophagitis    Multiple gastric ulcers    Bleeding chronic duodenal ulcer    Other insomnia    Intolerance to cold    Coronary artery disease involving native coronary artery of native heart without  angina pectoris    Underweight    Bilateral pneumonia    Weakness    Pneumonia, unspecified organism    BMI 22.0-22.9, adult    Muscle twitching    Shortness of breath    NSTEMI (non-ST elevated myocardial infarction)     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 [facility]    Diabetes     Enlarged prostate     Former smoker     Hiatal hernia     Hip pain     don    Hip pain, bilateral     Hyperlipidemia     Hypertension     Kidney disease        stage 3     Leg pain     don    Low back pain     Neck pain     Stroke      Past Surgical History:   Procedure Laterality Date    CARDIAC CATHETERIZATION Left 1/28/2022    Procedure: Cardiac Catheterization/Vascular Study;  Surgeon: Mani Salguero MD;  Location: [facility] CATH INVASIVE LOCATION;  Service: Cardiovascular;  Laterality: Left;    CARDIAC CATHETERIZATION N/A 1/7/2025    Procedure: Left Heart Cath;  Surgeon: Mani Salguero MD;  Location: [facility] CATH INVASIVE LOCATION;  Service: Cardiovascular;  Laterality: N/A;    CAT[facility]ACT EXTRACTION  2006    [facility]    COLONOSCOPY  2011    COLONOSCOPY N/A 5/27/2022    Procedure: COLONOSCOPY;  Surgeon: Terry Holliday MD;  Location: [facility] ENDOSCOPY;  Service: Gastroenterology;  Laterality: N/A;  polyps    CORONARY STENT PLACEMENT  01/28/2022    RCA    ENDOSCOPY N/A 5/27/2022    Procedure: ESOPHAGOGASTRODUODENOSCOPY with 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, gastric antrum biopsy;  Surgeon: Terry Holliday MD;  Location: [facility] ENDOSCOPY;  Service: Gastroenterology;  Laterality: N/A;  gastritis, gastric ulcer, small bowel AVM    LUNG CANCER SURGERY      upper lobe rt lung cancer 9/2017  at [facility]       SLP Recommendation and Plan                                                                                          EDUCATION  The patient has been educated in the following areas:   Dysphagia (Swallowing Impairment) Modified  Diet Instruction.        SLP GOALS       Row Name 01/08/25 1000       (LTG) Swallow    (LTG) Swallow The patient will maximize swallow function for least restrictive po diet, exhibiting no complications associated with dysphagia, adequate po intake, and demonstrating independent use of safe swallow compensations  -MB    [place] (Swallow Long Term Goal) independently (over 90% accuracy)  -MB    Time Frame (Swallow Long Term Goal) by discharge  -MB    Progress/Outcomes (Swallow Long Term Goal) goal ongoing  -MB    Comment (Swallow Long Term Goal) Pt was seen for skilled ST targeting meal assessment. Upright and eating in chair with nursing tech present. Tech and pt report no perceived difficulty. Pt in good spirits, demonstrating humor and good-natured sarcasm along with good command following and responses to social cues. Meal tray consisted of scrambled eggs, peaches, and ground sausage with thin liquid drinks by straw. Pt with upper and lower dentures in place. Self-fed roughly x15 bites solids and ~x4 sequential drinks of thin liquid. Demonstrated multiple swallows for each  thin trial and oral holding upon 2nd swallow for roughly 5-7 seconds; x1 cough and x1 throat clear occurred after thins; adequate labial seal with no spillage; functional rotary chew; pt tends to overstuff oral cavity. No complaint from pt at this time. Based on performance with PO, it is recommended that pt continue current [facility] ground/ thins diet.     Recommend: Continue mechanical ground solids/ thin liquids. If pt demonstrates persistent s/sx of aspiration please make NPO and immediately contact ST. ST will continue to follow to ensure safety and tolerance to diet and make additional recs as indicated.   -MB       (STG) Swallow 1    (STG) Swallow 1 The patient will participate in ongoing assessment of swallow, including re-evaluation clinically and/or including instrumental assessment of swallow if indicated, to further assess swallow  function in anticipation to initiate a PO diet  -MB    [place] (Swallow Short Term Goal 1) independently (over 90% accuracy)  -MB    Time Frame (Swallow Short Term Goal 1) by discharge  -MB    Progress/Outcomes (Swallow Short Term Goal 1) goal ongoing  -MB              User Key  (r) = Recorded By, (t) = Taken By, (c) = Cosigned By      Initials Name Provider Type    Randi James, SLP Speech and Language Pathologist    Denny Mansfield, SLP Speech and Language Pathologist                         Time Calculation:       Therapy Charges for Today       Code Description Service Date Service Provider Modifiers Qty    [id number]  ST TREATMENT SWALLOW 5 1/7/2025 Denny Phipps, SLP GN 1                 SCOT Doan  1/8/2025

## 2025-01-08 NOTE — PROGRESS NOTES
LOS: 7 days   Admitting Physician- Tono Fuentes MD    Reason For Followup:    Non-ST elevation myocardial infarction  Acute delirium  Mental status changes  CAD  Coronary artery stenting  Takotsubo syndrome    Subjective     Patient is lying comfortably in bed.  No complaints    Objective     Hemodynamics are stable right groin is without hematoma    Review of Systems:   Review of Systems   Constitutional: Negative for chills and fever.   HENT:  Negative for ear discharge and nosebleeds.    Eyes:  Negative for discharge and redness.   Cardiovascular:  Negative for chest pain, orthopnea, palpitations, paroxysmal nocturnal dyspnea and syncope.   Respiratory:  Negative for cough, shortness of breath and wheezing.    Endocrine: Negative for heat intolerance.   Skin:  Negative for rash.   Musculoskeletal:  Negative for arthritis and myalgias.   Gastrointestinal:  Negative for abdominal pain, melena, nausea and vomiting.   Genitourinary:  Negative for dysuria and hematuria.   Neurological:  Negative for dizziness, light-headedness, numbness and tremors.   Psychiatric/Behavioral:  Negative for depression. The patient is not nervous/anxious.          Vital Signs  Vitals:    01/08/25 0535 01/08/25 0600 01/08/25 0900 01/08/25 1200   BP:  (!) 97/39 115/54 116/52   BP Location:    Right arm   Patient Position:    Sitting   Pulse:  (!) 48 (!) 48 57   Resp:    17   Temp:    97.9 °F (36.6 °C)   TempSrc:    Temporal   SpO2:  96% 95% 95%   Weight: 60.4 kg (133 lb 3.2 oz)      Height:         Wt Readings from Last 1 Encounters:   01/08/25 60.4 kg (133 lb 3.2 oz)       Intake/Output Summary (Last 24 hours) at 1/8/2025 1428  Last data filed at 1/8/2025 0600  Gross per 24 hour   Intake 1008 ml   Output 1200 ml   Net -192 ml     Physical Exam:  Constitutional:       Appearance: Well-developed.   Eyes:      General: No scleral icterus.        Right eye: No discharge.   HENT:      Head: Normocephalic and atraumatic.   Neck:       Thyroid: No thyromegaly.      Lymphadenopathy: No cervical adenopathy.   Pulmonary:      Effort: Pulmonary effort is normal. No respiratory distress.      Breath sounds: Normal breath sounds. No wheezing. No rales.   Cardiovascular:      Normal rate. Regular rhythm.      No gallop.    Edema:     Peripheral edema absent.   Abdominal:      Tenderness: There is no abdominal tenderness.   Skin:     Findings: No erythema or rash.   Neurological:      Mental Status: Alert and oriented to person, place, and time.         Results Review:   Lab Results (last 24 hours)       Procedure Component Value Units Date/Time    Basic Metabolic Panel [[id number]]  (Abnormal) Collected: 01/08/25 0440    Specimen: Blood Updated: 01/08/25 0539     Glucose 146 mg/dL      BUN 18 mg/dL      Creatinine 1.87 mg/dL      Sodium 138 mmol/L      Potassium 3.6 mmol/L      Chloride 106 mmol/L      CO2 24.6 mmol/L      Calcium 8.7 mg/dL      BUN/Creatinine Ratio 9.6     Anion Gap 7.4 mmol/L      eGFR 35.0 mL/min/1.73     Narrative:      GFR Categories in Chronic Kidney Disease (CKD)      GFR Category          GFR (mL/min/1.73)    Interpretation  G1                     90 or greater         Normal or high (1)  G2                      60-89                Mild decrease (1)  G3a                   45-59                Mild to moderate decrease  G3b                   30-44                Moderate to severe decrease  G4                    15-29                Severe decrease  G5                    14 or less           Kidney failure          (1)In the absence of evidence of kidney disease, neither GFR category G1 or G2 fulfill the criteria for CKD.    eGFR calculation 2021 CKD-EPI creatinine equation, which does not include race as a factor    Lipid Panel [[id number]] Collected: 01/08/25 0440    Specimen: Blood Updated: 01/08/25 0539     Total Cholesterol 134 mg/dL      Triglycerides 94 mg/dL      HDL Cholesterol 50 mg/dL      LDL Cholesterol  66 mg/dL       VLDL Cholesterol 18 mg/dL      LDL/HDL Ratio 1.30    Narrative:      Cholesterol Reference Ranges  (U.[place]. Department of Health and Human Services ATP III Classifications)    Desirable          <200 mg/dL  Borderline High    200-239 mg/dL  High Risk          >240 mg/dL      Triglyceride Reference Ranges  (U.S. Department of Health and Human Services ATP III Classifications)    Normal           <150 mg/dL  Borderline High  150-199 mg/dL  High             200-499 mg/dL  Very High        >500 mg/dL    HDL Reference Ranges  (U.S. Department of Health and Human Services ATP III Classifications)    Low     <40 mg/dl (major risk factor for CHD)  High    >60 mg/dl ('negative' risk factor for CHD)        LDL Reference Ranges  (U.S. Department of Health and Human Services ATP III Classifications)    Optimal          <100 mg/dL  Near Optimal     100-129 mg/dL  Borderline High  130-159 mg/dL  High             160-189 mg/dL  Very High        >189 mg/dL    CBC Auto Differential [[id number]]  (Abnormal) Collected: 01/08/25 0440    Specimen: Blood Updated: 01/08/25 0510     WBC 7.16 10*3/mm3      RBC 2.76 10*6/mm3      Hemoglobin 8.9 g/dL      Hematocrit 27.9 %      .1 fL      MCH 32.2 pg      MCHC 31.9 g/dL      RDW 14.1 %      RDW-SD 51.8 fl      MPV 10.2 fL      Platelets 215 10*3/mm3      Neutrophil % 74.1 %      Lymphocyte % 13.5 %      Monocyte % 8.1 %      Eosinophil % 3.1 %      Basophil % 0.4 %      Immature Grans % 0.8 %      Neutrophils, Absolute 5.30 10*3/mm3      Lymphocytes, Absolute 0.97 10*3/mm3      Monocytes, Absolute 0.58 10*3/mm3      Eosinophils, Absolute 0.22 10*3/mm3      Basophils, Absolute 0.03 10*3/mm3      Immature Grans, Absolute 0.06 10*3/mm3      nRBC 0.0 /100 WBC           Imaging Results (Last 72 Hours)       ** No results found for the last 72 hours. **          ECG/EMG Results (most recent)       Procedure Component Value Units Date/Time    Adult Transthoracic Echo Complete W/ Cont if Necessary  Per Protocol [[id number]] Resulted: 01/01/25 0931     Updated: 01/01/25 0931     LVIDd 4.4 cm      LVIDs 3.5 cm      IVSd 1.10 cm      LVPWd 1.00 cm      FS 20.5 %      IVS/LVPW 1.10 cm      ESV(cubed) 42.9 ml      EDV(cubed) 85.2 ml      LV mass(C)d 158.2 grams      LVOT area 3.8 cm2      LVOT diam 2.20 cm      EDV(MOD-sp4) 77.7 ml      ESV(MOD-sp4) 33.9 ml      SV(MOD-sp4) 43.8 ml      EF(MOD-sp4) 56.4 %      MV E max levi 182.0 cm/sec      Pulm A Revs Dur 0.11 sec      Med Peak E' Levi 7.9 cm/sec      Lat Peak E' Levi 6.3 cm/sec      TR max levi 274.0 cm/sec      Avg E/e' ratio 25.63     SV(LVOT) 93.1 ml      SV(RVOT) 23.1 ml      Qp/Qs 0.25     RVIDd 2.9 cm      TAPSE (>1.6) 2.44 cm      RV S' 18.3 cm/sec      LA dimension (2D)  3.7 cm      Pulm Sys Levi 74.3 cm/sec      Pulm Austin Levi 57.5 cm/sec      Pulm S/D 1.29     Pulm A Revs Levi 41.5 cm/sec      LV V1 max 116.0 cm/sec      LV V1 max PG 5.4 mmHg      LV V1 mean PG 3.0 mmHg      LV V1 VTI 24.5 cm      Ao pk levi 179.0 cm/sec      Ao max PG 12.8 mmHg      Ao mean PG 7.0 mmHg      Ao V2 VTI 30.0 cm      TAMI(I,D) 3.1 cm2      MV max PG 12.7 mmHg      MV mean PG 5.0 mmHg      MV V2 VTI 27.8 cm      MVA(VTI) 3.4 cm2      TR max PG 30.0 mmHg      RVOT diam 1.40 cm      RV V1 max PG 3.7 mmHg      RV V1 max 95.8 cm/sec      RV V1 VTI 15.0 cm      PA V2 max 107.5 cm/sec      ACS 0.70 cm      Sinus 3.4 cm      Dimensionless Index 0.65 (DI)      EF(MOD-bp) 56.0 %     Narrative:        Left ventricular systolic function is normal. Calculated left   ventricular EF = 56% Left ventricular ejection fraction appears to be 56 -   60%.    Left ventricular diastolic function is consistent with (grade II w/high   LAP) pseudonormalization.    Mild to moderate aortic valve stenosis is present.    Estimated right ventricular systolic pressure from tricuspid   regurgitation is normal (<35 mmHg).      Telemetry Scan [[id number]] Resulted: 01/01/25     Updated: 01/02/25 1050    ECG 12 Lead QT  Measurement [[id number]] Collected: 01/02/25 1307     Updated: 01/02/25 1308     QT Interval 409 ms      QTC Interval 516 ms     Narrative:      HEART RATE=96  bpm  RR Ozxhcvtw=595  ms  IL Ktcsraiy=805  ms  P Horizontal Axis=-3  deg  P Front Axis=41  deg  QRSD Yhtwqkip=195  ms  QT Esbqpxjo=867  ms  ZZeA=863  ms  QRS Axis=-75  deg  T Wave Axis=-59  deg  - ABNORMAL ECG -  Sinus rhythm  Right bundle branch block  Abnormal T, consider ischemia, lateral leads  Date and Time of Study:2025-01-02 13:07:30    ECG 12 Lead QT Measurement [[id number]] Collected: 01/03/25 0826     Updated: 01/04/25 1330     QT Interval 384 ms      QTC Interval 486 ms     Narrative:      HEART RATE=96  bpm  RR Bsbgdsrf=063  ms  IL Qreicijx=082  ms  P Horizontal Axis=-68  deg  P Front Axis=48  deg  QRSD Qhlkcatg=599  ms  QT Yoyzzyxy=764  ms  BAhC=410  ms  QRS Axis=-67  deg  T Wave Axis=-60  deg  - ABNORMAL ECG -  Sinus rhythm  Ventricular premature complex  Probable  left atrial enlargement  Right bundle branch block  Left anterior fascicular block  Abnormal T, consider ischemia, lateral leads  When compared with ECG of 02-Jan-2025 13:07:30,  Nonspecific significant change  Electronically Signed By: Vincent Eng ([facility]) 2025-01-04 13:25:01  Date and Time of Study:2025-01-03 08:26:18    Telemetry Scan [[id number]] Resulted: 01/01/25     Updated: 01/06/25 0943    Telemetry Scan [[id number]] Resulted: 01/01/25     Updated: 01/06/25 0946    Telemetry Scan [[id number]] Resulted: 01/01/25     Updated: 01/06/25 1042    Telemetry Scan [[id number]] Resulted: 01/01/25     Updated: 01/06/25 1110    Telemetry Scan [[id number]] Resulted: 01/01/25     Updated: 01/06/25 1133    Telemetry Scan [[id number]] Resulted: 01/01/25     Updated: 01/06/25 1133    Telemetry Scan [[id number]] Resulted: 01/01/25     Updated: 01/07/25 0854    Telemetry Scan [[id number]] Resulted: 01/01/25     Updated: 01/07/25 0941    Telemetry Scan [[id number]] Resulted: 01/01/25     Updated: 01/07/25  1029    Telemetry Scan [[id number]] Resulted: 01/01/25     Updated: 01/07/25 1515    ECG 12 Lead Pre-Op / Pre-Procedure [[id number]] Collected: 01/08/25 0458     Updated: 01/08/25 0459     QT Interval 508 ms      QTC Interval 465 ms     Narrative:      HEART RATE=50  bpm  RR Lvgkczby=4345  ms  NM Ydydxrlv=711  ms  P Horizontal Axis=-5  deg  P Front Axis=46  deg  QRSD Tdqybtgm=780  ms  QT Qvpmhlvc=335  ms  VVvO=494  ms  QRS Axis=-41  deg  T Wave Axis=245  deg  - ABNORMAL ECG -  Sinus bradycardia  RBBB and LAFB  Abnormal T, probable ischemia, lateral leads  Date and Time of Study:2025-01-08 04:58:13    Telemetry Scan [[id number]] Resulted: 01/01/25     Updated: 01/08/25 0641    Telemetry Scan [[id number]] Resulted: 01/01/25     Updated: 01/08/25 0745    Telemetry Scan [[id number]] Resulted: 01/01/25     Updated: 01/08/25 0756    Telemetry Scan [[id number]] Resulted: 01/01/25     Updated: 01/08/25 0843    Telemetry Scan [[id number]] Resulted: 01/01/25     Updated: 01/08/25 0915    Telemetry Scan [[id number]] Resulted: 01/01/25     Updated: 01/08/25 0945    Telemetry Scan [[id number]] Resulted: 01/01/25     Updated: 01/08/25 1021    Telemetry Scan [[id number]] Resulted: 01/01/25     Updated: 01/08/25 1043    Telemetry Scan [[id number]] Resulted: 01/01/25     Updated: 01/08/25 1148    Telemetry Scan [[id number]] Resulted: 01/01/25     Updated: 01/08/25 1215    Telemetry Scan [[id number]] Resulted: 01/01/25     Updated: 01/08/25 1231          CBC    Results from last 7 days   Lab Units 01/08/25  0440 01/07/25  0603 01/06/25  0341 01/05/25  0141 01/04/25  0407 01/03/25  0408 01/02/25  0421   WBC 10*3/mm3 7.16 7.75 7.83 7.34 8.16 10.79 11.78*   HEMOGLOBIN g/dL 8.9* 10.0* 10.2* 10.1* 11.5* 12.7* 14.4   PLATELETS 10*3/mm3 215 216 225 256 271 333 310     BMP   Results from last 7 days   Lab Units 01/08/25  0440 01/07/25  0603 01/06/25  0341 01/05/25  0141 01/04/25  0407 01/03/25  0408 01/02/25  0421   SODIUM mmol/L 138 138 139 142 143 143  144   POTASSIUM mmol/L 3.6 4.2 3.6 3.6 3.6 3.8 3.5   CHLORIDE mmol/L 106 103 105 107 106 105 105   CO2 mmol/L 24.6 26.3 25.4 25.6 23.0 23.4 19.8*   BUN mg/dL 18 22 20 25* 26* 30* 19   CREATININE mg/dL 1.87* 1.47* 1.25 1.28* 1.31* 1.37* 1.14   GLUCOSE mg/dL 146* 96 118* 124* 95 108* 109*     CMP   Results from last 7 days   Lab Units 01/08/25  0440 01/07/25  0603 01/06/25  0341 01/05/25  0141 01/04/25  0407 01/03/25  0408 01/02/25  0421   SODIUM mmol/L 138 138 139 142 143 143 144   POTASSIUM mmol/L 3.6 4.2 3.6 3.6 3.6 3.8 3.5   CHLORIDE mmol/L 106 103 105 107 106 105 105   CO2 mmol/L 24.6 26.3 25.4 25.6 23.0 23.4 19.8*   BUN mg/dL 18 22 20 25* 26* 30* 19   CREATININE mg/dL 1.87* 1.47* 1.25 1.28* 1.31* 1.37* 1.14   GLUCOSE mg/dL 146* 96 118* 124* 95 108* 109*     Cardiac Studies:  Echo- Results for orders placed during the hospital encounter of 01/01/25    Adult Transthoracic Echo Complete W/ Cont if Necessary Per Protocol    Interpretation Summary    Left ventricular systolic function is normal. Calculated left ventricular EF = 56% Left ventricular ejection fraction appears to be 56 - 60%.    Left ventricular diastolic function is consistent with (grade II w/high LAP) pseudonormalization.    Mild to moderate aortic valve stenosis is present.    Estimated right ventricular systolic pressure from tricuspid regurgitation is normal (<35 mmHg).    Stress Myoview-  Cath-      Medication Review:   Scheduled Meds:aspirin, 81 mg, Oral, Daily  atorvastatin, 10 mg, Oral, Nightly  budesonide, 0.5 mg, Nebulization, BID - RT  clopidogrel, 75 mg, Oral, Daily  megestrol, 40 mg, Oral, Daily  metoprolol succinate XL, 12.5 mg, Oral, Q24H  pantoprazole, 40 mg, Oral, BID  rOPINIRole, 0.25 mg, Oral, Daily  sodium chloride, 10 mL, Intravenous, Q12H  tamsulosin, 0.4 mg, Oral, Daily      Continuous Infusions:   PRN Meds:.  acetaminophen    aluminum-magnesium hydroxide-simethicone    atropine    senna-docusate sodium **AND** polyethylene  glycol **AND** bisacodyl **AND** bisacodyl    diphenhydrAMINE    hydrALAZINE    ipratropium-albuterol    melatonin    Morphine    nitroglycerin    ondansetron ODT **OR** ondansetron    oxyCODONE    sodium chloride      Assessment & Plan     NSTEMI (non-ST elevated myocardial infarction)    Total occlusion of coronary artery, chronic    Type 2 myocardial infarction    MDM:    1.  Non-ST elevation myocardial infarction:    Patient probably have Takotsubo syndrome causing increased demand type II myocardial infarction.  Patient had 100% LCx stenosis which could have contributed to that.  I would recommend medical treatment    2.  CAD:    Previous stent was patent patient had intermediate lesion of PDA and LAD.  Recommend observation.  I would add Imdur    3.  Mixed hyperlipidemia:    Continue Lipitor.    4.  Sinus bradycardia:    Beta-blocker has been decreased.    Mani Salguero MD  01/08/25  14:28 EST

## 2025-01-08 NOTE — THERAPY EVALUATION
Patient Name: Kailash Cooper Jr.  : 1940    MRN: [id number]                              Today's Date: 2025       Admit Date: 2025    Visit Dx:     ICD-10-CM ICD-9-CM   1. NSTEMI (non-ST elevated myocardial infarction)  I21.4 410.70     Patient Active Problem List   Diagnosis    Chronic bilateral low back pain without sciatica    History of malignant neoplasm of thoracic cavity structure    Allergic rhinitis    Anemia due to stage 3 chronic kidney disease    Acute conjunctivitis    Osteoarthritis    Encounter for screening for malignant neoplasm of prostate    Enlarged prostate without lower urinary tract symptoms (luts)    Fatigue    Gastroesophageal reflux disease    Gastrointestinal hemorrhage    History of transient ischemic attack    Hydronephrosis    Hyperkalemia    Mixed hyperlipidemia    Essential hypertension    Hypogonadism    Male erectile disorder (CODE)    Obstructive sleep apnea    Osteomalacia    Secondary hyperparathyroidism of renal origin    Status post patent foramen ovale closure    Essential tremor    Vitamin D deficiency    Type 2 diabetes mellitus    Chronic kidney disease, stage 3 (moderate)    Hypertensive encephalopathy    Lung nodule    Lumbosacral spondylosis without myelopathy    Cervical spondylosis without myelopathy    Thoracic spondylosis    Chronic pain syndrome    Cervicalgia    Chest pain, atypical    CAP (community acquired pneumonia)    Elevated troponin    Positive D dimer    Chest pain    Acute respiratory failure with hypoxia    Sepsis without acute organ dysfunction    Non-STEMI (non-ST elevated myocardial infarction)    Oxygen dependent    Abnormal EKG    Anemia    History of duodenal ulcer    Melena    Erosive esophagitis    Multiple gastric ulcers    Bleeding chronic duodenal ulcer    Other insomnia    Intolerance to cold    Coronary artery disease involving native coronary artery of native heart without angina pectoris    Underweight    Bilateral pneumonia     Weakness    Pneumonia, unspecified organism    BMI 22.0-22.9, adult    Muscle twitching    Shortness of breath    NSTEMI (non-ST elevated myocardial infarction)     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 [facility]    Diabetes     Enlarged prostate     Former smoker     Hiatal hernia     Hip pain     don    Hip pain, bilateral     Hyperlipidemia     Hypertension     Kidney disease        stage 3     Leg pain     don    Low back pain     Neck pain     Stroke      Past Surgical History:   Procedure Laterality Date    CARDIAC CATHETERIZATION Left 1/28/2022    Procedure: Cardiac Catheterization/Vascular Study;  Surgeon: Mani Salguero MD;  Location: [facility] CATH INVASIVE LOCATION;  Service: Cardiovascular;  Laterality: Left;    CARDIAC CATHETERIZATION N/A 1/7/2025    Procedure: Left Heart Cath;  Surgeon: Mani Salguero MD;  Location: [facility] CATH INVASIVE LOCATION;  Service: Cardiovascular;  Laterality: N/A;    CATARACT EXTRACTION  2006    OU    COLONOSCOPY  2011    COLONOSCOPY N/A 5/27/2022    Procedure: COLONOSCOPY;  Surgeon: Terry Holliday MD;  Location: [facility] ENDOSCOPY;  Service: Gastroenterology;  Laterality: N/A;  polyps    CORONARY STENT PLACEMENT  01/28/2022    RCA    ENDOSCOPY N/A 5/27/2022    Procedure: ESOPHAGOGASTRODUODENOSCOPY with 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, gastric antrum biopsy;  Surgeon: Terry Holliday MD;  Location: [facility] ENDOSCOPY;  Service: Gastroenterology;  Laterality: N/A;  gastritis, gastric ulcer, small bowel AVM    LUNG CANCER SURGERY      upper lobe rt lung cancer 9/2017  at [facility]      General Information       Row Name 01/08/25 1109          Physical Therapy Time and Intention    Document Type evaluation  -CM     Mode of Treatment physical therapy  -CM       Row Name 01/08/25 1109          General Information    Patient Profile Reviewed yes  -CM     Prior Level of  Function independent:;community mobility;driving  per pt, but is poor historian  -CM     Existing Precautions/Restrictions fall  -CM     Barriers to Rehab medically complex;cognitive status;hearing deficit  -CM       Row Name 01/08/25 1109          Living Environment    People in Home spouse  -CM       Row Name 01/08/25 1109          Cognition    Orientation Status (Cognition) oriented to;place  does not know situation or year  -CM       Row Name 01/08/25 1109          Safety Issues/Impairments Affecting Functional Mobility    Safety Issues Affecting Function (Mobility) ability to follow commands;awareness of need for assistance;impulsivity;insight into deficits/self-awareness;judgment;positioning of assistive device;problem-solving;safety precaution awareness;safety precautions follow-through/compliance  -CM     Impairments Affecting Function (Mobility) balance;endurance/activity tolerance;strength  -CM               User Key  (r) = Recorded By, (t) = Taken By, (c) = Cosigned By      Initials Name Provider Type    CM Yvonne Story, PT Physical Therapist                   Mobility       Row Name 01/08/25 1114          Bed Mobility    Bed Mobility supine-sit  -CM     Supine-Sit [place] (Bed Mobility) contact guard;standby assist;verbal cues  -CM     Assistive Device (Bed Mobility) head of bed elevated;bed rails  -CM       Row Name 01/08/25 1114          Sit-Stand Transfer    Sit-Stand [place] (Transfers) contact guard  -CM     Comment, (Sit-Stand Transfer) noted to be having difficulty maintaining static standing balance w/o holding on to furniture, so used rw for gait  -CM       Row Name 01/08/25 1114          Gait/Stairs (Locomotion)    [place] Level (Gait) contact guard  -CM     Assistive Device (Gait) walker, front-wheeled  -CM     Patient was able to Ambulate yes  -CM     Distance in Feet (Gait) 40  -CM     Deviations/Abnormal Patterns (Gait) bilateral deviations;base of support, wide  -CM      Bilateral Gait Deviations forward flexed posture  -CM               User Key  (r) = Recorded By, (t) = Taken By, (c) = Cosigned By      Initials Name Provider Type    Yvonne Key, PT Physical Therapist                   Obj/Interventions       Row Name 01/08/25 1122          Range of Motion Comprehensive    General Range of Motion neck/trunk range of motion deficits identified;bilateral upper extremity ROM WFL;bilateral lower extremity ROM WNL  -CM     Comment, General Range of Motion severe thoracic kyphosis w/ capital extension to be able to look forward. Keeps head in R lateral cervical flexion  -CM       Row Name 01/08/25 1122          Strength Comprehensive (MMT)    General Manual Muscle Testing (MMT) Assessment no strength deficits identified  -CM       Row Name 01/08/25 1122          Motor Skills    Motor Skills coordination  -CM     Coordination fine motor deficit;other (see comments)  unable to follow commands for finger to thumb approximation, despite verbal cues and demonstration  -CM       Row Name 01/08/25 1122          Balance    Balance Assessment sitting static balance;sitting dynamic balance;standing static balance;standing dynamic balance  -CM     Static Sitting Balance standby assist  -CM     Dynamic Sitting Balance standby assist  -CM     Position, Sitting Balance unsupported;sitting edge of bed  -CM     Static Standing Balance contact guard;minimal assist  -CM     Dynamic Standing Balance minimal assist  -CM     Position/Device Used, Standing Balance unsupported  -CM       Row Name 01/08/25 1122          Sensory Assessment (Somatosensory)    Sensory Assessment (Somatosensory) sensation intact  -CM               User Key  (r) = Recorded By, (t) = Taken By, (c) = Cosigned By      Initials Name Provider Type    Yvonne Key, PT Physical Therapist                   Goals/Plan       Row Name 01/08/25 1132          Bed Mobility Goal 1 (PT)    Activity/Assistive Device (Bed Mobility  Goal 1, PT) bed mobility activities, all  -CM     [place] Level/Cues Needed (Bed Mobility Goal 1, PT) independent  -CM     Time Frame (Bed Mobility Goal 1, PT) 2 weeks  -CM       Row Name 01/08/25 1132          Transfer Goal 1 (PT)    Activity/Assistive Device (Transfer Goal 1, PT) transfers, all;walker, rolling  -CM     [place] Level/Cues Needed (Transfer Goal 1, PT) modified independence  -CM     Time Frame (Transfer Goal 1, PT) 2 weeks  -CM       Row Name 01/08/25 1132          Gait Training Goal 1 (PT)    Activity/Assistive Device (Gait Training Goal 1, PT) gait (walking locomotion);walker, rolling  -CM     [place] Level (Gait Training Goal 1, PT) modified independence  -CM     Distance (Gait Training Goal 1, PT) 200 ft  -CM     Time Frame (Gait Training Goal 1, PT) 2 weeks  -CM       Row Name 01/08/25 1132          Therapy Assessment/Plan (PT)    Planned Therapy Interventions (PT) balance training;bed mobility training;gait training;home exercise program;neuromuscular re-education;patient/family education;postural re-education;strengthening;ROM (range of motion);transfer training  -CM               User Key  (r) = Recorded By, (t) = Taken By, (c) = Cosigned By      Initials Name Provider Type    Yvonne Key, PT Physical Therapist                   Clinical Impression       Row Name 01/08/25 1124          Pain    Pretreatment Pain Rating 0/10 - no pain  -CM     Posttreatment Pain Rating 0/10 - no pain  -CM     Pain Management Interventions exercise or physical activity utilized  -CM     Response to Pain Interventions intervention effective per patient report;activity participation with tolerable pain  -CM       Row Name 01/08/25 1124          Plan of Care Review    Plan of Care Reviewed With patient  -CM     Outcome Evaluation 85 yo male adm 1/1/25 for acute altered mental status. Was found to have nstemi and acute delirium, acute on chronic respiratory failure. PMH: nsclc, ckd, tremors,  MI, cva, copd, cad. At baseline, pt reports he is very independent. Reports he drives and does not use assistive device for mobility. Lives w/ spouse in single level home. Pt reports he has rw and cane at home but does not use. Pt is very confused this date, so questionable historian. Today, pt demonstrates good mm strength and comes to sit and stand w/ cga. However, in standing, he is unable to maintain his balance w/o holding on to furniture. Ambulates w/ rw and cga x 40 ft well. Has significant spinal deformity. Recommend home w/ 24 hr care, home health, and use of his rw at home. Will follow.  -CM       Row Name 01/08/25 1131          Therapy Assessment/Plan (PT)    Rehab Potential (PT) good  -CM     Criteria for Skilled Interventions Met (PT) yes;meets criteria;skilled treatment is necessary  -CM     Therapy Frequency (PT) 3 times/wk  -CM     Predicted Duration of Therapy Intervention (PT) until d/c  -CM       Row Name 01/08/25 1131          Vital Signs    Pre SpO2 (%) 94  -CM     O2 Delivery Pre Treatment room air  -CM     Intra SpO2 (%) 88  -CM     O2 Delivery Intra Treatment room air  -CM     Post SpO2 (%) 93  -CM     O2 Delivery Post Treatment room air  -CM     Recovery Time pt reports he wears 2LO2 nearly all of the time at home.  -CM       Row Name 01/08/25 1131          Positioning and Restraints    Pre-Treatment Position in bed  -CM     Post Treatment Position chair  -CM     In Chair notified nsg;sitting;call light within reach;encouraged to call for assist;exit alarm on;legs elevated;with other staff  sitter present; RN reports pt has been trying to get up often  -CM               User Key  (r) = Recorded By, (t) = Taken By, (c) = Cosigned By      Initials Name Provider Type    Yvonne Key, PT Physical Therapist                   Outcome Measures       Row Name 01/08/25 1133 01/08/25 0400       How much help from another person do you currently need...    Turning from your back to your side  while in flat bed without using bedrails? 3  -CM 3  -KRISTEN    Moving from lying on back to sitting on the side of a flat bed without bedrails? 3  -CM 3  -KRISTEN    Moving to and from a bed to a chair (including a wheelchair)? 3  -CM 2  -KRISTEN    Standing up from a chair using your arms (e.g., wheelchair, bedside chair)? 3  -CM 2  -KRISTEN    Climbing 3-5 steps with a railing? 2  -CM 1  -KRISTEN    To walk in hospital room? 3  -CM 1  -KRISTEN    AM-PAC 6 Clicks Score (PT) 17  -CM 12  -KRISTEN              User Key  (r) = Recorded By, (t) = Taken By, (c) = Cosigned By      Initials Name Provider Type    Yvonne Key, PT Physical Therapist    Nabil Gore, RN Registered Nurse                                 Physical Therapy Education       Title: PT OT SLP Therapies (Done)       Topic: Physical Therapy (Done)       Point: Mobility training (Done)       Learning Progress Summary            Patient Acceptance, E,TB, VU,NL,NR by CM at 1/8/2025 1134                      Point: Home exercise program (Done)       Learning Progress Summary            Patient Acceptance, E,TB, VU,NL,NR by CM at 1/8/2025 1134                      Point: Body mechanics (Done)       Learning Progress Summary            Patient Acceptance, E,TB, VU,NL,NR by CM at 1/8/2025 1134                      Point: Precautions (Done)       Learning Progress Summary            Patient Acceptance, E,TB, VU,NL,NR by CM at 1/8/2025 1134                                      User Key       Initials Effective Dates Name Provider Type Discipline     06/16/21 -  Yvonne Story, PT Physical Therapist PT                  PT Recommendation and Plan  Planned Therapy Interventions (PT): balance training, bed mobility training, gait training, home exercise program, neuromuscular re-education, patient/family education, postural re-education, strengthening, ROM (range of motion), transfer training  Outcome Evaluation: 85 yo male adm 1/1/25 for acute altered mental status. Was found to  have nstemi and acute delirium, acute on chronic respiratory failure. PMH: nsclc, ckd, tremors, MI, cva, copd, cad. At baseline, pt reports he is very independent. Reports he drives and does not use assistive device for mobility. Lives w/ spouse in single level home. Pt reports he has rw and cane at home but does not use. Pt is very confused this date, so questionable historian. Today, pt demonstrates good mm strength and comes to sit and stand w/ cga. However, in standing, he is unable to maintain his balance w/o holding on to furniture. Ambulates w/ rw and cga x 40 ft well. Has significant spinal deformity. Recommend home w/ 24 hr care, home health, and use of his rw at home. Will follow.     Time Calculation:   PT Evaluation Complexity  History, PT Evaluation Complexity: 3 or more personal factors and/or comorbidities  Examination of Body Systems (PT Eval Complexity): total of 4 or more elements  Clinical Presentation (PT Evaluation Complexity): evolving  Clinical Decision Making (PT Evaluation Complexity): moderate complexity  Overall Complexity (PT Evaluation Complexity): moderate complexity     PT Charges       Row Name 01/08/25 1134             Time Calculation    Start Time 0925  -CM      Stop Time 0950  -CM      Time Calculation (min) 25 min  -CM      PT Received On 01/08/25  -CM      PT - Next Appointment 01/10/25  -CM      PT Goal Re-Cert Due Date 01/22/25  -CM         Time Calculation- PT    Total Timed Code Minutes- PT 0 minute(s)  -CM                User Key  (r) = Recorded By, (t) = Taken By, (c) = Cosigned By      Initials Name Provider Type     Yvonne Story, PT Physical Therapist                  Therapy Charges for Today       Code Description Service Date Service Provider Modifiers Qty    [id number]  PT EVAL MOD COMPLEXITY 4 1/8/2025 Yvonne Story, PT GP 1            PT G-Codes  AM-PAC 6 Clicks Score (PT): 17  PT Discharge Summary  Anticipated Discharge Disposition (PT): home with  24/7 care, home with home health    Yvonne Story, PT  1/8/2025

## 2025-01-08 NOTE — CASE MANAGEMENT/SOCIAL WORK
Case Management Discharge Note         Home Medical Care Patient indicates having no preference.      Service Provider Services Address Phone Fax Patient Preferred    [facility] [place] [facility] [address] [place] [address] [phone] --                 Transportation Services  Private: Car (with daughter)    Final Discharge Disposition Code: 06 - home with home health care

## 2025-01-08 NOTE — CASE MANAGEMENT/SOCIAL WORK
Continued Stay Note   Pro     Patient Name: Kailash Cooper Jr.  MRN: [id number]  Today's Date: 1/8/2025    Admit Date: 1/1/2025    Plan: DC Plan: Home with [facility] pending acceptance. Order requested. Lives with spouse and son. Daughter will provide transport.   Discharge Plan       Row Name 01/08/25 1326       Plan    Plan DC Plan: Home with [facility] pending acceptance. Order requested. Lives with spouse and son. Daughter will provide transport.    Patient/Family in Agreement with Plan yes    Provided Post Acute Provider List? N/A    Provided Post Acute Provider Quality & Resource List? N/A    Plan Comments CM spoke with Hospitalist and nursing for morning rounds and reviewed chart for clinical updates. PT recommending HH services at DC. CM placed referral in basket for [facility] and liaison notified. Awaiting confirmation of acceptance. Plan for DC home. Nephrology cleared for DC. Will DC home with 24/7 care from son and home health. Follow up IMM delivered to bedside. Daughter to transport when DC orders placed.                 Expected Discharge Date and Time       Expected Discharge Date Expected Discharge Time    Jan 8, 2025               Marjorie Mcfadden RN    Office Phone: [phone]  Office Cell:     [phone]

## 2025-01-09 ENCOUNTER — TELEPHONE (OUTPATIENT)
Dept: CARDIAC REHAB | Facility: HOSPITAL | Age: 85
End: 2025-01-09
Payer: MEDICARE

## 2025-01-09 ENCOUNTER — TRANSITIONAL CARE MANAGEMENT TELEPHONE ENCOUNTER (OUTPATIENT)
Dept: CALL CENTER | Facility: HOSPITAL | Age: 85
End: 2025-01-09
Payer: MEDICARE

## 2025-01-09 NOTE — OUTREACH NOTE
Call Center TCM Note      Flowsheet Row Responses   [facility] patient discharged from? [facility]   Does the patient have one of the following disease processes/diagnoses(primary or secondary)? Acute MI (STEMI,NSTEMI)   TCM attempt successful? Yes   Call start time 1031   Call end time 1035   Discharge diagnosis Non ST elevation myocardial infarction  H/o CAD s/p PCI   Is patient permission given to speak with other caregiver? Yes   List who call center can speak with spouse- Candis   Person spoke with today (if not patient) and relationship spouse   Meds reviewed with patient/caregiver? Yes   Is the patient having any side effects they believe may be caused by any medication additions or changes? No   Does the patient have all prescriptions related to this admission filled (includes statins,anticoagulants,HTN meds,anti-arrhythmia meds) Yes   Is the patient taking all medications as directed (includes completed medication regime)? Yes   Comments Scheduled hospital follow up appt with PCP for 1/27.   Does the patient have an appointment with their PCP within 7-14 days of discharge? Yes   What is the Home health agency?  [facility]   Has home health visited the patient within 72 hours of discharge? Call prior to 72 hours   DME comments Baseline 3L of O2- 98% on 3L of O2, advised spouse of O2 sats drop below 90% to seek urgent care, spouse voiced understanding.   Psychosocial issues? No   Did the patient receive a copy of their discharge instructions? Yes   Nursing interventions Reviewed instructions with patient   What is the patient's perception of their health status since discharge? Improving   Nursing interventions Nurse provided patient education   Is the patient/caregiver able to teach back signs and symptoms of when to call for help immediately: Irregular or rapid heart rate, Nausea or vomiting, Sudden sweating or clammy skin, Shortness of breath at any time, Sudden discomfort  in arms, back, neck or jaw, Sudden chest discomfort, Dizziness or lightheadedness   Nursing interventions Nurse provided patient education   Is the patient/caregiver able to teach back ways to prevent a second heart attack: Take medications, Follow up with MD   If the patient is a current smoker, are they able to teach back resources for cessation? Not a smoker   Is the patient/caregiver able to teach back the hierarchy of who to call/visit for symptoms/problems? PCP, Specialist, Home health nurse, Urgent Care, ED, 911 Yes   TCM call completed? Yes   Wrap up additional comments Per spouse, patient is doing well, denies any questions or concerns, scheduled hospital follow up appt with PCP for 1/27.   Call end time 1035   Would this patient benefit from a Referral to [facility] Social Work? No   Is the patient interested in additional calls from an ambulatory ? No            Veena Adams RN    1/9/2025, 10:35 EST

## 2025-01-13 LAB
QT INTERVAL: 508 MS
QTC INTERVAL: 465 MS

## 2025-01-14 NOTE — DISCHARGE SUMMARY
"[facility] Medicine Services  Discharge Summary    Date of Service: 2025  Patient Name: Kailash Cooper Jr.  : 1940  MRN: [id number]    Date of Admission: 2025  Discharge Diagnosis: NSTEMI (non-ST elevated myocardial infarction)  Date of Discharge: 2025  Primary Care Physician: Madelyn Márquez APRN      Presenting Problem:   NSTEMI (non-ST elevated myocardial infarction) [I21.4]    Active and Resolved Hospital Problems:  Active Hospital Problems    Diagnosis POA    **NSTEMI (non-ST elevated myocardial infarction) [I21.4] Yes    Total occlusion of coronary artery, chronic [I25.82] Yes    Type 2 myocardial infarction [I21.A1] Yes      Resolved Hospital Problems   No resolved problems to display.         Hospital Course     HPI:    \"Kailash Cooper Jr. is a 84 y.o. 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:   EKG showed normal sinus rhythm rate 82, right bundle branch block, left anterior fascicular block.      HS troponin 1165, repeat 1321  Further labs: , sodium 139, potassium 4.1, chloride 106, CO2 26, glucose 101, BUN 23, creatinine 1.7, ALT 21, AST 37, alk phosphatase 68, total bilirubin 0.5, albumin 4.1, calcium 9.7, WBC 11.4, Hgb 10.4, HCT 33.9, .  Urinalysis showed moderate blood, negative nitrates, negative leukocytes.       CT head impression no acute intracranial bleed, mass effect, or midline shift.  Age compatible atrophy and chronic deep white matter ischemic changes. Signed by Dr. Patrick Allison  CXR impression chronic scarring with superimposed mild interstitial edema or pneumonia. Signed by Dr. Patrick Allison     ER documentation states the patient has not complained of chest pain.  He has been restless and trying to climb out of bed so we are " "giving him Ativan.  Discussed patient with cardiology Dr. Aden and Sharan with the hospitalist. CXR did reveal possible pneumonia so he was given 1G of rocephin     Patient was planking in the bed trying to get out and go to the grocery store. He was given Iv ativan and has been resting. Nurse at bedside stated the patient is able to state his name and follows some directions when asked. Family reported he will sundown and more so when in the hospital.    \"    Hospital Course:    Patient hospitalized with acute delirium and chest pain and found to have NSTEMI 2/2 Takotsubo syndrome with 100% Lcx stenosis contributing to this. Patient was medically optimized by nephrology and internal medicine service before being taken to cath lab. In Cath lab noted to have above findings with medical management recommended. Discharged afterwards with recommendation for followup with PCP, Nephrology, and Cardiology service.        DISCHARGE Follow Up Recommendations for labs and diagnostics:     Cardiology  Nephrology  PCP        Day of Discharge     Vital Signs:       Physical Exam:  Physical Exam  Constitutional:       Appearance: Normal appearance.   Cardiovascular:      Rate and Rhythm: Normal rate and regular rhythm.      Pulses: Normal pulses.      Heart sounds: Normal heart sounds.   Pulmonary:      Effort: Pulmonary effort is normal.      Breath sounds: Normal breath sounds.   Abdominal:      General: Abdomen is flat.      Palpations: Abdomen is soft.   Skin:     General: Skin is warm.      Capillary Refill: Capillary refill takes less than 2 seconds.   Neurological:      Mental Status: He is alert.            Pertinent  and/or Most Recent Results     LAB RESULTS:      Lab 01/08/25  0440 01/07/25  0603   WBC 7.16 7.75   HEMOGLOBIN 8.9* 10.0*   HEMATOCRIT 27.9* 30.6*   PLATELETS 215 216   NEUTROS ABS 5.30 5.44   IMMATURE GRANS (ABS) 0.06* 0.10*   LYMPHS ABS 0.97 1.23   MONOS ABS 0.58 0.68   EOS ABS 0.22 0.26   .1* " 100.0*         Lab 01/08/25  0440 01/07/25  0603   SODIUM 138 138   POTASSIUM 3.6 4.2   CHLORIDE 106 103   CO2 24.6 26.3   ANION GAP 7.4 8.7   BUN 18 22   CREATININE 1.87* 1.47*   EGFR 35.0* 46.7*   GLUCOSE 146* 96   CALCIUM 8.7 9.5                 Lab 01/08/25  0440   CHOLESTEROL 134   LDL CHOL 66   HDL CHOL 50   TRIGLYCERIDES 94             Brief Urine Lab Results  (Last result in the past 365 days)        Color   Clarity   Blood   Leuk Est   Nitrite   Protein   CREAT   Urine HCG        01/01/25 1746 Yellow   Clear   Moderate (2+)   Negative   Negative   >=300 mg/dL (3+)                 Microbiology Results (last 10 days)       ** No results found for the last 240 hours. **            MRI Brain Without Contrast    Result Date: 1/3/2025  Impression: Impression: Motion-degraded exam demonstrating moderate typical chronic and age-related findings. No evidence of acute infarct, hemorrhage or mass effect. Electronically Signed: Ortega eHrrera MD  1/3/2025 10:30 PM EST  Workstation ID: [id number]    CT Head Without Contrast    Result Date: 1/1/2025  Impression: Impression: 1.The exam is essentially nondiagnostic due to considerable motion artifact. 2.Volume loss secondary to cerebral atrophy. 3.Suggestion of chronic white matter microvascular ischemia. 4.I cannot exclude a small subdural hematoma or acute ischemia. Electronically Signed: Josiah Lizarraga MD  1/1/2025 4:39 PM EST  Workstation ID: [id number]     Results for orders placed during the hospital encounter of 01/26/22    Duplex Venous Lower Extremity - Bilateral CAR    Interpretation Summary  · Normal bilateral lower extremity venous duplex scan.      Results for orders placed during the hospital encounter of 01/26/22    Duplex Venous Lower Extremity - Bilateral CAR    Interpretation Summary  · Normal bilateral lower extremity venous duplex scan.      Results for orders placed during the hospital encounter of 01/01/25    Adult Transthoracic Echo Complete W/ Cont if  Necessary Per Protocol    Interpretation Summary    Left ventricular systolic function is normal. Calculated left ventricular EF = 56% Left ventricular ejection fraction appears to be 56 - 60%.    Left ventricular diastolic function is consistent with (grade II w/high LAP) pseudonormalization.    Mild to moderate aortic valve stenosis is present.    Estimated right ventricular systolic pressure from tricuspid regurgitation is normal (<35 mmHg).      Labs Pending at Discharge:  Pending Results       None            Procedures Performed  Procedure(s):  Left Heart Cath         Consults:   Consults       Date and Time Order Name Status Description    1/8/2025  9:11 AM Inpatient Nephrology Consult Completed     1/3/2025 11:06 AM Inpatient Psychiatrist Consult Completed     1/1/2025  3:26 AM Inpatient Cardiology Consult Completed               Discharge Details        Discharge Medications        New Medications        Instructions Start Date   aspirin 81 MG EC tablet   81 mg, Oral, Daily      atorvastatin 10 MG tablet  Commonly known as: LIPITOR   10 mg, Oral, Nightly      isosorbide mononitrate 30 MG 24 hr tablet  Commonly known as: IMDUR   30 mg, Oral, Daily      metoprolol succinate XL 25 MG 24 hr tablet  Commonly known as: TOPROL-XL   12.5 mg, Oral, Every 24 Hours Scheduled             Changes to Medications        Instructions Start Date   oxyCODONE 15 MG immediate release tablet  Commonly known as: ROXICODONE  What changed: Another medication with the same name was removed. Continue taking this medication, and follow the directions you see here.   15 mg, Oral, Every 6 Hours PRN             Continue These Medications        Instructions Start Date   allopurinol 100 MG tablet  Commonly known as: ZYLOPRIM   200 mg, Daily      budesonide 0.25 MG/2ML nebulizer solution  Commonly known as: PULMICORT   0.25 mg, Nebulization, Daily - RT      cetirizine 10 MG tablet  Commonly known as: zyrTEC   10 mg, Oral, Daily       clopidogrel 75 MG tablet  Commonly known as: PLAVIX   75 mg, Oral, Daily      cyclobenzaprine 10 MG tablet  Commonly known as: FLEXERIL   10 mg, Nightly      erythromycin 5 MG/GM ophthalmic ointment  Commonly known as: ROMYCIN   INSTILL 1 APPLICATION IN EACH AFFECTED EYE THREE TIMES DAILY      ferrous sulfate 325 (65 FE) MG EC tablet   325 mg, Oral, Daily With Breakfast      furosemide 40 MG tablet  Commonly known as: LASIX   40 mg, Oral, Daily      glimepiride 1 MG tablet  Commonly known as: AMARYL   1 mg, Before Breakfast      megestrol 40 MG tablet  Commonly known as: MEGACE   40 mg, Oral, Daily      nitroglycerin 0.3 MG SL tablet  Commonly known as: Nitrostat   0.3 mg, Sublingual, Every 5 Minutes PRN, Take no more than 3 doses in 15 minutes.      pantoprazole 40 MG EC tablet  Commonly known as: PROTONIX   Take 1 tablet by mouth twice daily      potassium chloride 20 MEQ CR tablet  Commonly known as: Klor-Con M20   Take 2 tablets now      rOPINIRole 0.25 MG tablet  Commonly known as: REQUIP   0.25 mg, Oral, Nightly, Take 1 hour before bedtime.      tamsulosin 0.4 MG capsule 24 hr capsule  Commonly known as: FLOMAX   1 capsule, 2 Times Daily      vitamin B-12 1000 MCG tablet  Commonly known as: CYANOCOBALAMIN   1,000 mcg, 2 Times Daily             Stop These Medications      amLODIPine 10 MG tablet  Commonly known as: NORVASC     hydrALAZINE 50 MG tablet  Commonly known as: APRESOLINE     oxyCODONE-acetaminophen  MG per tablet  Commonly known as: Percocet     pravastatin 40 MG tablet  Commonly known as: PRAVACHOL              No Known Allergies      Discharge Disposition:   Home or Self Care    Diet:  Hospital:No active diet order        Discharge Activity:         CODE STATUS:  Code Status and Medical Interventions: CPR (Attempt to Resuscitate); Full Support   Ordered at: 01/01/25 0040     Level Of Support Discussed With:    Patient     Code Status (Patient has no pulse and is not breathing):    CPR  (Attempt to Resuscitate)     Medical Interventions (Patient has pulse or is breathing):    Full Support         Future Appointments   Date Time Provider Department Center   1/27/2025  9:30 AM Madelyn Rogers APRN [facility]   2/17/2025 10:10 AM Mc Key MD [facility]       Additional Instructions for the Follow-ups that You Need to Schedule       Ambulatory Referral to Cardiac Rehab   As directed      Ambulatory Referral to Home Health (Hospital)   As directed      Face to Face Visit Date: 1/8/2025   Follow-up provider for Plan of Care?: I treated the patient in an acute care facility and will not continue treatment after discharge.   Follow-up provider: MADELYN ROGERS [[id number]]   Reason/Clinical Findings: debility   Describe mobility limitations that make leaving home difficult: debility   Nursing/Therapeutic Services Requested: Physical Therapy   Frequency: 1 Week 1                Time spent on Discharge including face to face service:  50 minutes    Signature: Electronically signed by Tono Fuentes MD, 01/13/25, 21:45 EST.  [facility] Hospitalist Team

## 2025-01-17 RX ORDER — ROPINIROLE 0.25 MG/1
TABLET, FILM COATED ORAL
Qty: 30 TABLET | Refills: 0 | Status: SHIPPED | OUTPATIENT
Start: 2025-01-17

## 2025-01-27 NOTE — PROGRESS NOTES
"Enter Query Response Below      Query Response: Pneumonia ruled in, viral             If applicable, please update the problem list.     Patient: Kailash Cooper Jr.        : 1940  Account: [id number]           Admit Date: 2025        How to Respond to this query:       a. Click New Note     b. Answer query within the yellow box.                c. Update the Problem List, if applicable.      If you have any questions about this query contact me at: [email or web address]     Dr. Fuentes:    Risk factors: 84-year-old transferred in with elevated troponin and altered mental status.  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 Appears to be more or less at his baseline oxygen status, WBC 11.4, Noted lab results so far.  This does not seem to be driven by bacterial process.  Clinically seems more viral in nature.  DC antibiotics and monitor.\"  Treatment:  Rocephin 1 gm IV -.  Pneumonia is not noted as a diagnosis in the Discharge summary.    Please clarify the following:    Pneumonia ruled in, viral  Pneumonia ruled out  Other- specify______  Unable to determine    By submitting this query, we are merely seeking further clarification of documentation to accurately reflect all conditions that you are monitoring, evaluating, treating or that extend the hospitalization or utilize additional resources of care. Please utilize your independent clinical judgment when addressing the question(s) above.     This query and your response, once completed, will be entered into the legal medical record.    Sincerely,  Sherin Hartman RN, CCDS  Clinical Documentation [employer] " Program

## 2025-01-30 RX ORDER — CLOPIDOGREL BISULFATE 75 MG/1
75 TABLET ORAL DAILY
Qty: 90 TABLET | Refills: 0 | Status: SHIPPED | OUTPATIENT
Start: 2025-01-30

## 2025-02-06 RX ORDER — LEVOFLOXACIN 750 MG/1
750 TABLET, FILM COATED ORAL DAILY
Qty: 5 TABLET | Refills: 0 | Status: SHIPPED | OUTPATIENT
Start: 2025-02-06

## 2025-02-10 NOTE — TELEPHONE ENCOUNTER
Caller: DONNELL DURAN    Relationship: Emergency Contact    Best call back number: [phone]     What specialty or service is being requested: HOSPICE     Any additional details: WANTING TO KNOW IF THERE ARE ANY RECOMMENDATION FOR HOSPICE CARE PLEASE CALL AND ADVISE

## 2025-02-20 ENCOUNTER — TELEPHONE (OUTPATIENT)
Dept: FAMILY MEDICINE CLINIC | Facility: CLINIC | Age: 85
End: 2025-02-20

## 2025-02-20 NOTE — TELEPHONE ENCOUNTER
Hub staff attempted to follow warm transfer process and was unsuccessful     Caller: Candis Cooper    Relationship to patient: Self    Best call back number: [phone]    Patient is needing: PATIENT WOULD LIKE TO KNOW WHEN THE FILES AT THE CORONERS OFFICE WAS PICKED UP? PLEASE LET HER ASAP.

## (undated) DEVICE — CATH DIAG IMPULSE PIG .056 6F 110CM

## (undated) DEVICE — CATH DIAG IMPULSE FL4 6F 100CM

## (undated) DEVICE — BOWL PLSTC MD 16OZ BLU STRL

## (undated) DEVICE — GUIDE CATHETER: Brand: MACH1™

## (undated) DEVICE — STPCK 3WY HP ROT

## (undated) DEVICE — PINNACLE INTRODUCER SHEATH: Brand: PINNACLE

## (undated) DEVICE — PK TRY HEART CATH 50

## (undated) DEVICE — MEDICINE CUP, GRADUATED, STER: Brand: MEDLINE

## (undated) DEVICE — TBG NAMIC PRESS MONTR A/ F/M 12IN

## (undated) DEVICE — CATH DIAG IMPULSE FR4 6F 100CM

## (undated) DEVICE — ERBE NESSY®PLATE 170 SPLIT; 168CM²; CABLE 3M: Brand: ERBE

## (undated) DEVICE — KT DYEVERT PLS EZ W/SMART SYR FOR HI VISC CONTRST DISP

## (undated) DEVICE — DGW .035 FC J3MM 150CM TEF HEP: Brand: EMERALD

## (undated) DEVICE — BITEBLOCK ENDO W/STRAP 60F A/ LF DISP

## (undated) DEVICE — PK ENDO GI 50

## (undated) DEVICE — ELECTRD DEFIB M/FUNC PROPADZ RADIOL 2PK

## (undated) DEVICE — FIAPC® PROBE W/ FILTER 2200 A OD 2.3MM/6.9FR; L 2.2M/7.2FT: Brand: ERBE

## (undated) DEVICE — GW PTFE EMERALD HEPCOAT FC J TIP STD .035 3MM 150CM

## (undated) DEVICE — RADIFOCUS OBTURATOR: Brand: RADIFOCUS

## (undated) DEVICE — ST ACC MICROPUNCTURE STFF/CANN PLAT/TP 4F 21G 40CM

## (undated) DEVICE — DGW .035 FC J3MM 260CM TEF: Brand: EMERALD

## (undated) DEVICE — HI-TORQUE BALANCE MIDDLEWEIGHT UNIVERSAL II GUIDE WIRE STRAIGHT TIP PAK  190 CM: Brand: HI-TORQUE BALANCE MIDDLEWEIGHT UNIVERSAL II

## (undated) DEVICE — DEV INFL COMPAK W/ACCESSPLUS IN4530

## (undated) DEVICE — TREK CORONARY DILATATION CATHETER 3.0 MM X 20 MM / RAPID-EXCHANGE: Brand: TREK

## (undated) DEVICE — CATH DIAG IMPULSE FL3.5 6F 100CM